# Patient Record
Sex: MALE | Race: WHITE | NOT HISPANIC OR LATINO | Employment: OTHER | URBAN - NONMETROPOLITAN AREA
[De-identification: names, ages, dates, MRNs, and addresses within clinical notes are randomized per-mention and may not be internally consistent; named-entity substitution may affect disease eponyms.]

---

## 2018-05-10 ENCOUNTER — HOSPITAL ENCOUNTER (OUTPATIENT)
Dept: GENERAL RADIOLOGY | Facility: HOSPITAL | Age: 63
Discharge: HOME | End: 2018-05-10
Admitting: FAMILY MEDICINE

## 2018-05-10 DIAGNOSIS — R13.12 OROPHARYNGEAL DYSPHAGIA: ICD-10-CM

## 2018-05-10 PROCEDURE — G8998 SWALLOW D/C STATUS: HCPCS | Performed by: SPEECH-LANGUAGE PATHOLOGIST

## 2018-05-10 PROCEDURE — A9270 NON-COVERED ITEM OR SERVICE: HCPCS | Performed by: RADIOLOGY

## 2018-05-10 PROCEDURE — G8996 SWALLOW CURRENT STATUS: HCPCS | Performed by: SPEECH-LANGUAGE PATHOLOGIST

## 2018-05-10 PROCEDURE — 92611 MOTION FLUOROSCOPY/SWALLOW: CPT | Performed by: SPEECH-LANGUAGE PATHOLOGIST

## 2018-05-10 PROCEDURE — G8997 SWALLOW GOAL STATUS: HCPCS | Performed by: SPEECH-LANGUAGE PATHOLOGIST

## 2018-05-10 PROCEDURE — 63710000001 BARIUM SULFATE 96 % RECONSTITUTED SUSPENSION: Performed by: RADIOLOGY

## 2018-05-10 PROCEDURE — 74230 X-RAY XM SWLNG FUNCJ C+: CPT

## 2018-05-10 RX ADMIN — BARIUM SULFATE 40 ML: 960 POWDER, FOR SUSPENSION ORAL at 09:00

## 2018-05-10 NOTE — THERAPY EVALUATION
Outpatient Speech Language Pathology   Adult Swallow Initial Evaluation  [facility]     Patient Name: Bautista Grubbs  : 1955  MRN: [id number]  Today's Date: 5/10/2018         Visit Date: 05/10/2018   There is no problem list on file for this patient.       No past medical history on file.     No past surgical history on file.  SPEECH PATHOLOGY MODIFIED BARIUM SWALLOW STUDY (VFSS)    Chief Complaint: coughing on liquids    Medical Diagnoses: dysphagia    Past Medical History: severe intellectual disorder, bi-temporal seizure disorder, scoliosis        Present diet textures: NTL, ground meat, chopped food    Views: Patient seated at 90 degrees in:    X__lateral view    __A/P    Ability to follow instructions:  __Excellent   __Good __Fair  _X_Poor      Oral Motor Status Exam:    Dentition: X__Natural  __Dentures   __Edentulous         __Partials         Condition/Fit: missing most teeth    Presence of Oral Motor Weakness:  Yes, general weakness, decreased bolus control.l      The following consistencies were given, with symptoms as noted:    Consistency Method Labial Seal Oral Prep AP Transit Premature Spillage Delayed Swallow Reflex Laryngeal Penetration Aspiration Pooling Valleculae Pooling pyriform sinuses   Thin  Cup     yes yes   yes yes   Thin   cup    yes yes       Puree spoon    yes    Yes, after the swallow Yes, after the swallow   Cookie         Yes, after the swallow Yes, after the swallow     Impairments:  _X_Premature loss of bolus  __Reduced velopharyngeal closure  _X_Decreased lingual propulsion  __Reduced epiglottic inversion  __Decreased hyolaryngeal elevation  __Reduced laryngeal closure  __Reduced esophageal sphincter opening      Penetration-Aspiration Scale Rating:     Category Score Descriptions   No penetration or aspiration 1 Contrast does not enter the airway   Penetration 2 Contrast enters the airway, remains above vocal folds; no residue    3 Contrast remains above vocal  folds; visible residue remains    4 Contrast contacts vocal folds; no residue    5 Contrast contacts vocal folds; visible residue remains   Aspiration 6 Contrast passes glottis; no subglottic residue visible    7 Contrast passes glottis; visible subglottic residue despite patient's response    8 Contrast passes glottis; visible subglottic residue; absent patient response       Aspiration:  __Prior __During __After the swallow    Cough: X__Delayed __Immediate   __Absent with penetration/aspiration    Cough response:  __Weak __Strong        Dysphagia Scale Rating:    Dysphagia Rating Scale Explanation   0   Normal swallowing mechanism     1 Minimal Dysphagia- video swallow shows slight deviance from a normal swallow. Patient may report change in sensation during swallow. No change in diet is required.     2 Mild Dysphagia- oropharyngeal dysphagia present, which can be managed by specific swallow suggestions. Slight modification in consistency of diet may be indicated.      3 Mild-Moderate Dysphagia- potential for aspiration exists but is diminished by specific swallow techniques and a modified diet. Time for eating is significantly increased; thus supplemental nutrition may be indicated.      4 Moderate Dysphagia- significant potential for aspiration exists. Trace aspiration of one or more consistencies may be seen under videofluoroscopy. Patient may eat certain consistencies by using specific techniques to minimize potential for aspiration and/or facilitate swallowing. Supervision at mealtimes required. May require supplemental nutrition orally or via feeding tube.      5 Moderately Severe Dysphagia- patient aspirates 5% to 10% on one or more consistencies, with potential for aspiration on all consistencies. Potential for aspiration minimized by specific swallow instructions. Cough reflex absent or nonprotective. Alterative mode of feeding required to maintain patient's nutritional needs. If pulmonary status is  "compromised, \"nothing by mouth\" may be indicated.      6 Severe Dysphagia- more than 10% aspiration for all consistencies. \"Nothing by mouth\" recommended.          Recommendation:  Based on performance of MBS , recommend advance to thin liquids and consider flow controlled cup if cough persists at bedside.  Recommend continue mech soft solids or chopped foods d/t pt having increased oral transit and decreased mastication skills.  He may also require monitoring for use of compensatory strats as he has decreaed cognition.      Compensatory Swallow Strategies suggested:   _x_sit 90 degrees for all PO and ___ minutes after meals/snacks/medication   x__small bites   x__small sips   _x_cyclic eating (2 bites/1 sip)   _x_eat/drink slowly   _x_chew food well   _x_empty mouth each time between bites   _x_swallow _1_ times after each 1__bite _1_sip _1_at the end of the meal   _x_check mouth for pocketing   _x_supervision at all meals for compliance with compensatory swallow strategies.               Visit Dx:     ICD-10-CM ICD-9-CM   1. Oropharyngeal dysphagia R13.12 787.22                 SLP Adult Swallow Evaluation - 05/10/18 0900        Rehab Evaluation    Document Type evaluation  -EC    Total Evaluation Minutes, SLP 30  -EC    Subjective Information no complaints  -EC    Patient Observations alert;cooperative;agree to therapy  -EC    Patient/Family Observations staff from [facility] present  -EC    Patient Effort good  -EC    Comment pt difficuylty following directions  -EC    Symptoms Noted During/After Treatment none  -EC       General Information    Patient Profile Reviewed yes  -EC    Pertinent History Of Current Problem recent change to NTL via cup rim   -EC    Current Method of Nutrition ground;soft textures;chopped  -EC    Barriers to Rehab language barrier;cognitive status;previous functional deficit  -EC    Patient's Goals for Discharge return to all previous roles/activities  -EC       Oral Motor and Function    " Dentition Assessment missing teeth;teeth are in poor condition  -EC    Secretion Management WNL/WFL  -EC    Mucosal Quality moist, healthy  -EC    Volitional Swallow delayed  -EC    Volitional Cough unable to elicit  -EC       General Eating/Swallowing Observations    Respiratory Support Currently in Use room air  -EC    Eating/Swallowing Skills self-fed;fed by SLP  -EC    Positioning During Eating upright 90 degree  -EC    Utensils Used spoon;cup  -EC    Consistencies Trialed soft textures;pureed;thin liquids  -EC       Clinical Swallow Eval    Oral Prep Phase impaired  -EC    Oral Transit impaired  -EC    Oral Residue WFL  -EC    Pharyngeal Phase suspected pharyngeal impairment  -EC    Esophageal Phase unremarkable  -EC       Oral Prep Concerns    Oral Prep Concerns prolonged mastication;incomplete or weak lip closure around spoon;incomplete bolus preparation  -EC    Prolonged Mastication pudding;mechanical soft  -EC    Incomplete or Weak Lip Closure Around Spoon pudding;mechanical soft  -EC    Incomplete Bolus Preparation mechanical soft  -EC    Oral Prep Concerns, Comment pt has decreased mastication of soft foods and will benfit from mech soft or chopped foods.    -EC       Oral Transit Concerns    Oral Transit Concerns delayed initiation of bolus transit;increased oral transit time  -EC    Delayed Intiation of Bolus Transit pudding;mechanical soft  -EC    Increased Oral Transit Time pudding;mechanical soft  -EC       Pharyngeal Phase Concerns    Pharyngeal Phase Concerns multiple swallows  -EC    Multiple Swallows thin;pudding;mechanical soft  -EC    Pharyngeal Phase Concerns, Comment pt clears residue with second swallow  -EC       MBS/VFSS    Utensils Used spoon;cup  -EC    Consistencies Trialed soft textures;pureed;thin liquids  -EC       MBS/VFSS Interpretation    Oral Prep Phase impaired oral phase of swallowing  -EC    Oral Transit Phase impaired  -EC    VFSS Summary no aspiration noted.  flash  penetration of thin liquids with delayed swallow initation noted  -EC    Oral Phase, Comment increased time for mastication of puree and mech soft solids   -EC       Oral Preparatory Phase    Oral Preparatory Phase prolonged manipulation  -EC    Prolonged Manipulation mechanical soft  -EC    Oral Preparatory Phase, Comment increased time noted with chewable.    -EC       Oral Transit Phase    Impaired Oral Transit Phase delayed initiation of bolus transit;increased A-P transit time  -EC    Delayed Initiation of bolus transit pudding/puree;mechanical soft  -EC    Increased A-P Transit Time mechanical soft;pudding/puree  -EC    Oral Transit Phase, Comment cleared residue with second swallow  -EC       Initiation of Pharyngeal Swallow    Initiation of Pharyngeal Swallow bolus in valleculae;bolus in pyriform sinuses  -EC    Pharyngeal Phase, Comment cleared residue with second swallow  -EC       Esophageal Phase    Esophageal Phase see radiology report for further details  -EC      User Key  (r) = Recorded By, (t) = Taken By, (c) = Cosigned By    Initials Name Provider Type    EC Светлана Coronado CCC-SLP Speech and Language Pathologist                              OP SLP Education     Row Name 05/10/18 1003       Education    Barriers to Learning Language barrier;Decreased comprehension  -EC    Education Provided Family/caregivers demonstrated recommended strategies  -EC    Assessed Learning needs  -EC    Learning Motivation Weak  -EC    Learning Method Explanation;Demonstration  -EC    Teaching Response Demonstrated understanding  -EC      User Key  (r) = Recorded By, (t) = Taken By, (c) = Cosigned By    Initials Name Effective Dates    EC Светлана Coronado CCC-SLP 03/07/18 -                             Time Calculation:   SLP Start Time: 0900  SLP Stop Time: 0930  SLP Time Calculation (min): 30 min  Total Timed Code Minutes- SLP: 30 minute(s)    Therapy Charges for Today     Code Description Service Date Service  Provider Modifiers Qty    02210104451 HC ST SWALLOWING CURRENT STATUS 5/10/2018 BRITTANY MetzSLP GN, CK 1    59748996212 HC ST SWALLOWING PROJECTED 5/10/2018 TORIBIO Metz GN, CJ 1    06221924305 HC ST SWALLOWING DISCHARGE 5/10/2018 TORIBIO Metz, CJ 1    97010712092 HC ST MOTION FLUORO EVAL SWALLOW 2 5/10/2018 TORIBIO Metz GN 1          SLP G-Codes  Functional Limitations: Swallowing  Swallow Current Status (): At least 40 percent but less than 60 percent impaired, limited or restricted  Swallow Goal Status (): At least 20 percent but less than 40 percent impaired, limited or restricted  Swallow Discharge Status (): At least 20 percent but less than 40 percent impaired, limited or restricted        TORIBIO Barone  5/10/2018

## 2018-12-06 ENCOUNTER — OUTSIDE FACILITY SERVICE (OUTPATIENT)
Dept: FAMILY MEDICINE CLINIC | Facility: CLINIC | Age: 63
End: 2018-12-06

## 2018-12-06 PROCEDURE — 99235 HOSP IP/OBS SAME DATE MOD 70: CPT | Performed by: FAMILY MEDICINE

## 2018-12-08 ENCOUNTER — APPOINTMENT (OUTPATIENT)
Dept: CT IMAGING | Facility: HOSPITAL | Age: 63
End: 2018-12-08

## 2018-12-08 ENCOUNTER — APPOINTMENT (OUTPATIENT)
Dept: GENERAL RADIOLOGY | Facility: HOSPITAL | Age: 63
End: 2018-12-08

## 2018-12-08 ENCOUNTER — HOSPITAL ENCOUNTER (OUTPATIENT)
Facility: HOSPITAL | Age: 63
Setting detail: OBSERVATION
LOS: 1 days | Discharge: HOME HEALTH | End: 2018-12-11
Attending: EMERGENCY MEDICINE | Admitting: EMERGENCY MEDICINE

## 2018-12-08 DIAGNOSIS — Z74.09 IMPAIRED MOBILITY AND ADLS: ICD-10-CM

## 2018-12-08 DIAGNOSIS — R13.10 DYSPHAGIA, UNSPECIFIED TYPE: ICD-10-CM

## 2018-12-08 DIAGNOSIS — R55 SYNCOPE, UNSPECIFIED SYNCOPE TYPE: Primary | ICD-10-CM

## 2018-12-08 DIAGNOSIS — Z78.9 IMPAIRED MOBILITY AND ADLS: ICD-10-CM

## 2018-12-08 DIAGNOSIS — J18.9 PNEUMONIA OF RIGHT LOWER LOBE DUE TO INFECTIOUS ORGANISM: ICD-10-CM

## 2018-12-08 DIAGNOSIS — Z74.09 IMPAIRED FUNCTIONAL MOBILITY, BALANCE, GAIT, AND ENDURANCE: ICD-10-CM

## 2018-12-08 LAB
ALBUMIN SERPL-MCNC: 4 G/DL (ref 3.4–4.8)
ALBUMIN/GLOB SERPL: 1.4 G/DL (ref 1.1–1.8)
ALP SERPL-CCNC: 92 U/L (ref 38–126)
ALT SERPL W P-5'-P-CCNC: 57 U/L (ref 21–72)
ANION GAP SERPL CALCULATED.3IONS-SCNC: 10 MMOL/L (ref 5–15)
AST SERPL-CCNC: 39 U/L (ref 17–59)
BASOPHILS # BLD AUTO: 0.01 10*3/MM3 (ref 0–0.2)
BASOPHILS NFR BLD AUTO: 0.1 % (ref 0–2)
BILIRUB SERPL-MCNC: 0.4 MG/DL (ref 0.2–1.3)
BILIRUB UR QL STRIP: NEGATIVE
BUN BLD-MCNC: 17 MG/DL (ref 7–21)
BUN/CREAT SERPL: 21 (ref 7–25)
CALCIUM SPEC-SCNC: 9.4 MG/DL (ref 8.4–10.2)
CHLORIDE SERPL-SCNC: 98 MMOL/L (ref 95–110)
CLARITY UR: ABNORMAL
CO2 SERPL-SCNC: 27 MMOL/L (ref 22–31)
COLOR UR: YELLOW
CREAT BLD-MCNC: 0.81 MG/DL (ref 0.7–1.3)
D-LACTATE SERPL-SCNC: 1 MMOL/L (ref 0.5–2)
DEPRECATED RDW RBC AUTO: 42.6 FL (ref 35.1–43.9)
EOSINOPHIL # BLD AUTO: 0.11 10*3/MM3 (ref 0–0.7)
EOSINOPHIL NFR BLD AUTO: 1.2 % (ref 0–7)
ERYTHROCYTE [DISTWIDTH] IN BLOOD BY AUTOMATED COUNT: 12.8 % (ref 11.5–14.5)
FLUAV AG NPH QL: NEGATIVE
FLUBV AG NPH QL IA: NEGATIVE
GFR SERPL CREATININE-BSD FRML MDRD: 97 ML/MIN/1.73 (ref 49–113)
GLOBULIN UR ELPH-MCNC: 2.8 GM/DL (ref 2.3–3.5)
GLUCOSE BLD-MCNC: 103 MG/DL (ref 60–100)
GLUCOSE UR STRIP-MCNC: NEGATIVE MG/DL
HCT VFR BLD AUTO: 35.2 % (ref 39–49)
HGB BLD-MCNC: 12.6 G/DL (ref 13.7–17.3)
HGB UR QL STRIP.AUTO: NEGATIVE
HOLD SPECIMEN: NORMAL
IMM GRANULOCYTES # BLD: 0.03 10*3/MM3 (ref 0–0.02)
IMM GRANULOCYTES NFR BLD: 0.3 % (ref 0–0.5)
KETONES UR QL STRIP: NEGATIVE
LEUKOCYTE ESTERASE UR QL STRIP.AUTO: NEGATIVE
LYMPHOCYTES # BLD AUTO: 0.48 10*3/MM3 (ref 0.6–4.2)
LYMPHOCYTES NFR BLD AUTO: 5.2 % (ref 10–50)
MAGNESIUM SERPL-MCNC: 2 MG/DL (ref 1.6–2.3)
MCH RBC QN AUTO: 32.6 PG (ref 26.5–34)
MCHC RBC AUTO-ENTMCNC: 35.8 G/DL (ref 31.5–36.3)
MCV RBC AUTO: 91.2 FL (ref 80–98)
MONOCYTES # BLD AUTO: 0.83 10*3/MM3 (ref 0–0.9)
MONOCYTES NFR BLD AUTO: 9.1 % (ref 0–12)
NEUTROPHILS # BLD AUTO: 7.7 10*3/MM3 (ref 2–8.6)
NEUTROPHILS NFR BLD AUTO: 84.1 % (ref 37–80)
NITRITE UR QL STRIP: NEGATIVE
NT-PROBNP SERPL-MCNC: 74.3 PG/ML (ref 0–900)
PH UR STRIP.AUTO: 7.5 [PH] (ref 5–9)
PLATELET # BLD AUTO: 266 10*3/MM3 (ref 150–450)
PMV BLD AUTO: 9.8 FL (ref 8–12)
POTASSIUM BLD-SCNC: 4 MMOL/L (ref 3.5–5.1)
PROT SERPL-MCNC: 6.8 G/DL (ref 6.3–8.6)
PROT UR QL STRIP: NEGATIVE
RBC # BLD AUTO: 3.86 10*6/MM3 (ref 4.37–5.74)
SODIUM BLD-SCNC: 135 MMOL/L (ref 137–145)
SP GR UR STRIP: 1.01 (ref 1–1.03)
TROPONIN I SERPL-MCNC: <0.012 NG/ML
UROBILINOGEN UR QL STRIP: ABNORMAL
WBC NRBC COR # BLD: 9.16 10*3/MM3 (ref 3.2–9.8)
WHOLE BLOOD HOLD SPECIMEN: NORMAL

## 2018-12-08 PROCEDURE — 96372 THER/PROPH/DIAG INJ SC/IM: CPT

## 2018-12-08 PROCEDURE — 74018 RADEX ABDOMEN 1 VIEW: CPT

## 2018-12-08 PROCEDURE — 99284 EMERGENCY DEPT VISIT MOD MDM: CPT

## 2018-12-08 PROCEDURE — G0378 HOSPITAL OBSERVATION PER HR: HCPCS

## 2018-12-08 PROCEDURE — 83735 ASSAY OF MAGNESIUM: CPT | Performed by: EMERGENCY MEDICINE

## 2018-12-08 PROCEDURE — 25010000002 ZIPRASIDONE MESYLATE PER 10 MG: Performed by: EMERGENCY MEDICINE

## 2018-12-08 PROCEDURE — 83605 ASSAY OF LACTIC ACID: CPT | Performed by: FAMILY MEDICINE

## 2018-12-08 PROCEDURE — 70450 CT HEAD/BRAIN W/O DYE: CPT

## 2018-12-08 PROCEDURE — 87040 BLOOD CULTURE FOR BACTERIA: CPT | Performed by: FAMILY MEDICINE

## 2018-12-08 PROCEDURE — 80053 COMPREHEN METABOLIC PANEL: CPT | Performed by: EMERGENCY MEDICINE

## 2018-12-08 PROCEDURE — 36415 COLL VENOUS BLD VENIPUNCTURE: CPT | Performed by: FAMILY MEDICINE

## 2018-12-08 PROCEDURE — 71045 X-RAY EXAM CHEST 1 VIEW: CPT

## 2018-12-08 PROCEDURE — 85025 COMPLETE CBC W/AUTO DIFF WBC: CPT | Performed by: EMERGENCY MEDICINE

## 2018-12-08 PROCEDURE — 87804 INFLUENZA ASSAY W/OPTIC: CPT | Performed by: EMERGENCY MEDICINE

## 2018-12-08 PROCEDURE — 93005 ELECTROCARDIOGRAM TRACING: CPT | Performed by: EMERGENCY MEDICINE

## 2018-12-08 PROCEDURE — 96375 TX/PRO/DX INJ NEW DRUG ADDON: CPT

## 2018-12-08 PROCEDURE — 25010000002 LORAZEPAM PER 2 MG: Performed by: EMERGENCY MEDICINE

## 2018-12-08 PROCEDURE — 81003 URINALYSIS AUTO W/O SCOPE: CPT | Performed by: EMERGENCY MEDICINE

## 2018-12-08 PROCEDURE — 96365 THER/PROPH/DIAG IV INF INIT: CPT

## 2018-12-08 PROCEDURE — 93010 ELECTROCARDIOGRAM REPORT: CPT | Performed by: INTERNAL MEDICINE

## 2018-12-08 PROCEDURE — 84484 ASSAY OF TROPONIN QUANT: CPT | Performed by: EMERGENCY MEDICINE

## 2018-12-08 PROCEDURE — 25010000002 CEFTRIAXONE PER 250 MG: Performed by: FAMILY MEDICINE

## 2018-12-08 PROCEDURE — 83880 ASSAY OF NATRIURETIC PEPTIDE: CPT | Performed by: EMERGENCY MEDICINE

## 2018-12-08 PROCEDURE — 94640 AIRWAY INHALATION TREATMENT: CPT

## 2018-12-08 PROCEDURE — 96361 HYDRATE IV INFUSION ADD-ON: CPT

## 2018-12-08 RX ORDER — ACETAMINOPHEN 325 MG/1
650 TABLET ORAL EVERY 4 HOURS PRN
Status: DISCONTINUED | OUTPATIENT
Start: 2018-12-08 | End: 2018-12-11 | Stop reason: HOSPADM

## 2018-12-08 RX ORDER — CHLORPHENIRAMINE MALEATE 4 MG/1
4 TABLET ORAL EVERY 6 HOURS PRN
Status: DISCONTINUED | OUTPATIENT
Start: 2018-12-08 | End: 2018-12-11 | Stop reason: HOSPADM

## 2018-12-08 RX ORDER — FERROUS SULFATE 325(65) MG
325 TABLET ORAL
COMMUNITY

## 2018-12-08 RX ORDER — BUDESONIDE AND FORMOTEROL FUMARATE DIHYDRATE 160; 4.5 UG/1; UG/1
2 AEROSOL RESPIRATORY (INHALATION)
Status: DISCONTINUED | OUTPATIENT
Start: 2018-12-08 | End: 2018-12-11 | Stop reason: HOSPADM

## 2018-12-08 RX ORDER — AZITHROMYCIN 250 MG/1
500 TABLET, FILM COATED ORAL ONCE
Status: COMPLETED | OUTPATIENT
Start: 2018-12-08 | End: 2018-12-08

## 2018-12-08 RX ORDER — LEVETIRACETAM 1000 MG/1
1000 TABLET ORAL 2 TIMES DAILY
COMMUNITY

## 2018-12-08 RX ORDER — SIMVASTATIN 20 MG
20 TABLET ORAL DAILY
COMMUNITY

## 2018-12-08 RX ORDER — ATORVASTATIN CALCIUM 20 MG/1
20 TABLET, FILM COATED ORAL DAILY
Status: DISCONTINUED | OUTPATIENT
Start: 2018-12-08 | End: 2018-12-11 | Stop reason: HOSPADM

## 2018-12-08 RX ORDER — ONDANSETRON 2 MG/ML
4 INJECTION INTRAMUSCULAR; INTRAVENOUS EVERY 6 HOURS PRN
Status: DISCONTINUED | OUTPATIENT
Start: 2018-12-08 | End: 2018-12-11 | Stop reason: HOSPADM

## 2018-12-08 RX ORDER — DOCUSATE SODIUM 100 MG/1
100 CAPSULE, LIQUID FILLED ORAL 2 TIMES DAILY
Status: DISCONTINUED | OUTPATIENT
Start: 2018-12-08 | End: 2018-12-11 | Stop reason: HOSPADM

## 2018-12-08 RX ORDER — ZIPRASIDONE MESYLATE 20 MG/ML
10 INJECTION, POWDER, LYOPHILIZED, FOR SOLUTION INTRAMUSCULAR ONCE
Status: COMPLETED | OUTPATIENT
Start: 2018-12-08 | End: 2018-12-08

## 2018-12-08 RX ORDER — FAMOTIDINE 40 MG/1
40 TABLET, FILM COATED ORAL DAILY
Status: DISCONTINUED | OUTPATIENT
Start: 2018-12-08 | End: 2018-12-11 | Stop reason: HOSPADM

## 2018-12-08 RX ORDER — SODIUM CHLORIDE 0.9 % (FLUSH) 0.9 %
3 SYRINGE (ML) INJECTION EVERY 12 HOURS SCHEDULED
Status: DISCONTINUED | OUTPATIENT
Start: 2018-12-08 | End: 2018-12-11 | Stop reason: HOSPADM

## 2018-12-08 RX ORDER — LORAZEPAM 2 MG/ML
0.5 INJECTION INTRAMUSCULAR ONCE
Status: COMPLETED | OUTPATIENT
Start: 2018-12-08 | End: 2018-12-08

## 2018-12-08 RX ORDER — LEVETIRACETAM 750 MG/1
750 TABLET ORAL 2 TIMES DAILY
COMMUNITY
End: 2022-09-26

## 2018-12-08 RX ORDER — CYPROHEPTADINE HYDROCHLORIDE 4 MG/1
4 TABLET ORAL 3 TIMES DAILY
COMMUNITY
End: 2020-03-12

## 2018-12-08 RX ORDER — LANOLIN ALCOHOL/MO/W.PET/CERES
50 CREAM (GRAM) TOPICAL
COMMUNITY
End: 2020-03-12

## 2018-12-08 RX ORDER — OMEPRAZOLE 20 MG/1
20 CAPSULE, DELAYED RELEASE ORAL DAILY
COMMUNITY
End: 2021-01-09 | Stop reason: HOSPADM

## 2018-12-08 RX ORDER — FLUOXETINE HYDROCHLORIDE 20 MG/1
20 CAPSULE ORAL DAILY
COMMUNITY
End: 2020-09-29

## 2018-12-08 RX ORDER — ZIPRASIDONE MESYLATE 20 MG/ML
10 INJECTION, POWDER, LYOPHILIZED, FOR SOLUTION INTRAMUSCULAR EVERY 12 HOURS PRN
Status: DISCONTINUED | OUTPATIENT
Start: 2018-12-08 | End: 2018-12-11 | Stop reason: HOSPADM

## 2018-12-08 RX ORDER — SODIUM CHLORIDE 0.9 % (FLUSH) 0.9 %
10 SYRINGE (ML) INJECTION AS NEEDED
Status: DISCONTINUED | OUTPATIENT
Start: 2018-12-08 | End: 2018-12-11 | Stop reason: HOSPADM

## 2018-12-08 RX ORDER — LORAZEPAM 2 MG/ML
0.5 INJECTION INTRAMUSCULAR EVERY 6 HOURS PRN
Status: DISCONTINUED | OUTPATIENT
Start: 2018-12-08 | End: 2018-12-11 | Stop reason: HOSPADM

## 2018-12-08 RX ORDER — LEVETIRACETAM 500 MG/1
500 TABLET ORAL 2 TIMES DAILY
COMMUNITY
End: 2021-01-21

## 2018-12-08 RX ORDER — LANOLIN ALCOHOL/MO/W.PET/CERES
3 CREAM (GRAM) TOPICAL NIGHTLY
COMMUNITY
End: 2020-03-12

## 2018-12-08 RX ORDER — SODIUM CHLORIDE 0.9 % (FLUSH) 0.9 %
3-10 SYRINGE (ML) INJECTION AS NEEDED
Status: DISCONTINUED | OUTPATIENT
Start: 2018-12-08 | End: 2018-12-11 | Stop reason: HOSPADM

## 2018-12-08 RX ORDER — FERROUS SULFATE TAB EC 324 MG (65 MG FE EQUIVALENT) 324 (65 FE) MG
324 TABLET DELAYED RESPONSE ORAL
Status: DISCONTINUED | OUTPATIENT
Start: 2018-12-09 | End: 2018-12-11 | Stop reason: HOSPADM

## 2018-12-08 RX ORDER — POLYETHYLENE GLYCOL 3350 17 G/17G
17 POWDER, FOR SOLUTION ORAL NIGHTLY
COMMUNITY
End: 2021-08-23

## 2018-12-08 RX ORDER — LAMOTRIGINE 200 MG/1
200 TABLET ORAL 2 TIMES DAILY
COMMUNITY

## 2018-12-08 RX ORDER — FAMOTIDINE 10 MG/ML
20 INJECTION, SOLUTION INTRAVENOUS EVERY 12 HOURS SCHEDULED
Status: CANCELLED | OUTPATIENT
Start: 2018-12-08

## 2018-12-08 RX ORDER — IPRATROPIUM BROMIDE AND ALBUTEROL SULFATE 2.5; .5 MG/3ML; MG/3ML
3 SOLUTION RESPIRATORY (INHALATION) EVERY 4 HOURS PRN
Status: DISCONTINUED | OUTPATIENT
Start: 2018-12-08 | End: 2018-12-11 | Stop reason: HOSPADM

## 2018-12-08 RX ORDER — IPRATROPIUM BROMIDE AND ALBUTEROL SULFATE 2.5; .5 MG/3ML; MG/3ML
3 SOLUTION RESPIRATORY (INHALATION)
Status: DISCONTINUED | OUTPATIENT
Start: 2018-12-08 | End: 2018-12-11 | Stop reason: HOSPADM

## 2018-12-08 RX ORDER — ACETAMINOPHEN 500 MG
500 TABLET ORAL 2 TIMES DAILY
COMMUNITY
End: 2020-03-12

## 2018-12-08 RX ORDER — SODIUM CHLORIDE 9 MG/ML
100 INJECTION, SOLUTION INTRAVENOUS CONTINUOUS
Status: DISCONTINUED | OUTPATIENT
Start: 2018-12-08 | End: 2018-12-11

## 2018-12-08 RX ADMIN — THIORIDAZINE HYDROCHLORIDE 200 MG: 50 TABLET, FILM COATED ORAL at 21:55

## 2018-12-08 RX ADMIN — LEVETIRACETAM 1750 MG: 500 TABLET ORAL at 21:55

## 2018-12-08 RX ADMIN — FAMOTIDINE 40 MG: 40 TABLET ORAL at 18:12

## 2018-12-08 RX ADMIN — LORAZEPAM 0.5 MG: 2 INJECTION INTRAMUSCULAR; INTRAVENOUS at 06:05

## 2018-12-08 RX ADMIN — AZITHROMYCIN 500 MG: 250 TABLET, FILM COATED ORAL at 09:17

## 2018-12-08 RX ADMIN — ATORVASTATIN CALCIUM 20 MG: 20 TABLET, FILM COATED ORAL at 21:55

## 2018-12-08 RX ADMIN — SODIUM CHLORIDE 100 ML/HR: 900 INJECTION, SOLUTION INTRAVENOUS at 16:24

## 2018-12-08 RX ADMIN — IPRATROPIUM BROMIDE AND ALBUTEROL SULFATE 3 ML: 2.5; .5 SOLUTION RESPIRATORY (INHALATION) at 16:11

## 2018-12-08 RX ADMIN — ZIPRASIDONE MESYLATE 10 MG: 20 INJECTION, POWDER, LYOPHILIZED, FOR SOLUTION INTRAMUSCULAR at 03:25

## 2018-12-08 RX ADMIN — SODIUM CHLORIDE, PRESERVATIVE FREE 3 ML: 5 INJECTION INTRAVENOUS at 21:58

## 2018-12-08 RX ADMIN — CEFTRIAXONE SODIUM 1 G: 1 INJECTION, POWDER, FOR SOLUTION INTRAMUSCULAR; INTRAVENOUS at 09:00

## 2018-12-08 RX ADMIN — DOCUSATE SODIUM 100 MG: 100 CAPSULE, LIQUID FILLED ORAL at 21:58

## 2018-12-08 NOTE — ED NOTES
Caregiver requested a staff member to sit so they could go on break.     Delvis Cruz RN  12/08/18 0984

## 2018-12-08 NOTE — H&P
[facility] Medicine Admission      Date of Admission: 12/8/2018      Primary Care Physician: Provider, No Known      Chief Complaint   Patient presents with   • Nausea   • Vomiting   • Syncope   • Hypotension       HPI:  63-year-old male presents to emergency department By caregivers.  He is intellectually disabled with history of major depressive disorder, seizures, and autism.  He is nonverbal.  Caregiver reports the patient has hes been sick lately , cough short of breath, vomting, diarrhea and BP has been fluctuating . Patient also had an episode of syncope.  patient  is unable to provide history due to being  Non verbal , and hx is obtained from caregivers and chart and medical staff.     Family history, surgical history, social history, current medications and allergies are reviewed with the caregiver and triage documentation and vitals are reviewed          Past Medical History:   Past Medical History:   Diagnosis Date   • Alopecia    • Arcus senilis    • Autism    • Cataract    • Contracture, right wrist    • COPD (chronic obstructive pulmonary disease) (CMS/HCC)    • GERD (gastroesophageal reflux disease)    • Hyperlipidemia    • Insomnia    • Intellectual disability    • Lennox-Gastaut syndrome (CMS/HCC)    • Major depressive disorder    • Orthostatic hypotension    • Osteoporosis    • Right bundle branch block    • Scoliosis    • Seizures (CMS/HCC)        Past Surgical History: History reviewed. No pertinent surgical history.    Family History: History reviewed. No pertinent family history.    Social History:   Social History     Socioeconomic History   • Marital status: Single     Spouse name: Not on file   • Number of children: Not on file   • Years of education: Not on file   • Highest education level: Not on file   Substance and Sexual Activity   • Alcohol use: No     Frequency: Never   • Drug use: No       Allergies:   Allergies   Allergen Reactions   •  Haldol [Haloperidol] Unknown (See Comments)     Unknown         Medications:   Prior to Admission medications    Not on File     No current facility-administered medications for this encounter.     Current Outpatient Medications:   •  acetaminophen (TYLENOL) 500 MG tablet, Take 500 mg by mouth 2 (Two) Times a Day., Disp: , Rfl:   •  calcium carbonate-vitamin d (CALCIUM 600+D) 600-400 MG-UNIT per tablet, Take 1 tablet by mouth Daily., Disp: , Rfl:   •  cyproheptadine (PERIACTIN) 4 MG tablet, Take 4 mg by mouth 3 (Three) Times a Day., Disp: , Rfl:   •  ferrous sulfate 325 (65 FE) MG tablet, Take 325 mg by mouth Daily With Breakfast., Disp: , Rfl:   •  Fludrocortisone Acetate (FLORINEF PO), Take 0.1 mg by mouth Daily., Disp: , Rfl:   •  FLUoxetine (PROzac) 20 MG capsule, Take 20 mg by mouth Daily., Disp: , Rfl:   •  lamoTRIgine (LaMICtal) 200 MG tablet, Take 200 mg by mouth 3 (Three) Times a Day., Disp: , Rfl:   •  levETIRAcetam (KEPPRA) 1000 MG tablet, Take 1,000 mg by mouth 2 (Two) Times a Day., Disp: , Rfl:   •  levETIRAcetam (KEPPRA) 250 MG tablet, Take 250 mg by mouth 2 (Two) Times a Day., Disp: , Rfl:   •  levETIRAcetam (KEPPRA) 500 MG tablet, Take 500 mg by mouth 2 (Two) Times a Day., Disp: , Rfl:   •  melatonin 3 MG tablet, Take 3 mg by mouth Every Night., Disp: , Rfl:   •  Multiple Vitamins-Minerals (CERTAVITE/ANTIOXIDANTS PO), Take 1 tablet by mouth 1 (One) Time., Disp: , Rfl:   •  omeprazole (priLOSEC) 20 MG capsule, Take 20 mg by mouth Daily., Disp: , Rfl:   •  polyethylene glycol (MIRALAX) packet, Take 17 g by mouth Every Night., Disp: , Rfl:   •  simvastatin (ZOCOR) 20 MG tablet, Take 20 mg by mouth Every Night., Disp: , Rfl:   •  thioridazine (MELLARIL) 100 MG tablet, Take 100 mg by mouth Daily., Disp: , Rfl:   •  thioridazine (MELLARIL) 100 MG tablet, Take 200 mg by mouth Every Night., Disp: , Rfl:   •  vitamin B-6 (PYRIDOXINE) 50 MG tablet, Take 50 mg by mouth Daily., Disp: , Rfl:   •  cefdinir  (OMNICEF) 300 MG capsule, Take 1 capsule by mouth 2 (Two) Times a Day., Disp: 6 capsule, Rfl: 0  Review of Systems:    -Otherwise complete ROS is negative except as mentioned above.    Physical Exam:    Physical Exam     Temp:  [98.5 °F (36.9 °C)-98.8 °F (37.1 °C)] 98.5 °F (36.9 °C)  Heart Rate:  [86-90] 87  Resp:  [18-20] 20  BP: (112-137)/(72-87) 112/72    -General:AAOx 0 non verbal . Covered in blanket up to his face. No signs of acute distress .  -HEENT: Head: Normocephalic and atraumatic. Right Ear: External ear normal. Left Ear: External ear normal. Nose: Nose normal. Mouth/Throat: Oropharynx is clear and moist.   Eyes: Conjunctivae and EOM are normal. Pupils are equal, round, and reactive to light. Right eye exhibits no discharge. Left eye exhibits no discharge.   Neck: Normal range of motion. Neck supple. No JVD present. No tracheal deviation present. No thyromegaly present.   -CVS: Normal rate, regular rhythm, normal heart sounds and intact distal pulses.  Exam reveals no gallop and no friction rub.    No murmur heard.  -Pulmonary: bilateral scattered wheezes . diminished bilaterally .  -Abdominal: Soft, Bowel sounds are normal. No distension and no mass. There is no tenderness. There is no rebound and no guarding. No hernia.   -Musc: No cyanosis, clubbing or edema.  -Lymph: No cervical adenopathy.       Nursing note and vitals reviewed.    Results Reviewed:  I have personally reviewed current lab, radiology, and data and agree with results.  Lab Results (last 24 hours)     Procedure Component Value Units Date/Time    Influenza Antigen, Rapid - Swab, Nasopharynx [[id number]]  (Normal) Collected:  12/08/18 0440    Specimen:  Swab from Nasopharynx Updated:  12/08/18 0706     Influenza A Ag, EIA Negative     Influenza B Ag, EIA Negative    Urinalysis With Culture If Indicated - Urine, Clean Catch [[id number]]  (Abnormal) Collected:  12/08/18 0552    Specimen:  Urine, Clean Catch Updated:  12/08/18 0603      Color, UA Yellow     Appearance, UA Cloudy     pH, UA 7.5     Specific Gravity, UA 1.011     Glucose, UA Negative     Ketones, UA Negative     Bilirubin, UA Negative     Blood, UA Negative     Protein, UA Negative     Leuk Esterase, UA Negative     Nitrite, UA Negative     Urobilinogen, UA 1.0 E.U./dL    Narrative:       Urine microscopic not indicated.    Extra Tubes [[id number]] Collected:  12/08/18 0452    Specimen:  Blood, Venous Line Updated:  12/08/18 0601    Narrative:       The following orders were created for panel order Extra Tubes.  Procedure                               Abnormality         Status                     ---------                               -----------         ------                     Light Blue Top[[id number]]                                   Final result               Gold Top - SST[[id number]]                                   Final result                 Please view results for these tests on the individual orders.    Light Blue Top [[id number]] Collected:  12/08/18 0452    Specimen:  Blood Updated:  12/08/18 0601     Extra Tube hold for add-on     Comment: Auto resulted       Gold Top - SST [[id number]] Collected:  12/08/18 0452    Specimen:  Blood Updated:  12/08/18 0601     Extra Tube Hold for add-ons.     Comment: Auto resulted.       Troponin [[id number]]  (Normal) Collected:  12/08/18 0440    Specimen:  Blood Updated:  12/08/18 0533     Troponin I <0.012 ng/mL     BNP [[id number]]  (Normal) Collected:  12/08/18 0440    Specimen:  Blood Updated:  12/08/18 0533     proBNP 74.3 pg/mL     Comprehensive Metabolic Panel [[id number]]  (Abnormal) Collected:  12/08/18 0440    Specimen:  Blood Updated:  12/08/18 0522     Glucose 103 mg/dL      BUN 17 mg/dL      Creatinine 0.81 mg/dL      Sodium 135 mmol/L      Potassium 4.0 mmol/L      Chloride 98 mmol/L      CO2 27.0 mmol/L      Calcium 9.4 mg/dL      Total Protein 6.8 g/dL      Albumin 4.00 g/dL      ALT (SGPT) 57 U/L      AST (SGOT) 39 U/L       Alkaline Phosphatase 92 U/L      Total Bilirubin 0.4 mg/dL      eGFR Non African Amer 97 mL/min/1.73      Globulin 2.8 gm/dL      A/G Ratio 1.4 g/dL      BUN/Creatinine Ratio 21.0     Anion Gap 10.0 mmol/L     Magnesium [[id number]]  (Normal) Collected:  12/08/18 0440    Specimen:  Blood Updated:  12/08/18 0522     Magnesium 2.0 mg/dL     CBC & Differential [[id number]] Collected:  12/08/18 0440    Specimen:  Blood Updated:  12/08/18 0456    Narrative:       The following orders were created for panel order CBC & Differential.  Procedure                               Abnormality         Status                     ---------                               -----------         ------                     CBC Auto Differential[[id number]]        Abnormal            Final result                 Please view results for these tests on the individual orders.    CBC Auto Differential [[id number]]  (Abnormal) Collected:  12/08/18 0440    Specimen:  Blood Updated:  12/08/18 0456     WBC 9.16 10*3/mm3      RBC 3.86 10*6/mm3      Hemoglobin 12.6 g/dL      Hematocrit 35.2 %      MCV 91.2 fL      MCH 32.6 pg      MCHC 35.8 g/dL      RDW 12.8 %      RDW-SD 42.6 fl      MPV 9.8 fL      Platelets 266 10*3/mm3      Neutrophil % 84.1 %      Lymphocyte % 5.2 %      Monocyte % 9.1 %      Eosinophil % 1.2 %      Basophil % 0.1 %      Immature Grans % 0.3 %      Neutrophils, Absolute 7.70 10*3/mm3      Lymphocytes, Absolute 0.48 10*3/mm3      Monocytes, Absolute 0.83 10*3/mm3      Eosinophils, Absolute 0.11 10*3/mm3      Basophils, Absolute 0.01 10*3/mm3      Immature Grans, Absolute 0.03 10*3/mm3         Imaging Results (last 24 hours)     Procedure Component Value Units Date/Time    CT Head Without Contrast [[id number]] Collected:  12/08/18 0600     Updated:  12/08/18 0651    Narrative:         CT head without contrast on 12/8/2018     CLINICAL INDICATION: Syncope, hypotension    TECHNIQUE: Multiple axial images are obtained throughout the  head  without the administration of contrast. This exam was performed  according to our departmental dose-optimization program, which  includes automated exposure control, adjustment of the mA and/or  kV according to patient size and/or use of iterative  reconstruction technique.   Total DLP is 2891.4 mGy*cm.    COMPARISON: None    FINDINGS: Motion somewhat limits the exam. There is generalized  cerebral atrophy. There is no hydrocephalus. No definite CT  evidence of acute infarct is noted. There is no hemorrhage. There  are no abnormal extra-axial fluid collections. There is no mass,  mass effect or midline shift. Mucosal thickening is noted in the  left maxillary sinus consistent with chronic sinusitis. No bony  abnormality is noted.      Impression:       Atrophy with no acute intracranial abnormality.    Electronically signed by:  Melvin Larsen  12/8/2018 6:50 AM  CST Workstation: [id number]    XR Chest 1 View [[id number]] Collected:  12/08/18 0259     Updated:  12/08/18 0315    Narrative:         Chest single view on  12/8/2018     CLINICAL INDICATION: Syncope    COMPARISON: None    FINDINGS: There is right basilar opacity consistent with  atelectasis and/or pneumonia. Lungs are otherwise clear. Heart is  within normal limits for size. Pulmonary vascularity is within  normal limits.      Impression:       Mild right basilar atelectasis and/or pneumonia.    Electronically signed by:  Melvin Larsen  12/8/2018 3:14 AM  CST Workstation: [id number]          Assessment/Plan       There are no active hospital problems to display for this patient.    Active Hospital Problems    Diagnosis   • Pneumonia   • Syncope     Assessment / Plan  --Acute dyspnea and cough  Likely secondary to COPD exacerbation/Pneumonia   Admit. IV antibiotics. IV Solu-Medrol. Duonebs. Supplemental O2. Cultures pending NPO, SLP .    --diarrhea , with N/V . Will obtain abd KUB and GI panel     --syncope . CT head negative. Trop  negative. Will obtain ECHO , IVF , treat as above     --hx of sezires : will need to confirm home meds . Seizure precations .    --intellectualy delay     --agitation : PRN geodan and ativan     --GERD: PPI      --GI/DVT prophylaxis    I discussed the patient's findings and my recommendations with the patient, and the patient verbalized understanding of the plan, risks and benefits of the plan.    This document has been electronically signed by Gulshan Thomas MD on December 8, 2018 9:06 AM

## 2018-12-08 NOTE — ED NOTES
Holding PO med until nurse can verify with caregiver that pt can tolerate PO medications.     Delvis Cruz RN  12/08/18 0901

## 2018-12-08 NOTE — ED NOTES
Pt refuses to keep O2 probe on finger, refuses to give temperature (had to hold down arm for axillary temp), and refuses to cooperate with commands. Pt repeatedly pulls cover over head.     Candelario Mayen RN  12/08/18 1233

## 2018-12-08 NOTE — ED PROVIDER NOTES
Subjective   63-year-old male presents to emergency department By caregivers.  He is intellectually disabled with history of major depressive disorder, seizures, and autism.  He is nonverbal.  Caregiver reports the patient has had multiple episodes of syncope over the last month or so and has had fluctuating blood pressure.  He does have history of orthostatic hypotension but they are more concerned about these episodes.  There is local physician has sent him out couple of times and he did receive some fluids recently at [facility] but with a low blood pressure this evening they wanted him sent out again for evaluation.  He does have history of hyperlipidemia but they deny any known history of cardiac disease.  He has reported COPD as well.    Family history, surgical history, social history, current medications and allergies are reviewed with the caregiver and triage documentation and vitals are reviewed.        History provided by:  Caregiver and medical records  History limited by:  Psychiatric disorder   used: No        Review of Systems   Unable to perform ROS: Other (intellectual disability, non-verbal)   Neurological: Positive for syncope.       Past Medical History:   Diagnosis Date   • Alopecia    • Arcus senilis    • Autism    • Cataract    • Contracture, right wrist    • COPD (chronic obstructive pulmonary disease) (CMS/HCC)    • GERD (gastroesophageal reflux disease)    • Hyperlipidemia    • Insomnia    • Intellectual disability    • Lennox-Gastaut syndrome (CMS/HCC)    • Major depressive disorder    • Orthostatic hypotension    • Osteoporosis    • Right bundle branch block    • Scoliosis    • Seizures (CMS/HCC)        Allergies   Allergen Reactions   • Haldol [Haloperidol] Unknown (See Comments)     Unknown         History reviewed. No pertinent surgical history.    History reviewed. No pertinent family history.    Social History     Socioeconomic History   • Marital status:  Single     Spouse name: Not on file   • Number of children: Not on file   • Years of education: Not on file   • Highest education level: Not on file   Substance and Sexual Activity   • Alcohol use: No     Frequency: Never   • Drug use: No           Objective   Physical Exam   Constitutional: He appears well-developed and well-nourished.   HENT:   Head: Normocephalic.   Eyes: Pupils are equal, round, and reactive to light.   Cardiovascular: Normal rate and regular rhythm.   Pulmonary/Chest: Effort normal and breath sounds normal.   Abdominal: Soft. Bowel sounds are normal.   Musculoskeletal: Normal range of motion. He exhibits no edema.   Neurological: He is alert.   Skin: Skin is warm and dry. Capillary refill takes less than 2 seconds.   Nursing note and vitals reviewed.      ECG 12 Lead    Date/Time: 12/8/2018 9:06 AM  Performed by: Wilian Moore MD  Authorized by: Raffaele Spicer DO   Interpreted by physician  Rhythm: sinus tachycardia  BPM: 106            none         ED Course      Labs Reviewed   COMPREHENSIVE METABOLIC PANEL - Abnormal; Notable for the following components:       Result Value    Glucose 103 (*)     Sodium 135 (*)     All other components within normal limits   URINALYSIS W/ CULTURE IF INDICATED - Abnormal; Notable for the following components:    Appearance, UA Cloudy (*)     All other components within normal limits    Narrative:     Urine microscopic not indicated.   CBC WITH AUTO DIFFERENTIAL - Abnormal; Notable for the following components:    RBC 3.86 (*)     Hemoglobin 12.6 (*)     Hematocrit 35.2 (*)     Neutrophil % 84.1 (*)     Lymphocyte % 5.2 (*)     Lymphocytes, Absolute 0.48 (*)     Immature Grans, Absolute 0.03 (*)     All other components within normal limits   INFLUENZA ANTIGEN, RAPID - Normal   TROPONIN (IN-HOUSE) - Normal   BNP (IN-HOUSE) - Normal   MAGNESIUM - Normal   BLOOD CULTURE   BLOOD CULTURE   CBC AND DIFFERENTIAL    Narrative:     The following orders  were created for panel order CBC & Differential.  Procedure                               Abnormality         Status                     ---------                               -----------         ------                     CBC Auto Differential[[id number]]        Abnormal            Final result                 Please view results for these tests on the individual orders.   EXTRA TUBES    Narrative:     The following orders were created for panel order Extra Tubes.  Procedure                               Abnormality         Status                     ---------                               -----------         ------                     Light Blue Top[[id number]]                                   Final result               Gold Top - SST[[id number]]                                   Final result                 Please view results for these tests on the individual orders.   LIGHT BLUE TOP   GOLD TOP - SST     Ct Head Without Contrast    Result Date: 12/8/2018  Narrative: CT head without contrast on 12/8/2018 CLINICAL INDICATION: Syncope, hypotension TECHNIQUE: Multiple axial images are obtained throughout the head without the administration of contrast. This exam was performed according to our departmental dose-optimization program, which includes automated exposure control, adjustment of the mA and/or kV according to patient size and/or use of iterative reconstruction technique. Total DLP is 2891.4 mGy*cm. COMPARISON: None FINDINGS: Motion somewhat limits the exam. There is generalized cerebral atrophy. There is no hydrocephalus. No definite CT evidence of acute infarct is noted. There is no hemorrhage. There are no abnormal extra-axial fluid collections. There is no mass, mass effect or midline shift. Mucosal thickening is noted in the left maxillary sinus consistent with chronic sinusitis. No bony abnormality is noted.     Impression: Atrophy with no acute intracranial abnormality. Electronically signed by:   Melvin Larsen  12/8/2018 6:50 AM CST Workstation: [id number]    Xr Chest 1 View    Result Date: 12/8/2018  Narrative: Chest single view on  12/8/2018 CLINICAL INDICATION: Syncope COMPARISON: None FINDINGS: There is right basilar opacity consistent with atelectasis and/or pneumonia. Lungs are otherwise clear. Heart is within normal limits for size. Pulmonary vascularity is within normal limits.     Impression: Mild right basilar atelectasis and/or pneumonia. Electronically signed by:  Melvin Larsen  12/8/2018 3:14 AM CST Workstation: [id number]    EKG December 8, 2018@0 723 reveals sinus tachycardia with peak cc at a rate of 106 bpm.  There is significant artifact however no obvious acute ischemia.  There is no previous EKG for comparison.    0700 patient evaluation complete and awaiting admission.  Patient signed out to Dr. Moore.  Please see his documentation for final disposition.        MDM      Final diagnoses:   Syncope, unspecified syncope type   Pneumonia of right lower lobe due to infectious organism (CMS/[facility])            Wilian Moore MD  12/08/18 0937

## 2018-12-09 ENCOUNTER — APPOINTMENT (OUTPATIENT)
Dept: CARDIOLOGY | Facility: HOSPITAL | Age: 63
End: 2018-12-09
Attending: FAMILY MEDICINE

## 2018-12-09 PROBLEM — J18.9 PNEUMONIA OF RIGHT LOWER LOBE DUE TO INFECTIOUS ORGANISM: Status: ACTIVE | Noted: 2018-12-09

## 2018-12-09 PROBLEM — E78.5 HYPERLIPIDEMIA: Status: ACTIVE | Noted: 2018-12-09

## 2018-12-09 PROBLEM — F84.0 AUTISM: Status: ACTIVE | Noted: 2018-12-09

## 2018-12-09 PROBLEM — K21.9 GERD (GASTROESOPHAGEAL REFLUX DISEASE): Status: ACTIVE | Noted: 2018-12-09

## 2018-12-09 PROBLEM — R56.9 SEIZURES: Status: ACTIVE | Noted: 2018-12-09

## 2018-12-09 PROBLEM — J44.9 COPD (CHRONIC OBSTRUCTIVE PULMONARY DISEASE): Status: ACTIVE | Noted: 2018-12-09

## 2018-12-09 LAB
ANION GAP SERPL CALCULATED.3IONS-SCNC: 9 MMOL/L (ref 5–15)
B PERT DNA SPEC QL NAA+PROBE: NOT DETECTED
BASOPHILS # BLD AUTO: 0.01 10*3/MM3 (ref 0–0.2)
BASOPHILS NFR BLD AUTO: 0.2 % (ref 0–2)
BUN BLD-MCNC: 13 MG/DL (ref 7–21)
BUN/CREAT SERPL: 13.8 (ref 7–25)
C PNEUM DNA NPH QL NAA+NON-PROBE: NOT DETECTED
CALCIUM SPEC-SCNC: 9 MG/DL (ref 8.4–10.2)
CHLORIDE SERPL-SCNC: 104 MMOL/L (ref 95–110)
CO2 SERPL-SCNC: 26 MMOL/L (ref 22–31)
CREAT BLD-MCNC: 0.94 MG/DL (ref 0.7–1.3)
DEPRECATED RDW RBC AUTO: 43.9 FL (ref 35.1–43.9)
EOSINOPHIL # BLD AUTO: 0.25 10*3/MM3 (ref 0–0.7)
EOSINOPHIL NFR BLD AUTO: 5.1 % (ref 0–7)
ERYTHROCYTE [DISTWIDTH] IN BLOOD BY AUTOMATED COUNT: 13.1 % (ref 11.5–14.5)
FLUAV H1 2009 PAND RNA NPH QL NAA+PROBE: NOT DETECTED
FLUAV H1 HA GENE NPH QL NAA+PROBE: NOT DETECTED
FLUAV H3 RNA NPH QL NAA+PROBE: NOT DETECTED
FLUAV SUBTYP SPEC NAA+PROBE: NOT DETECTED
FLUBV RNA ISLT QL NAA+PROBE: NOT DETECTED
GFR SERPL CREATININE-BSD FRML MDRD: 81 ML/MIN/1.73 (ref 49–113)
GLUCOSE BLD-MCNC: 82 MG/DL (ref 60–100)
HADV DNA SPEC NAA+PROBE: NOT DETECTED
HCOV 229E RNA SPEC QL NAA+PROBE: NOT DETECTED
HCOV HKU1 RNA SPEC QL NAA+PROBE: NOT DETECTED
HCOV NL63 RNA SPEC QL NAA+PROBE: NOT DETECTED
HCOV OC43 RNA SPEC QL NAA+PROBE: NOT DETECTED
HCT VFR BLD AUTO: 34.7 % (ref 39–49)
HGB BLD-MCNC: 12.2 G/DL (ref 13.7–17.3)
HMPV RNA NPH QL NAA+NON-PROBE: NOT DETECTED
HPIV1 RNA SPEC QL NAA+PROBE: NOT DETECTED
HPIV2 RNA SPEC QL NAA+PROBE: NOT DETECTED
HPIV3 RNA NPH QL NAA+PROBE: NOT DETECTED
HPIV4 P GENE NPH QL NAA+PROBE: NOT DETECTED
IMM GRANULOCYTES # BLD: 0.02 10*3/MM3 (ref 0–0.02)
IMM GRANULOCYTES NFR BLD: 0.4 % (ref 0–0.5)
LYMPHOCYTES # BLD AUTO: 1.39 10*3/MM3 (ref 0.6–4.2)
LYMPHOCYTES NFR BLD AUTO: 28.3 % (ref 10–50)
M PNEUMO IGG SER IA-ACNC: NOT DETECTED
MCH RBC QN AUTO: 32.3 PG (ref 26.5–34)
MCHC RBC AUTO-ENTMCNC: 35.2 G/DL (ref 31.5–36.3)
MCV RBC AUTO: 91.8 FL (ref 80–98)
MONOCYTES # BLD AUTO: 0.63 10*3/MM3 (ref 0–0.9)
MONOCYTES NFR BLD AUTO: 12.8 % (ref 0–12)
NEUTROPHILS # BLD AUTO: 2.61 10*3/MM3 (ref 2–8.6)
NEUTROPHILS NFR BLD AUTO: 53.2 % (ref 37–80)
PLATELET # BLD AUTO: 261 10*3/MM3 (ref 150–450)
PMV BLD AUTO: 10 FL (ref 8–12)
POTASSIUM BLD-SCNC: 3.7 MMOL/L (ref 3.5–5.1)
RBC # BLD AUTO: 3.78 10*6/MM3 (ref 4.37–5.74)
RHINOVIRUS RNA SPEC NAA+PROBE: NOT DETECTED
RSV RNA NPH QL NAA+NON-PROBE: NOT DETECTED
SODIUM BLD-SCNC: 139 MMOL/L (ref 137–145)
WBC NRBC COR # BLD: 4.91 10*3/MM3 (ref 3.2–9.8)

## 2018-12-09 PROCEDURE — 87633 RESP VIRUS 12-25 TARGETS: CPT | Performed by: HOSPITALIST

## 2018-12-09 PROCEDURE — 96367 TX/PROPH/DG ADDL SEQ IV INF: CPT

## 2018-12-09 PROCEDURE — 87486 CHLMYD PNEUM DNA AMP PROBE: CPT | Performed by: HOSPITALIST

## 2018-12-09 PROCEDURE — G0378 HOSPITAL OBSERVATION PER HR: HCPCS

## 2018-12-09 PROCEDURE — 94760 N-INVAS EAR/PLS OXIMETRY 1: CPT

## 2018-12-09 PROCEDURE — 94640 AIRWAY INHALATION TREATMENT: CPT

## 2018-12-09 PROCEDURE — 96366 THER/PROPH/DIAG IV INF ADDON: CPT

## 2018-12-09 PROCEDURE — 94799 UNLISTED PULMONARY SVC/PX: CPT

## 2018-12-09 PROCEDURE — G8996 SWALLOW CURRENT STATUS: HCPCS | Performed by: SPEECH-LANGUAGE PATHOLOGIST

## 2018-12-09 PROCEDURE — 80048 BASIC METABOLIC PNL TOTAL CA: CPT | Performed by: FAMILY MEDICINE

## 2018-12-09 PROCEDURE — 25010000002 CEFTRIAXONE: Performed by: FAMILY MEDICINE

## 2018-12-09 PROCEDURE — G8979 MOBILITY GOAL STATUS: HCPCS

## 2018-12-09 PROCEDURE — 97162 PT EVAL MOD COMPLEX 30 MIN: CPT

## 2018-12-09 PROCEDURE — 85025 COMPLETE CBC W/AUTO DIFF WBC: CPT | Performed by: FAMILY MEDICINE

## 2018-12-09 PROCEDURE — G8997 SWALLOW GOAL STATUS: HCPCS | Performed by: SPEECH-LANGUAGE PATHOLOGIST

## 2018-12-09 PROCEDURE — 87581 M.PNEUMON DNA AMP PROBE: CPT | Performed by: HOSPITALIST

## 2018-12-09 PROCEDURE — 25010000002 AZITHROMYCIN PER 500 MG: Performed by: FAMILY MEDICINE

## 2018-12-09 PROCEDURE — 96361 HYDRATE IV INFUSION ADD-ON: CPT

## 2018-12-09 PROCEDURE — 92610 EVALUATE SWALLOWING FUNCTION: CPT | Performed by: SPEECH-LANGUAGE PATHOLOGIST

## 2018-12-09 PROCEDURE — G8978 MOBILITY CURRENT STATUS: HCPCS

## 2018-12-09 PROCEDURE — 87798 DETECT AGENT NOS DNA AMP: CPT | Performed by: HOSPITALIST

## 2018-12-09 RX ADMIN — SODIUM CHLORIDE 100 ML/HR: 900 INJECTION, SOLUTION INTRAVENOUS at 02:23

## 2018-12-09 RX ADMIN — DOCUSATE SODIUM 100 MG: 100 CAPSULE, LIQUID FILLED ORAL at 08:46

## 2018-12-09 RX ADMIN — LEVETIRACETAM 1750 MG: 500 TABLET ORAL at 20:54

## 2018-12-09 RX ADMIN — FAMOTIDINE 40 MG: 40 TABLET ORAL at 08:46

## 2018-12-09 RX ADMIN — BUDESONIDE AND FORMOTEROL FUMARATE DIHYDRATE 2 PUFF: 160; 4.5 AEROSOL RESPIRATORY (INHALATION) at 21:51

## 2018-12-09 RX ADMIN — ATORVASTATIN CALCIUM 20 MG: 20 TABLET, FILM COATED ORAL at 08:46

## 2018-12-09 RX ADMIN — THIORIDAZINE HYDROCHLORIDE 100 MG: 50 TABLET, FILM COATED ORAL at 08:47

## 2018-12-09 RX ADMIN — CEFTRIAXONE 1 G: 1 INJECTION, POWDER, FOR SOLUTION INTRAMUSCULAR; INTRAVENOUS at 12:00

## 2018-12-09 RX ADMIN — IPRATROPIUM BROMIDE AND ALBUTEROL SULFATE 3 ML: 2.5; .5 SOLUTION RESPIRATORY (INHALATION) at 21:42

## 2018-12-09 RX ADMIN — LEVETIRACETAM 1750 MG: 500 TABLET ORAL at 08:46

## 2018-12-09 RX ADMIN — DOCUSATE SODIUM 100 MG: 100 CAPSULE, LIQUID FILLED ORAL at 20:55

## 2018-12-09 RX ADMIN — SODIUM CHLORIDE 100 ML/HR: 900 INJECTION, SOLUTION INTRAVENOUS at 16:47

## 2018-12-09 RX ADMIN — FERROUS SULFATE TAB EC 324 MG (65 MG FE EQUIVALENT) 324 MG: 324 (65 FE) TABLET DELAYED RESPONSE at 08:46

## 2018-12-09 RX ADMIN — AZITHROMYCIN MONOHYDRATE 500 MG: 500 INJECTION, POWDER, LYOPHILIZED, FOR SOLUTION INTRAVENOUS at 08:45

## 2018-12-09 RX ADMIN — THIORIDAZINE HYDROCHLORIDE 200 MG: 50 TABLET, FILM COATED ORAL at 20:55

## 2018-12-09 RX ADMIN — SODIUM CHLORIDE, PRESERVATIVE FREE 3 ML: 5 INJECTION INTRAVENOUS at 20:59

## 2018-12-09 NOTE — THERAPY EVALUATION
Acute Care - Speech Language Pathology   Swallow Initial Evaluation [facility]     Patient Name: Bautista Grubbs  : 1955  MRN: [id number]  Today's Date: 2018        Referring Physician: Dr EDUARD Thomas      Admit Date: 2018     Dysphagia evaluation completed this date. Pt's CG stated that pt was previously on a regular diet w/cut meats/thin liquids w/no difficulty. Pt is nonverbal and declined to self feed. Pt consumed puree/regular solids w/no difficulty. Pt coughed on thin liquids via cup x1 over 12+ oz of intake. SLP suspects that pt's coordination was off d/t being fed vs self-feeding. SLP will f/u w/pt for diet toleration. CG stated that someone will be present at all meals for tray setup and feeding assistance if needed. Pt's diet advanced to regular solids/thin liquids - no straws.    Goal:  Patient will safely tolerate least restricted diet w/no overt s/s of aspiration for adequate nutrition and hydration:      Visit Dx:     ICD-10-CM ICD-9-CM   1. Syncope, unspecified syncope type R55 780.2   2. Pneumonia of right lower lobe due to infectious organism (CMS/HCC) J18.1 486   3. Dysphagia, unspecified type R13.10 787.20     Patient Active Problem List   Diagnosis   • Pneumonia   • Syncope   • Pneumonia of right lower lobe due to infectious organism (CMS/HCC)   • Autism   • COPD (chronic obstructive pulmonary disease) (CMS/HCC)   • GERD (gastroesophageal reflux disease)   • Hyperlipidemia   • Seizures (CMS/HCC)     Past Medical History:   Diagnosis Date   • Alopecia    • Arcus senilis    • Autism    • Cataract    • Contracture, right wrist    • COPD (chronic obstructive pulmonary disease) (CMS/HCC)    • GERD (gastroesophageal reflux disease)    • Hyperlipidemia    • Insomnia    • Intellectual disability    • Lennox-Gastaut syndrome (CMS/HCC)    • Major depressive disorder    • Orthostatic hypotension    • Osteoporosis    • Right bundle branch block    • Scoliosis    • Seizures (CMS/HCC)       History reviewed. No pertinent surgical history.     SWALLOW EVALUATION (last 72 hours)      SLP Adult Swallow Evaluation     Row Name 12/09/18 0902                   Rehab Evaluation    Document Type  evaluation  -CK        Total Evaluation Minutes, SLP  41  -CK        Subjective Information  no complaints  -CK        Patient Observations  alert;cooperative;agree to therapy  -CK        Patient/Family Observations  Pt reclined in bed; CG at bedside  -CK        Patient Effort  good  -CK           General Information    Patient Profile Reviewed  yes  -CK        Pertinent History Of Current Problem  Regular solids w/cut meats; thin liquids  -CK        Current Method of Nutrition  NPO  -CK        Precautions/Limitations, Vision  WFL;for purposes of eval  -CK        Precautions/Limitations, Hearing  WFL;for purposes of eval  -CK        Prior Level of Function-Communication  cognitive-linguistic impairment  -CK        Prior Level of Function-Swallowing  regular textures;thin liquids;other (see comments) cut meats  -CK        Plans/Goals Discussed with  patient;other (see comments) caregiver  -CK        Barriers to Rehab  cognitive status  -CK        Patient's Goals for Discharge  patient could not state  -CK           Pain Assessment    Additional Documentation  Pain Scale: FACES Pre/Post-Treatment (Group)  -CK           Pain Scale: Numbers Pre/Post-Treatment    Pain Scale: Numbers, Pretreatment  --  -CK           Pain Scale: FACES Pre/Post-Treatment    Pain: FACES Scale, Pretreatment  0-->no hurt  -CK        Pain: FACES Scale, Post-Treatment  0-->no hurt  -CK           Oral Motor and Function    Dentition Assessment  natural, present and adequate  -CK        Secretion Management  WNL/WFL  -CK        Mucosal Quality  moist, healthy  -CK        Volitional Swallow  WFL  -CK        Volitional Cough  unable to elicit  -CK           General Eating/Swallowing Observations    Respiratory Support Currently in Use  room air   -CK        Eating/Swallowing Skills  fed by SLP  -CK        Positioning During Eating  upright in bed  -CK        Utensils Used  cup;straw;spoon  -CK        Consistencies Trialed  regular textures;pureed;thin liquids  -CK           Clinical Swallow Eval    Oral Prep Phase  WFL  -CK        Oral Transit  WFL  -CK        Oral Residue  WFL  -CK        Pharyngeal Phase  WFL  -CK        Esophageal Phase  unremarkable  -CK        Clinical Swallow Evaluation Summary  Pt appears to be safely tolerating regular/puree solids and thin liquids this date.  Pt's cough appeared to be d/t incoordination from being fed.  -CK           Clinical Impression    SLP Swallowing Diagnosis  functional oral phase;functional pharyngeal phase  -CK        Functional Impact  no impact on function  -CK        Rehab Potential/Prognosis, Swallowing  good, to achieve stated therapy goals  -CK        Swallow Criteria for Skilled Therapeutic Interventions Met  demonstrates skilled criteria  -CK           Recommendations    Therapy Frequency (Swallow)  other (see comments) 1-2 f/u sessions  -CK        Predicted Duration Therapy Intervention (Days)  until discharge  -CK        SLP Diet Recommendation  regular textures;thin liquids  -CK        Recommended Precautions and Strategies  no straw;upright posture during/after eating;small bites of food and sips of liquid  -CK        SLP Rec. for Method of Medication Administration  as tolerated  -CK        Monitor for Signs of Aspiration  yes;cough;gurgly voice;throat clearing  -CK        Anticipated Dischage Disposition  community-based residential facility ([facility])  -CK           Swallow Goals (SLP)    Oral Nutrition/Hydration Goal Selection (SLP)  oral nutrition/hydration, SLP goal 1  -CK           Oral Nutrition/Hydration Goal 1 (SLP)    Oral Nutrition/Hydration Goal 1, SLP  Pt will safely tolerate least restricted diet w/no overt s/s of aspiration for adequate nutrition and hydration.  -CK        Time Frame  (Oral Nutrition/Hydration Goal 1, SLP)  by discharge  -CK        Barriers (Oral Nutrition/Hydration Goal 1, SLP)  cognition  -CK        Progress/Outcomes (Oral Nutrition/Hydration Goal 1, SLP)  other (see comments) new goal  -CK          User Key  (r) = Recorded By, (t) = Taken By, (c) = Cosigned By    Initials Name Effective Dates    SAUL EnidNetta, MS CCC-SLP 04/03/18 -           EDUCATION  The patient has been educated in the following areas:   Dysphagia (Swallowing Impairment) Modified Diet Instruction.    SLP Recommendation and Plan  SLP Swallowing Diagnosis: functional oral phase, functional pharyngeal phase  SLP Diet Recommendation: regular textures, thin liquids  Recommended Precautions and Strategies: no straw, upright posture during/after eating, small bites of food and sips of liquid     Monitor for Signs of Aspiration: yes, cough, gurgly voice, throat clearing     Swallow Criteria for Skilled Therapeutic Interventions Met: demonstrates skilled criteria  Anticipated Dischage Disposition: community-based residential facility ([facility])  Rehab Potential/Prognosis, Swallowing: good, to achieve stated therapy goals  Therapy Frequency (Swallow): other (see comments)(1-2 f/u sessions)  Predicted Duration Therapy Intervention (Days): until discharge       Plan of Care Reviewed With: patient, caregiver  Plan of Care Review  Plan of Care Reviewed With: patient, caregiver  Outcome Summary: Dysphagia evaluation completed this date.  Pt's CG stated that pt was previously on a regular diet w/cut meats/thin liquids w/no difficulty.  Pt is nonverbal and declined to self feed.  Pt consumed puree/regular solids w/no difficulty.  Pt coughed on thin liquids via cup x1 over 12+ oz of intake.  SLP suspects that pt's coordination was off d/t being fed vs self-feeding.  SLP will f/u w/pt for diet toleration.  CG stated that someone will be present at all meals for tray setup and feeding assistance if needed.  Pt's diet  advanced to regular solids/thin liquids - no straws.    SLP GOALS     Row Name 12/09/18 0902             Oral Nutrition/Hydration Goal 1 (SLP)    Oral Nutrition/Hydration Goal 1, SLP  Pt will safely tolerate least restricted diet w/no overt s/s of aspiration for adequate nutrition and hydration.  -CK      Time Frame (Oral Nutrition/Hydration Goal 1, SLP)  by discharge  -CK      Barriers (Oral Nutrition/Hydration Goal 1, SLP)  cognition  -CK      Progress/Outcomes (Oral Nutrition/Hydration Goal 1, SLP)  other (see comments) new goal  -CK        User Key  (r) = Recorded By, (t) = Taken By, (c) = Cosigned By    Initials Name Provider Type    Netta Soto MS CCC-SLP Speech and Language Pathologist             Time Calculation:   Time Calculation- SLP     Row Name 12/09/18 1138             Time Calculation- SLP    SLP Start Time  0902  -CK      SLP Stop Time  0943  -CK      SLP Time Calculation (min)  41 min  -CK      Total Timed Code Minutes- SLP  41 minute(s)  -CK      SLP Received On  12/09/18  -CK      SLP Goal Re-Cert Due Date  12/23/18  -CK        User Key  (r) = Recorded By, (t) = Taken By, (c) = Cosigned By    Initials Name Provider Type    Netta Soto MS CCC-SLP Speech and Language Pathologist          Therapy Charges for Today     Code Description Service Date Service Provider Modifiers Qty    30152693115 HC ST SWALLOWING CURRENT STATUS 12/9/2018 Netta Rossi MS CCC-SLP GN, CI 1    72717160670 HC ST SWALLOWING PROJECTED 12/9/2018 Netta Rossi MS CCC-SLP GN, CI 1    53656228701 HC ST EVAL ORAL PHARYNG SWALLOW 3 12/9/2018 Netta Rossi MS CCC-SLP GN 1          SLP G-Codes  Functional Limitations: Swallowing  Swallow Current Status (): At least 1 percent but less than 20 percent impaired, limited or restricted  Swallow Goal Status (): At least 1 percent but less than 20 percent impaired, limited or restricted    Netta Rossi MS CCC-SLP  12/9/2018

## 2018-12-09 NOTE — PLAN OF CARE
Problem: Fall Risk (Adult)  Goal: Identify Related Risk Factors and Signs and Symptoms  Outcome: Outcome(s) achieved Date Met: 12/09/18    Goal: Absence of Fall  Outcome: Ongoing (interventions implemented as appropriate)      Problem: Patient Care Overview  Goal: Plan of Care Review  Outcome: Ongoing (interventions implemented as appropriate)   12/09/18 0040   Coping/Psychosocial   Plan of Care Reviewed With patient;caregiver   Plan of Care Review   Progress no change     Goal: Individualization and Mutuality  Outcome: Ongoing (interventions implemented as appropriate)    Goal: Discharge Needs Assessment  Outcome: Ongoing (interventions implemented as appropriate)    Goal: Interprofessional Rounds/Family Conf  Outcome: Ongoing (interventions implemented as appropriate)      Problem: Skin Injury Risk (Adult)  Goal: Identify Related Risk Factors and Signs and Symptoms  Outcome: Outcome(s) achieved Date Met: 12/09/18    Goal: Skin Health and Integrity  Outcome: Ongoing (interventions implemented as appropriate)      Problem: Pneumonia (Adult)  Goal: Signs and Symptoms of Listed Potential Problems Will be Absent, Minimized or Managed (Pneumonia)  Outcome: Ongoing (interventions implemented as appropriate)

## 2018-12-09 NOTE — PLAN OF CARE
Problem: Patient Care Overview  Goal: Plan of Care Review  Outcome: Ongoing (interventions implemented as appropriate)   12/09/18 7599   Coping/Psychosocial   Plan of Care Reviewed With patient;caregiver   OTHER   Outcome Summary PT hugo completed this date; he was accompanied by caregiver from facility which was very helpful.He is able to amb and toilet at home indep but currently dependent & non ambulatory. BP was 111/92 initially w/  sidelying, 119/96 and  sitting and he tried to return to supine multiple times so not sure if dizzy or not, then 111/82,  after being supine for about 10 min. Pt will be followed to encourage mobilty and gait w/ VS monitoring as there is concern for syncopal hx. Recommend return to prior facility w/ PT to follow for juliether gait/ex training as sparkle.

## 2018-12-09 NOTE — THERAPY EVALUATION
Acute Care - Physical Therapy Initial Evaluation  [facility]     Patient Name: Bautista Grubbs  : 1955  MRN: [id number]  Today's Date: 2018         Referring Physician: Dr EDUARD Thomas      Admit Date: 2018    Visit Dx:     ICD-10-CM ICD-9-CM   1. Syncope, unspecified syncope type R55 780.2   2. Pneumonia of right lower lobe due to infectious organism (CMS/HCC) J18.1 486   3. Dysphagia, unspecified type R13.10 787.20   4. Impaired functional mobility, balance, gait, and endurance Z74.09 V49.89     Patient Active Problem List   Diagnosis   • Pneumonia   • Syncope   • Pneumonia of right lower lobe due to infectious organism (CMS/HCC)   • Autism   • COPD (chronic obstructive pulmonary disease) (CMS/HCC)   • GERD (gastroesophageal reflux disease)   • Hyperlipidemia   • Seizures (CMS/HCC)     Past Medical History:   Diagnosis Date   • Alopecia    • Arcus senilis    • Autism    • Cataract    • Contracture, right wrist    • COPD (chronic obstructive pulmonary disease) (CMS/HCC)    • GERD (gastroesophageal reflux disease)    • Hyperlipidemia    • Insomnia    • Intellectual disability    • Lennox-Gastaut syndrome (CMS/HCC)    • Major depressive disorder    • Orthostatic hypotension    • Osteoporosis    • Right bundle branch block    • Scoliosis    • Seizures (CMS/HCC)      History reviewed. No pertinent surgical history.     PT ASSESSMENT (last 12 hours)      Physical Therapy Evaluation     Row Name 18 1003          PT Evaluation Time/Intention    Subjective Information  no complaints  -GB     Document Type  evaluation  -GB     Mode of Treatment  individual therapy;physical therapy  -GB     Comment  sitter present; states she doesn't know why PT indicated because he walks tho has not walked here; states he only had 1 episode of passing out which precipitated his admit;  -GB     Row Name 18 1003          General Information    Patient Profile Reviewed?  yes  -GB     Referring Physician  Dr CHAPA  Bleibel  -     Patient Observations  agree to therapy sitter agreed  -GB     Prior Level of Function  independent:;min assist:;mod assist:;max assist:;gait;ADL's  -GB     Equipment Currently Used at Home  wheelchair  -GB     Existing Precautions/Restrictions  cardiac;fall;seizures  (Significant)  watch b/p & syncope;pt sits on ground when objects to events  -GB     Risks Reviewed  patient:;other:;LOB;dizziness;change in vital signs  -GB     Benefits Reviewed  patient:;other:;improve function;increase independence;increase strength  -GB     Row Name 12/09/18 1003          Relationship/Environment    Primary Source of Support/Comfort  nonrelative caregiver  -GB     Name of Support/Comfort Primary Source  Skyler present from [place]-is his usual caregiver  -GB     Lives With  facility resident  -GB     Row Name 12/09/18 1003          Resource/Environmental Concerns    Current Living Arrangements  residential facility  -     Transportation Concerns  -- facility arranges transportation  -     Row Name 12/09/18 1003          Cognitive Assessment/Intervention- PT/OT    Orientation Status (Cognition)  oriented to;person  -GB     Follows Commands (Cognition)  follows one step commands;0-24% accuracy  -     Safety Deficit (Cognitive)  severe deficit;ability to follow commands;at risk behavior observed;impulsivity;insight into deficits/self awareness;judgment;problem solving;safety precautions awareness;safety precautions follow-through/compliance  -     Row Name 12/09/18 1003          Safety Issues, Functional Mobility    Safety Issues Affecting Function (Mobility)  ability to follow commands;insight into deficits/self awareness;judgment  -     Row Name 12/09/18 1003          Bed Mobility Assessment/Treatment    Bed Mobility Assessment/Treatment  bed mobility (all) activities  -     [place] Level (Bed Mobility)  maximum assist (25% patient effort)  -     Bed Mobility, Safety Issues  cognitive deficits  limit understanding;decreased use of arms for pushing/pulling;decreased use of legs for bridging/pushing;impaired trunk control for bed mobility  -     Assistive Device (Bed Mobility)  bed rails;draw sheet;head of bed elevated  -     Comment (Bed Mobility)  pt has ability to move but does not follow commands at eval so Aide from [facility] moved him more than he moved self.   -     Row Name 12/09/18 1003          Transfer Assessment/Treatment    Comment (Transfers)  Aide from [facility] states pt is amb and toiletting indep usually but not while here, as he realizes someone will clean him up;  -     Row Name 12/09/18 1003          Gait/Stairs Assessment/Training    [place] Level (Gait)  not tested;unable to assess  -     Distance in Feet (Gait)  not amb today due to multiple attmepts to lay down, stay in fetal position and resist requests. His systloci/diastolic levels notably close so he could be dizzy/light headed and we are unable to tell.   -     Row Name 12/09/18 1003          General ROM    GENERAL ROM COMMENTS  difficult to assess due to resisting activity from us. Keeps postioin in fetal position but can elongate UE's w/ assist to partiail extension; he lacks end range UE/LE extension but allowed elbow ext to ~40 deg from ext w/ PROM; feet in full PF kyrie and tight  -     Row Name 12/09/18 1003          MMT (Manual Muscle Testing)    General MMT Comments  difficult to evaluate w/ him not following directions or cues.bit appears at least 4/5 when he wants to use limbs due to amount of resistance noted w/ attempted ROM  -     Row Name 12/09/18 1003          Sensory Assessment/Intervention    Sensory General Assessment  no sensation deficits identified  -     Row Name 12/09/18 1003          Plan of Care Review    Plan of Care Reviewed With  patient;caregiver  -     Row Name 12/09/18 1003          Physical Therapy Clinical Impression    PT Diagnosis (PT Clinical Impression)   recent  fall/syncope, PNE with resultant impaired functional mobility, gait, balance and endurance  -GB     Prognosis (PT Clinical Impression)  good  -GB     Patient/Family Goals Statement (PT Clinical Impression)  return to PLOF at facility  -GB     Criteria for Skilled Interventions Met (PT Clinical Impression)  yes;treatment indicated  -GB     Pathology/Pathophysiology Noted (Describe Specifically for Each System)  musculoskeletal;cardiovascular  -GB     Impairments Found (describe specific impairments)  aerobic capacity/endurance;gait, locomotion, and balance  -GB     Rehab Potential (PT Clinical Summary)  good, to achieve stated therapy goals  -GB     Predicted Duration of Therapy (PT)  till goals met or d/c   -GB     Care Plan Review (PT)  patient/other agree to care plan  -GB     Care Plan Review, Other Participant (PT Clinical Impression)  caregiver  -GB     Row Name 12/09/18 1003          Vital Signs    Pre Systolic BP Rehab  111  -GB     Pre Treatment Diastolic BP  92  -GB     Intra Systolic BP Rehab  119  -GB     Intra Treatment Diastolic BP  96  -GB     Post Systolic BP Rehab  111  -GB     Post Treatment Diastolic BP  82  -GB     Pretreatment Heart Rate (beats/min)  105  -GB     Intratreatment Heart Rate (beats/min)  124  -GB     Posttreatment Heart Rate (beats/min)  104  -GB     Pre SpO2 (%)  98  -GB     O2 Delivery Pre Treatment  room air  -GB     Intra SpO2 (%)  91  -GB     O2 Delivery Intra Treatment  room air  -GB     Post SpO2 (%)  92  -GB     O2 Delivery Post Treatment  room air  -GB     Pre Patient Position  Supine  -GB     Intra Patient Position  Sitting  -GB     Post Patient Position  Supine  -GB     Row Name 12/09/18 1003          Physical Therapy Goals    Bed Mobility Goal Selection (PT)  bed mobility, PT goal 1  -GB     Gait Training Goal Selection (PT)  gait training, PT goal 1  -GB     Additional Documentation  Stairs Goal Selection (PT) (Row)  -GB     Row Name 12/09/18 1003          Bed Mobility  Goal 1 (PT)    Activity/Assistive Device (Bed Mobility Goal 1, PT)  bed mobility activities, all  -GB     [place] Level/Cues Needed (Bed Mobility Goal 1, PT)  independent;conditional independence  -GB     Time Frame (Bed Mobility Goal 1, PT)  by discharge  -GB     Progress/Outcomes (Bed Mobility Goal 1, PT)  goal ongoing  -GB     Row Name 12/09/18 1003          Gait Training Goal 1 (PT)    Activity/Assistive Device (Gait Training Goal 1, PT)  gait (walking locomotion);decrease fall risk  -GB     [place] Level (Gait Training Goal 1, PT)  conditional independence;contact guard assist  -GB     Distance (Gait Goal 1, PT)  100 ft or more  -GB     Time Frame (Gait Training Goal 1, PT)  by discharge  -GB     Progress/Outcome (Gait Training Goal 1, PT)  goal ongoing  -GB     Row Name 12/09/18 1003          Positioning and Restraints    Pre-Treatment Position  in bed  -GB     Post Treatment Position  bed  -GB     In Bed  notified nsg;side lying right;encouraged to call for assist;exit alarm on;with family/caregiver  -GB     Row Name 12/09/18 1003          Living Environment    Home Accessibility  wheelchair accessible  -GB       User Key  (r) = Recorded By, (t) = Taken By, (c) = Cosigned By    Initials Name Provider Type    Kati Shah, PT Physical Therapist          PT Recommendation and Plan  Anticipated Discharge Disposition (PT): home with home health, home with 24/7 care, anticipate therapy at next level of care  Planned Therapy Interventions (PT Eval): balance training, bed mobility training, gait training, stair training, transfer training  Therapy Frequency (PT Clinical Impression): other (see comments)(6x/wk)  Outcome Summary/Treatment Plan (PT)  Anticipated Discharge Disposition (PT): home with home health, home with 24/7 care, anticipate therapy at next level of care  Plan of Care Reviewed With: patient, caregiver  Outcome Summary: PT hugo completed this date; he was accompanied by  caregiver from facility which was very helpful.He is able to amb and toilet at home indep but currently dependent & non ambulatory. BP was 111/92 initially w/  sidelying, 119/96 and  sitting and he tried to return to supine multiple times so not sure if dizzy or not, then 111/82,  after being supine for about 10 min. Pt will be followed to encourage mobilty and gait w/ VS monitoring as there is concern for syncopal hx.  Recommend return to prior facility w/ PT to follow for furhter gait/ex training as sparkle.   Outcome Measures     Row Name 12/09/18 1200             How much help from another person do you currently need...    Turning from your back to your side while in flat bed without using bedrails?  2  -GB      Moving from lying on back to sitting on the side of a flat bed without bedrails?  2  -GB      Moving to and from a bed to a chair (including a wheelchair)?  1  -GB      Standing up from a chair using your arms (e.g., wheelchair, bedside chair)?  1  -GB      Climbing 3-5 steps with a railing?  1  -GB      To walk in hospital room?  1  -GB      AM-PAC 6 Clicks Score  8  -GB         Functional Assessment    Outcome Measure Options  AM-PAC 6 Clicks Basic Mobility (PT)  -GB        User Key  (r) = Recorded By, (t) = Taken By, (c) = Cosigned By    Initials Name Provider Type    Kati Shah PT Physical Therapist         Time Calculation:   PT Charges     Row Name 12/09/18 1245             Time Calculation    Start Time  1003  -GB      Stop Time  1040  -GB      Time Calculation (min)  37 min  -GB      PT Non-Billable Time (min)  14 min  -GB      PT Received On  12/09/18  -GB      PT Goal Re-Cert Due Date  12/19/18  -GB        User Key  (r) = Recorded By, (t) = Taken By, (c) = Cosigned By    Initials Name Provider Type    Kati Shah PT Physical Therapist        Therapy Suggested Charges     Code   Minutes Charges    None           Therapy Charges for Today      Code Description Service Date Service Provider Modifiers Qty    68887483133 HC PT MOBILITY CURRENT 12/9/2018 Kati Arellano, PT GP, CM 1    11954901246 HC PT MOBILITY PROJECTED 12/9/2018 Kati Arellano, PT GP, CI 1    64493138756 HC PT EVAL MOD COMPLEXITY 2 12/9/2018 Kati Arellano, PT GP 1    86749958753 HC PT THER SUPP EA 15 MIN 12/9/2018 Kati Arellano, PT GP 1          PT G-Codes  PT Professional Judgement Used?: Yes  Outcome Measure Options: AM-PAC 6 Clicks Basic Mobility (PT)  AM-PAC 6 Clicks Score: 8  Functional Limitation: Mobility: Walking and moving around  Mobility: Walking and Moving Around Current Status (): At least 80 percent but less than 100 percent impaired, limited or restricted  Mobility: Walking and Moving Around Goal Status (): At least 1 percent but less than 20 percent impaired, limited or restricted      Kati Arellano, PT  12/9/2018

## 2018-12-09 NOTE — PLAN OF CARE
Problem: Patient Care Overview  Goal: Plan of Care Review  Outcome: Ongoing (interventions implemented as appropriate)   12/09/18 1142   Coping/Psychosocial   Plan of Care Reviewed With patient;caregiver   OTHER   Outcome Summary Dysphagia evaluation completed this date. Pt's CG stated that pt was previously on a regular diet w/cut meats/thin liquids w/no difficulty. Pt is nonverbal and declined to self feed. Pt consumed puree/regular solids w/no difficulty. Pt coughed on thin liquids via cup x1 over 12+ oz of intake. SLP suspects that pt's coordination was off d/t being fed vs self-feeding. SLP will f/u w/pt for diet toleration. CG stated that someone will be present at all meals for tray setup and feeding assistance if needed. Pt's diet advanced to regular solids/thin liquids - no straws.

## 2018-12-09 NOTE — PROGRESS NOTES
"DAILY PROGRESS NOTE  [facility]    Patient Identification:  Name: Bautista Grubbs  Age: 63 y.o.  Sex: male  :  1955  MRN: [id number]         Primary Care Physician: Provider, No Known    Subjective:  Interval History:He is non verbal.    Objective:    Scheduled Meds:  atorvastatin 20 mg Oral Daily   azithromycin 500 mg Intravenous Q24H   budesonide-formoterol 2 puff Inhalation BID - RT   ceftriaxone 1 g Intravenous Q24H   docusate sodium 100 mg Oral BID   famotidine 40 mg Oral Daily   ferrous sulfate 324 mg Oral Daily With Breakfast   ipratropium-albuterol 3 mL Nebulization 4x Daily - RT   levETIRAcetam 1,750 mg Oral Q12H   sodium chloride 3 mL Intravenous Q12H   thioridazine 100 mg Oral Daily   thioridazine 200 mg Oral Nightly     Continuous Infusions:  sodium chloride 100 mL/hr Last Rate: 100 mL/hr (18 0732)       Vital signs in last 24 hours:  Temp:  [97.2 °F (36.2 °C)-98.6 °F (37 °C)] 97.2 °F (36.2 °C)  Heart Rate:  [] 94  Resp:  [18-20] 18  BP: (100-143)/(61-86) 100/69    Intake/Output:    Intake/Output Summary (Last 24 hours) at 2018 0941  Last data filed at 2018 0223  Gross per 24 hour   Intake 998.33 ml   Output --   Net 998.33 ml       Exam:  /69 (BP Location: Right arm, Patient Position: Lying)   Pulse 94   Temp 97.2 °F (36.2 °C) (Axillary)   Resp 18   Ht 180.3 cm (71\")   Wt 74.5 kg (164 lb 3.2 oz)   SpO2 92%   BMI 22.90 kg/m²     General Appearance:    Alert, nonverbal, no distress   Head:    Normocephalic, without obvious abnormality, atraumatic   Eyes:       Throat:   Lips, tongue, gums normal   Neck:   Supple, symmetrical, trachea midline, no JVD   Lungs:     Wheezes bilaterally, respirations unlabored   Chest Wall:    No tenderness or deformity    Heart:    Regular rate and rhythm, S1 and S2 normal, no murmur,no  Rub or gallop   Abdomen:     Soft, non-tender, bowel sounds active, no masses, no organomegaly    Extremities:   Extremities " normal, atraumatic, no cyanosis or edema   Pulses:      Skin:   Skin is warm and dry,  no rashes or palpable lesions   Neurologic:   Artistic with chronic changes and he is nonverbal       [unfilled]  Data Review:  Results from last 7 days   Lab Units  12/09/18   0718  12/08/18   0440   SODIUM mmol/L  139  135*   POTASSIUM mmol/L  3.7  4.0   CHLORIDE mmol/L  104  98   CO2 mmol/L  26.0  27.0   BUN mg/dL  13  17   CREATININE mg/dL  0.94  0.81   GLUCOSE mg/dL  82  103*   CALCIUM mg/dL  9.0  9.4     Results from last 7 days   Lab Units  12/09/18   0718  12/08/18   0440   WBC 10*3/mm3  4.91  9.16   HEMOGLOBIN g/dL  12.2*  12.6*   HEMATOCRIT %  34.7*  35.2*   PLATELETS 10*3/mm3  261  266             No results found for: TROPONINT      Results from last 7 days   Lab Units  12/08/18   0440   ALK PHOS U/L  92   BILIRUBIN mg/dL  0.4   ALT (SGPT) U/L  57   AST (SGOT) U/L  39             No results found for: POCGLU        Patient Active Problem List   Diagnosis Code   • Pneumonia J18.9   • Syncope R55   • Pneumonia of right lower lobe due to infectious organism (CMS/[facility]) J18.1   • Autism F84.0   • COPD (chronic obstructive pulmonary disease) (CMS/[facility]) J44.9   • GERD (gastroesophageal reflux disease) K21.9   • Hyperlipidemia E78.5   • Seizures (CMS/[facility]) R56.9       Assessment:  Active Hospital Problems    Diagnosis Date Noted   • **Pneumonia [J18.9] 12/08/2018   • Pneumonia of right lower lobe due to infectious organism (CMS/HCC) [J18.1] 12/09/2018   • Autism [F84.0] 12/09/2018   • COPD (chronic obstructive pulmonary disease) (CMS/HCC) [J44.9] 12/09/2018   • GERD (gastroesophageal reflux disease) [K21.9] 12/09/2018   • Hyperlipidemia [E78.5] 12/09/2018   • Seizures (CMS/[facility]) [R56.9] 12/09/2018   • Syncope [R55] 12/08/2018      Resolved Hospital Problems   No resolved problems to display.       Plan:  Continue with antibiotics, bronchodilators and oxygen.  Speech therapy is seeing for swallow eval.  We'll get follow-up  labs.    Luis Mohan MD  12/9/2018  9:41 AM

## 2018-12-10 ENCOUNTER — APPOINTMENT (OUTPATIENT)
Dept: CARDIOLOGY | Facility: HOSPITAL | Age: 63
End: 2018-12-10
Attending: FAMILY MEDICINE

## 2018-12-10 LAB
ADV 40+41 DNA STL QL NAA+NON-PROBE: NOT DETECTED
ANION GAP SERPL CALCULATED.3IONS-SCNC: 8 MMOL/L (ref 5–15)
ASTRO TYP 1-8 RNA STL QL NAA+NON-PROBE: NOT DETECTED
BASOPHILS # BLD AUTO: 0.03 10*3/MM3 (ref 0–0.2)
BASOPHILS NFR BLD AUTO: 0.6 % (ref 0–2)
BH CV ECHO MEAS - ACS: 2.4 CM
BH CV ECHO MEAS - AO ROOT AREA (BSA CORRECTED): 2.1
BH CV ECHO MEAS - AO ROOT AREA: 10.8 CM^2
BH CV ECHO MEAS - AO ROOT DIAM: 3.7 CM
BH CV ECHO MEAS - ASC AORTA: 3.3 CM
BH CV ECHO MEAS - BSA(HAYCOCK): 1.8 M^2
BH CV ECHO MEAS - BSA: 1.7 M^2
BH CV ECHO MEAS - BZI_BMI: 30.8 KILOGRAMS/M^2
BH CV ECHO MEAS - BZI_METRIC_HEIGHT: 154.9 CM
BH CV ECHO MEAS - BZI_METRIC_WEIGHT: 73.9 KG
BH CV ECHO MEAS - EDV(CUBED): 65.9 ML
BH CV ECHO MEAS - EDV(TEICH): 71.7 ML
BH CV ECHO MEAS - EF(CUBED): 73.3 %
BH CV ECHO MEAS - EF(TEICH): 65.7 %
BH CV ECHO MEAS - ESV(CUBED): 17.6 ML
BH CV ECHO MEAS - ESV(TEICH): 24.6 ML
BH CV ECHO MEAS - FS: 35.6 %
BH CV ECHO MEAS - IVS/LVPW: 0.97
BH CV ECHO MEAS - IVSD: 1.1 CM
BH CV ECHO MEAS - LA DIMENSION: 3.6 CM
BH CV ECHO MEAS - LA/AO: 0.97
BH CV ECHO MEAS - LV MASS(C)D: 145 GRAMS
BH CV ECHO MEAS - LV MASS(C)DI: 83.7 GRAMS/M^2
BH CV ECHO MEAS - LVIDD: 4 CM
BH CV ECHO MEAS - LVIDS: 2.6 CM
BH CV ECHO MEAS - LVPWD: 1.1 CM
BH CV ECHO MEAS - PA MAX PG: 2.4 MMHG
BH CV ECHO MEAS - PA V2 MAX: 77.1 CM/SEC
BH CV ECHO MEAS - RAP SYSTOLE: 5 MMHG
BH CV ECHO MEAS - RVDD: 2.5 CM
BH CV ECHO MEAS - RVSP: 16 MMHG
BH CV ECHO MEAS - SI(CUBED): 27.9 ML/M^2
BH CV ECHO MEAS - SI(TEICH): 27.2 ML/M^2
BH CV ECHO MEAS - SV(CUBED): 48.4 ML
BH CV ECHO MEAS - SV(TEICH): 47.1 ML
BH CV ECHO MEAS - TR MAX VEL: 174 CM/SEC
BUN BLD-MCNC: 11 MG/DL (ref 7–21)
BUN/CREAT SERPL: 12.9 (ref 7–25)
C CAYETANENSIS DNA STL QL NAA+NON-PROBE: NOT DETECTED
C DIFF TOX GENS STL QL NAA+PROBE: NOT DETECTED
CALCIUM SPEC-SCNC: 9.1 MG/DL (ref 8.4–10.2)
CAMPY SP DNA.DIARRHEA STL QL NAA+PROBE: NOT DETECTED
CHLORIDE SERPL-SCNC: 102 MMOL/L (ref 95–110)
CO2 SERPL-SCNC: 26 MMOL/L (ref 22–31)
CREAT BLD-MCNC: 0.85 MG/DL (ref 0.7–1.3)
CRYPTOSP STL CULT: NOT DETECTED
DEPRECATED RDW RBC AUTO: 44.2 FL (ref 35.1–43.9)
E COLI DNA SPEC QL NAA+PROBE: NOT DETECTED
E HISTOLYT AG STL-ACNC: NOT DETECTED
EAEC PAA PLAS AGGR+AATA ST NAA+NON-PRB: NOT DETECTED
EC STX1 + STX2 GENES STL NAA+PROBE: NOT DETECTED
EOSINOPHIL # BLD AUTO: 0.24 10*3/MM3 (ref 0–0.7)
EOSINOPHIL NFR BLD AUTO: 4.8 % (ref 0–7)
EPEC EAE GENE STL QL NAA+NON-PROBE: NOT DETECTED
ERYTHROCYTE [DISTWIDTH] IN BLOOD BY AUTOMATED COUNT: 13.1 % (ref 11.5–14.5)
ETEC LTA+ST1A+ST1B TOX ST NAA+NON-PROBE: NOT DETECTED
G LAMBLIA DNA SPEC QL NAA+PROBE: NOT DETECTED
GFR SERPL CREATININE-BSD FRML MDRD: 91 ML/MIN/1.73 (ref 49–113)
GLUCOSE BLD-MCNC: 91 MG/DL (ref 60–100)
HCT VFR BLD AUTO: 36 % (ref 39–49)
HGB BLD-MCNC: 12.5 G/DL (ref 13.7–17.3)
IMM GRANULOCYTES # BLD: 0.03 10*3/MM3 (ref 0–0.02)
IMM GRANULOCYTES NFR BLD: 0.6 % (ref 0–0.5)
LYMPHOCYTES # BLD AUTO: 1.39 10*3/MM3 (ref 0.6–4.2)
LYMPHOCYTES NFR BLD AUTO: 27.9 % (ref 10–50)
MAXIMAL PREDICTED HEART RATE: 158 BPM
MCH RBC QN AUTO: 32.2 PG (ref 26.5–34)
MCHC RBC AUTO-ENTMCNC: 34.7 G/DL (ref 31.5–36.3)
MCV RBC AUTO: 92.8 FL (ref 80–98)
MONOCYTES # BLD AUTO: 0.71 10*3/MM3 (ref 0–0.9)
MONOCYTES NFR BLD AUTO: 14.2 % (ref 0–12)
NEUTROPHILS # BLD AUTO: 2.59 10*3/MM3 (ref 2–8.6)
NEUTROPHILS NFR BLD AUTO: 51.9 % (ref 37–80)
NOROVIRUS GI+II RNA STL QL NAA+NON-PROBE: NOT DETECTED
NRBC BLD MANUAL-RTO: 0 /100 WBC (ref 0–0)
P SHIGELLOIDES DNA STL QL NAA+NON-PROBE: NOT DETECTED
PLATELET # BLD AUTO: 295 10*3/MM3 (ref 150–450)
PMV BLD AUTO: 9.5 FL (ref 8–12)
POTASSIUM BLD-SCNC: 3.6 MMOL/L (ref 3.5–5.1)
RBC # BLD AUTO: 3.88 10*6/MM3 (ref 4.37–5.74)
RV RNA STL NAA+PROBE: NOT DETECTED
SALMONELLA DNA SPEC QL NAA+PROBE: NOT DETECTED
SAPO I+II+IV+V RNA STL QL NAA+NON-PROBE: NOT DETECTED
SHIGELLA SP+EIEC IPAH STL QL NAA+PROBE: NOT DETECTED
SODIUM BLD-SCNC: 136 MMOL/L (ref 137–145)
STRESS TARGET HR: 134 BPM
V CHOLERAE DNA SPEC QL NAA+PROBE: NOT DETECTED
VIBRIO DNA SPEC NAA+PROBE: NOT DETECTED
WBC NRBC COR # BLD: 4.99 10*3/MM3 (ref 3.2–9.8)
YERSINIA STL CULT: NOT DETECTED

## 2018-12-10 PROCEDURE — G8988 SELF CARE GOAL STATUS: HCPCS

## 2018-12-10 PROCEDURE — G0378 HOSPITAL OBSERVATION PER HR: HCPCS

## 2018-12-10 PROCEDURE — 94799 UNLISTED PULMONARY SVC/PX: CPT

## 2018-12-10 PROCEDURE — G8998 SWALLOW D/C STATUS: HCPCS | Performed by: SPEECH-LANGUAGE PATHOLOGIST

## 2018-12-10 PROCEDURE — 80048 BASIC METABOLIC PNL TOTAL CA: CPT | Performed by: FAMILY MEDICINE

## 2018-12-10 PROCEDURE — 97166 OT EVAL MOD COMPLEX 45 MIN: CPT

## 2018-12-10 PROCEDURE — 85025 COMPLETE CBC W/AUTO DIFF WBC: CPT | Performed by: FAMILY MEDICINE

## 2018-12-10 PROCEDURE — 25010000002 CEFTRIAXONE PER 250 MG: Performed by: FAMILY MEDICINE

## 2018-12-10 PROCEDURE — 93321 DOPPLER ECHO F-UP/LMTD STD: CPT | Performed by: INTERNAL MEDICINE

## 2018-12-10 PROCEDURE — 96361 HYDRATE IV INFUSION ADD-ON: CPT

## 2018-12-10 PROCEDURE — 93321 DOPPLER ECHO F-UP/LMTD STD: CPT

## 2018-12-10 PROCEDURE — 97530 THERAPEUTIC ACTIVITIES: CPT

## 2018-12-10 PROCEDURE — 96366 THER/PROPH/DIAG IV INF ADDON: CPT

## 2018-12-10 PROCEDURE — 93325 DOPPLER ECHO COLOR FLOW MAPG: CPT | Performed by: INTERNAL MEDICINE

## 2018-12-10 PROCEDURE — 93308 TTE F-UP OR LMTD: CPT

## 2018-12-10 PROCEDURE — 92526 ORAL FUNCTION THERAPY: CPT | Performed by: SPEECH-LANGUAGE PATHOLOGIST

## 2018-12-10 PROCEDURE — 93308 TTE F-UP OR LMTD: CPT | Performed by: INTERNAL MEDICINE

## 2018-12-10 PROCEDURE — 94760 N-INVAS EAR/PLS OXIMETRY 1: CPT

## 2018-12-10 PROCEDURE — 25010000002 AZITHROMYCIN PER 500 MG: Performed by: FAMILY MEDICINE

## 2018-12-10 PROCEDURE — G8987 SELF CARE CURRENT STATUS: HCPCS

## 2018-12-10 PROCEDURE — 93325 DOPPLER ECHO COLOR FLOW MAPG: CPT

## 2018-12-10 PROCEDURE — 87999 UNLISTED MICROBIOLOGY PX: CPT | Performed by: FAMILY MEDICINE

## 2018-12-10 PROCEDURE — G8989 SELF CARE D/C STATUS: HCPCS

## 2018-12-10 RX ADMIN — SODIUM CHLORIDE 100 ML/HR: 900 INJECTION, SOLUTION INTRAVENOUS at 17:50

## 2018-12-10 RX ADMIN — LEVETIRACETAM 1750 MG: 500 TABLET ORAL at 09:19

## 2018-12-10 RX ADMIN — THIORIDAZINE HYDROCHLORIDE 200 MG: 50 TABLET, FILM COATED ORAL at 20:27

## 2018-12-10 RX ADMIN — THIORIDAZINE HYDROCHLORIDE 100 MG: 50 TABLET, FILM COATED ORAL at 09:20

## 2018-12-10 RX ADMIN — FAMOTIDINE 40 MG: 40 TABLET ORAL at 09:20

## 2018-12-10 RX ADMIN — AZITHROMYCIN MONOHYDRATE 500 MG: 500 INJECTION, POWDER, LYOPHILIZED, FOR SOLUTION INTRAVENOUS at 09:19

## 2018-12-10 RX ADMIN — IPRATROPIUM BROMIDE AND ALBUTEROL SULFATE 3 ML: 2.5; .5 SOLUTION RESPIRATORY (INHALATION) at 07:39

## 2018-12-10 RX ADMIN — ATORVASTATIN CALCIUM 20 MG: 20 TABLET, FILM COATED ORAL at 09:20

## 2018-12-10 RX ADMIN — SODIUM CHLORIDE, PRESERVATIVE FREE 3 ML: 5 INJECTION INTRAVENOUS at 20:28

## 2018-12-10 RX ADMIN — IPRATROPIUM BROMIDE AND ALBUTEROL SULFATE 3 ML: 2.5; .5 SOLUTION RESPIRATORY (INHALATION) at 16:40

## 2018-12-10 RX ADMIN — SODIUM CHLORIDE 100 ML/HR: 900 INJECTION, SOLUTION INTRAVENOUS at 03:15

## 2018-12-10 RX ADMIN — LEVETIRACETAM 1750 MG: 500 TABLET ORAL at 20:27

## 2018-12-10 RX ADMIN — CEFTRIAXONE 1 G: 1 INJECTION, POWDER, FOR SOLUTION INTRAMUSCULAR; INTRAVENOUS at 10:59

## 2018-12-10 RX ADMIN — IPRATROPIUM BROMIDE AND ALBUTEROL SULFATE 3 ML: 2.5; .5 SOLUTION RESPIRATORY (INHALATION) at 19:59

## 2018-12-10 RX ADMIN — FERROUS SULFATE TAB EC 324 MG (65 MG FE EQUIVALENT) 324 MG: 324 (65 FE) TABLET DELAYED RESPONSE at 09:19

## 2018-12-10 NOTE — PROGRESS NOTES
MEDICINE DAILY PROGRESS NOTE  NAME: Bautista Grubbs  : 1955  MRN: [id number]     LOS: 1 day     PROVIDER OF SERVICE: Cesar Murry MD    Chief Complaint: Pneumonia    Subjective:   HPI: 63-year-old  male with medical history significant for depression, seizure disorder, autism, nonverbal presented to the emergency room after.  Patient being unwell.  Reports per patient's caregivers is that he's had worsening cough, shortness of air, vomiting, diarrhea, and one episode of syncope.  Since patient has been in our facility no episodes of syncope have been witnessed.  Patient is still not his usual self per caregivers.  Patient has better by mouth intake with caregivers assistance.  Patient not feeding himself like he would normally.  Patient also not ambulating like he would normally.  Patient currently being treated for community acquired pneumonia.  Patient still with some diarrhea ;however, patient's stools are loose and not watery per nursing.    Interval History:  History taken from: chart RN Patient unable to give history due to patient nonverbal.  12/10. No acute events. Patient with loose stools after a period of constipation. Nonverbal. Sitter at bedside.    Review of Systems:   Review of Systems   Unable to perform ROS: Patient nonverbal       Objective:     Vital Signs  Vitals:    12/10/18 0739 12/10/18 0748 12/10/18 0859 12/10/18 1200   BP:    122/72   BP Location:    Right arm   Patient Position:    Lying   Pulse: 89 83 66 66   Resp: 18   18   Temp:    98.8 °F (37.1 °C)   TempSrc:    Oral   SpO2: 98%   98%   Weight:       Height:           Physical Exam  Physical Exam   Constitutional: He appears well-developed and well-nourished. No distress.   HENT:   Head: Normocephalic and atraumatic.   Right Ear: External ear normal.   Left Ear: External ear normal.   Nose: Nose normal.   Eyes: Conjunctivae and EOM are normal. Pupils are equal, round, and reactive to light.   Neck: Neck supple. No  thyromegaly present.   Cardiovascular: Normal rate, regular rhythm, normal heart sounds and intact distal pulses.   Pulmonary/Chest: Effort normal. He has no wheezes. He has no rales. He exhibits no tenderness.   Coarse bibasilar.   Abdominal: Soft. Bowel sounds are normal. He exhibits no distension and no mass. There is no tenderness. There is no rebound and no guarding.   Musculoskeletal: He exhibits no edema or tenderness.   Neurological: He is alert.   Nonverbal. Very alert.    Skin: Skin is warm and dry. No rash noted. He is not diaphoretic. No erythema. No pallor.   Psychiatric:   Unable to assess.  Nonverbal.   Nursing note and vitals reviewed.      Medication Review    Current Facility-Administered Medications:   •  acetaminophen (TYLENOL) tablet 650 mg, 650 mg, Oral, Q4H PRN, Gulshan Thomas MD  •  atorvastatin (LIPITOR) tablet 20 mg, 20 mg, Oral, Daily, Gulshan Thomas MD, 20 mg at 12/10/18 0920  •  AZITHROMYCIN 500 MG/250 ML 0.9% NS IVPB (vial-mate), 500 mg, Intravenous, Q24H, Gulshan Thomas MD, Stopped at 12/10/18 1229  •  budesonide-formoterol (SYMBICORT) 160-4.5 MCG/ACT inhaler 2 puff, 2 puff, Inhalation, BID - RT, Gulshan Thomas MD, 2 puff at 12/09/18 2151  •  cefTRIAXone (ROCEPHIN) 1 g/100 mL 0.9% NS (MBP), 1 g, Intravenous, Q24H, Gulshan Thomas MD, Stopped at 12/10/18 1415  •  chlorpheniramine (CHLOR-TRIMETON) tablet 4 mg, 4 mg, Oral, Q6H PRN, Gulshan Thomas MD  •  docusate sodium (COLACE) capsule 100 mg, 100 mg, Oral, BID, Gulshan Thomas MD, 100 mg at 12/09/18 2055  •  famotidine (PEPCID) tablet 40 mg, 40 mg, Oral, Daily, Gulshan Thomas MD, 40 mg at 12/10/18 0920  •  ferrous sulfate EC tablet 324 mg, 324 mg, Oral, Daily With Breakfast, Gulshan Thomas MD, 324 mg at 12/10/18 0919  •  ipratropium-albuterol (DUO-NEB) nebulizer solution 3 mL, 3 mL, Nebulization, Q4H PRN, Gulshan Thomas MD  •  ipratropium-albuterol (DUO-NEB) nebulizer solution 3 mL, 3 mL, Nebulization, 4x Daily - RT, Gulhsan Thomas MD, 3  mL at 12/10/18 0739  •  levETIRAcetam (KEPPRA) tablet 1,750 mg, 1,750 mg, Oral, Q12H, Gulshan Thomas MD, 1,750 mg at 12/10/18 0919  •  LORazepam (ATIVAN) injection 0.5 mg, 0.5 mg, Intravenous, Q6H PRN, Gulshan Thomas MD  •  ondansetron (ZOFRAN) injection 4 mg, 4 mg, Intravenous, Q6H PRN, Gulshan Thomas MD  •  [COMPLETED] Insert peripheral IV, , , Once **AND** sodium chloride 0.9 % flush 10 mL, 10 mL, Intravenous, PRN, Raffaele Spicer, DO  •  sodium chloride 0.9 % flush 3 mL, 3 mL, Intravenous, Q12H, Gulshan Thomas MD, 3 mL at 12/09/18 2059  •  sodium chloride 0.9 % flush 3-10 mL, 3-10 mL, Intravenous, PRN, Gulshan Thomas MD  •  sodium chloride 0.9 % infusion, 100 mL/hr, Intravenous, Continuous, Gulshan Thomas MD, Last Rate: 100 mL/hr at 12/10/18 1414, 100 mL/hr at 12/10/18 1414  •  thioridazine (MELLARIL) tablet 100 mg, 100 mg, Oral, Daily, Gulshan Thomas MD, 100 mg at 12/10/18 0920  •  thioridazine (MELLARIL) tablet 200 mg, 200 mg, Oral, Nightly, Gulshan Thomas MD, 200 mg at 12/09/18 2055  •  ziprasidone (GEODON) injection 10 mg, 10 mg, Intramuscular, Q12H PRN, Gulshan Thomas MD     Diagnostic Data    Lab Results (last 24 hours)     Procedure Component Value Units Date/Time    Blood Culture - Blood, Arm, Left [[id number]] Collected:  12/08/18 0956    Specimen:  Blood from Arm, Left Updated:  12/10/18 1000     Blood Culture No growth at 2 days    Blood Culture - Blood, Arm, Left [[id number]] Collected:  12/08/18 0916    Specimen:  Blood from Arm, Left Updated:  12/10/18 0946     Blood Culture No growth at 2 days    Basic Metabolic Panel [[id number]]  (Abnormal) Collected:  12/10/18 0619    Specimen:  Blood Updated:  12/10/18 0700     Glucose 91 mg/dL      BUN 11 mg/dL      Creatinine 0.85 mg/dL      Sodium 136 mmol/L      Potassium 3.6 mmol/L      Chloride 102 mmol/L      CO2 26.0 mmol/L      Calcium 9.1 mg/dL      eGFR Non African Amer 91 mL/min/1.73      BUN/Creatinine Ratio 12.9     Anion Gap 8.0 mmol/L     CBC &  Differential [[id number]] Collected:  12/10/18 0619    Specimen:  Blood Updated:  12/10/18 0651    Narrative:       The following orders were created for panel order CBC & Differential.  Procedure                               Abnormality         Status                     ---------                               -----------         ------                     CBC Auto Differential[[id number]]        Abnormal            Final result                 Please view results for these tests on the individual orders.    CBC Auto Differential [[id number]]  (Abnormal) Collected:  12/10/18 0619    Specimen:  Blood Updated:  12/10/18 0651     WBC 4.99 10*3/mm3      RBC 3.88 10*6/mm3      Hemoglobin 12.5 g/dL      Hematocrit 36.0 %      MCV 92.8 fL      MCH 32.2 pg      MCHC 34.7 g/dL      RDW 13.1 %      RDW-SD 44.2 fl      MPV 9.5 fL      Platelets 295 10*3/mm3      Neutrophil % 51.9 %      Lymphocyte % 27.9 %      Monocyte % 14.2 %      Eosinophil % 4.8 %      Basophil % 0.6 %      Immature Grans % 0.6 %      Neutrophils, Absolute 2.59 10*3/mm3      Lymphocytes, Absolute 1.39 10*3/mm3      Monocytes, Absolute 0.71 10*3/mm3      Eosinophils, Absolute 0.24 10*3/mm3      Basophils, Absolute 0.03 10*3/mm3      Immature Grans, Absolute 0.03 10*3/mm3      nRBC 0.0 /100 WBC     Respiratory Panel, PCR - Swab, Nasopharynx [[id number]]  (Normal) Collected:  12/09/18 1648    Specimen:  Swab from Nasopharynx Updated:  12/09/18 1854     ADENOVIRUS, PCR Not Detected     Coronavirus 229E Not Detected     Coronavirus HKU1 Not Detected     Coronavirus NL63 Not Detected     Coronavirus OC43 Not Detected     Human Metapneumovirus Not Detected     Human Rhinovirus/Enterovirus Not Detected     Influenza B PCR Not Detected     Parainfluenza Virus 1 Not Detected     Parainfluenza Virus 2 Not Detected     Parainfluenza Virus 3 Not Detected     Parainfluenza Virus 4 Not Detected     Bordetella pertussis pcr Not Detected     Influenza A H1 2009 PCR Not  Detected     Chlamydophila pneumoniae PCR Not Detected     Mycoplasma pneumo by PCR Not Detected     Influenza A PCR Not Detected     Influenza A H3 Not Detected     Influenza A H1 Not Detected     RSV, PCR Not Detected          I reviewed the patient's new clinical results.    Assessment/Plan:     Active Hospital Problems    Diagnosis Date Noted   • **Pneumonia [J18.9] 12/08/2018   • Pneumonia of right lower lobe due to infectious organism (CMS/[facility]) [J18.1] 12/09/2018   • Autism [F84.0] 12/09/2018   • COPD (chronic obstructive pulmonary disease) (CMS/[facility]) [J44.9] 12/09/2018   • GERD (gastroesophageal reflux disease) [K21.9] 12/09/2018   • Hyperlipidemia [E78.5] 12/09/2018   • Seizures (CMS/[facility]) [R56.9] 12/09/2018   • Syncope [R55] 12/08/2018       #.  Community acquired pneumonia.  Azithromycin/Rocephin.  #.  Near-syncope.  Echo reviewed.  Technically difficult study but appears to have intact ejection fraction.  PT ordered.  #.  COPD.  Nebs.  Patient on room air.  #.  Seizure disorder.  Keppra.  Seizure precautions.  #.  Diarrhea.  Loose but not watery per nursing.  GI PCR ordered.  Johnny results.  #. HLD. Statin.      Will monitor patient's hospital course and adjust treatment as hospital course dictates.    DVT prophylaxis: SCDs  Code status is   Code Status and Medical Interventions:   Ordered at: 12/08/18 0911     Code Status:    CPR     Medical Interventions (Level of Support Prior to Arrest):    Full       Plan for disposition:[facility] in 0-2 days      Time:           This document has been electronically signed by Cesar Murry MD on December 10, 2018 2:31 PM

## 2018-12-10 NOTE — THERAPY TREATMENT NOTE
Acute Care - Physical Therapy Treatment Note  [facility]     Patient Name: Bautista Grubbs  : 1955  MRN: [id number]  Today's Date: 12/10/2018        Referring Physician: Dr EDUARD Thomas    Admit Date: 2018    Visit Dx:    ICD-10-CM ICD-9-CM   1. Syncope, unspecified syncope type R55 780.2   2. Pneumonia of right lower lobe due to infectious organism (CMS/HCC) J18.1 486   3. Dysphagia, unspecified type R13.10 787.20   4. Impaired functional mobility, balance, gait, and endurance Z74.09 V49.89     Patient Active Problem List   Diagnosis   • Pneumonia   • Syncope   • Pneumonia of right lower lobe due to infectious organism (CMS/HCC)   • Autism   • COPD (chronic obstructive pulmonary disease) (CMS/[facility])   • GERD (gastroesophageal reflux disease)   • Hyperlipidemia   • Seizures (CMS/HCC)       Therapy Treatment    Rehabilitation Treatment Summary     Row Name 12/10/18 1150 12/10/18 1122          Treatment Time/Intention    Discipline  speech language pathologist  -CK  physical therapy assistant  -TA     Document Type  therapy note (daily note)  -CK  therapy note (daily note)  -TA     Subjective Information  no complaints  -CK  no complaints  -TA     Mode of Treatment  speech-language pathology;individual therapy  -CK  individual therapy;physical therapy  -TA     Patient/Family Observations  Pt reclined to 35 degrees; CG feeding pt in this position; stated that pt refused to sit up for her and PT  -CK  --     Care Plan Review  care plan/treatment goals reviewed;risks/benefits reviewed;current/potential barriers reviewed;patient/other agree to care plan  -CK  --     Care Plan Review, Other Participant(s)  caregiver  -CK  --     Therapy Frequency (PT Clinical Impression)  --  other (see comments) 6x/wk  -TA     Patient Effort  adequate  -CK  --     Existing Precautions/Restrictions  --  cardiac;fall;seizures  (Significant)  watch b/p & syncope;pt sits on ground when objects to events  -TA     Patient  Response to Treatment  --  sitter present, pt demonstrated unwillingness for vss to be taken, pt required much encouragement to partiicipate  -TA     Recorded by [CK] Netta Rossi MS CCC-SLP 12/10/18 1158 [TA] Liza Bhatt, PTA 12/10/18 1202     Row Name 12/10/18 1122             Vital Signs    Pre Systolic BP Rehab  106  -TA      Pre Treatment Diastolic BP  80  -TA      Pretreatment Heart Rate (beats/min)  89  -TA      Pre SpO2 (%)  98  -TA      O2 Delivery Pre Treatment  room air  -TA      Pre Patient Position  Supine with blanket over his head, sitter states it is his normal  -TA      Intra Patient Position  Sitting  -TA      Post Patient Position  Side Lying right  -TA      Recorded by [TA] Liza Bhatt, PTA 12/10/18 1202      Row Name 12/10/18 1122             Cognitive Assessment/Intervention- PT/OT    Orientation Status (Cognition)  oriented to;person  -TA      Follows Commands (Cognition)  follows one step commands;0-24% accuracy  -TA      Recorded by [TA] Liza Bhatt, PTA 12/10/18 1202      Row Name 12/10/18 1122             Bed Mobility Assessment/Treatment    Bed Mobility Assessment/Treatment  bed mobility (all) activities  -TA      Garza Level (Bed Mobility)  maximum assist (25% patient effort)  -TA      Rolling Right Garza (Bed Mobility)  supervision  -TA      Scooting/Bridging Garza (Bed Mobility)  supervision  -TA      Supine-Sit Garza (Bed Mobility)  maximum assist (25% patient effort);2 person assist assist of sitter from [place]  -TA      Sit-Supine Garza (Bed Mobility)  supervision  -TA      Bed Mobility, Safety Issues  cognitive deficits limit understanding;decreased use of arms for pushing/pulling;decreased use of legs for bridging/pushing;impaired trunk control for bed mobility  -TA      Assistive Device (Bed Mobility)  bed rails;draw sheet;head of bed elevated  -TA      Comment (Bed Mobility)   pt sat @ EOB briefly & pt immediiately rolled to  right & returned to fetal position  -TA      Recorded by [TA] Liza Bhatt, PTA 12/10/18 1202      Row Name 12/10/18 1122             Gait/Stairs Assessment/Training    [place] Level (Gait)  not tested;unable to assess  -TA      Recorded by [TA] Liza Bhatt, PTA 12/10/18 1202      Row Name 12/10/18 1150 12/10/18 1122          Positioning and Restraints    Pre-Treatment Position  in bed  -CK  in bed  -TA     Post Treatment Position  bed  -CK  bed  -TA     In Bed  --  with family/caregiver;with other staff  -TA     Recorded by [CK] Netta Rossi, MS CCC-SLP 12/10/18 1158 [TA] Liza Bhatt, PTA 12/10/18 1202     Row Name 12/10/18 1150 12/10/18 1122          Pain Scale: FACES Pre/Post-Treatment    Pain: FACES Scale, Pretreatment  0-->no hurt  -CK  0-->no hurt  -TA     Pain: FACES Scale, Post-Treatment  --  0-->no hurt  -TA     Recorded by [CK] Netta Rossi, MS CCC-SLP 12/10/18 1158 [TA] Liza Bhatt, PTA 12/10/18 1202     Row Name 12/10/18 1122             Sensory Assessment/Intervention    Sensory General Assessment  no sensation deficits identified  -TA      Recorded by [TA] Liza Bhatt, PTA 12/10/18 1202      Row Name 12/10/18 1122             Outcome Summary/Treatment Plan (PT)    Daily Summary of Progress (PT)  unable to show any progress toward functional goals  -TA      Plan for Continued Treatment (PT)  continue  -TA      Anticipated Discharge Disposition (PT)  home with home health;home with 24/7 care;anticipate therapy at next level of care  -TA      Recorded by [TA] Liza Bhatt, PTA 12/10/18 1202      Row Name 12/10/18 1150             Outcome Summary/Treatment Plan (SLP)    Daily Summary of Progress (SLP)  progress toward functional goals as expected  -CK      Barriers to Overall Progress (SLP)  cognition impacts cooperation at times  -CK      Plan for Continued Treatment (SLP)  Conintue POC  -CK      Anticipated Dischage Disposition  community-based residential facility  ([facility])  -CK      Recorded by [CK] Netta Rossi, MS CCC-SLP 12/10/18 1158        User Key  (r) = Recorded By, (t) = Taken By, (c) = Cosigned By    Initials Name Effective Dates Discipline    CK Netta Rossi, MS CCC-SLP 04/03/18 -  SLP    Liza Pan PTA 03/07/18 -  PT               PT Rehab Goals     Row Name 12/10/18 1122             Bed Mobility Goal 1 (PT)    Activity/Assistive Device (Bed Mobility Goal 1, PT)  bed mobility activities, all  -TA      [place]eler Level/Cues Needed (Bed Mobility Goal 1, PT)  independent;conditional independence  -TA      Time Frame (Bed Mobility Goal 1, PT)  by discharge  -TA      Progress/Outcomes (Bed Mobility Goal 1, PT)  goal ongoing  -TA         Gait Training Goal 1 (PT)    Activity/Assistive Device (Gait Training Goal 1, PT)  gait (walking locomotion);decrease fall risk  -TA      [place] Level (Gait Training Goal 1, PT)  conditional independence;contact guard assist  -TA      Distance (Gait Goal 1, PT)  100 ft or more  -TA      Time Frame (Gait Training Goal 1, PT)  by discharge  -TA      Progress/Outcome (Gait Training Goal 1, PT)  goal ongoing  -TA        User Key  (r) = Recorded By, (t) = Taken By, (c) = Cosigned By    Initials Name Provider Type Discipline    Liza Pan, LORAINE Physical Therapy Assistant PT              PT Recommendation and Plan  Anticipated Discharge Disposition (PT): home with home health, home with 24/7 care, anticipate therapy at next level of care  Therapy Frequency (PT Clinical Impression): other (see comments)(6x/wk)  Outcome Summary/Treatment Plan (PT)  Daily Summary of Progress (PT): unable to show any progress toward functional goals  Plan for Continued Treatment (PT): continue  Anticipated Discharge Disposition (PT): home with home health, home with 24/7 care, anticipate therapy at next level of care  Outcome Summary: pt sup>sit with max assist of 2, sit>sup with SBA, rolling to right with SBA. pt sat @ EOB  briefly, pt will require 24/7 care & to return to facility @ D/C  Outcome Measures     Row Name 12/10/18 1200 12/09/18 1200          How much help from another person do you currently need...    Turning from your back to your side while in flat bed without using bedrails?  2  -TA  2  -GB     Moving from lying on back to sitting on the side of a flat bed without bedrails?  2  -TA  2  -GB     Moving to and from a bed to a chair (including a wheelchair)?  1  -TA  1  -GB     Standing up from a chair using your arms (e.g., wheelchair, bedside chair)?  1  -TA  1  -GB     Climbing 3-5 steps with a railing?  1  -TA  1  -GB     To walk in hospital room?  1  -TA  1  -GB     AM-PAC 6 Clicks Score  8  -TA  8  -GB        Functional Assessment    Outcome Measure Options  AM-PAC 6 Clicks Basic Mobility (PT)  -TA  AM-PAC 6 Clicks Basic Mobility (PT)  -GB       User Key  (r) = Recorded By, (t) = Taken By, (c) = Cosigned By    Initials Name Provider Type    Kati Shah, PT Physical Therapist    Liza Pan PTA Physical Therapy Assistant         Time Calculation:   PT Charges     Row Name 12/10/18 1206             Time Calculation    Start Time  1122  -TA      Stop Time  1139  -TA      Time Calculation (min)  17 min  -TA         Time Calculation- PT    Total Timed Code Minutes- PT  17 minute(s)  -TA        User Key  (r) = Recorded By, (t) = Taken By, (c) = Cosigned By    Initials Name Provider Type    Liza Pan PTA Physical Therapy Assistant        Therapy Suggested Charges     Code   Minutes Charges    None           Therapy Charges for Today     Code Description Service Date Service Provider Modifiers Qty    84229692225 HC PT THERAPEUTIC ACT EA 15 MIN 12/10/2018 Liza Bhatt PTA GP 1          PT G-Codes  PT Professional Judgement Used?: Yes  Outcome Measure Options: AM-PAC 6 Clicks Basic Mobility (PT)  AM-PAC 6 Clicks Score: 8  Functional Limitation: Mobility: Walking and moving  around  Mobility: Walking and Moving Around Current Status (): At least 80 percent but less than 100 percent impaired, limited or restricted  Mobility: Walking and Moving Around Goal Status (): At least 1 percent but less than 20 percent impaired, limited or restricted    Liza Bhatt, PTA  12/10/2018

## 2018-12-10 NOTE — THERAPY DISCHARGE NOTE
Acute Care - Occupational Therapy Initial Eval/Discharge  [facility]     Patient Name: Bautista Grubbs  : 1955  MRN: [id number]  Today's Date: 12/10/2018  Onset of Illness/Injury or Date of Surgery: 18  Date of Referral to OT: 18  Referring Physician: Dr. EDUARD Clements       Admit Date: 2018       ICD-10-CM ICD-9-CM   1. Syncope, unspecified syncope type R55 780.2   2. Pneumonia of right lower lobe due to infectious organism (CMS/HCC) J18.1 486   3. Dysphagia, unspecified type R13.10 787.20   4. Impaired functional mobility, balance, gait, and endurance Z74.09 V49.89   5. Impaired mobility and ADLs Z74.09 799.89     Patient Active Problem List   Diagnosis   • Pneumonia   • Syncope   • Pneumonia of right lower lobe due to infectious organism (CMS/HCC)   • Autism   • COPD (chronic obstructive pulmonary disease) (CMS/HCC)   • GERD (gastroesophageal reflux disease)   • Hyperlipidemia   • Seizures (CMS/HCC)     Past Medical History:   Diagnosis Date   • Alopecia    • Arcus senilis    • Autism    • Cataract    • Contracture, right wrist    • COPD (chronic obstructive pulmonary disease) (CMS/HCC)    • GERD (gastroesophageal reflux disease)    • Hyperlipidemia    • Insomnia    • Intellectual disability    • Lennox-Gastaut syndrome (CMS/HCC)    • Major depressive disorder    • Orthostatic hypotension    • Osteoporosis    • Right bundle branch block    • Scoliosis    • Seizures (CMS/HCC)      History reviewed. No pertinent surgical history.       OT ASSESSMENT FLOWSHEET (last 72 hours)      Occupational Therapy Evaluation     Row Name 12/10/18 1244                   OT Evaluation Time/Intention    Subjective Information  no complaints  -MH        Document Type  evaluation  -MH        Mode of Treatment  occupational therapy  -MH        Total Evaluation Minutes, Occupational Therapy  13  -MH        Patient Effort  poor  -MH        Comment  Caregiver/sitter from SNF present and provided pt history    -           General Information    Patient Profile Reviewed?  yes  -        Onset of Illness/Injury or Date of Surgery  12/08/18  -        Referring Physician  Dr. EDUARD Clements   -        Patient Observations  alert;poorly cooperative  -        Patient/Family Observations  Pt supine in bed with caregiver/sitter present throughout eval.  -        Prior Level of Function  mod assist:;dressing;dependent:;bathing;grooming;independent:;feeding max A toileting  -        Equipment Currently Used at Home  wheelchair  -        Existing Precautions/Restrictions  cardiac;fall;seizures  -        Risks Reviewed  patient:;other:;LOB;nausea/vomiting;dizziness;increased discomfort;change in vital signs  -        Benefits Reviewed  patient:;other:;improve function;increase independence;increase strength  -           Relationship/Environment    Primary Source of Support/Comfort  nonrelative caregiver  -        Lives With  facility resident  -           Resource/Environmental Concerns    Current Living Arrangements  residential facility  -           Cognitive Assessment/Intervention- PT/OT    Orientation Status (Cognition)  -- Pt nonverbal  -        Follows Commands (Cognition)  does not follow one step commands;unable/difficult to assess  -        Safety Deficit (Cognitive)  unable/difficult to assess;severe deficit;ability to follow commands  -           Safety Issues, Functional Mobility    Safety Issues Affecting Function (Mobility)  ability to follow commands;insight into deficits/self awareness  -           Bed Mobility Assessment/Treatment    Bed Mobility Assessment/Treatment  supine-sit  -        Supine-Sit [place] (Bed Mobility)  unable to assess;dependent (less than 25% patient effort);2 person assist  -        Bed Mobility, Safety Issues  cognitive deficits limit understanding;decreased use of arms for pushing/pulling;decreased use of legs for bridging/pushing;impaired trunk control  for bed mobility  -        Assistive Device (Bed Mobility)  -- Attempted to sit pt EOB with max A x2 however pt resistive   -           Functional Mobility    Functional Mobility- Comment  Caregiver from SNF reports pt is typically independent with ambulation however does not want to walk/get out of bed for past 2 weeks   -           ADL Assessment/Intervention    Additional Documentation  -- Unable to assess due to pt resisting   -           BADL Safety/Performance    Impairments, BADL Safety/Performance  cognition  -           General ROM    GENERAL ROM COMMENTS  Difficult to evaluate due to pt resisting activity and unable to follow commands. Pt not allowing AROM or PROM.   -           MMT (Manual Muscle Testing)    General MMT Comments  difficult to evaluate due to pt not following commands. BUE MMT appears 4+/5 when pt resisting.   -           Sensory Assessment/Intervention    Sensory General Assessment  no sensation deficits identified  -           Positioning and Restraints    Pre-Treatment Position  in bed  -        Post Treatment Position  bed  -MH        In Bed  supine;call light within reach;encouraged to call for assist;exit alarm on;side rails up x2;with family/caregiver  -           Pain Scale: FACES Pre/Post-Treatment    Pain: FACES Scale, Pretreatment  0-->no hurt  -        Pain: FACES Scale, Post-Treatment  0-->no hurt  -           Plan of Care Review    Plan of Care Reviewed With  patient;caregiver  -           Clinical Impression (OT)    Date of Referral to OT  12/08/18  -        OT Diagnosis  impaired mobility and ADLs  -        Criteria for Skilled Therapeutic Interventions Met (OT Eval)  current level of function same as previous level of function  -        Care Plan Review (OT)  evaluation/treatment results reviewed;risks/benefits reviewed;current/potential barriers reviewed;patient/other agree to care plan  -        Care Plan Review, Other Participant (OT  Eval)  caregiver  -        Anticipated Discharge Disposition (OT)  skilled nursing facility  -           Vital Signs    Pre Systolic BP Rehab  126  -        Pre Treatment Diastolic BP  93  -        Pretreatment Heart Rate (beats/min)  94  -        Posttreatment Resp Rate (breaths/min)  -- Pt resisting pulse ox on finger   -           Living Environment    Home Accessibility  wheelchair accessible  -          User Key  (r) = Recorded By, (t) = Taken By, (c) = Cosigned By    Initials Name Effective Dates     Zeeshan Reynaga 10/12/18 -               OT Recommendation and Plan  Outcome Summary/Treatment Plan (OT)  Anticipated Discharge Disposition (OT): skilled nursing facility  Plan of Care Review  Plan of Care Reviewed With: patient  Plan of Care Reviewed With: patient  Outcome Summary: OT eval completed 12/10/18. Pt with caregiver at bedside and providing pt history. Pt resisting any activity and unable to follow any commands. Caregiver reports that pt requires assist with ADLs. Pt currently functioning at baseline and does not require skilled OT intervention at this time. Recommend return to SNF upon d/c.          Outcome Measures     Row Name 12/10/18 1244 12/10/18 1200 12/09/18 1200       How much help from another person do you currently need...    Turning from your back to your side while in flat bed without using bedrails?  --  2  -TA  2  -GB    Moving from lying on back to sitting on the side of a flat bed without bedrails?  --  2  -TA  2  -GB    Moving to and from a bed to a chair (including a wheelchair)?  --  1  -TA  1  -GB    Standing up from a chair using your arms (e.g., wheelchair, bedside chair)?  --  1  -TA  1  -GB    Climbing 3-5 steps with a railing?  --  1  -TA  1  -GB    To walk in hospital room?  --  1  -TA  1  -GB    AM-PAC 6 Clicks Score  --  8  -TA  8  -GB       How much help from another is currently needed...    Putting on and taking off regular lower body clothing?  2   -  --  --    Bathing (including washing, rinsing, and drying)  1  -  --  --    Toileting (which includes using toilet bed pan or urinal)  1  -  --  --    Putting on and taking off regular upper body clothing  2  -  --  --    Taking care of personal grooming (such as brushing teeth)  2  -  --  --    Eating meals  4  -  --  --    Score  12  -  --  --       Functional Assessment    Outcome Measure Options  AM-PAC 6 Clicks Daily Activity (OT)  -  AM-PAC 6 Clicks Basic Mobility (PT)  -  AM-PAC 6 Clicks Basic Mobility (PT)  -      User Key  (r) = Recorded By, (t) = Taken By, (c) = Cosigned By    Initials Name Provider Type    Kati Shah, PT Physical Therapist    Liza Pan, PTA Physical Therapy Assistant     Zeeshan Reyngaa Occupational Therapist          Time Calculation:   Time Calculation- OT     Row Name 12/10/18 1538             Time Calculation-     OT Start Time  1244  -      OT Stop Time  1257  -      OT Time Calculation (min)  13 min  -      OT Received On  12/10/18  -        User Key  (r) = Recorded By, (t) = Taken By, (c) = Cosigned By    Initials Name Provider Type     Zeeshan Reynaga Occupational Therapist        Therapy Suggested Charges     Code   Minutes Charges    None           Therapy Charges for Today     Code Description Service Date Service Provider Modifiers Qty    51502240413 HC OT SELFCARE CURRENT 12/10/2018 Zeeshan Reynaga, CL 1    23238417473 HC OT SELFCARE PROJECTED 12/10/2018 Zeeshan Reynaga  1    43434781714 HC OT SELFCARE DISCHARGE 12/10/2018 Zeeshan Reynaga, CL 1    [id number]  OT EVAL MOD COMPLEXITY 1 12/10/2018 Zeeshan Reynaga 1          OT G-codes  OT Professional Judgement Used?: Yes  OT Functional Scales Options: AM-PAC 6 Clicks Daily Activity (OT)  Score: 12  Functional Limitation: Self care  Self Care Current Status (): At least 60 percent but less than 80 percent impaired,  limited or restricted  Self Care Goal Status (): At least 60 percent but less than 80 percent impaired, limited or restricted  Self Care Discharge Status (): At least 60 percent but less than 80 percent impaired, limited or restricted    OT Discharge Summary  Anticipated Discharge Disposition (OT): skilled nursing facility    Margato Wilcoxricks  12/10/2018

## 2018-12-10 NOTE — PLAN OF CARE
Problem: Patient Care Overview  Goal: Plan of Care Review  Outcome: Ongoing (interventions implemented as appropriate)   12/10/18 0106   Coping/Psychosocial   Plan of Care Reviewed With patient   OTHER   Outcome Summary OT eval completed 12/10/18. Pt with caregiver at bedside and providing pt history. Pt resisting any activity and unable to follow any commands. Caregiver reports that pt requires assist with ADLs. Pt currently functioning at baseline and does not require skilled OT intervention at this time. Recommend return to SNF upon d/c.

## 2018-12-10 NOTE — PLAN OF CARE
Problem: Patient Care Overview  Goal: Plan of Care Review  Outcome: Ongoing (interventions implemented as appropriate)   12/10/18 8338   Coping/Psychosocial   Plan of Care Reviewed With patient;caregiver   Plan of Care Review   Progress no change   OTHER   Outcome Summary Pt seen for dysphagia therapy. Pt presented in a reclined position at 35 degrees and was being fed lunch by CG. CG stated that pt had refused to situp in the bed for her and PT. Pt consumed thin liquids via spoon and straw. Pt coughed x1 on straw and no other overt s/s of aspiration were noted. CG stated that pt does cough from time to time; however, that was baseline and he typically feeds himself. SLP educated CG on d/c on current diet at this time.

## 2018-12-10 NOTE — PLAN OF CARE
Problem: Fall Risk (Adult)  Goal: Absence of Fall  Outcome: Ongoing (interventions implemented as appropriate)      Problem: Patient Care Overview  Goal: Individualization and Mutuality  Outcome: Ongoing (interventions implemented as appropriate)    Goal: Discharge Needs Assessment  Outcome: Ongoing (interventions implemented as appropriate)    Goal: Interprofessional Rounds/Family Conf  Outcome: Ongoing (interventions implemented as appropriate)      Problem: Skin Injury Risk (Adult)  Goal: Skin Health and Integrity  Outcome: Ongoing (interventions implemented as appropriate)      Problem: Pneumonia (Adult)  Goal: Signs and Symptoms of Listed Potential Problems Will be Absent, Minimized or Managed (Pneumonia)  Outcome: Ongoing (interventions implemented as appropriate)

## 2018-12-10 NOTE — PLAN OF CARE
Problem: Patient Care Overview  Goal: Plan of Care Review  Outcome: Ongoing (interventions implemented as appropriate)   12/10/18 0130 12/10/18 0800 12/10/18 1122   Coping/Psychosocial   Plan of Care Reviewed With --  patient --    Plan of Care Review   Progress improving --  --    OTHER   Outcome Summary --  --  pt sup>sit with max assist of 2, sit>sup with SBA, rolling to right with SBA. pt sat @ EOB briefly, pt will require 24/7 care & to return to facility @ D/C

## 2018-12-10 NOTE — PLAN OF CARE
Problem: Fall Risk (Adult)  Goal: Absence of Fall  Outcome: Ongoing (interventions implemented as appropriate)      Problem: Patient Care Overview  Goal: Plan of Care Review  Outcome: Ongoing (interventions implemented as appropriate)   12/10/18 0130   Coping/Psychosocial   Plan of Care Reviewed With patient   Plan of Care Review   Progress improving     Goal: Individualization and Mutuality  Outcome: Ongoing (interventions implemented as appropriate)    Goal: Discharge Needs Assessment  Outcome: Ongoing (interventions implemented as appropriate)    Goal: Interprofessional Rounds/Family Conf  Outcome: Ongoing (interventions implemented as appropriate)      Problem: Skin Injury Risk (Adult)  Goal: Skin Health and Integrity  Outcome: Ongoing (interventions implemented as appropriate)      Problem: Pneumonia (Adult)  Goal: Signs and Symptoms of Listed Potential Problems Will be Absent, Minimized or Managed (Pneumonia)  Outcome: Ongoing (interventions implemented as appropriate)

## 2018-12-10 NOTE — THERAPY DISCHARGE NOTE
Acute Care - Speech Language Pathology   Swallow Treatment Note/Discharge   [facility]     Patient Name: Bautista Grubbs  : 1955  MRN: [id number]  Today's Date: 12/10/2018        Referring Physician: Dr EDUARD Thomas      Admit Date: 2018      Goal:  Patient will safely tolerate least restricted diet w/no overt s/s of aspiration for adequate nutrition and hydration:  Pt seen for dysphagia therapy. Pt presented in a reclined position at 35 degrees and was being fed lunch by CG. CG stated that pt had refused to situp in the bed for her and PT. Pt consumed thin liquids via spoon and straw. Pt coughed x1 on straw and no other overt s/s of aspiration were noted. CG stated that pt does cough from time to time; however, that was baseline and he typically feeds himself. SLP educated CG on d/c on current diet at this time.  Visit Dx:      ICD-10-CM ICD-9-CM   1. Syncope, unspecified syncope type R55 780.2   2. Pneumonia of right lower lobe due to infectious organism (CMS/HCC) J18.1 486   3. Dysphagia, unspecified type R13.10 787.20   4. Impaired functional mobility, balance, gait, and endurance Z74.09 V49.89     Patient Active Problem List   Diagnosis   • Pneumonia   • Syncope   • Pneumonia of right lower lobe due to infectious organism (CMS/HCC)   • Autism   • COPD (chronic obstructive pulmonary disease) (CMS/HCC)   • GERD (gastroesophageal reflux disease)   • Hyperlipidemia   • Seizures (CMS/HCC)       Therapy Treatment  Rehabilitation Treatment Summary     Row Name 12/10/18 1150 12/10/18 1122          Treatment Time/Intention    Discipline  speech language pathologist  -CK  physical therapy assistant  -TA     Document Type  therapy note (daily note)  -CK  therapy note (daily note)  -TA     Subjective Information  no complaints  -CK  no complaints  -TA     Mode of Treatment  speech-language pathology;individual therapy  -CK  individual therapy;physical therapy  -TA     Patient/Family Observations  Pt  reclined to 35 degrees; CG feeding pt in this position; stated that pt refused to sit up for her and PT  -CK  --     Care Plan Review  care plan/treatment goals reviewed;risks/benefits reviewed;current/potential barriers reviewed;patient/other agree to care plan  -CK  --     Care Plan Review, Other Participant(s)  caregiver  -CK  --     Therapy Frequency (PT Clinical Impression)  --  other (see comments) 6x/wk  -TA     Total Evaluation Minutes, SLP  15  -CK2  --     Patient Effort  adequate  -CK  --     Existing Precautions/Restrictions  --  cardiac;fall;seizures  (Significant)  watch b/p & syncope;pt sits on ground when objects to events  -TA     Patient Response to Treatment  --  sitter present, pt demonstrated unwillingness for vss to be taken, pt required much encouragement to partiicipate  -TA     Recorded by [CK] Netta Rossi, MS CCC-SLP 12/10/18 1158  [CK2] Netta Rossi, MS CCC-SLP 12/10/18 1304 [TA] Liza Bhatt, PTA 12/10/18 1202     Row Name 12/10/18 1122             Vital Signs    Pre Systolic BP Rehab  106  -TA      Pre Treatment Diastolic BP  80  -TA      Pretreatment Heart Rate (beats/min)  89  -TA      Pre SpO2 (%)  98  -TA      O2 Delivery Pre Treatment  room air  -TA      Pre Patient Position  Supine with blanket over his head, sitter states it is his normal  -TA      Intra Patient Position  Sitting  -TA      Post Patient Position  Side Lying right  -TA      Recorded by [TA] Liza Bhatt, PTA 12/10/18 1202      Row Name 12/10/18 1122             Cognitive Assessment/Intervention- PT/OT    Orientation Status (Cognition)  oriented to;person  -TA      Follows Commands (Cognition)  follows one step commands;0-24% accuracy  -TA      Recorded by [TA] Liza Bhatt, PTA 12/10/18 1202      Row Name 12/10/18 1122             Bed Mobility Assessment/Treatment    Bed Mobility Assessment/Treatment  bed mobility (all) activities  -TA      [place] Level (Bed Mobility)  maximum assist (25%  patient effort)  -TA      Rolling Right [place] (Bed Mobility)  supervision  -TA      Scooting/Bridging [place] (Bed Mobility)  supervision  -TA      Supine-Sit [place] (Bed Mobility)  maximum assist (25% patient effort);2 person assist assist of sitter from [place]  -TA      Sit-Supine [place] (Bed Mobility)  supervision  -TA      Bed Mobility, Safety Issues  cognitive deficits limit understanding;decreased use of arms for pushing/pulling;decreased use of legs for bridging/pushing;impaired trunk control for bed mobility  -TA      Assistive Device (Bed Mobility)  bed rails;draw sheet;head of bed elevated  -TA      Comment (Bed Mobility)   pt sat @ EOB briefly & pt immediiately rolled to right & returned to fetal position  -TA      Recorded by [TA] Liza Bhatt, PTA 12/10/18 1202      Row Name 12/10/18 1122             Gait/Stairs Assessment/Training    [place] Level (Gait)  not tested;unable to assess  -TA      Recorded by [TA] Liza Bhatt, PTA 12/10/18 1202      Row Name 12/10/18 1150 12/10/18 1122          Positioning and Restraints    Pre-Treatment Position  in bed  -CK  in bed  -TA     Post Treatment Position  bed  -CK  bed  -TA     In Bed  supine;side lying right;call light within reach;encouraged to call for assist;exit alarm on;side rails up x2;with family/caregiver  -CK2  with family/caregiver;with other staff  -TA     Recorded by [CK] Netta Rossi, MS CCC-SLP 12/10/18 1158  [CK2] Netta Rossi, MS CCC-SLP 12/10/18 1304 [TA] Liza Bhatt, PTA 12/10/18 1202     Row Name 12/10/18 1150 12/10/18 1122          Pain Scale: FACES Pre/Post-Treatment    Pain: FACES Scale, Pretreatment  0-->no hurt  -CK  0-->no hurt  -TA     Pain: FACES Scale, Post-Treatment  0-->no hurt  -CK2  0-->no hurt  -TA     Recorded by [CK] Netta Rossi, MS CCC-SLP 12/10/18 1158  [CK2] Netta Rossi, MS CCC-SLP 12/10/18 1304 [TA] Liza Bhatt, PTA 12/10/18 1202     Row Name 12/10/18  1122             Sensory Assessment/Intervention    Sensory General Assessment  no sensation deficits identified  -TA      Recorded by [TA] Liza Bhatt, PTA 12/10/18 1202      Row Name 12/10/18 1122             Outcome Summary/Treatment Plan (PT)    Daily Summary of Progress (PT)  unable to show any progress toward functional goals  -TA      Plan for Continued Treatment (PT)  continue  -TA      Anticipated Discharge Disposition (PT)  home with home health;home with 24/7 care;anticipate therapy at next level of care  -TA      Recorded by [TA] Liza Bhatt, PTA 12/10/18 1202      Row Name 12/10/18 1150             Outcome Summary/Treatment Plan (SLP)    Daily Summary of Progress (SLP)  progress toward functional goals as expected  -CK      Barriers to Overall Progress (SLP)  cognition impacts cooperation at times  -CK      Plan for Continued Treatment (SLP)  Conintue POC  -CK      Anticipated Dischage Disposition  [facility] facility ([facility])  -CK      Reason for Discharge  all goals and outcomes met, no further needs identified  -CK2      Recorded by [CK] Netta Rossi MS CCC-SLP 12/10/18 1158  [CK2] Netta Rossi, MS CCC-SLP 12/10/18 1304        User Key  (r) = Recorded By, (t) = Taken By, (c) = Cosigned By    Initials Name Effective Dates Discipline    CK Netta Rossi, MS CCC-SLP 04/03/18 -  SLP    TA Liza Bhatt, PTA 03/07/18 -  PT        Outcome Summary  Outcome Summary/Treatment Plan (SLP)  Daily Summary of Progress (SLP): progress toward functional goals as expected (12/10/18 1150 : Netta Rossi, MS CCC-SLP)  Barriers to Overall Progress (SLP): cognition impacts cooperation at times (12/10/18 1150 : Netta Rossi, MS CCC-SLP)  Plan for Continued Treatment (SLP): Conintue POC (12/10/18 1150 : Netta Rossi, MS CCC-SLP)  Anticipated Dischage Disposition: [facility] facility ([facility]) (12/10/18 1248 : Netta Rossi, MS CCC-SLP)  Reason  for Discharge: all goals and outcomes met, no further needs identified (12/10/18 1248 : Netta Rossi MS CCC-SLP)  SLP GOALS     Row Name 12/10/18 1150 12/09/18 0902          Oral Nutrition/Hydration Goal 1 (SLP)    Oral Nutrition/Hydration Goal 1, SLP  Pt will safely tolerate least restricted diet w/no overt s/s of aspiration for adequate nutrition and hydration.  -CK  Pt will safely tolerate least restricted diet w/no overt s/s of aspiration for adequate nutrition and hydration.  -CK     Time Frame (Oral Nutrition/Hydration Goal 1, SLP)  by discharge  -CK  by discharge  -CK     Barriers (Oral Nutrition/Hydration Goal 1, SLP)  cognition  -CK  cognition  -CK     Progress/Outcomes (Oral Nutrition/Hydration Goal 1, SLP)  goal met  (Significant)   -CK  other (see comments) new goal  -CK       User Key  (r) = Recorded By, (t) = Taken By, (c) = Cosigned By    Initials Name Provider Type    CK Netta Rossi MS CCC-SLP Speech and Language Pathologist          EDUCATION  The patient has been educated in the following areas:   Dysphagia (Swallowing Impairment) Modified Diet Instruction.    SLP Recommendation and Plan                    Anticipated Dischage Disposition: community-based residential facility ([facility])                Daily Summary of Progress (SLP): progress toward functional goals as expected  Plan for Continued Treatment (SLP): Jewel BECERRA  Plan of Care Reviewed With: patient, caregiver  Progress: no change  Plan of Care Reviewed With: patient, caregiver           Time Calculation:   Time Calculation- SLP     Row Name 12/10/18 1247             Time Calculation- SLP    SLP Start Time  1150  -CK      SLP Stop Time  1205  -CK      SLP Time Calculation (min)  15 min  -CK      Total Timed Code Minutes- SLP  15 minute(s)  -CK      SLP Received On  12/10/18  -CK      SLP Goal Re-Cert Due Date  12/23/18  -CK        User Key  (r) = Recorded By, (t) = Taken By, (c) = Cosigned By    Initials Name Provider Type     Netta Soto MS CCC-SLP Speech and Language Pathologist          Therapy Charges for Today     Code Description Service Date Service Provider Modifiers Qty    34649570136 HC ST SWALLOWING CURRENT STATUS 12/9/2018 Netta Rossi MS CCC-SLP GN, CI 1    91902803003 HC ST SWALLOWING PROJECTED 12/9/2018 Netta Rossi MS CCC-SLP GN, CI 1    37772671291 HC ST EVAL ORAL PHARYNG SWALLOW 3 12/9/2018 Netta Rossi MS CCC-SLP GN 1    46379615806 HC ST SWALLOWING DISCHARGE 12/10/2018 Netta Rossi MS CCC-SLP GN, CI 1    93124222644 HC ST TREATMENT SWALLOW 1 12/10/2018 Netta Rossi MS CCC-SLP GN 1          SLP G-Codes  Functional Limitations: Swallowing  Swallow Current Status (): At least 1 percent but less than 20 percent impaired, limited or restricted  Swallow Goal Status (): At least 1 percent but less than 20 percent impaired, limited or restricted  Swallow Discharge Status (): At least 1 percent but less than 20 percent impaired, limited or restricted    SLP Discharge Summary  Anticipated Dischage Disposition: community-based residential facility ([facility])  Reason for Discharge: all goals and outcomes met, no further needs identified  Progress Toward Achieving Short/long Term Goals: all goals met within established timelines    MS BRITTANY LopezSLP  12/10/2018

## 2018-12-11 VITALS
DIASTOLIC BLOOD PRESSURE: 72 MMHG | SYSTOLIC BLOOD PRESSURE: 122 MMHG | WEIGHT: 159.44 LBS | OXYGEN SATURATION: 99 % | BODY MASS INDEX: 22.32 KG/M2 | HEIGHT: 71 IN | TEMPERATURE: 98.8 F | RESPIRATION RATE: 18 BRPM | HEART RATE: 74 BPM

## 2018-12-11 LAB
ANION GAP SERPL CALCULATED.3IONS-SCNC: 9 MMOL/L (ref 5–15)
BASOPHILS # BLD AUTO: 0.01 10*3/MM3 (ref 0–0.2)
BASOPHILS NFR BLD AUTO: 0.2 % (ref 0–2)
BUN BLD-MCNC: 11 MG/DL (ref 7–21)
BUN/CREAT SERPL: 12.4 (ref 7–25)
CALCIUM SPEC-SCNC: 9.2 MG/DL (ref 8.4–10.2)
CHLORIDE SERPL-SCNC: 104 MMOL/L (ref 95–110)
CO2 SERPL-SCNC: 24 MMOL/L (ref 22–31)
CREAT BLD-MCNC: 0.89 MG/DL (ref 0.7–1.3)
DEPRECATED RDW RBC AUTO: 41.4 FL (ref 35.1–43.9)
EOSINOPHIL # BLD AUTO: 0.27 10*3/MM3 (ref 0–0.7)
EOSINOPHIL NFR BLD AUTO: 5.4 % (ref 0–7)
ERYTHROCYTE [DISTWIDTH] IN BLOOD BY AUTOMATED COUNT: 12.6 % (ref 11.5–14.5)
GFR SERPL CREATININE-BSD FRML MDRD: 87 ML/MIN/1.73 (ref 49–113)
GLUCOSE BLD-MCNC: 87 MG/DL (ref 60–100)
HCT VFR BLD AUTO: 35 % (ref 39–49)
HGB BLD-MCNC: 12.5 G/DL (ref 13.7–17.3)
IMM GRANULOCYTES # BLD: 0.02 10*3/MM3 (ref 0–0.02)
IMM GRANULOCYTES NFR BLD: 0.4 % (ref 0–0.5)
LYMPHOCYTES # BLD AUTO: 1.32 10*3/MM3 (ref 0.6–4.2)
LYMPHOCYTES NFR BLD AUTO: 26.4 % (ref 10–50)
MCH RBC QN AUTO: 32.4 PG (ref 26.5–34)
MCHC RBC AUTO-ENTMCNC: 35.7 G/DL (ref 31.5–36.3)
MCV RBC AUTO: 90.7 FL (ref 80–98)
MONOCYTES # BLD AUTO: 0.69 10*3/MM3 (ref 0–0.9)
MONOCYTES NFR BLD AUTO: 13.8 % (ref 0–12)
NEUTROPHILS # BLD AUTO: 2.69 10*3/MM3 (ref 2–8.6)
NEUTROPHILS NFR BLD AUTO: 53.8 % (ref 37–80)
PLATELET # BLD AUTO: 270 10*3/MM3 (ref 150–450)
PMV BLD AUTO: 9.7 FL (ref 8–12)
POTASSIUM BLD-SCNC: 3.4 MMOL/L (ref 3.5–5.1)
RBC # BLD AUTO: 3.86 10*6/MM3 (ref 4.37–5.74)
SODIUM BLD-SCNC: 137 MMOL/L (ref 137–145)
WBC NRBC COR # BLD: 5 10*3/MM3 (ref 3.2–9.8)

## 2018-12-11 PROCEDURE — 80048 BASIC METABOLIC PNL TOTAL CA: CPT | Performed by: FAMILY MEDICINE

## 2018-12-11 PROCEDURE — 25010000002 AZITHROMYCIN PER 500 MG: Performed by: FAMILY MEDICINE

## 2018-12-11 PROCEDURE — 96366 THER/PROPH/DIAG IV INF ADDON: CPT

## 2018-12-11 PROCEDURE — 85025 COMPLETE CBC W/AUTO DIFF WBC: CPT | Performed by: FAMILY MEDICINE

## 2018-12-11 PROCEDURE — 96361 HYDRATE IV INFUSION ADD-ON: CPT

## 2018-12-11 PROCEDURE — 25010000002 CEFTRIAXONE PER 250 MG: Performed by: FAMILY MEDICINE

## 2018-12-11 PROCEDURE — G0378 HOSPITAL OBSERVATION PER HR: HCPCS

## 2018-12-11 RX ORDER — CEFDINIR 300 MG/1
300 CAPSULE ORAL 2 TIMES DAILY
Qty: 6 CAPSULE | Refills: 0 | Status: SHIPPED | OUTPATIENT
Start: 2018-12-12 | End: 2020-03-12

## 2018-12-11 RX ADMIN — LEVETIRACETAM 1750 MG: 500 TABLET ORAL at 08:50

## 2018-12-11 RX ADMIN — THIORIDAZINE HYDROCHLORIDE 100 MG: 50 TABLET, FILM COATED ORAL at 08:50

## 2018-12-11 RX ADMIN — SODIUM CHLORIDE 100 ML/HR: 900 INJECTION, SOLUTION INTRAVENOUS at 05:28

## 2018-12-11 RX ADMIN — AZITHROMYCIN MONOHYDRATE 500 MG: 500 INJECTION, POWDER, LYOPHILIZED, FOR SOLUTION INTRAVENOUS at 10:59

## 2018-12-11 RX ADMIN — FAMOTIDINE 40 MG: 40 TABLET ORAL at 08:50

## 2018-12-11 RX ADMIN — CEFTRIAXONE 1 G: 1 INJECTION, POWDER, FOR SOLUTION INTRAMUSCULAR; INTRAVENOUS at 08:49

## 2018-12-11 RX ADMIN — FERROUS SULFATE TAB EC 324 MG (65 MG FE EQUIVALENT) 324 MG: 324 (65 FE) TABLET DELAYED RESPONSE at 08:50

## 2018-12-11 RX ADMIN — ATORVASTATIN CALCIUM 20 MG: 20 TABLET, FILM COATED ORAL at 08:50

## 2018-12-11 NOTE — PLAN OF CARE
Problem: Patient Care Overview  Goal: Plan of Care Review  Outcome: Ongoing (interventions implemented as appropriate)   12/11/18 0259   Coping/Psychosocial   Plan of Care Reviewed With patient   Plan of Care Review   Progress no change   OTHER   Outcome Summary pt appeared to rest well during the night. vital signs maintained stable. will continue to monitor.     Goal: Individualization and Mutuality  Outcome: Ongoing (interventions implemented as appropriate)    Goal: Discharge Needs Assessment  Outcome: Ongoing (interventions implemented as appropriate)      Problem: Skin Injury Risk (Adult)  Goal: Skin Health and Integrity  Outcome: Ongoing (interventions implemented as appropriate)      Problem: Pneumonia (Adult)  Goal: Signs and Symptoms of Listed Potential Problems Will be Absent, Minimized or Managed (Pneumonia)  Outcome: Ongoing (interventions implemented as appropriate)

## 2018-12-11 NOTE — DISCHARGE SUMMARY
DISCHARGE SUMMARY    NAME: Bautista Grubbs   PHYSICIAN: Cesar Murry MD  : [id number]  MRN: [id number]    ADMITTED: 2018   DISCHARGED: 18    ADMISSION DIAGNOSES:  Present on Admission:  • Pneumonia  • Syncope    DISCHARGE DIAGNOSES:     Pneumonia    Syncope    Pneumonia of right lower lobe due to infectious organism (CMS/[facility])    Autism    COPD (chronic obstructive pulmonary disease) (CMS/[facility])    GERD (gastroesophageal reflux disease)    Hyperlipidemia    Seizures (CMS/[facility])    SERVICE: Medicine. Attending Cesar Murry MD    CONSULTS:   Consult Orders (all) (From admission, onward)    None          PROCEDURES:   Imaging Results (last 7 days)     Procedure Component Value Units Date/Time    XR Abdomen KUB [[id number]] Collected:  18 1526     Updated:  18 1547    Narrative:       PROCEDURE: XR ABDOMEN KUB    Clinical History: n/v, R55 Syncope and collapse J18.1 Lobar  pneumonia, unspecified organism    Indication:     Same as above     Comparison:     None     Technique:    Single supine view of the abdomen and pelvis.    Findings:     There is no gross evidence of free air in the abdomen or the  pelvis on this single supine view of the abdomen and pelvis.    The small and large bowel gas pattern does not show any evidence  of obstruction, ileus or bowel wall thickening.    There is no visualization of radiopaque calculi in the outline of  the urinary tract.    There is moderate amount of constipation      Impression:       Impression:     There is moderate amount of constipation      Location of Interpretation:   [id number]    Electronically signed by:  Felton Trujillo MD  2018 3:45 PM CST  Workstation: [id number]    CT Head Without Contrast [[id number]] Collected:  18 06     Updated:  18 0651    Narrative:         CT head without contrast on 2018     CLINICAL INDICATION: Syncope, hypotension    TECHNIQUE: Multiple axial images are obtained throughout the head  without the  administration of contrast. This exam was performed  according to our departmental dose-optimization program, which  includes automated exposure control, adjustment of the mA and/or  kV according to patient size and/or use of iterative  reconstruction technique.   Total DLP is 2891.4 mGy*cm.    COMPARISON: None    FINDINGS: Motion somewhat limits the exam. There is generalized  cerebral atrophy. There is no hydrocephalus. No definite CT  evidence of acute infarct is noted. There is no hemorrhage. There  are no abnormal extra-axial fluid collections. There is no mass,  mass effect or midline shift. Mucosal thickening is noted in the  left maxillary sinus consistent with chronic sinusitis. No bony  abnormality is noted.      Impression:       Atrophy with no acute intracranial abnormality.    Electronically signed by:  Melvin Larsen  12/8/2018 6:50 AM  CST Workstation: [email or web address]    XR Chest 1 View [[id number]] Collected:  12/08/18 0259     Updated:  12/08/18 0315    Narrative:         Chest single view on  12/8/2018     CLINICAL INDICATION: Syncope    COMPARISON: None    FINDINGS: There is right basilar opacity consistent with  atelectasis and/or pneumonia. Lungs are otherwise clear. Heart is  within normal limits for size. Pulmonary vascularity is within  normal limits.      Impression:       Mild right basilar atelectasis and/or pneumonia.    Electronically signed by:  Melvin Larsen  12/8/2018 3:14 AM  CST Workstation: [id number]          HISTORY OF PRESENT ILLNESS: *Copied from Gulshan Thomas MD's H&P*  63-year-old male presents to emergency department By caregivers.  He is intellectually disabled with history of major depressive disorder, seizures, and autism.  He is nonverbal.  Caregiver reports the patient has hes been sick lately , cough short of breath, vomting, diarrhea and BP has been fluctuating . Patient also had an episode of syncope.  patient  is unable to provide history due to being  Non  verbal , and hx is obtained from caregivers and chart and medical staff.       DIAGNOSTIC DATA:   Lab Results (last 7 days)     Procedure Component Value Units Date/Time    Blood Culture - Blood, Arm, Left [[id number]] Collected:  12/08/18 0956    Specimen:  Blood from Arm, Left Updated:  12/11/18 1000     Blood Culture No growth at 3 days    Blood Culture - Blood, Arm, Left [[id number]] Collected:  12/08/18 0916    Specimen:  Blood from Arm, Left Updated:  12/11/18 0945     Blood Culture No growth at 3 days    Basic Metabolic Panel [[id number]]  (Abnormal) Collected:  12/11/18 0646    Specimen:  Blood Updated:  12/11/18 0736     Glucose 87 mg/dL      BUN 11 mg/dL      Creatinine 0.89 mg/dL      Sodium 137 mmol/L      Potassium 3.4 mmol/L      Chloride 104 mmol/L      CO2 24.0 mmol/L      Calcium 9.2 mg/dL      eGFR Non African Amer 87 mL/min/1.73      BUN/Creatinine Ratio 12.4     Anion Gap 9.0 mmol/L     CBC & Differential [[id number]] Collected:  12/11/18 0646    Specimen:  Blood Updated:  12/11/18 0721    Narrative:       The following orders were created for panel order CBC & Differential.  Procedure                               Abnormality         Status                     ---------                               -----------         ------                     CBC Auto Differential[[id number]]        Abnormal            Final result                 Please view results for these tests on the individual orders.    CBC Auto Differential [[id number]]  (Abnormal) Collected:  12/11/18 0646    Specimen:  Blood Updated:  12/11/18 0721     WBC 5.00 10*3/mm3      RBC 3.86 10*6/mm3      Hemoglobin 12.5 g/dL      Hematocrit 35.0 %      MCV 90.7 fL      MCH 32.4 pg      MCHC 35.7 g/dL      RDW 12.6 %      RDW-SD 41.4 fl      MPV 9.7 fL      Platelets 270 10*3/mm3      Neutrophil % 53.8 %      Lymphocyte % 26.4 %      Monocyte % 13.8 %      Eosinophil % 5.4 %      Basophil % 0.2 %      Immature Grans % 0.4 %      Neutrophils,  Absolute 2.69 10*3/mm3      Lymphocytes, Absolute 1.32 10*3/mm3      Monocytes, Absolute 0.69 10*3/mm3      Eosinophils, Absolute 0.27 10*3/mm3      Basophils, Absolute 0.01 10*3/mm3      Immature Grans, Absolute 0.02 10*3/mm3     Gastrointestinal Panel, PCR - Stool, Per Rectum [[id number]]  (Normal) Collected:  12/10/18 1909    Specimen:  Stool from Per Rectum Updated:  12/10/18 2222     Campylobacter Not Detected     Clostridium difficile (toxin A/B) Not Detected     Plesiomonas shigelloides Not Detected     Salmonella Not Detected     Vibrio Not Detected     Vibrio cholerae Not Detected     Yersinia enterocolitica Not Detected     Enteroaggregative E. coli (EAEC) Not Detected     Enteropathogenic E. coli (EPEC) Not Detected     Enterotoxigenic E. coli (ETEC) lt/st Not Detected     Shiga-like toxin-producing E. coli (STEC) stx1/stx2 Not Detected     E. coli O157 Not Detected     Shigella/Enteroinvasive E. coli (EIEC) Not Detected     Cryptosporidium Not Detected     Cyclospora cayetanensis Not Detected     Entamoeba histolytica Not Detected     Giardia lamblia Not Detected     Adenovirus F40/41 Not Detected     Astrovirus Not Detected     Norovirus GI/GII Not Detected     Rotavirus A Not Detected     Sapovirus (I, II, IV or V) Not Detected    Narrative:       Testing performed by multiplex PCR system.    Basic Metabolic Panel [[id number]]  (Abnormal) Collected:  12/10/18 0619    Specimen:  Blood Updated:  12/10/18 0700     Glucose 91 mg/dL      BUN 11 mg/dL      Creatinine 0.85 mg/dL      Sodium 136 mmol/L      Potassium 3.6 mmol/L      Chloride 102 mmol/L      CO2 26.0 mmol/L      Calcium 9.1 mg/dL      eGFR Non African Amer 91 mL/min/1.73      BUN/Creatinine Ratio 12.9     Anion Gap 8.0 mmol/L     CBC & Differential [[id number]] Collected:  12/10/18 0619    Specimen:  Blood Updated:  12/10/18 0651    Narrative:       The following orders were created for panel order CBC & Differential.  Procedure                                Abnormality         Status                     ---------                               -----------         ------                     CBC Auto Differential[[id number]]        Abnormal            Final result                 Please view results for these tests on the individual orders.    CBC Auto Differential [[id number]]  (Abnormal) Collected:  12/10/18 0619    Specimen:  Blood Updated:  12/10/18 0651     WBC 4.99 10*3/mm3      RBC 3.88 10*6/mm3      Hemoglobin 12.5 g/dL      Hematocrit 36.0 %      MCV 92.8 fL      MCH 32.2 pg      MCHC 34.7 g/dL      RDW 13.1 %      RDW-SD 44.2 fl      MPV 9.5 fL      Platelets 295 10*3/mm3      Neutrophil % 51.9 %      Lymphocyte % 27.9 %      Monocyte % 14.2 %      Eosinophil % 4.8 %      Basophil % 0.6 %      Immature Grans % 0.6 %      Neutrophils, Absolute 2.59 10*3/mm3      Lymphocytes, Absolute 1.39 10*3/mm3      Monocytes, Absolute 0.71 10*3/mm3      Eosinophils, Absolute 0.24 10*3/mm3      Basophils, Absolute 0.03 10*3/mm3      Immature Grans, Absolute 0.03 10*3/mm3      nRBC 0.0 /100 WBC     Respiratory Panel, PCR - Swab, Nasopharynx [[id number]]  (Normal) Collected:  12/09/18 1648    Specimen:  Swab from Nasopharynx Updated:  12/09/18 1854     ADENOVIRUS, PCR Not Detected     Coronavirus 229E Not Detected     Coronavirus HKU1 Not Detected     Coronavirus NL63 Not Detected     Coronavirus OC43 Not Detected     Human Metapneumovirus Not Detected     Human Rhinovirus/Enterovirus Not Detected     Influenza B PCR Not Detected     Parainfluenza Virus 1 Not Detected     Parainfluenza Virus 2 Not Detected     Parainfluenza Virus 3 Not Detected     Parainfluenza Virus 4 Not Detected     Bordetella pertussis pcr Not Detected     Influenza A H1 2009 PCR Not Detected     Chlamydophila pneumoniae PCR Not Detected     Mycoplasma pneumo by PCR Not Detected     Influenza A PCR Not Detected     Influenza A H3 Not Detected     Influenza A H1 Not Detected     RSV, PCR Not Detected     Basic Metabolic Panel [[id number]]  (Normal) Collected:  12/09/18 0718    Specimen:  Blood Updated:  12/09/18 0805     Glucose 82 mg/dL      BUN 13 mg/dL      Creatinine 0.94 mg/dL      Sodium 139 mmol/L      Potassium 3.7 mmol/L      Chloride 104 mmol/L      CO2 26.0 mmol/L      Calcium 9.0 mg/dL      eGFR Non African Amer 81 mL/min/1.73      BUN/Creatinine Ratio 13.8     Anion Gap 9.0 mmol/L     CBC & Differential [[id number]] Collected:  12/09/18 0718    Specimen:  Blood Updated:  12/09/18 0759    Narrative:       The following orders were created for panel order CBC & Differential.  Procedure                               Abnormality         Status                     ---------                               -----------         ------                     CBC Auto Differential[[id number]]        Abnormal            Final result                 Please view results for these tests on the individual orders.    CBC Auto Differential [[id number]]  (Abnormal) Collected:  12/09/18 0718    Specimen:  Blood Updated:  12/09/18 0759     WBC 4.91 10*3/mm3      RBC 3.78 10*6/mm3      Hemoglobin 12.2 g/dL      Hematocrit 34.7 %      MCV 91.8 fL      MCH 32.3 pg      MCHC 35.2 g/dL      RDW 13.1 %      RDW-SD 43.9 fl      MPV 10.0 fL      Platelets 261 10*3/mm3      Neutrophil % 53.2 %      Lymphocyte % 28.3 %      Monocyte % 12.8 %      Eosinophil % 5.1 %      Basophil % 0.2 %      Immature Grans % 0.4 %      Neutrophils, Absolute 2.61 10*3/mm3      Lymphocytes, Absolute 1.39 10*3/mm3      Monocytes, Absolute 0.63 10*3/mm3      Eosinophils, Absolute 0.25 10*3/mm3      Basophils, Absolute 0.01 10*3/mm3      Immature Grans, Absolute 0.02 10*3/mm3     Lactic Acid, Plasma [[id number]]  (Normal) Collected:  12/08/18 1514    Specimen:  Blood Updated:  12/08/18 1541     Lactate 1.0 mmol/L     Influenza Antigen, Rapid - Swab, Nasopharynx [[id number]]  (Normal) Collected:  12/08/18 0440    Specimen:  Swab from Nasopharynx Updated:   12/08/18 0706     Influenza A Ag, EIA Negative     Influenza B Ag, EIA Negative    Urinalysis With Culture If Indicated - Urine, Clean Catch [[id number]]  (Abnormal) Collected:  12/08/18 0552    Specimen:  Urine, Clean Catch Updated:  12/08/18 0603     Color, UA Yellow     Appearance, UA Cloudy     pH, UA 7.5     Specific Gravity, UA 1.011     Glucose, UA Negative     Ketones, UA Negative     Bilirubin, UA Negative     Blood, UA Negative     Protein, UA Negative     Leuk Esterase, UA Negative     Nitrite, UA Negative     Urobilinogen, UA 1.0 E.U./dL    Narrative:       Urine microscopic not indicated.    Extra Tubes [[id number]] Collected:  12/08/18 0452    Specimen:  Blood, Venous Line Updated:  12/08/18 0601    Narrative:       The following orders were created for panel order Extra Tubes.  Procedure                               Abnormality         Status                     ---------                               -----------         ------                     Light Blue Top[[id number]]                                   Final result               Gold Top - SST[[id number]]                                   Final result                 Please view results for these tests on the individual orders.    Light Blue Top [[id number]] Collected:  12/08/18 0452    Specimen:  Blood Updated:  12/08/18 0601     Extra Tube hold for add-on     Comment: Auto resulted       Gold Top - SST [[id number]] Collected:  12/08/18 0452    Specimen:  Blood Updated:  12/08/18 0601     Extra Tube Hold for add-ons.     Comment: Auto resulted.       Troponin [[id number]]  (Normal) Collected:  12/08/18 0440    Specimen:  Blood Updated:  12/08/18 0533     Troponin I <0.012 ng/mL     BNP [[id number]]  (Normal) Collected:  12/08/18 0440    Specimen:  Blood Updated:  12/08/18 0533     proBNP 74.3 pg/mL     Comprehensive Metabolic Panel [[id number]]  (Abnormal) Collected:  12/08/18 0440    Specimen:  Blood Updated:  12/08/18 0522     Glucose 103 mg/dL       BUN 17 mg/dL      Creatinine 0.81 mg/dL      Sodium 135 mmol/L      Potassium 4.0 mmol/L      Chloride 98 mmol/L      CO2 27.0 mmol/L      Calcium 9.4 mg/dL      Total Protein 6.8 g/dL      Albumin 4.00 g/dL      ALT (SGPT) 57 U/L      AST (SGOT) 39 U/L      Alkaline Phosphatase 92 U/L      Total Bilirubin 0.4 mg/dL      eGFR Non African Amer 97 mL/min/1.73      Globulin 2.8 gm/dL      A/G Ratio 1.4 g/dL      BUN/Creatinine Ratio 21.0     Anion Gap 10.0 mmol/L     Magnesium [[id number]]  (Normal) Collected:  12/08/18 0440    Specimen:  Blood Updated:  12/08/18 0522     Magnesium 2.0 mg/dL     CBC & Differential [[id number]] Collected:  12/08/18 0440    Specimen:  Blood Updated:  12/08/18 0456    Narrative:       The following orders were created for panel order CBC & Differential.  Procedure                               Abnormality         Status                     ---------                               -----------         ------                     CBC Auto Differential[[id number]]        Abnormal            Final result                 Please view results for these tests on the individual orders.    CBC Auto Differential [[id number]]  (Abnormal) Collected:  12/08/18 0440    Specimen:  Blood Updated:  12/08/18 0456     WBC 9.16 10*3/mm3      RBC 3.86 10*6/mm3      Hemoglobin 12.6 g/dL      Hematocrit 35.2 %      MCV 91.2 fL      MCH 32.6 pg      MCHC 35.8 g/dL      RDW 12.8 %      RDW-SD 42.6 fl      MPV 9.8 fL      Platelets 266 10*3/mm3      Neutrophil % 84.1 %      Lymphocyte % 5.2 %      Monocyte % 9.1 %      Eosinophil % 1.2 %      Basophil % 0.1 %      Immature Grans % 0.3 %      Neutrophils, Absolute 7.70 10*3/mm3      Lymphocytes, Absolute 0.48 10*3/mm3      Monocytes, Absolute 0.83 10*3/mm3      Eosinophils, Absolute 0.11 10*3/mm3      Basophils, Absolute 0.01 10*3/mm3      Immature Grans, Absolute 0.03 10*3/mm3           HOSPITAL COURSE:  Active Hospital Problems    Diagnosis Date Noted   • **Pneumonia  [J18.9] 12/08/2018   • Pneumonia of right lower lobe due to infectious organism (CMS/[facility]) [J18.1] 12/09/2018   • Autism [F84.0] 12/09/2018   • COPD (chronic obstructive pulmonary disease) (CMS/[facility]) [J44.9] 12/09/2018   • GERD (gastroesophageal reflux disease) [K21.9] 12/09/2018   • Hyperlipidemia [E78.5] 12/09/2018   • Seizures (CMS/[facility]) [R56.9] 12/09/2018   • Syncope [R55] 12/08/2018     Patient admitted for CAP from [facility].  Patient non verbal. He improved towards his baseline mentation. He did have some diarrhea that was tested via GI PCR. Reviewed and negative. Patient with no diarrhea overnight on day of discharge. He is to complete a course of azithromycin/rocephin(cefdinir) on discharge. Patient much more alert at time of discharge. Close follow up on discharge. Patient with near syncopal episode reported also on admission. ECHO reviewed and grossly negative. No reoccurrence of symptoms while in the hospital.      #.  Community acquired pneumonia.    12/11. Azithromycin completed. Rocephin transitioned to cefdinir for completion of 7 days of abx.  12/10. Azithromycin/Rocephin.  #.  Near-syncope.  Echo reviewed.  Technically difficult study but appears to have intact ejection fraction.  PT ordered.  #.  COPD.  Nebs.  Patient on room air.  #.  Seizure disorder.  Keppra.  Seizure precautions.  #.  Diarrhea.    12/11. GI PCR reviewed. Negative. Had resolved prior to discharge. No diarrhea noted overnight. Monitor. Follow up as needed.  12/10. Loose but not watery per nursing.  GI PCR ordered.  Johnny results.  #. HLD. Statin.      DISCHARGE CONDITION:   Stable. At [facility]    DISPOSITION:  Home or Self Care    DISCHARGE MEDICATIONS     Discharge Medications      New Medications      Instructions Start Date   cefdinir 300 MG capsule  Commonly known as:  OMNICEF   300 mg, Oral, 2 Times Daily         Continue These Medications      Instructions Start Date   acetaminophen 500 MG tablet  Commonly known as:  TYLENOL    500 mg, Oral, 2 Times Daily      CALCIUM 600+D 600-400 MG-UNIT per tablet  Generic drug:  calcium carbonate-vitamin d   1 tablet, Oral, Daily      CERTAVITE/ANTIOXIDANTS PO   1 tablet, Oral, Once      cyproheptadine 4 MG tablet  Commonly known as:  PERIACTIN   4 mg, Oral, 3 Times Daily      ferrous sulfate 325 (65 FE) MG tablet   325 mg, Oral, Daily With Breakfast      FLORINEF PO   0.1 mg, Oral, Daily      FLUoxetine 20 MG capsule  Commonly known as:  PROzac   20 mg, Oral, Daily      lamoTRIgine 200 MG tablet  Commonly known as:  LaMICtal   200 mg, Oral, 3 Times Daily      levETIRAcetam 1000 MG tablet  Commonly known as:  KEPPRA   1,000 mg, Oral, 2 Times Daily      levETIRAcetam 500 MG tablet  Commonly known as:  KEPPRA   500 mg, Oral, 2 Times Daily      levETIRAcetam 250 MG tablet  Commonly known as:  KEPPRA   250 mg, Oral, 2 Times Daily      melatonin 3 MG tablet   3 mg, Oral, Nightly      omeprazole 20 MG capsule  Commonly known as:  priLOSEC   20 mg, Oral, Daily      polyethylene glycol packet  Commonly known as:  MIRALAX   17 g, Oral, Nightly      simvastatin 20 MG tablet  Commonly known as:  ZOCOR   20 mg, Oral, Nightly      thioridazine 100 MG tablet  Commonly known as:  MELLARIL   100 mg, Oral, Daily      thioridazine 100 MG tablet  Commonly known as:  MELLARIL   200 mg, Oral, Nightly      vitamin B-6 50 MG tablet  Commonly known as:  PYRIDOXINE   50 mg, Oral, Daily             INSTRUCTIONS:  Activity:   Activity Instructions     Activity as Tolerated      PT/OT/SLP          Diet:   Diet Instructions     Advance Diet As Tolerated      Regular. Thin liquid.          Special instructions: Patient instructed to call MD or return to ED with worsening shortness of breath, chest pain, fever greater than 100.4 degrees F or any other medical concerns..    FOLLOW UP:   Follow-up Information     Provider, No Known. Schedule an appointment as soon as possible for a visit in 1 week(s).    Why:  Needs to see PCP or  Provider at [facility] in 1 week for recheck/hospital follow up.  Contact information:  [facility] [id number]                   PENDING TEST RESULTS AT DISCHARGE   Order Current Status    Blood Culture - Blood, Arm, Left Preliminary result    Blood Culture - Blood, Arm, Left Preliminary result          Time: Discharge 30 min          This document has been electronically signed by Cesar Murry MD on December 11, 2018 2:21 PM

## 2018-12-11 NOTE — PROGRESS NOTES
MEDICINE DAILY PROGRESS NOTE  NAME: Bautista Grubbs  : 1955  MRN: [id number]     LOS: 1 day     PROVIDER OF SERVICE: Cesar Murry MD    Chief Complaint: Pneumonia    Subjective:   HPI: 63-year-old  male with medical history significant for depression, seizure disorder, autism, nonverbal presented to the emergency room after.  Patient being unwell.  Reports per patient's caregivers is that he's had worsening cough, shortness of air, vomiting, diarrhea, and one episode of syncope.  Since patient has been in our facility no episodes of syncope have been witnessed.  Patient is still not his usual self per caregivers.  Patient has better by mouth intake with caregivers assistance.  Patient not feeding himself like he would normally.  Patient also not ambulating like he would normally.  Patient currently being treated for community acquired pneumonia.  Patient still with some diarrhea ;however, patient's stools are loose and not watery per nursing.    Interval History:  History taken from: chart RN Patient unable to give history due to patient nonverbal.  . Patient with no diarrhea overnight. Rested well. Vital signs reviewed. Tolerating PO with assistance.   12/10. No acute events. Patient with loose stools after a period of constipation. Nonverbal. Sitter at bedside.    Review of Systems:   Review of Systems   Unable to perform ROS: Patient nonverbal       Objective:     Vital Signs  Vitals:    18 0124 18 0352 18 1040 18 1200   BP:  111/76  122/72   BP Location:  Left arm  Left arm   Patient Position:  Lying  Lying   Pulse: 74 85 76 74   Resp:  16  18   Temp:  97.7 °F (36.5 °C)  98.8 °F (37.1 °C)   TempSrc:  Axillary  Oral   SpO2:  98%  99%   Weight:  72.3 kg (159 lb 7 oz)     Height:           Physical Exam  Physical Exam   Constitutional: He appears well-developed and well-nourished. No distress.   HENT:   Head: Normocephalic and atraumatic.   Right Ear: External ear  normal.   Left Ear: External ear normal.   Nose: Nose normal.   Eyes: Conjunctivae and EOM are normal. Pupils are equal, round, and reactive to light.   Neck: Neck supple. No thyromegaly present.   Cardiovascular: Normal rate, regular rhythm, normal heart sounds and intact distal pulses.   Pulmonary/Chest: Effort normal. He has no wheezes. He has no rales. He exhibits no tenderness.   Coarse bibasilar.   Abdominal: Soft. Bowel sounds are normal. He exhibits no distension and no mass. There is no tenderness. There is no rebound and no guarding.   Musculoskeletal: He exhibits no edema or tenderness.   Neurological: He is alert.   Nonverbal. Very alert.    Skin: Skin is warm and dry. No rash noted. He is not diaphoretic. No erythema. No pallor.   Psychiatric:   Unable to assess.  Nonverbal.   Nursing note and vitals reviewed.      Medication Review    Current Facility-Administered Medications:   •  acetaminophen (TYLENOL) tablet 650 mg, 650 mg, Oral, Q4H PRN, Gulshan Thomas MD  •  atorvastatin (LIPITOR) tablet 20 mg, 20 mg, Oral, Daily, Gulshan Thomas MD, 20 mg at 12/11/18 0850  •  budesonide-formoterol (SYMBICORT) 160-4.5 MCG/ACT inhaler 2 puff, 2 puff, Inhalation, BID - RT, Gulshan Thomas MD, 2 puff at 12/09/18 2151  •  cefTRIAXone (ROCEPHIN) 1 g/100 mL 0.9% NS (MBP), 1 g, Intravenous, Q24H, Gulshan Thomas MD, Stopped at 12/11/18 1055  •  chlorpheniramine (CHLOR-TRIMETON) tablet 4 mg, 4 mg, Oral, Q6H PRN, Gulshan Thomas MD  •  docusate sodium (COLACE) capsule 100 mg, 100 mg, Oral, BID, Gulshan Thomas MD, 100 mg at 12/09/18 2055  •  famotidine (PEPCID) tablet 40 mg, 40 mg, Oral, Daily, Gulshan Thomsa MD, 40 mg at 12/11/18 0850  •  ferrous sulfate EC tablet 324 mg, 324 mg, Oral, Daily With Breakfast, Gulshan Thomas MD, 324 mg at 12/11/18 0850  •  ipratropium-albuterol (DUO-NEB) nebulizer solution 3 mL, 3 mL, Nebulization, Q4H PRN, Gulshan Thomas MD  •  ipratropium-albuterol (DUO-NEB) nebulizer solution 3 mL, 3 mL,  Nebulization, 4x Daily - RT, Gulshan Thomas MD, 3 mL at 12/10/18 1959  •  levETIRAcetam (KEPPRA) tablet 1,750 mg, 1,750 mg, Oral, Q12H, Gulshan Thomas MD, 1,750 mg at 12/11/18 0850  •  LORazepam (ATIVAN) injection 0.5 mg, 0.5 mg, Intravenous, Q6H PRN, Gulshan Thomas MD  •  ondansetron (ZOFRAN) injection 4 mg, 4 mg, Intravenous, Q6H PRN, Gulshan Thomas MD  •  [COMPLETED] Insert peripheral IV, , , Once **AND** sodium chloride 0.9 % flush 10 mL, 10 mL, Intravenous, PRN, Raffaele Spicer,   •  sodium chloride 0.9 % flush 3 mL, 3 mL, Intravenous, Q12H, Gulshan Thomas MD, 3 mL at 12/10/18 2028  •  sodium chloride 0.9 % flush 3-10 mL, 3-10 mL, Intravenous, PRN, Gulshan Thomas MD  •  thioridazine (MELLARIL) tablet 100 mg, 100 mg, Oral, Daily, Gulshan Thomas MD, 100 mg at 12/11/18 0850  •  thioridazine (MELLARIL) tablet 200 mg, 200 mg, Oral, Nightly, Gulshan Thomas MD, 200 mg at 12/10/18 2027  •  ziprasidone (GEODON) injection 10 mg, 10 mg, Intramuscular, Q12H PRN, Gulshan Thomas MD     Diagnostic Data    Lab Results (last 24 hours)     Procedure Component Value Units Date/Time    Blood Culture - Blood, Arm, Left [[id number]] Collected:  12/08/18 0956    Specimen:  Blood from Arm, Left Updated:  12/11/18 1000     Blood Culture No growth at 3 days    Blood Culture - Blood, Arm, Left [[id number]] Collected:  12/08/18 0916    Specimen:  Blood from Arm, Left Updated:  12/11/18 0945     Blood Culture No growth at 3 days    Basic Metabolic Panel [[id number]]  (Abnormal) Collected:  12/11/18 0646    Specimen:  Blood Updated:  12/11/18 0736     Glucose 87 mg/dL      BUN 11 mg/dL      Creatinine 0.89 mg/dL      Sodium 137 mmol/L      Potassium 3.4 mmol/L      Chloride 104 mmol/L      CO2 24.0 mmol/L      Calcium 9.2 mg/dL      eGFR Non African Amer 87 mL/min/1.73      BUN/Creatinine Ratio 12.4     Anion Gap 9.0 mmol/L     CBC & Differential [[id number]] Collected:  12/11/18 0646    Specimen:  Blood Updated:  12/11/18 0721     Narrative:       The following orders were created for panel order CBC & Differential.  Procedure                               Abnormality         Status                     ---------                               -----------         ------                     CBC Auto Differential[[id number]]        Abnormal            Final result                 Please view results for these tests on the individual orders.    CBC Auto Differential [[id number]]  (Abnormal) Collected:  12/11/18 0646    Specimen:  Blood Updated:  12/11/18 0721     WBC 5.00 10*3/mm3      RBC 3.86 10*6/mm3      Hemoglobin 12.5 g/dL      Hematocrit 35.0 %      MCV 90.7 fL      MCH 32.4 pg      MCHC 35.7 g/dL      RDW 12.6 %      RDW-SD 41.4 fl      MPV 9.7 fL      Platelets 270 10*3/mm3      Neutrophil % 53.8 %      Lymphocyte % 26.4 %      Monocyte % 13.8 %      Eosinophil % 5.4 %      Basophil % 0.2 %      Immature Grans % 0.4 %      Neutrophils, Absolute 2.69 10*3/mm3      Lymphocytes, Absolute 1.32 10*3/mm3      Monocytes, Absolute 0.69 10*3/mm3      Eosinophils, Absolute 0.27 10*3/mm3      Basophils, Absolute 0.01 10*3/mm3      Immature Grans, Absolute 0.02 10*3/mm3     Gastrointestinal Panel, PCR - Stool, Per Rectum [[id number]]  (Normal) Collected:  12/10/18 1909    Specimen:  Stool from Per Rectum Updated:  12/10/18 2222     Campylobacter Not Detected     Clostridium difficile (toxin A/B) Not Detected     Plesiomonas shigelloides Not Detected     Salmonella Not Detected     Vibrio Not Detected     Vibrio cholerae Not Detected     Yersinia enterocolitica Not Detected     Enteroaggregative E. coli (EAEC) Not Detected     Enteropathogenic E. coli (EPEC) Not Detected     Enterotoxigenic E. coli (ETEC) lt/st Not Detected     Shiga-like toxin-producing E. coli (STEC) stx1/stx2 Not Detected     E. coli O157 Not Detected     Shigella/Enteroinvasive E. coli (EIEC) Not Detected     Cryptosporidium Not Detected     Cyclospora cayetanensis Not Detected      Entamoeba histolytica Not Detected     Giardia lamblia Not Detected     Adenovirus F40/41 Not Detected     Astrovirus Not Detected     Norovirus GI/GII Not Detected     Rotavirus A Not Detected     Sapovirus (I, II, IV or V) Not Detected    Narrative:       Testing performed by multiplex PCR system.          I reviewed the patient's new clinical results.    Assessment/Plan:     Active Hospital Problems    Diagnosis Date Noted   • **Pneumonia [J18.9] 12/08/2018   • Pneumonia of right lower lobe due to infectious organism (CMS/[facility]) [J18.1] 12/09/2018   • Autism [F84.0] 12/09/2018   • COPD (chronic obstructive pulmonary disease) (CMS/[facility]) [J44.9] 12/09/2018   • GERD (gastroesophageal reflux disease) [K21.9] 12/09/2018   • Hyperlipidemia [E78.5] 12/09/2018   • Seizures (CMS/[facility]) [R56.9] 12/09/2018   • Syncope [R55] 12/08/2018       #.  Community acquired pneumonia.    12/11. Azithromycin completed. Rocephin transitioned to cefdinir for completion of 7 days of abx.  12/10. Azithromycin/Rocephin.  #.  Near-syncope.  Echo reviewed.  Technically difficult study but appears to have intact ejection fraction.  PT ordered.  #.  COPD.  Nebs.  Patient on room air.  #.  Seizure disorder.  Keppra.  Seizure precautions.  #.  Diarrhea.    12/11. GI PCR reviewed.  12/10. Loose but not watery per nursing.  GI PCR ordered.  Johnny results.  #. HLD. Statin.      Will monitor patient's hospital course and adjust treatment as hospital course dictates.    DVT prophylaxis: SCDs  Code status is   Code Status and Medical Interventions:   Ordered at: 12/08/18 0911     Code Status:    CPR     Medical Interventions (Level of Support Prior to Arrest):    Full       Plan for disposition:[facility] in today.      Time:           This document has been electronically signed by Cesar Murry MD on December 11, 2018 2:18 PM

## 2018-12-13 LAB
BACTERIA SPEC AEROBE CULT: NORMAL
BACTERIA SPEC AEROBE CULT: NORMAL

## 2019-01-18 ENCOUNTER — OFFICE VISIT (OUTPATIENT)
Dept: CARDIOLOGY | Facility: CLINIC | Age: 64
End: 2019-01-18

## 2019-01-18 VITALS
DIASTOLIC BLOOD PRESSURE: 54 MMHG | BODY MASS INDEX: 22.67 KG/M2 | WEIGHT: 161.9 LBS | OXYGEN SATURATION: 98 % | HEIGHT: 71 IN | SYSTOLIC BLOOD PRESSURE: 96 MMHG | HEART RATE: 95 BPM

## 2019-01-18 DIAGNOSIS — R55 VASOVAGAL SYNCOPE: Primary | ICD-10-CM

## 2019-01-18 DIAGNOSIS — I95.1 ORTHOSTATIC HYPOTENSION: ICD-10-CM

## 2019-01-18 PROCEDURE — 99203 OFFICE O/P NEW LOW 30 MIN: CPT | Performed by: INTERNAL MEDICINE

## 2019-01-18 RX ORDER — MEGESTROL ACETATE 40 MG/ML
SUSPENSION ORAL DAILY
COMMUNITY
End: 2020-03-12

## 2019-01-18 NOTE — PATIENT INSTRUCTIONS
Orthostatic Hypotension  Orthostatic hypotension is a sudden drop in blood pressure that happens when you quickly change positions, such as when you get up from a seated or lying position. Blood pressure is a measurement of how strongly, or weakly, your blood is pressing against the walls of your arteries. Arteries are blood vessels that carry blood from your heart throughout your body. When blood pressure is too low, you may not get enough blood to your brain or to the rest of your organs. This can cause weakness, light-headedness, rapid heartbeat, and fainting. This can last for just a few seconds or for up to a few minutes.  Orthostatic hypotension is usually not a serious problem. However, if it happens frequently or gets worse, it may be a sign of something more serious.  What are the causes?  This condition may be caused by:  · Sudden changes in posture, such as standing up quickly after you have been sitting or lying down.  · Blood loss.  · Loss of body fluids (dehydration).  · Heart problems.  · Hormone (endocrine) problems.  · Pregnancy.  · Severe infection.  · Lack of certain nutrients.  · Severe allergic reactions (anaphylaxis).  · Certain medicines, such as blood pressure medicine or medicines that make the body lose excess fluids (diuretics). Sometimes, this condition can be caused by not taking medicine as directed, such as taking too much of a certain medicine.    What increases the risk?  Certain factors can make you more likely to develop orthostatic hypotension, including:  · Age. Risk increases as you get older.  · Conditions that affect the heart or the central nervous system.  · Taking certain medicines, such as blood pressure medicine or diuretics.  · Being pregnant.    What are the signs or symptoms?  Symptoms of this condition may include:  · Weakness.  · Light-headedness.  · Dizziness.  · Blurred vision.  · Fatigue.  · Rapid heartbeat.  · Fainting, in severe cases.    How is this  "diagnosed?  This condition is diagnosed based on:  · Your medical history.  · Your symptoms.  · Your blood pressure measurement. Your health care provider will check your blood pressure when you are:  ? Lying down.  ? Sitting.  ? Standing.    A blood pressure reading is recorded as two numbers, such as \"120 over 80\" (or 120/80). The first (\"top\") number is called the systolic pressure. It is a measure of the pressure in your arteries as your heart beats. The second (\"bottom\") number is called the diastolic pressure. It is a measure of the pressure in your arteries when your heart relaxes between beats. Blood pressure is measured in a unit called mm Hg. Healthy blood pressure for adults is 120/80. If your blood pressure is below 90/60, you may be diagnosed with hypotension.  Other information or tests that may be used to diagnose orthostatic hypotension include:  · Your other vital signs, such as your heart rate and temperature.  · Blood tests.  · Tilt table test. For this test, you will be safely secured to a table that moves you from a lying position to an upright position. Your heart rhythm and blood pressure will be monitored during the test.    How is this treated?  Treatment for this condition may include:  · Changing your diet. This may involve eating more salt (sodium) or drinking more water.  · Taking medicines to raise your blood pressure.  · Changing the dosage of certain medicines you are taking that might be lowering your blood pressure.  · Wearing compression stockings. These stockings help to prevent blood clots and reduce swelling in your legs.    In some cases, you may need to go to the hospital for:  · Fluid replacement. This means you will receive fluids through an IV tube.  · Blood replacement. This means you will receive donated blood through an IV tube (transfusion).  · Treating an infection or heart problems, if this applies.  · Monitoring. You may need to be monitored while medicines that you " are taking wear off.    Follow these instructions at home:  Eating and drinking    · Drink enough fluid to keep your urine clear or pale yellow.  · Eat a healthy diet and follow instructions from your health care provider about eating or drinking restrictions. A healthy diet includes:  ? Fresh fruits and vegetables.  ? Whole grains.  ? Lean meats.  ? Low-fat dairy products.  · Eat extra salt only as directed. Do not add extra salt to your diet unless your health care provider told you to do that.  · Eat frequent, small meals.  · Avoid standing up suddenly after eating.  Medicines  · Take over-the-counter and prescription medicines only as told by your health care provider.  ? Follow instructions from your health care provider about changing the dosage of your current medicines, if this applies.  ? Do not stop or adjust any of your medicines on your own.  General instructions  · Wear compression stockings as told by your health care provider.  · Get up slowly from lying down or sitting positions. This gives your blood pressure a chance to adjust.  · Avoid hot showers and excessive heat as directed by your health care provider.  · Return to your normal activities as told by your health care provider. Ask your health care provider what activities are safe for you.  · Do not use any products that contain nicotine or tobacco, such as cigarettes and e-cigarettes. If you need help quitting, ask your health care provider.  · Keep all follow-up visits as told by your health care provider. This is important.  Contact a health care provider if:  · You vomit.  · You have diarrhea.  · You have a fever for more than 2-3 days.  · You feel more thirsty than usual.  · You feel weak and tired.  Get help right away if:  · You have chest pain.  · You have a fast or irregular heartbeat.  · You develop numbness in any part of your body.  · You cannot move your arms or your legs.  · You have trouble speaking.  · You become sweaty or feel  lightheaded.  · You faint.  · You feel short of breath.  · You have trouble staying awake.  · You feel confused.  This information is not intended to replace advice given to you by your health care provider. Make sure you discuss any questions you have with your health care provider.  Document Released: 12/08/2003 Document Revised: 09/05/2017 Document Reviewed: 06/09/2017  [employer] Interactive Patient Education © 2018 [employer]

## 2019-01-18 NOTE — PROGRESS NOTES
Cardiovascular Medicine      Bin Can M.D., Ph.D., [facility]         Dr. Murry  Thank you for asking me to see Bautista Grubbs for Vasovagal syncope.    History of Present Illness  This is a 63 y.o. male with:    1.  Vasovagal syncope    Bautista Grubbs is a 63 y.o. male who presents for consultation today.  He was arranged hospital discharge follow-up for vasovagal syncope.  The patient is intellectually disabled and resides at a facility.  He also has been diagnosed with major depressive disorder, seizure disorder and autism.  He is nonverbal.  The patient presented to the emergency department after an episode of syncope.  My report is from the EMR.  In the emergency department there was a caregiver present who reported that the patient had been sick lately.  The patient had obvious shortness of breath and cough.  He also had some vomiting and diarrhea.  There was a report of fluctuating blood pressure.  I also reviewed the emergency department notes.  They report a history of documented orthostatic hypotension.  The patient has had documented postural changes in his blood pressure.  There was also a physician who had sent the patient out several times for IV fluids because of low blood pressures.  The patient was diagnosed with pneumonia and admitted to the hospitalist service.  Chest x-ray showed right basilar opacification.  He actually had a resting low blood pressure, but this was his normal.  He had no further episodes of syncope.  A 2-D TTE was obtained that was technically difficult because he was uncooperative.  All valvular structures were not visualized.  However, his left ventricular ejection fraction appeared to be normal.  His last EKG was when he was inpatient and showed sinus tach with normal intervals.  For reasons that are really unclear he was arranged a cardiology follow-up for his episode of vasovagal syncope.  Fortunately, he's accompanied by caregiver daily.  They tell me he's  had no further episodes.  He is actually now also been placed on fludrocortisone.  I'm told that his laboratory evaluations are managed by physician at the facility in which she lives. His caregiver does report that when he stands he appears to be very weak and his lips color change.     Review of Systems - Review of Systems   Unable to perform ROS: patient nonverbal        All other systems were reviewed and were negative.    family history is not on file.     reports that he does not drink alcohol or use drugs.    Allergies   Allergen Reactions   • Haldol [Haloperidol] Unknown (See Comments)     Unknown           Current Outpatient Medications:   •  acetaminophen (TYLENOL) 500 MG tablet, Take 500 mg by mouth 2 (Two) Times a Day., Disp: , Rfl:   •  calcium carbonate-vitamin d (CALCIUM 600+D) 600-400 MG-UNIT per tablet, Take 1 tablet by mouth Daily., Disp: , Rfl:   •  cyproheptadine (PERIACTIN) 4 MG tablet, Take 4 mg by mouth 3 (Three) Times a Day., Disp: , Rfl:   •  ferrous sulfate 325 (65 FE) MG tablet, Take 325 mg by mouth Daily With Breakfast., Disp: , Rfl:   •  FLUoxetine (PROzac) 20 MG capsule, Take 20 mg by mouth Daily., Disp: , Rfl:   •  lamoTRIgine (LaMICtal) 200 MG tablet, Take 200 mg by mouth 3 (Three) Times a Day., Disp: , Rfl:   •  levETIRAcetam (KEPPRA) 1000 MG tablet, Take 1,000 mg by mouth 2 (Two) Times a Day., Disp: , Rfl:   •  levETIRAcetam (KEPPRA) 500 MG tablet, Take 500 mg by mouth 2 (Two) Times a Day., Disp: , Rfl:   •  megestrol (MEGACE) 40 MG/ML suspension, Take  by mouth Daily., Disp: , Rfl:   •  Multiple Vitamins-Minerals (CERTAVITE/ANTIOXIDANTS PO), Take 1 tablet by mouth 1 (One) Time., Disp: , Rfl:   •  omeprazole (priLOSEC) 20 MG capsule, Take 20 mg by mouth Daily., Disp: , Rfl:   •  polyethylene glycol (MIRALAX) packet, Take 17 g by mouth Every Night., Disp: , Rfl:   •  simvastatin (ZOCOR) 20 MG tablet, Take 20 mg by mouth Every Night., Disp: , Rfl:   •  thioridazine (MELLARIL) 100 MG  tablet, Take 100 mg by mouth Every Morning., Disp: , Rfl:   •  vitamin B-6 (PYRIDOXINE) 50 MG tablet, Take 50 mg by mouth. 4 tablets at bedtime, Disp: , Rfl:   •  cefdinir (OMNICEF) 300 MG capsule, Take 1 capsule by mouth 2 (Two) Times a Day., Disp: 6 capsule, Rfl: 0  •  Fludrocortisone Acetate (FLORINEF PO), Take 0.1 mg by mouth Daily., Disp: , Rfl:   •  levETIRAcetam (KEPPRA) 250 MG tablet, Take 250 mg by mouth 2 (Two) Times a Day., Disp: , Rfl:   •  melatonin 3 MG tablet, Take 3 mg by mouth Every Night., Disp: , Rfl:   •  thioridazine (MELLARIL) 100 MG tablet, Take 200 mg by mouth Every Night., Disp: , Rfl:     Physical Exam:  Physical Exam:  Vitals:    01/18/19 1008   BP: 96/54   Pulse: 95   SpO2: 98%     Body mass index is 22.58 kg/m².   Pulse Ox: Normal  on room air  General: alert, appears stated age and cooperative     Body Habitus: well-nourished    HEENT: Head: Normocephalic, no lesions, without obvious abnormality. No arcus senilis, xanthelasma or xanthomas.    JVP: Volume/Pulsation: Normal  Carotid Exam: no bruit normal pulsation bilaterally   Carotid Volume: normal    Respirations: no increased work of breathing   Chest:  Not examined secondary to lack of patient cooperation    Pulmonary:Not examined secondary to lack of patient cooperation     Heart rate: normal    Heart Rhythm: regular     Heart Sounds: S1: normal intensity  S2: normal intensity  S3: absent   S4: absent  Opening Snap: absent  A2-OS:  absent.   Pericardial rub: absent    Ejection click: None      Murmurs:  absent   Extremity: moves all extremities equally.         DATA REVIEWED:         TTE/JUICE:  Results for orders placed during the hospital encounter of 12/08/18   Adult Transthoracic Echo Limited W/ Cont if Necessary Per Protocol    Narrative · The quality of the study is limited due to poor acoustic windows related   to limited views obtained and an uncooperative patient  · Grossly preserved systolic biventricular function. Estimated  LVEF 60%.  · No significant valvular abnormalities given the limitations of the   study.          --------------------------------------------------------------------------------------------------  LABS:   Lab Results   Component Value Date    GLUCOSE 87 12/11/2018    BUN 11 12/11/2018    CREATININE 0.89 12/11/2018    EGFRIFNONA 87 12/11/2018    BCR 12.4 12/11/2018    K 3.4 (L) 12/11/2018    CO2 24.0 12/11/2018    CALCIUM 9.2 12/11/2018    ALBUMIN 4.00 12/08/2018    AST 39 12/08/2018    ALT 57 12/08/2018     Lab Results   Component Value Date    WBC 5.00 12/11/2018    HGB 12.5 (L) 12/11/2018    HCT 35.0 (L) 12/11/2018    MCV 90.7 12/11/2018     12/11/2018     No results found for: CHOL, CHLPL, TRIG, HDL, LDL, LDLDIRECT  No results found for: TSH, Z6DKVZT, L1SMWGM, THYROIDAB  Lab Results   Component Value Date    TROPONINI <0.012 12/08/2018     No results found for: HGBA1C  No results found for: DDIMER  Lab Results   Component Value Date    ALT 57 12/08/2018     No results found for: HGBA1C  Lab Results   Component Value Date    CREATININE 0.89 12/11/2018     No results found for: IRON, TIBC, FERRITIN  No results found for: INR, PROTIME    Assessment/Plan      Diagnosis Plan   1. Vasovagal syncope/orthostatic hypotension.  I did inform the caregiver today that I do not see patients on a regular basis for  OH.  I think it would be reasonable for him to establish care with EP. He apparently was prescribed Florinef on his discharge, but it doesn't appear to be administered at the facility.   A hand out was provided to the caregiver.   · EP referral for consideration of OH medications  · Handout provided        No follow-up was provided in my clinic since I do not see patients with OH.  A referral was placed to Dr. Reid.

## 2019-01-28 ENCOUNTER — APPOINTMENT (OUTPATIENT)
Dept: GENERAL RADIOLOGY | Facility: HOSPITAL | Age: 64
End: 2019-01-28

## 2019-01-28 ENCOUNTER — HOSPITAL ENCOUNTER (EMERGENCY)
Facility: HOSPITAL | Age: 64
Discharge: HOME | End: 2019-01-28
Attending: EMERGENCY MEDICINE | Admitting: EMERGENCY MEDICINE

## 2019-01-28 ENCOUNTER — APPOINTMENT (OUTPATIENT)
Dept: CT IMAGING | Facility: HOSPITAL | Age: 64
End: 2019-01-28

## 2019-01-28 VITALS
SYSTOLIC BLOOD PRESSURE: 138 MMHG | WEIGHT: 133 LBS | TEMPERATURE: 97.3 F | HEART RATE: 73 BPM | HEIGHT: 71 IN | DIASTOLIC BLOOD PRESSURE: 71 MMHG | RESPIRATION RATE: 17 BRPM | BODY MASS INDEX: 18.62 KG/M2 | OXYGEN SATURATION: 93 %

## 2019-01-28 DIAGNOSIS — S02.0XXD CLOSED FRACTURE OF PARIETAL BONE OF SKULL WITH ROUTINE HEALING, SUBSEQUENT ENCOUNTER: ICD-10-CM

## 2019-01-28 DIAGNOSIS — S06.4XAA EPIDURAL HEMATOMA (HCC): Primary | ICD-10-CM

## 2019-01-28 LAB
ANION GAP SERPL CALCULATED.3IONS-SCNC: 9 MMOL/L (ref 4–13)
APTT PPP: 29.4 SECONDS (ref 24.1–34.8)
BASOPHILS # BLD AUTO: 0.03 10*3/MM3 (ref 0–0.2)
BASOPHILS NFR BLD AUTO: 0.6 % (ref 0–2)
BILIRUB UR QL STRIP: NEGATIVE
BUN BLD-MCNC: 15 MG/DL (ref 5–21)
BUN/CREAT SERPL: 18.5 (ref 7–25)
CALCIUM SPEC-SCNC: 9.6 MG/DL (ref 8.4–10.4)
CHLORIDE SERPL-SCNC: 95 MMOL/L (ref 98–110)
CLARITY UR: ABNORMAL
CO2 SERPL-SCNC: 33 MMOL/L (ref 24–31)
COLOR UR: YELLOW
CREAT BLD-MCNC: 0.81 MG/DL (ref 0.5–1.4)
DEPRECATED RDW RBC AUTO: 42.5 FL (ref 40–54)
EOSINOPHIL # BLD AUTO: 0.15 10*3/MM3 (ref 0–0.7)
EOSINOPHIL NFR BLD AUTO: 2.8 % (ref 0–4)
ERYTHROCYTE [DISTWIDTH] IN BLOOD BY AUTOMATED COUNT: 12.9 % (ref 12–15)
GFR SERPL CREATININE-BSD FRML MDRD: 97 ML/MIN/1.73
GLUCOSE BLD-MCNC: 99 MG/DL (ref 70–100)
GLUCOSE UR STRIP-MCNC: NEGATIVE MG/DL
HCT VFR BLD AUTO: 37.1 % (ref 40–52)
HGB BLD-MCNC: 12.6 G/DL (ref 14–18)
HGB UR QL STRIP.AUTO: NEGATIVE
IMM GRANULOCYTES # BLD AUTO: 0.02 10*3/MM3 (ref 0–0.03)
IMM GRANULOCYTES NFR BLD AUTO: 0.4 % (ref 0–5)
INR PPP: 0.93 (ref 0.91–1.09)
KETONES UR QL STRIP: NEGATIVE
LEUKOCYTE ESTERASE UR QL STRIP.AUTO: NEGATIVE
LYMPHOCYTES # BLD AUTO: 1 10*3/MM3 (ref 0.72–4.86)
LYMPHOCYTES NFR BLD AUTO: 18.5 % (ref 15–45)
MCH RBC QN AUTO: 31.1 PG (ref 28–32)
MCHC RBC AUTO-ENTMCNC: 34 G/DL (ref 33–36)
MCV RBC AUTO: 91.6 FL (ref 82–95)
MONOCYTES # BLD AUTO: 0.71 10*3/MM3 (ref 0.19–1.3)
MONOCYTES NFR BLD AUTO: 13.1 % (ref 4–12)
NEUTROPHILS # BLD AUTO: 3.51 10*3/MM3 (ref 1.87–8.4)
NEUTROPHILS NFR BLD AUTO: 64.6 % (ref 39–78)
NITRITE UR QL STRIP: NEGATIVE
NRBC BLD AUTO-RTO: 0 /100 WBC (ref 0–0)
PH UR STRIP.AUTO: 8 [PH] (ref 5–8)
PLATELET # BLD AUTO: 244 10*3/MM3 (ref 130–400)
PMV BLD AUTO: 10.3 FL (ref 6–12)
POTASSIUM BLD-SCNC: 4.1 MMOL/L (ref 3.5–5.3)
PROT UR QL STRIP: NEGATIVE
PROTHROMBIN TIME: 12.7 SECONDS (ref 11.9–14.6)
RBC # BLD AUTO: 4.05 10*6/MM3 (ref 4.8–5.9)
SODIUM BLD-SCNC: 137 MMOL/L (ref 135–145)
SP GR UR STRIP: 1.02 (ref 1–1.03)
UROBILINOGEN UR QL STRIP: ABNORMAL
WBC NRBC COR # BLD: 5.42 10*3/MM3 (ref 4.8–10.8)

## 2019-01-28 PROCEDURE — 72125 CT NECK SPINE W/O DYE: CPT

## 2019-01-28 PROCEDURE — 81003 URINALYSIS AUTO W/O SCOPE: CPT | Performed by: EMERGENCY MEDICINE

## 2019-01-28 PROCEDURE — 80048 BASIC METABOLIC PNL TOTAL CA: CPT | Performed by: EMERGENCY MEDICINE

## 2019-01-28 PROCEDURE — 93005 ELECTROCARDIOGRAM TRACING: CPT | Performed by: EMERGENCY MEDICINE

## 2019-01-28 PROCEDURE — 85610 PROTHROMBIN TIME: CPT | Performed by: EMERGENCY MEDICINE

## 2019-01-28 PROCEDURE — 85730 THROMBOPLASTIN TIME PARTIAL: CPT | Performed by: EMERGENCY MEDICINE

## 2019-01-28 PROCEDURE — 70450 CT HEAD/BRAIN W/O DYE: CPT

## 2019-01-28 PROCEDURE — 99284 EMERGENCY DEPT VISIT MOD MDM: CPT | Performed by: NEUROLOGICAL SURGERY

## 2019-01-28 PROCEDURE — 85025 COMPLETE CBC W/AUTO DIFF WBC: CPT | Performed by: EMERGENCY MEDICINE

## 2019-01-28 PROCEDURE — 99285 EMERGENCY DEPT VISIT HI MDM: CPT

## 2019-01-28 PROCEDURE — 93010 ELECTROCARDIOGRAM REPORT: CPT | Performed by: INTERNAL MEDICINE

## 2019-01-28 PROCEDURE — 71045 X-RAY EXAM CHEST 1 VIEW: CPT

## 2019-01-28 RX ORDER — SODIUM CHLORIDE 0.9 % (FLUSH) 0.9 %
10 SYRINGE (ML) INJECTION AS NEEDED
Status: DISCONTINUED | OUTPATIENT
Start: 2019-01-28 | End: 2019-01-28 | Stop reason: HOSPADM

## 2019-01-31 ENCOUNTER — TRANSCRIBE ORDERS (OUTPATIENT)
Dept: ADMINISTRATIVE | Facility: HOSPITAL | Age: 64
End: 2019-01-31

## 2019-01-31 DIAGNOSIS — S02.0XXD CLOSED FRACTURE OF VAULT OF SKULL WITH ROUTINE HEALING, SUBSEQUENT ENCOUNTER: ICD-10-CM

## 2019-01-31 DIAGNOSIS — S06.4XAS: Primary | ICD-10-CM

## 2019-02-04 ENCOUNTER — OFFICE VISIT (OUTPATIENT)
Dept: CARDIOLOGY | Facility: CLINIC | Age: 64
End: 2019-02-04

## 2019-02-04 VITALS
HEIGHT: 71 IN | HEART RATE: 89 BPM | OXYGEN SATURATION: 99 % | BODY MASS INDEX: 22.68 KG/M2 | SYSTOLIC BLOOD PRESSURE: 114 MMHG | WEIGHT: 162 LBS | DIASTOLIC BLOOD PRESSURE: 78 MMHG

## 2019-02-04 DIAGNOSIS — R55 VASOVAGAL SYNCOPE: Primary | ICD-10-CM

## 2019-02-04 DIAGNOSIS — K21.9 GASTROESOPHAGEAL REFLUX DISEASE WITHOUT ESOPHAGITIS: ICD-10-CM

## 2019-02-04 DIAGNOSIS — I95.1 ORTHOSTATIC HYPOTENSION: ICD-10-CM

## 2019-02-04 DIAGNOSIS — E78.5 HYPERLIPIDEMIA, UNSPECIFIED HYPERLIPIDEMIA TYPE: ICD-10-CM

## 2019-02-04 DIAGNOSIS — R56.9 SEIZURES (HCC): ICD-10-CM

## 2019-02-04 PROCEDURE — 99204 OFFICE O/P NEW MOD 45 MIN: CPT | Performed by: INTERNAL MEDICINE

## 2019-02-04 RX ORDER — MIDODRINE HYDROCHLORIDE 5 MG/1
5 TABLET ORAL 3 TIMES DAILY
Qty: 90 TABLET | Refills: 3 | Status: SHIPPED | OUTPATIENT
Start: 2019-02-04

## 2019-02-04 NOTE — PROGRESS NOTES
Bautista Grubbs  63 y.o. male    02/04/2019  1. Vasovagal syncope    2. Orthostatic hypotension    3. Seizures (CMS/HCC)    4. Hyperlipidemia, unspecified hyperlipidemia type    5. Gastroesophageal reflux disease without esophagitis        History of Present Illness:  Patient's Body mass index is 22.59 kg/m². BMI is within normal parameters. No follow-up required.   .  63 y.o. male who presents for consultation today with history of recurrent postural syncope and documented history of orthostasis.  Previously evaluated by Dr. Can and subsequently had evaluation performed by neurosurgeon for management of abnormal CT scan finding no intervention performed.  Patient have recurrent postural syncope with a documented history of orthostasis  The patient is intellectually disabled and resides at a facility.  He also has been diagnosed with major depressive disorder, seizure disorder and autism.  He is nonverbal.  T  There was a report of fluctuating blood pressure.    during previous ER evaluation for syncope reported orthostasis and similar finding also reported by caregiver.  The patient has had documented postural changes in his blood pressure.   patient unable to provide information has a history of intellectually disability .  A 2-D TTE was obtained that was technically difficult because he was uncooperative.  All valvular structures were not visualized.  However, his left ventricular ejection fraction appeared to be normal.  His last EKG was when he was inpatient and showed sinus tach with normal intervals.  For reasons that are really unclear he was arranged a cardiology follow-up for his episode of vasovagal syncope.  Fortunately, he's accompanied by caregiver daily.    ECHO 12/2018  · The quality of the study is limited due to poor acoustic windows related to limited views obtained and an uncooperative patient  · Grossly preserved systolic biventricular function. Estimated LVEF 60%.  · No significant valvular  abnormalities given the limitations of the study.       CT 1/2019  FINDINGS:   There is acute extra-axial blood in the posterior RIGHT parietal region  that measures 7 mm in thickness. The midline structures are  nondisplaced. There is moderate, generalized volume loss.     A small amount of intracranial hemorrhage is noted in the inferior RIGHT  frontal region on coronal image 10.     A nondepressed skull fracture extends through the LEFT parietal and  temporal bones and into the skull base. This further extends into the  greater wing of the LEFT sphenoid on axial image 8.     IMPRESSION:  1. Small extra-axial hemorrhage in the RIGHT parietal region is likely  due to a contrecoup injury. There is no significant mass effect.  2. Nondepressed skull fracture extending from the through the LEFT  parietal and temporal bones and into the greater wing of the LEFT  sphenoid.  3. Small amount of intraparenchymal hemorrhage in the RIGHT frontal  lobe, inferiorly.     Findings were discussed with Dr. Silverio Stewart at 7:42 pm on 1/28/2019.    SUBJECTIVE:    Allergies   Allergen Reactions   • Haldol [Haloperidol] Unknown (See Comments)     Unknown           Past Medical History:   Diagnosis Date   • Alopecia    • Arcus senilis    • Autism    • Cataract    • Contracture, right wrist    • COPD (chronic obstructive pulmonary disease) (CMS/HCC)    • GERD (gastroesophageal reflux disease)    • Hyperlipidemia    • Insomnia    • Intellectual disability    • Lennox-Gastaut syndrome (CMS/HCC)    • Major depressive disorder    • Orthostatic hypotension    • Osteoporosis    • Right bundle branch block    • Scoliosis    • Seizures (CMS/HCC)          Past Surgical History:   Procedure Laterality Date   • OTHER SURGICAL HISTORY      unknown if patient has had any surgies         Family History   Family history unknown: Yes         Social History     Socioeconomic History   • Marital status: Single     Spouse name: Not on file   • Number of  children: Not on file   • Years of education: Not on file   • Highest education level: Not on file   Social Needs   • Financial resource strain: Not on file   • Food insecurity - worry: Not on file   • Food insecurity - inability: Not on file   • Transportation needs - medical: Not on file   • Transportation needs - non-medical: Not on file   Occupational History   • Not on file   Tobacco Use   • Smoking status: Unknown If Ever Smoked   Substance and Sexual Activity   • Alcohol use: No     Frequency: Never   • Drug use: No   • Sexual activity: Defer   Other Topics Concern   • Not on file   Social History Narrative   • Not on file         Current Outpatient Medications   Medication Sig Dispense Refill   • acetaminophen (TYLENOL) 500 MG tablet Take 500 mg by mouth 2 (Two) Times a Day.     • calcium carbonate-vitamin d (CALCIUM 600+D) 600-400 MG-UNIT per tablet Take 1 tablet by mouth Daily.     • cefdinir (OMNICEF) 300 MG capsule Take 1 capsule by mouth 2 (Two) Times a Day. 6 capsule 0   • cyproheptadine (PERIACTIN) 4 MG tablet Take 4 mg by mouth 3 (Three) Times a Day.     • ferrous sulfate 325 (65 FE) MG tablet Take 325 mg by mouth Daily With Breakfast.     • Fludrocortisone Acetate (FLORINEF PO) Take 0.1 mg by mouth Daily.     • FLUoxetine (PROzac) 20 MG capsule Take 20 mg by mouth Daily.     • lamoTRIgine (LaMICtal) 200 MG tablet Take 200 mg by mouth 3 (Three) Times a Day.     • levETIRAcetam (KEPPRA) 1000 MG tablet Take 1,000 mg by mouth 2 (Two) Times a Day.     • levETIRAcetam (KEPPRA) 250 MG tablet Take 250 mg by mouth 2 (Two) Times a Day.     • levETIRAcetam (KEPPRA) 500 MG tablet Take 500 mg by mouth 2 (Two) Times a Day.     • megestrol (MEGACE) 40 MG/ML suspension Take  by mouth Daily.     • melatonin 3 MG tablet Take 3 mg by mouth Every Night.     • midodrine (PROAMATINE) 5 MG tablet Take 1 tablet by mouth 3 (Three) Times a Day. 90 tablet 3   • Multiple Vitamins-Minerals (CERTAVITE/ANTIOXIDANTS PO) Take 1  "tablet by mouth 1 (One) Time.     • omeprazole (priLOSEC) 20 MG capsule Take 20 mg by mouth Daily.     • polyethylene glycol (MIRALAX) packet Take 17 g by mouth Every Night.     • simvastatin (ZOCOR) 20 MG tablet Take 20 mg by mouth Every Night.     • thioridazine (MELLARIL) 100 MG tablet Take 100 mg by mouth Every Morning.     • thioridazine (MELLARIL) 100 MG tablet Take 200 mg by mouth Every Night.     • vitamin B-6 (PYRIDOXINE) 50 MG tablet Take 50 mg by mouth. 4 tablets at bedtime       No current facility-administered medications for this visit.            Review of Systems:      Unable to perform ROS: patient nonverbal             OBJECTIVE:    /78   Pulse 89   Ht 180.3 cm (71\")   Wt 73.5 kg (162 lb)   SpO2 99%   BMI 22.59 kg/m²       Physical Exam:     General: alert, appears stated age and cooperative     Body Habitus: well-nourished    HEENT: Head: Normocephalic, no lesions, without obvious abnormality..    JVP: Volume/Pulsation:       Normal  Carotid Exam: no bruit normal pulsation bilaterally    Carotid Volume: normal                      Respirations: no increased work of breathing            Chest:  Not examined secondary to lack of patient cooperation         Pulmonary:Not examined secondary to lack of patient cooperation                                      Heart rate: normal    Heart Rhythm: regular                                     Heart Sounds: S1: normal intensity  S2: normal intensity  S3: absent                               S4: absent  Opening Snap: absent          A2-OS:  absent.   Pericardial rub: absent                       Ejection click: None                                        Murmurs:  absent   Extremity: moves all extremities equally.             Procedures      Lab Results   Component Value Date    WBC 5.42 01/28/2019    HGB 12.6 (L) 01/28/2019    HCT 37.1 (L) 01/28/2019    MCV 91.6 01/28/2019     01/28/2019     Lab Results   Component Value Date    GLUCOSE 99 " 01/28/2019    BUN 15 01/28/2019    CREATININE 0.81 01/28/2019    EGFRIFNONA 97 01/28/2019    BCR 18.5 01/28/2019    CO2 33.0 (H) 01/28/2019    CALCIUM 9.6 01/28/2019    ALBUMIN 4.00 12/08/2018    AST 39 12/08/2018    ALT 57 12/08/2018     No results found for: CHOL  No results found for: TRIG  No results found for: HDL  No components found for: LDLCALC  No results found for: LDL  No results found for: HDLLDLRATIO  No components found for: CHOLHDL  No results found for: HGBA1C  No results found for: TSH, G2TFUQX, F4VDHYH, THYROIDAB        ASSESSMENT AND PLAN:  #1 recurrent syncope #2 postural orthostasis #3 intellectual disability with history of seizure disorder    63 years old patient with a history of recurrent postural syncope with a documented history of orthostasis evaluated by Dr. Can and subsequently referred for further evaluations.  Patient accompanied by caregiver.  Patient unable to provide information.  In discussed with the caregiver regarding evaluation is management of recurrent postural syncope with a documented history of orthostasis.  Advise to increase  water intake at least 50-60 pounds in 24 hour period.  Not sure how much the patient will follow their instructions regarding postural change holding hent together pulling apart and flexion and extension and ankle level before changing posture.  I will start midodrine starting the dose of 5 mg by mouth 3 times a day.  Dose can be adjusted depends on patient's clinical condition and hemodynamic response.  The patient remained symptomatic Droxidopa  can be contemplated.  Patient is on multiple antiepileptic medications will see him back in 6 month R depends on patient clinical conditions    Bautista was seen today for new patient.    Diagnoses and all orders for this visit:    Vasovagal syncope    Orthostatic hypotension    Seizures (CMS/HCC)    Hyperlipidemia, unspecified hyperlipidemia type    Gastroesophageal reflux disease without  esophagitis    Other orders  -     midodrine (PROAMATINE) 5 MG tablet; Take 1 tablet by mouth 3 (Three) Times a Day.        Earnestine Reid MD  2/4/2019  9:58 AM

## 2019-02-14 ENCOUNTER — HOSPITAL ENCOUNTER (OUTPATIENT)
Dept: CT IMAGING | Facility: HOSPITAL | Age: 64
Discharge: HOME | End: 2019-02-14
Attending: FAMILY MEDICINE | Admitting: FAMILY MEDICINE

## 2019-02-14 DIAGNOSIS — S02.0XXD CLOSED FRACTURE OF VAULT OF SKULL WITH ROUTINE HEALING, SUBSEQUENT ENCOUNTER: ICD-10-CM

## 2019-02-14 DIAGNOSIS — S06.4XAS: ICD-10-CM

## 2019-02-14 PROCEDURE — 70450 CT HEAD/BRAIN W/O DYE: CPT

## 2019-04-22 ENCOUNTER — TELEPHONE (OUTPATIENT)
Dept: FAMILY MEDICINE CLINIC | Facility: CLINIC | Age: 64
End: 2019-04-22

## 2019-04-22 NOTE — TELEPHONE ENCOUNTER
----- Message from GARRET Cason sent at 4/18/2019  6:11 PM CDT -----  Please fax to [email or web address]:  [phone]

## 2019-04-22 NOTE — TELEPHONE ENCOUNTER
----- Message from GARRET Cason sent at 4/18/2019  6:10 PM CDT -----  Please fax to [email or web address]:  [phone]  Thanks!

## 2019-06-05 ENCOUNTER — OFFICE VISIT (OUTPATIENT)
Dept: CARDIOLOGY | Facility: CLINIC | Age: 64
End: 2019-06-05

## 2019-06-05 VITALS
WEIGHT: 160 LBS | SYSTOLIC BLOOD PRESSURE: 122 MMHG | BODY MASS INDEX: 22.4 KG/M2 | HEART RATE: 76 BPM | HEIGHT: 71 IN | DIASTOLIC BLOOD PRESSURE: 70 MMHG

## 2019-06-05 DIAGNOSIS — I95.1 ORTHOSTATIC HYPOTENSION: ICD-10-CM

## 2019-06-05 DIAGNOSIS — R56.9 SEIZURES (HCC): ICD-10-CM

## 2019-06-05 DIAGNOSIS — R55 VASOVAGAL SYNCOPE: Primary | ICD-10-CM

## 2019-06-05 DIAGNOSIS — E78.5 HYPERLIPIDEMIA, UNSPECIFIED HYPERLIPIDEMIA TYPE: ICD-10-CM

## 2019-06-05 PROCEDURE — 99213 OFFICE O/P EST LOW 20 MIN: CPT | Performed by: INTERNAL MEDICINE

## 2019-06-05 NOTE — PROGRESS NOTES
Bautista Grubbs  63 y.o. male    06/05/2019  1. Vasovagal syncope    2. Hyperlipidemia, unspecified hyperlipidemia type    3. Seizures (CMS/HCC)    4. Orthostatic hypotension        History of Present Illness:    Patient's Body mass index is 22.32 kg/m². BMI is within normal parameters. No follow-up required..    63 y.o. male who presents for consultation today with history of recurrent postural syncope and documented history of orthostasis.  Previously evaluated by Dr. Can and subsequently had evaluation performed by neurosurgeon for management of abnormal CT scan finding no intervention performed.  Patient have recurrent postural syncope with a documented history of orthostasis  The patient is intellectually disabled and resides at a facility.  He also has been diagnosed with major depressive disorder, seizure disorder and autism.  He is nonverbal.  T There was a report of fluctuating blood pressure.    during previous ER evaluation for syncope reported orthostasis and similar finding also reported by caregiver.  The patient has had documented postural changes in his blood pressure.   patient unable to provide information has a history of intellectually disability .  A 2-D TTE was obtained that was technically difficult because he was uncooperative.  All valvular structures were not visualized.  However, his left ventricular ejection fraction appeared to be normal.  His last EKG was when he was inpatient and showed sinus tach with normal intervals.  For reasons that are really unclear he was arranged a cardiology follow-up for his episode of vasovagal syncope.  Fortunately, he's accompanied by caregiver daily.     ECHO 12/2018  · The quality of the study is limited due to poor acoustic windows related to limited views obtained and an uncooperative patient  · Grossly preserved systolic biventricular function. Estimated LVEF 60%.  · No significant valvular abnormalities given the limitations of the  study.        CT 1/2019  FINDINGS:   There is acute extra-axial blood in the posterior RIGHT parietal region  that measures 7 mm in thickness. The midline structures are  nondisplaced. There is moderate, generalized volume loss.     A small amount of intracranial hemorrhage is noted in the inferior RIGHT  frontal region on coronal image 10.     A nondepressed skull fracture extends through the LEFT parietal and  temporal bones and into the skull base. This further extends into the  greater wing of the LEFT sphenoid on axial image 8.     IMPRESSION:  1. Small extra-axial hemorrhage in the RIGHT parietal region is likely  due to a contrecoup injury. There is no significant mass effect.  2. Nondepressed skull fracture extending from the through the LEFT  parietal and temporal bones and into the greater wing of the LEFT  sphenoid.  3. Small amount of intraparenchymal hemorrhage in the RIGHT frontal  lobe, inferiorly.          SUBJECTIVE:    Allergies   Allergen Reactions   • Haldol [Haloperidol] Unknown (See Comments)     Unknown           Past Medical History:   Diagnosis Date   • Alopecia    • Arcus senilis    • Autism    • Cataract    • Contracture, right wrist    • COPD (chronic obstructive pulmonary disease) (CMS/HCC)    • GERD (gastroesophageal reflux disease)    • Hyperlipidemia    • Insomnia    • Intellectual disability    • Lennox-Gastaut syndrome (CMS/HCC)    • Major depressive disorder    • Orthostatic hypotension    • Osteoporosis    • Right bundle branch block    • Scoliosis    • Seizures (CMS/HCC)          Past Surgical History:   Procedure Laterality Date   • OTHER SURGICAL HISTORY      unknown if patient has had any surgies         Family History   Family history unknown: Yes         Social History     Socioeconomic History   • Marital status: Single     Spouse name: Not on file   • Number of children: Not on file   • Years of education: Not on file   • Highest education level: Not on file   Tobacco Use    • Smoking status: Unknown If Ever Smoked   Substance and Sexual Activity   • Alcohol use: No     Frequency: Never   • Drug use: No   • Sexual activity: Defer         Current Outpatient Medications   Medication Sig Dispense Refill   • acetaminophen (TYLENOL) 500 MG tablet Take 500 mg by mouth 2 (Two) Times a Day.     • calcium carbonate-vitamin d (CALCIUM 600+D) 600-400 MG-UNIT per tablet Take 1 tablet by mouth Daily.     • cefdinir (OMNICEF) 300 MG capsule Take 1 capsule by mouth 2 (Two) Times a Day. 6 capsule 0   • cyproheptadine (PERIACTIN) 4 MG tablet Take 4 mg by mouth 3 (Three) Times a Day.     • ferrous sulfate 325 (65 FE) MG tablet Take 325 mg by mouth Daily With Breakfast.     • Fludrocortisone Acetate (FLORINEF PO) Take 0.1 mg by mouth Daily.     • FLUoxetine (PROzac) 20 MG capsule Take 20 mg by mouth Daily.     • lamoTRIgine (LaMICtal) 200 MG tablet Take 200 mg by mouth 3 (Three) Times a Day.     • levETIRAcetam (KEPPRA) 1000 MG tablet Take 1,000 mg by mouth 2 (Two) Times a Day.     • levETIRAcetam (KEPPRA) 250 MG tablet Take 250 mg by mouth 2 (Two) Times a Day.     • levETIRAcetam (KEPPRA) 500 MG tablet Take 500 mg by mouth 2 (Two) Times a Day.     • megestrol (MEGACE) 40 MG/ML suspension Take  by mouth Daily.     • melatonin 3 MG tablet Take 3 mg by mouth Every Night.     • midodrine (PROAMATINE) 5 MG tablet Take 1 tablet by mouth 3 (Three) Times a Day. 90 tablet 3   • Multiple Vitamins-Minerals (CERTAVITE/ANTIOXIDANTS PO) Take 1 tablet by mouth 1 (One) Time.     • omeprazole (priLOSEC) 20 MG capsule Take 20 mg by mouth Daily.     • polyethylene glycol (MIRALAX) packet Take 17 g by mouth Every Night.     • simvastatin (ZOCOR) 20 MG tablet Take 20 mg by mouth Every Night.     • thioridazine (MELLARIL) 100 MG tablet Take 100 mg by mouth Every Morning.     • thioridazine (MELLARIL) 100 MG tablet Take 200 mg by mouth Every Night.     • vitamin B-6 (PYRIDOXINE) 50 MG tablet Take 50 mg by mouth. 4 tablets at  "bedtime       No current facility-administered medications for this visit.            Review of Systems:     Difficult to obtain a review of system given the mental status however no significant change reported and no further syncope reported.  The patient had a history of intracranial bleeding medically managed no recurrent seizure reported       OBJECTIVE:    /70   Pulse 76   Ht 180.3 cm (71\")   Wt 72.6 kg (160 lb)   BMI 22.32 kg/m²       Physical Exam:     Constitutional: Cooperative, alert and oriented, well-developed, well-nourished, in no acute distress.     HENT:   Head: Normocephalic, normal hair patterns, no masses or tenderness.  Ears, Nose, and Throat: No gross abnormalities. No pallor or cyanosis. Dentition good.   Eyes: EOMS intact, PERRL, conjunctivae and lids unremarkable. Fundoscopic exam and visual fields not performed.   Neck: No palpable masses or adenopathy, no thyromegaly, no JVD, carotid pulses are full and equal bilaterally and without  Bruits.     Cardiovascular: Regular rhythm, S1 and S2 normal, no S3 or S4. Apical impulse not displaced. No murmurs, gallops, or rubs detected.     Pulmonary/Chest: Chest: normal symmetry, no tenderness to palpation, normal respiratory excursion, no intercostal retraction, no use of accessory muscles. Pulmonary: Normal breath sounds. No rales or rhonchi.    Abdominal: Abdomen soft, bowel sounds normoactive, no masses, no hepatosplenomegaly, non-tender, no bruits.     Musculoskeletal: No deformities, clubbing, cyanosis, erythema, or edema observed. There are no spinal abnormalities noted. Normal muscle strength and tone. Pulses full and equal in all extremities, no bruits auscultated.     Neurological: No gross motor or sensory deficits noted, affect appropriate, oriented to time, person, place.     Skin: Warm and dry to the touch, no apparent skin lesions or masses noted.     Psychiatric: He has a normal mood and affect. His behavior is normal. " Judgment and thought content normal.         Procedures      Lab Results   Component Value Date    WBC 5.42 01/28/2019    HGB 12.6 (L) 01/28/2019    HCT 37.1 (L) 01/28/2019    MCV 91.6 01/28/2019     01/28/2019     Lab Results   Component Value Date    GLUCOSE 99 01/28/2019    BUN 15 01/28/2019    CREATININE 0.81 01/28/2019    EGFRIFNONA 97 01/28/2019    BCR 18.5 01/28/2019    CO2 33.0 (H) 01/28/2019    CALCIUM 9.6 01/28/2019    ALBUMIN 4.00 12/08/2018    AST 39 12/08/2018    ALT 57 12/08/2018     No results found for: CHOL  No results found for: TRIG  No results found for: HDL  No components found for: LDLCALC  No results found for: LDL  No results found for: HDLLDLRATIO  No components found for: CHOLHDL  No results found for: HGBA1C  No results found for: TSH, U6TLAWR, O7BCDFF, THYROIDAB        ASSESSMENT AND PLAN:  #1 recurrent syncope #2 postural orthostasis #3 intellectual disability with history of seizure disorder     63 years old patient with a history of recurrent postural syncope with a documented history of orthostasis evaluated by Dr. Can and subsequently referred for further evaluations.  Patient accompanied by caregiver.  Patient unable to provide information.  In discussed with the caregiver regarding evaluation is management of recurrent postural syncope with a documented history of orthostasis.  Advise to increase  water intake at least 50-60 pounds in 24 hour period.  Not sure how much the patient will follow their instructions regarding postural change , flexion and extension and ankle level before changing posture.  started midodrine starting the dose of 5 mg by mouth 3 times a day.  No further recurrence of syncope Dose can be adjusted depends on patient's clinical condition and hemodynamic response.   Patient is on multiple antiepileptic medications will see him back in 6 month R depends on patient clinical conditions        Bautista was seen today for follow-up.    Diagnoses and all  orders for this visit:    Vasovagal syncope    Hyperlipidemia, unspecified hyperlipidemia type    Seizures (CMS/HCC)    Orthostatic hypotension        Earnestine Reid MD  6/5/2019  12:11 PM

## 2019-07-14 ENCOUNTER — APPOINTMENT (OUTPATIENT)
Dept: GENERAL RADIOLOGY | Facility: HOSPITAL | Age: 64
End: 2019-07-14

## 2019-07-14 ENCOUNTER — HOSPITAL ENCOUNTER (EMERGENCY)
Facility: HOSPITAL | Age: 64
Discharge: HOME | End: 2019-07-14
Attending: FAMILY MEDICINE | Admitting: FAMILY MEDICINE

## 2019-07-14 VITALS
RESPIRATION RATE: 20 BRPM | SYSTOLIC BLOOD PRESSURE: 164 MMHG | OXYGEN SATURATION: 97 % | TEMPERATURE: 98.2 F | DIASTOLIC BLOOD PRESSURE: 85 MMHG | BODY MASS INDEX: 20.58 KG/M2 | HEIGHT: 71 IN | HEART RATE: 78 BPM | WEIGHT: 147 LBS

## 2019-07-14 DIAGNOSIS — S01.511A LIP LACERATION, INITIAL ENCOUNTER: ICD-10-CM
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
## 2019-07-14 PROCEDURE — 96360 HYDRATION IV INFUSION INIT: CPT

## 2019-07-14 PROCEDURE — 70140 X-RAY EXAM OF FACIAL BONES: CPT

## 2019-07-14 PROCEDURE — 99284 EMERGENCY DEPT VISIT MOD MDM: CPT

## 2019-07-14 RX ORDER — KETAMINE HYDROCHLORIDE 100 MG/ML
25 INJECTION INTRAMUSCULAR; INTRAVENOUS ONCE
Status: COMPLETED | OUTPATIENT
Start: 2019-07-14 | End: 2019-07-14

## 2019-07-14 RX ORDER — SODIUM CHLORIDE 0.9 % (FLUSH) 0.9 %
10 SYRINGE (ML) INJECTION AS NEEDED
Status: DISCONTINUED | OUTPATIENT
Start: 2019-07-14 | End: 2019-07-14 | Stop reason: HOSPADM

## 2019-07-14 RX ORDER — SODIUM CHLORIDE 9 MG/ML
125 INJECTION, SOLUTION INTRAVENOUS CONTINUOUS
Status: DISCONTINUED | OUTPATIENT
Start: 2019-07-14 | End: 2019-07-14 | Stop reason: HOSPADM

## 2019-07-14 RX ADMIN — SODIUM CHLORIDE 125 ML/HR: 900 INJECTION, SOLUTION INTRAVENOUS at 13:32

## 2019-07-14 RX ADMIN — KETAMINE HYDROCHLORIDE 25 MG: 100 INJECTION INTRAMUSCULAR; INTRAVENOUS at 14:08

## 2019-07-14 RX ADMIN — KETAMINE HYDROCHLORIDE 25 MG: 100 INJECTION INTRAMUSCULAR; INTRAVENOUS at 14:14

## 2019-07-14 RX ADMIN — Medication 10 ML: at 13:32

## 2019-07-14 NOTE — ED PROVIDER NOTES
Subjective   63-year-old autistic nonverbal LGS patient who lives in a [facility] assisted living home reports to ED via staff complaining of an upper lip laceration.  Staff members at bedside reports that the patient was ambulating on his tiptoes.  And fell forward striking the floor with his face.  The patient is pleasantly combative, disoriented but at his baseline, nonverbal.  Patient is wearing a protective helmet year.        History provided by:  Nursing home and medical records  History limited by:  Dementia and psychiatric disorder   used: No        Review of Systems   Unable to perform ROS: Patient nonverbal       Past Medical History:   Diagnosis Date   • Alopecia    • Arcus senilis    • Autism    • Cataract    • Contracture, right wrist    • COPD (chronic obstructive pulmonary disease) (CMS/HCC)    • GERD (gastroesophageal reflux disease)    • Hyperlipidemia    • Insomnia    • Intellectual disability    • Lennox-Gastaut syndrome (CMS/HCC)    • Major depressive disorder    • Orthostatic hypotension    • Osteoporosis    • Right bundle branch block    • Scoliosis    • Seizures (CMS/HCC)        Allergies   Allergen Reactions   • Haldol [Haloperidol] Unknown (See Comments)     Unknown         Past Surgical History:   Procedure Laterality Date   • OTHER SURGICAL HISTORY      unknown if patient has had any surgies       Family History   Family history unknown: Yes       Social History     Socioeconomic History   • Marital status: Single     Spouse name: Not on file   • Number of children: Not on file   • Years of education: Not on file   • Highest education level: Not on file   Tobacco Use   • Smoking status: Unknown If Ever Smoked   Substance and Sexual Activity   • Alcohol use: No     Frequency: Never   • Drug use: No   • Sexual activity: Defer           Objective   Physical Exam   Constitutional: He appears well-nourished.   HENT:   Mouth/Throat: Abnormal dentition (missing teeth ).  Lacerations and dental caries present.       Eyes: Pupils are equal, round, and reactive to light.   Pulmonary/Chest: Effort normal and breath sounds normal.   Neurological: He displays atrophy. He exhibits abnormal muscle tone. Coordination and gait abnormal. GCS eye subscore is 4. GCS verbal subscore is 2. GCS motor subscore is 4.   Nursing note and vitals reviewed.      Laceration Repair  Date/Time: 7/14/2019 1:12 PM  Performed by: Aime Jackson APRN  Authorized by: Daljit Anand MD     Consent:     Consent obtained:  Written    Consent given by:  Guardian and healthcare agent    Risks discussed:  Infection, poor cosmetic result and pain    Alternatives discussed:  No treatment  Anesthesia (see MAR for exact dosages):     Anesthesia method:  Nerve block    Block location:  Infraorbital Nerve Block    Block needle gauge:  27 G    Block anesthetic:  Lidocaine 1% WITH epi    Block injection procedure:  Anatomic landmarks identified, introduced needle and negative aspiration for blood    Block outcome:  Anesthesia achieved  Laceration details:     Location:  Lip    Lip location:  Upper lip, full thickness    Vermilion border involved: yes      Height of lip laceration:  Up to half vertical height    Length (cm):  1.5  Repair type:     Repair type:  Simple  Pre-procedure details:     Preparation:  Patient was prepped and draped in usual sterile fashion and imaging obtained to evaluate for foreign bodies  Exploration:     Wound extent: fascia violated      Contaminated: no    Treatment:     Area cleansed with:  Shur-Clens and saline    Amount of cleaning:  Standard    Irrigation solution:  Sterile saline    Irrigation volume:  100    Irrigation method:  Syringe    Visualized foreign bodies/material removed: no    Skin repair:     Repair method:  Sutures    Suture size:  5-0    Suture material:  Fast-absorbing gut    Suture technique:  Simple interrupted    Number of sutures:  4  Approximation:      Approximation:  Close    Vermilion border: well-aligned    Post-procedure details:     Patient tolerance of procedure:  Tolerated well, no immediate complications  Comments:      Conscious sedation achieved with ketamine               ED Course         XR Facial Bones < 3 View   Final Result   CONCLUSION:   No facial bone fracture identified on this single view.      [id number]      Electronically signed by:  Dar Whitten MD  7/14/2019 3:15 PM CDT   Workstation: [id number]                    MDM  Number of Diagnoses or Management Options  Fall, initial encounter: new and requires workup  Lip laceration, initial encounter: new and requires workup     Amount and/or Complexity of Data Reviewed  Tests in the radiology section of CPT®: ordered    Risk of Complications, Morbidity, and/or Mortality  Presenting problems: moderate  Diagnostic procedures: moderate  Management options: moderate          Final diagnoses:   Fall, initial encounter   Lip laceration, initial encounter            Aime Jackson, APRN  07/14/19 1528

## 2019-07-15 ENCOUNTER — TELEPHONE (OUTPATIENT)
Dept: FAMILY MEDICINE CLINIC | Facility: CLINIC | Age: 64
End: 2019-07-15

## 2019-07-15 NOTE — TELEPHONE ENCOUNTER
Raegan from [facility] called and stated that Bautista Grubbs was unable to wear the sleeping device equipment and Rito  Is suppose to be returning it today to the hospital.  Raegan suggested and in house sleep study with possible sedation.

## 2019-07-25 RX ORDER — THIORIDAZINE HYDROCHLORIDE 25 MG/1
100 TABLET, FILM COATED ORAL 3 TIMES DAILY
Qty: 100 TABLET | Refills: 0 | Status: SHIPPED | OUTPATIENT
Start: 2019-07-25 | End: 2020-03-12

## 2019-12-18 ENCOUNTER — OFFICE VISIT (OUTPATIENT)
Dept: CARDIOLOGY | Facility: CLINIC | Age: 64
End: 2019-12-18

## 2019-12-18 VITALS
BODY MASS INDEX: 20.58 KG/M2 | DIASTOLIC BLOOD PRESSURE: 80 MMHG | HEIGHT: 71 IN | WEIGHT: 147 LBS | OXYGEN SATURATION: 98 % | HEART RATE: 93 BPM | SYSTOLIC BLOOD PRESSURE: 120 MMHG

## 2019-12-18 DIAGNOSIS — E78.5 HYPERLIPIDEMIA, UNSPECIFIED HYPERLIPIDEMIA TYPE: ICD-10-CM

## 2019-12-18 DIAGNOSIS — I95.1 ORTHOSTATIC HYPOTENSION: ICD-10-CM

## 2019-12-18 DIAGNOSIS — R55 VASOVAGAL SYNCOPE: Primary | ICD-10-CM

## 2019-12-18 DIAGNOSIS — R56.9 SEIZURES (HCC): ICD-10-CM

## 2019-12-18 PROCEDURE — 99213 OFFICE O/P EST LOW 20 MIN: CPT | Performed by: INTERNAL MEDICINE

## 2019-12-18 NOTE — PROGRESS NOTES
Bautista Grubbs  64 y.o. male    12/18/2019      1. Vasovagal syncope    2. Hyperlipidemia, unspecified hyperlipidemia type    3. Orthostatic hypotension    4. Seizures (CMS/HCC)        History of Present Illness:    Patient's Body mass index is 20.51 kg/m². BMI is within normal parameters. No follow-up required..    64 y.o. male who was evaluated with history of recurrent postural syncope and documented history of orthostasis.  Previously evaluated by Dr. Can and subsequently had evaluation performed by neurosurgeon for management of abnormal CT scan finding no intervention performed.  Patient have recurrent postural syncope with a documented history of orthostasis  The patient is intellectually disabled and resides at a facility.  He also has been diagnosed with major depressive disorder, seizure disorder and autism.  He is nonverbal.  T There was a report of fluctuating blood pressure.    during previous ER evaluation for syncope reported orthostasis and similar finding also reported by caregiver.  The patient has had documented postural changes in his blood pressure.   patient unable to provide information has a history of intellectually disability .  A 2-D TTE was obtained that was technically difficult because he was uncooperative.  All valvular structures were not visualized.  However, his left ventricular ejection fraction appeared to be normal.  His last EKG was when he was inpatient and showed sinus tach with normal intervals.  For reasons that are really unclear he was arranged a cardiology follow-up for his episode of vasovagal syncope.  Fortunately, he's accompanied by caregiver daily.     ECHO 12/2018  · The quality of the study is limited due to poor acoustic windows related to limited views obtained and an uncooperative patient  · Grossly preserved systolic biventricular function. Estimated LVEF 60%.  · No significant valvular abnormalities given the limitations of the study.        CT  1/2019  FINDINGS:   There is acute extra-axial blood in the posterior RIGHT parietal region  that measures 7 mm in thickness. The midline structures are  nondisplaced. There is moderate, generalized volume loss.     A small amount of intracranial hemorrhage is noted in the inferior RIGHT  frontal region on coronal image 10.     A nondepressed skull fracture extends through the LEFT parietal and  temporal bones and into the skull base. This further extends into the  greater wing of the LEFT sphenoid on axial image 8.     IMPRESSION:  1. Small extra-axial hemorrhage in the RIGHT parietal region is likely  due to a contrecoup injury. There is no significant mass effect.  2. Nondepressed skull fracture extending from the through the LEFT  parietal and temporal bones and into the greater wing of the LEFT  sphenoid.  3. Small amount of intraparenchymal hemorrhage in the RIGHT frontal  lobe, inferiorly.          SUBJECTIVE:    Allergies   Allergen Reactions   • Haldol [Haloperidol] Unknown (See Comments)     Unknown           Past Medical History:   Diagnosis Date   • Alopecia    • Arcus senilis    • Autism    • Cataract    • Contracture, right wrist    • COPD (chronic obstructive pulmonary disease) (CMS/HCC)    • GERD (gastroesophageal reflux disease)    • Hyperlipidemia    • Insomnia    • Intellectual disability    • Lennox-Gastaut syndrome (CMS/HCC)    • Major depressive disorder    • Orthostatic hypotension    • Osteoporosis    • Right bundle branch block    • Scoliosis    • Seizures (CMS/HCC)          Past Surgical History:   Procedure Laterality Date   • OTHER SURGICAL HISTORY      unknown if patient has had any surgies         Family History   Family history unknown: Yes         Social History     Socioeconomic History   • Marital status: Single     Spouse name: Not on file   • Number of children: Not on file   • Years of education: Not on file   • Highest education level: Not on file   Tobacco Use   • Smoking  status: Unknown If Ever Smoked   Substance and Sexual Activity   • Alcohol use: No     Frequency: Never   • Drug use: No   • Sexual activity: Defer         Current Outpatient Medications   Medication Sig Dispense Refill   • acetaminophen (TYLENOL) 500 MG tablet Take 500 mg by mouth 2 (Two) Times a Day.     • calcium carbonate-vitamin d (CALCIUM 600+D) 600-400 MG-UNIT per tablet Take 1 tablet by mouth Daily.     • cefdinir (OMNICEF) 300 MG capsule Take 1 capsule by mouth 2 (Two) Times a Day. 6 capsule 0   • cyproheptadine (PERIACTIN) 4 MG tablet Take 4 mg by mouth 3 (Three) Times a Day.     • ferrous sulfate 325 (65 FE) MG tablet Take 325 mg by mouth Daily With Breakfast.     • Fludrocortisone Acetate (FLORINEF PO) Take 0.1 mg by mouth Daily.     • FLUoxetine (PROzac) 20 MG capsule Take 20 mg by mouth Daily.     • lamoTRIgine (LaMICtal) 200 MG tablet Take 200 mg by mouth 3 (Three) Times a Day.     • levETIRAcetam (KEPPRA) 1000 MG tablet Take 1,000 mg by mouth 2 (Two) Times a Day.     • levETIRAcetam (KEPPRA) 250 MG tablet Take 250 mg by mouth 2 (Two) Times a Day.     • levETIRAcetam (KEPPRA) 500 MG tablet Take 500 mg by mouth 2 (Two) Times a Day.     • megestrol (MEGACE) 40 MG/ML suspension Take  by mouth Daily.     • melatonin 3 MG tablet Take 3 mg by mouth Every Night.     • midodrine (PROAMATINE) 5 MG tablet Take 1 tablet by mouth 3 (Three) Times a Day. 90 tablet 3   • Multiple Vitamins-Minerals (CERTAVITE/ANTIOXIDANTS PO) Take 1 tablet by mouth 1 (One) Time.     • omeprazole (priLOSEC) 20 MG capsule Take 20 mg by mouth Daily.     • polyethylene glycol (MIRALAX) packet Take 17 g by mouth Every Night.     • simvastatin (ZOCOR) 20 MG tablet Take 20 mg by mouth Every Night.     • thioridazine (MELLARIL) 100 MG tablet Take 100 mg by mouth Every Morning.     • thioridazine (MELLARIL) 25 MG tablet Take 4 tablets by mouth 3 (Three) Times a Day. 100 tablet 0   • vitamin B-6 (PYRIDOXINE) 50 MG tablet Take 50 mg by mouth. 4  "tablets at bedtime       No current facility-administered medications for this visit.            Review of Systems:     Difficult to obtain a review of system given the mental status however no significant change reported and no further syncope reported.  The patient had a history of intracranial bleeding medically managed no recurrent seizure reported       OBJECTIVE:    /80   Pulse 93   Ht 180.3 cm (70.98\")   Wt 66.7 kg (147 lb)   SpO2 98%   BMI 20.51 kg/m²       Physical Exam:     Constitutional: Cooperative, alert and oriented, well-developed, well-nourished, in no acute distress.     HENT:   Head: Normocephalic, normal hair patterns, no masses or tenderness.  Ears, Nose, and Throat: No gross abnormalities. No pallor or cyanosis. Dentition good.   Eyes: EOMS intact, PERRL, conjunctivae and lids unremarkable. Fundoscopic exam and visual fields not performed.   Neck: No palpable masses or adenopathy, no thyromegaly, no JVD, carotid pulses are full and equal bilaterally and without  Bruits.     Cardiovascular: Regular rhythm, S1 and S2 normal, no S3 or S4. Apical impulse not displaced. No murmurs, gallops, or rubs detected.     Pulmonary/Chest: Chest: normal symmetry, no tenderness to palpation, normal respiratory excursion, no intercostal retraction, no use of accessory muscles. Pulmonary: Normal breath sounds. No rales or rhonchi.    Abdominal: Abdomen soft, bowel sounds normoactive, no masses, no hepatosplenomegaly, non-tender, no bruits.     Musculoskeletal: No deformities, clubbing, cyanosis, erythema, or edema observed. There are no spinal abnormalities noted. Normal muscle strength and tone. Pulses full and equal in all extremities, no bruits auscultated.     Neurological: History of intellectual disability.     Skin: Warm and dry to the touch, no apparent skin lesions or masses noted.     Psychiatric: He has a normal mood and affect. His behavior is normal. Judgment and thought content normal. "         Procedures      Lab Results   Component Value Date    WBC 5.42 01/28/2019    HGB 12.6 (L) 01/28/2019    HCT 37.1 (L) 01/28/2019    MCV 91.6 01/28/2019     01/28/2019     Lab Results   Component Value Date    GLUCOSE 99 01/28/2019    BUN 15 01/28/2019    CREATININE 0.81 01/28/2019    EGFRIFNONA 97 01/28/2019    BCR 18.5 01/28/2019    CO2 33.0 (H) 01/28/2019    CALCIUM 9.6 01/28/2019    ALBUMIN 4.00 12/08/2018    AST 39 12/08/2018    ALT 57 12/08/2018     No results found for: CHOL  No results found for: TRIG  No results found for: HDL  No components found for: LDLCALC  No results found for: LDL  No results found for: HDLLDLRATIO  No components found for: CHOLHDL  No results found for: HGBA1C  No results found for: TSH, G7YAIZC, T4PJLDQ, THYROIDAB        ASSESSMENT AND PLAN:  #1 recurrent syncope  #2 postural orthostasis #  3 intellectual disability with history of seizure disorder     64 years old patient with a history of recurrent postural syncope with a documented history of orthostasis evaluated by Dr. Can and subsequently referred for further evaluations.  Patient accompanied by caregiver.  Patient unable to provide information.  In discussed with the caregiver regarding evaluation is management of recurrent postural syncope with a documented history of orthostasis.  Advise to increase  water intake at least 50-60 pounds in 24 hour period.  Not sure how much the patient will follow  instructions regarding postural change , flexion and extension and ankle level before changing posture.    Patient was started on midodrine starting the dose of 5 mg by mouth 3 times a day.  No further recurrence of syncope .   Patient is on multiple antiepileptic medications will see him back in 6 month R depends on patient clinical conditions        Bautista was seen today for follow-up.    Diagnoses and all orders for this visit:    Vasovagal syncope    Hyperlipidemia, unspecified hyperlipidemia  type    Orthostatic hypotension    Seizures (CMS/HCC)        Earnestine Reid MD  12/18/2019  11:43 AM

## 2020-01-19 ENCOUNTER — APPOINTMENT (OUTPATIENT)
Dept: GENERAL RADIOLOGY | Facility: HOSPITAL | Age: 65
End: 2020-01-19

## 2020-01-19 ENCOUNTER — APPOINTMENT (OUTPATIENT)
Dept: CT IMAGING | Facility: HOSPITAL | Age: 65
End: 2020-01-19

## 2020-01-19 ENCOUNTER — HOSPITAL ENCOUNTER (EMERGENCY)
Facility: HOSPITAL | Age: 65
Discharge: ANOTHER HOSPITAL | End: 2020-01-20
Attending: EMERGENCY MEDICINE | Admitting: EMERGENCY MEDICINE

## 2020-01-19 DIAGNOSIS — A41.9 SEPSIS WITH ACUTE ORGAN DYSFUNCTION AND SEPTIC SHOCK, DUE TO UNSPECIFIED ORGANISM, UNSPECIFIED TYPE (HCC): Primary | ICD-10-CM

## 2020-01-19 DIAGNOSIS — S02.92XA CLOSED EXTENSIVE FACIAL FRACTURES, INITIAL ENCOUNTER (HCC): ICD-10-CM

## 2020-01-19 DIAGNOSIS — R65.21 SEPSIS WITH ACUTE ORGAN DYSFUNCTION AND SEPTIC SHOCK, DUE TO UNSPECIFIED ORGANISM, UNSPECIFIED TYPE (HCC): Primary | ICD-10-CM

## 2020-01-19 DIAGNOSIS — J69.0 ASPIRATION PNEUMONIA, UNSPECIFIED ASPIRATION PNEUMONIA TYPE, UNSPECIFIED LATERALITY, UNSPECIFIED PART OF LUNG (HCC): ICD-10-CM

## 2020-01-19 LAB
ALBUMIN SERPL-MCNC: 3 G/DL (ref 3.5–5.2)
ALBUMIN/GLOB SERPL: 1.2 G/DL
ALP SERPL-CCNC: 75 U/L (ref 39–117)
ALT SERPL W P-5'-P-CCNC: 28 U/L (ref 1–41)
ANION GAP SERPL CALCULATED.3IONS-SCNC: 13 MMOL/L (ref 5–15)
ARTERIAL PATENCY WRIST A: ABNORMAL
AST SERPL-CCNC: 25 U/L (ref 1–40)
ATMOSPHERIC PRESS: 760 MMHG
BASE EXCESS BLDA CALC-SCNC: -5.6 MMOL/L (ref 0–2)
BDY SITE: ABNORMAL
BILIRUB SERPL-MCNC: 0.5 MG/DL (ref 0.2–1.2)
BUN BLD-MCNC: 25 MG/DL (ref 8–23)
BUN/CREAT SERPL: 17 (ref 7–25)
CALCIUM SPEC-SCNC: 8 MG/DL (ref 8.6–10.5)
CHLORIDE SERPL-SCNC: 101 MMOL/L (ref 98–107)
CO2 SERPL-SCNC: 20 MMOL/L (ref 22–29)
CREAT BLD-MCNC: 1.47 MG/DL (ref 0.76–1.27)
D-LACTATE SERPL-SCNC: 3.5 MMOL/L (ref 0.5–2)
DEPRECATED RDW RBC AUTO: 46.3 FL (ref 37–54)
ERYTHROCYTE [DISTWIDTH] IN BLOOD BY AUTOMATED COUNT: 13 % (ref 12.3–15.4)
FLUAV AG NPH QL: NEGATIVE
FLUBV AG NPH QL IA: NEGATIVE
GAS FLOW AIRWAY: 15 LPM
GFR SERPL CREATININE-BSD FRML MDRD: 48 ML/MIN/1.73
GLOBULIN UR ELPH-MCNC: 2.5 GM/DL
GLUCOSE BLD-MCNC: 96 MG/DL (ref 65–99)
HCO3 BLDA-SCNC: 19.1 MMOL/L (ref 20–26)
HCT VFR BLD AUTO: 33.2 % (ref 37.5–51)
HGB BLD-MCNC: 11.3 G/DL (ref 13–17.7)
Lab: ABNORMAL
MAGNESIUM SERPL-MCNC: 1.6 MG/DL (ref 1.6–2.4)
MCH RBC QN AUTO: 32.9 PG (ref 26.6–33)
MCHC RBC AUTO-ENTMCNC: 34 G/DL (ref 31.5–35.7)
MCV RBC AUTO: 96.8 FL (ref 79–97)
MODALITY: ABNORMAL
NT-PROBNP SERPL-MCNC: 495.5 PG/ML (ref 5–900)
PCO2 BLDA: 33.7 MM HG (ref 35–45)
PH BLDA: 7.36 PH UNITS (ref 7.35–7.45)
PLATELET # BLD AUTO: 166 10*3/MM3 (ref 140–450)
PMV BLD AUTO: 9.3 FL (ref 6–12)
PO2 BLDA: 133 MM HG (ref 83–108)
POTASSIUM BLD-SCNC: 4.1 MMOL/L (ref 3.5–5.2)
PROT SERPL-MCNC: 5.5 G/DL (ref 6–8.5)
RBC # BLD AUTO: 3.43 10*6/MM3 (ref 4.14–5.8)
SAO2 % BLDCOA: 99.1 % (ref 94–99)
SODIUM BLD-SCNC: 134 MMOL/L (ref 136–145)
TROPONIN T SERPL-MCNC: <0.01 NG/ML (ref 0–0.03)
VENTILATOR MODE: ABNORMAL
WBC NRBC COR # BLD: 3.15 10*3/MM3 (ref 3.4–10.8)

## 2020-01-19 PROCEDURE — 93005 ELECTROCARDIOGRAM TRACING: CPT | Performed by: EMERGENCY MEDICINE

## 2020-01-19 PROCEDURE — 82803 BLOOD GASES ANY COMBINATION: CPT

## 2020-01-19 PROCEDURE — 83880 ASSAY OF NATRIURETIC PEPTIDE: CPT | Performed by: EMERGENCY MEDICINE

## 2020-01-19 PROCEDURE — 36600 WITHDRAWAL OF ARTERIAL BLOOD: CPT

## 2020-01-19 PROCEDURE — 87804 INFLUENZA ASSAY W/OPTIC: CPT | Performed by: EMERGENCY MEDICINE

## 2020-01-19 PROCEDURE — 85025 COMPLETE CBC W/AUTO DIFF WBC: CPT | Performed by: EMERGENCY MEDICINE

## 2020-01-19 PROCEDURE — 71045 X-RAY EXAM CHEST 1 VIEW: CPT

## 2020-01-19 PROCEDURE — 80053 COMPREHEN METABOLIC PANEL: CPT | Performed by: EMERGENCY MEDICINE

## 2020-01-19 PROCEDURE — C1751 CATH, INF, PER/CENT/MIDLINE: HCPCS

## 2020-01-19 PROCEDURE — 99285 EMERGENCY DEPT VISIT HI MDM: CPT

## 2020-01-19 PROCEDURE — 85007 BL SMEAR W/DIFF WBC COUNT: CPT | Performed by: EMERGENCY MEDICINE

## 2020-01-19 PROCEDURE — 70486 CT MAXILLOFACIAL W/O DYE: CPT

## 2020-01-19 PROCEDURE — 70450 CT HEAD/BRAIN W/O DYE: CPT

## 2020-01-19 PROCEDURE — 84484 ASSAY OF TROPONIN QUANT: CPT | Performed by: EMERGENCY MEDICINE

## 2020-01-19 PROCEDURE — 83605 ASSAY OF LACTIC ACID: CPT | Performed by: EMERGENCY MEDICINE

## 2020-01-19 PROCEDURE — 83735 ASSAY OF MAGNESIUM: CPT | Performed by: EMERGENCY MEDICINE

## 2020-01-19 RX ORDER — SODIUM CHLORIDE 0.9 % (FLUSH) 0.9 %
10 SYRINGE (ML) INJECTION AS NEEDED
Status: DISCONTINUED | OUTPATIENT
Start: 2020-01-19 | End: 2020-01-20 | Stop reason: HOSPADM

## 2020-01-19 RX ORDER — NOREPINEPHRINE BIT/0.9 % NACL 8 MG/250ML
.02-.3 INFUSION BOTTLE (ML) INTRAVENOUS
Status: DISCONTINUED | OUTPATIENT
Start: 2020-01-19 | End: 2020-01-20 | Stop reason: HOSPADM

## 2020-01-20 ENCOUNTER — APPOINTMENT (OUTPATIENT)
Dept: CT IMAGING | Facility: HOSPITAL | Age: 65
End: 2020-01-20

## 2020-01-20 VITALS
HEIGHT: 70 IN | OXYGEN SATURATION: 99 % | SYSTOLIC BLOOD PRESSURE: 85 MMHG | TEMPERATURE: 98 F | DIASTOLIC BLOOD PRESSURE: 55 MMHG | RESPIRATION RATE: 22 BRPM | WEIGHT: 163.6 LBS | HEART RATE: 98 BPM | BODY MASS INDEX: 23.42 KG/M2

## 2020-01-20 LAB
ANISOCYTOSIS BLD QL: ABNORMAL
BACTERIA UR QL AUTO: NORMAL /HPF
BILIRUB UR QL STRIP: ABNORMAL
CLARITY UR: ABNORMAL
COLOR UR: ABNORMAL
GLUCOSE UR STRIP-MCNC: NEGATIVE MG/DL
HGB UR QL STRIP.AUTO: NEGATIVE
HOLD SPECIMEN: NORMAL
HOLD SPECIMEN: NORMAL
HYALINE CASTS UR QL AUTO: NORMAL /LPF
KETONES UR QL STRIP: NEGATIVE
LEUKOCYTE ESTERASE UR QL STRIP.AUTO: NEGATIVE
LYMPHOCYTES # BLD MANUAL: 0.35 10*3/MM3 (ref 0.7–3.1)
LYMPHOCYTES NFR BLD MANUAL: 10 % (ref 5–12)
LYMPHOCYTES NFR BLD MANUAL: 11 % (ref 19.6–45.3)
METAMYELOCYTES NFR BLD MANUAL: 2 % (ref 0–0)
MONOCYTES # BLD AUTO: 0.32 10*3/MM3 (ref 0.1–0.9)
NEUTROPHILS # BLD AUTO: 2.27 10*3/MM3 (ref 1.7–7)
NEUTROPHILS NFR BLD MANUAL: 68 % (ref 42.7–76)
NEUTS BAND NFR BLD MANUAL: 4 % (ref 0–5)
NEUTS VAC BLD QL SMEAR: ABNORMAL
NITRITE UR QL STRIP: NEGATIVE
PH UR STRIP.AUTO: 7.5 [PH] (ref 5–9)
PROT UR QL STRIP: ABNORMAL
RBC # UR: NORMAL /HPF
REF LAB TEST METHOD: NORMAL
SMALL PLATELETS BLD QL SMEAR: ADEQUATE
SMUDGE CELLS BLD QL SMEAR: ABNORMAL
SMUDGE CELLS IN BLOOD BY LIGHT MICROSCOPY: 3 /100 WBC
SP GR UR STRIP: 1.02 (ref 1–1.03)
SQUAMOUS #/AREA URNS HPF: NORMAL /HPF
UROBILINOGEN UR QL STRIP: ABNORMAL
VARIANT LYMPHS NFR BLD MANUAL: 5 % (ref 0–5)
WBC UR QL AUTO: NORMAL /HPF
WHOLE BLOOD HOLD SPECIMEN: NORMAL

## 2020-01-20 PROCEDURE — 25010000002 VANCOMYCIN: Performed by: EMERGENCY MEDICINE

## 2020-01-20 PROCEDURE — 96366 THER/PROPH/DIAG IV INF ADDON: CPT

## 2020-01-20 PROCEDURE — 72125 CT NECK SPINE W/O DYE: CPT

## 2020-01-20 PROCEDURE — 87040 BLOOD CULTURE FOR BACTERIA: CPT | Performed by: EMERGENCY MEDICINE

## 2020-01-20 PROCEDURE — 96365 THER/PROPH/DIAG IV INF INIT: CPT

## 2020-01-20 PROCEDURE — 96368 THER/DIAG CONCURRENT INF: CPT

## 2020-01-20 PROCEDURE — P9612 CATHETERIZE FOR URINE SPEC: HCPCS

## 2020-01-20 PROCEDURE — 81001 URINALYSIS AUTO W/SCOPE: CPT | Performed by: EMERGENCY MEDICINE

## 2020-01-20 PROCEDURE — 36415 COLL VENOUS BLD VENIPUNCTURE: CPT | Performed by: EMERGENCY MEDICINE

## 2020-01-20 PROCEDURE — 93010 ELECTROCARDIOGRAM REPORT: CPT | Performed by: INTERNAL MEDICINE

## 2020-01-20 PROCEDURE — C1751 CATH, INF, PER/CENT/MIDLINE: HCPCS

## 2020-01-20 PROCEDURE — 25010000002 PIPERACILLIN-TAZOBACTAM: Performed by: EMERGENCY MEDICINE

## 2020-01-20 RX ADMIN — PIPERACILLIN SODIUM,TAZOBACTAM SODIUM 3.38 G: 3; .375 INJECTION, POWDER, FOR SOLUTION INTRAVENOUS at 01:23

## 2020-01-20 RX ADMIN — VANCOMYCIN HYDROCHLORIDE 1500 MG: 10 INJECTION, POWDER, LYOPHILIZED, FOR SOLUTION INTRAVENOUS at 02:02

## 2020-01-20 RX ADMIN — NOREPINEPHRINE BITARTRATE 0.02 MCG/KG/MIN: 1 INJECTION, SOLUTION, CONCENTRATE INTRAVENOUS at 00:02

## 2020-01-20 NOTE — ED PROVIDER NOTES
Subjective   64-year-old male is brought via EMS from his personal care facility with concern for possible sepsis and respiratory issues.  He reportedly has been coughing and with low oxygen saturation and low blood pressure.  Patient is nonverbal due to history of MR, autism, and seizures.  He has some facial bruising and they report that he hits himself and throws himself in the floor.  They believe the current trauma may have happened about 24 to 36 hours prior to arrival.  He also has issues with sometimes getting choked on food if they are watching carefully during eating.  Patient unable to provide any history or review of systems.          History provided by:  EMS personnel, medical records and caregiver  History limited by: Nonverbal.   used: No        Review of Systems   Unable to perform ROS: Patient nonverbal   Respiratory: Positive for cough and shortness of breath.    Skin: Positive for wound.       Past Medical History:   Diagnosis Date   • Alopecia    • Arcus senilis    • Autism    • Cataract    • Contracture, right wrist    • COPD (chronic obstructive pulmonary disease) (CMS/HCC)    • GERD (gastroesophageal reflux disease)    • Hyperlipidemia    • Insomnia    • Intellectual disability    • Lennox-Gastaut syndrome (CMS/HCC)    • Major depressive disorder    • Orthostatic hypotension    • Osteoporosis    • Right bundle branch block    • Scoliosis    • Seizures (CMS/HCC)        Allergies   Allergen Reactions   • Haldol [Haloperidol] Unknown (See Comments)     Unknown         Past Surgical History:   Procedure Laterality Date   • OTHER SURGICAL HISTORY      unknown if patient has had any surgies       Family History   Family history unknown: Yes       Social History     Socioeconomic History   • Marital status: Single     Spouse name: Not on file   • Number of children: Not on file   • Years of education: Not on file   • Highest education level: Not on file   Tobacco Use   • Smoking  status: Unknown If Ever Smoked   Substance and Sexual Activity   • Alcohol use: No     Frequency: Never   • Drug use: No   • Sexual activity: Defer           Objective   Physical Exam   Constitutional: He appears well-developed. He appears ill. He appears distressed. Face mask in place.   HENT:   Head: Normocephalic. Head is with contusion. Head is without raccoon's eyes, without Junior's sign and without laceration.       Right Ear: Tympanic membrane normal.   Left Ear: Tympanic membrane normal.   Nose: No septal deviation or nasal septal hematoma. Right sinus exhibits maxillary sinus tenderness. Left sinus exhibits no maxillary sinus tenderness.   Mouth/Throat: Oropharynx is clear and moist and mucous membranes are normal.   Eyes: Conjunctivae and EOM are normal.   Neck: No JVD present.   Cardiovascular: Regular rhythm, intact distal pulses and normal pulses.  No extrasystoles are present. Tachycardia present.   No murmur heard.  Pulmonary/Chest: No accessory muscle usage. Tachypnea noted. He is in respiratory distress. He has decreased breath sounds in the right lower field and the left lower field. He has no wheezes. He has rhonchi in the right upper field and the left upper field. He has no rales.   Abdominal: Soft. Bowel sounds are normal.   Neurological: He is alert.   Skin: Skin is warm and dry. Capillary refill takes less than 2 seconds. There is pallor.   Nursing note and vitals reviewed.      Critical Care  Performed by: Raffaele Spicer DO  Authorized by: Raffaele Spicer DO     Critical care provider statement:     Critical care time (minutes):  25    Critical care time was exclusive of:  Separately billable procedures and treating other patients and teaching time    Critical care was necessary to treat or prevent imminent or life-threatening deterioration of the following conditions:  Circulatory failure, respiratory failure, sepsis and shock    Critical care was time spent personally by me on  the following activities:  Ordering and performing treatments and interventions, ordering and review of laboratory studies, ordering and review of radiographic studies, pulse oximetry, re-evaluation of patient's condition, vascular access procedures, obtaining history from patient or surrogate, interpretation of cardiac output measurements, examination of patient, evaluation of patient's response to treatment, discussions with consultants and development of treatment plan with patient or surrogate  Central Line At Bedside  Date/Time: 1/20/2020 1:43 AM  Performed by: Raffaele Spicer DO  Authorized by: Raffaele Spicer DO     Consent:     Consent obtained:  Emergent situation  Pre-procedure details:     Hand hygiene: Hand hygiene performed prior to insertion      Sterile barrier technique: All elements of maximal sterile technique followed      Skin preparation:  2% chlorhexidine  Anesthesia (see MAR for exact dosages):     Anesthesia method:  Local infiltration    Local anesthetic:  Lidocaine 1% w/o epi  Procedure details:     Location:  R femoral    Site selection rationale:  Upper extremity contractures and neck position, collapsable IJs    Patient position:  Flat    Procedural supplies:  Triple lumen    Catheter size:  7 Fr    Landmarks identified: yes      Ultrasound guidance: yes      Sterile ultrasound techniques: Sterile gel and sterile probe covers were used      Number of attempts:  1    Successful placement: yes    Post-procedure details:     Post-procedure:  Dressing applied and line sutured    Assessment:  Blood return through all ports and free fluid flow    Patient tolerance of procedure:  Tolerated well, no immediate complications      none         ED Course      Labs Reviewed   COMPREHENSIVE METABOLIC PANEL - Abnormal; Notable for the following components:       Result Value    BUN 25 (*)     Creatinine 1.47 (*)     Sodium 134 (*)     CO2 20.0 (*)     Calcium 8.0 (*)     Total Protein 5.5 (*)       Albumin 3.00 (*)     eGFR Non  Amer 48 (*)     All other components within normal limits    Narrative:     GFR Normal >60  Chronic Kidney Disease <60  Kidney Failure <15     URINALYSIS W/ CULTURE IF INDICATED - Abnormal; Notable for the following components:    Appearance, UA Cloudy (*)     Bilirubin, UA Small (1+) (*)     Protein, UA 30 mg/dL (1+) (*)     All other components within normal limits   LACTIC ACID, PLASMA - Abnormal; Notable for the following components:    Lactate 3.5 (*)     All other components within normal limits   CBC WITH AUTO DIFFERENTIAL - Abnormal; Notable for the following components:    WBC 3.15 (*)     RBC 3.43 (*)     Hemoglobin 11.3 (*)     Hematocrit 33.2 (*)     All other components within normal limits   BLOOD GAS, ARTERIAL - Abnormal; Notable for the following components:    pCO2, Arterial 33.7 (*)     pO2, Arterial 133.0 (*)     HCO3, Arterial 19.1 (*)     Base Excess, Arterial -5.6 (*)     O2 Saturation, Arterial 99.1 (*)     All other components within normal limits   MANUAL DIFFERENTIAL - Abnormal; Notable for the following components:    Lymphocyte % 11.0 (*)     Metamyelocyte % 2.0 (*)     Lymphocytes Absolute 0.35 (*)     All other components within normal limits   INFLUENZA ANTIGEN, RAPID - Normal   TROPONIN (IN-HOUSE) - Normal    Narrative:     Troponin T Reference Range:  <= 0.03 ng/mL-   Negative for AMI  >0.03 ng/mL-     Abnormal for myocardial necrosis.  Clinicians would have to utilize clinical acumen, EKG, Troponin and serial changes to determine if it is an Acute Myocardial Infarction or myocardial injury due to an underlying chronic condition.       Results may be falsely decreased if patient taking Biotin.     BNP (IN-HOUSE) - Normal    Narrative:     Among patients with dyspnea, NT-proBNP is highly sensitive for the detection of acute congestive heart failure. In addition NT-proBNP of <300 pg/ml effectively rules out acute congestive heart failure with  99% negative predictive value.    Results may be falsely decreased if patient taking Biotin.     MAGNESIUM - Normal   BLOOD CULTURE   BLOOD CULTURE   BLOOD GAS, ARTERIAL   URINALYSIS, MICROSCOPIC ONLY   LACTIC ACID REFLEX TIMER   CBC AND DIFFERENTIAL    Narrative:     The following orders were created for panel order CBC & Differential.  Procedure                               Abnormality         Status                     ---------                               -----------         ------                     CBC Auto Differential[[id number]]        Abnormal            Final result                 Please view results for these tests on the individual orders.   EXTRA TUBES    Narrative:     The following orders were created for panel order Extra Tubes.  Procedure                               Abnormality         Status                     ---------                               -----------         ------                     Light Blue Top[[id number]]                                   Final result               Gold Top - SST[[id number]]                                   Final result                 Please view results for these tests on the individual orders.   LIGHT BLUE TOP   GOLD TOP - SST   EXTRA TUBES    Narrative:     The following orders were created for panel order Extra Tubes.  Procedure                               Abnormality         Status                     ---------                               -----------         ------                     Lavender Top[[id number]]                                                                Green Top (Gel)[[id number]]                                                               Please view results for these tests on the individual orders.   LAVENDER TOP   GREEN TOP     Ct Head Without Contrast    Result Date: 1/19/2020  Narrative: EXAM: CT HEAD WO CONTRAST CLINICAL INDICATION: Altered mental status, trauma COMPARISON: There is no previous study for comparison.  TECHNIQUE: CT scan was done using contiguous axial 5 mm sections through the brain. This exam was performed according to our departmental dose-optimization program, which includes automated exposure control, adjustment of the mA and/or kV according to patient size and/or use of iterative reconstruction technique. FINDINGS: There is no midline shift, mass effect, or extraaxial fluid collection. There is no evidence of acute intracranial hemorrhage, mass lesion, or cerebral edema. The ventricles and cortical sulci are normal for the patient's age. Bone window images reveal no evidence of a skull fracture.     Impression: No evidence of an acute intracranial process. Electronically signed by:  Aime Mon MD  1/19/2020 11:45 PM CST Workstation: [id number]    Ct Cervical Spine Without Contrast    Result Date: 1/20/2020  Narrative: EXAM:   CT Cervical Spine Without Intravenous Contrast CLINICAL HISTORY:   The patient is 64 years old and is Male; facial pain, ?fall TECHNIQUE:   Axial computed tomography images of the cervical spine without intravenous contrast.  Sagittal and coronal reformatted images were created and reviewed.  This CT exam was performed using one or more of the following dose reduction techniques:  automated exposure control, adjustment of the mA and/or kV according to patient size, and/or use of iterative reconstruction technique. COMPARISON:   No relevant prior studies available. FINDINGS:   VERTEBRAE:  The vertebral body heights and alignment are maintained.  No acute fracture.   DISCS/SPINAL CANAL/NEURAL FORAMINA:  There is multi-level intervertebral disc height loss. There are disc-osteophyte complexes at several levels, with associated mild spinal canal narrowing. There is also facet hypertrophy and uncovertebral joint osteophytosis, with associated multilevel neural foraminal narrowing.   SOFT TISSUES:  The soft tissues are normal.   LUNG APICES:  The lung apices are clear.     Impression:    Spondylosis of the cervical spine without acute findings. Electronically signed by:  Lisset Lund MD  1/20/2020 1:52 AM CST Workstation: [id number]    Xr Chest 1 View    Result Date: 1/19/2020  Narrative: EXAM: XR CHEST 1 VW CLINICAL INDICATION: Respiratory distress COMPARISON: 12/8/2018 FINDINGS: A single view of the chest was obtained. The heart size is normal. The pulmonary vascularity is unremarkable. The lungs are clear except for mild atelectasis in the right lung base. There is no pneumothorax or pleural effusion. Small hiatal hernia is noted.     Impression: Mild atelectasis in the right lung base. Otherwise, no acute process in the chest. Electronically signed by:  Aime Mon MD  1/19/2020 11:20 PM CST Workstation: [id number]    Ct Facial Bones Without Contrast    Result Date: 1/19/2020  Narrative: EXAM:   CT Maxillofacial Without Intravenous Contrast CLINICAL HISTORY:   The patient is 64 years old and is Male; facial trauma pain TECHNIQUE:   Axial computed tomography images of the face without intravenous contrast.  Sagittal and coronal reformatted images were created and reviewed.  This CT exam was performed using one or more of the following dose reduction techniques:  automated exposure control, adjustment of the mA and/or kV according to patient size, and/or use of iterative reconstruction technique. COMPARISON:   No relevant prior studies available. FINDINGS:   BONES/JOINTS:  Fracture of the infratemporal surface and zygomatic process of the right maxilla is present. Fracture of the frontal process of the maxilla bilaterally is present. Fracture of the orbital floor on the right is present.   SOFT TISSUES:  Unremarkable.   ORBITS:  Unremarkable.   SINUSES:  Mucoperiosteal thickening of the right maxillary sinus and ethmoid air cells is present.  No air-fluid levels.     Impression:   Multiple facial fractures to include: Right maxilla (infratemporal surface, zygomatic process, and frontal process),  frontal process of the left maxilla, and right orbital floor. Electronically signed by:  Lisset Lund MD  1/19/2020 11:58 PM CST Workstation: [id number]    EKG January 20, 2020 at 0029 reveals sinus rhythm with baseline artifact at a rate of 99 bpm.  Some T wave flattening without inversion.  No Leland depression.  No evidence of acute ischemia.        MDM  Number of Diagnoses or Management Options     Amount and/or Complexity of Data Reviewed  Clinical lab tests: reviewed  Tests in the radiology section of CPT®: reviewed  Discuss the patient with other providers: yes    Critical Care  Total time providing critical care: < 30 minutes    Patient Progress  Patient progress: stable    SEPTIC SHOCK FOCUSED EXAM    *Must be completed by a licensed independent Practitioner within 6 hours of diagnosis for the following conditions -- Septic Shock or Severe Sepsis with Lactate >/= 4.    Fluid bolus must be completed prior to assessment.      Diagnosis: Septic Shock     Vital Signs (Attestation) Reviewed Temp, HR, RR, BP, SpO2   Shock Index (HR/SBP) (Attestation) Reviewed    Urine Output (Attestation) Reviewed   General resting comfortably, no distress   Respiratory rhonchi   Cardiac/Chest regular rate, rhythm   Skin (documentation of skin color is required) pale   Capillary Refill <3 seconds   Peripheral Pulses Checked                          radial  palpable     † Septic shock is defined by CMS as severe sepsis plus one of the following: persistent hypotension after fluid bolus OR tissue hypoperfusion (Lactic Acid ?4)  †† ONE OF THE FOLLOWING can be done in lieu of the Focused Exam: Measure CVP; Measure ScVO2; Echocardiogram (cardiac echo or cardiac ultrasound); Dynamic assessment of fluid responsiveness with passive leg raise or fluid challenge.    Raffaele Spicer DO  01/20/20  2:00 AM    Patient with multiple right-sided facial fractures.  Septic shock likely due to aspiration.  No other source identified.  Started  on vancomycin and Zosyn.  Central line placed for pressor initiation.  Spoke with [facility] for transfer given patient's traumatic injuries.  Accepted to Dr. Rivera to the ICU.    Final diagnoses:   Sepsis with acute organ dysfunction and septic shock, due to unspecified organism, unspecified type (CMS/[facility])   Closed extensive facial fractures, initial encounter (CMS/[facility])   Aspiration pneumonia, unspecified aspiration pneumonia type, unspecified laterality, unspecified part of lung (CMS/[facility])            Raffaele Spicer,   01/20/20 0208

## 2020-01-25 LAB
BACTERIA SPEC AEROBE CULT: NORMAL
BACTERIA SPEC AEROBE CULT: NORMAL

## 2020-02-06 DIAGNOSIS — R56.9 SEIZURES (HCC): Primary | ICD-10-CM

## 2020-02-06 RX ORDER — DIAZEPAM 10 MG/2ML
10 GEL RECTAL ONCE
Qty: 2 EACH | Refills: 5 | Status: SHIPPED | OUTPATIENT
Start: 2020-02-06 | End: 2020-02-06

## 2020-03-12 ENCOUNTER — APPOINTMENT (OUTPATIENT)
Dept: GENERAL RADIOLOGY | Facility: HOSPITAL | Age: 65
End: 2020-03-12

## 2020-03-12 ENCOUNTER — HOSPITAL ENCOUNTER (INPATIENT)
Facility: HOSPITAL | Age: 65
LOS: 4 days | Discharge: HOME | End: 2020-03-16
Attending: EMERGENCY MEDICINE | Admitting: INTERNAL MEDICINE

## 2020-03-12 DIAGNOSIS — J18.9 PNEUMONIA OF LEFT UPPER LOBE DUE TO INFECTIOUS ORGANISM: Primary | ICD-10-CM

## 2020-03-12 DIAGNOSIS — A41.9 SEPSIS, DUE TO UNSPECIFIED ORGANISM, UNSPECIFIED WHETHER ACUTE ORGAN DYSFUNCTION PRESENT (HCC): ICD-10-CM

## 2020-03-12 DIAGNOSIS — I95.9 HYPOTENSION, UNSPECIFIED HYPOTENSION TYPE: ICD-10-CM

## 2020-03-12 LAB
ALBUMIN SERPL-MCNC: 3.3 G/DL (ref 3.5–5.2)
ALBUMIN/GLOB SERPL: 1.2 G/DL
ALP SERPL-CCNC: 61 U/L (ref 39–117)
ALT SERPL W P-5'-P-CCNC: 17 U/L (ref 1–41)
ANION GAP SERPL CALCULATED.3IONS-SCNC: 11 MMOL/L (ref 5–15)
AST SERPL-CCNC: 18 U/L (ref 1–40)
BILIRUB SERPL-MCNC: 0.2 MG/DL (ref 0.2–1.2)
BILIRUB UR QL STRIP: NEGATIVE
BUN BLD-MCNC: 21 MG/DL (ref 8–23)
BUN/CREAT SERPL: 24.1 (ref 7–25)
CALCIUM SPEC-SCNC: 9.2 MG/DL (ref 8.6–10.5)
CHLORIDE SERPL-SCNC: 101 MMOL/L (ref 98–107)
CLARITY UR: CLEAR
CO2 SERPL-SCNC: 26 MMOL/L (ref 22–29)
COLOR UR: YELLOW
CRE SCREEN PCR: NOT DETECTED
CREAT BLD-MCNC: 0.87 MG/DL (ref 0.76–1.27)
D-LACTATE SERPL-SCNC: 2 MMOL/L (ref 0.5–2)
D-LACTATE SERPL-SCNC: 2 MMOL/L (ref 0.5–2)
DEPRECATED RDW RBC AUTO: 45.9 FL (ref 37–54)
ERYTHROCYTE [DISTWIDTH] IN BLOOD BY AUTOMATED COUNT: 13.6 % (ref 12.3–15.4)
GFR SERPL CREATININE-BSD FRML MDRD: 89 ML/MIN/1.73
GLOBULIN UR ELPH-MCNC: 2.7 GM/DL
GLUCOSE BLD-MCNC: 93 MG/DL (ref 65–99)
GLUCOSE UR STRIP-MCNC: NEGATIVE MG/DL
HCT VFR BLD AUTO: 32.1 % (ref 37.5–51)
HGB BLD-MCNC: 11 G/DL (ref 13–17.7)
HGB UR QL STRIP.AUTO: NEGATIVE
HOLD SPECIMEN: NORMAL
HOLD SPECIMEN: NORMAL
IMP STRAIN: NOT DETECTED
KETONES UR QL STRIP: NEGATIVE
KPC STRAIN: NOT DETECTED
LEUKOCYTE ESTERASE UR QL STRIP.AUTO: NEGATIVE
LYMPHOCYTES # BLD MANUAL: 0.53 10*3/MM3 (ref 0.7–3.1)
LYMPHOCYTES NFR BLD MANUAL: 11 % (ref 5–12)
LYMPHOCYTES NFR BLD MANUAL: 12 % (ref 19.6–45.3)
MCH RBC QN AUTO: 31.4 PG (ref 26.6–33)
MCHC RBC AUTO-ENTMCNC: 34.3 G/DL (ref 31.5–35.7)
MCV RBC AUTO: 91.7 FL (ref 79–97)
METAMYELOCYTES NFR BLD MANUAL: 1 % (ref 0–0)
MONOCYTES # BLD AUTO: 0.49 10*3/MM3 (ref 0.1–0.9)
NDM STRAIN: NOT DETECTED
NEUTROPHILS # BLD AUTO: 3.35 10*3/MM3 (ref 1.7–7)
NEUTROPHILS NFR BLD MANUAL: 48 % (ref 42.7–76)
NEUTS BAND NFR BLD MANUAL: 28 % (ref 0–5)
NITRITE UR QL STRIP: NEGATIVE
NT-PROBNP SERPL-MCNC: 482.5 PG/ML (ref 5–900)
OXA 48 STRAIN: NOT DETECTED
PH UR STRIP.AUTO: 7.5 [PH] (ref 5–9)
PLATELET # BLD AUTO: 205 10*3/MM3 (ref 140–450)
PMV BLD AUTO: 10.3 FL (ref 6–12)
POTASSIUM BLD-SCNC: 3.8 MMOL/L (ref 3.5–5.2)
PROT SERPL-MCNC: 6 G/DL (ref 6–8.5)
PROT UR QL STRIP: NEGATIVE
RBC # BLD AUTO: 3.5 10*6/MM3 (ref 4.14–5.8)
RBC MORPH BLD: NORMAL
SMALL PLATELETS BLD QL SMEAR: ADEQUATE
SODIUM BLD-SCNC: 138 MMOL/L (ref 136–145)
SP GR UR STRIP: 1.01 (ref 1–1.03)
TROPONIN T SERPL-MCNC: <0.01 NG/ML (ref 0–0.03)
UROBILINOGEN UR QL STRIP: NORMAL
VIM STRAIN: NOT DETECTED
WBC MORPH BLD: NORMAL
WBC NRBC COR # BLD: 4.41 10*3/MM3 (ref 3.4–10.8)
WHOLE BLOOD HOLD SPECIMEN: NORMAL
WHOLE BLOOD HOLD SPECIMEN: NORMAL

## 2020-03-12 PROCEDURE — 93005 ELECTROCARDIOGRAM TRACING: CPT | Performed by: EMERGENCY MEDICINE

## 2020-03-12 PROCEDURE — 25010000002 PIPERACILLIN-TAZOBACTAM: Performed by: EMERGENCY MEDICINE

## 2020-03-12 PROCEDURE — 87081 CULTURE SCREEN ONLY: CPT | Performed by: INTERNAL MEDICINE

## 2020-03-12 PROCEDURE — 25010000002 VANCOMYCIN: Performed by: EMERGENCY MEDICINE

## 2020-03-12 PROCEDURE — 87150 DNA/RNA AMPLIFIED PROBE: CPT | Performed by: EMERGENCY MEDICINE

## 2020-03-12 PROCEDURE — 80053 COMPREHEN METABOLIC PANEL: CPT | Performed by: EMERGENCY MEDICINE

## 2020-03-12 PROCEDURE — 93010 ELECTROCARDIOGRAM REPORT: CPT | Performed by: INTERNAL MEDICINE

## 2020-03-12 PROCEDURE — 71045 X-RAY EXAM CHEST 1 VIEW: CPT

## 2020-03-12 PROCEDURE — 36415 COLL VENOUS BLD VENIPUNCTURE: CPT | Performed by: EMERGENCY MEDICINE

## 2020-03-12 PROCEDURE — P9612 CATHETERIZE FOR URINE SPEC: HCPCS

## 2020-03-12 PROCEDURE — 85025 COMPLETE CBC W/AUTO DIFF WBC: CPT | Performed by: EMERGENCY MEDICINE

## 2020-03-12 PROCEDURE — 94640 AIRWAY INHALATION TREATMENT: CPT

## 2020-03-12 PROCEDURE — 87040 BLOOD CULTURE FOR BACTERIA: CPT | Performed by: EMERGENCY MEDICINE

## 2020-03-12 PROCEDURE — 25010000002 PIPERACILLIN SOD-TAZOBACTAM PER 1 G: Performed by: INTERNAL MEDICINE

## 2020-03-12 PROCEDURE — 83605 ASSAY OF LACTIC ACID: CPT | Performed by: INTERNAL MEDICINE

## 2020-03-12 PROCEDURE — 85007 BL SMEAR W/DIFF WBC COUNT: CPT | Performed by: EMERGENCY MEDICINE

## 2020-03-12 PROCEDURE — 84484 ASSAY OF TROPONIN QUANT: CPT | Performed by: EMERGENCY MEDICINE

## 2020-03-12 PROCEDURE — 87147 CULTURE TYPE IMMUNOLOGIC: CPT | Performed by: EMERGENCY MEDICINE

## 2020-03-12 PROCEDURE — 81003 URINALYSIS AUTO W/O SCOPE: CPT | Performed by: EMERGENCY MEDICINE

## 2020-03-12 PROCEDURE — 99284 EMERGENCY DEPT VISIT MOD MDM: CPT

## 2020-03-12 PROCEDURE — 94799 UNLISTED PULMONARY SVC/PX: CPT

## 2020-03-12 PROCEDURE — 83880 ASSAY OF NATRIURETIC PEPTIDE: CPT | Performed by: EMERGENCY MEDICINE

## 2020-03-12 PROCEDURE — 83605 ASSAY OF LACTIC ACID: CPT | Performed by: EMERGENCY MEDICINE

## 2020-03-12 RX ORDER — SODIUM CHLORIDE 0.9 % (FLUSH) 0.9 %
10 SYRINGE (ML) INJECTION AS NEEDED
Status: DISCONTINUED | OUTPATIENT
Start: 2020-03-12 | End: 2020-03-16 | Stop reason: HOSPADM

## 2020-03-12 RX ORDER — LAMOTRIGINE 100 MG/1
200 TABLET ORAL 2 TIMES DAILY
Status: DISCONTINUED | OUTPATIENT
Start: 2020-03-12 | End: 2020-03-16 | Stop reason: HOSPADM

## 2020-03-12 RX ORDER — SODIUM CHLORIDE 9 MG/ML
150 INJECTION, SOLUTION INTRAVENOUS CONTINUOUS
Status: DISCONTINUED | OUTPATIENT
Start: 2020-03-12 | End: 2020-03-13

## 2020-03-12 RX ORDER — LEVETIRACETAM 500 MG/1
500 TABLET ORAL 2 TIMES DAILY
Status: DISCONTINUED | OUTPATIENT
Start: 2020-03-12 | End: 2020-03-16 | Stop reason: HOSPADM

## 2020-03-12 RX ORDER — LEVETIRACETAM 250 MG/1
250 TABLET ORAL 2 TIMES DAILY
Status: DISCONTINUED | OUTPATIENT
Start: 2020-03-12 | End: 2020-03-12

## 2020-03-12 RX ORDER — IPRATROPIUM BROMIDE AND ALBUTEROL SULFATE 2.5; .5 MG/3ML; MG/3ML
3 SOLUTION RESPIRATORY (INHALATION)
Status: DISCONTINUED | OUTPATIENT
Start: 2020-03-12 | End: 2020-03-16 | Stop reason: HOSPADM

## 2020-03-12 RX ORDER — LEVETIRACETAM 250 MG/1
250 TABLET ORAL 2 TIMES DAILY
Status: DISCONTINUED | OUTPATIENT
Start: 2020-03-12 | End: 2020-03-16 | Stop reason: HOSPADM

## 2020-03-12 RX ORDER — SODIUM CHLORIDE 9 MG/ML
125 INJECTION, SOLUTION INTRAVENOUS CONTINUOUS
Status: DISCONTINUED | OUTPATIENT
Start: 2020-03-12 | End: 2020-03-12

## 2020-03-12 RX ORDER — SODIUM CHLORIDE 0.9 % (FLUSH) 0.9 %
10 SYRINGE (ML) INJECTION EVERY 12 HOURS SCHEDULED
Status: DISCONTINUED | OUTPATIENT
Start: 2020-03-12 | End: 2020-03-16 | Stop reason: HOSPADM

## 2020-03-12 RX ORDER — LEVETIRACETAM 500 MG/1
500 TABLET ORAL DAILY
Status: DISCONTINUED | OUTPATIENT
Start: 2020-03-12 | End: 2020-03-12

## 2020-03-12 RX ORDER — LEVETIRACETAM 500 MG/1
1000 TABLET ORAL 2 TIMES DAILY
Status: DISCONTINUED | OUTPATIENT
Start: 2020-03-12 | End: 2020-03-12

## 2020-03-12 RX ORDER — LEVETIRACETAM 500 MG/1
1000 TABLET ORAL 2 TIMES DAILY
Status: DISCONTINUED | OUTPATIENT
Start: 2020-03-12 | End: 2020-03-16 | Stop reason: HOSPADM

## 2020-03-12 RX ADMIN — LAMOTRIGINE 200 MG: 100 TABLET ORAL at 22:05

## 2020-03-12 RX ADMIN — LEVETIRACETAM 250 MG: 250 TABLET ORAL at 22:03

## 2020-03-12 RX ADMIN — IPRATROPIUM BROMIDE AND ALBUTEROL SULFATE 3 ML: 2.5; .5 SOLUTION RESPIRATORY (INHALATION) at 13:16

## 2020-03-12 RX ADMIN — SODIUM CHLORIDE 150 ML/HR: 9 INJECTION, SOLUTION INTRAVENOUS at 22:04

## 2020-03-12 RX ADMIN — SODIUM CHLORIDE 125 ML/HR: 900 INJECTION, SOLUTION INTRAVENOUS at 16:19

## 2020-03-12 RX ADMIN — LEVETIRACETAM 500 MG: 500 TABLET ORAL at 22:02

## 2020-03-12 RX ADMIN — PIPERACILLIN SODIUM,TAZOBACTAM SODIUM 3.38 G: 3; .375 INJECTION, POWDER, FOR SOLUTION INTRAVENOUS at 11:47

## 2020-03-12 RX ADMIN — SODIUM CHLORIDE, PRESERVATIVE FREE 10 ML: 5 INJECTION INTRAVENOUS at 22:03

## 2020-03-12 RX ADMIN — SODIUM CHLORIDE 125 ML/HR: 900 INJECTION, SOLUTION INTRAVENOUS at 09:57

## 2020-03-12 RX ADMIN — PIPERACILLIN AND TAZOBACTAM 3.38 G: 3; .375 INJECTION, POWDER, FOR SOLUTION INTRAVENOUS at 18:45

## 2020-03-12 RX ADMIN — VANCOMYCIN HYDROCHLORIDE 1500 MG: 10 INJECTION, POWDER, LYOPHILIZED, FOR SOLUTION INTRAVENOUS at 13:04

## 2020-03-12 RX ADMIN — LEVETIRACETAM 1000 MG: 500 TABLET ORAL at 22:02

## 2020-03-12 NOTE — PLAN OF CARE
Problem: Fall Risk (Adult)  Goal: Identify Related Risk Factors and Signs and Symptoms  Outcome: Ongoing (interventions implemented as appropriate)  Note:   Pt admitted from ED, pneumonia. Pt is nonverbal at baseline, pt is accompanied by staff member from [facility]. Pt on troom air, O2 sat at 92-96% on room air. Pt in soft wrist restraints to preserve lines and monitoring, as patient is unable to comprehend need, keeps trying to remove them.

## 2020-03-12 NOTE — ED PROVIDER NOTES
Subjective   Patient presents from [facility] assisted care home with complaints of shortness of breath and low oxygen saturations.  Patient has a history of intellectual disability and is not able to give a history, nonverbal.  Patient reportedly with fever to over 102 at the facility.  Patient has had recent diagnosis of pneumonia approximately 1 month ago requiring hospitalization.  Patient does have behavioral issues and can be somewhat combative at times though patient's long-term contractures on the left side and general weakness limits his ability to be combative.  There is been no nausea vomiting, no diarrhea per history by EMS or the care home personnel.  Patient does have history of COPD.          Review of Systems   Unable to perform ROS: Patient nonverbal   Constitutional: Positive for fatigue and fever.   Respiratory: Positive for cough, choking and shortness of breath.    Gastrointestinal: Positive for vomiting.   Psychiatric/Behavioral: Positive for behavioral problems and confusion.       Past Medical History:   Diagnosis Date   • Alopecia    • Arcus senilis    • Autism    • Cataract    • Contracture, right wrist    • COPD (chronic obstructive pulmonary disease) (CMS/HCC)    • GERD (gastroesophageal reflux disease)    • Hyperlipidemia    • Insomnia    • Intellectual disability    • Lennox-Gastaut syndrome (CMS/HCC)    • Major depressive disorder    • Orthostatic hypotension    • Osteoporosis    • Right bundle branch block    • Scoliosis    • Seizures (CMS/HCC)        Allergies   Allergen Reactions   • Haldol [Haloperidol] Unknown (See Comments)     Unknown         Past Surgical History:   Procedure Laterality Date   • OTHER SURGICAL HISTORY      unknown if patient has had any surgies       Family History   Family history unknown: Yes       Social History     Socioeconomic History   • Marital status: Single     Spouse name: Not on file   • Number of children: Not on file   • Years of education: Not on  file   • Highest education level: Not on file   Tobacco Use   • Smoking status: Unknown If Ever Smoked   Substance and Sexual Activity   • Alcohol use: No     Frequency: Never   • Drug use: No   • Sexual activity: Defer           Objective   Physical Exam   Constitutional: He appears well-developed and well-nourished. He appears distressed.   HENT:   Head: Normocephalic and atraumatic.   Dry mucous membranes     Eyes: Pupils are equal, round, and reactive to light. Conjunctivae and EOM are normal.   Neck: Normal range of motion. Neck supple. No JVD present.   Cardiovascular: Normal rate, regular rhythm, normal heart sounds and intact distal pulses. Exam reveals no gallop and no friction rub.   No murmur heard.  Pulmonary/Chest: No accessory muscle usage. Tachypnea noted. He is in respiratory distress. He has no wheezes. He has rhonchi. He has no rales. He exhibits no tenderness.   Abdominal: Soft. Bowel sounds are normal. He exhibits no distension and no mass. There is no tenderness. There is no rebound and no guarding.   Musculoskeletal: Normal range of motion.   Areas of contracture LUE, RUE.  Tone in bilateral legs       Neurological: He is alert.   Noncooperative with exam, no new focal neurologic deficits.     Skin: Skin is warm and dry. Capillary refill takes less than 2 seconds. He is not diaphoretic.   Psychiatric: He has a normal mood and affect. His behavior is normal. Judgment and thought content normal.   Nursing note and vitals reviewed.      Procedures           ED Course                                 Labs Reviewed   COMPREHENSIVE METABOLIC PANEL - Abnormal; Notable for the following components:       Result Value    Albumin 3.30 (*)     All other components within normal limits    Narrative:     GFR Normal >60  Chronic Kidney Disease <60  Kidney Failure <15     CBC WITH AUTO DIFFERENTIAL - Abnormal; Notable for the following components:    RBC 3.50 (*)     Hemoglobin 11.0 (*)     Hematocrit 32.1 (*)      All other components within normal limits   MANUAL DIFFERENTIAL - Abnormal; Notable for the following components:    Lymphocyte % 12.0 (*)     Bands %  28.0 (*)     Metamyelocyte % 1.0 (*)     Lymphocytes Absolute 0.53 (*)     All other components within normal limits   LACTIC ACID, PLASMA - Normal   BNP (IN-HOUSE) - Normal    Narrative:     Among patients with dyspnea, NT-proBNP is highly sensitive for the detection of acute congestive heart failure. In addition NT-proBNP of <300 pg/ml effectively rules out acute congestive heart failure with 99% negative predictive value.    Results may be falsely decreased if patient taking Biotin.     TROPONIN (IN-HOUSE) - Normal    Narrative:     Troponin T Reference Range:  <= 0.03 ng/mL-   Negative for AMI  >0.03 ng/mL-     Abnormal for myocardial necrosis.  Clinicians would have to utilize clinical acumen, EKG, Troponin and serial changes to determine if it is an Acute Myocardial Infarction or myocardial injury due to an underlying chronic condition.       Results may be falsely decreased if patient taking Biotin.     BLOOD CULTURE   BLOOD CULTURE   RAINBOW DRAW    Narrative:     The following orders were created for panel order [place] Draw.  Procedure                               Abnormality         Status                     ---------                               -----------         ------                     Light Blue Top[[id number]]                                   Final result               Green Top (Gel)[[id number]]                                  Final result               Lavender Top[[id number]]                                     Final result               Gold Top - SST[[id number]]                                   Final result                 Please view results for these tests on the individual orders.   URINALYSIS W/ MICROSCOPIC IF INDICATED (NO CULTURE)   CBC AND DIFFERENTIAL    Narrative:     The following orders were created for panel order CBC &  Differential.  Procedure                               Abnormality         Status                     ---------                               -----------         ------                     CBC Auto Differential[[id number]]        Abnormal            Final result                 Please view results for these tests on the individual orders.   LIGHT BLUE TOP   GREEN TOP   LAVENDER TOP   GOLD TOP - SST       XR Chest 1 View   Final Result   Heterogeneous consolidation in the left upper lobe   and perihilar area may represent localized area of pneumonia.      Moderate asymmetric increased pulmonary vascular and interstitial   markings throughout the right lung differential includes   asymmetric edema or infectious or inflammatory process in the   right lung.      [id number]      Electronically signed by:  Nils Pantoja MD  3/12/2020 10:49   AM CDT Workstation: [id number]        Patient with pneumonia, findings c/w sepsis.  Abx initiated, lactate 2.0.  Stepdown support.           MDM    Final diagnoses:   Pneumonia of left upper lobe due to infectious organism (CMS/HCC)   Sepsis, due to unspecified organism, unspecified whether acute organ dysfunction present (CMS/HCC)   Hypotension, unspecified hypotension type            Sandeep Fernandez MD  03/12/20 1552

## 2020-03-12 NOTE — H&P
History and Physical  Kalpesh Schwarz MD  Hospitalist    Date of admission: 3/12/2020    Patient Care Team:  Bautista James MD as PCP - General (Family Medicine)    Chief complaint   Chief Complaint   Patient presents with   • Shortness of Breath     Subjective     Patient is a 64 y.o. male admitted for worsening shortness of breath, cough, hypoxia. He could have experienced a fever up to 102 for the last day of so. He has had previous episodes of pneumonia in the recent past.    He is unable to give any history given his intellectual disability.The information is obtained from the nursing and ER physician notes.    History  Past Medical History:   Diagnosis Date   • Alopecia    • Autism    • COPD (chronic obstructive pulmonary disease) (CMS/HCC)    • GERD (gastroesophageal reflux disease)    • Hyperlipidemia    • Insomnia    • Intellectual disability    • Lennox-Gastaut syndrome (CMS/HCC)    • Major depressive disorder    • Orthostatic hypotension    • Osteoporosis    • Right bundle branch block    • Scoliosis    • Seizures (CMS/HCC)      History reviewed. No pertinent surgical history.  Family History   Problem Relation Age of Onset   • Hypertension Father      Social History     Tobacco Use   • Smoking status: Never Smoker   • Smokeless tobacco: Never Used   Substance Use Topics   • Alcohol use: Not Currently   • Drug use: Not Currently     Medications Prior to Admission   Medication Sig Dispense Refill Last Dose   • calcium carbonate-vitamin d (CALCIUM 600+D) 600-400 MG-UNIT per tablet Take 1 tablet by mouth 2 (Two) Times a Day.   Patient Taking Differently at Unknown time   • ferrous sulfate 325 (65 FE) MG tablet Take 325 mg by mouth Daily With Breakfast.   3/11/2020 at Unknown time   • FLUoxetine (PROzac) 20 MG capsule Take 20 mg by mouth Daily.   3/12/2020 at Unknown time   • lamoTRIgine (LaMICtal) 200 MG tablet Take 200 mg by mouth 2 (Two) Times a Day.   Patient Taking Differently at Unknown time   •  levETIRAcetam (KEPPRA) 1000 MG tablet Take 1,000 mg by mouth 2 (Two) Times a Day.   3/12/2020 at 0500   • levETIRAcetam (KEPPRA) 250 MG tablet Take 250 mg by mouth 2 (Two) Times a Day.   3/12/2020 at 0500   • levETIRAcetam (KEPPRA) 500 MG tablet Take 500 mg by mouth 2 (Two) Times a Day.   3/12/2020 at 0500   • midodrine (PROAMATINE) 5 MG tablet Take 1 tablet by mouth 3 (Three) Times a Day. (Patient taking differently: Take 10 mg by mouth 3 (Three) Times a Day.) 90 tablet 3 3/12/2020 at 0500   • Multiple Vitamins-Minerals (CERTAVITE/ANTIOXIDANTS PO) Take 1 tablet by mouth 1 (One) Time.   3/12/2020 at Unknown time   • omeprazole (priLOSEC) 20 MG capsule Take 20 mg by mouth Daily.   3/12/2020 at Unknown time   • polyethylene glycol (MIRALAX) packet Take 17 g by mouth Every Night.   3/11/2020 at Unknown time   • simvastatin (ZOCOR) 20 MG tablet Take 20 mg by mouth Every Night.   3/11/2020 at Unknown time   • thioridazine (MELLARIL) 100 MG tablet Take 100 mg by mouth 3 (Three) Times a Day.   Patient Taking Differently at Unknown time     Allergies:  Haldol [haloperidol]    Review of Systems  Review of Systems   Constitutional: Positive for fatigue and fever.   Respiratory: Positive for cough, shortness of breath and wheezing. Negative for choking.    Cardiovascular: Negative for chest pain and palpitations.   Gastrointestinal: Negative for abdominal distention, abdominal pain, blood in stool, diarrhea, nausea and vomiting.   Genitourinary: Negative for dysuria, enuresis, frequency, genital sores, penile pain and urgency.   Musculoskeletal: Positive for arthralgias. Negative for back pain and joint swelling.   Skin: Positive for pallor. Negative for color change and rash.   Neurological: Positive for weakness. Negative for seizures, syncope, facial asymmetry, light-headedness and headaches.   Psychiatric/Behavioral: Positive for agitation and confusion. Negative for behavioral problems.   All other systems reviewed and  are negative.      Objective     Vital Signs  Temp:  [97.4 °F (36.3 °C)-99.1 °F (37.3 °C)] 97.4 °F (36.3 °C)  Heart Rate:  [] 78  Resp:  [20-22] 20  BP: ()/(52-87) 125/83    Physical Exam:  Physical Exam   Constitutional: He appears ill. He appears distressed.   HENT:   Head: Normocephalic and atraumatic.   Eyes: Pupils are equal, round, and reactive to light. EOM are normal. No scleral icterus.   Neck: Normal range of motion. Neck supple.   Cardiovascular: Normal rate and regular rhythm.   Pulmonary/Chest: He is in respiratory distress (minimal). He has no wheezes. He has rales.   Abdominal: Soft. Bowel sounds are normal. He exhibits no distension. There is no tenderness. There is no guarding.   Musculoskeletal: Normal range of motion. He exhibits no edema or tenderness.   Neurological: No cranial nerve deficit. He exhibits abnormal muscle tone.   Non verbal / withdraws in pain. Spastic weakness present   Skin: Skin is warm and dry. No rash noted. No erythema. There is pallor.   Vitals reviewed.      Results Review:   Lab Results (last 24 hours)     Procedure Component Value Units Date/Time    CRE Screen by PCR - Swab, Large Intestine, Rectum [[id number]] Collected:  03/12/20 1439    Specimen:  Swab from Large Intestine, Rectum Updated:  03/12/20 1640     CRE SCREEN Not Detected     Comment: Test performed by real-time polymerase chain reaction (qPCR).        OXA 48 Strain Not Detected     IMP STRAIN Not Detected     VIM STRAIN Not Detected     NDM Strain Not Detected     KPC Strain Not Detected    Blood Culture - Blood, Hand, Right [[id number]] Collected:  03/12/20 1357    Specimen:  Blood from Hand, Right Updated:  03/12/20 1355    Urinalysis With Microscopic If Indicated (No Culture) - Urine, Catheter [[id number]]  (Normal) Collected:  03/12/20 1302    Specimen:  Urine, Catheter Updated:  03/12/20 1314     Color, UA Yellow     Appearance, UA Clear     pH, UA 7.5     Specific [place], UA 1.006      Glucose, UA Negative     Ketones, UA Negative     Bilirubin, UA Negative     Blood, UA Negative     Protein, UA Negative     Leuk Esterase, UA Negative     Nitrite, UA Negative     Urobilinogen, UA 0.2 E.U./dL    Narrative:       Urine microscopic not indicated.    [place] Draw [[id number]] Collected:  03/12/20 1029    Specimen:  Blood Updated:  03/12/20 1145    Narrative:       The following orders were created for panel order [place] Draw.  Procedure                               Abnormality         Status                     ---------                               -----------         ------                     Light Blue Top[[id number]]                                   Final result               Green Top (Gel)[[id number]]                                  Final result               Lavender Top[[id number]]                                     Final result               Gold Top - SST[[id number]]                                   Final result                 Please view results for these tests on the individual orders.    Lavender Top [[id number]] Collected:  03/12/20 1030    Specimen:  Blood Updated:  03/12/20 1145     Extra Tube hold for add-on     Comment: Auto resulted       Manual Differential [[id number]]  (Abnormal) Collected:  03/12/20 1030    Specimen:  Blood Updated:  03/12/20 1131     Neutrophil % 48.0 %      Lymphocyte % 12.0 %      Monocyte % 11.0 %      Bands %  28.0 %      Metamyelocyte % 1.0 %      Neutrophils Absolute 3.35 10*3/mm3      Lymphocytes Absolute 0.53 10*3/mm3      Monocytes Absolute 0.49 10*3/mm3      RBC Morphology Normal     WBC Morphology Normal     Platelet Estimate Adequate    Light Blue Top [[id number]] Collected:  03/12/20 1029    Specimen:  Blood Updated:  03/12/20 1130     Extra Tube hold for add-on     Comment: Auto resulted       Green Top (Gel) [[id number]] Collected:  03/12/20 1030    Specimen:  Blood Updated:  03/12/20 1130     Extra Tube Hold for add-ons.     Comment: Auto  resulted.       Gold Top - [facility] [[id number]] Collected:  03/12/20 1030    Specimen:  Blood Updated:  03/12/20 1130     Extra Tube Hold for add-ons.     Comment: Auto resulted.       CBC & Differential [[id number]] Collected:  03/12/20 1030    Specimen:  Blood Updated:  03/12/20 1113    Narrative:       The following orders were created for panel order CBC & Differential.  Procedure                               Abnormality         Status                     ---------                               -----------         ------                     CBC Auto Differential[[id number]]        Abnormal            Final result                 Please view results for these tests on the individual orders.    CBC Auto Differential [[id number]]  (Abnormal) Collected:  03/12/20 1030    Specimen:  Blood Updated:  03/12/20 1113     WBC 4.41 10*3/mm3      RBC 3.50 10*6/mm3      Hemoglobin 11.0 g/dL      Hematocrit 32.1 %      MCV 91.7 fL      MCH 31.4 pg      MCHC 34.3 g/dL      RDW 13.6 %      RDW-SD 45.9 fl      MPV 10.3 fL      Platelets 205 10*3/mm3     Comprehensive Metabolic Panel [[id number]]  (Abnormal) Collected:  03/12/20 1030    Specimen:  Blood Updated:  03/12/20 1109     Glucose 93 mg/dL      BUN 21 mg/dL      Creatinine 0.87 mg/dL      Sodium 138 mmol/L      Potassium 3.8 mmol/L      Chloride 101 mmol/L      CO2 26.0 mmol/L      Calcium 9.2 mg/dL      Total Protein 6.0 g/dL      Albumin 3.30 g/dL      ALT (SGPT) 17 U/L      AST (SGOT) 18 U/L      Alkaline Phosphatase 61 U/L      Total Bilirubin 0.2 mg/dL      eGFR Non African Amer 89 mL/min/1.73      Globulin 2.7 gm/dL      A/G Ratio 1.2 g/dL      BUN/Creatinine Ratio 24.1     Anion Gap 11.0 mmol/L     Narrative:       GFR Normal >60  Chronic Kidney Disease <60  Kidney Failure <15      Troponin [[id number]]  (Normal) Collected:  03/12/20 1030    Specimen:  Blood Updated:  03/12/20 1108     Troponin T <0.010 ng/mL     Narrative:       Troponin T Reference Range:  <= 0.03 ng/mL-    Negative for AMI  >0.03 ng/mL-     Abnormal for myocardial necrosis.  Clinicians would have to utilize clinical acumen, EKG, Troponin and serial changes to determine if it is an Acute Myocardial Infarction or myocardial injury due to an underlying chronic condition.       Results may be falsely decreased if patient taking Biotin.      BNP [[id number]]  (Normal) Collected:  03/12/20 1030    Specimen:  Blood Updated:  03/12/20 1107     proBNP 482.5 pg/mL     Narrative:       Among patients with dyspnea, NT-proBNP is highly sensitive for the detection of acute congestive heart failure. In addition NT-proBNP of <300 pg/ml effectively rules out acute congestive heart failure with 99% negative predictive value.    Results may be falsely decreased if patient taking Biotin.      Lactic Acid, Plasma [[id number]]  (Normal) Collected:  03/12/20 1030    Specimen:  Blood Updated:  03/12/20 1105     Lactate 2.0 mmol/L     Blood Culture - Blood, Arm, Left [[id number]] Collected:  03/12/20 1029    Specimen:  Blood from Arm, Left Updated:  03/12/20 1037          Imaging Results (Last 24 Hours)     Procedure Component Value Units Date/Time    XR Chest 1 View [[id number]] Collected:  03/12/20 1033     Updated:  03/12/20 1050    Narrative:       PROCEDURE: Chest, AP semiupright at 9:59 AM.    INDICATION: sob, sepsis    COMPARISON: 1/19/2020 chest x-ray.    Mild cardiomegaly with mild increased vascularity.    There is heterogeneous consolidation in the left upper lobe and  left perihilar area.  Asymmetric increased pulmonary vascular and interstitial markings  throughout the right lung.    No pleural effusion.      Impression:       Heterogeneous consolidation in the left upper lobe  and perihilar area may represent localized area of pneumonia.    Moderate asymmetric increased pulmonary vascular and interstitial  markings throughout the right lung differential includes  asymmetric edema or infectious or inflammatory process in the  right  lung.    [id number]    Electronically signed by:  Nils Pantoja MD  3/12/2020 10:49  AM CDT Workstation: [id number]          Assessment/Plan       Left upper lobe pneumonia (CMS/HCC)    Sepsis (CMS/HCC)    Autism    Seizures (CMS/HCC)    Continue with supportive care, aggressive IV hydration due to the hypotension present on admission (78/52), supplemental oxygen, nebulized treatments, IV antibiotics.     The admission chest x-ray shows a right upper lung infiltrate, but the white count is within normal limit (4.41) as it is his lactic acid (2.0).    Kalpesh Schwarz MD  03/12/20  18:48

## 2020-03-13 LAB
ALBUMIN SERPL-MCNC: 2.9 G/DL (ref 3.5–5.2)
ALBUMIN/GLOB SERPL: 0.9 G/DL
ALP SERPL-CCNC: 62 U/L (ref 39–117)
ALT SERPL W P-5'-P-CCNC: 19 U/L (ref 1–41)
ANION GAP SERPL CALCULATED.3IONS-SCNC: 12 MMOL/L (ref 5–15)
ANISOCYTOSIS BLD QL: NORMAL
AST SERPL-CCNC: 23 U/L (ref 1–40)
BACTERIA BLD CULT: ABNORMAL
BASOPHILS # BLD AUTO: 0.04 10*3/MM3 (ref 0–0.2)
BASOPHILS NFR BLD AUTO: 0.4 % (ref 0–1.5)
BILIRUB SERPL-MCNC: 0.2 MG/DL (ref 0.2–1.2)
BUN BLD-MCNC: 23 MG/DL (ref 8–23)
BUN/CREAT SERPL: 31.1 (ref 7–25)
CALCIUM SPEC-SCNC: 9.1 MG/DL (ref 8.6–10.5)
CHLORIDE SERPL-SCNC: 103 MMOL/L (ref 98–107)
CLUMPED PLATELETS: PRESENT
CO2 SERPL-SCNC: 21 MMOL/L (ref 22–29)
CREAT BLD-MCNC: 0.74 MG/DL (ref 0.76–1.27)
DEPRECATED RDW RBC AUTO: 45 FL (ref 37–54)
EOSINOPHIL # BLD AUTO: 0 10*3/MM3 (ref 0–0.4)
EOSINOPHIL NFR BLD AUTO: 0 % (ref 0.3–6.2)
ERYTHROCYTE [DISTWIDTH] IN BLOOD BY AUTOMATED COUNT: 13.4 % (ref 12.3–15.4)
GFR SERPL CREATININE-BSD FRML MDRD: 107 ML/MIN/1.73
GLOBULIN UR ELPH-MCNC: 3.2 GM/DL
GLUCOSE BLD-MCNC: 119 MG/DL (ref 65–99)
HCT VFR BLD AUTO: 31.6 % (ref 37.5–51)
HGB BLD-MCNC: 10.9 G/DL (ref 13–17.7)
IMM GRANULOCYTES # BLD AUTO: 0.1 10*3/MM3 (ref 0–0.05)
IMM GRANULOCYTES NFR BLD AUTO: 1 % (ref 0–0.5)
LYMPHOCYTES # BLD AUTO: 0.43 10*3/MM3 (ref 0.7–3.1)
LYMPHOCYTES NFR BLD AUTO: 4.3 % (ref 19.6–45.3)
MCH RBC QN AUTO: 31.4 PG (ref 26.6–33)
MCHC RBC AUTO-ENTMCNC: 34.5 G/DL (ref 31.5–35.7)
MCV RBC AUTO: 91.1 FL (ref 79–97)
MONOCYTES # BLD AUTO: 0.68 10*3/MM3 (ref 0.1–0.9)
MONOCYTES NFR BLD AUTO: 6.8 % (ref 5–12)
MRSA DNA SPEC QL NAA+PROBE: NEGATIVE
NEUTROPHILS # BLD AUTO: 8.77 10*3/MM3 (ref 1.7–7)
NEUTROPHILS NFR BLD AUTO: 87.5 % (ref 42.7–76)
NRBC BLD AUTO-RTO: 0 /100 WBC (ref 0–0.2)
PLATELET # BLD AUTO: 205 10*3/MM3 (ref 140–450)
PMV BLD AUTO: 10.4 FL (ref 6–12)
POIKILOCYTOSIS BLD QL SMEAR: NORMAL
POTASSIUM BLD-SCNC: 3.7 MMOL/L (ref 3.5–5.2)
PROT SERPL-MCNC: 6.1 G/DL (ref 6–8.5)
RBC # BLD AUTO: 3.47 10*6/MM3 (ref 4.14–5.8)
SMALL PLATELETS BLD QL SMEAR: ADEQUATE
SODIUM BLD-SCNC: 136 MMOL/L (ref 136–145)
WBC MORPH BLD: NORMAL
WBC NRBC COR # BLD: 10.02 10*3/MM3 (ref 3.4–10.8)

## 2020-03-13 PROCEDURE — 85025 COMPLETE CBC W/AUTO DIFF WBC: CPT | Performed by: INTERNAL MEDICINE

## 2020-03-13 PROCEDURE — 80175 DRUG SCREEN QUAN LAMOTRIGINE: CPT | Performed by: INTERNAL MEDICINE

## 2020-03-13 PROCEDURE — 25010000002 PIPERACILLIN SOD-TAZOBACTAM PER 1 G: Performed by: INTERNAL MEDICINE

## 2020-03-13 PROCEDURE — 87641 MR-STAPH DNA AMP PROBE: CPT | Performed by: INTERNAL MEDICINE

## 2020-03-13 PROCEDURE — 25010000002 VANCOMYCIN 1 G RECONSTITUTED SOLUTION: Performed by: INTERNAL MEDICINE

## 2020-03-13 PROCEDURE — 80053 COMPREHEN METABOLIC PANEL: CPT | Performed by: INTERNAL MEDICINE

## 2020-03-13 PROCEDURE — 94799 UNLISTED PULMONARY SVC/PX: CPT

## 2020-03-13 PROCEDURE — 85007 BL SMEAR W/DIFF WBC COUNT: CPT | Performed by: INTERNAL MEDICINE

## 2020-03-13 RX ORDER — SODIUM CHLORIDE 9 MG/ML
100 INJECTION, SOLUTION INTRAVENOUS CONTINUOUS
Status: DISCONTINUED | OUTPATIENT
Start: 2020-03-13 | End: 2020-03-16 | Stop reason: HOSPADM

## 2020-03-13 RX ADMIN — LEVETIRACETAM 250 MG: 250 TABLET ORAL at 21:52

## 2020-03-13 RX ADMIN — PIPERACILLIN AND TAZOBACTAM 3.38 G: 3; .375 INJECTION, POWDER, FOR SOLUTION INTRAVENOUS at 10:41

## 2020-03-13 RX ADMIN — SODIUM CHLORIDE 150 ML/HR: 9 INJECTION, SOLUTION INTRAVENOUS at 10:40

## 2020-03-13 RX ADMIN — LAMOTRIGINE 200 MG: 100 TABLET ORAL at 08:48

## 2020-03-13 RX ADMIN — SODIUM CHLORIDE, PRESERVATIVE FREE 10 ML: 5 INJECTION INTRAVENOUS at 08:48

## 2020-03-13 RX ADMIN — LEVETIRACETAM 1000 MG: 500 TABLET ORAL at 08:48

## 2020-03-13 RX ADMIN — IPRATROPIUM BROMIDE AND ALBUTEROL SULFATE 3 ML: 2.5; .5 SOLUTION RESPIRATORY (INHALATION) at 07:09

## 2020-03-13 RX ADMIN — PIPERACILLIN AND TAZOBACTAM 3.38 G: 3; .375 INJECTION, POWDER, FOR SOLUTION INTRAVENOUS at 18:04

## 2020-03-13 RX ADMIN — IPRATROPIUM BROMIDE AND ALBUTEROL SULFATE 3 ML: 2.5; .5 SOLUTION RESPIRATORY (INHALATION) at 11:18

## 2020-03-13 RX ADMIN — LAMOTRIGINE 200 MG: 100 TABLET ORAL at 21:52

## 2020-03-13 RX ADMIN — LEVETIRACETAM 500 MG: 500 TABLET ORAL at 21:52

## 2020-03-13 RX ADMIN — VANCOMYCIN HYDROCHLORIDE 1000 MG: 1 INJECTION, POWDER, LYOPHILIZED, FOR SOLUTION INTRAVENOUS at 01:32

## 2020-03-13 RX ADMIN — LEVETIRACETAM 250 MG: 250 TABLET ORAL at 08:48

## 2020-03-13 RX ADMIN — PIPERACILLIN AND TAZOBACTAM 3.38 G: 3; .375 INJECTION, POWDER, FOR SOLUTION INTRAVENOUS at 02:48

## 2020-03-13 RX ADMIN — LEVETIRACETAM 1000 MG: 500 TABLET ORAL at 21:52

## 2020-03-13 RX ADMIN — LEVETIRACETAM 500 MG: 500 TABLET ORAL at 08:47

## 2020-03-13 RX ADMIN — SODIUM CHLORIDE, PRESERVATIVE FREE 10 ML: 5 INJECTION INTRAVENOUS at 21:53

## 2020-03-13 NOTE — PROGRESS NOTES
Progress Note  Kalpesh Schwarz MD  Hospitalist    Date of visit: 3/13/2020     LOS: 1 day   Patient Care Team:  Bautista James MD as PCP - General (Family Medicine)    Chief Complaint: lethargy    Subjective     Interval History:     Patient Complaints: less lethargic today, more agitated    History taken from: nursing     Medication Review:   Current Facility-Administered Medications   Medication Dose Route Frequency Provider Last Rate Last Dose   • ipratropium-albuterol (DUO-NEB) nebulizer solution 3 mL  3 mL Nebulization 4x Daily - RT Kalpesh Schwarz MD   3 mL at 03/13/20 1118   • lamoTRIgine (LaMICtal) tablet 200 mg  200 mg Oral BID Kalpesh Schwarz MD   200 mg at 03/13/20 0848   • levETIRAcetam (KEPPRA) tablet 1,000 mg  1,000 mg Oral BID Kalpesh Schwarz MD   1,000 mg at 03/13/20 0848    And   • levETIRAcetam (KEPPRA) tablet 500 mg  500 mg Oral BID Kalpesh Schwarz MD   500 mg at 03/13/20 0847    And   • levETIRAcetam (KEPPRA) tablet 250 mg  250 mg Oral BID Kalpesh Schwarz MD   250 mg at 03/13/20 0848   • Pharmacy to Dose Zosyn   Does not apply Continuous PRN Kalpesh Schwarz MD       • piperacillin-tazobactam (ZOSYN) 3.375 g/100 mL 0.9% NS IVPB (mbp)  3.375 g Intravenous Q8H Kalpesh Schwarz MD   3.375 g at 03/13/20 1041   • sodium chloride 0.9 % flush 10 mL  10 mL Intravenous PRN Kalpesh Schwarz MD       • sodium chloride 0.9 % flush 10 mL  10 mL Intravenous Q12H Kalpesh Schwarz MD   10 mL at 03/13/20 0848   • sodium chloride 0.9 % flush 10 mL  10 mL Intravenous PRN Kalpesh Schwarz MD       • sodium chloride 0.9 % infusion  100 mL/hr Intravenous Continuous Kalpesh Schwarz  mL/hr at 03/13/20 1159 100 mL/hr at 03/13/20 1159       Review of Systems:   Review of Systems   Unable to perform ROS: Patient nonverbal       Objective     Vital Signs  Temp:  [97 °F (36.1 °C)-97.7 °F (36.5 °C)] 97.5 °F (36.4 °C)  Heart Rate:  [67-86] 67  Resp:  [18-20] 18  BP: ()/(58-87) 112/76    Physical Exam:  Physical  Exam   Constitutional: He appears ill. No distress.   HENT:   Head: Normocephalic and atraumatic.   Eyes: Pupils are equal, round, and reactive to light. EOM are normal. No scleral icterus.   Neck: Normal range of motion. Neck supple.   Cardiovascular: Normal rate and regular rhythm.   Pulmonary/Chest: No respiratory distress. He has no wheezes. He has no rales.   Abdominal: Soft. Bowel sounds are normal. He exhibits no distension. There is no tenderness. There is no guarding.   Musculoskeletal: He exhibits no tenderness.   Neurological: He exhibits abnormal muscle tone.   More arousable / more agitated / spastic weakness    Skin: Skin is warm and dry. There is pallor.        Results Review:    Lab Results (last 24 hours)     Procedure Component Value Units Date/Time    Blood Culture - Blood, Hand, Right [[id number]] Collected:  03/12/20 1354    Specimen:  Blood from Hand, Right Updated:  03/13/20 1400     Blood Culture No growth at 24 hours    MRSA Screen, PCR - Swab, Nares [[id number]]  (Normal) Collected:  03/13/20 0918    Specimen:  Swab from Nares Updated:  03/13/20 1101     MRSA, PCR Negative    Narrative:       Performed by real-time polymerase chain reaction (qPCR).    Blood Culture - Blood, Arm, Left [[id number]] Collected:  03/12/20 1029    Specimen:  Blood from Arm, Left Updated:  03/13/20 1045     Blood Culture No growth at 24 hours    Scan Slide [[id number]] Collected:  03/13/20 0334    Specimen:  Blood Updated:  03/13/20 0552     Anisocytosis Slight/1+     Poikilocytes Slight/1+     WBC Morphology Normal     Platelet Estimate Adequate     Clumped Platelets Present    Comprehensive Metabolic Panel [[id number]]  (Abnormal) Collected:  03/13/20 0334    Specimen:  Blood Updated:  03/13/20 0441     Glucose 119 mg/dL      BUN 23 mg/dL      Creatinine 0.74 mg/dL      Sodium 136 mmol/L      Potassium 3.7 mmol/L      Chloride 103 mmol/L      CO2 21.0 mmol/L      Calcium 9.1 mg/dL      Total Protein 6.1 g/dL       Albumin 2.90 g/dL      ALT (SGPT) 19 U/L      AST (SGOT) 23 U/L      Alkaline Phosphatase 62 U/L      Total Bilirubin 0.2 mg/dL      eGFR Non African Amer 107 mL/min/1.73      Globulin 3.2 gm/dL      A/G Ratio 0.9 g/dL      BUN/Creatinine Ratio 31.1     Anion Gap 12.0 mmol/L     Narrative:       GFR Normal >60  Chronic Kidney Disease <60  Kidney Failure <15      CBC & Differential [[id number]] Collected:  03/13/20 0334    Specimen:  Blood Updated:  03/13/20 0414    Narrative:       The following orders were created for panel order CBC & Differential.  Procedure                               Abnormality         Status                     ---------                               -----------         ------                     CBC Auto Differential[[id number]]        Abnormal            Final result                 Please view results for these tests on the individual orders.    CBC Auto Differential [[id number]]  (Abnormal) Collected:  03/13/20 0334    Specimen:  Blood Updated:  03/13/20 0414     WBC 10.02 10*3/mm3      RBC 3.47 10*6/mm3      Hemoglobin 10.9 g/dL      Hematocrit 31.6 %      MCV 91.1 fL      MCH 31.4 pg      MCHC 34.5 g/dL      RDW 13.4 %      RDW-SD 45.0 fl      MPV 10.4 fL      Platelets 205 10*3/mm3      Neutrophil % 87.5 %      Lymphocyte % 4.3 %      Monocyte % 6.8 %      Eosinophil % 0.0 %      Basophil % 0.4 %      Immature Grans % 1.0 %      Neutrophils, Absolute 8.77 10*3/mm3      Lymphocytes, Absolute 0.43 10*3/mm3      Monocytes, Absolute 0.68 10*3/mm3      Eosinophils, Absolute 0.00 10*3/mm3      Basophils, Absolute 0.04 10*3/mm3      Immature Grans, Absolute 0.10 10*3/mm3      nRBC 0.0 /100 WBC     Lamotrigine Level [[id number]] Collected:  03/13/20 0334    Specimen:  Blood Updated:  03/13/20 0407    Lactic Acid, Plasma [[id number]]  (Normal) Collected:  03/12/20 2013    Specimen:  Blood Updated:  03/12/20 2043     Lactate 2.0 mmol/L     CRE Screen by PCR - Swab, Large Intestine, Rectum  [[id number]] Collected:  03/12/20 1435    Specimen:  Swab from Large Intestine, Rectum Updated:  03/12/20 1640     CRE SCREEN Not Detected     Comment: Test performed by real-time polymerase chain reaction (qPCR).        OXA 48 Strain Not Detected     IMP STRAIN Not Detected     VIM STRAIN Not Detected     NDM Strain Not Detected     KPC Strain Not Detected          Imaging Results (Last 24 Hours)     ** No results found for the last 24 hours. **          Assessment/Plan       Left upper lobe pneumonia (CMS/HCC)    Sepsis (CMS/HCC)    Autism    Seizures (CMS/HCC)    Continue with the supportive care, nebulized treatments, IV antibiotics. Can be transferred to a regular floor.    Kalpesh Schwarz MD  03/13/20  16:04

## 2020-03-13 NOTE — PROGRESS NOTES
Discharge Planning Assessment  [facility]     Patient Name: Bautista Grubbs  MRN: [id number]  Today's Date: 3/13/2020    Admit Date: 3/12/2020    Discharge Needs Assessment     Row Name 03/13/20 0915       Living Environment    Lives With  facility resident [facility]    Primary Care Provided by  other (see comments) [facility]    Provides Primary Care For  no one    Family Caregiver if Needed  none    Quality of Family Relationships  unable to assess    Able to Return to Prior Arrangements  yes    Living Arrangement Comments  Pt is a resident of [facility].        Resource/Environmental Concerns    Resource/Environmental Concerns  none       Transition Planning    Patient/Family Anticipates Transition to  long term care facility Goal is for pt to return to [facility] once cleared medically.     Transportation Anticipated  other (see comments) [facility] has van and transports their pt's to and from facility.        Discharge Needs Assessment    Concerns to be Addressed  adjustment to diagnosis/illness    Equipment Currently Used at Home  wheelchair;shower chair    Anticipated Changes Related to Illness  none    Equipment Needed After Discharge  -- Awaiting MD and therapy recomendations.     Current Discharge Risk  chronically ill;cognitively impaired;physical impairment    Discharge Coordination/Progress  Goal is for pt to return to [facility] once cleared medically.         Discharge Plan     Row Name 03/13/20 0920       Plan    Plan Comments  LACE Complete. Pt is a resident of [facility]. All information was gathered from Rut at [facility]. Rut informed LSW that goal is for pt to return to facility once cleared medically. She reports pt uses wheelchair to assist with mobility. LSW/case mgt will follow up as consulted and complete arrangements as ordered. Volodymyr Lynn LSW        Destination      Coordination has not been started for this encounter.      Durable Medical Equipment      Coordination has not been started for this  encounter.      Dialysis/Infusion      Coordination has not been started for this encounter.      Home Medical Care      Coordination has not been started for this encounter.      Therapy      Coordination has not been started for this encounter.      Community Resources      Coordination has not been started for this encounter.        Expected Discharge Date and Time     Expected Discharge Date Expected Discharge Time    Mar 15, 2020         Demographic Summary     Row Name 03/13/20 0912       General Information    Admission Type  inpatient    Referral Source  high risk screening    Reason for Consult  discharge planning    Preferred Language  English     Used During This Interaction  yes       Contact Information    Contact Information Obtained for          Functional Status     Row Name 03/13/20 0913       Functional Status    Usual Activity Tolerance  poor    Current Activity Tolerance  poor    Functional Status Comments  Uses wheelchair to assist with mobility. Pt also wears helmet, knee and elbow pads.        Functional Status, IADL    Medications  completely dependent    Meal Preparation  completely dependent    Housekeeping  completely dependent    Laundry  completely dependent    Shopping  completely dependent    IADL Comments  Pt is a resident of [facility].       Mental Status Summary    Recent Changes in Mental Status/Cognitive Functioning  no changes        Psychosocial    No documentation.       Abuse/Neglect    No documentation.       Legal    No documentation.       Substance Abuse    No documentation.       Patient Forms    No documentation.           MADHAV Manley

## 2020-03-13 NOTE — PLAN OF CARE
Problem: Patient Care Overview  Goal: Plan of Care Review  Outcome: Ongoing (interventions implemented as appropriate)  Flowsheets (Taken 3/13/2020 7670)  Progress: improving  Outcome Summary: VSS, Pt out of restraints, Respiratory stable, Fall precautions in place, Pt turns self     Problem: Pneumonia (Adult)  Goal: Signs and Symptoms of Listed Potential Problems Will be Absent, Minimized or Managed (Pneumonia)  Outcome: Ongoing (interventions implemented as appropriate)     Problem: Patient Care Overview  Goal: Individualization and Mutuality  Outcome: Ongoing (interventions implemented as appropriate)     Problem: Patient Care Overview  Goal: Discharge Needs Assessment  Outcome: Ongoing (interventions implemented as appropriate)     Problem: Fall Risk (Adult)  Goal: Identify Related Risk Factors and Signs and Symptoms  Outcome: Ongoing (interventions implemented as appropriate)     Problem: Fluid Volume Deficit (Adult)  Goal: Identify Related Risk Factors and Signs and Symptoms  Outcome: Ongoing (interventions implemented as appropriate)     Problem: Skin Injury Risk (Adult)  Goal: Identify Related Risk Factors and Signs and Symptoms  Outcome: Ongoing (interventions implemented as appropriate)

## 2020-03-14 LAB
ANION GAP SERPL CALCULATED.3IONS-SCNC: 10 MMOL/L (ref 5–15)
BASOPHILS # BLD AUTO: 0.03 10*3/MM3 (ref 0–0.2)
BASOPHILS NFR BLD AUTO: 0.3 % (ref 0–1.5)
BUN BLD-MCNC: 17 MG/DL (ref 8–23)
BUN/CREAT SERPL: 24.6 (ref 7–25)
CALCIUM SPEC-SCNC: 8.7 MG/DL (ref 8.6–10.5)
CHLORIDE SERPL-SCNC: 105 MMOL/L (ref 98–107)
CO2 SERPL-SCNC: 23 MMOL/L (ref 22–29)
CREAT BLD-MCNC: 0.69 MG/DL (ref 0.76–1.27)
DEPRECATED RDW RBC AUTO: 43.1 FL (ref 37–54)
EOSINOPHIL # BLD AUTO: 0.08 10*3/MM3 (ref 0–0.4)
EOSINOPHIL NFR BLD AUTO: 0.7 % (ref 0.3–6.2)
ERYTHROCYTE [DISTWIDTH] IN BLOOD BY AUTOMATED COUNT: 13.1 % (ref 12.3–15.4)
GFR SERPL CREATININE-BSD FRML MDRD: 116 ML/MIN/1.73
GLUCOSE BLD-MCNC: 81 MG/DL (ref 65–99)
HCT VFR BLD AUTO: 30 % (ref 37.5–51)
HGB BLD-MCNC: 10.5 G/DL (ref 13–17.7)
IMM GRANULOCYTES # BLD AUTO: 0.12 10*3/MM3 (ref 0–0.05)
IMM GRANULOCYTES NFR BLD AUTO: 1 % (ref 0–0.5)
LYMPHOCYTES # BLD AUTO: 0.95 10*3/MM3 (ref 0.7–3.1)
LYMPHOCYTES NFR BLD AUTO: 8.2 % (ref 19.6–45.3)
MCH RBC QN AUTO: 31.3 PG (ref 26.6–33)
MCHC RBC AUTO-ENTMCNC: 35 G/DL (ref 31.5–35.7)
MCV RBC AUTO: 89.6 FL (ref 79–97)
MONOCYTES # BLD AUTO: 0.7 10*3/MM3 (ref 0.1–0.9)
MONOCYTES NFR BLD AUTO: 6 % (ref 5–12)
NEUTROPHILS # BLD AUTO: 9.71 10*3/MM3 (ref 1.7–7)
NEUTROPHILS NFR BLD AUTO: 83.8 % (ref 42.7–76)
NRBC BLD AUTO-RTO: 0 /100 WBC (ref 0–0.2)
PLATELET # BLD AUTO: 227 10*3/MM3 (ref 140–450)
PMV BLD AUTO: 10.1 FL (ref 6–12)
POTASSIUM BLD-SCNC: 3.8 MMOL/L (ref 3.5–5.2)
RBC # BLD AUTO: 3.35 10*6/MM3 (ref 4.14–5.8)
SODIUM BLD-SCNC: 138 MMOL/L (ref 136–145)
WBC NRBC COR # BLD: 11.59 10*3/MM3 (ref 3.4–10.8)

## 2020-03-14 PROCEDURE — 85025 COMPLETE CBC W/AUTO DIFF WBC: CPT | Performed by: INTERNAL MEDICINE

## 2020-03-14 PROCEDURE — 80048 BASIC METABOLIC PNL TOTAL CA: CPT | Performed by: INTERNAL MEDICINE

## 2020-03-14 PROCEDURE — 25010000002 PIPERACILLIN SOD-TAZOBACTAM PER 1 G: Performed by: INTERNAL MEDICINE

## 2020-03-14 PROCEDURE — 94799 UNLISTED PULMONARY SVC/PX: CPT

## 2020-03-14 RX ADMIN — LEVETIRACETAM 500 MG: 500 TABLET ORAL at 08:23

## 2020-03-14 RX ADMIN — LAMOTRIGINE 200 MG: 100 TABLET ORAL at 08:22

## 2020-03-14 RX ADMIN — LEVETIRACETAM 1000 MG: 500 TABLET ORAL at 08:23

## 2020-03-14 RX ADMIN — LEVETIRACETAM 500 MG: 500 TABLET ORAL at 20:01

## 2020-03-14 RX ADMIN — IPRATROPIUM BROMIDE AND ALBUTEROL SULFATE 3 ML: 2.5; .5 SOLUTION RESPIRATORY (INHALATION) at 11:52

## 2020-03-14 RX ADMIN — LEVETIRACETAM 250 MG: 250 TABLET ORAL at 20:02

## 2020-03-14 RX ADMIN — IPRATROPIUM BROMIDE AND ALBUTEROL SULFATE 3 ML: 2.5; .5 SOLUTION RESPIRATORY (INHALATION) at 08:05

## 2020-03-14 RX ADMIN — LEVETIRACETAM 1000 MG: 500 TABLET ORAL at 20:01

## 2020-03-14 RX ADMIN — LEVETIRACETAM 250 MG: 250 TABLET ORAL at 08:22

## 2020-03-14 RX ADMIN — SODIUM CHLORIDE, PRESERVATIVE FREE 10 ML: 5 INJECTION INTRAVENOUS at 08:23

## 2020-03-14 RX ADMIN — PIPERACILLIN AND TAZOBACTAM 3.38 G: 3; .375 INJECTION, POWDER, FOR SOLUTION INTRAVENOUS at 10:14

## 2020-03-14 RX ADMIN — LAMOTRIGINE 200 MG: 100 TABLET ORAL at 20:01

## 2020-03-14 RX ADMIN — PIPERACILLIN AND TAZOBACTAM 3.38 G: 3; .375 INJECTION, POWDER, FOR SOLUTION INTRAVENOUS at 01:54

## 2020-03-14 RX ADMIN — IPRATROPIUM BROMIDE AND ALBUTEROL SULFATE 3 ML: 2.5; .5 SOLUTION RESPIRATORY (INHALATION) at 20:25

## 2020-03-14 RX ADMIN — SODIUM CHLORIDE, PRESERVATIVE FREE 10 ML: 5 INJECTION INTRAVENOUS at 20:01

## 2020-03-14 RX ADMIN — PIPERACILLIN AND TAZOBACTAM 3.38 G: 3; .375 INJECTION, POWDER, FOR SOLUTION INTRAVENOUS at 17:05

## 2020-03-14 RX ADMIN — IPRATROPIUM BROMIDE AND ALBUTEROL SULFATE 3 ML: 2.5; .5 SOLUTION RESPIRATORY (INHALATION) at 15:38

## 2020-03-14 RX ADMIN — SODIUM CHLORIDE 100 ML/HR: 9 INJECTION, SOLUTION INTRAVENOUS at 08:25

## 2020-03-14 NOTE — PLAN OF CARE
Problem: Patient Care Overview  Goal: Plan of Care Review  Outcome: Ongoing (interventions implemented as appropriate)  Flowsheets  Taken 3/14/2020 0049 by Akosua Warren, RN  Progress: improving  Outcome Summary: pt awake most of night. vital signs stable. no s/s of distress.  Taken 3/13/2020 1200 by Laquita Larios RN  Plan of Care Reviewed With: caregiver;patient

## 2020-03-14 NOTE — PLAN OF CARE
Problem: Patient Care Overview  Goal: Plan of Care Review  Flowsheets  Taken 3/14/2020 1715  Progress: improving  Taken 3/14/2020 0935  Plan of Care Reviewed With: caregiver;patient     Problem: Fall Risk (Adult)  Goal: Absence of Fall  Flowsheets (Taken 3/14/2020 1715)  Absence of Fall: achieves outcome     Problem: Pneumonia (Adult)  Goal: Signs and Symptoms of Listed Potential Problems Will be Absent, Minimized or Managed (Pneumonia)  Flowsheets (Taken 3/14/2020 1715)  Problems Assessed (Pneumonia): all  Problems Present (Pneumonia): respiratory compromise     Problem: Communication Impaired, Verbal (Adult,Obstetrics,Pediatric)  Goal: Improved/Effective Communication  Flowsheets (Taken 3/14/2020 1715)  Improved/Effective Communication: making progress toward outcome     Problem: Fluid Volume Deficit (Adult)  Goal: Optimal Fluid Balance  Flowsheets (Taken 3/14/2020 1715)  Optimal Fluid Balance: making progress toward outcome     Problem: Skin Injury Risk (Adult)  Goal: Skin Health and Integrity  Flowsheets (Taken 3/14/2020 1715)  Skin Health and Integrity: making progress toward outcome

## 2020-03-14 NOTE — PROGRESS NOTES
[facility] Medicine Services  INPATIENT PROGRESS NOTE    Length of Stay: 2  Date of Admission: 3/12/2020  Primary Care Physician: Bautista James MD    Subjective   Chief Complaint: Lethargy  HPI: Patient appears comfortable.  He is mostly nonverbal.    Review of Systems   Unable to perform ROS: Psychiatric disorder        Objective    Temp:  [96.9 °F (36.1 °C)-98.9 °F (37.2 °C)] 97.9 °F (36.6 °C)  Heart Rate:  [64-85] 84  Resp:  [16-18] 16  BP: (106-130)/(67-83) 106/67    Physical Exam   Constitutional: He appears well-developed and well-nourished.   HENT:   Head: Normocephalic and atraumatic.   Eyes: Pupils are equal, round, and reactive to light. EOM are normal.   Neck: Normal range of motion. Neck supple.   Cardiovascular: Normal rate, regular rhythm, normal heart sounds and intact distal pulses. Exam reveals no gallop and no friction rub.   No murmur heard.  Pulmonary/Chest: Effort normal and breath sounds normal. No respiratory distress. He has no wheezes. He has no rales. He exhibits no tenderness.   Abdominal: Soft. Bowel sounds are normal. He exhibits no distension. There is no tenderness.   Psychiatric: He has a normal mood and affect. His behavior is normal.   Vitals reviewed.          Results Review:  I have reviewed the labs, radiology results, and diagnostic studies.    Laboratory Data:   Results from last 7 days   Lab Units 03/14/20  0535 03/13/20  0334 03/12/20  1030   SODIUM mmol/L 138 136 138   POTASSIUM mmol/L 3.8 3.7 3.8   CHLORIDE mmol/L 105 103 101   CO2 mmol/L 23.0 21.0* 26.0   BUN mg/dL 17 23 21   CREATININE mg/dL 0.69* 0.74* 0.87   GLUCOSE mg/dL 81 119* 93   CALCIUM mg/dL 8.7 9.1 9.2   BILIRUBIN mg/dL  --  0.2 0.2   ALK PHOS U/L  --  62 61   ALT (SGPT) U/L  --  19 17   AST (SGOT) U/L  --  23 18   ANION GAP mmol/L 10.0 12.0 11.0     Estimated Creatinine Clearance: 103.1 mL/min (A) (by C-G formula based on SCr of 0.69 mg/dL (L)).          Results  from last 7 days   Lab Units 03/14/20  0535 03/13/20  0334 03/12/20  1030   WBC 10*3/mm3 11.59* 10.02 4.41   HEMOGLOBIN g/dL 10.5* 10.9* 11.0*   HEMATOCRIT % 30.0* 31.6* 32.1*   PLATELETS 10*3/mm3 227 205 205           Culture Data:   Blood Culture   Date Value Ref Range Status   03/12/2020 No growth at 24 hours  Preliminary   03/12/2020 No growth at 2 days  Preliminary     No results found for: URINECX  No results found for: RESPCX  No results found for: WOUNDCX  No results found for: STOOLCX  No components found for: BODYFLD    Radiology Data:   Imaging Results (Last 24 Hours)     ** No results found for the last 24 hours. **          I have reviewed the patient's current medications.     Assessment/Plan     Active Hospital Problems    Diagnosis   • **Left upper lobe pneumonia (CMS/HCC)   • Sepsis (CMS/HCC)   • Autism   • Seizures (CMS/[facility])       Plan:    1.  Left upper lobe pneumonia: Continue antibiotics.  Cultures pending.  2.  Sepsis: Continue supportive care.  Patient had 1 out of 4 blood cultures positive.  Likely contaminant.  3.  Autism  4.  Seizure disorder: Continue Keppra.      Discharge Planning: I expect patient to be discharged to facility in 2-3 days.        This document has been electronically signed by Vicente Spicer MD on March 14, 2020 13:07

## 2020-03-15 LAB
ANION GAP SERPL CALCULATED.3IONS-SCNC: 11 MMOL/L (ref 5–15)
BACTERIA SPEC AEROBE CULT: ABNORMAL
BASOPHILS # BLD AUTO: 0.03 10*3/MM3 (ref 0–0.2)
BASOPHILS NFR BLD AUTO: 0.4 % (ref 0–1.5)
BUN BLD-MCNC: 13 MG/DL (ref 8–23)
BUN/CREAT SERPL: 18.1 (ref 7–25)
CALCIUM SPEC-SCNC: 8.8 MG/DL (ref 8.6–10.5)
CHLORIDE SERPL-SCNC: 101 MMOL/L (ref 98–107)
CO2 SERPL-SCNC: 25 MMOL/L (ref 22–29)
CREAT BLD-MCNC: 0.72 MG/DL (ref 0.76–1.27)
DEPRECATED RDW RBC AUTO: 44.1 FL (ref 37–54)
EOSINOPHIL # BLD AUTO: 0.14 10*3/MM3 (ref 0–0.4)
EOSINOPHIL NFR BLD AUTO: 1.9 % (ref 0.3–6.2)
ERYTHROCYTE [DISTWIDTH] IN BLOOD BY AUTOMATED COUNT: 13.2 % (ref 12.3–15.4)
GFR SERPL CREATININE-BSD FRML MDRD: 110 ML/MIN/1.73
GLUCOSE BLD-MCNC: 104 MG/DL (ref 65–99)
GRAM STN SPEC: ABNORMAL
HCT VFR BLD AUTO: 32.3 % (ref 37.5–51)
HGB BLD-MCNC: 11.2 G/DL (ref 13–17.7)
IMM GRANULOCYTES # BLD AUTO: 0.1 10*3/MM3 (ref 0–0.05)
IMM GRANULOCYTES NFR BLD AUTO: 1.4 % (ref 0–0.5)
ISOLATED FROM: ABNORMAL
LYMPHOCYTES # BLD AUTO: 0.72 10*3/MM3 (ref 0.7–3.1)
LYMPHOCYTES NFR BLD AUTO: 9.9 % (ref 19.6–45.3)
MCH RBC QN AUTO: 31.4 PG (ref 26.6–33)
MCHC RBC AUTO-ENTMCNC: 34.7 G/DL (ref 31.5–35.7)
MCV RBC AUTO: 90.5 FL (ref 79–97)
MONOCYTES # BLD AUTO: 0.7 10*3/MM3 (ref 0.1–0.9)
MONOCYTES NFR BLD AUTO: 9.6 % (ref 5–12)
NEUTROPHILS # BLD AUTO: 5.57 10*3/MM3 (ref 1.7–7)
NEUTROPHILS NFR BLD AUTO: 76.8 % (ref 42.7–76)
NRBC BLD AUTO-RTO: 0 /100 WBC (ref 0–0.2)
PLATELET # BLD AUTO: 233 10*3/MM3 (ref 140–450)
PMV BLD AUTO: 9.8 FL (ref 6–12)
POTASSIUM BLD-SCNC: 3.8 MMOL/L (ref 3.5–5.2)
RBC # BLD AUTO: 3.57 10*6/MM3 (ref 4.14–5.8)
SODIUM BLD-SCNC: 137 MMOL/L (ref 136–145)
WBC NRBC COR # BLD: 7.26 10*3/MM3 (ref 3.4–10.8)

## 2020-03-15 PROCEDURE — 85025 COMPLETE CBC W/AUTO DIFF WBC: CPT | Performed by: HOSPITALIST

## 2020-03-15 PROCEDURE — 80048 BASIC METABOLIC PNL TOTAL CA: CPT | Performed by: HOSPITALIST

## 2020-03-15 PROCEDURE — 25010000002 PIPERACILLIN SOD-TAZOBACTAM PER 1 G: Performed by: INTERNAL MEDICINE

## 2020-03-15 PROCEDURE — 94799 UNLISTED PULMONARY SVC/PX: CPT

## 2020-03-15 RX ADMIN — LAMOTRIGINE 200 MG: 100 TABLET ORAL at 20:03

## 2020-03-15 RX ADMIN — SODIUM CHLORIDE 100 ML/HR: 9 INJECTION, SOLUTION INTRAVENOUS at 19:49

## 2020-03-15 RX ADMIN — SODIUM CHLORIDE 100 ML/HR: 9 INJECTION, SOLUTION INTRAVENOUS at 01:53

## 2020-03-15 RX ADMIN — IPRATROPIUM BROMIDE AND ALBUTEROL SULFATE 3 ML: 2.5; .5 SOLUTION RESPIRATORY (INHALATION) at 08:13

## 2020-03-15 RX ADMIN — LEVETIRACETAM 1000 MG: 500 TABLET ORAL at 08:30

## 2020-03-15 RX ADMIN — LAMOTRIGINE 200 MG: 100 TABLET ORAL at 08:31

## 2020-03-15 RX ADMIN — LEVETIRACETAM 500 MG: 500 TABLET ORAL at 08:31

## 2020-03-15 RX ADMIN — PIPERACILLIN AND TAZOBACTAM 3.38 G: 3; .375 INJECTION, POWDER, FOR SOLUTION INTRAVENOUS at 01:53

## 2020-03-15 RX ADMIN — LEVETIRACETAM 250 MG: 250 TABLET ORAL at 20:04

## 2020-03-15 RX ADMIN — IPRATROPIUM BROMIDE AND ALBUTEROL SULFATE 3 ML: 2.5; .5 SOLUTION RESPIRATORY (INHALATION) at 15:50

## 2020-03-15 RX ADMIN — SODIUM CHLORIDE, PRESERVATIVE FREE 10 ML: 5 INJECTION INTRAVENOUS at 08:31

## 2020-03-15 RX ADMIN — PIPERACILLIN AND TAZOBACTAM 3.38 G: 3; .375 INJECTION, POWDER, FOR SOLUTION INTRAVENOUS at 09:00

## 2020-03-15 RX ADMIN — LEVETIRACETAM 250 MG: 250 TABLET ORAL at 08:30

## 2020-03-15 RX ADMIN — IPRATROPIUM BROMIDE AND ALBUTEROL SULFATE 3 ML: 2.5; .5 SOLUTION RESPIRATORY (INHALATION) at 11:57

## 2020-03-15 RX ADMIN — LEVETIRACETAM 1000 MG: 500 TABLET ORAL at 20:03

## 2020-03-15 RX ADMIN — SODIUM CHLORIDE, PRESERVATIVE FREE 10 ML: 5 INJECTION INTRAVENOUS at 20:03

## 2020-03-15 RX ADMIN — LEVETIRACETAM 500 MG: 500 TABLET ORAL at 20:04

## 2020-03-15 RX ADMIN — IPRATROPIUM BROMIDE AND ALBUTEROL SULFATE 3 ML: 2.5; .5 SOLUTION RESPIRATORY (INHALATION) at 21:27

## 2020-03-15 NOTE — PLAN OF CARE
Problem: Fall Risk (Adult)  Goal: Absence of Fall  Flowsheets (Taken 3/15/2020 1723)  Absence of Fall: achieves outcome     Problem: Seizure Disorder/Epilepsy (Adult)  Goal: Signs and Symptoms of Listed Potential Problems Will be Absent, Minimized or Managed (Seizure Disorder/Epilepsy)  Flowsheets (Taken 3/15/2020 1723)  Problems Assessed (Seizure Disorder/Epilepsy): all  Problems Present (Seizure/Epilepsy): none     Problem: Communication Impaired, Verbal (Adult,Obstetrics,Pediatric)  Goal: Improved/Effective Communication  Flowsheets (Taken 3/15/2020 1723)  Improved/Effective Communication: making progress toward outcome     Problem: Fluid Volume Deficit (Adult)  Goal: Optimal Fluid Balance  Flowsheets (Taken 3/15/2020 1723)  Optimal Fluid Balance: achieves outcome     Problem: Skin Injury Risk (Adult)  Goal: Skin Health and Integrity  Flowsheets (Taken 3/15/2020 1723)  Skin Health and Integrity: achieves outcome

## 2020-03-15 NOTE — PLAN OF CARE
Problem: Patient Care Overview  Goal: Plan of Care Review  Outcome: Ongoing (interventions implemented as appropriate)  Flowsheets  Taken 3/15/2020 0147  Progress: improving  Outcome Summary: pt appeared to rest better tonight. vital signs stable. no s/s of distress.  Taken 3/14/2020 2000  Plan of Care Reviewed With: caregiver;patient

## 2020-03-15 NOTE — PROGRESS NOTES
[facility] Medicine Services  INPATIENT PROGRESS NOTE    Length of Stay: 3  Date of Admission: 3/12/2020  Primary Care Physician: Bautista James MD    Subjective   Chief Complaint: Lethargy  HPI:  Patient asleep, but wakes up easily.  Appears comfortable.    Review of Systems   Unable to perform ROS: Psychiatric disorder        Objective    Temp:  [97.7 °F (36.5 °C)-98.9 °F (37.2 °C)] 98.1 °F (36.7 °C)  Heart Rate:  [60-95] 80  Resp:  [16-18] 16  BP: (108-138)/(66-92) 108/66    Physical Exam   Constitutional: He appears well-developed and well-nourished.   HENT:   Head: Normocephalic and atraumatic.   Eyes: Pupils are equal, round, and reactive to light. EOM are normal.   Neck: Normal range of motion. Neck supple.   Cardiovascular: Normal rate, regular rhythm, normal heart sounds and intact distal pulses. Exam reveals no gallop and no friction rub.   No murmur heard.  Pulmonary/Chest: Effort normal and breath sounds normal. No respiratory distress. He has no wheezes. He has no rales. He exhibits no tenderness.   Abdominal: Soft. Bowel sounds are normal. He exhibits no distension. There is no tenderness.   Psychiatric: He has a normal mood and affect. His behavior is normal.   Vitals reviewed.        Results Review:  I have reviewed the labs, radiology results, and diagnostic studies.    Laboratory Data:   Results from last 7 days   Lab Units 03/14/20  0535 03/13/20  0334 03/12/20  1030   SODIUM mmol/L 138 136 138   POTASSIUM mmol/L 3.8 3.7 3.8   CHLORIDE mmol/L 105 103 101   CO2 mmol/L 23.0 21.0* 26.0   BUN mg/dL 17 23 21   CREATININE mg/dL 0.69* 0.74* 0.87   GLUCOSE mg/dL 81 119* 93   CALCIUM mg/dL 8.7 9.1 9.2   BILIRUBIN mg/dL  --  0.2 0.2   ALK PHOS U/L  --  62 61   ALT (SGPT) U/L  --  19 17   AST (SGOT) U/L  --  23 18   ANION GAP mmol/L 10.0 12.0 11.0     Estimated Creatinine Clearance: 100.4 mL/min (A) (by C-G formula based on SCr of 0.69 mg/dL (L)).             Results from last 7 days   Lab Units 03/14/20  0535 03/13/20  0334 03/12/20  1030   WBC 10*3/mm3 11.59* 10.02 4.41   HEMOGLOBIN g/dL 10.5* 10.9* 11.0*   HEMATOCRIT % 30.0* 31.6* 32.1*   PLATELETS 10*3/mm3 227 205 205           Culture Data:   Blood Culture   Date Value Ref Range Status   03/12/2020 Staphylococcus, coagulase negative (C)  Final     Comment:     Probable contaminant requires clinical correlation, susceptibility not performed unless requested by physician.       No results found for: URINECX  No results found for: RESPCX  No results found for: WOUNDCX  No results found for: STOOLCX  No components found for: BODYFLD    Radiology Data:   Imaging Results (Last 24 Hours)     ** No results found for the last 24 hours. **          I have reviewed the patient's current medications.     Assessment/Plan     Active Hospital Problems    Diagnosis   • **Left upper lobe pneumonia (CMS/HCC)   • Sepsis (CMS/HCC)   • Autism   • Seizures (CMS/[facility])       Plan:    1.  Left upper lobe pneumonia: Continue antibiotics.  Cultures pending.  Recheck white blood count.  2.  Sepsis: Continue supportive care.  Patient had 1 out of 4 blood cultures positive.  Likely contaminant.  3.  Autism  4.  Seizure disorder: Continue Keppra.      Discharge Planning: I expect patient to be discharged to facility in 2-3 days.        This document has been electronically signed by Vicente Spicer MD on March 15, 2020 11:28

## 2020-03-16 VITALS
TEMPERATURE: 97 F | BODY MASS INDEX: 19.93 KG/M2 | OXYGEN SATURATION: 90 % | HEART RATE: 88 BPM | HEIGHT: 70 IN | SYSTOLIC BLOOD PRESSURE: 105 MMHG | RESPIRATION RATE: 18 BRPM | DIASTOLIC BLOOD PRESSURE: 57 MMHG | WEIGHT: 139.2 LBS

## 2020-03-16 LAB
ANION GAP SERPL CALCULATED.3IONS-SCNC: 12 MMOL/L (ref 5–15)
BASOPHILS # BLD AUTO: 0.04 10*3/MM3 (ref 0–0.2)
BASOPHILS NFR BLD AUTO: 0.6 % (ref 0–1.5)
BUN BLD-MCNC: 13 MG/DL (ref 8–23)
BUN/CREAT SERPL: 19.4 (ref 7–25)
CALCIUM SPEC-SCNC: 8.7 MG/DL (ref 8.6–10.5)
CHLORIDE SERPL-SCNC: 98 MMOL/L (ref 98–107)
CO2 SERPL-SCNC: 25 MMOL/L (ref 22–29)
CREAT BLD-MCNC: 0.67 MG/DL (ref 0.76–1.27)
DEPRECATED RDW RBC AUTO: 43.6 FL (ref 37–54)
EOSINOPHIL # BLD AUTO: 0.19 10*3/MM3 (ref 0–0.4)
EOSINOPHIL NFR BLD AUTO: 2.6 % (ref 0.3–6.2)
ERYTHROCYTE [DISTWIDTH] IN BLOOD BY AUTOMATED COUNT: 13.2 % (ref 12.3–15.4)
GFR SERPL CREATININE-BSD FRML MDRD: 120 ML/MIN/1.73
GLUCOSE BLD-MCNC: 87 MG/DL (ref 65–99)
HCT VFR BLD AUTO: 31.7 % (ref 37.5–51)
HGB BLD-MCNC: 11.1 G/DL (ref 13–17.7)
IMM GRANULOCYTES # BLD AUTO: 0.17 10*3/MM3 (ref 0–0.05)
IMM GRANULOCYTES NFR BLD AUTO: 2.4 % (ref 0–0.5)
LAMOTRIGINE SERPL-MCNC: 5.1 UG/ML (ref 2–20)
LYMPHOCYTES # BLD AUTO: 1.08 10*3/MM3 (ref 0.7–3.1)
LYMPHOCYTES NFR BLD AUTO: 15 % (ref 19.6–45.3)
MCH RBC QN AUTO: 31.3 PG (ref 26.6–33)
MCHC RBC AUTO-ENTMCNC: 35 G/DL (ref 31.5–35.7)
MCV RBC AUTO: 89.3 FL (ref 79–97)
MONOCYTES # BLD AUTO: 0.83 10*3/MM3 (ref 0.1–0.9)
MONOCYTES NFR BLD AUTO: 11.5 % (ref 5–12)
NEUTROPHILS # BLD AUTO: 4.88 10*3/MM3 (ref 1.7–7)
NEUTROPHILS NFR BLD AUTO: 67.9 % (ref 42.7–76)
NRBC BLD AUTO-RTO: 0 /100 WBC (ref 0–0.2)
PLATELET # BLD AUTO: 224 10*3/MM3 (ref 140–450)
PMV BLD AUTO: 10.1 FL (ref 6–12)
POTASSIUM BLD-SCNC: 4 MMOL/L (ref 3.5–5.2)
RBC # BLD AUTO: 3.55 10*6/MM3 (ref 4.14–5.8)
SODIUM BLD-SCNC: 135 MMOL/L (ref 136–145)
WBC NRBC COR # BLD: 7.19 10*3/MM3 (ref 3.4–10.8)

## 2020-03-16 PROCEDURE — 80048 BASIC METABOLIC PNL TOTAL CA: CPT | Performed by: HOSPITALIST

## 2020-03-16 PROCEDURE — 94799 UNLISTED PULMONARY SVC/PX: CPT

## 2020-03-16 PROCEDURE — 85025 COMPLETE CBC W/AUTO DIFF WBC: CPT | Performed by: HOSPITALIST

## 2020-03-16 RX ORDER — AMOXICILLIN AND CLAVULANATE POTASSIUM 875; 125 MG/1; MG/1
1 TABLET, FILM COATED ORAL 2 TIMES DAILY
Qty: 10 TABLET | Refills: 0 | Status: SHIPPED | OUTPATIENT
Start: 2020-03-16 | End: 2020-03-21

## 2020-03-16 RX ADMIN — LEVETIRACETAM 250 MG: 250 TABLET ORAL at 09:08

## 2020-03-16 RX ADMIN — SODIUM CHLORIDE, PRESERVATIVE FREE 10 ML: 5 INJECTION INTRAVENOUS at 09:07

## 2020-03-16 RX ADMIN — LEVETIRACETAM 500 MG: 500 TABLET ORAL at 09:08

## 2020-03-16 RX ADMIN — SODIUM CHLORIDE 100 ML/HR: 9 INJECTION, SOLUTION INTRAVENOUS at 07:50

## 2020-03-16 RX ADMIN — IPRATROPIUM BROMIDE AND ALBUTEROL SULFATE 3 ML: 2.5; .5 SOLUTION RESPIRATORY (INHALATION) at 06:28

## 2020-03-16 RX ADMIN — LEVETIRACETAM 1000 MG: 500 TABLET ORAL at 09:08

## 2020-03-16 RX ADMIN — IPRATROPIUM BROMIDE AND ALBUTEROL SULFATE 3 ML: 2.5; .5 SOLUTION RESPIRATORY (INHALATION) at 10:55

## 2020-03-16 RX ADMIN — LAMOTRIGINE 200 MG: 100 TABLET ORAL at 09:09

## 2020-03-16 NOTE — PLAN OF CARE
Problem: Patient Care Overview  Goal: Plan of Care Review  Outcome: Ongoing (interventions implemented as appropriate)  Flowsheets (Taken 3/16/2020 1014)  Progress: no change  Plan of Care Reviewed With: patient  Note:   Pt acting more like his baseline, according to [place] sitter. Lung sounds are clear. Vital signs stable. Pt shows good appetite. Pt scheduled to be discharged today.

## 2020-03-16 NOTE — DISCHARGE SUMMARY
PHYSICIAN DISCHARGE SUMMARY                                                                       [facility]    Patient Identification:  Name: Bautista Grubbs  Age: 64 y.o.  Sex: male  :  1955  MRN: [id number]  Primary Care Physician: Bautista James MD    Admit date: 3/12/2020  Discharge date and time:3/16/2020  Discharged Condition: good    Discharge Diagnoses:  Active Hospital Problems    Diagnosis  POA   • **Left upper lobe pneumonia (CMS/HCC) [J18.1]  Yes   • Sepsis (CMS/HCC) [A41.9]  Yes   • Autism [F84.0]  Yes   • Seizures (CMS/HCC) [R56.9]  Yes   • GERD (gastroesophageal reflux disease) [K21.9]  Yes   • Hyperlipidemia [E78.5]  Yes   • COPD (chronic obstructive pulmonary disease) (CMS/HCC) [J44.9]  Yes      Resolved Hospital Problems   No resolved problems to display.      Patient Active Problem List   Diagnosis Code   • Pneumonia J18.9   • Syncope R55   • Pneumonia of right lower lobe due to infectious organism (CMS/HCC) J18.1   • Autism F84.0   • COPD (chronic obstructive pulmonary disease) (CMS/HCC) J44.9   • GERD (gastroesophageal reflux disease) K21.9   • Hyperlipidemia E78.5   • Seizures (CMS/HCC) R56.9   • Orthostatic hypotension I95.1   • Left upper lobe pneumonia (CMS/HCC) J18.1   • Sepsis (CMS/HCC) A41.9       PMHX:   Past Medical History:   Diagnosis Date   • Alopecia    • Autism    • COPD (chronic obstructive pulmonary disease) (CMS/HCC)    • GERD (gastroesophageal reflux disease)    • Hyperlipidemia    • Insomnia    • Intellectual disability    • Lennox-Gastaut syndrome (CMS/HCC)    • Major depressive disorder    • Orthostatic hypotension    • Osteoporosis    • Right bundle branch block    • Scoliosis    • Seizures (CMS/HCC)      PSHX: History reviewed. No pertinent surgical history.    Hospital Course: Bautista Grubbs  is a 64 y.o. male admitted for worsening shortness of breath, cough, hypoxia.  He could have experienced a fever up to 102 for the last day of so. He has had previous episodes of pneumonia in the recent past.     He is unable to give any history given his intellectual disability.The information is obtained from the nursing and ER physician notes.       The patient was admitted to the hospital and treated with IV antibiotics for pneumonia.  He was doing better after being in the hospital for a few days and looked well enough to go back to his facility.  He will finish a few more days of oral antibiotics and follow-up with his primary care in 1 week for continuing care.  Suggest have follow-up chest x-ray in about a month to ensure his pneumonia is clearing.    Consults:     Consults     No orders found from 2/12/2020 to 3/13/2020.        Results from last 7 days   Lab Units 03/16/20  0644   WBC 10*3/mm3 7.19   HEMOGLOBIN g/dL 11.1*   HEMATOCRIT % 31.7*   PLATELETS 10*3/mm3 224     Results from last 7 days   Lab Units 03/16/20  0644   SODIUM mmol/L 135*   POTASSIUM mmol/L 4.0   CHLORIDE mmol/L 98   CO2 mmol/L 25.0   BUN mg/dL 13   CREATININE mg/dL 0.67*   GLUCOSE mg/dL 87   CALCIUM mg/dL 8.7     Significant Diagnostic Studies:   WBC   Date Value Ref Range Status   03/16/2020 7.19 3.40 - 10.80 10*3/mm3 Final     Hemoglobin   Date Value Ref Range Status   03/16/2020 11.1 (L) 13.0 - 17.7 g/dL Final     Hematocrit   Date Value Ref Range Status   03/16/2020 31.7 (L) 37.5 - 51.0 % Final     Platelets   Date Value Ref Range Status   03/16/2020 224 140 - 450 10*3/mm3 Final     Sodium   Date Value Ref Range Status   03/16/2020 135 (L) 136 - 145 mmol/L Final     Potassium   Date Value Ref Range Status   03/16/2020 4.0 3.5 - 5.2 mmol/L Final     Chloride   Date Value Ref Range Status   03/16/2020 98 98 - 107 mmol/L Final     CO2   Date Value Ref Range Status   03/16/2020 25.0 22.0 - 29.0 mmol/L Final     BUN   Date Value Ref Range Status   03/16/2020 13 8 - 23 mg/dL Final     Creatinine   Date Value Ref  Range Status   03/16/2020 0.67 (L) 0.76 - 1.27 mg/dL Final     Glucose   Date Value Ref Range Status   03/16/2020 87 65 - 99 mg/dL Final     Calcium   Date Value Ref Range Status   03/16/2020 8.7 8.6 - 10.5 mg/dL Final     No results found for: AST, ALT, ALKPHOS  No results found for: APTT, INR  No results found for: COLORU, CLARITYU, SPECGRAV, PHUR, PROTEINUR, GLUCOSEU, KETONESU, BLOODU, NITRITE, LEUKOCYTESUR, BILIRUBINUR, UROBILINOGEN, RBCUA, WBCUA, BACTERIA, UACOMMENT  No results found for: TROPONINT, TROPONINI, BNP  No components found for: HGBA1C;2  No components found for: TSH;2  Imaging Results (All)     Procedure Component Value Units Date/Time    XR Chest 1 View [[id number]] Collected:  03/12/20 1033     Updated:  03/12/20 1050    Narrative:       PROCEDURE: Chest, AP semiupright at 9:59 AM.    INDICATION: sob, sepsis    COMPARISON: 1/19/2020 chest x-ray.    Mild cardiomegaly with mild increased vascularity.    There is heterogeneous consolidation in the left upper lobe and  left perihilar area.  Asymmetric increased pulmonary vascular and interstitial markings  throughout the right lung.    No pleural effusion.      Impression:       Heterogeneous consolidation in the left upper lobe  and perihilar area may represent localized area of pneumonia.    Moderate asymmetric increased pulmonary vascular and interstitial  markings throughout the right lung differential includes  asymmetric edema or infectious or inflammatory process in the  right lung.    [id number]    Electronically signed by:  Nils Pantoja MD  3/12/2020 10:49  AM CDT Workstation: [id number]        Lab Results (last 7 days)     Procedure Component Value Units Date/Time    Blood Culture - Blood, Arm, Left [[id number]] Collected:  03/12/20 1029    Specimen:  Blood from Arm, Left Updated:  03/16/20 1045     Blood Culture No growth at 4 days    Lamotrigine Level [[id number]] Collected:  03/13/20 0334    Specimen:  Blood Updated:  03/16/20 1008      Lamotrigine 5.1 ug/mL      Comment: This test was developed and its performance characteristics  determined by [facility]. It has not been cleared or approved  by the Food and Drug Administration.                                  Detection Limit = 1.0       Narrative:       Performed at:  [address]  [id number]  : Mamadou Rivera MD, Phone:  [phone]    Basic Metabolic Panel [[id number]]  (Abnormal) Collected:  03/16/20 0644    Specimen:  Blood Updated:  03/16/20 0728     Glucose 87 mg/dL      BUN 13 mg/dL      Creatinine 0.67 mg/dL      Sodium 135 mmol/L      Potassium 4.0 mmol/L      Chloride 98 mmol/L      CO2 25.0 mmol/L      Calcium 8.7 mg/dL      eGFR Non African Amer 120 mL/min/1.73      BUN/Creatinine Ratio 19.4     Anion Gap 12.0 mmol/L     Narrative:       GFR Normal >60  Chronic Kidney Disease <60  Kidney Failure <15      CBC & Differential [[id number]] Collected:  03/16/20 0644    Specimen:  Blood Updated:  03/16/20 0703    Narrative:       The following orders were created for panel order CBC & Differential.  Procedure                               Abnormality         Status                     ---------                               -----------         ------                     CBC Auto Differential[[id number]]        Abnormal            Final result                 Please view results for these tests on the individual orders.    CBC Auto Differential [[id number]]  (Abnormal) Collected:  03/16/20 0644    Specimen:  Blood Updated:  03/16/20 0703     WBC 7.19 10*3/mm3      RBC 3.55 10*6/mm3      Hemoglobin 11.1 g/dL      Hematocrit 31.7 %      MCV 89.3 fL      MCH 31.3 pg      MCHC 35.0 g/dL      RDW 13.2 %      RDW-SD 43.6 fl      MPV 10.1 fL      Platelets 224 10*3/mm3      Neutrophil % 67.9 %      Lymphocyte % 15.0 %      Monocyte % 11.5 %      Eosinophil % 2.6 %      Basophil % 0.6 %      Immature Grans % 2.4 %      Neutrophils, Absolute 4.88  10*3/mm3      Lymphocytes, Absolute 1.08 10*3/mm3      Monocytes, Absolute 0.83 10*3/mm3      Eosinophils, Absolute 0.19 10*3/mm3      Basophils, Absolute 0.04 10*3/mm3      Immature Grans, Absolute 0.17 10*3/mm3      nRBC 0.0 /100 WBC     Basic Metabolic Panel [[id number]]  (Abnormal) Collected:  03/15/20 1145    Specimen:  Blood Updated:  03/15/20 1213     Glucose 104 mg/dL      BUN 13 mg/dL      Creatinine 0.72 mg/dL      Sodium 137 mmol/L      Potassium 3.8 mmol/L      Chloride 101 mmol/L      CO2 25.0 mmol/L      Calcium 8.8 mg/dL      eGFR Non African Amer 110 mL/min/1.73      BUN/Creatinine Ratio 18.1     Anion Gap 11.0 mmol/L     Narrative:       GFR Normal >60  Chronic Kidney Disease <60  Kidney Failure <15      CBC & Differential [[id number]] Collected:  03/15/20 1145    Specimen:  Blood Updated:  03/15/20 1155    Narrative:       The following orders were created for panel order CBC & Differential.  Procedure                               Abnormality         Status                     ---------                               -----------         ------                     CBC Auto Differential[[id number]]        Abnormal            Final result                 Please view results for these tests on the individual orders.    CBC Auto Differential [[id number]]  (Abnormal) Collected:  03/15/20 1145    Specimen:  Blood Updated:  03/15/20 1155     WBC 7.26 10*3/mm3      RBC 3.57 10*6/mm3      Hemoglobin 11.2 g/dL      Hematocrit 32.3 %      MCV 90.5 fL      MCH 31.4 pg      MCHC 34.7 g/dL      RDW 13.2 %      RDW-SD 44.1 fl      MPV 9.8 fL      Platelets 233 10*3/mm3      Neutrophil % 76.8 %      Lymphocyte % 9.9 %      Monocyte % 9.6 %      Eosinophil % 1.9 %      Basophil % 0.4 %      Immature Grans % 1.4 %      Neutrophils, Absolute 5.57 10*3/mm3      Lymphocytes, Absolute 0.72 10*3/mm3      Monocytes, Absolute 0.70 10*3/mm3      Eosinophils, Absolute 0.14 10*3/mm3      Basophils, Absolute 0.03 10*3/mm3       Immature Grans, Absolute 0.10 10*3/mm3      nRBC 0.0 /100 WBC     Blood Culture - Blood, Hand, Right [[id number]]  (Abnormal) Collected:  03/12/20 1354    Specimen:  Blood from Hand, Right Updated:  03/15/20 0626     Blood Culture Staphylococcus, coagulase negative     Comment: Probable contaminant requires clinical correlation, susceptibility not performed unless requested by physician.          Isolated from Anaerobic Bottle     Gram Stain Anaerobic Bottle Gram positive cocci in clusters     Comment: 1 bottle positive of 4 drawn       Basic Metabolic Panel [[id number]]  (Abnormal) Collected:  03/14/20 0535    Specimen:  Blood Updated:  03/14/20 0634     Glucose 81 mg/dL      BUN 17 mg/dL      Creatinine 0.69 mg/dL      Sodium 138 mmol/L      Potassium 3.8 mmol/L      Chloride 105 mmol/L      CO2 23.0 mmol/L      Calcium 8.7 mg/dL      eGFR Non African Amer 116 mL/min/1.73      BUN/Creatinine Ratio 24.6     Anion Gap 10.0 mmol/L     Narrative:       GFR Normal >60  Chronic Kidney Disease <60  Kidney Failure <15      CBC & Differential [[id number]] Collected:  03/14/20 0535    Specimen:  Blood Updated:  03/14/20 0611    Narrative:       The following orders were created for panel order CBC & Differential.  Procedure                               Abnormality         Status                     ---------                               -----------         ------                     CBC Auto Differential[[id number]]        Abnormal            Final result                 Please view results for these tests on the individual orders.    CBC Auto Differential [[id number]]  (Abnormal) Collected:  03/14/20 0535    Specimen:  Blood Updated:  03/14/20 0611     WBC 11.59 10*3/mm3      RBC 3.35 10*6/mm3      Hemoglobin 10.5 g/dL      Hematocrit 30.0 %      MCV 89.6 fL      MCH 31.3 pg      MCHC 35.0 g/dL      RDW 13.1 %      RDW-SD 43.1 fl      MPV 10.1 fL      Platelets 227 10*3/mm3      Neutrophil % 83.8 %      Lymphocyte % 8.2 %       Monocyte % 6.0 %      Eosinophil % 0.7 %      Basophil % 0.3 %      Immature Grans % 1.0 %      Neutrophils, Absolute 9.71 10*3/mm3      Lymphocytes, Absolute 0.95 10*3/mm3      Monocytes, Absolute 0.70 10*3/mm3      Eosinophils, Absolute 0.08 10*3/mm3      Basophils, Absolute 0.03 10*3/mm3      Immature Grans, Absolute 0.12 10*3/mm3      nRBC 0.0 /100 WBC     Blood Culture ID, PCR - Blood, Hand, Right [[id number]]  (Abnormal) Collected:  03/12/20 1354    Specimen:  Blood from Hand, Right Updated:  03/13/20 1951     BCID, PCR Staphylococcus species, not aureus. mecA (methicillin resistance gene) detected. Identification by BCID PCR.    MRSA Screen, PCR - Swab, Nares [[id number]]  (Normal) Collected:  03/13/20 0918    Specimen:  Swab from Nares Updated:  03/13/20 1101     MRSA, PCR Negative    Narrative:       Performed by real-time polymerase chain reaction (qPCR).    Scan Slide [[id number]] Collected:  03/13/20 0334    Specimen:  Blood Updated:  03/13/20 0552     Anisocytosis Slight/1+     Poikilocytes Slight/1+     WBC Morphology Normal     Platelet Estimate Adequate     Clumped Platelets Present    Comprehensive Metabolic Panel [[id number]]  (Abnormal) Collected:  03/13/20 0334    Specimen:  Blood Updated:  03/13/20 0441     Glucose 119 mg/dL      BUN 23 mg/dL      Creatinine 0.74 mg/dL      Sodium 136 mmol/L      Potassium 3.7 mmol/L      Chloride 103 mmol/L      CO2 21.0 mmol/L      Calcium 9.1 mg/dL      Total Protein 6.1 g/dL      Albumin 2.90 g/dL      ALT (SGPT) 19 U/L      AST (SGOT) 23 U/L      Alkaline Phosphatase 62 U/L      Total Bilirubin 0.2 mg/dL      eGFR Non African Amer 107 mL/min/1.73      Globulin 3.2 gm/dL      A/G Ratio 0.9 g/dL      BUN/Creatinine Ratio 31.1     Anion Gap 12.0 mmol/L     Narrative:       GFR Normal >60  Chronic Kidney Disease <60  Kidney Failure <15      CBC & Differential [[id number]] Collected:  03/13/20 0334    Specimen:  Blood Updated:  03/13/20 0414    Narrative:       The  following orders were created for panel order CBC & Differential.  Procedure                               Abnormality         Status                     ---------                               -----------         ------                     CBC Auto Differential[[id number]]        Abnormal            Final result                 Please view results for these tests on the individual orders.    CBC Auto Differential [[id number]]  (Abnormal) Collected:  03/13/20 0334    Specimen:  Blood Updated:  03/13/20 0414     WBC 10.02 10*3/mm3      RBC 3.47 10*6/mm3      Hemoglobin 10.9 g/dL      Hematocrit 31.6 %      MCV 91.1 fL      MCH 31.4 pg      MCHC 34.5 g/dL      RDW 13.4 %      RDW-SD 45.0 fl      MPV 10.4 fL      Platelets 205 10*3/mm3      Neutrophil % 87.5 %      Lymphocyte % 4.3 %      Monocyte % 6.8 %      Eosinophil % 0.0 %      Basophil % 0.4 %      Immature Grans % 1.0 %      Neutrophils, Absolute 8.77 10*3/mm3      Lymphocytes, Absolute 0.43 10*3/mm3      Monocytes, Absolute 0.68 10*3/mm3      Eosinophils, Absolute 0.00 10*3/mm3      Basophils, Absolute 0.04 10*3/mm3      Immature Grans, Absolute 0.10 10*3/mm3      nRBC 0.0 /100 WBC     Lactic Acid, Plasma [[id number]]  (Normal) Collected:  03/12/20 2013    Specimen:  Blood Updated:  03/12/20 2043     Lactate 2.0 mmol/L     CRE Screen by PCR - Swab, Large Intestine, Rectum [[id number]] Collected:  03/12/20 1435    Specimen:  Swab from Large Intestine, Rectum Updated:  03/12/20 1640     CRE SCREEN Not Detected     Comment: Test performed by real-time polymerase chain reaction (qPCR).        OXA 48 Strain Not Detected     IMP STRAIN Not Detected     VIM STRAIN Not Detected     NDM Strain Not Detected     KPC Strain Not Detected    Urinalysis With Microscopic If Indicated (No Culture) - Urine, Catheter [[id number]]  (Normal) Collected:  03/12/20 1302    Specimen:  Urine, Catheter Updated:  03/12/20 1314     Color, UA Yellow     Appearance, UA Clear     pH, UA 7.5      Specific Gravity, UA 1.006     Glucose, UA Negative     Ketones, UA Negative     Bilirubin, UA Negative     Blood, UA Negative     Protein, UA Negative     Leuk Esterase, UA Negative     Nitrite, UA Negative     Urobilinogen, UA 0.2 E.U./dL    Narrative:       Urine microscopic not indicated.    [place] Draw [[id number]] Collected:  03/12/20 1029    Specimen:  Blood Updated:  03/12/20 1145    Narrative:       The following orders were created for panel order [place] Draw.  Procedure                               Abnormality         Status                     ---------                               -----------         ------                     Light Blue Top[[id number]]                                   Final result               Green Top (Gel)[[id number]]                                  Final result               Lavender Top[[id number]]                                     Final result               Gold Top - SST[[id number]]                                   Final result                 Please view results for these tests on the individual orders.    [place] Top [[id number]] Collected:  03/12/20 1030    Specimen:  Blood Updated:  03/12/20 1145     Extra Tube hold for add-on     Comment: Auto resulted       Manual Differential [[id number]]  (Abnormal) Collected:  03/12/20 1030    Specimen:  Blood Updated:  03/12/20 1131     Neutrophil % 48.0 %      Lymphocyte % 12.0 %      Monocyte % 11.0 %      Bands %  28.0 %      Metamyelocyte % 1.0 %      Neutrophils Absolute 3.35 10*3/mm3      Lymphocytes Absolute 0.53 10*3/mm3      Monocytes Absolute 0.49 10*3/mm3      RBC Morphology Normal     WBC Morphology Normal     Platelet Estimate Adequate    Light Blue Top [[id number]] Collected:  03/12/20 1029    Specimen:  Blood Updated:  03/12/20 1130     Extra Tube hold for add-on     Comment: Auto resulted       Green Top (Gel) [[id number]] Collected:  03/12/20 1030    Specimen:  Blood Updated:  03/12/20 1130     Extra Tube Hold for  add-ons.     Comment: Auto resulted.       Gold Top - SST [[id number]] Collected:  03/12/20 1030    Specimen:  Blood Updated:  03/12/20 1130     Extra Tube Hold for add-ons.     Comment: Auto resulted.       CBC & Differential [[id number]] Collected:  03/12/20 1030    Specimen:  Blood Updated:  03/12/20 1113    Narrative:       The following orders were created for panel order CBC & Differential.  Procedure                               Abnormality         Status                     ---------                               -----------         ------                     CBC Auto Differential[[id number]]        Abnormal            Final result                 Please view results for these tests on the individual orders.    CBC Auto Differential [[id number]]  (Abnormal) Collected:  03/12/20 1030    Specimen:  Blood Updated:  03/12/20 1113     WBC 4.41 10*3/mm3      RBC 3.50 10*6/mm3      Hemoglobin 11.0 g/dL      Hematocrit 32.1 %      MCV 91.7 fL      MCH 31.4 pg      MCHC 34.3 g/dL      RDW 13.6 %      RDW-SD 45.9 fl      MPV 10.3 fL      Platelets 205 10*3/mm3     Comprehensive Metabolic Panel [[id number]]  (Abnormal) Collected:  03/12/20 1030    Specimen:  Blood Updated:  03/12/20 1109     Glucose 93 mg/dL      BUN 21 mg/dL      Creatinine 0.87 mg/dL      Sodium 138 mmol/L      Potassium 3.8 mmol/L      Chloride 101 mmol/L      CO2 26.0 mmol/L      Calcium 9.2 mg/dL      Total Protein 6.0 g/dL      Albumin 3.30 g/dL      ALT (SGPT) 17 U/L      AST (SGOT) 18 U/L      Alkaline Phosphatase 61 U/L      Total Bilirubin 0.2 mg/dL      eGFR Non African Amer 89 mL/min/1.73      Globulin 2.7 gm/dL      A/G Ratio 1.2 g/dL      BUN/Creatinine Ratio 24.1     Anion Gap 11.0 mmol/L     Narrative:       GFR Normal >60  Chronic Kidney Disease <60  Kidney Failure <15      Troponin [[id number]]  (Normal) Collected:  03/12/20 1030    Specimen:  Blood Updated:  03/12/20 1108     Troponin T <0.010 ng/mL     Narrative:       Troponin T  "Reference Range:  <= 0.03 ng/mL-   Negative for AMI  >0.03 ng/mL-     Abnormal for myocardial necrosis.  Clinicians would have to utilize clinical acumen, EKG, Troponin and serial changes to determine if it is an Acute Myocardial Infarction or myocardial injury due to an underlying chronic condition.       Results may be falsely decreased if patient taking Biotin.      BNP [[id number]]  (Normal) Collected:  03/12/20 1030    Specimen:  Blood Updated:  03/12/20 1107     proBNP 482.5 pg/mL     Narrative:       Among patients with dyspnea, NT-proBNP is highly sensitive for the detection of acute congestive heart failure. In addition NT-proBNP of <300 pg/ml effectively rules out acute congestive heart failure with 99% negative predictive value.    Results may be falsely decreased if patient taking Biotin.      Lactic Acid, Plasma [[id number]]  (Normal) Collected:  03/12/20 1030    Specimen:  Blood Updated:  03/12/20 1105     Lactate 2.0 mmol/L         /69 (BP Location: Left arm, Patient Position: Lying)   Pulse 91   Temp 98.4 °F (36.9 °C) (Temporal)   Resp 18   Ht 177.8 cm (70\")   Wt 63.1 kg (139 lb 3.2 oz)   SpO2 93%   BMI 19.97 kg/m²     Discharge Exam:  General Appearance:    Alert, cooperative, no distress                          Head:    Normocephalic, without obvious abnormality, atraumatic                          Eyes:                            Throat:   Lips, tongue, gums normal                          Neck:   Supple, symmetrical, trachea midline, no JVD                        Lungs:     Clear to auscultation bilaterally, respirations unlabored                Chest Wall:    No tenderness or deformity                        Heart:    Regular rate and rhythm, S1 and S2 normal, no murmur,no  Rub or gallop                  Abdomen:     Soft, non-tender, bowel sounds active, no masses, no organomegaly                  Extremities:   Extremities normal, atraumatic, no cyanosis or edema                    "          Skin:   Skin is warm and dry,  no rashes or palpable lesions                  Neurologic: He is autistic and nonverbal     Disposition:  Home    Patient Instructions:     Diet and activity as tolerated         Discharge Medications      New Medications      Instructions Start Date   amoxicillin-clavulanate 875-125 MG per tablet  Commonly known as:  AUGMENTIN   1 tablet, Oral, 2 Times Daily         Changes to Medications      Instructions Start Date   midodrine 5 MG tablet  Commonly known as:  PROAMATINE  What changed:  how much to take   5 mg, Oral, 3 Times Daily         Continue These Medications      Instructions Start Date   CALCIUM 600+D 600-400 MG-UNIT per tablet  Generic drug:  calcium carbonate-vitamin d   1 tablet, Oral, 2 Times Daily      CERTAVITE/ANTIOXIDANTS PO   1 tablet, Oral, Once      ferrous sulfate 325 (65 FE) MG tablet   325 mg, Oral, Daily With Breakfast      FLUoxetine 20 MG capsule  Commonly known as:  PROzac   20 mg, Oral, Daily      lamoTRIgine 200 MG tablet  Commonly known as:  LaMICtal   200 mg, Oral, 2 Times Daily      levETIRAcetam 1000 MG tablet  Commonly known as:  KEPPRA   1,000 mg, Oral, 2 Times Daily      levETIRAcetam 500 MG tablet  Commonly known as:  KEPPRA   500 mg, Oral, 2 Times Daily      levETIRAcetam 250 MG tablet  Commonly known as:  KEPPRA   250 mg, Oral, 2 Times Daily      omeprazole 20 MG capsule  Commonly known as:  priLOSEC   20 mg, Oral, Daily      polyethylene glycol packet  Commonly known as:  MIRALAX   17 g, Oral, Nightly      simvastatin 20 MG tablet  Commonly known as:  ZOCOR   20 mg, Oral, Nightly      thioridazine 100 MG tablet  Commonly known as:  MELLARIL   100 mg, Oral, 3 Times Daily           Future Appointments   Date Time Provider Department Center   6/23/2020 10:00 AM Earnestine Reid MD [facility] None     Follow-up Information     Bautista James MD Follow up in 1 week(s).    Specialty:  Family Medicine  Why:  Needs follow-up chest x-ray  in about a month to ensure his pneumonia is clearing  Contact information:  [address] [facility] [address]  [phone]                 Discharge Order (From admission, onward)     Start     Ordered    03/16/20 1045  Discharge patient  Once     Expected Discharge Date:  03/16/20    Discharge Disposition:  Home or Self Care    Physician of Record for Attribution - Please select from Treatment Team:  LUIS MOHAN [[id number]]    Review needed by CMO to determine Physician of Record:  No       Question Answer Comment   Physician of Record for Attribution - Please select from Treatment Team LUIS MOHAN    Review needed by CMO to determine Physician of Record No        03/16/20 1047                Total time spent discharging patient including evaluation,post hospitalization follow up,  medication and post hospitalization instructions and education total time exceeds 30 minutes.    Signed:  Luis Mohan MD  3/16/2020  10:47

## 2020-03-16 NOTE — PLAN OF CARE
Problem: Patient Care Overview  Goal: Plan of Care Review  Outcome: Ongoing (interventions implemented as appropriate)  Flowsheets  Taken 3/16/2020 0142  Progress: improving  Outcome Summary: pt rested well tonight. vital signs stable. no s/s of distress.  Taken 3/15/2020 2000  Plan of Care Reviewed With: caregiver;patient

## 2020-03-17 ENCOUNTER — READMISSION MANAGEMENT (OUTPATIENT)
Dept: CALL CENTER | Facility: HOSPITAL | Age: 65
End: 2020-03-17

## 2020-03-17 LAB — BACTERIA SPEC AEROBE CULT: NORMAL

## 2020-03-17 NOTE — OUTREACH NOTE
Prep Survey      Responses   [facility] facility patient discharged from?  [place]   Is LACE score < 7 ?  No   Eligibility  Not Eligible   What are the reasons patient is not eligible?  [facility]   Does the patient have one of the following disease processes/diagnoses(primary or secondary)?  Sepsis   Prep survey completed?  Yes          Lina Seaman RN

## 2020-03-24 DIAGNOSIS — J18.9 PNEUMONIA OF LEFT UPPER LOBE DUE TO INFECTIOUS ORGANISM: Primary | ICD-10-CM

## 2020-03-24 DIAGNOSIS — Z87.01 HISTORY OF PNEUMONIA: ICD-10-CM

## 2020-06-05 DIAGNOSIS — F41.9 ANXIETY: ICD-10-CM

## 2020-06-05 DIAGNOSIS — F84.0 AUTISM: Primary | ICD-10-CM

## 2020-06-05 RX ORDER — LORAZEPAM 0.5 MG/1
0.5 TABLET ORAL 2 TIMES DAILY
Qty: 62 TABLET | Refills: 5 | Status: SHIPPED | OUTPATIENT
Start: 2020-06-05 | End: 2020-10-30

## 2020-06-23 ENCOUNTER — OFFICE VISIT (OUTPATIENT)
Dept: CARDIOLOGY | Facility: CLINIC | Age: 65
End: 2020-06-23

## 2020-06-23 ENCOUNTER — DOCUMENTATION (OUTPATIENT)
Dept: CARDIOLOGY | Facility: CLINIC | Age: 65
End: 2020-06-23

## 2020-06-23 VITALS
HEIGHT: 70 IN | BODY MASS INDEX: 18.61 KG/M2 | DIASTOLIC BLOOD PRESSURE: 80 MMHG | WEIGHT: 130 LBS | SYSTOLIC BLOOD PRESSURE: 140 MMHG

## 2020-06-23 DIAGNOSIS — I95.1 ORTHOSTATIC HYPOTENSION: ICD-10-CM

## 2020-06-23 DIAGNOSIS — R56.9 SEIZURES (HCC): ICD-10-CM

## 2020-06-23 DIAGNOSIS — R55 VASOVAGAL SYNCOPE: Primary | ICD-10-CM

## 2020-06-23 DIAGNOSIS — E78.5 HYPERLIPIDEMIA, UNSPECIFIED HYPERLIPIDEMIA TYPE: ICD-10-CM

## 2020-06-23 PROCEDURE — 99213 OFFICE O/P EST LOW 20 MIN: CPT | Performed by: INTERNAL MEDICINE

## 2020-06-23 NOTE — PROGRESS NOTES
Contacted chris about the care that was given to the patient today,   When talking with Dr. Reid, he stated that he asked the two care givers if they had any questions and why the patient is here.     Informed my practice manager about everything that is going on.

## 2020-06-23 NOTE — PROGRESS NOTES
Bautista Grubbs  64 y.o. male    6/23/2020      1. Vasovagal syncope    2. Hyperlipidemia, unspecified hyperlipidemia type    3. Orthostatic hypotension    4. Seizures (CMS/HCC)        History of Present Illness:    Patient's Body mass index is 18.65 kg/m². BMI is within normal parameters. No follow-up required..    64 y.o. male who presented for routine follow-up and recently evaluated at [facility] for lung infection treated subsequent discharge and is back to baseline. walked into the room 2 attendants or caregiver was present and asked if there is any question the need to addressed told he is doing fine and we do not know why even we  here may be routine appointment and the patient had history of tremor start left, another moment he covered his face sitting upright in chair the was evaluated with history of recurrent postural syncope and documented history of orthostasis.  Previously evaluated by Dr. Can and subsequently had evaluation performed by neurosurgeon for management of abnormal CT scan finding no intervention performed.  Patient have recurrent postural syncope with a documented history of orthostasis  The patient is intellectually disabled and resides at a facility.  He also has been diagnosed with major depressive disorder, seizure disorder and autism.  He is nonverbal.  There was a report of fluctuating blood pressure.    during previous ER evaluation for syncope reported orthostasis and similar finding also reported by caregiver.  The patient has had documented postural changes in his blood pressure.   patient unable to provide information has a history of intellectually disability .  A 2-D TTE was obtained that was technically difficult because he was uncooperative.  All valvular structures were not visualized.  However, his left ventricular ejection fraction appeared to be normal.  .      ECHO 12/2018  · The quality of the study is limited due to poor acoustic windows related  to limited views obtained and an uncooperative patient  · Grossly preserved systolic biventricular function. Estimated LVEF 60%.  · No significant valvular abnormalities given the limitations of the study.        CT 1/2019  FINDINGS:   There is acute extra-axial blood in the posterior RIGHT parietal region  that measures 7 mm in thickness. The midline structures are  nondisplaced. There is moderate, generalized volume loss.     A small amount of intracranial hemorrhage is noted in the inferior RIGHT  frontal region on coronal image 10.     A nondepressed skull fracture extends through the LEFT parietal and  temporal bones and into the skull base. This further extends into the  greater wing of the LEFT sphenoid on axial image 8.     IMPRESSION:  1. Small extra-axial hemorrhage in the RIGHT parietal region is likely  due to a contrecoup injury. There is no significant mass effect.  2. Nondepressed skull fracture extending from the through the LEFT  parietal and temporal bones and into the greater wing of the LEFT  sphenoid.  3. Small amount of intraparenchymal hemorrhage in the RIGHT frontal  lobe, inferiorly.          SUBJECTIVE:    Allergies   Allergen Reactions   • Haldol [Haloperidol] Unknown (See Comments)     Unknown           Past Medical History:   Diagnosis Date   • Alopecia    • Autism    • COPD (chronic obstructive pulmonary disease) (CMS/HCC)    • GERD (gastroesophageal reflux disease)    • Hyperlipidemia    • Insomnia    • Intellectual disability    • Lennox-Gastaut syndrome (CMS/HCC)    • Major depressive disorder    • Orthostatic hypotension    • Osteoporosis    • Right bundle branch block    • Scoliosis    • Seizures (CMS/HCC)          History reviewed. No pertinent surgical history.      Family History   Problem Relation Age of Onset   • Hypertension Father          Social History     Socioeconomic History   • Marital status: Single     Spouse name: Not on file   • Number of children: Not on file   •  Years of education: Not on file   • Highest education level: Not on file   Tobacco Use   • Smoking status: Never Smoker   • Smokeless tobacco: Never Used   Substance and Sexual Activity   • Alcohol use: Not Currently   • Drug use: Not Currently   • Sexual activity: Not Currently         Current Outpatient Medications   Medication Sig Dispense Refill   • calcium carbonate-vitamin d (CALCIUM 600+D) 600-400 MG-UNIT per tablet Take 1 tablet by mouth 2 (Two) Times a Day.     • ferrous sulfate 325 (65 FE) MG tablet Take 325 mg by mouth Daily With Breakfast.     • FLUoxetine (PROzac) 20 MG capsule Take 20 mg by mouth Daily.     • lamoTRIgine (LaMICtal) 200 MG tablet Take 200 mg by mouth 2 (Two) Times a Day.     • levETIRAcetam (KEPPRA) 1000 MG tablet Take 1,000 mg by mouth 2 (Two) Times a Day.     • levETIRAcetam (KEPPRA) 250 MG tablet Take 250 mg by mouth 2 (Two) Times a Day.     • levETIRAcetam (KEPPRA) 500 MG tablet Take 500 mg by mouth 2 (Two) Times a Day.     • LORazepam (Ativan) 0.5 MG tablet Take 1 tablet by mouth 2 (two) times a day. 62 tablet 5   • midodrine (PROAMATINE) 5 MG tablet Take 1 tablet by mouth 3 (Three) Times a Day. (Patient taking differently: Take 10 mg by mouth 3 (Three) Times a Day.) 90 tablet 3   • Multiple Vitamins-Minerals (CERTAVITE/ANTIOXIDANTS PO) Take 1 tablet by mouth 1 (One) Time.     • omeprazole (priLOSEC) 20 MG capsule Take 20 mg by mouth Daily.     • polyethylene glycol (MIRALAX) packet Take 17 g by mouth Every Night.     • simvastatin (ZOCOR) 20 MG tablet Take 20 mg by mouth Every Night.     • thioridazine (MELLARIL) 100 MG tablet Take 100 mg by mouth 3 (Three) Times a Day.       No current facility-administered medications for this visit.            Review of Systems:     Difficult to obtain a review of system given the mental status however no significant change reported and no further syncope reported.  The patient had a history of intracranial bleeding medically managed no  "recurrent seizure reported       OBJECTIVE:    /80   Ht 177.8 cm (70\")   Wt 59 kg (130 lb)   BMI 18.65 kg/m²       Physical Exam:     Patient was sitting upright in chair and no complaints reported by the caregiver and his blood pressure today 140/80 Is nonverbal and sometimes start laughing and at the time he covered his face and head tremor and extremity        Procedures      Lab Results   Component Value Date    WBC 7.19 03/16/2020    HGB 11.1 (L) 03/16/2020    HCT 31.7 (L) 03/16/2020    MCV 89.3 03/16/2020     03/16/2020     Lab Results   Component Value Date    GLUCOSE 87 03/16/2020    BUN 13 03/16/2020    CREATININE 0.67 (L) 03/16/2020    EGFRIFNONA 120 03/16/2020    BCR 19.4 03/16/2020    CO2 25.0 03/16/2020    CALCIUM 8.7 03/16/2020    ALBUMIN 2.90 (L) 03/13/2020    AST 23 03/13/2020    ALT 19 03/13/2020     No results found for: CHOL  No results found for: TRIG  No results found for: HDL  No components found for: LDLCALC  No results found for: LDL  No results found for: HDLLDLRATIO  No components found for: CHOLHDL  No results found for: HGBA1C  No results found for: TSH, H9NJBYI, H7KMASP, THYROIDAB        ASSESSMENT AND PLAN:  #1 recurrent syncope  #2 postural orthostasis #  3 intellectual disability with history of seizure disorder     64 years old patient with a history of recurrent postural syncope with a documented history of orthostasis evaluated by Dr. Can and subsequently referred for further evaluations.  Patient accompanied by caregiver.  Patient unable to provide information.    As previously recommended will continue with the fluid intake and midodrine 5 mg p.o. 3 times daily and dose can be adjusted depends upon patient's hemodynamic   Not sure how much the patient will follow .      Patient is on multiple antiepileptic medications will see him back in 12month R depends on patient clinical conditions        Bautista was seen today for follow-up.    Diagnoses and all orders " for this visit:    Vasovagal syncope    Hyperlipidemia, unspecified hyperlipidemia type    Orthostatic hypotension    Seizures (CMS/HCC)        Earnestine Reid MD  6/23/2020  10:15

## 2020-09-29 ENCOUNTER — APPOINTMENT (OUTPATIENT)
Dept: CT IMAGING | Facility: HOSPITAL | Age: 65
End: 2020-09-29

## 2020-09-29 ENCOUNTER — HOSPITAL ENCOUNTER (EMERGENCY)
Facility: HOSPITAL | Age: 65
Discharge: HOME | End: 2020-09-29
Attending: EMERGENCY MEDICINE | Admitting: EMERGENCY MEDICINE

## 2020-09-29 ENCOUNTER — APPOINTMENT (OUTPATIENT)
Dept: GENERAL RADIOLOGY | Facility: HOSPITAL | Age: 65
End: 2020-09-29

## 2020-09-29 VITALS
OXYGEN SATURATION: 99 % | WEIGHT: 137 LBS | HEART RATE: 97 BPM | BODY MASS INDEX: 20.76 KG/M2 | TEMPERATURE: 99.3 F | RESPIRATION RATE: 18 BRPM | DIASTOLIC BLOOD PRESSURE: 72 MMHG | HEIGHT: 68 IN | SYSTOLIC BLOOD PRESSURE: 118 MMHG

## 2020-09-29 DIAGNOSIS — J18.9 PNEUMONIA OF LEFT LOWER LOBE DUE TO INFECTIOUS ORGANISM: Primary | ICD-10-CM

## 2020-09-29 LAB
ALBUMIN SERPL-MCNC: 4.1 G/DL (ref 3.5–5.2)
ALBUMIN/GLOB SERPL: 1.6 G/DL
ALP SERPL-CCNC: 73 U/L (ref 39–117)
ALT SERPL W P-5'-P-CCNC: 29 U/L (ref 1–41)
AMYLASE SERPL-CCNC: 76 U/L (ref 28–100)
ANION GAP SERPL CALCULATED.3IONS-SCNC: 7 MMOL/L (ref 5–15)
AST SERPL-CCNC: 23 U/L (ref 1–40)
BASOPHILS # BLD AUTO: 0.02 10*3/MM3 (ref 0–0.2)
BASOPHILS NFR BLD AUTO: 0.2 % (ref 0–1.5)
BILIRUB SERPL-MCNC: 0.3 MG/DL (ref 0–1.2)
BILIRUB UR QL STRIP: NEGATIVE
BUN SERPL-MCNC: 25 MG/DL (ref 8–23)
BUN/CREAT SERPL: 24.3 (ref 7–25)
CALCIUM SPEC-SCNC: 9.7 MG/DL (ref 8.6–10.5)
CHLORIDE SERPL-SCNC: 97 MMOL/L (ref 98–107)
CLARITY UR: CLEAR
CO2 SERPL-SCNC: 26 MMOL/L (ref 22–29)
COLOR UR: YELLOW
CREAT SERPL-MCNC: 1.03 MG/DL (ref 0.76–1.27)
DEPRECATED RDW RBC AUTO: 41.4 FL (ref 37–54)
EOSINOPHIL # BLD AUTO: 0.01 10*3/MM3 (ref 0–0.4)
EOSINOPHIL NFR BLD AUTO: 0.1 % (ref 0.3–6.2)
ERYTHROCYTE [DISTWIDTH] IN BLOOD BY AUTOMATED COUNT: 12.5 % (ref 12.3–15.4)
GFR SERPL CREATININE-BSD FRML MDRD: 73 ML/MIN/1.73
GLOBULIN UR ELPH-MCNC: 2.6 GM/DL
GLUCOSE BLDC GLUCOMTR-MCNC: 108 MG/DL (ref 70–130)
GLUCOSE SERPL-MCNC: 101 MG/DL (ref 65–99)
GLUCOSE UR STRIP-MCNC: NEGATIVE MG/DL
HCT VFR BLD AUTO: 38.9 % (ref 37.5–51)
HGB BLD-MCNC: 13.5 G/DL (ref 13–17.7)
HGB UR QL STRIP.AUTO: NEGATIVE
HOLD SPECIMEN: NORMAL
HOLD SPECIMEN: NORMAL
IMM GRANULOCYTES # BLD AUTO: 0.04 10*3/MM3 (ref 0–0.05)
IMM GRANULOCYTES NFR BLD AUTO: 0.5 % (ref 0–0.5)
INR PPP: 0.95 (ref 0.8–1.2)
KETONES UR QL STRIP: NEGATIVE
LEUKOCYTE ESTERASE UR QL STRIP.AUTO: NEGATIVE
LYMPHOCYTES # BLD AUTO: 0.3 10*3/MM3 (ref 0.7–3.1)
LYMPHOCYTES NFR BLD AUTO: 3.6 % (ref 19.6–45.3)
MAGNESIUM SERPL-MCNC: 1.9 MG/DL (ref 1.6–2.4)
MCH RBC QN AUTO: 31.7 PG (ref 26.6–33)
MCHC RBC AUTO-ENTMCNC: 34.7 G/DL (ref 31.5–35.7)
MCV RBC AUTO: 91.3 FL (ref 79–97)
MONOCYTES # BLD AUTO: 0.53 10*3/MM3 (ref 0.1–0.9)
MONOCYTES NFR BLD AUTO: 6.3 % (ref 5–12)
NEUTROPHILS NFR BLD AUTO: 7.46 10*3/MM3 (ref 1.7–7)
NEUTROPHILS NFR BLD AUTO: 89.3 % (ref 42.7–76)
NITRITE UR QL STRIP: NEGATIVE
NRBC BLD AUTO-RTO: 0 /100 WBC (ref 0–0.2)
PH UR STRIP.AUTO: 8 [PH] (ref 5–9)
PLATELET # BLD AUTO: 218 10*3/MM3 (ref 140–450)
PMV BLD AUTO: 10.1 FL (ref 6–12)
POTASSIUM SERPL-SCNC: 4.5 MMOL/L (ref 3.5–5.2)
PROT SERPL-MCNC: 6.7 G/DL (ref 6–8.5)
PROT UR QL STRIP: NEGATIVE
PROTHROMBIN TIME: 13 SECONDS (ref 11.1–15.3)
RBC # BLD AUTO: 4.26 10*6/MM3 (ref 4.14–5.8)
SARS-COV-2 N GENE RESP QL NAA+PROBE: NOT DETECTED
SODIUM SERPL-SCNC: 130 MMOL/L (ref 136–145)
SP GR UR STRIP: 1.01 (ref 1–1.03)
TROPONIN T SERPL-MCNC: <0.01 NG/ML (ref 0–0.03)
UROBILINOGEN UR QL STRIP: NORMAL
WBC # BLD AUTO: 8.36 10*3/MM3 (ref 3.4–10.8)
WHOLE BLOOD HOLD SPECIMEN: NORMAL
WHOLE BLOOD HOLD SPECIMEN: NORMAL

## 2020-09-29 PROCEDURE — 85610 PROTHROMBIN TIME: CPT | Performed by: NURSE PRACTITIONER

## 2020-09-29 PROCEDURE — 93010 ELECTROCARDIOGRAM REPORT: CPT | Performed by: INTERNAL MEDICINE

## 2020-09-29 PROCEDURE — 82150 ASSAY OF AMYLASE: CPT | Performed by: NURSE PRACTITIONER

## 2020-09-29 PROCEDURE — C9803 HOPD COVID-19 SPEC COLLECT: HCPCS | Performed by: NURSE PRACTITIONER

## 2020-09-29 PROCEDURE — 82962 GLUCOSE BLOOD TEST: CPT

## 2020-09-29 PROCEDURE — 99284 EMERGENCY DEPT VISIT MOD MDM: CPT

## 2020-09-29 PROCEDURE — 25010000002 LEVOFLOXACIN PER 250 MG: Performed by: NURSE PRACTITIONER

## 2020-09-29 PROCEDURE — 87635 SARS-COV-2 COVID-19 AMP PRB: CPT | Performed by: NURSE PRACTITIONER

## 2020-09-29 PROCEDURE — 85025 COMPLETE CBC W/AUTO DIFF WBC: CPT | Performed by: NURSE PRACTITIONER

## 2020-09-29 PROCEDURE — 93005 ELECTROCARDIOGRAM TRACING: CPT | Performed by: NURSE PRACTITIONER

## 2020-09-29 PROCEDURE — 80053 COMPREHEN METABOLIC PANEL: CPT | Performed by: NURSE PRACTITIONER

## 2020-09-29 PROCEDURE — 84484 ASSAY OF TROPONIN QUANT: CPT | Performed by: NURSE PRACTITIONER

## 2020-09-29 PROCEDURE — 83735 ASSAY OF MAGNESIUM: CPT | Performed by: NURSE PRACTITIONER

## 2020-09-29 PROCEDURE — 81003 URINALYSIS AUTO W/O SCOPE: CPT | Performed by: NURSE PRACTITIONER

## 2020-09-29 PROCEDURE — 96365 THER/PROPH/DIAG IV INF INIT: CPT

## 2020-09-29 PROCEDURE — 71045 X-RAY EXAM CHEST 1 VIEW: CPT

## 2020-09-29 PROCEDURE — P9612 CATHETERIZE FOR URINE SPEC: HCPCS

## 2020-09-29 RX ORDER — LEVOFLOXACIN 5 MG/ML
750 INJECTION, SOLUTION INTRAVENOUS ONCE
Status: COMPLETED | OUTPATIENT
Start: 2020-09-29 | End: 2020-09-29

## 2020-09-29 RX ORDER — LEVOFLOXACIN 5 MG/ML
500 INJECTION, SOLUTION INTRAVENOUS ONCE
Status: DISCONTINUED | OUTPATIENT
Start: 2020-09-29 | End: 2020-09-29

## 2020-09-29 RX ORDER — DENOSUMAB 60 MG/ML
60 INJECTION SUBCUTANEOUS
COMMUNITY

## 2020-09-29 RX ORDER — SODIUM CHLORIDE 0.9 % (FLUSH) 0.9 %
10 SYRINGE (ML) INJECTION AS NEEDED
Status: DISCONTINUED | OUTPATIENT
Start: 2020-09-29 | End: 2020-09-29 | Stop reason: HOSPADM

## 2020-09-29 RX ORDER — LEVOFLOXACIN 750 MG/1
750 TABLET ORAL DAILY
Qty: 5 TABLET | Refills: 0 | Status: SHIPPED | OUTPATIENT
Start: 2020-09-29 | End: 2020-10-04

## 2020-09-29 RX ADMIN — LEVOFLOXACIN 750 MG: 5 INJECTION, SOLUTION INTRAVENOUS at 12:36

## 2020-09-29 RX ADMIN — SODIUM CHLORIDE 1000 ML: 900 INJECTION, SOLUTION INTRAVENOUS at 11:00

## 2020-09-30 ENCOUNTER — TELEPHONE (OUTPATIENT)
Dept: OTHER | Facility: HOSPITAL | Age: 65
End: 2020-09-30

## 2020-09-30 NOTE — TELEPHONE ENCOUNTER
Spoke with Kelly at [facility] regarding patient's negative COVID results after being given permission by guardian, Andres, to do so.

## 2020-09-30 NOTE — TELEPHONE ENCOUNTER
Spoke with patient's guardian, [place], and gave negative COVID results. Given permission by guardian to contact [facility] with negative results as well.

## 2020-10-30 DIAGNOSIS — F84.0 AUTISM: ICD-10-CM

## 2020-10-30 DIAGNOSIS — F41.9 ANXIETY: ICD-10-CM

## 2020-10-30 RX ORDER — LORAZEPAM 0.5 MG/1
TABLET ORAL
Qty: 60 TABLET | Refills: 5 | Status: SHIPPED | OUTPATIENT
Start: 2020-10-30 | End: 2021-04-20 | Stop reason: SDUPTHER

## 2021-01-05 ENCOUNTER — APPOINTMENT (OUTPATIENT)
Dept: CT IMAGING | Facility: HOSPITAL | Age: 66
End: 2021-01-05

## 2021-01-05 ENCOUNTER — HOSPITAL ENCOUNTER (INPATIENT)
Facility: HOSPITAL | Age: 66
LOS: 1 days | Discharge: HOME | End: 2021-01-09
Attending: EMERGENCY MEDICINE | Admitting: INTERNAL MEDICINE

## 2021-01-05 DIAGNOSIS — D64.9 ANEMIA, UNSPECIFIED TYPE: Primary | ICD-10-CM

## 2021-01-05 DIAGNOSIS — K92.2 GASTROINTESTINAL HEMORRHAGE, UNSPECIFIED GASTROINTESTINAL HEMORRHAGE TYPE: ICD-10-CM

## 2021-01-05 DIAGNOSIS — D50.0 IRON DEFICIENCY ANEMIA DUE TO CHRONIC BLOOD LOSS: ICD-10-CM

## 2021-01-05 LAB
ALBUMIN SERPL-MCNC: 3.1 G/DL (ref 3.5–5.2)
ALBUMIN/GLOB SERPL: 1.5 G/DL
ALP SERPL-CCNC: 67 U/L (ref 39–117)
ALT SERPL W P-5'-P-CCNC: 19 U/L (ref 1–41)
ANION GAP SERPL CALCULATED.3IONS-SCNC: 6 MMOL/L (ref 5–15)
AST SERPL-CCNC: 17 U/L (ref 1–40)
BASOPHILS # BLD AUTO: 0.03 10*3/MM3 (ref 0–0.2)
BASOPHILS NFR BLD AUTO: 0.7 % (ref 0–1.5)
BILIRUB SERPL-MCNC: <0.2 MG/DL (ref 0–1.2)
BUN SERPL-MCNC: 16 MG/DL (ref 8–23)
BUN/CREAT SERPL: 18.2 (ref 7–25)
CALCIUM SPEC-SCNC: 8.3 MG/DL (ref 8.6–10.5)
CHLORIDE SERPL-SCNC: 108 MMOL/L (ref 98–107)
CO2 SERPL-SCNC: 27 MMOL/L (ref 22–29)
CREAT SERPL-MCNC: 0.88 MG/DL (ref 0.76–1.27)
DEPRECATED RDW RBC AUTO: 46.9 FL (ref 37–54)
EOSINOPHIL # BLD AUTO: 0.15 10*3/MM3 (ref 0–0.4)
EOSINOPHIL NFR BLD AUTO: 3.5 % (ref 0.3–6.2)
ERYTHROCYTE [DISTWIDTH] IN BLOOD BY AUTOMATED COUNT: 14.1 % (ref 12.3–15.4)
GFR SERPL CREATININE-BSD FRML MDRD: 87 ML/MIN/1.73
GLOBULIN UR ELPH-MCNC: 2.1 GM/DL
GLUCOSE SERPL-MCNC: 95 MG/DL (ref 65–99)
HCT VFR BLD AUTO: 24.6 % (ref 37.5–51)
HGB BLD-MCNC: 8.2 G/DL (ref 13–17.7)
HOLD SPECIMEN: NORMAL
IMM GRANULOCYTES # BLD AUTO: 0.03 10*3/MM3 (ref 0–0.05)
IMM GRANULOCYTES NFR BLD AUTO: 0.7 % (ref 0–0.5)
INR PPP: 0.99 (ref 0.8–1.2)
LYMPHOCYTES # BLD AUTO: 0.6 10*3/MM3 (ref 0.7–3.1)
LYMPHOCYTES NFR BLD AUTO: 14.1 % (ref 19.6–45.3)
MCH RBC QN AUTO: 31.1 PG (ref 26.6–33)
MCHC RBC AUTO-ENTMCNC: 33.3 G/DL (ref 31.5–35.7)
MCV RBC AUTO: 93.2 FL (ref 79–97)
MONOCYTES # BLD AUTO: 0.37 10*3/MM3 (ref 0.1–0.9)
MONOCYTES NFR BLD AUTO: 8.7 % (ref 5–12)
NEUTROPHILS NFR BLD AUTO: 3.07 10*3/MM3 (ref 1.7–7)
NEUTROPHILS NFR BLD AUTO: 72.3 % (ref 42.7–76)
NRBC BLD AUTO-RTO: 0 /100 WBC (ref 0–0.2)
PLATELET # BLD AUTO: 250 10*3/MM3 (ref 140–450)
PMV BLD AUTO: 10.2 FL (ref 6–12)
POTASSIUM SERPL-SCNC: 3.7 MMOL/L (ref 3.5–5.2)
PROT SERPL-MCNC: 5.2 G/DL (ref 6–8.5)
PROTHROMBIN TIME: 13.5 SECONDS (ref 11.1–15.3)
RBC # BLD AUTO: 2.64 10*6/MM3 (ref 4.14–5.8)
SODIUM SERPL-SCNC: 141 MMOL/L (ref 136–145)
WBC # BLD AUTO: 4.25 10*3/MM3 (ref 3.4–10.8)

## 2021-01-05 PROCEDURE — G0378 HOSPITAL OBSERVATION PER HR: HCPCS

## 2021-01-05 PROCEDURE — 85610 PROTHROMBIN TIME: CPT | Performed by: EMERGENCY MEDICINE

## 2021-01-05 PROCEDURE — 74177 CT ABD & PELVIS W/CONTRAST: CPT

## 2021-01-05 PROCEDURE — 99285 EMERGENCY DEPT VISIT HI MDM: CPT

## 2021-01-05 PROCEDURE — 85025 COMPLETE CBC W/AUTO DIFF WBC: CPT | Performed by: EMERGENCY MEDICINE

## 2021-01-05 PROCEDURE — 80053 COMPREHEN METABOLIC PANEL: CPT | Performed by: EMERGENCY MEDICINE

## 2021-01-05 PROCEDURE — 25010000002 IOPAMIDOL 61 % SOLUTION: Performed by: EMERGENCY MEDICINE

## 2021-01-05 RX ORDER — SODIUM CHLORIDE 0.9 % (FLUSH) 0.9 %
10 SYRINGE (ML) INJECTION EVERY 12 HOURS SCHEDULED
Status: DISCONTINUED | OUTPATIENT
Start: 2021-01-05 | End: 2021-01-09 | Stop reason: HOSPADM

## 2021-01-05 RX ORDER — LEVETIRACETAM 500 MG/1
1000 TABLET ORAL 2 TIMES DAILY
Status: DISCONTINUED | OUTPATIENT
Start: 2021-01-05 | End: 2021-01-09 | Stop reason: HOSPADM

## 2021-01-05 RX ORDER — PANTOPRAZOLE SODIUM 40 MG/10ML
40 INJECTION, POWDER, LYOPHILIZED, FOR SOLUTION INTRAVENOUS
Status: DISCONTINUED | OUTPATIENT
Start: 2021-01-06 | End: 2021-01-06

## 2021-01-05 RX ORDER — MIDODRINE HYDROCHLORIDE 5 MG/1
5 TABLET ORAL
Status: DISCONTINUED | OUTPATIENT
Start: 2021-01-05 | End: 2021-01-06

## 2021-01-05 RX ORDER — MULTIPLE VITAMINS W/ MINERALS TAB 9MG-400MCG
1 TAB ORAL ONCE
Status: COMPLETED | OUTPATIENT
Start: 2021-01-06 | End: 2021-01-06

## 2021-01-05 RX ORDER — SODIUM CHLORIDE 0.9 % (FLUSH) 0.9 %
10 SYRINGE (ML) INJECTION AS NEEDED
Status: DISCONTINUED | OUTPATIENT
Start: 2021-01-05 | End: 2021-01-09 | Stop reason: HOSPADM

## 2021-01-05 RX ORDER — LEVETIRACETAM 500 MG/1
500 TABLET ORAL EVERY 12 HOURS SCHEDULED
Status: DISCONTINUED | OUTPATIENT
Start: 2021-01-05 | End: 2021-01-09 | Stop reason: HOSPADM

## 2021-01-05 RX ORDER — FERROUS SULFATE TAB EC 324 MG (65 MG FE EQUIVALENT) 324 (65 FE) MG
324 TABLET DELAYED RESPONSE ORAL
Status: DISCONTINUED | OUTPATIENT
Start: 2021-01-06 | End: 2021-01-09 | Stop reason: HOSPADM

## 2021-01-05 RX ORDER — DEXTROSE AND SODIUM CHLORIDE 5; .45 G/100ML; G/100ML
30 INJECTION, SOLUTION INTRAVENOUS CONTINUOUS PRN
Status: CANCELLED | OUTPATIENT
Start: 2021-01-07

## 2021-01-05 RX ORDER — LORAZEPAM 0.5 MG/1
0.5 TABLET ORAL EVERY 12 HOURS SCHEDULED
Status: DISCONTINUED | OUTPATIENT
Start: 2021-01-05 | End: 2021-01-09 | Stop reason: HOSPADM

## 2021-01-05 RX ORDER — LAMOTRIGINE 100 MG/1
200 TABLET ORAL 2 TIMES DAILY
Status: DISCONTINUED | OUTPATIENT
Start: 2021-01-05 | End: 2021-01-09 | Stop reason: HOSPADM

## 2021-01-05 RX ORDER — ATORVASTATIN CALCIUM 10 MG/1
10 TABLET, FILM COATED ORAL DAILY
Status: DISCONTINUED | OUTPATIENT
Start: 2021-01-06 | End: 2021-01-09 | Stop reason: HOSPADM

## 2021-01-05 RX ORDER — LEVETIRACETAM 250 MG/1
250 TABLET ORAL 2 TIMES DAILY
Status: DISCONTINUED | OUTPATIENT
Start: 2021-01-05 | End: 2021-01-09 | Stop reason: HOSPADM

## 2021-01-05 RX ADMIN — Medication 1 TABLET: at 21:18

## 2021-01-05 RX ADMIN — SODIUM CHLORIDE 500 ML: 9 INJECTION, SOLUTION INTRAVENOUS at 10:59

## 2021-01-05 RX ADMIN — SODIUM CHLORIDE, PRESERVATIVE FREE 10 ML: 5 INJECTION INTRAVENOUS at 21:20

## 2021-01-05 RX ADMIN — LAMOTRIGINE 200 MG: 100 TABLET ORAL at 21:19

## 2021-01-05 RX ADMIN — IOPAMIDOL 80 ML: 612 INJECTION, SOLUTION INTRAVENOUS at 13:00

## 2021-01-05 RX ADMIN — MIDODRINE HYDROCHLORIDE 5 MG: 5 TABLET ORAL at 19:44

## 2021-01-05 RX ADMIN — THIORIDAZINE HYDROCHLORIDE 100 MG: 50 TABLET, FILM COATED ORAL at 21:20

## 2021-01-05 RX ADMIN — LORAZEPAM 0.5 MG: 0.5 TABLET ORAL at 21:19

## 2021-01-05 RX ADMIN — LEVETIRACETAM 250 MG: 250 TABLET ORAL at 21:20

## 2021-01-05 RX ADMIN — LEVETIRACETAM 1000 MG: 500 TABLET ORAL at 21:19

## 2021-01-05 RX ADMIN — LEVETIRACETAM 500 MG: 500 TABLET ORAL at 21:20

## 2021-01-05 NOTE — ED NOTES
Per [facility] nurse, patient had coffee ground emesis and dark tarry stools. Pt was seen at [facility] and did not receive a scope. Pt sent here for further evaluation.      Sobia Moscoso, RN  01/05/21 2375

## 2021-01-05 NOTE — ED PROVIDER NOTES
Subjective   65-year-old male from long-term care facility due to [place] Gestalt syndrome as well as autism, COPD, and seizures is brought via caregivers for concern for hematemesis and dark stools.  Reportedly he was seen yesterday at [facility] for the hematemesis and was discharged home.  They cannot give much information on what was done.  They brought him in because of dark stools today.  Denies any known abnormal vital signs.  Patient cannot provide history or review of systems.    Family history, surgical history, social history, current medications and allergies are reviewed with the patient's medical record and triage documentation and vitals are reviewed.      History provided by:  Medical records and caregiver  History limited by:  Patient nonverbal   used: No        Review of Systems   Unable to perform ROS: Patient nonverbal   Constitutional: Negative for fever.   Gastrointestinal: Positive for blood in stool and vomiting (hematemesis). Negative for abdominal distention and anal bleeding.       Past Medical History:   Diagnosis Date   • Alopecia    • Autism    • COPD (chronic obstructive pulmonary disease) (CMS/HCC)    • GERD (gastroesophageal reflux disease)    • Hyperlipidemia    • Insomnia    • Intellectual disability    • Lennox-Gastaut syndrome (CMS/HCC)    • Major depressive disorder    • Orthostatic hypotension    • Osteoporosis    • Right bundle branch block    • Scoliosis    • Seizures (CMS/HCC)        Allergies   Allergen Reactions   • Haldol [Haloperidol] Unknown (See Comments)     Unknown         History reviewed. No pertinent surgical history.    Family History   Problem Relation Age of Onset   • Hypertension Father        Social History     Socioeconomic History   • Marital status: Single     Spouse name: Not on file   • Number of children: Not on file   • Years of education: Not on file   • Highest education level: Not on file   Tobacco Use   • Smoking status: Never  Smoker   • Smokeless tobacco: Never Used   Substance and Sexual Activity   • Alcohol use: Not Currently   • Drug use: Not Currently   • Sexual activity: Not Currently           Objective   Physical Exam  Vitals signs and nursing note reviewed.   Constitutional:       General: He is not in acute distress.     Appearance: Normal appearance.   HENT:      Head:      Comments: Wearing protective helmet  Eyes:      General: No scleral icterus.     Conjunctiva/sclera: Conjunctivae normal.      Pupils: Pupils are equal, round, and reactive to light.   Cardiovascular:      Rate and Rhythm: Normal rate and regular rhythm.      Pulses: Normal pulses.      Heart sounds: No murmur.   Pulmonary:      Effort: Pulmonary effort is normal.      Breath sounds: Normal breath sounds.   Abdominal:      General: Bowel sounds are normal.      Palpations: Abdomen is soft.      Tenderness: There is no abdominal tenderness. There is no guarding or rebound.   Musculoskeletal: Normal range of motion.   Skin:     General: Skin is warm and dry.      Capillary Refill: Capillary refill takes less than 2 seconds.      Coloration: Skin is not jaundiced or pale.   Neurological:      Mental Status: He is alert.         Procedures  none         ED Course      Labs Reviewed   COMPREHENSIVE METABOLIC PANEL - Abnormal; Notable for the following components:       Result Value    Chloride 108 (*)     Calcium 8.3 (*)     Total Protein 5.2 (*)     Albumin 3.10 (*)     All other components within normal limits    Narrative:     GFR Normal >60  Chronic Kidney Disease <60  Kidney Failure <15     CBC WITH AUTO DIFFERENTIAL - Abnormal; Notable for the following components:    RBC 2.64 (*)     Hemoglobin 8.2 (*)     Hematocrit 24.6 (*)     Lymphocyte % 14.1 (*)     Immature Grans % 0.7 (*)     Lymphocytes, Absolute 0.60 (*)     All other components within normal limits   PROTIME-INR - Normal    Narrative:     Therapeutic range for most indications is 2.0-3.0  INR,  or 2.5-3.5 for mechanical heart valves.   CBC AND DIFFERENTIAL    Narrative:     The following orders were created for panel order CBC & Differential.  Procedure                               Abnormality         Status                     ---------                               -----------         ------                     CBC Auto Differential[[id number]]        Abnormal            Final result                 Please view results for these tests on the individual orders.   EXTRA TUBES    Narrative:     The following orders were created for panel order Extra Tubes.  Procedure                               Abnormality         Status                     ---------                               -----------         ------                     Gold Top - SST[[id number]]                                   Final result                 Please view results for these tests on the individual orders.   GOLD TOP - SST     Ct Abdomen Pelvis With Contrast    Result Date: 1/5/2021  Narrative: PROCEDURE: CT abdomen and pelvis with intravenous contrast HISTORY: hematemesis, black stool This exam was performed using radiation doses that are as low as reasonably achievable (ALARA). This exam was performed according to our departmental dose optimization program, which includes automated exposure control, adjustment of the mA and/or KV according to patient size and/or use of iterative reconstruction technique. COMPARISON: No comparison CONTRAST: Oral and 80 cc intravenous Isovue 300 TECHNIQUE: Multiple contiguous contrast enhanced axial images are obtained of the abdomen and pelvis. FINDINGS: LOWER CHEST: Unremarkable. HEPATOBILIARY: Unremarkable. SPLEEN: Unremarkable. PANCREAS: Unremarkable ADRENAL GLANDS: Unremarkable. KIDNEYS/URETERS: No evidence of hydronephrosis or suspicious mass. GASTROINTESTINAL: Small hiatal hernia. Moderate amount of stool noted throughout the colon. REPRODUCTIVE ORGANS: Unremarkable. URINARY BLADDER:  Unremarkable VASCULAR: Unremarkable LYMPH NODES: No pathologically enlarged nodes by size criteria. PERITONEUM/RETROPERITONEUM: Unremarkable. OSSEOUS STRUCTURES: Multiple old healed bilateral rib fractures. Inferior endplate T9 compression sclerotic deformity. No acute osseous abnormality is seen.     Impression: CONCLUSION: No acute CT abnormality. Moderate stool noted throughout the colon. Small hiatal hernia. Electronically signed by:  Jorge Davis MD  1/5/2021 1:16 PM CST Workstation: [id number]            MDM  Number of Diagnoses or Management Options     Amount and/or Complexity of Data Reviewed  Clinical lab tests: reviewed    Patient Progress  Patient progress: stable    Patient was found to have a 5 g hemoglobin drop in 3 months.  Hemoccult positive stool and no active bleeding while in the emergency department with stable vital signs.  Remainder of labs overall unremarkable.  Discussed admission with the hospitalist she is agreeable.    Final diagnoses:   Anemia, unspecified type   Gastrointestinal hemorrhage, unspecified gastrointestinal hemorrhage type            Raffaele Spicer DO  01/05/21 1443

## 2021-01-05 NOTE — H&P
[facility] Medicine Admission      Date of Admission: 1/5/2021      Primary Care Physician: Bautista James MD      Chief Complaint: Melena and hematemesis    HPI: Patient is a 65-year-old male with history of Lennox Gestalt syndrome and autism who is unable to provide significant history. Patient was seen at [facility] Thursday for hematemesis that did not warrant hospitalization. This morning he was noted to have dark stools and presented to our facility for evaluation.  History was obtained from the emergency department record and the patient's caregiver.    Concurrent Medical History:  has a past medical history of Alopecia, Autism, COPD (chronic obstructive pulmonary disease) (CMS/[facility]), GERD (gastroesophageal reflux disease), Hyperlipidemia, Insomnia, Intellectual disability, Lennox-Gastaut syndrome (CMS/HCC), Major depressive disorder, Orthostatic hypotension, Osteoporosis, Right bundle branch block, Scoliosis, and Seizures (CMS/[facility]).    Past Surgical History:  has no past surgical history on file.    Family History: family history includes Hypertension in his father.     Social History:  reports that he has never smoked. He has never used smokeless tobacco. He reports previous alcohol use. He reports previous drug use.    Allergies:   Allergies   Allergen Reactions   • Haldol [Haloperidol] Unknown (See Comments)     Unknown         Medications:   Prior to Admission medications    Medication Sig Start Date End Date Taking? Authorizing Provider   calcium carbonate-vitamin d (CALCIUM 600+D) 600-400 MG-UNIT per tablet Take 1 tablet by mouth 2 (Two) Times a Day.    Patricia Driscoll MD   denosumab (Prolia) 60 MG/ML solution prefilled syringe syringe Inject  under the skin into the appropriate area as directed 1 (One) Time.    Patricia Driscoll MD   ferrous sulfate 325 (65 FE) MG tablet Take 325 mg by mouth Daily With Breakfast.    Patricia Driscoll MD    lamoTRIgine (LaMICtal) 200 MG tablet Take 200 mg by mouth 2 (Two) Times a Day.    Patricia Driscoll MD   levETIRAcetam (KEPPRA) 1000 MG tablet Take 1,000 mg by mouth 2 (Two) Times a Day.    Patricia Driscoll MD   levETIRAcetam (KEPPRA) 250 MG tablet Take 250 mg by mouth 2 (Two) Times a Day.    Patricia Driscoll MD   levETIRAcetam (KEPPRA) 500 MG tablet Take 500 mg by mouth 2 (Two) Times a Day.    Patricia Driscoll MD   LORazepam (ATIVAN) 0.5 MG tablet GIVE ONE TABLET BY MOUTH TWICE DAILY FOR ANXIETY AND AGITATION 10/30/20   Taylor Jones MD   midodrine (PROAMATINE) 5 MG tablet Take 1 tablet by mouth 3 (Three) Times a Day.  Patient taking differently: Take 10 mg by mouth 3 (Three) Times a Day. 2/4/19   Earnestine Reid MD   Multiple Vitamins-Minerals (CERTAVITE/ANTIOXIDANTS PO) Take 1 tablet by mouth 1 (One) Time.    Patricia Driscoll MD   omeprazole (priLOSEC) 20 MG capsule Take 20 mg by mouth Daily.    Patricia Driscoll MD   polyethylene glycol (MIRALAX) packet Take 17 g by mouth Every Night.    Patricia Driscoll MD   simvastatin (ZOCOR) 20 MG tablet Take 20 mg by mouth Every Night.    Patricia Driscoll MD   thioridazine (MELLARIL) 100 MG tablet Take 100 mg by mouth 3 (Three) Times a Day.    Patricia Driscoll MD       Review of Systems:  Review of Systems   Unable to perform ROS: Psychiatric disorder        Physical Exam:   Temp:  [98 °F (36.7 °C)] 98 °F (36.7 °C)  Heart Rate:  [67-68] 68  Resp:  [17-18] 18  BP: ()/(54-62) 100/54     Physical Exam  Constitutional:       Appearance: He is well-developed.   HENT:      Head: Normocephalic and atraumatic.   Neck:      Musculoskeletal: Normal range of motion and neck supple. Normal range of motion. No edema, neck rigidity or spinous process tenderness.      Vascular: No JVD.      Trachea: No tracheal tenderness or tracheal deviation.   Cardiovascular:      Rate and Rhythm: Normal rate and regular rhythm.      Pulses:  Normal pulses.      Heart sounds: Normal heart sounds, S1 normal and S2 normal. No murmur. No friction rub. No gallop.    Pulmonary:      Effort: Pulmonary effort is normal. No accessory muscle usage or respiratory distress.      Breath sounds: Normal breath sounds. No decreased breath sounds, wheezing or rales.   Chest:      Chest wall: No tenderness.   Abdominal:      General: Bowel sounds are normal. There is no distension.      Palpations: Abdomen is soft. There is no mass.      Tenderness: There is no abdominal tenderness. There is no guarding or rebound.   Musculoskeletal:         General: No tenderness.   Skin:     General: Skin is warm.      Coloration: Skin is not pale.      Findings: No rash.   Neurological:      Mental Status: He is alert and oriented to person, place, and time. Mental status is at baseline.      Cranial Nerves: No cranial nerve deficit.      Sensory: No sensory deficit.      Motor: No atrophy, abnormal muscle tone or seizure activity.      Coordination: Coordination normal.      Deep Tendon Reflexes: Reflexes are normal and symmetric. Reflexes normal.      Comments: Patient is nonverbal and does not follow commands.   Psychiatric:         Behavior: Behavior normal.         Thought Content: Thought content normal.         Judgment: Judgment normal.      Comments: Patient is nonverbal.       During my exam, patient remained on his side curled up precluding a full and thorough physical exam from being done.    Results Reviewed:  I have personally reviewed current lab, radiology, and data and agree with results.  Lab Results (last 24 hours)     Procedure Component Value Units Date/Time    Extra Tubes [[id number]] Collected: 01/05/21 1052    Specimen: Blood, Venous Line Updated: 01/05/21 1200    Narrative:      The following orders were created for panel order Extra Tubes.  Procedure                               Abnormality         Status                     ---------                                -----------         ------                     Gold Top - SST[[id number]]                                   Final result                 Please view results for these tests on the individual orders.    Gold Top - SST [[id number]] Collected: 01/05/21 1052    Specimen: Blood Updated: 01/05/21 1200     Extra Tube Hold for add-ons.     Comment: Auto resulted.       Protime-INR [[id number]]  (Normal) Collected: 01/05/21 1052    Specimen: Blood Updated: 01/05/21 1125     Protime 13.5 Seconds      INR 0.99    Narrative:      Therapeutic range for most indications is 2.0-3.0 INR,  or 2.5-3.5 for mechanical heart valves.    Comprehensive Metabolic Panel [[id number]]  (Abnormal) Collected: 01/05/21 1052    Specimen: Blood Updated: 01/05/21 1116     Glucose 95 mg/dL      BUN 16 mg/dL      Creatinine 0.88 mg/dL      Sodium 141 mmol/L      Potassium 3.7 mmol/L      Chloride 108 mmol/L      CO2 27.0 mmol/L      Calcium 8.3 mg/dL      Total Protein 5.2 g/dL      Albumin 3.10 g/dL      ALT (SGPT) 19 U/L      AST (SGOT) 17 U/L      Alkaline Phosphatase 67 U/L      Total Bilirubin <0.2 mg/dL      eGFR Non African Amer 87 mL/min/1.73      Globulin 2.1 gm/dL      A/G Ratio 1.5 g/dL      BUN/Creatinine Ratio 18.2     Anion Gap 6.0 mmol/L     Narrative:      GFR Normal >60  Chronic Kidney Disease <60  Kidney Failure <15      CBC & Differential [[id number]]  (Abnormal) Collected: 01/05/21 1052    Specimen: Blood Updated: 01/05/21 1102    Narrative:      The following orders were created for panel order CBC & Differential.  Procedure                               Abnormality         Status                     ---------                               -----------         ------                     CBC Auto Differential[[id number]]        Abnormal            Final result                 Please view results for these tests on the individual orders.    CBC Auto Differential [[id number]]  (Abnormal) Collected: 01/05/21 1052    Specimen: Blood  Updated: 01/05/21 1102     WBC 4.25 10*3/mm3      RBC 2.64 10*6/mm3      Hemoglobin 8.2 g/dL      Hematocrit 24.6 %      MCV 93.2 fL      MCH 31.1 pg      MCHC 33.3 g/dL      RDW 14.1 %      RDW-SD 46.9 fl      MPV 10.2 fL      Platelets 250 10*3/mm3      Neutrophil % 72.3 %      Lymphocyte % 14.1 %      Monocyte % 8.7 %      Eosinophil % 3.5 %      Basophil % 0.7 %      Immature Grans % 0.7 %      Neutrophils, Absolute 3.07 10*3/mm3      Lymphocytes, Absolute 0.60 10*3/mm3      Monocytes, Absolute 0.37 10*3/mm3      Eosinophils, Absolute 0.15 10*3/mm3      Basophils, Absolute 0.03 10*3/mm3      Immature Grans, Absolute 0.03 10*3/mm3      nRBC 0.0 /100 WBC         Imaging Results (Last 24 Hours)     Procedure Component Value Units Date/Time    CT Abdomen Pelvis With Contrast [[id number]] Collected: 01/05/21 1243     Updated: 01/05/21 1317    Narrative:      PROCEDURE: CT abdomen and pelvis with intravenous contrast    HISTORY: hematemesis, black stool    This exam was performed using radiation doses that are as low as  reasonably achievable (ALARA).  This exam was performed according to our departmental dose  optimization program, which includes automated exposure control,  adjustment of the mA and/or KV according to patient size and/or  use of iterative reconstruction technique.    COMPARISON: No comparison    CONTRAST:   Oral and 80 cc intravenous Isovue 300     TECHNIQUE: Multiple contiguous contrast enhanced axial images are  obtained of the abdomen and pelvis.     FINDINGS:     LOWER CHEST: Unremarkable.    HEPATOBILIARY: Unremarkable.  SPLEEN: Unremarkable.  PANCREAS: Unremarkable  ADRENAL GLANDS: Unremarkable.  KIDNEYS/URETERS: No evidence of hydronephrosis or suspicious  mass.    GASTROINTESTINAL: Small hiatal hernia. Moderate amount of stool  noted throughout the colon.  REPRODUCTIVE ORGANS: Unremarkable.  URINARY BLADDER: Unremarkable    VASCULAR: Unremarkable  LYMPH NODES: No pathologically enlarged nodes  by size criteria.  PERITONEUM/RETROPERITONEUM: Unremarkable.     OSSEOUS STRUCTURES: Multiple old healed bilateral rib fractures.  Inferior endplate T9 compression sclerotic deformity. No acute  osseous abnormality is seen.        Impression:      CONCLUSION:   No acute CT abnormality. Moderate stool noted throughout the  colon. Small hiatal hernia.    Electronically signed by:  Jorge Davis MD  1/5/2021 1:16 PM CST  Workstation: [id number]            Assessment:    Active Hospital Problems    Diagnosis   • Anemia             Plan:  1.  Anemia:  Likely secondary to acute blood loss.  Hemoglobin has dropped about 5 g in the last 3 months.  Discussed with Dr. Lang.  She will see him bleeding.  We will keep n.p.o. tonight.  Start PPI.  Will type and screen.  Monitor closely for need for transfusion.  2.  COPD: No acute exacerbation:  3.  Seizure disorder: Continue home medication.  4.  Hyperlipidemia: Continue medication.  5.  Lennox-Gastaut syndrome  6.  Autism  7.  DVT prophylaxis: SCDs.    I discussed the patient's findings and my recommendations with: Nursing and caregiver        This document has been electronically signed by Vicente Spicer MD on January 5, 2021 15:28 CST

## 2021-01-05 NOTE — PLAN OF CARE
Goal Outcome Evaluation:  Plan of Care Reviewed With: patient  Progress: no change  Outcome Summary: New admission

## 2021-01-06 ENCOUNTER — ANESTHESIA EVENT (OUTPATIENT)
Dept: GASTROENTEROLOGY | Facility: HOSPITAL | Age: 66
End: 2021-01-06

## 2021-01-06 ENCOUNTER — ANESTHESIA (OUTPATIENT)
Dept: GASTROENTEROLOGY | Facility: HOSPITAL | Age: 66
End: 2021-01-06

## 2021-01-06 PROBLEM — K92.2 GASTROINTESTINAL HEMORRHAGE: Status: ACTIVE | Noted: 2021-01-05

## 2021-01-06 LAB
ANION GAP SERPL CALCULATED.3IONS-SCNC: 5 MMOL/L (ref 5–15)
BASOPHILS # BLD AUTO: 0.04 10*3/MM3 (ref 0–0.2)
BASOPHILS NFR BLD AUTO: 0.7 % (ref 0–1.5)
BUN SERPL-MCNC: 14 MG/DL (ref 8–23)
BUN/CREAT SERPL: 14.1 (ref 7–25)
CALCIUM SPEC-SCNC: 9.6 MG/DL (ref 8.6–10.5)
CHLORIDE SERPL-SCNC: 101 MMOL/L (ref 98–107)
CO2 SERPL-SCNC: 30 MMOL/L (ref 22–29)
CREAT SERPL-MCNC: 0.99 MG/DL (ref 0.76–1.27)
DEPRECATED RDW RBC AUTO: 46.3 FL (ref 37–54)
EOSINOPHIL # BLD AUTO: 0.24 10*3/MM3 (ref 0–0.4)
EOSINOPHIL NFR BLD AUTO: 4.4 % (ref 0.3–6.2)
ERYTHROCYTE [DISTWIDTH] IN BLOOD BY AUTOMATED COUNT: 14.2 % (ref 12.3–15.4)
GFR SERPL CREATININE-BSD FRML MDRD: 76 ML/MIN/1.73
GLUCOSE SERPL-MCNC: 77 MG/DL (ref 65–99)
HCT VFR BLD AUTO: 27.9 % (ref 37.5–51)
HGB BLD-MCNC: 9.4 G/DL (ref 13–17.7)
IMM GRANULOCYTES # BLD AUTO: 0.04 10*3/MM3 (ref 0–0.05)
IMM GRANULOCYTES NFR BLD AUTO: 0.7 % (ref 0–0.5)
LYMPHOCYTES # BLD AUTO: 1.1 10*3/MM3 (ref 0.7–3.1)
LYMPHOCYTES NFR BLD AUTO: 20 % (ref 19.6–45.3)
MCH RBC QN AUTO: 30.9 PG (ref 26.6–33)
MCHC RBC AUTO-ENTMCNC: 33.7 G/DL (ref 31.5–35.7)
MCV RBC AUTO: 91.8 FL (ref 79–97)
MONOCYTES # BLD AUTO: 0.53 10*3/MM3 (ref 0.1–0.9)
MONOCYTES NFR BLD AUTO: 9.6 % (ref 5–12)
NEUTROPHILS NFR BLD AUTO: 3.55 10*3/MM3 (ref 1.7–7)
NEUTROPHILS NFR BLD AUTO: 64.6 % (ref 42.7–76)
NRBC BLD AUTO-RTO: 0 /100 WBC (ref 0–0.2)
PLATELET # BLD AUTO: 290 10*3/MM3 (ref 140–450)
PMV BLD AUTO: 10.5 FL (ref 6–12)
POTASSIUM SERPL-SCNC: 3.7 MMOL/L (ref 3.5–5.2)
RBC # BLD AUTO: 3.04 10*6/MM3 (ref 4.14–5.8)
SODIUM SERPL-SCNC: 136 MMOL/L (ref 136–145)
WBC # BLD AUTO: 5.5 10*3/MM3 (ref 3.4–10.8)

## 2021-01-06 PROCEDURE — 80048 BASIC METABOLIC PNL TOTAL CA: CPT | Performed by: HOSPITALIST

## 2021-01-06 PROCEDURE — 87635 SARS-COV-2 COVID-19 AMP PRB: CPT | Performed by: INTERNAL MEDICINE

## 2021-01-06 PROCEDURE — 85025 COMPLETE CBC W/AUTO DIFF WBC: CPT | Performed by: HOSPITALIST

## 2021-01-06 PROCEDURE — G0378 HOSPITAL OBSERVATION PER HR: HCPCS

## 2021-01-06 PROCEDURE — 99204 OFFICE O/P NEW MOD 45 MIN: CPT | Performed by: INTERNAL MEDICINE

## 2021-01-06 RX ORDER — SODIUM CHLORIDE 9 MG/ML
50 INJECTION, SOLUTION INTRAVENOUS CONTINUOUS
Status: DISCONTINUED | OUTPATIENT
Start: 2021-01-06 | End: 2021-01-09 | Stop reason: HOSPADM

## 2021-01-06 RX ORDER — MIDODRINE HYDROCHLORIDE 5 MG/1
10 TABLET ORAL
Status: DISCONTINUED | OUTPATIENT
Start: 2021-01-06 | End: 2021-01-09 | Stop reason: HOSPADM

## 2021-01-06 RX ORDER — PANTOPRAZOLE SODIUM 40 MG/10ML
40 INJECTION, POWDER, LYOPHILIZED, FOR SOLUTION INTRAVENOUS
Status: DISCONTINUED | OUTPATIENT
Start: 2021-01-06 | End: 2021-01-08

## 2021-01-06 RX ADMIN — PANTOPRAZOLE SODIUM 40 MG: 40 INJECTION, POWDER, FOR SOLUTION INTRAVENOUS at 06:12

## 2021-01-06 RX ADMIN — LEVETIRACETAM 500 MG: 500 TABLET ORAL at 09:16

## 2021-01-06 RX ADMIN — MIDODRINE HYDROCHLORIDE 5 MG: 5 TABLET ORAL at 09:14

## 2021-01-06 RX ADMIN — SODIUM CHLORIDE 125 ML/HR: 9 INJECTION, SOLUTION INTRAVENOUS at 11:58

## 2021-01-06 RX ADMIN — Medication 1 TABLET: at 20:42

## 2021-01-06 RX ADMIN — Medication 1 TABLET: at 09:15

## 2021-01-06 RX ADMIN — LAMOTRIGINE 200 MG: 100 TABLET ORAL at 20:39

## 2021-01-06 RX ADMIN — THIORIDAZINE HYDROCHLORIDE 100 MG: 50 TABLET, FILM COATED ORAL at 09:14

## 2021-01-06 RX ADMIN — FERROUS SULFATE TAB EC 324 MG (65 MG FE EQUIVALENT) 324 MG: 324 (65 FE) TABLET DELAYED RESPONSE at 09:14

## 2021-01-06 RX ADMIN — LEVETIRACETAM 1000 MG: 500 TABLET ORAL at 20:40

## 2021-01-06 RX ADMIN — THIORIDAZINE HYDROCHLORIDE 100 MG: 50 TABLET, FILM COATED ORAL at 20:39

## 2021-01-06 RX ADMIN — THIORIDAZINE HYDROCHLORIDE 100 MG: 50 TABLET, FILM COATED ORAL at 17:22

## 2021-01-06 RX ADMIN — LEVETIRACETAM 500 MG: 500 TABLET ORAL at 20:40

## 2021-01-06 RX ADMIN — MIDODRINE HYDROCHLORIDE 10 MG: 5 TABLET ORAL at 13:08

## 2021-01-06 RX ADMIN — LORAZEPAM 0.5 MG: 0.5 TABLET ORAL at 09:14

## 2021-01-06 RX ADMIN — LEVETIRACETAM 250 MG: 250 TABLET ORAL at 20:40

## 2021-01-06 RX ADMIN — LEVETIRACETAM 1000 MG: 500 TABLET ORAL at 10:29

## 2021-01-06 RX ADMIN — ATORVASTATIN CALCIUM 10 MG: 10 TABLET, FILM COATED ORAL at 09:14

## 2021-01-06 RX ADMIN — SODIUM CHLORIDE, PRESERVATIVE FREE 10 ML: 5 INJECTION INTRAVENOUS at 09:15

## 2021-01-06 RX ADMIN — LORAZEPAM 0.5 MG: 0.5 TABLET ORAL at 20:40

## 2021-01-06 RX ADMIN — SODIUM CHLORIDE 125 ML/HR: 9 INJECTION, SOLUTION INTRAVENOUS at 20:39

## 2021-01-06 RX ADMIN — LEVETIRACETAM 250 MG: 250 TABLET ORAL at 09:18

## 2021-01-06 RX ADMIN — SODIUM CHLORIDE, PRESERVATIVE FREE 10 ML: 5 INJECTION INTRAVENOUS at 20:57

## 2021-01-06 RX ADMIN — LAMOTRIGINE 200 MG: 100 TABLET ORAL at 09:15

## 2021-01-06 NOTE — PROGRESS NOTES
[facility] Medicine Services  INPATIENT PROGRESS NOTE    Length of Stay: 0  Date of Admission: 1/5/2021  Primary Care Physician: Bautista James MD    Subjective   Chief Complaint: No complaints    HPI:      1/6/21:  Hemoglobin 9.4 today. No reports of hematemesis today.       H&P by Dr. Spicer:  Patient is a 65-year-old male with history of Lennox Gestalt syndrome and autism who is unable to provide significant history. Patient was seen at [facility] Thursday for hematemesis that did not warrant hospitalization. This morning he was noted to have dark stools and presented to our facility for evaluation.  History was obtained from the emergency department record and the patient's caregiver.       Objective    Temp:  [97.2 °F (36.2 °C)-98 °F (36.7 °C)] 97.5 °F (36.4 °C)  Heart Rate:  [64-83] 64  Resp:  [18] 18  BP: ()/(52-69) 101/53    Physical Exam  Constitutional:       Appearance: He is well-developed.   HENT:      Head: Normocephalic and atraumatic.   Eyes:      Pupils: Pupils are equal, round, and reactive to light.   Neck:      Musculoskeletal: Normal range of motion and neck supple.   Cardiovascular:      Rate and Rhythm: Normal rate and regular rhythm.   Pulmonary:      Effort: Pulmonary effort is normal.      Breath sounds: Normal breath sounds.   Abdominal:      General: Bowel sounds are normal.      Palpations: Abdomen is soft.   Musculoskeletal: Normal range of motion.   Skin:     General: Skin is warm and dry.   Neurological:      Mental Status: He is alert and oriented to person, place, and time.   Psychiatric:         Behavior: Behavior normal.       Results Review:  I have reviewed the labs, radiology results, and diagnostic studies.    Laboratory Data:   Results from last 7 days   Lab Units 01/06/21  0519 01/05/21  1052   SODIUM mmol/L 136 141   POTASSIUM mmol/L 3.7 3.7   CHLORIDE mmol/L 101 108*   CO2 mmol/L 30.0* 27.0   BUN mg/dL 14 16    CREATININE mg/dL 0.99 0.88   GLUCOSE mg/dL 77 95   CALCIUM mg/dL 9.6 8.3*   BILIRUBIN mg/dL  --  <0.2   ALK PHOS U/L  --  67   ALT (SGPT) U/L  --  19   AST (SGOT) U/L  --  17   ANION GAP mmol/L 5.0 6.0     Estimated Creatinine Clearance: 67 mL/min (by C-G formula based on SCr of 0.99 mg/dL).          Results from last 7 days   Lab Units 01/06/21  0518 01/05/21  1052   WBC 10*3/mm3 5.50 4.25   HEMOGLOBIN g/dL 9.4* 8.2*   HEMATOCRIT % 27.9* 24.6*   PLATELETS 10*3/mm3 290 250     Results from last 7 days   Lab Units 01/05/21  1052   INR  0.99       Culture Data:   No results found for: BLOODCX  No results found for: URINECX  No results found for: RESPCX  No results found for: WOUNDCX  No results found for: STOOLCX  No components found for: BODYFLD    Radiology Data:   Imaging Results (Last 24 Hours)     ** No results found for the last 24 hours. **          I have reviewed the patient's current medications.     Assessment/Plan     Active Hospital Problems    Diagnosis POA   • **Gastrointestinal hemorrhage [K92.2] Unknown   • Anemia [D64.9] Yes       Plan:    1.  Anemia:  Likely secondary to acute blood loss.  Hemoglobin has dropped about 5 g in the last 3 months. Dr. Lang consult appreciated. Continue PPI.  Will type and screen.  Monitor closely for need for transfusion.  2.  COPD: No acute exacerbation: Continue to monitor.   3.  Seizure disorder: Continue home medication.  4.  Hyperlipidemia: Continue home medication.  5.  Lennox-Gastaut syndrome:    6.  Autism:  7.  DVT prophylaxis: SCDs.  8.  Hypotension:  Continue Midodrine 10 mg tid.     Discharge Planning: I expect patient to be discharged to home in 1-2 days.      This document has been electronically signed by GARRET Munroe on January 6, 2021 15:47 CST

## 2021-01-06 NOTE — PLAN OF CARE
Problem: Adult Inpatient Plan of Care  Goal: Plan of Care Review  Outcome: Ongoing, Progressing  Flowsheets  Taken 1/6/2021 0528 by Genna Nugent, RN  Plan of Care Reviewed With:   patient   caregiver  Outcome Summary: VSS no bowel movement this shift, able to take meds. Will continue to monitor.  Taken 1/5/2021 1623 by Page Hanley RN  Progress: no change   Goal Outcome Evaluation:  Plan of Care Reviewed With: patient, caregiver  Progress: no change  Outcome Summary: VSS no bowel movement this shift, able to take meds. Will continue to monitor.

## 2021-01-06 NOTE — H&P (VIEW-ONLY)
SUBJECTIVE:   1/6/2021    Name: Bautista Grubbs  DOD: 1955    REASON FOR CONSULT: GI bleed    Chief Complaint:     Chief Complaint   Patient presents with   • Nausea   • Vomiting       Subjective     Patient is 65 y.o. male with PMH of autism, COPD, GERD, hyperlipidemia, depression, seizures, scoliosis presents with several days H/O nausea, vomiting, hematemesis and melena. Noted to have drop in hgb to 8.2 yesterday which has improved to 9.4 today. Pt nonverbal and unable to give history.       ROS/HISTORY/ CURRENT MEDICATIONS/OBJECTIVE/VS/PE:   Review of Systems:   Review of Systems   Unable to perform ROS: Patient nonverbal       History:     Past Medical History:   Diagnosis Date   • Alopecia    • Autism    • COPD (chronic obstructive pulmonary disease) (CMS/HCC)    • GERD (gastroesophageal reflux disease)    • Hyperlipidemia    • Insomnia    • Intellectual disability    • Lennox-Gastaut syndrome (CMS/HCC)    • Major depressive disorder    • Orthostatic hypotension    • Osteoporosis    • Right bundle branch block    • Scoliosis    • Seizures (CMS/HCC)      History reviewed. No pertinent surgical history.  Family History   Problem Relation Age of Onset   • Hypertension Father      Social History     Tobacco Use   • Smoking status: Never Smoker   • Smokeless tobacco: Never Used   Substance Use Topics   • Alcohol use: Not Currently   • Drug use: Not Currently     Medications Prior to Admission   Medication Sig Dispense Refill Last Dose   • calcium carbonate-vitamin d (CALCIUM 600+D) 600-400 MG-UNIT per tablet Take 1 tablet by mouth 2 (Two) Times a Day.   1/5/2021 at Unknown time   • denosumab (Prolia) 60 MG/ML solution prefilled syringe syringe Inject  under the skin into the appropriate area as directed 1 (One) Time.   1/5/2021 at Unknown time   • ferrous sulfate 325 (65 FE) MG tablet Take 325 mg by mouth Daily With Breakfast.   1/5/2021 at Unknown time   • lamoTRIgine (LaMICtal) 200 MG tablet Take 200 mg  by mouth 2 (Two) Times a Day.   1/5/2021 at Unknown time   • levETIRAcetam (KEPPRA) 1000 MG tablet Take 1,000 mg by mouth 2 (Two) Times a Day.   1/5/2021 at Unknown time   • levETIRAcetam (KEPPRA) 250 MG tablet Take 250 mg by mouth 2 (Two) Times a Day.   1/5/2021 at Unknown time   • levETIRAcetam (KEPPRA) 500 MG tablet Take 500 mg by mouth 2 (Two) Times a Day.   1/5/2021 at Unknown time   • LORazepam (ATIVAN) 0.5 MG tablet GIVE ONE TABLET BY MOUTH TWICE DAILY FOR ANXIETY AND AGITATION 60 tablet 5 1/4/2021 at Unknown time   • midodrine (PROAMATINE) 5 MG tablet Take 1 tablet by mouth 3 (Three) Times a Day. (Patient taking differently: Take 10 mg by mouth 3 (Three) Times a Day.) 90 tablet 3 1/5/2021 at Unknown time   • Multiple Vitamins-Minerals (CERTAVITE/ANTIOXIDANTS PO) Take 1 tablet by mouth 1 (One) Time.   1/5/2021 at Unknown time   • omeprazole (priLOSEC) 20 MG capsule Take 20 mg by mouth Daily.   1/5/2021 at Unknown time   • polyethylene glycol (MIRALAX) packet Take 17 g by mouth Every Night.   1/4/2021 at Unknown time   • simvastatin (ZOCOR) 20 MG tablet Take 20 mg by mouth Every Night.   1/5/2021 at Unknown time   • thioridazine (MELLARIL) 100 MG tablet Take 100 mg by mouth 3 (Three) Times a Day.   1/5/2021 at Unknown time     Allergies:  Haldol [haloperidol]    I have reviewed the patient's medical history, surgical history and family history in the available medical record system.     Current Medications:     Current Facility-Administered Medications   Medication Dose Route Frequency Provider Last Rate Last Admin   • atorvastatin (LIPITOR) tablet 10 mg  10 mg Oral Daily Vicente Spicer MD   10 mg at 01/06/21 0914   • calcium carbonate-vitamin d 600-400 MG-UNIT per tablet 1 tablet  1 tablet Oral BID Vicente Spicer MD   1 tablet at 01/06/21 0915   • ferrous sulfate EC tablet 324 mg  324 mg Oral Daily With Breakfast Vicente Spicer MD   324 mg at 01/06/21 0914   • lamoTRIgine (LaMICtal) tablet 200 mg   200 mg Oral BID Vicente Spicer MD   200 mg at 01/06/21 0915   • levETIRAcetam (KEPPRA) tablet 1,000 mg  1,000 mg Oral BID Vicente Spicer MD   1,000 mg at 01/06/21 1029   • levETIRAcetam (KEPPRA) tablet 250 mg  250 mg Oral BID Vicente Spicer MD   250 mg at 01/06/21 0918   • levETIRAcetam (KEPPRA) tablet 500 mg  500 mg Oral Q12H Vicente Spicer MD   500 mg at 01/06/21 0916   • LORazepam (ATIVAN) tablet 0.5 mg  0.5 mg Oral Q12H Vicente Spicer MD   0.5 mg at 01/06/21 0914   • midodrine (PROAMATINE) tablet 10 mg  10 mg Oral TID AC Levill, Mercedes G, APRN       • pantoprazole (PROTONIX) injection 40 mg  40 mg Intravenous BID AC Levill, Mercedes G, APRN       • sodium chloride 0.9 % flush 10 mL  10 mL Intravenous PRN Vicente Spicer MD       • sodium chloride 0.9 % flush 10 mL  10 mL Intravenous Q12H Vicente Spicer MD   10 mL at 01/06/21 0915   • sodium chloride 0.9 % flush 10 mL  10 mL Intravenous PRN Vicente Spicer MD       • sodium chloride 0.9 % infusion  125 mL/hr Intravenous Continuous Levill, Mercedes G, APRN 125 mL/hr at 01/06/21 1158 125 mL/hr at 01/06/21 1158   • thioridazine (MELLARIL) tablet 100 mg  100 mg Oral TID Vicente Spicer MD   100 mg at 01/06/21 0914       Objective     Physical Exam:   Temp:  [97.2 °F (36.2 °C)-98 °F (36.7 °C)] 97.7 °F (36.5 °C)  Heart Rate:  [64-83] 77  Resp:  [18] 18  BP: ()/(52-69) 94/59    Physical Exam:  General Appearance:    Alert, cooperative, in no acute distress   Head:    Normocephalic, without obvious abnormality, atraumatic   Eyes:            Lids and lashes normal, conjunctivae and sclerae normal, no   icterus, no pallor, corneas clear, PERRLA   Ears:    Ears appear intact with no abnormalities noted   Throat:   No oral lesions, no thrush, oral mucosa moist   Neck:   No adenopathy, supple, trachea midline, no thyromegaly, no     carotid bruit, no JVD   Back:     No kyphosis present, no scoliosis present, no skin lesions,       erythema or  scars, no tenderness to percussion or                   palpation,   range of motion normal   Lungs:     Clear to auscultation,respirations regular, even and                   unlabored    Heart:    Regular rhythm and normal rate, normal S1 and S2, no            murmur, no gallop, no rub, no click   Breast Exam:    Deferred   Abdomen:     Normal bowel sounds, no masses, no organomegaly, soft        nontender, nondistended, no guarding, no rebound                 tenderness   Genitalia:    Deferred   Extremities:   Moves all extremities well, no edema, no cyanosis, no              redness   Pulses:   Pulses palpable and equal bilaterally   Skin:   No bleeding, bruising or rash   Lymph nodes:   No palpable adenopathy   Neurologic:   Cranial nerves 2 - 12 grossly intact, sensation intact, DTR        present and equal bilaterally      Results Review:     Lab Results   Component Value Date    WBC 5.50 01/06/2021    WBC 4.25 01/05/2021    WBC 8.36 09/29/2020    HGB 9.4 (L) 01/06/2021    HGB 8.2 (L) 01/05/2021    HGB 13.5 09/29/2020    HCT 27.9 (L) 01/06/2021    HCT 24.6 (L) 01/05/2021    HCT 38.9 09/29/2020     01/06/2021     01/05/2021     09/29/2020     Results from last 7 days   Lab Units 01/05/21  1052   ALK PHOS U/L 67   ALT (SGPT) U/L 19   AST (SGOT) U/L 17     Results from last 7 days   Lab Units 01/05/21  1052   BILIRUBIN mg/dL <0.2   ALK PHOS U/L 67     No results found for: LIPASE  Lab Results   Component Value Date    INR 0.99 01/05/2021    INR 0.95 09/29/2020    INR 0.93 01/28/2019         Radiology Review:  Imaging Results (Last 72 Hours)     Procedure Component Value Units Date/Time    CT Abdomen Pelvis With Contrast [[id number]] Collected: 01/05/21 1243     Updated: 01/05/21 1317    Narrative:      PROCEDURE: CT abdomen and pelvis with intravenous contrast    HISTORY: hematemesis, black stool    This exam was performed using radiation doses that are as low as  reasonably achievable  (MIKE).  This exam was performed according to our departmental dose  optimization program, which includes automated exposure control,  adjustment of the mA and/or KV according to patient size and/or  use of iterative reconstruction technique.    COMPARISON: No comparison    CONTRAST:   Oral and 80 cc intravenous Isovue 300     TECHNIQUE: Multiple contiguous contrast enhanced axial images are  obtained of the abdomen and pelvis.     FINDINGS:     LOWER CHEST: Unremarkable.    HEPATOBILIARY: Unremarkable.  SPLEEN: Unremarkable.  PANCREAS: Unremarkable  ADRENAL GLANDS: Unremarkable.  KIDNEYS/URETERS: No evidence of hydronephrosis or suspicious  mass.    GASTROINTESTINAL: Small hiatal hernia. Moderate amount of stool  noted throughout the colon.  REPRODUCTIVE ORGANS: Unremarkable.  URINARY BLADDER: Unremarkable    VASCULAR: Unremarkable  LYMPH NODES: No pathologically enlarged nodes by size criteria.  PERITONEUM/RETROPERITONEUM: Unremarkable.     OSSEOUS STRUCTURES: Multiple old healed bilateral rib fractures.  Inferior endplate T9 compression sclerotic deformity. No acute  osseous abnormality is seen.        Impression:      CONCLUSION:   No acute CT abnormality. Moderate stool noted throughout the  colon. Small hiatal hernia.    Electronically signed by:  Jorge Davis MD  1/5/2021 1:16 PM CST  Workstation: [id number]          I reviewed the patient's new clinical results.    I reviewed the patient's new imaging results and agree with the interpretation.     ASSESSMENT/PLAN:   ASSESSMENT: GI bleed likely UGIB since pt had emesis and hematemesis, could also be LGIB given the H/O melena.   Acute post hemorrhagic anemia    PLAN: 1. Cont PPI  2. Follow H/H closely and transfuse as needed  3. Schedule EGD for further evaluation  4. Colonoscopy if EGD is unremarkable  The risks, benefits, and alternatives of this procedure have been discussed with the patient or the responsible party. The patient understands and agrees to  proceed.         Wanda Lang MD  01/06/21  12:46 CST

## 2021-01-06 NOTE — PLAN OF CARE
Goal Outcome Evaluation:  Plan of Care Reviewed With: other (see comments)(RN)  Progress: no change  Outcome Summary: Initial assessment.  Resume PO intake as appropriate.

## 2021-01-06 NOTE — CONSULTS
SUBJECTIVE:   1/6/2021    Name: Bautista Grubbs  DOD: 1955    REASON FOR CONSULT: GI bleed    Chief Complaint:     Chief Complaint   Patient presents with   • Nausea   • Vomiting       Subjective     Patient is 65 y.o. male with PMH of autism, COPD, GERD, hyperlipidemia, depression, seizures, scoliosis presents with several days H/O nausea, vomiting, hematemesis and melena. Noted to have drop in hgb to 8.2 yesterday which has improved to 9.4 today. Pt nonverbal and unable to give history.       ROS/HISTORY/ CURRENT MEDICATIONS/OBJECTIVE/VS/PE:   Review of Systems:   Review of Systems   Unable to perform ROS: Patient nonverbal       History:     Past Medical History:   Diagnosis Date   • Alopecia    • Autism    • COPD (chronic obstructive pulmonary disease) (CMS/HCC)    • GERD (gastroesophageal reflux disease)    • Hyperlipidemia    • Insomnia    • Intellectual disability    • Lennox-Gastaut syndrome (CMS/HCC)    • Major depressive disorder    • Orthostatic hypotension    • Osteoporosis    • Right bundle branch block    • Scoliosis    • Seizures (CMS/HCC)      History reviewed. No pertinent surgical history.  Family History   Problem Relation Age of Onset   • Hypertension Father      Social History     Tobacco Use   • Smoking status: Never Smoker   • Smokeless tobacco: Never Used   Substance Use Topics   • Alcohol use: Not Currently   • Drug use: Not Currently     Medications Prior to Admission   Medication Sig Dispense Refill Last Dose   • calcium carbonate-vitamin d (CALCIUM 600+D) 600-400 MG-UNIT per tablet Take 1 tablet by mouth 2 (Two) Times a Day.   1/5/2021 at Unknown time   • denosumab (Prolia) 60 MG/ML solution prefilled syringe syringe Inject  under the skin into the appropriate area as directed 1 (One) Time.   1/5/2021 at Unknown time   • ferrous sulfate 325 (65 FE) MG tablet Take 325 mg by mouth Daily With Breakfast.   1/5/2021 at Unknown time   • lamoTRIgine (LaMICtal) 200 MG tablet Take 200 mg  by mouth 2 (Two) Times a Day.   1/5/2021 at Unknown time   • levETIRAcetam (KEPPRA) 1000 MG tablet Take 1,000 mg by mouth 2 (Two) Times a Day.   1/5/2021 at Unknown time   • levETIRAcetam (KEPPRA) 250 MG tablet Take 250 mg by mouth 2 (Two) Times a Day.   1/5/2021 at Unknown time   • levETIRAcetam (KEPPRA) 500 MG tablet Take 500 mg by mouth 2 (Two) Times a Day.   1/5/2021 at Unknown time   • LORazepam (ATIVAN) 0.5 MG tablet GIVE ONE TABLET BY MOUTH TWICE DAILY FOR ANXIETY AND AGITATION 60 tablet 5 1/4/2021 at Unknown time   • midodrine (PROAMATINE) 5 MG tablet Take 1 tablet by mouth 3 (Three) Times a Day. (Patient taking differently: Take 10 mg by mouth 3 (Three) Times a Day.) 90 tablet 3 1/5/2021 at Unknown time   • Multiple Vitamins-Minerals (CERTAVITE/ANTIOXIDANTS PO) Take 1 tablet by mouth 1 (One) Time.   1/5/2021 at Unknown time   • omeprazole (priLOSEC) 20 MG capsule Take 20 mg by mouth Daily.   1/5/2021 at Unknown time   • polyethylene glycol (MIRALAX) packet Take 17 g by mouth Every Night.   1/4/2021 at Unknown time   • simvastatin (ZOCOR) 20 MG tablet Take 20 mg by mouth Every Night.   1/5/2021 at Unknown time   • thioridazine (MELLARIL) 100 MG tablet Take 100 mg by mouth 3 (Three) Times a Day.   1/5/2021 at Unknown time     Allergies:  Haldol [haloperidol]    I have reviewed the patient's medical history, surgical history and family history in the available medical record system.     Current Medications:     Current Facility-Administered Medications   Medication Dose Route Frequency Provider Last Rate Last Admin   • atorvastatin (LIPITOR) tablet 10 mg  10 mg Oral Daily Vicente Spicer MD   10 mg at 01/06/21 0914   • calcium carbonate-vitamin d 600-400 MG-UNIT per tablet 1 tablet  1 tablet Oral BID Vicente Spicer MD   1 tablet at 01/06/21 0915   • ferrous sulfate EC tablet 324 mg  324 mg Oral Daily With Breakfast Vicente Spicer MD   324 mg at 01/06/21 0914   • lamoTRIgine (LaMICtal) tablet 200 mg   200 mg Oral BID Vicente Spicer MD   200 mg at 01/06/21 0915   • levETIRAcetam (KEPPRA) tablet 1,000 mg  1,000 mg Oral BID Vicente Spicer MD   1,000 mg at 01/06/21 1029   • levETIRAcetam (KEPPRA) tablet 250 mg  250 mg Oral BID Vicente Spicer MD   250 mg at 01/06/21 0918   • levETIRAcetam (KEPPRA) tablet 500 mg  500 mg Oral Q12H Vicente Spicer MD   500 mg at 01/06/21 0916   • LORazepam (ATIVAN) tablet 0.5 mg  0.5 mg Oral Q12H Vicente Spicer MD   0.5 mg at 01/06/21 0914   • midodrine (PROAMATINE) tablet 10 mg  10 mg Oral TID AC Levill, Mercedes G, APRN       • pantoprazole (PROTONIX) injection 40 mg  40 mg Intravenous BID AC Levill, Mercedes G, APRN       • sodium chloride 0.9 % flush 10 mL  10 mL Intravenous PRN Vicente Spicer MD       • sodium chloride 0.9 % flush 10 mL  10 mL Intravenous Q12H Vicente Spicer MD   10 mL at 01/06/21 0915   • sodium chloride 0.9 % flush 10 mL  10 mL Intravenous PRN Vicente Spicer MD       • sodium chloride 0.9 % infusion  125 mL/hr Intravenous Continuous Levill, Mercedes G, APRN 125 mL/hr at 01/06/21 1158 125 mL/hr at 01/06/21 1158   • thioridazine (MELLARIL) tablet 100 mg  100 mg Oral TID Vicente Spicer MD   100 mg at 01/06/21 0914       Objective     Physical Exam:   Temp:  [97.2 °F (36.2 °C)-98 °F (36.7 °C)] 97.7 °F (36.5 °C)  Heart Rate:  [64-83] 77  Resp:  [18] 18  BP: ()/(52-69) 94/59    Physical Exam:  General Appearance:    Alert, cooperative, in no acute distress   Head:    Normocephalic, without obvious abnormality, atraumatic   Eyes:            Lids and lashes normal, conjunctivae and sclerae normal, no   icterus, no pallor, corneas clear, PERRLA   Ears:    Ears appear intact with no abnormalities noted   Throat:   No oral lesions, no thrush, oral mucosa moist   Neck:   No adenopathy, supple, trachea midline, no thyromegaly, no     carotid bruit, no JVD   Back:     No kyphosis present, no scoliosis present, no skin lesions,       erythema or  scars, no tenderness to percussion or                   palpation,   range of motion normal   Lungs:     Clear to auscultation,respirations regular, even and                   unlabored    Heart:    Regular rhythm and normal rate, normal S1 and S2, no            murmur, no gallop, no rub, no click   Breast Exam:    Deferred   Abdomen:     Normal bowel sounds, no masses, no organomegaly, soft        nontender, nondistended, no guarding, no rebound                 tenderness   Genitalia:    Deferred   Extremities:   Moves all extremities well, no edema, no cyanosis, no              redness   Pulses:   Pulses palpable and equal bilaterally   Skin:   No bleeding, bruising or rash   Lymph nodes:   No palpable adenopathy   Neurologic:   Cranial nerves 2 - 12 grossly intact, sensation intact, DTR        present and equal bilaterally      Results Review:     Lab Results   Component Value Date    WBC 5.50 01/06/2021    WBC 4.25 01/05/2021    WBC 8.36 09/29/2020    HGB 9.4 (L) 01/06/2021    HGB 8.2 (L) 01/05/2021    HGB 13.5 09/29/2020    HCT 27.9 (L) 01/06/2021    HCT 24.6 (L) 01/05/2021    HCT 38.9 09/29/2020     01/06/2021     01/05/2021     09/29/2020     Results from last 7 days   Lab Units 01/05/21  1052   ALK PHOS U/L 67   ALT (SGPT) U/L 19   AST (SGOT) U/L 17     Results from last 7 days   Lab Units 01/05/21  1052   BILIRUBIN mg/dL <0.2   ALK PHOS U/L 67     No results found for: LIPASE  Lab Results   Component Value Date    INR 0.99 01/05/2021    INR 0.95 09/29/2020    INR 0.93 01/28/2019         Radiology Review:  Imaging Results (Last 72 Hours)     Procedure Component Value Units Date/Time    CT Abdomen Pelvis With Contrast [[id number]] Collected: 01/05/21 1243     Updated: 01/05/21 1317    Narrative:      PROCEDURE: CT abdomen and pelvis with intravenous contrast    HISTORY: hematemesis, black stool    This exam was performed using radiation doses that are as low as  reasonably achievable  (MIKE).  This exam was performed according to our departmental dose  optimization program, which includes automated exposure control,  adjustment of the mA and/or KV according to patient size and/or  use of iterative reconstruction technique.    COMPARISON: No comparison    CONTRAST:   Oral and 80 cc intravenous Isovue 300     TECHNIQUE: Multiple contiguous contrast enhanced axial images are  obtained of the abdomen and pelvis.     FINDINGS:     LOWER CHEST: Unremarkable.    HEPATOBILIARY: Unremarkable.  SPLEEN: Unremarkable.  PANCREAS: Unremarkable  ADRENAL GLANDS: Unremarkable.  KIDNEYS/URETERS: No evidence of hydronephrosis or suspicious  mass.    GASTROINTESTINAL: Small hiatal hernia. Moderate amount of stool  noted throughout the colon.  REPRODUCTIVE ORGANS: Unremarkable.  URINARY BLADDER: Unremarkable    VASCULAR: Unremarkable  LYMPH NODES: No pathologically enlarged nodes by size criteria.  PERITONEUM/RETROPERITONEUM: Unremarkable.     OSSEOUS STRUCTURES: Multiple old healed bilateral rib fractures.  Inferior endplate T9 compression sclerotic deformity. No acute  osseous abnormality is seen.        Impression:      CONCLUSION:   No acute CT abnormality. Moderate stool noted throughout the  colon. Small hiatal hernia.    Electronically signed by:  Jorge Davis MD  1/5/2021 1:16 PM CST  Workstation: [id number]          I reviewed the patient's new clinical results.    I reviewed the patient's new imaging results and agree with the interpretation.     ASSESSMENT/PLAN:   ASSESSMENT: GI bleed likely UGIB since pt had emesis and hematemesis, could also be LGIB given the H/O melena.   Acute post hemorrhagic anemia    PLAN: 1. Cont PPI  2. Follow H/H closely and transfuse as needed  3. Schedule EGD for further evaluation  4. Colonoscopy if EGD is unremarkable  The risks, benefits, and alternatives of this procedure have been discussed with the patient or the responsible party. The patient understands and agrees to  proceed.         Wanda Lang MD  01/06/21  12:46 CST

## 2021-01-07 LAB
ANION GAP SERPL CALCULATED.3IONS-SCNC: 11 MMOL/L (ref 5–15)
BASOPHILS # BLD AUTO: 0.02 10*3/MM3 (ref 0–0.2)
BASOPHILS NFR BLD AUTO: 0.4 % (ref 0–1.5)
BUN SERPL-MCNC: 16 MG/DL (ref 8–23)
BUN/CREAT SERPL: 17.2 (ref 7–25)
CALCIUM SPEC-SCNC: 8.7 MG/DL (ref 8.6–10.5)
CHLORIDE SERPL-SCNC: 104 MMOL/L (ref 98–107)
CO2 SERPL-SCNC: 24 MMOL/L (ref 22–29)
CREAT SERPL-MCNC: 0.93 MG/DL (ref 0.76–1.27)
DEPRECATED RDW RBC AUTO: 46.6 FL (ref 37–54)
EOSINOPHIL # BLD AUTO: 0.08 10*3/MM3 (ref 0–0.4)
EOSINOPHIL NFR BLD AUTO: 1.8 % (ref 0.3–6.2)
ERYTHROCYTE [DISTWIDTH] IN BLOOD BY AUTOMATED COUNT: 14 % (ref 12.3–15.4)
GFR SERPL CREATININE-BSD FRML MDRD: 82 ML/MIN/1.73
GLUCOSE BLDC GLUCOMTR-MCNC: 144 MG/DL (ref 70–130)
GLUCOSE BLDC GLUCOMTR-MCNC: 173 MG/DL (ref 70–130)
GLUCOSE BLDC GLUCOMTR-MCNC: 45 MG/DL (ref 70–130)
GLUCOSE BLDC GLUCOMTR-MCNC: 84 MG/DL (ref 70–130)
GLUCOSE SERPL-MCNC: 44 MG/DL (ref 65–99)
HCT VFR BLD AUTO: 27.3 % (ref 37.5–51)
HGB BLD-MCNC: 9.2 G/DL (ref 13–17.7)
IMM GRANULOCYTES # BLD AUTO: 0.03 10*3/MM3 (ref 0–0.05)
IMM GRANULOCYTES NFR BLD AUTO: 0.7 % (ref 0–0.5)
LYMPHOCYTES # BLD AUTO: 0.94 10*3/MM3 (ref 0.7–3.1)
LYMPHOCYTES NFR BLD AUTO: 21 % (ref 19.6–45.3)
MCH RBC QN AUTO: 31.5 PG (ref 26.6–33)
MCHC RBC AUTO-ENTMCNC: 33.7 G/DL (ref 31.5–35.7)
MCV RBC AUTO: 93.5 FL (ref 79–97)
MONOCYTES # BLD AUTO: 0.46 10*3/MM3 (ref 0.1–0.9)
MONOCYTES NFR BLD AUTO: 10.3 % (ref 5–12)
NEUTROPHILS NFR BLD AUTO: 2.94 10*3/MM3 (ref 1.7–7)
NEUTROPHILS NFR BLD AUTO: 65.8 % (ref 42.7–76)
NRBC BLD AUTO-RTO: 0 /100 WBC (ref 0–0.2)
PLATELET # BLD AUTO: 256 10*3/MM3 (ref 140–450)
PMV BLD AUTO: 9.8 FL (ref 6–12)
POTASSIUM SERPL-SCNC: 3.7 MMOL/L (ref 3.5–5.2)
RBC # BLD AUTO: 2.92 10*6/MM3 (ref 4.14–5.8)
SARS-COV-2 N GENE RESP QL NAA+PROBE: NOT DETECTED
SODIUM SERPL-SCNC: 139 MMOL/L (ref 136–145)
WBC # BLD AUTO: 4.47 10*3/MM3 (ref 3.4–10.8)

## 2021-01-07 PROCEDURE — 82962 GLUCOSE BLOOD TEST: CPT

## 2021-01-07 PROCEDURE — G0378 HOSPITAL OBSERVATION PER HR: HCPCS

## 2021-01-07 PROCEDURE — 0DB98ZX EXCISION OF DUODENUM, VIA NATURAL OR ARTIFICIAL OPENING ENDOSCOPIC, DIAGNOSTIC: ICD-10-PCS | Performed by: INTERNAL MEDICINE

## 2021-01-07 PROCEDURE — 0DB48ZX EXCISION OF ESOPHAGOGASTRIC JUNCTION, VIA NATURAL OR ARTIFICIAL OPENING ENDOSCOPIC, DIAGNOSTIC: ICD-10-PCS | Performed by: INTERNAL MEDICINE

## 2021-01-07 PROCEDURE — 43239 EGD BIOPSY SINGLE/MULTIPLE: CPT | Performed by: INTERNAL MEDICINE

## 2021-01-07 PROCEDURE — 25010000002 PROPOFOL 10 MG/ML EMULSION: Performed by: NURSE ANESTHETIST, CERTIFIED REGISTERED

## 2021-01-07 PROCEDURE — 85025 COMPLETE CBC W/AUTO DIFF WBC: CPT | Performed by: HOSPITALIST

## 2021-01-07 PROCEDURE — 80048 BASIC METABOLIC PNL TOTAL CA: CPT | Performed by: HOSPITALIST

## 2021-01-07 PROCEDURE — 88305 TISSUE EXAM BY PATHOLOGIST: CPT

## 2021-01-07 PROCEDURE — 0DB68ZX EXCISION OF STOMACH, VIA NATURAL OR ARTIFICIAL OPENING ENDOSCOPIC, DIAGNOSTIC: ICD-10-PCS | Performed by: INTERNAL MEDICINE

## 2021-01-07 RX ORDER — LORAZEPAM 0.5 MG/1
1 TABLET ORAL ONCE
Status: COMPLETED | OUTPATIENT
Start: 2021-01-07 | End: 2021-01-07

## 2021-01-07 RX ORDER — LIDOCAINE HYDROCHLORIDE 20 MG/ML
INJECTION, SOLUTION INTRAVENOUS AS NEEDED
Status: DISCONTINUED | OUTPATIENT
Start: 2021-01-07 | End: 2021-01-07 | Stop reason: SURG

## 2021-01-07 RX ORDER — PROPOFOL 10 MG/ML
VIAL (ML) INTRAVENOUS AS NEEDED
Status: DISCONTINUED | OUTPATIENT
Start: 2021-01-07 | End: 2021-01-07 | Stop reason: SURG

## 2021-01-07 RX ORDER — POLYETHYLENE GLYCOL 3350 17 G/17G
1 POWDER, FOR SOLUTION ORAL ONCE
Status: COMPLETED | OUTPATIENT
Start: 2021-01-07 | End: 2021-01-07

## 2021-01-07 RX ORDER — LORAZEPAM 2 MG/ML
1 INJECTION INTRAMUSCULAR EVERY 4 HOURS PRN
Status: DISCONTINUED | OUTPATIENT
Start: 2021-01-07 | End: 2021-01-09 | Stop reason: HOSPADM

## 2021-01-07 RX ORDER — DEXTROSE MONOHYDRATE 25 G/50ML
50 INJECTION, SOLUTION INTRAVENOUS
Status: DISCONTINUED | OUTPATIENT
Start: 2021-01-07 | End: 2021-01-09 | Stop reason: HOSPADM

## 2021-01-07 RX ORDER — DEXTROSE AND SODIUM CHLORIDE 5; .45 G/100ML; G/100ML
30 INJECTION, SOLUTION INTRAVENOUS CONTINUOUS PRN
Status: DISCONTINUED | OUTPATIENT
Start: 2021-01-07 | End: 2021-01-09 | Stop reason: HOSPADM

## 2021-01-07 RX ORDER — POLYETHYLENE GLYCOL 3350 17 G/17G
238 POWDER, FOR SOLUTION ORAL ONCE
Status: DISCONTINUED | OUTPATIENT
Start: 2021-01-07 | End: 2021-01-07

## 2021-01-07 RX ADMIN — LIDOCAINE HYDROCHLORIDE 100 MG: 20 INJECTION, SOLUTION INTRAVENOUS at 15:33

## 2021-01-07 RX ADMIN — LEVETIRACETAM 1000 MG: 500 TABLET ORAL at 20:57

## 2021-01-07 RX ADMIN — LEVETIRACETAM 500 MG: 500 TABLET ORAL at 20:58

## 2021-01-07 RX ADMIN — PROPOFOL 30 MG: 10 INJECTION, EMULSION INTRAVENOUS at 15:33

## 2021-01-07 RX ADMIN — DEXTROSE MONOHYDRATE 50 ML: 500 INJECTION PARENTERAL at 07:25

## 2021-01-07 RX ADMIN — PROPOFOL 70 MG: 10 INJECTION, EMULSION INTRAVENOUS at 15:35

## 2021-01-07 RX ADMIN — POLYETHYLENE GLYCOL 3350 1 BOTTLE: 17 POWDER, FOR SOLUTION ORAL at 17:56

## 2021-01-07 RX ADMIN — DEXTROSE MONOHYDRATE 50 ML: 500 INJECTION PARENTERAL at 16:33

## 2021-01-07 RX ADMIN — MIDODRINE HYDROCHLORIDE 10 MG: 5 TABLET ORAL at 16:34

## 2021-01-07 RX ADMIN — LORAZEPAM 0.5 MG: 0.5 TABLET ORAL at 20:56

## 2021-01-07 RX ADMIN — SODIUM CHLORIDE 125 ML/HR: 9 INJECTION, SOLUTION INTRAVENOUS at 05:20

## 2021-01-07 RX ADMIN — LAMOTRIGINE 200 MG: 100 TABLET ORAL at 20:56

## 2021-01-07 RX ADMIN — THIORIDAZINE HYDROCHLORIDE 100 MG: 50 TABLET, FILM COATED ORAL at 20:56

## 2021-01-07 RX ADMIN — PANTOPRAZOLE SODIUM 40 MG: 40 INJECTION, POWDER, FOR SOLUTION INTRAVENOUS at 08:51

## 2021-01-07 RX ADMIN — PANTOPRAZOLE SODIUM 40 MG: 40 INJECTION, POWDER, FOR SOLUTION INTRAVENOUS at 16:33

## 2021-01-07 RX ADMIN — SODIUM CHLORIDE 125 ML/HR: 9 INJECTION, SOLUTION INTRAVENOUS at 18:05

## 2021-01-07 RX ADMIN — SODIUM CHLORIDE 125 ML/HR: 9 INJECTION, SOLUTION INTRAVENOUS at 13:51

## 2021-01-07 RX ADMIN — Medication 1 TABLET: at 20:58

## 2021-01-07 RX ADMIN — LEVETIRACETAM 250 MG: 250 TABLET ORAL at 20:56

## 2021-01-07 RX ADMIN — SODIUM CHLORIDE, PRESERVATIVE FREE 10 ML: 5 INJECTION INTRAVENOUS at 20:58

## 2021-01-07 RX ADMIN — SODIUM CHLORIDE 500 ML: 9 INJECTION, SOLUTION INTRAVENOUS at 08:42

## 2021-01-07 RX ADMIN — PROPOFOL 20 MG: 10 INJECTION, EMULSION INTRAVENOUS at 15:38

## 2021-01-07 RX ADMIN — LORAZEPAM 1 MG: 0.5 TABLET ORAL at 20:57

## 2021-01-07 RX ADMIN — THIORIDAZINE HYDROCHLORIDE 100 MG: 50 TABLET, FILM COATED ORAL at 16:36

## 2021-01-07 NOTE — ANESTHESIA POSTPROCEDURE EVALUATION
Patient: Bautista Grubbs    Procedure Summary     Date: 01/07/21 Room / Location: [facility] ENDOSCOPY 1 / [facility] ENDOSCOPY    Anesthesia Start: 1531 Anesthesia Stop: 1538    Procedure: ESOPHAGOGASTRODUODENOSCOPY (N/A ) Diagnosis:       Gastrointestinal hemorrhage, unspecified gastrointestinal hemorrhage type      (Gastrointestinal hemorrhage, unspecified gastrointestinal hemorrhage type [K92.2])    Surgeon: Wanda Lang MD Provider: Leodan Navas CRNA    Anesthesia Type: MAC ASA Status: 3          Anesthesia Type: MAC    Vitals  No vitals data found for the desired time range.          Post Anesthesia Care and Evaluation    Patient location during evaluation: bedside  Patient participation: waiting for patient participation  Level of consciousness: responsive to verbal stimuli  Pain management: adequate  Airway patency: patent  Anesthetic complications: No anesthetic complications  PONV Status: none  Cardiovascular status: acceptable  Respiratory status: acceptable  Hydration status: acceptable    Comments: ---------------------------               01/07/21                      1539         ---------------------------   BP:          134/76         Pulse:       71        Resp:          16           Temp:   98.1 °F (36.7 °C)   SpO2:          98%         ---------------------------

## 2021-01-07 NOTE — NURSING NOTE
pts SBP up to 82  Pt still seems asymptomatic   Attempted to give pts AM dose of Midodrine but pt refused  Dheeraj COMBS made aware

## 2021-01-07 NOTE — NURSING NOTE
Spoke with Dr Foster regarding pts low blood sugar orders received for a one time dose of D50 50 ML will continue to monitor

## 2021-01-07 NOTE — PROGRESS NOTES
[facility] Medicine Services  INPATIENT PROGRESS NOTE    Length of Stay: 0  Date of Admission: 1/5/2021  Primary Care Physician: Bautista James MD    Subjective   Chief Complaint: hematemesis   HPI:  65 yea rold male with a history of Lennox Gestalt syndrome and autism who is admitted for hematemesis and melena.  He is to [facility] [place] EGD today. H&H is stable. Refused medications today    Review of Systems   Unable to perform ROS: Psychiatric disorder        All pertinent negatives and positives are as above. All other systems have been reviewed and are negative unless otherwise stated.     Objective    Temp:  [97.3 °F (36.3 °C)-98 °F (36.7 °C)] 97.8 °F (36.6 °C)  Heart Rate:  [57-80] 57  Resp:  [16-18] 18  BP: ()/(53-68) 125/68    Physical Exam  Vitals signs reviewed.   Constitutional:       General: He is not in acute distress.     Appearance: Normal appearance.   Cardiovascular:      Rate and Rhythm: Normal rate and regular rhythm.   Pulmonary:      Effort: Pulmonary effort is normal.      Breath sounds: Normal breath sounds.   Abdominal:      General: There is no distension.      Palpations: Abdomen is soft.      Tenderness: There is no abdominal tenderness.   Musculoskeletal:         General: No deformity.   Skin:     General: Skin is warm and dry.   Neurological:      General: No focal deficit present.      Mental Status: He is alert. Mental status is at baseline.             Results Review:  I have reviewed the labs, radiology results, and diagnostic studies.    Laboratory Data:   Results from last 7 days   Lab Units 01/07/21  0603 01/06/21  0519 01/05/21  1052   SODIUM mmol/L 139 136 141   POTASSIUM mmol/L 3.7 3.7 3.7   CHLORIDE mmol/L 104 101 108*   CO2 mmol/L 24.0 30.0* 27.0   BUN mg/dL 16 14 16   CREATININE mg/dL 0.93 0.99 0.88   GLUCOSE mg/dL 44* 77 95   CALCIUM mg/dL 8.7 9.6 8.3*   BILIRUBIN mg/dL  --   --  <0.2   ALK PHOS U/L  --   --  67   ALT (SGPT) U/L   --   --  19   AST (SGOT) U/L  --   --  17   ANION GAP mmol/L 11.0 5.0 6.0     Estimated Creatinine Clearance: 73.2 mL/min (by C-G formula based on SCr of 0.93 mg/dL).          Results from last 7 days   Lab Units 01/07/21  0603 01/06/21  0518 01/05/21  1052   WBC 10*3/mm3 4.47 5.50 4.25   HEMOGLOBIN g/dL 9.2* 9.4* 8.2*   HEMATOCRIT % 27.3* 27.9* 24.6*   PLATELETS 10*3/mm3 256 290 250     Results from last 7 days   Lab Units 01/05/21  1052   INR  0.99       Culture Data:   No results found for: BLOODCX  No results found for: URINECX  No results found for: RESPCX  No results found for: WOUNDCX  No results found for: STOOLCX  No components found for: BODYFLD    Radiology Data:   Imaging Results (Last 24 Hours)     ** No results found for the last 24 hours. **          I have reviewed the patient's current medications.     Assessment/Plan     Active Hospital Problems    Diagnosis   • **Gastrointestinal hemorrhage   • Anemia       Plan:   EGD today  PPI  GI consultation appreciated  H&H stable  VTE PPx: SCD          The patient was evaluated during the global COVID-19 pandemic, and the diagnosis was suspected/considered upon their initial presentation.  Evaluation, treatment, and testing were consistent with current guidelines for patients who present with complaints or symptoms that may be related to COVID-19.        This document has been electronically signed by GARRET Harry on January 7, 2021 13:21 CST

## 2021-01-07 NOTE — PLAN OF CARE
Problem: Adult Inpatient Plan of Care  Goal: Plan of Care Review  1/7/2021 0510 by Genna Nugent RN  Outcome: Ongoing, Not Progressing  Flowsheets  Taken 1/7/2021 0510 by Genna Nugent RN  Outcome Summary: VSS EGD today no Bm this shift will continue to monitor  Taken 1/6/2021 1447 by Blanca Menjivar RD  Progress: no change  1/7/2021 0510 by Genna Nugent RN  Outcome: Ongoing, Progressing  Flowsheets  Taken 1/7/2021 0510 by Genna Nugent RN  Plan of Care Reviewed With:   caregiver   patient  Outcome Summary: VSS EGD today no Bm this shift will continue to monitor  Taken 1/6/2021 1447 by Blanca Menjivar RD  Progress: no change   Goal Outcome Evaluation:  Plan of Care Reviewed With: caregiver, patient  Progress: no change  Outcome Summary: VSS EGD today no Bm this shift will continue to monitor

## 2021-01-07 NOTE — NURSING NOTE
made aware that pts BP 78/58  Pt is non-verbal but appears asymptomatic at this time  Orders received

## 2021-01-07 NOTE — PLAN OF CARE
Goal Outcome Evaluation:  Plan of Care Reviewed With: other (see comments)  Progress: no change  Outcome Summary: information received from chon.

## 2021-01-07 NOTE — ANESTHESIA PREPROCEDURE EVALUATION
Anesthesia Evaluation     NPO Solid Status: > 8 hours  NPO Liquid Status: > 2 hours           Airway   Mallampati: III  TM distance: >3 FB  Neck ROM: full  Dental      Pulmonary    (+) COPD,   Cardiovascular     (+) dysrhythmias, hyperlipidemia,       Neuro/Psych  (+) seizures, syncope, psychiatric history Depression,     GI/Hepatic/Renal/Endo    (+)  GERD well controlled, GI bleeding ,     Musculoskeletal     Abdominal    Substance History      OB/GYN          Other                        Anesthesia Plan    ASA 3     MAC     intravenous induction     Anesthetic plan, all risks, benefits, and alternatives have been provided, discussed and informed consent has been obtained with: healthcare power of  and sibling.

## 2021-01-08 ENCOUNTER — ANESTHESIA EVENT (OUTPATIENT)
Dept: GASTROENTEROLOGY | Facility: HOSPITAL | Age: 66
End: 2021-01-08

## 2021-01-08 ENCOUNTER — ANESTHESIA (OUTPATIENT)
Dept: GASTROENTEROLOGY | Facility: HOSPITAL | Age: 66
End: 2021-01-08

## 2021-01-08 PROBLEM — D50.0 IRON DEFICIENCY ANEMIA DUE TO CHRONIC BLOOD LOSS: Status: ACTIVE | Noted: 2021-01-05

## 2021-01-08 LAB
ANION GAP SERPL CALCULATED.3IONS-SCNC: 5 MMOL/L (ref 5–15)
BASOPHILS # BLD AUTO: 0.03 10*3/MM3 (ref 0–0.2)
BASOPHILS NFR BLD AUTO: 0.4 % (ref 0–1.5)
BUN SERPL-MCNC: 10 MG/DL (ref 8–23)
BUN/CREAT SERPL: 12.3 (ref 7–25)
CALCIUM SPEC-SCNC: 8.8 MG/DL (ref 8.6–10.5)
CHLORIDE SERPL-SCNC: 107 MMOL/L (ref 98–107)
CO2 SERPL-SCNC: 27 MMOL/L (ref 22–29)
CREAT SERPL-MCNC: 0.81 MG/DL (ref 0.76–1.27)
DEPRECATED RDW RBC AUTO: 44.7 FL (ref 37–54)
EOSINOPHIL # BLD AUTO: 0.23 10*3/MM3 (ref 0–0.4)
EOSINOPHIL NFR BLD AUTO: 3.4 % (ref 0.3–6.2)
ERYTHROCYTE [DISTWIDTH] IN BLOOD BY AUTOMATED COUNT: 13.6 % (ref 12.3–15.4)
GFR SERPL CREATININE-BSD FRML MDRD: 96 ML/MIN/1.73
GLUCOSE BLDC GLUCOMTR-MCNC: 216 MG/DL (ref 70–130)
GLUCOSE BLDC GLUCOMTR-MCNC: 270 MG/DL (ref 70–130)
GLUCOSE BLDC GLUCOMTR-MCNC: 56 MG/DL (ref 70–130)
GLUCOSE BLDC GLUCOMTR-MCNC: 84 MG/DL (ref 70–130)
GLUCOSE BLDC GLUCOMTR-MCNC: 85 MG/DL (ref 70–130)
GLUCOSE SERPL-MCNC: 88 MG/DL (ref 65–99)
HCT VFR BLD AUTO: 28 % (ref 37.5–51)
HGB BLD-MCNC: 9.4 G/DL (ref 13–17.7)
IMM GRANULOCYTES # BLD AUTO: 0.03 10*3/MM3 (ref 0–0.05)
IMM GRANULOCYTES NFR BLD AUTO: 0.4 % (ref 0–0.5)
LYMPHOCYTES # BLD AUTO: 0.74 10*3/MM3 (ref 0.7–3.1)
LYMPHOCYTES NFR BLD AUTO: 10.9 % (ref 19.6–45.3)
MCH RBC QN AUTO: 31.2 PG (ref 26.6–33)
MCHC RBC AUTO-ENTMCNC: 33.6 G/DL (ref 31.5–35.7)
MCV RBC AUTO: 93 FL (ref 79–97)
MONOCYTES # BLD AUTO: 0.62 10*3/MM3 (ref 0.1–0.9)
MONOCYTES NFR BLD AUTO: 9.1 % (ref 5–12)
NEUTROPHILS NFR BLD AUTO: 5.13 10*3/MM3 (ref 1.7–7)
NEUTROPHILS NFR BLD AUTO: 75.8 % (ref 42.7–76)
NRBC BLD AUTO-RTO: 0 /100 WBC (ref 0–0.2)
PLATELET # BLD AUTO: 285 10*3/MM3 (ref 140–450)
PMV BLD AUTO: 9.9 FL (ref 6–12)
POTASSIUM SERPL-SCNC: 3.9 MMOL/L (ref 3.5–5.2)
RBC # BLD AUTO: 3.01 10*6/MM3 (ref 4.14–5.8)
SODIUM SERPL-SCNC: 139 MMOL/L (ref 136–145)
WBC # BLD AUTO: 6.78 10*3/MM3 (ref 3.4–10.8)

## 2021-01-08 PROCEDURE — 0DBP8ZX EXCISION OF RECTUM, VIA NATURAL OR ARTIFICIAL OPENING ENDOSCOPIC, DIAGNOSTIC: ICD-10-PCS | Performed by: INTERNAL MEDICINE

## 2021-01-08 PROCEDURE — 88305 TISSUE EXAM BY PATHOLOGIST: CPT

## 2021-01-08 PROCEDURE — 25010000003 LEVETIRACETAM IN NACL 0.75% 1000 MG/100ML SOLUTION: Performed by: NURSE PRACTITIONER

## 2021-01-08 PROCEDURE — 85025 COMPLETE CBC W/AUTO DIFF WBC: CPT | Performed by: INTERNAL MEDICINE

## 2021-01-08 PROCEDURE — 45385 COLONOSCOPY W/LESION REMOVAL: CPT | Performed by: INTERNAL MEDICINE

## 2021-01-08 PROCEDURE — 0DBK8ZX EXCISION OF ASCENDING COLON, VIA NATURAL OR ARTIFICIAL OPENING ENDOSCOPIC, DIAGNOSTIC: ICD-10-PCS | Performed by: INTERNAL MEDICINE

## 2021-01-08 PROCEDURE — 80048 BASIC METABOLIC PNL TOTAL CA: CPT | Performed by: INTERNAL MEDICINE

## 2021-01-08 PROCEDURE — 0DBN8ZX EXCISION OF SIGMOID COLON, VIA NATURAL OR ARTIFICIAL OPENING ENDOSCOPIC, DIAGNOSTIC: ICD-10-PCS | Performed by: INTERNAL MEDICINE

## 2021-01-08 PROCEDURE — 82962 GLUCOSE BLOOD TEST: CPT

## 2021-01-08 PROCEDURE — 25010000002 PROPOFOL 10 MG/ML EMULSION: Performed by: NURSE ANESTHETIST, CERTIFIED REGISTERED

## 2021-01-08 PROCEDURE — 45380 COLONOSCOPY AND BIOPSY: CPT | Performed by: INTERNAL MEDICINE

## 2021-01-08 PROCEDURE — 0DBL8ZX EXCISION OF TRANSVERSE COLON, VIA NATURAL OR ARTIFICIAL OPENING ENDOSCOPIC, DIAGNOSTIC: ICD-10-PCS | Performed by: INTERNAL MEDICINE

## 2021-01-08 RX ORDER — PANTOPRAZOLE SODIUM 40 MG/1
40 TABLET, DELAYED RELEASE ORAL
Status: DISCONTINUED | OUTPATIENT
Start: 2021-01-09 | End: 2021-01-09 | Stop reason: HOSPADM

## 2021-01-08 RX ORDER — LEVETIRACETAM 10 MG/ML
1000 INJECTION INTRAVASCULAR ONCE
Status: COMPLETED | OUTPATIENT
Start: 2021-01-08 | End: 2021-01-08

## 2021-01-08 RX ORDER — PROPOFOL 10 MG/ML
VIAL (ML) INTRAVENOUS AS NEEDED
Status: DISCONTINUED | OUTPATIENT
Start: 2021-01-08 | End: 2021-01-08 | Stop reason: SURG

## 2021-01-08 RX ORDER — ONDANSETRON 2 MG/ML
4 INJECTION INTRAMUSCULAR; INTRAVENOUS ONCE AS NEEDED
Status: CANCELLED | OUTPATIENT
Start: 2021-01-08

## 2021-01-08 RX ADMIN — PROPOFOL 20 MG: 10 INJECTION, EMULSION INTRAVENOUS at 16:21

## 2021-01-08 RX ADMIN — PROPOFOL 10 MG: 10 INJECTION, EMULSION INTRAVENOUS at 16:11

## 2021-01-08 RX ADMIN — THIORIDAZINE HYDROCHLORIDE 100 MG: 50 TABLET, FILM COATED ORAL at 22:25

## 2021-01-08 RX ADMIN — PANTOPRAZOLE SODIUM 40 MG: 40 INJECTION, POWDER, FOR SOLUTION INTRAVENOUS at 08:58

## 2021-01-08 RX ADMIN — PROPOFOL 10 MG: 10 INJECTION, EMULSION INTRAVENOUS at 16:09

## 2021-01-08 RX ADMIN — LEVETIRACETAM 1000 MG: 500 TABLET ORAL at 22:26

## 2021-01-08 RX ADMIN — MIDODRINE HYDROCHLORIDE 10 MG: 5 TABLET ORAL at 18:01

## 2021-01-08 RX ADMIN — PROPOFOL 10 MG: 10 INJECTION, EMULSION INTRAVENOUS at 16:00

## 2021-01-08 RX ADMIN — PROPOFOL 20 MG: 10 INJECTION, EMULSION INTRAVENOUS at 15:57

## 2021-01-08 RX ADMIN — SODIUM CHLORIDE 125 ML/HR: 9 INJECTION, SOLUTION INTRAVENOUS at 02:15

## 2021-01-08 RX ADMIN — Medication 1 TABLET: at 22:25

## 2021-01-08 RX ADMIN — LEVETIRACETAM 250 MG: 250 TABLET ORAL at 22:27

## 2021-01-08 RX ADMIN — ATORVASTATIN CALCIUM 10 MG: 10 TABLET, FILM COATED ORAL at 18:01

## 2021-01-08 RX ADMIN — PROPOFOL 10 MG: 10 INJECTION, EMULSION INTRAVENOUS at 16:27

## 2021-01-08 RX ADMIN — PROPOFOL 60 MG: 10 INJECTION, EMULSION INTRAVENOUS at 15:51

## 2021-01-08 RX ADMIN — SODIUM CHLORIDE, PRESERVATIVE FREE 10 ML: 5 INJECTION INTRAVENOUS at 22:27

## 2021-01-08 RX ADMIN — LORAZEPAM 0.5 MG: 0.5 TABLET ORAL at 22:26

## 2021-01-08 RX ADMIN — PROPOFOL 40 MG: 10 INJECTION, EMULSION INTRAVENOUS at 16:18

## 2021-01-08 RX ADMIN — FERROUS SULFATE TAB EC 324 MG (65 MG FE EQUIVALENT) 324 MG: 324 (65 FE) TABLET DELAYED RESPONSE at 18:01

## 2021-01-08 RX ADMIN — PROPOFOL 10 MG: 10 INJECTION, EMULSION INTRAVENOUS at 16:23

## 2021-01-08 RX ADMIN — LEVETIRACETAM 500 MG: 500 TABLET ORAL at 22:25

## 2021-01-08 RX ADMIN — PROPOFOL 20 MG: 10 INJECTION, EMULSION INTRAVENOUS at 15:54

## 2021-01-08 RX ADMIN — PROPOFOL 20 MG: 10 INJECTION, EMULSION INTRAVENOUS at 15:58

## 2021-01-08 RX ADMIN — THIORIDAZINE HYDROCHLORIDE 100 MG: 50 TABLET, FILM COATED ORAL at 18:01

## 2021-01-08 RX ADMIN — LEVETIRACETAM 1000 MG: 10 INJECTION INTRAVENOUS at 10:13

## 2021-01-08 RX ADMIN — LAMOTRIGINE 200 MG: 100 TABLET ORAL at 22:25

## 2021-01-08 RX ADMIN — PROPOFOL 20 MG: 10 INJECTION, EMULSION INTRAVENOUS at 16:24

## 2021-01-08 RX ADMIN — PROPOFOL 10 MG: 10 INJECTION, EMULSION INTRAVENOUS at 16:15

## 2021-01-08 NOTE — NURSING NOTE
2014 Staff assist called RN ran into room to find patient having a seizure, Seizure lasted 2 minutes before post clonic stage achieved vitals taken and were WNL. Dr Foster contacted and orders received for a one time dose of oral ativan and PRN ativan IV. Blood sugar of 216 taken. Patient behavior at baseline at this time. [place] contacted with an update Will continue to monitor

## 2021-01-08 NOTE — PLAN OF CARE
Problem: Adult Inpatient Plan of Care  Goal: Plan of Care Review  Outcome: Ongoing, Progressing  Flowsheets  Taken 1/8/2021 0520 by Genna Nugent, RN  Progress: improving  Outcome Summary: pt experienced seizure today at 2014, baseline achieved after two minutes Md notified and orders recieved for PRN ativian and a one time extra dose of PO ativian. patient has had full dose of bowel prep and 2 very large bowel movements.Colonoscopy scheduled today NPO since midnight will continue to monitor  Taken 1/7/2021 1914 by Rubina Banerjee RN  Plan of Care Reviewed With: patient   Goal Outcome Evaluation:  Plan of Care Reviewed With: patient  Progress: improving  Outcome Summary: pt experienced seizure today at 2014, baseline achieved after two minutes Md notified and orders recieved for PRN ativian and a one time extra dose of PO ativian. patient has had full dose of bowel prep and 2 very large bowel movements.Colonoscopy scheduled today NPO since midnight will continue to monitor

## 2021-01-08 NOTE — PLAN OF CARE
Goal Outcome Evaluation:  Plan of Care Reviewed With: patient  Progress: no change  Outcome Summary: pt had a EGD today and has colonoscopy in AM

## 2021-01-08 NOTE — ANESTHESIA POSTPROCEDURE EVALUATION
Patient: Bautista Grubbs    Procedure Summary     Date: 01/08/21 Room / Location: [facility] ENDOSCOPY 1 / [facility] ENDOSCOPY    Anesthesia Start: 1542 Anesthesia Stop: 1656    Procedure: COLONOSCOPY (N/A ) Diagnosis:       Iron deficiency anemia due to chronic blood loss      (Iron deficiency anemia due to chronic blood loss [D50.0])    Surgeon: Wanda Lang MD Provider: Becca Jack CRNA    Anesthesia Type: MAC ASA Status: 3          Anesthesia Type: MAC    Vitals  Vitals Value Taken Time   BP 96/58 01/08/21 1634   Temp 97.6 °F (36.4 °C) 01/08/21 1634   Pulse 65 01/08/21 1634   Resp 16 01/08/21 1634   SpO2 96 % 01/08/21 1634           Post Anesthesia Care and Evaluation    Patient location during evaluation: bedside  Patient participation: complete - patient participated  Level of consciousness: awake and alert  Pain score: 0  Pain management: adequate  Airway patency: patent  Anesthetic complications: No anesthetic complications  PONV Status: none  Cardiovascular status: hemodynamically stable  Respiratory status: spontaneous ventilation and room air  Hydration status: acceptable

## 2021-01-08 NOTE — ANESTHESIA PREPROCEDURE EVALUATION
Anesthesia Evaluation     NPO Solid Status: > 8 hours  NPO Liquid Status: > 2 hours           Airway   Mallampati: III  TM distance: >3 FB  Neck ROM: full  Dental          Pulmonary    (+) COPD, decreased breath sounds,   Cardiovascular - normal exam    (+) dysrhythmias, hyperlipidemia,       Neuro/Psych  (+) seizures, syncope, psychiatric history Depression,       ROS Comment: Does not communicate  GI/Hepatic/Renal/Endo    (+)  GERD well controlled, GI bleeding ,     Musculoskeletal     Abdominal    Substance History      OB/GYN          Other   blood dyscrasia anemia,                       Anesthesia Plan    ASA 3     MAC     intravenous induction     Anesthetic plan, all risks, benefits, and alternatives have been provided, discussed and informed consent has been obtained with: healthcare power of  and sibling.

## 2021-01-08 NOTE — PROGRESS NOTES
[facility] Medicine Services  INPATIENT PROGRESS NOTE    Length of Stay: 0  Date of Admission: 1/5/2021  Primary Care Physician: Bautista James MD    Subjective   Chief Complaint: hematemesis   HPI:  65 yea rold male with a history of Lennox Gestalt syndrome, seizures, and autism who is admitted for hematemesis and melena.  EGD revealed gastritis and esophagitis. H&H is stable.  Colonoscopy is planned today.  He had a seizure last night and had refused medications yesterday.    Review of Systems   Unable to perform ROS: Psychiatric disorder      All pertinent negatives and positives are as above. All other systems have been reviewed and are negative unless otherwise stated.     Objective    Temp:  [96.6 °F (35.9 °C)-98.1 °F (36.7 °C)] 96.6 °F (35.9 °C)  Heart Rate:  [48-71] 65  Resp:  [16-17] 16  BP: ()/(52-81) 102/64    Physical Exam  Vitals signs reviewed.   Constitutional:       General: He is not in acute distress.     Appearance: Normal appearance.   Cardiovascular:      Rate and Rhythm: Normal rate and regular rhythm.   Pulmonary:      Effort: Pulmonary effort is normal.      Breath sounds: Normal breath sounds.   Abdominal:      General: There is no distension.      Palpations: Abdomen is soft.      Tenderness: There is no abdominal tenderness.   Musculoskeletal:         General: No deformity.   Skin:     General: Skin is warm and dry.   Neurological:      General: No focal deficit present.      Mental Status: He is alert. Mental status is at baseline.         Results Review:  I have reviewed the labs, radiology results, and diagnostic studies.    Laboratory Data:   Results from last 7 days   Lab Units 01/08/21  0541 01/07/21  0603 01/06/21  0519 01/05/21  1052   SODIUM mmol/L 139 139 136 141   POTASSIUM mmol/L 3.9 3.7 3.7 3.7   CHLORIDE mmol/L 107 104 101 108*   CO2 mmol/L 27.0 24.0 30.0* 27.0   BUN mg/dL 10 16 14 16   CREATININE mg/dL 0.81 0.93 0.99 0.88    GLUCOSE mg/dL 88 44* 77 95   CALCIUM mg/dL 8.8 8.7 9.6 8.3*   BILIRUBIN mg/dL  --   --   --  <0.2   ALK PHOS U/L  --   --   --  67   ALT (SGPT) U/L  --   --   --  19   AST (SGOT) U/L  --   --   --  17   ANION GAP mmol/L 5.0 11.0 5.0 6.0     Estimated Creatinine Clearance: 79.8 mL/min (by C-G formula based on SCr of 0.81 mg/dL).          Results from last 7 days   Lab Units 01/08/21  0541 01/07/21  0603 01/06/21  0518 01/05/21  1052   WBC 10*3/mm3 6.78 4.47 5.50 4.25   HEMOGLOBIN g/dL 9.4* 9.2* 9.4* 8.2*   HEMATOCRIT % 28.0* 27.3* 27.9* 24.6*   PLATELETS 10*3/mm3 285 256 290 250     Results from last 7 days   Lab Units 01/05/21  1052   INR  0.99       Culture Data:   No results found for: BLOODCX  No results found for: URINECX  No results found for: RESPCX  No results found for: WOUNDCX  No results found for: STOOLCX  No components found for: BODYFLD    Radiology Data:   Imaging Results (Last 24 Hours)     ** No results found for the last 24 hours. **          I have reviewed the patient's current medications.     Assessment/Plan     Active Hospital Problems    Diagnosis   • **Gastrointestinal hemorrhage   • Anemia   • Iron deficiency anemia due to chronic blood loss     Added automatically from request for surgery [id number]         Plan:   S/P EGD  Colonoscopy today  PPI  GI consultation appreciated  H&H stable  IV Keppra 1000 mg once, then resume PO keppra after colonoscopy  VTE PPx: SCD          The patient was evaluated during the global COVID-19 pandemic, and the diagnosis was suspected/considered upon their initial presentation.  Evaluation, treatment, and testing were consistent with current guidelines for patients who present with complaints or symptoms that may be related to COVID-19.        This document has been electronically signed by GARRET Harry on January 8, 2021 12:18 CST

## 2021-01-09 ENCOUNTER — READMISSION MANAGEMENT (OUTPATIENT)
Dept: CALL CENTER | Facility: HOSPITAL | Age: 66
End: 2021-01-09

## 2021-01-09 VITALS
BODY MASS INDEX: 18.83 KG/M2 | DIASTOLIC BLOOD PRESSURE: 61 MMHG | WEIGHT: 134.5 LBS | SYSTOLIC BLOOD PRESSURE: 99 MMHG | TEMPERATURE: 96.2 F | RESPIRATION RATE: 18 BRPM | OXYGEN SATURATION: 98 % | HEART RATE: 67 BPM | HEIGHT: 71 IN

## 2021-01-09 LAB
ANION GAP SERPL CALCULATED.3IONS-SCNC: 6 MMOL/L (ref 5–15)
BASOPHILS # BLD AUTO: 0.04 10*3/MM3 (ref 0–0.2)
BASOPHILS NFR BLD AUTO: 0.6 % (ref 0–1.5)
BUN SERPL-MCNC: 8 MG/DL (ref 8–23)
BUN/CREAT SERPL: 9 (ref 7–25)
CALCIUM SPEC-SCNC: 8.4 MG/DL (ref 8.6–10.5)
CHLORIDE SERPL-SCNC: 106 MMOL/L (ref 98–107)
CO2 SERPL-SCNC: 28 MMOL/L (ref 22–29)
CREAT SERPL-MCNC: 0.89 MG/DL (ref 0.76–1.27)
DEPRECATED RDW RBC AUTO: 44.4 FL (ref 37–54)
EOSINOPHIL # BLD AUTO: 0.21 10*3/MM3 (ref 0–0.4)
EOSINOPHIL NFR BLD AUTO: 3.3 % (ref 0.3–6.2)
ERYTHROCYTE [DISTWIDTH] IN BLOOD BY AUTOMATED COUNT: 13.6 % (ref 12.3–15.4)
GFR SERPL CREATININE-BSD FRML MDRD: 86 ML/MIN/1.73
GLUCOSE BLDC GLUCOMTR-MCNC: 131 MG/DL (ref 70–130)
GLUCOSE BLDC GLUCOMTR-MCNC: 90 MG/DL (ref 70–130)
GLUCOSE SERPL-MCNC: 114 MG/DL (ref 65–99)
HCT VFR BLD AUTO: 27.8 % (ref 37.5–51)
HGB BLD-MCNC: 9.6 G/DL (ref 13–17.7)
IMM GRANULOCYTES # BLD AUTO: 0.03 10*3/MM3 (ref 0–0.05)
IMM GRANULOCYTES NFR BLD AUTO: 0.5 % (ref 0–0.5)
LYMPHOCYTES # BLD AUTO: 0.8 10*3/MM3 (ref 0.7–3.1)
LYMPHOCYTES NFR BLD AUTO: 12.6 % (ref 19.6–45.3)
MCH RBC QN AUTO: 31.5 PG (ref 26.6–33)
MCHC RBC AUTO-ENTMCNC: 34.5 G/DL (ref 31.5–35.7)
MCV RBC AUTO: 91.1 FL (ref 79–97)
MONOCYTES # BLD AUTO: 0.68 10*3/MM3 (ref 0.1–0.9)
MONOCYTES NFR BLD AUTO: 10.7 % (ref 5–12)
NEUTROPHILS NFR BLD AUTO: 4.6 10*3/MM3 (ref 1.7–7)
NEUTROPHILS NFR BLD AUTO: 72.3 % (ref 42.7–76)
NRBC BLD AUTO-RTO: 0 /100 WBC (ref 0–0.2)
PLATELET # BLD AUTO: 272 10*3/MM3 (ref 140–450)
PMV BLD AUTO: 9.7 FL (ref 6–12)
POTASSIUM SERPL-SCNC: 3.5 MMOL/L (ref 3.5–5.2)
RBC # BLD AUTO: 3.05 10*6/MM3 (ref 4.14–5.8)
SODIUM SERPL-SCNC: 140 MMOL/L (ref 136–145)
WBC # BLD AUTO: 6.36 10*3/MM3 (ref 3.4–10.8)

## 2021-01-09 PROCEDURE — 80048 BASIC METABOLIC PNL TOTAL CA: CPT | Performed by: INTERNAL MEDICINE

## 2021-01-09 PROCEDURE — 85025 COMPLETE CBC W/AUTO DIFF WBC: CPT | Performed by: INTERNAL MEDICINE

## 2021-01-09 PROCEDURE — 25010000002 LORAZEPAM PER 2 MG: Performed by: INTERNAL MEDICINE

## 2021-01-09 PROCEDURE — 82962 GLUCOSE BLOOD TEST: CPT

## 2021-01-09 RX ORDER — PANTOPRAZOLE SODIUM 40 MG/1
40 TABLET, DELAYED RELEASE ORAL DAILY
Qty: 30 TABLET | Refills: 0 | Status: SHIPPED | OUTPATIENT
Start: 2021-01-09 | End: 2021-01-28 | Stop reason: HOSPADM

## 2021-01-09 RX ADMIN — SODIUM CHLORIDE, PRESERVATIVE FREE 10 ML: 5 INJECTION INTRAVENOUS at 08:35

## 2021-01-09 RX ADMIN — Medication 1 TABLET: at 08:34

## 2021-01-09 RX ADMIN — FERROUS SULFATE TAB EC 324 MG (65 MG FE EQUIVALENT) 324 MG: 324 (65 FE) TABLET DELAYED RESPONSE at 08:34

## 2021-01-09 RX ADMIN — LEVETIRACETAM 1000 MG: 500 TABLET ORAL at 08:37

## 2021-01-09 RX ADMIN — LORAZEPAM 1 MG: 2 INJECTION, SOLUTION INTRAMUSCULAR; INTRAVENOUS at 02:14

## 2021-01-09 RX ADMIN — THIORIDAZINE HYDROCHLORIDE 100 MG: 50 TABLET, FILM COATED ORAL at 08:33

## 2021-01-09 RX ADMIN — PANTOPRAZOLE SODIUM 40 MG: 40 TABLET, DELAYED RELEASE ORAL at 06:12

## 2021-01-09 RX ADMIN — ATORVASTATIN CALCIUM 10 MG: 10 TABLET, FILM COATED ORAL at 08:34

## 2021-01-09 RX ADMIN — MIDODRINE HYDROCHLORIDE 10 MG: 5 TABLET ORAL at 06:12

## 2021-01-09 RX ADMIN — LEVETIRACETAM 250 MG: 250 TABLET ORAL at 08:34

## 2021-01-09 RX ADMIN — LEVETIRACETAM 500 MG: 500 TABLET ORAL at 08:38

## 2021-01-09 RX ADMIN — LAMOTRIGINE 200 MG: 100 TABLET ORAL at 08:33

## 2021-01-09 RX ADMIN — SODIUM CHLORIDE 50 ML/HR: 9 INJECTION, SOLUTION INTRAVENOUS at 06:12

## 2021-01-09 RX ADMIN — LORAZEPAM 0.5 MG: 0.5 TABLET ORAL at 08:34

## 2021-01-09 NOTE — NURSING NOTE
Nurse was called to pts room per sitter, pt was having a seizure  When nurse entered room pt still appeared to be seizing   Sitter states that the seizure lasted approximately a minute   pts PRN dose of IV Ativan was given for seizure activity

## 2021-01-09 NOTE — DISCHARGE SUMMARY
[facility] Medicine Services  DISCHARGE SUMMARY       Date of Admission: 1/5/2021  Date of Discharge:  1/9/2021  Primary Care Physician: Bautista James MD    Presenting Problem/History of Present Illness:  Anemia, unspecified type [D64.9]  Gastrointestinal hemorrhage, unspecified gastrointestinal hemorrhage type [K92.2]  Anemia, unspecified type [D64.9]     Final Discharge Diagnoses:    Gastrointestinal hemorrhage    Seizures (CMS/HCC)    Anemia    Iron deficiency anemia due to chronic blood loss      Consults:   Consults     Date and Time Order Name Status Description    1/5/2021 1756 Inpatient Gastroenterology Consult Completed     1/5/2021 1441 Hospitalist (on-call MD unless specified)            Procedures Performed: Procedure(s):  COLONOSCOPY           01/08 1536 COLONOSCOPY  01/07 1529 UPPER GI ENDOSCOPY    Pertinent Test Results:   Ct Abdomen Pelvis With Contrast    Result Date: 1/5/2021  PROCEDURE: CT abdomen and pelvis with intravenous contrast HISTORY: hematemesis, black stool This exam was performed using radiation doses that are as low as reasonably achievable (ALARA). This exam was performed according to our departmental dose optimization program, which includes automated exposure control, adjustment of the mA and/or KV according to patient size and/or use of iterative reconstruction technique. COMPARISON: No comparison CONTRAST: Oral and 80 cc intravenous Isovue 300 TECHNIQUE: Multiple contiguous contrast enhanced axial images are obtained of the abdomen and pelvis. FINDINGS: LOWER CHEST: Unremarkable. HEPATOBILIARY: Unremarkable. SPLEEN: Unremarkable. PANCREAS: Unremarkable ADRENAL GLANDS: Unremarkable. KIDNEYS/URETERS: No evidence of hydronephrosis or suspicious mass. GASTROINTESTINAL: Small hiatal hernia. Moderate amount of stool noted throughout the colon. REPRODUCTIVE ORGANS: Unremarkable. URINARY BLADDER: Unremarkable VASCULAR: Unremarkable LYMPH  "NODES: No pathologically enlarged nodes by size criteria. PERITONEUM/RETROPERITONEUM: Unremarkable. OSSEOUS STRUCTURES: Multiple old healed bilateral rib fractures. Inferior endplate T9 compression sclerotic deformity. No acute osseous abnormality is seen.     CONCLUSION: No acute CT abnormality. Moderate stool noted throughout the colon. Small hiatal hernia. Electronically signed by:  Jorge Davis MD  1/5/2021 1:16 PM CST Workstation: [id number]      HPI/Hospital Course:  The patient is a 65 y.o. male who presented to [facility] with with a history of Lennox Gestalt syndrome and autism who presented with hematemesis and melena.  GI consulted and he underwent EGD and colonoscopy.  This revealed gastritis, esophagitis, and multiple polyps.  H&H is stable with no evidence of active bleeding.  He is stable and discharged to [facility].        Condition on Discharge:  Stable    Physical Exam on Discharge:  BP 99/61 (BP Location: Right arm, Patient Position: Lying)   Pulse 67   Temp 96.2 °F (35.7 °C) (Axillary)   Resp 18   Ht 180.3 cm (71\")   Wt 61 kg (134 lb 8 oz)   SpO2 98%   BMI 18.76 kg/m²   Physical Exam  Vitals signs reviewed.   Constitutional:       General: He is not in acute distress.     Appearance: Normal appearance.   Cardiovascular:      Rate and Rhythm: Normal rate and regular rhythm.   Pulmonary:      Effort: Pulmonary effort is normal.      Breath sounds: Normal breath sounds.   Abdominal:      General: There is no distension.      Palpations: Abdomen is soft.      Tenderness: There is no abdominal tenderness.   Musculoskeletal:         General: No deformity.   Skin:     General: Skin is warm and dry.   Neurological:      General: No focal deficit present.      Mental Status: He is alert. Mental status is at baseline.     Discharge Disposition:  Home or Self Care    Discharge Medications:     Discharge Medications      New Medications      Instructions Start Date   pantoprazole 40 MG EC " tablet  Commonly known as: Protonix   40 mg, Oral, Daily         Changes to Medications      Instructions Start Date   midodrine 5 MG tablet  Commonly known as: PROAMATINE  What changed: how much to take   5 mg, Oral, 3 Times Daily         Continue These Medications      Instructions Start Date   Calcium 600+D 600-400 MG-UNIT per tablet  Generic drug: calcium carbonate-vitamin d   1 tablet, Oral, 2 Times Daily      ferrous sulfate 325 (65 FE) MG tablet   325 mg, Oral, Daily With Breakfast      lamoTRIgine 200 MG tablet  Commonly known as: LaMICtal   200 mg, Oral, 2 Times Daily      levETIRAcetam 1000 MG tablet  Commonly known as: KEPPRA   1,000 mg, Oral, 2 Times Daily      levETIRAcetam 500 MG tablet  Commonly known as: KEPPRA   500 mg, Oral, 2 Times Daily      levETIRAcetam 250 MG tablet  Commonly known as: KEPPRA   250 mg, Oral, 2 Times Daily      LORazepam 0.5 MG tablet  Commonly known as: ATIVAN   GIVE ONE TABLET BY MOUTH TWICE DAILY FOR ANXIETY AND AGITATION      multivitamin with minerals tablet tablet   1 tablet, Oral, Once      polyethylene glycol packet  Commonly known as: MIRALAX   17 g, Oral, Nightly      Prolia 60 MG/ML solution prefilled syringe syringe  Generic drug: denosumab   Subcutaneous, Once      simvastatin 20 MG tablet  Commonly known as: ZOCOR   20 mg, Oral, Nightly      thioridazine 100 MG tablet  Commonly known as: MELLARIL   100 mg, Oral, 3 Times Daily         Stop These Medications    omeprazole 20 MG capsule  Commonly known as: priLOSEC            Discharge Diet:   Diet Instructions     Diet: Regular; Thin      Discharge Diet: Regular    Fluid Consistency: Thin          Activity at Discharge:   Activity Instructions     Activity as Tolerated          Follow-up Appointments:   1. PCP 1 week  2. Dr. Lang 2 weeks  Future Appointments   Date Time Provider Department Center   6/23/2021 10:00 AM Earnestine Reid MD [facility] None       Test Results Pending at Discharge:   Pending Labs      Order Current Status    Tissue Pathology Exam Collected (01/08/21 1472)    Tissue Pathology Exam In process          Dheeraj Barron Lory, APRN  01/09/21  11:34 CST    Time: Discharge planning and teaching took greater than 30 minutes.

## 2021-01-09 NOTE — PLAN OF CARE
Goal Outcome Evaluation:  Plan of Care Reviewed With: patient, other (see comments)  Progress: no change  Outcome Summary: pt ate well after Colonoscopy, pt did have one seizure and dose of Ativan given

## 2021-01-09 NOTE — OUTREACH NOTE
Prep Survey      Responses   [facility] patient discharged from?  [place]   Is LACE score < 7 ?  No   Emergency Room discharge w/ pulse ox?  No   Eligibility  [facility]   Date of Admission  01/05/21   Date of Discharge  01/09/21   Discharge Disposition  Home or Self Care   Discharge diagnosis  Gastrointestinal hemorrhage   Does the patient have one of the following disease processes/diagnoses(primary or secondary)?  Other   Does the patient have Home health ordered?  No   Is there a DME ordered?  No   Prep survey completed?  Yes          Patricia Rendon RN

## 2021-01-11 ENCOUNTER — TRANSITIONAL CARE MANAGEMENT TELEPHONE ENCOUNTER (OUTPATIENT)
Dept: CALL CENTER | Facility: HOSPITAL | Age: 66
End: 2021-01-11

## 2021-01-11 NOTE — OUTREACH NOTE
Call Center TCM Note      Responses   [facility] patient discharged from?  [place]   Does the patient have one of the following disease processes/diagnoses(primary or secondary)?  Other   TCM attempt successful?  No   Unsuccessful attempts  Attempt 1   Revoked Reason  Other [Pt lives in Long Term Facility]          Rut Castano LPN    1/11/2021, 14:28 EST

## 2021-01-12 LAB
LAB AP CASE REPORT: NORMAL
PATH REPORT.FINAL DX SPEC: NORMAL

## 2021-01-13 LAB
LAB AP CASE REPORT: NORMAL
PATH REPORT.FINAL DX SPEC: NORMAL

## 2021-01-18 ENCOUNTER — READMISSION MANAGEMENT (OUTPATIENT)
Dept: CALL CENTER | Facility: HOSPITAL | Age: 66
End: 2021-01-18

## 2021-01-18 NOTE — OUTREACH NOTE
Medical Week 2 Survey      Responses   [facility] patient discharged from?  [place]   Does the patient have one of the following disease processes/diagnoses(primary or secondary)?  Other   Week 2 attempt successful?  Yes   Call start time  1555   Discharge diagnosis  Gastrointestinal hemorrhage   Rescheduled  Revoked [The number listed for patient is his living facility. They report he doesn't have a personal number. ]   Call end time  1555          Mana Mancia RN

## 2021-01-21 ENCOUNTER — ANESTHESIA (OUTPATIENT)
Dept: GASTROENTEROLOGY | Facility: HOSPITAL | Age: 66
End: 2021-01-21

## 2021-01-21 ENCOUNTER — APPOINTMENT (OUTPATIENT)
Dept: GENERAL RADIOLOGY | Facility: HOSPITAL | Age: 66
End: 2021-01-21

## 2021-01-21 ENCOUNTER — ANESTHESIA EVENT (OUTPATIENT)
Dept: GASTROENTEROLOGY | Facility: HOSPITAL | Age: 66
End: 2021-01-21

## 2021-01-21 ENCOUNTER — APPOINTMENT (OUTPATIENT)
Dept: INTERVENTIONAL RADIOLOGY/VASCULAR | Facility: HOSPITAL | Age: 66
End: 2021-01-21

## 2021-01-21 ENCOUNTER — APPOINTMENT (OUTPATIENT)
Dept: ULTRASOUND IMAGING | Facility: HOSPITAL | Age: 66
End: 2021-01-21

## 2021-01-21 ENCOUNTER — HOSPITAL ENCOUNTER (INPATIENT)
Facility: HOSPITAL | Age: 66
LOS: 6 days | Discharge: NURSING, REHAB OR LONG-TERM CARE | End: 2021-01-28
Attending: EMERGENCY MEDICINE | Admitting: FAMILY MEDICINE

## 2021-01-21 DIAGNOSIS — R13.12 OROPHARYNGEAL DYSPHAGIA: ICD-10-CM

## 2021-01-21 DIAGNOSIS — E83.52 HYPERCALCEMIA: ICD-10-CM

## 2021-01-21 DIAGNOSIS — Z78.9 IMPAIRED MOBILITY AND ACTIVITIES OF DAILY LIVING: ICD-10-CM

## 2021-01-21 DIAGNOSIS — K92.2 UGIB (UPPER GASTROINTESTINAL BLEED): Primary | ICD-10-CM

## 2021-01-21 DIAGNOSIS — J18.9 PNEUMONIA OF RIGHT UPPER LOBE DUE TO INFECTIOUS ORGANISM: ICD-10-CM

## 2021-01-21 DIAGNOSIS — Z74.09 IMPAIRED MOBILITY AND ACTIVITIES OF DAILY LIVING: ICD-10-CM

## 2021-01-21 DIAGNOSIS — Z74.09 IMPAIRED FUNCTIONAL MOBILITY, BALANCE, GAIT, AND ENDURANCE: ICD-10-CM

## 2021-01-21 LAB
ABO GROUP BLD: NORMAL
ALBUMIN SERPL-MCNC: 3.6 G/DL (ref 3.5–5.2)
ALBUMIN/GLOB SERPL: 1.3 G/DL
ALP SERPL-CCNC: 81 U/L (ref 39–117)
ALT SERPL W P-5'-P-CCNC: 25 U/L (ref 1–41)
AMYLASE SERPL-CCNC: 63 U/L (ref 28–100)
ANION GAP SERPL CALCULATED.3IONS-SCNC: 8 MMOL/L (ref 5–15)
APTT PPP: 26.4 SECONDS (ref 20–40.3)
AST SERPL-CCNC: 20 U/L (ref 1–40)
BASOPHILS # BLD AUTO: 0.03 10*3/MM3 (ref 0–0.2)
BASOPHILS NFR BLD AUTO: 0.3 % (ref 0–1.5)
BILIRUB SERPL-MCNC: 0.2 MG/DL (ref 0–1.2)
BILIRUB UR QL STRIP: NEGATIVE
BLD GP AB SCN SERPL QL: NEGATIVE
BUN SERPL-MCNC: 42 MG/DL (ref 8–23)
BUN/CREAT SERPL: 41.6 (ref 7–25)
CALCIUM SPEC-SCNC: 11.5 MG/DL (ref 8.6–10.5)
CHLORIDE SERPL-SCNC: 104 MMOL/L (ref 98–107)
CLARITY UR: CLEAR
CO2 SERPL-SCNC: 29 MMOL/L (ref 22–29)
COLOR UR: YELLOW
CREAT SERPL-MCNC: 1.01 MG/DL (ref 0.76–1.27)
D-LACTATE SERPL-SCNC: 1.8 MMOL/L (ref 0.5–2)
DEPRECATED RDW RBC AUTO: 47.9 FL (ref 37–54)
EOSINOPHIL # BLD AUTO: 0.01 10*3/MM3 (ref 0–0.4)
EOSINOPHIL NFR BLD AUTO: 0.1 % (ref 0.3–6.2)
ERYTHROCYTE [DISTWIDTH] IN BLOOD BY AUTOMATED COUNT: 14.3 % (ref 12.3–15.4)
FLUAV RNA RESP QL NAA+PROBE: NOT DETECTED
FLUBV RNA RESP QL NAA+PROBE: NOT DETECTED
GASTROCULT GAST QL: NEGATIVE
GFR SERPL CREATININE-BSD FRML MDRD: 74 ML/MIN/1.73
GLOBULIN UR ELPH-MCNC: 2.8 GM/DL
GLUCOSE SERPL-MCNC: 123 MG/DL (ref 65–99)
GLUCOSE UR STRIP-MCNC: NEGATIVE MG/DL
HCT VFR BLD AUTO: 35.9 % (ref 37.5–51)
HGB BLD-MCNC: 12 G/DL (ref 13–17.7)
HGB UR QL STRIP.AUTO: NEGATIVE
HOLD SPECIMEN: NORMAL
HOLD SPECIMEN: NORMAL
IMM GRANULOCYTES # BLD AUTO: 0.02 10*3/MM3 (ref 0–0.05)
IMM GRANULOCYTES NFR BLD AUTO: 0.2 % (ref 0–0.5)
INR PPP: 0.95 (ref 0.8–1.2)
KETONES UR QL STRIP: NEGATIVE
LEUKOCYTE ESTERASE UR QL STRIP.AUTO: NEGATIVE
LIPASE SERPL-CCNC: 11 U/L (ref 13–60)
LYMPHOCYTES # BLD AUTO: 0.18 10*3/MM3 (ref 0.7–3.1)
LYMPHOCYTES NFR BLD AUTO: 1.9 % (ref 19.6–45.3)
Lab: NORMAL
MCH RBC QN AUTO: 30.9 PG (ref 26.6–33)
MCHC RBC AUTO-ENTMCNC: 33.4 G/DL (ref 31.5–35.7)
MCV RBC AUTO: 92.5 FL (ref 79–97)
MONOCYTES # BLD AUTO: 0.51 10*3/MM3 (ref 0.1–0.9)
MONOCYTES NFR BLD AUTO: 5.3 % (ref 5–12)
NEUTROPHILS NFR BLD AUTO: 8.95 10*3/MM3 (ref 1.7–7)
NEUTROPHILS NFR BLD AUTO: 92.2 % (ref 42.7–76)
NITRITE UR QL STRIP: NEGATIVE
NRBC BLD AUTO-RTO: 0 /100 WBC (ref 0–0.2)
PH UR STRIP.AUTO: 7 [PH] (ref 5–9)
PLATELET # BLD AUTO: 267 10*3/MM3 (ref 140–450)
PMV BLD AUTO: 10.1 FL (ref 6–12)
POTASSIUM SERPL-SCNC: 4.6 MMOL/L (ref 3.5–5.2)
PROT SERPL-MCNC: 6.4 G/DL (ref 6–8.5)
PROT UR QL STRIP: NEGATIVE
PROTHROMBIN TIME: 13 SECONDS (ref 11.1–15.3)
RBC # BLD AUTO: 3.88 10*6/MM3 (ref 4.14–5.8)
RH BLD: NEGATIVE
SARS-COV-2 RNA RESP QL NAA+PROBE: NOT DETECTED
SODIUM SERPL-SCNC: 141 MMOL/L (ref 136–145)
SP GR UR STRIP: 1.02 (ref 1–1.03)
T&S EXPIRATION DATE: NORMAL
UROBILINOGEN UR QL STRIP: NORMAL
WBC # BLD AUTO: 9.7 10*3/MM3 (ref 3.4–10.8)
WHOLE BLOOD HOLD SPECIMEN: NORMAL
WHOLE BLOOD HOLD SPECIMEN: NORMAL

## 2021-01-21 PROCEDURE — 74022 RADEX COMPL AQT ABD SERIES: CPT

## 2021-01-21 PROCEDURE — 99285 EMERGENCY DEPT VISIT HI MDM: CPT

## 2021-01-21 PROCEDURE — 85730 THROMBOPLASTIN TIME PARTIAL: CPT | Performed by: EMERGENCY MEDICINE

## 2021-01-21 PROCEDURE — P9047 ALBUMIN (HUMAN), 25%, 50ML: HCPCS | Performed by: INTERNAL MEDICINE

## 2021-01-21 PROCEDURE — 87636 SARSCOV2 & INF A&B AMP PRB: CPT | Performed by: EMERGENCY MEDICINE

## 2021-01-21 PROCEDURE — 82150 ASSAY OF AMYLASE: CPT | Performed by: EMERGENCY MEDICINE

## 2021-01-21 PROCEDURE — 85610 PROTHROMBIN TIME: CPT | Performed by: EMERGENCY MEDICINE

## 2021-01-21 PROCEDURE — 25010000002 ALBUMIN HUMAN 25% PER 50 ML: Performed by: INTERNAL MEDICINE

## 2021-01-21 PROCEDURE — C1751 CATH, INF, PER/CENT/MIDLINE: HCPCS

## 2021-01-21 PROCEDURE — 87150 DNA/RNA AMPLIFIED PROBE: CPT | Performed by: EMERGENCY MEDICINE

## 2021-01-21 PROCEDURE — 86901 BLOOD TYPING SEROLOGIC RH(D): CPT

## 2021-01-21 PROCEDURE — 25010000002 AZITHROMYCIN 500 MG/250 ML: Performed by: EMERGENCY MEDICINE

## 2021-01-21 PROCEDURE — 86901 BLOOD TYPING SEROLOGIC RH(D): CPT | Performed by: EMERGENCY MEDICINE

## 2021-01-21 PROCEDURE — 85025 COMPLETE CBC W/AUTO DIFF WBC: CPT

## 2021-01-21 PROCEDURE — 87147 CULTURE TYPE IMMUNOLOGIC: CPT | Performed by: EMERGENCY MEDICINE

## 2021-01-21 PROCEDURE — 87186 SC STD MICRODIL/AGAR DIL: CPT | Performed by: EMERGENCY MEDICINE

## 2021-01-21 PROCEDURE — 83690 ASSAY OF LIPASE: CPT

## 2021-01-21 PROCEDURE — 02HV33Z INSERTION OF INFUSION DEVICE INTO SUPERIOR VENA CAVA, PERCUTANEOUS APPROACH: ICD-10-PCS | Performed by: INTERNAL MEDICINE

## 2021-01-21 PROCEDURE — 81003 URINALYSIS AUTO W/O SCOPE: CPT | Performed by: EMERGENCY MEDICINE

## 2021-01-21 PROCEDURE — 99214 OFFICE O/P EST MOD 30 MIN: CPT | Performed by: INTERNAL MEDICINE

## 2021-01-21 PROCEDURE — G0378 HOSPITAL OBSERVATION PER HR: HCPCS

## 2021-01-21 PROCEDURE — G0463 HOSPITAL OUTPT CLINIC VISIT: HCPCS | Performed by: INTERNAL MEDICINE

## 2021-01-21 PROCEDURE — 87040 BLOOD CULTURE FOR BACTERIA: CPT | Performed by: EMERGENCY MEDICINE

## 2021-01-21 PROCEDURE — 82271 OCCULT BLOOD OTHER SOURCES: CPT | Performed by: EMERGENCY MEDICINE

## 2021-01-21 PROCEDURE — 86900 BLOOD TYPING SEROLOGIC ABO: CPT | Performed by: EMERGENCY MEDICINE

## 2021-01-21 PROCEDURE — 83605 ASSAY OF LACTIC ACID: CPT | Performed by: EMERGENCY MEDICINE

## 2021-01-21 PROCEDURE — 80053 COMPREHEN METABOLIC PANEL: CPT

## 2021-01-21 PROCEDURE — 25010000002 PHENYLEPHRINE 10 MG/ML SOLUTION: Performed by: NURSE ANESTHETIST, CERTIFIED REGISTERED

## 2021-01-21 PROCEDURE — 86900 BLOOD TYPING SEROLOGIC ABO: CPT

## 2021-01-21 PROCEDURE — 86850 RBC ANTIBODY SCREEN: CPT | Performed by: EMERGENCY MEDICINE

## 2021-01-21 PROCEDURE — 25010000002 CEFTRIAXONE: Performed by: INTERNAL MEDICINE

## 2021-01-21 RX ORDER — ACETAMINOPHEN 650 MG/1
650 SUPPOSITORY RECTAL EVERY 4 HOURS PRN
Status: DISCONTINUED | OUTPATIENT
Start: 2021-01-21 | End: 2021-01-28 | Stop reason: HOSPADM

## 2021-01-21 RX ORDER — CLINDAMYCIN PHOSPHATE 600 MG/50ML
600 INJECTION INTRAVENOUS ONCE
Status: COMPLETED | OUTPATIENT
Start: 2021-01-21 | End: 2021-01-21

## 2021-01-21 RX ORDER — NOREPINEPHRINE BIT/0.9 % NACL 8 MG/250ML
.02-.3 INFUSION BOTTLE (ML) INTRAVENOUS
Status: DISCONTINUED | OUTPATIENT
Start: 2021-01-21 | End: 2021-01-26

## 2021-01-21 RX ORDER — SODIUM CHLORIDE 0.9 % (FLUSH) 0.9 %
10 SYRINGE (ML) INJECTION AS NEEDED
Status: DISCONTINUED | OUTPATIENT
Start: 2021-01-21 | End: 2021-01-28 | Stop reason: HOSPADM

## 2021-01-21 RX ORDER — ONDANSETRON 2 MG/ML
4 INJECTION INTRAMUSCULAR; INTRAVENOUS EVERY 6 HOURS PRN
Status: DISCONTINUED | OUTPATIENT
Start: 2021-01-21 | End: 2021-01-28 | Stop reason: HOSPADM

## 2021-01-21 RX ORDER — PANTOPRAZOLE SODIUM 40 MG/10ML
40 INJECTION, POWDER, LYOPHILIZED, FOR SOLUTION INTRAVENOUS ONCE
Status: COMPLETED | OUTPATIENT
Start: 2021-01-21 | End: 2021-01-21

## 2021-01-21 RX ORDER — LEVETIRACETAM 100 MG/ML
1750 SOLUTION ORAL EVERY 12 HOURS SCHEDULED
Status: DISCONTINUED | OUTPATIENT
Start: 2021-01-21 | End: 2021-01-26

## 2021-01-21 RX ORDER — SODIUM CHLORIDE 9 MG/ML
75 INJECTION, SOLUTION INTRAVENOUS CONTINUOUS
Status: DISCONTINUED | OUTPATIENT
Start: 2021-01-21 | End: 2021-01-28 | Stop reason: HOSPADM

## 2021-01-21 RX ORDER — ACETAMINOPHEN 160 MG/5ML
650 SOLUTION ORAL EVERY 4 HOURS PRN
Status: DISCONTINUED | OUTPATIENT
Start: 2021-01-21 | End: 2021-01-28 | Stop reason: HOSPADM

## 2021-01-21 RX ORDER — ONDANSETRON 4 MG/1
4 TABLET, FILM COATED ORAL EVERY 6 HOURS PRN
Status: DISCONTINUED | OUTPATIENT
Start: 2021-01-21 | End: 2021-01-28 | Stop reason: HOSPADM

## 2021-01-21 RX ORDER — ALBUMIN (HUMAN) 12.5 G/50ML
25 SOLUTION INTRAVENOUS 2 TIMES DAILY
Status: COMPLETED | OUTPATIENT
Start: 2021-01-21 | End: 2021-01-23

## 2021-01-21 RX ORDER — SODIUM CHLORIDE 0.9 % (FLUSH) 0.9 %
10 SYRINGE (ML) INJECTION EVERY 12 HOURS SCHEDULED
Status: DISCONTINUED | OUTPATIENT
Start: 2021-01-21 | End: 2021-01-28 | Stop reason: HOSPADM

## 2021-01-21 RX ORDER — SODIUM CHLORIDE 9 MG/ML
125 INJECTION, SOLUTION INTRAVENOUS CONTINUOUS
Status: DISCONTINUED | OUTPATIENT
Start: 2021-01-21 | End: 2021-01-21

## 2021-01-21 RX ORDER — ACETAMINOPHEN 325 MG/1
650 TABLET ORAL EVERY 4 HOURS PRN
Status: DISCONTINUED | OUTPATIENT
Start: 2021-01-21 | End: 2021-01-28 | Stop reason: HOSPADM

## 2021-01-21 RX ORDER — LEVOFLOXACIN 5 MG/ML
750 INJECTION, SOLUTION INTRAVENOUS ONCE
Status: DISCONTINUED | OUTPATIENT
Start: 2021-01-21 | End: 2021-01-21

## 2021-01-21 RX ORDER — LEVETIRACETAM 500 MG/1
1000 TABLET ORAL 2 TIMES DAILY
Status: DISCONTINUED | OUTPATIENT
Start: 2021-01-21 | End: 2021-01-21 | Stop reason: DRUGHIGH

## 2021-01-21 RX ORDER — PANTOPRAZOLE SODIUM 40 MG/10ML
40 INJECTION, POWDER, LYOPHILIZED, FOR SOLUTION INTRAVENOUS
Status: DISCONTINUED | OUTPATIENT
Start: 2021-01-22 | End: 2021-01-28 | Stop reason: HOSPADM

## 2021-01-21 RX ORDER — LAMOTRIGINE 100 MG/1
200 TABLET ORAL 2 TIMES DAILY
Status: DISCONTINUED | OUTPATIENT
Start: 2021-01-21 | End: 2021-01-28 | Stop reason: HOSPADM

## 2021-01-21 RX ORDER — PHENYLEPHRINE HCL IN 0.9% NACL 0.5 MG/5ML
.5-3 SYRINGE (ML) INTRAVENOUS
Status: DISCONTINUED | OUTPATIENT
Start: 2021-01-21 | End: 2021-01-21 | Stop reason: HOSPADM

## 2021-01-21 RX ORDER — DEXTROSE AND SODIUM CHLORIDE 5; .45 G/100ML; G/100ML
30 INJECTION, SOLUTION INTRAVENOUS CONTINUOUS PRN
Status: DISCONTINUED | OUTPATIENT
Start: 2021-01-21 | End: 2021-01-22 | Stop reason: SDUPTHER

## 2021-01-21 RX ORDER — MIDODRINE HYDROCHLORIDE 5 MG/1
10 TABLET ORAL
Status: DISCONTINUED | OUTPATIENT
Start: 2021-01-21 | End: 2021-01-28 | Stop reason: HOSPADM

## 2021-01-21 RX ADMIN — SODIUM CHLORIDE 1000 ML: 9 INJECTION, SOLUTION INTRAVENOUS at 07:56

## 2021-01-21 RX ADMIN — LEVETIRACETAM 1750 MG: 100 SOLUTION ORAL at 22:30

## 2021-01-21 RX ADMIN — CLINDAMYCIN IN 5 PERCENT DEXTROSE 600 MG: 12 INJECTION, SOLUTION INTRAVENOUS at 09:44

## 2021-01-21 RX ADMIN — MIDODRINE HYDROCHLORIDE 10 MG: 5 TABLET ORAL at 18:52

## 2021-01-21 RX ADMIN — CEFTRIAXONE 1 G: 1 INJECTION, POWDER, FOR SOLUTION INTRAMUSCULAR; INTRAVENOUS at 19:03

## 2021-01-21 RX ADMIN — Medication 1 MCG/KG/MIN: at 15:30

## 2021-01-21 RX ADMIN — SODIUM CHLORIDE, PRESERVATIVE FREE 10 ML: 5 INJECTION INTRAVENOUS at 20:59

## 2021-01-21 RX ADMIN — LAMOTRIGINE 200 MG: 100 TABLET ORAL at 21:01

## 2021-01-21 RX ADMIN — SODIUM CHLORIDE 125 ML/HR: 9 INJECTION, SOLUTION INTRAVENOUS at 14:03

## 2021-01-21 RX ADMIN — PANTOPRAZOLE SODIUM 40 MG: 40 INJECTION, POWDER, FOR SOLUTION INTRAVENOUS at 07:56

## 2021-01-21 RX ADMIN — SODIUM CHLORIDE 250 ML: 9 INJECTION, SOLUTION INTRAVENOUS at 16:57

## 2021-01-21 RX ADMIN — SODIUM CHLORIDE 125 ML/HR: 9 INJECTION, SOLUTION INTRAVENOUS at 09:06

## 2021-01-21 RX ADMIN — ALBUMIN (HUMAN) 25 G: 0.25 INJECTION, SOLUTION INTRAVENOUS at 17:55

## 2021-01-21 RX ADMIN — SODIUM CHLORIDE 75 ML/HR: 900 INJECTION, SOLUTION INTRAVENOUS at 16:57

## 2021-01-21 RX ADMIN — AZITHROMYCIN 500 MG: 500 INJECTION, POWDER, LYOPHILIZED, FOR SOLUTION INTRAVENOUS at 10:40

## 2021-01-22 ENCOUNTER — ANESTHESIA (OUTPATIENT)
Dept: GASTROENTEROLOGY | Facility: HOSPITAL | Age: 66
End: 2021-01-22

## 2021-01-22 ENCOUNTER — ANESTHESIA EVENT (OUTPATIENT)
Dept: GASTROENTEROLOGY | Facility: HOSPITAL | Age: 66
End: 2021-01-22

## 2021-01-22 LAB
ANION GAP SERPL CALCULATED.3IONS-SCNC: 5 MMOL/L (ref 5–15)
BACTERIA BLD CULT: ABNORMAL
BASOPHILS # BLD AUTO: 0.05 10*3/MM3 (ref 0–0.2)
BASOPHILS NFR BLD AUTO: 0.4 % (ref 0–1.5)
BUN SERPL-MCNC: 19 MG/DL (ref 8–23)
BUN/CREAT SERPL: 27.9 (ref 7–25)
CALCIUM SPEC-SCNC: 8.3 MG/DL (ref 8.6–10.5)
CHLORIDE SERPL-SCNC: 111 MMOL/L (ref 98–107)
CO2 SERPL-SCNC: 24 MMOL/L (ref 22–29)
CREAT SERPL-MCNC: 0.68 MG/DL (ref 0.76–1.27)
DEPRECATED RDW RBC AUTO: 49.5 FL (ref 37–54)
EOSINOPHIL # BLD AUTO: 0.32 10*3/MM3 (ref 0–0.4)
EOSINOPHIL NFR BLD AUTO: 2.5 % (ref 0.3–6.2)
ERYTHROCYTE [DISTWIDTH] IN BLOOD BY AUTOMATED COUNT: 14.6 % (ref 12.3–15.4)
GFR SERPL CREATININE-BSD FRML MDRD: 117 ML/MIN/1.73
GLUCOSE SERPL-MCNC: 87 MG/DL (ref 65–99)
HCT VFR BLD AUTO: 25.9 % (ref 37.5–51)
HGB BLD-MCNC: 8.7 G/DL (ref 13–17.7)
IMM GRANULOCYTES # BLD AUTO: 0.07 10*3/MM3 (ref 0–0.05)
IMM GRANULOCYTES NFR BLD AUTO: 0.5 % (ref 0–0.5)
LYMPHOCYTES # BLD AUTO: 0.48 10*3/MM3 (ref 0.7–3.1)
LYMPHOCYTES NFR BLD AUTO: 3.7 % (ref 19.6–45.3)
MCH RBC QN AUTO: 31.4 PG (ref 26.6–33)
MCHC RBC AUTO-ENTMCNC: 33.6 G/DL (ref 31.5–35.7)
MCV RBC AUTO: 93.5 FL (ref 79–97)
MONOCYTES # BLD AUTO: 0.72 10*3/MM3 (ref 0.1–0.9)
MONOCYTES NFR BLD AUTO: 5.5 % (ref 5–12)
NEUTROPHILS NFR BLD AUTO: 11.35 10*3/MM3 (ref 1.7–7)
NEUTROPHILS NFR BLD AUTO: 87.4 % (ref 42.7–76)
NRBC BLD AUTO-RTO: 0 /100 WBC (ref 0–0.2)
PLATELET # BLD AUTO: 199 10*3/MM3 (ref 140–450)
PMV BLD AUTO: 10.5 FL (ref 6–12)
POTASSIUM SERPL-SCNC: 4.1 MMOL/L (ref 3.5–5.2)
RBC # BLD AUTO: 2.77 10*6/MM3 (ref 4.14–5.8)
SODIUM SERPL-SCNC: 140 MMOL/L (ref 136–145)
WBC # BLD AUTO: 12.99 10*3/MM3 (ref 3.4–10.8)

## 2021-01-22 PROCEDURE — G0378 HOSPITAL OBSERVATION PER HR: HCPCS

## 2021-01-22 PROCEDURE — 0DB58ZX EXCISION OF ESOPHAGUS, VIA NATURAL OR ARTIFICIAL OPENING ENDOSCOPIC, DIAGNOSTIC: ICD-10-PCS | Performed by: INTERNAL MEDICINE

## 2021-01-22 PROCEDURE — 85025 COMPLETE CBC W/AUTO DIFF WBC: CPT | Performed by: INTERNAL MEDICINE

## 2021-01-22 PROCEDURE — 25010000002 VANCOMYCIN: Performed by: INTERNAL MEDICINE

## 2021-01-22 PROCEDURE — 25010000002 AZITHROMYCIN 500 MG/250 ML: Performed by: INTERNAL MEDICINE

## 2021-01-22 PROCEDURE — 43239 EGD BIOPSY SINGLE/MULTIPLE: CPT | Performed by: INTERNAL MEDICINE

## 2021-01-22 PROCEDURE — 88312 SPECIAL STAINS GROUP 1: CPT

## 2021-01-22 PROCEDURE — 25010000002 VANCOMYCIN 5 G RECONSTITUTED SOLUTION: Performed by: INTERNAL MEDICINE

## 2021-01-22 PROCEDURE — 88305 TISSUE EXAM BY PATHOLOGIST: CPT

## 2021-01-22 PROCEDURE — P9047 ALBUMIN (HUMAN), 25%, 50ML: HCPCS | Performed by: INTERNAL MEDICINE

## 2021-01-22 PROCEDURE — 88342 IMHCHEM/IMCYTCHM 1ST ANTB: CPT

## 2021-01-22 PROCEDURE — 25010000002 PROPOFOL 10 MG/ML EMULSION: Performed by: NURSE ANESTHETIST, CERTIFIED REGISTERED

## 2021-01-22 PROCEDURE — 88341 IMHCHEM/IMCYTCHM EA ADD ANTB: CPT

## 2021-01-22 PROCEDURE — 25010000002 ALBUMIN HUMAN 25% PER 50 ML: Performed by: INTERNAL MEDICINE

## 2021-01-22 PROCEDURE — 80048 BASIC METABOLIC PNL TOTAL CA: CPT | Performed by: INTERNAL MEDICINE

## 2021-01-22 RX ORDER — ONDANSETRON 2 MG/ML
4 INJECTION INTRAMUSCULAR; INTRAVENOUS ONCE AS NEEDED
Status: DISCONTINUED | OUTPATIENT
Start: 2021-01-22 | End: 2021-01-22 | Stop reason: HOSPADM

## 2021-01-22 RX ORDER — PROPOFOL 10 MG/ML
VIAL (ML) INTRAVENOUS AS NEEDED
Status: DISCONTINUED | OUTPATIENT
Start: 2021-01-22 | End: 2021-01-22 | Stop reason: SURG

## 2021-01-22 RX ORDER — LIDOCAINE HYDROCHLORIDE 20 MG/ML
INJECTION, SOLUTION INTRAVENOUS AS NEEDED
Status: DISCONTINUED | OUTPATIENT
Start: 2021-01-22 | End: 2021-01-22 | Stop reason: SURG

## 2021-01-22 RX ORDER — DEXTROSE AND SODIUM CHLORIDE 5; .45 G/100ML; G/100ML
30 INJECTION, SOLUTION INTRAVENOUS CONTINUOUS PRN
Status: DISCONTINUED | OUTPATIENT
Start: 2021-01-22 | End: 2021-01-28 | Stop reason: HOSPADM

## 2021-01-22 RX ADMIN — MIDODRINE HYDROCHLORIDE 10 MG: 5 TABLET ORAL at 10:00

## 2021-01-22 RX ADMIN — LEVETIRACETAM 1750 MG: 100 SOLUTION ORAL at 21:33

## 2021-01-22 RX ADMIN — PANTOPRAZOLE SODIUM 40 MG: 40 INJECTION, POWDER, FOR SOLUTION INTRAVENOUS at 10:00

## 2021-01-22 RX ADMIN — ALBUMIN (HUMAN) 25 G: 0.25 INJECTION, SOLUTION INTRAVENOUS at 17:37

## 2021-01-22 RX ADMIN — VANCOMYCIN HYDROCHLORIDE 1500 MG: 10 INJECTION, POWDER, LYOPHILIZED, FOR SOLUTION INTRAVENOUS at 10:00

## 2021-01-22 RX ADMIN — AZITHROMYCIN MONOHYDRATE 500 MG: 500 INJECTION, POWDER, LYOPHILIZED, FOR SOLUTION INTRAVENOUS at 16:44

## 2021-01-22 RX ADMIN — ALBUMIN (HUMAN) 25 G: 0.25 INJECTION, SOLUTION INTRAVENOUS at 10:00

## 2021-01-22 RX ADMIN — LAMOTRIGINE 200 MG: 100 TABLET ORAL at 21:33

## 2021-01-22 RX ADMIN — PROPOFOL 30 MG: 10 INJECTION, EMULSION INTRAVENOUS at 12:58

## 2021-01-22 RX ADMIN — LAMOTRIGINE 200 MG: 100 TABLET ORAL at 10:00

## 2021-01-22 RX ADMIN — PROPOFOL 30 MG: 10 INJECTION, EMULSION INTRAVENOUS at 12:59

## 2021-01-22 RX ADMIN — LIDOCAINE HYDROCHLORIDE 60 MG: 20 INJECTION, SOLUTION INTRAVENOUS at 12:58

## 2021-01-22 RX ADMIN — LEVETIRACETAM 1750 MG: 100 SOLUTION ORAL at 10:00

## 2021-01-22 RX ADMIN — SODIUM CHLORIDE, PRESERVATIVE FREE 10 ML: 5 INJECTION INTRAVENOUS at 08:30

## 2021-01-22 RX ADMIN — SODIUM CHLORIDE 75 ML/HR: 900 INJECTION, SOLUTION INTRAVENOUS at 04:33

## 2021-01-22 RX ADMIN — MIDODRINE HYDROCHLORIDE 10 MG: 5 TABLET ORAL at 17:37

## 2021-01-22 RX ADMIN — VANCOMYCIN HYDROCHLORIDE 1250 MG: 5 INJECTION, POWDER, LYOPHILIZED, FOR SOLUTION INTRAVENOUS at 22:14

## 2021-01-22 NOTE — ANESTHESIA PREPROCEDURE EVALUATION
Anesthesia Evaluation     Patient summary reviewed and Nursing notes reviewed   NPO Solid Status: > 8 hours  NPO Liquid Status: > 8 hours           Airway   Mallampati: II  TM distance: >3 FB  Neck ROM: full  No difficulty expected  Dental    (+) poor dentition    Pulmonary    (+) pneumonia , COPD moderate, decreased breath sounds,   Cardiovascular - normal exam    Rhythm: regular  Rate: normal    (+) dysrhythmias, hyperlipidemia,     ROS comment: Right bundle branch block    Neuro/Psych  (+) seizures poorly controlled, syncope, psychiatric history Depression,       ROS Comment: Mentally handicapped  GI/Hepatic/Renal/Endo    (+)  GERD poorly controlled, GI bleeding upper active bleeding,     Musculoskeletal     Abdominal  - normal exam   Substance History      OB/GYN          Other                      Anesthesia Plan    ASA 3     MAC   (Consent from brother 1/21/2021)  intravenous induction     Anesthetic plan, all risks, benefits, and alternatives have been provided, discussed and informed consent has been obtained with: patient.

## 2021-01-22 NOTE — ANESTHESIA POSTPROCEDURE EVALUATION
Patient: Bautista Grubbs    Procedure Summary     Date: 01/22/21 Room / Location: [facility] ENDOSCOPY 2 / [facility] ENDOSCOPY    Anesthesia Start: 1257 Anesthesia Stop: 1306    Procedure: ESOPHAGOGASTRODUODENOSCOPY (N/A ) Diagnosis:       UGIB (upper gastrointestinal bleed)      (UGIB (upper gastrointestinal bleed) [K92.2])    Surgeon: Wanda Lang MD Provider: Becca Jack CRNA    Anesthesia Type: MAC ASA Status: 3          Anesthesia Type: MAC    Vitals  No vitals data found for the desired time range.          Post Anesthesia Care and Evaluation    Patient location during evaluation: bedside  Patient participation: waiting for patient participation  Post-procedure mental status: returned to baseline.  PONV Status: none  Cardiovascular status: hemodynamically stable  Respiratory status: room air and spontaneous ventilation  Hydration status: acceptable

## 2021-01-23 PROBLEM — A41.9 SEPSIS: Status: RESOLVED | Noted: 2020-03-12 | Resolved: 2021-01-23

## 2021-01-23 PROBLEM — J18.9 LEFT UPPER LOBE PNEUMONIA: Status: RESOLVED | Noted: 2020-03-12 | Resolved: 2021-01-23

## 2021-01-23 PROBLEM — R78.81 MRSA BACTEREMIA: Status: ACTIVE | Noted: 2021-01-23

## 2021-01-23 PROBLEM — J18.9 PNEUMONIA OF RIGHT LOWER LOBE DUE TO INFECTIOUS ORGANISM: Status: RESOLVED | Noted: 2018-12-09 | Resolved: 2021-01-23

## 2021-01-23 PROBLEM — B95.62 MRSA BACTEREMIA: Status: ACTIVE | Noted: 2021-01-23

## 2021-01-23 PROBLEM — J18.9 RIGHT UPPER LOBE PNEUMONIA: Status: ACTIVE | Noted: 2021-01-23

## 2021-01-23 PROBLEM — J18.9 PNEUMONIA: Status: RESOLVED | Noted: 2018-12-08 | Resolved: 2021-01-23

## 2021-01-23 PROBLEM — R78.81 BACTEREMIA: Status: ACTIVE | Noted: 2021-01-23

## 2021-01-23 LAB
ANION GAP SERPL CALCULATED.3IONS-SCNC: 10 MMOL/L (ref 5–15)
BACTERIA SPEC AEROBE CULT: ABNORMAL
BACTERIA SPEC AEROBE CULT: ABNORMAL
BASOPHILS # BLD AUTO: 0.02 10*3/MM3 (ref 0–0.2)
BASOPHILS NFR BLD AUTO: 0.2 % (ref 0–1.5)
BUN SERPL-MCNC: 9 MG/DL (ref 8–23)
BUN/CREAT SERPL: 11.3 (ref 7–25)
CALCIUM SPEC-SCNC: 7.9 MG/DL (ref 8.6–10.5)
CHLORIDE SERPL-SCNC: 106 MMOL/L (ref 98–107)
CO2 SERPL-SCNC: 23 MMOL/L (ref 22–29)
CREAT SERPL-MCNC: 0.8 MG/DL (ref 0.76–1.27)
DEPRECATED RDW RBC AUTO: 47.9 FL (ref 37–54)
EOSINOPHIL # BLD AUTO: 0.22 10*3/MM3 (ref 0–0.4)
EOSINOPHIL NFR BLD AUTO: 2.1 % (ref 0.3–6.2)
ERYTHROCYTE [DISTWIDTH] IN BLOOD BY AUTOMATED COUNT: 14.2 % (ref 12.3–15.4)
GFR SERPL CREATININE-BSD FRML MDRD: 97 ML/MIN/1.73
GLUCOSE SERPL-MCNC: 81 MG/DL (ref 65–99)
GRAM STN SPEC: ABNORMAL
GRAM STN SPEC: ABNORMAL
HCT VFR BLD AUTO: 22 % (ref 37.5–51)
HCT VFR BLD AUTO: 22.3 % (ref 37.5–51)
HGB BLD-MCNC: 7.5 G/DL (ref 13–17.7)
HGB BLD-MCNC: 7.6 G/DL (ref 13–17.7)
IMM GRANULOCYTES # BLD AUTO: 0.04 10*3/MM3 (ref 0–0.05)
IMM GRANULOCYTES NFR BLD AUTO: 0.4 % (ref 0–0.5)
ISOLATED FROM: ABNORMAL
ISOLATED FROM: ABNORMAL
LYMPHOCYTES # BLD AUTO: 0.65 10*3/MM3 (ref 0.7–3.1)
LYMPHOCYTES NFR BLD AUTO: 6.2 % (ref 19.6–45.3)
MCH RBC QN AUTO: 31.7 PG (ref 26.6–33)
MCHC RBC AUTO-ENTMCNC: 34.1 G/DL (ref 31.5–35.7)
MCV RBC AUTO: 92.9 FL (ref 79–97)
MONOCYTES # BLD AUTO: 0.77 10*3/MM3 (ref 0.1–0.9)
MONOCYTES NFR BLD AUTO: 7.4 % (ref 5–12)
NEUTROPHILS NFR BLD AUTO: 8.75 10*3/MM3 (ref 1.7–7)
NEUTROPHILS NFR BLD AUTO: 83.7 % (ref 42.7–76)
NRBC BLD AUTO-RTO: 0 /100 WBC (ref 0–0.2)
PLATELET # BLD AUTO: 178 10*3/MM3 (ref 140–450)
PMV BLD AUTO: 10.2 FL (ref 6–12)
POTASSIUM SERPL-SCNC: 2.8 MMOL/L (ref 3.5–5.2)
POTASSIUM SERPL-SCNC: 3.3 MMOL/L (ref 3.5–5.2)
RBC # BLD AUTO: 2.4 10*6/MM3 (ref 4.14–5.8)
SODIUM SERPL-SCNC: 139 MMOL/L (ref 136–145)
VANCOMYCIN PEAK SERPL-MCNC: 24.1 MCG/ML (ref 20–40)
VANCOMYCIN TROUGH SERPL-MCNC: 10.1 MCG/ML (ref 5–20)
WBC # BLD AUTO: 10.45 10*3/MM3 (ref 3.4–10.8)

## 2021-01-23 PROCEDURE — 25010000002 ALBUMIN HUMAN 25% PER 50 ML: Performed by: INTERNAL MEDICINE

## 2021-01-23 PROCEDURE — 80202 ASSAY OF VANCOMYCIN: CPT | Performed by: INTERNAL MEDICINE

## 2021-01-23 PROCEDURE — 85014 HEMATOCRIT: CPT | Performed by: INTERNAL MEDICINE

## 2021-01-23 PROCEDURE — 25010000002 AZITHROMYCIN PER 500 MG: Performed by: INTERNAL MEDICINE

## 2021-01-23 PROCEDURE — P9047 ALBUMIN (HUMAN), 25%, 50ML: HCPCS | Performed by: INTERNAL MEDICINE

## 2021-01-23 PROCEDURE — 84132 ASSAY OF SERUM POTASSIUM: CPT | Performed by: FAMILY MEDICINE

## 2021-01-23 PROCEDURE — 85018 HEMOGLOBIN: CPT | Performed by: INTERNAL MEDICINE

## 2021-01-23 PROCEDURE — 85025 COMPLETE CBC W/AUTO DIFF WBC: CPT | Performed by: INTERNAL MEDICINE

## 2021-01-23 PROCEDURE — 25010000002 VANCOMYCIN 5 G RECONSTITUTED SOLUTION: Performed by: INTERNAL MEDICINE

## 2021-01-23 PROCEDURE — 80048 BASIC METABOLIC PNL TOTAL CA: CPT | Performed by: INTERNAL MEDICINE

## 2021-01-23 PROCEDURE — 36415 COLL VENOUS BLD VENIPUNCTURE: CPT | Performed by: INTERNAL MEDICINE

## 2021-01-23 RX ORDER — POTASSIUM CHLORIDE 1.5 G/1.77G
40 POWDER, FOR SOLUTION ORAL AS NEEDED
Status: DISCONTINUED | OUTPATIENT
Start: 2021-01-23 | End: 2021-01-28 | Stop reason: HOSPADM

## 2021-01-23 RX ORDER — POTASSIUM CHLORIDE 750 MG/1
40 CAPSULE, EXTENDED RELEASE ORAL AS NEEDED
Status: DISCONTINUED | OUTPATIENT
Start: 2021-01-23 | End: 2021-01-28 | Stop reason: HOSPADM

## 2021-01-23 RX ORDER — POTASSIUM CHLORIDE 7.45 MG/ML
10 INJECTION INTRAVENOUS
Status: DISCONTINUED | OUTPATIENT
Start: 2021-01-23 | End: 2021-01-28 | Stop reason: HOSPADM

## 2021-01-23 RX ORDER — SUCRALFATE 1 G/1
1 TABLET ORAL
Status: DISCONTINUED | OUTPATIENT
Start: 2021-01-23 | End: 2021-01-28 | Stop reason: HOSPADM

## 2021-01-23 RX ADMIN — SODIUM CHLORIDE, PRESERVATIVE FREE 10 ML: 5 INJECTION INTRAVENOUS at 08:33

## 2021-01-23 RX ADMIN — LAMOTRIGINE 200 MG: 100 TABLET ORAL at 21:04

## 2021-01-23 RX ADMIN — POTASSIUM CHLORIDE 40 MEQ: 10 CAPSULE, COATED, EXTENDED RELEASE ORAL at 21:05

## 2021-01-23 RX ADMIN — MIDODRINE HYDROCHLORIDE 10 MG: 5 TABLET ORAL at 11:09

## 2021-01-23 RX ADMIN — SUCRALFATE 1 G: 1 TABLET ORAL at 21:05

## 2021-01-23 RX ADMIN — MIDODRINE HYDROCHLORIDE 10 MG: 5 TABLET ORAL at 16:43

## 2021-01-23 RX ADMIN — ALBUMIN (HUMAN) 25 G: 0.25 INJECTION, SOLUTION INTRAVENOUS at 06:11

## 2021-01-23 RX ADMIN — VANCOMYCIN HYDROCHLORIDE 1250 MG: 5 INJECTION, POWDER, LYOPHILIZED, FOR SOLUTION INTRAVENOUS at 11:09

## 2021-01-23 RX ADMIN — POTASSIUM CHLORIDE 40 MEQ: 10 CAPSULE, COATED, EXTENDED RELEASE ORAL at 17:42

## 2021-01-23 RX ADMIN — MIDODRINE HYDROCHLORIDE 10 MG: 5 TABLET ORAL at 08:32

## 2021-01-23 RX ADMIN — AZITHROMYCIN MONOHYDRATE 500 MG: 500 INJECTION, POWDER, LYOPHILIZED, FOR SOLUTION INTRAVENOUS at 15:27

## 2021-01-23 RX ADMIN — SODIUM CHLORIDE 75 ML/HR: 900 INJECTION, SOLUTION INTRAVENOUS at 11:10

## 2021-01-23 RX ADMIN — LEVETIRACETAM 1750 MG: 100 SOLUTION ORAL at 21:05

## 2021-01-23 RX ADMIN — VANCOMYCIN HYDROCHLORIDE 1250 MG: 5 INJECTION, POWDER, LYOPHILIZED, FOR SOLUTION INTRAVENOUS at 22:03

## 2021-01-23 RX ADMIN — LEVETIRACETAM 1750 MG: 100 SOLUTION ORAL at 08:33

## 2021-01-23 RX ADMIN — SODIUM CHLORIDE, PRESERVATIVE FREE 10 ML: 5 INJECTION INTRAVENOUS at 21:09

## 2021-01-23 RX ADMIN — LAMOTRIGINE 200 MG: 100 TABLET ORAL at 08:32

## 2021-01-23 RX ADMIN — SUCRALFATE 1 G: 1 TABLET ORAL at 13:27

## 2021-01-23 RX ADMIN — PANTOPRAZOLE SODIUM 40 MG: 40 INJECTION, POWDER, FOR SOLUTION INTRAVENOUS at 06:11

## 2021-01-23 RX ADMIN — SUCRALFATE 1 G: 1 TABLET ORAL at 16:43

## 2021-01-24 LAB
ANION GAP SERPL CALCULATED.3IONS-SCNC: 7 MMOL/L (ref 5–15)
BASOPHILS # BLD AUTO: 0.02 10*3/MM3 (ref 0–0.2)
BASOPHILS NFR BLD AUTO: 0.2 % (ref 0–1.5)
BUN SERPL-MCNC: 10 MG/DL (ref 8–23)
BUN/CREAT SERPL: 16.7 (ref 7–25)
CALCIUM SPEC-SCNC: 8.4 MG/DL (ref 8.6–10.5)
CHLORIDE SERPL-SCNC: 112 MMOL/L (ref 98–107)
CO2 SERPL-SCNC: 23 MMOL/L (ref 22–29)
CREAT SERPL-MCNC: 0.6 MG/DL (ref 0.76–1.27)
DEPRECATED RDW RBC AUTO: 49.1 FL (ref 37–54)
EOSINOPHIL # BLD AUTO: 0.22 10*3/MM3 (ref 0–0.4)
EOSINOPHIL NFR BLD AUTO: 2.4 % (ref 0.3–6.2)
ERYTHROCYTE [DISTWIDTH] IN BLOOD BY AUTOMATED COUNT: 14.3 % (ref 12.3–15.4)
GFR SERPL CREATININE-BSD FRML MDRD: 136 ML/MIN/1.73
GLUCOSE SERPL-MCNC: 97 MG/DL (ref 65–99)
HCT VFR BLD AUTO: 22.2 % (ref 37.5–51)
HGB BLD-MCNC: 7.5 G/DL (ref 13–17.7)
HOLD SPECIMEN: NORMAL
IMM GRANULOCYTES # BLD AUTO: 0.04 10*3/MM3 (ref 0–0.05)
IMM GRANULOCYTES NFR BLD AUTO: 0.4 % (ref 0–0.5)
LYMPHOCYTES # BLD AUTO: 0.71 10*3/MM3 (ref 0.7–3.1)
LYMPHOCYTES NFR BLD AUTO: 7.7 % (ref 19.6–45.3)
MCH RBC QN AUTO: 31.4 PG (ref 26.6–33)
MCHC RBC AUTO-ENTMCNC: 33.8 G/DL (ref 31.5–35.7)
MCV RBC AUTO: 92.9 FL (ref 79–97)
MONOCYTES # BLD AUTO: 0.79 10*3/MM3 (ref 0.1–0.9)
MONOCYTES NFR BLD AUTO: 8.5 % (ref 5–12)
NEUTROPHILS NFR BLD AUTO: 7.49 10*3/MM3 (ref 1.7–7)
NEUTROPHILS NFR BLD AUTO: 80.8 % (ref 42.7–76)
NRBC BLD AUTO-RTO: 0 /100 WBC (ref 0–0.2)
PLATELET # BLD AUTO: 214 10*3/MM3 (ref 140–450)
PMV BLD AUTO: 10.5 FL (ref 6–12)
POTASSIUM SERPL-SCNC: 3.5 MMOL/L (ref 3.5–5.2)
POTASSIUM SERPL-SCNC: 4.1 MMOL/L (ref 3.5–5.2)
RBC # BLD AUTO: 2.39 10*6/MM3 (ref 4.14–5.8)
SODIUM SERPL-SCNC: 142 MMOL/L (ref 136–145)
WBC # BLD AUTO: 9.27 10*3/MM3 (ref 3.4–10.8)

## 2021-01-24 PROCEDURE — 92610 EVALUATE SWALLOWING FUNCTION: CPT | Performed by: SPEECH-LANGUAGE PATHOLOGIST

## 2021-01-24 PROCEDURE — 84132 ASSAY OF SERUM POTASSIUM: CPT | Performed by: FAMILY MEDICINE

## 2021-01-24 PROCEDURE — 87040 BLOOD CULTURE FOR BACTERIA: CPT | Performed by: FAMILY MEDICINE

## 2021-01-24 PROCEDURE — 25010000002 AZITHROMYCIN PER 500 MG: Performed by: INTERNAL MEDICINE

## 2021-01-24 PROCEDURE — 25010000002 VANCOMYCIN 5 G RECONSTITUTED SOLUTION: Performed by: FAMILY MEDICINE

## 2021-01-24 PROCEDURE — 85025 COMPLETE CBC W/AUTO DIFF WBC: CPT | Performed by: INTERNAL MEDICINE

## 2021-01-24 PROCEDURE — 36415 COLL VENOUS BLD VENIPUNCTURE: CPT | Performed by: INTERNAL MEDICINE

## 2021-01-24 PROCEDURE — 25010000002 VANCOMYCIN 5 G RECONSTITUTED SOLUTION: Performed by: INTERNAL MEDICINE

## 2021-01-24 PROCEDURE — 80048 BASIC METABOLIC PNL TOTAL CA: CPT | Performed by: INTERNAL MEDICINE

## 2021-01-24 RX ADMIN — SODIUM CHLORIDE, PRESERVATIVE FREE 10 ML: 5 INJECTION INTRAVENOUS at 09:33

## 2021-01-24 RX ADMIN — SODIUM CHLORIDE 75 ML/HR: 900 INJECTION, SOLUTION INTRAVENOUS at 04:00

## 2021-01-24 RX ADMIN — LAMOTRIGINE 200 MG: 100 TABLET ORAL at 21:03

## 2021-01-24 RX ADMIN — LEVETIRACETAM 1750 MG: 100 SOLUTION ORAL at 09:32

## 2021-01-24 RX ADMIN — SUCRALFATE 1 G: 1 TABLET ORAL at 16:37

## 2021-01-24 RX ADMIN — VANCOMYCIN HYDROCHLORIDE 1500 MG: 5 INJECTION, POWDER, LYOPHILIZED, FOR SOLUTION INTRAVENOUS at 21:03

## 2021-01-24 RX ADMIN — POTASSIUM CHLORIDE 40 MEQ: 10 CAPSULE, COATED, EXTENDED RELEASE ORAL at 13:36

## 2021-01-24 RX ADMIN — AZITHROMYCIN MONOHYDRATE 500 MG: 500 INJECTION, POWDER, LYOPHILIZED, FOR SOLUTION INTRAVENOUS at 15:14

## 2021-01-24 RX ADMIN — MIDODRINE HYDROCHLORIDE 10 MG: 5 TABLET ORAL at 16:37

## 2021-01-24 RX ADMIN — VANCOMYCIN HYDROCHLORIDE 1250 MG: 5 INJECTION, POWDER, LYOPHILIZED, FOR SOLUTION INTRAVENOUS at 09:32

## 2021-01-24 RX ADMIN — LEVETIRACETAM 1750 MG: 100 SOLUTION ORAL at 21:03

## 2021-01-24 RX ADMIN — POTASSIUM CHLORIDE 40 MEQ: 10 CAPSULE, COATED, EXTENDED RELEASE ORAL at 02:04

## 2021-01-24 RX ADMIN — MIDODRINE HYDROCHLORIDE 10 MG: 5 TABLET ORAL at 09:33

## 2021-01-24 RX ADMIN — PANTOPRAZOLE SODIUM 40 MG: 40 INJECTION, POWDER, FOR SOLUTION INTRAVENOUS at 05:24

## 2021-01-24 RX ADMIN — SODIUM CHLORIDE 75 ML/HR: 900 INJECTION, SOLUTION INTRAVENOUS at 22:11

## 2021-01-24 RX ADMIN — SUCRALFATE 1 G: 1 TABLET ORAL at 09:33

## 2021-01-24 RX ADMIN — SUCRALFATE 1 G: 1 TABLET ORAL at 21:02

## 2021-01-24 RX ADMIN — MIDODRINE HYDROCHLORIDE 10 MG: 5 TABLET ORAL at 12:26

## 2021-01-24 RX ADMIN — SUCRALFATE 1 G: 1 TABLET ORAL at 12:26

## 2021-01-24 RX ADMIN — LAMOTRIGINE 200 MG: 100 TABLET ORAL at 09:33

## 2021-01-24 RX ADMIN — POTASSIUM CHLORIDE 40 MEQ: 10 CAPSULE, COATED, EXTENDED RELEASE ORAL at 09:33

## 2021-01-25 ENCOUNTER — APPOINTMENT (OUTPATIENT)
Dept: NUCLEAR MEDICINE | Facility: HOSPITAL | Age: 66
End: 2021-01-25

## 2021-01-25 LAB
L PNEUMO1 AG UR QL IA: NEGATIVE
POTASSIUM SERPL-SCNC: 3.6 MMOL/L (ref 3.5–5.2)
S PNEUM AG SPEC QL LA: NEGATIVE

## 2021-01-25 PROCEDURE — 97162 PT EVAL MOD COMPLEX 30 MIN: CPT

## 2021-01-25 PROCEDURE — A9541 TC99M SULFUR COLLOID: HCPCS | Performed by: INTERNAL MEDICINE

## 2021-01-25 PROCEDURE — 25010000002 VANCOMYCIN 5 G RECONSTITUTED SOLUTION: Performed by: FAMILY MEDICINE

## 2021-01-25 PROCEDURE — 99232 SBSQ HOSP IP/OBS MODERATE 35: CPT | Performed by: INTERNAL MEDICINE

## 2021-01-25 PROCEDURE — 87899 AGENT NOS ASSAY W/OPTIC: CPT | Performed by: INTERNAL MEDICINE

## 2021-01-25 PROCEDURE — 78264 GASTRIC EMPTYING IMG STUDY: CPT

## 2021-01-25 PROCEDURE — 84132 ASSAY OF SERUM POTASSIUM: CPT | Performed by: FAMILY MEDICINE

## 2021-01-25 PROCEDURE — 97166 OT EVAL MOD COMPLEX 45 MIN: CPT

## 2021-01-25 PROCEDURE — 0 TECHNETIUM SULFUR COLLOID: Performed by: INTERNAL MEDICINE

## 2021-01-25 RX ADMIN — SUCRALFATE 1 G: 1 TABLET ORAL at 17:04

## 2021-01-25 RX ADMIN — SUCRALFATE 1 G: 1 TABLET ORAL at 20:04

## 2021-01-25 RX ADMIN — LEVETIRACETAM 1750 MG: 100 SOLUTION ORAL at 20:05

## 2021-01-25 RX ADMIN — POTASSIUM CHLORIDE 40 MEQ: 10 CAPSULE, COATED, EXTENDED RELEASE ORAL at 14:27

## 2021-01-25 RX ADMIN — TECHNETIUM TC 99M SULFUR COLLOID 1 DOSE: KIT at 11:12

## 2021-01-25 RX ADMIN — VANCOMYCIN HYDROCHLORIDE 1500 MG: 5 INJECTION, POWDER, LYOPHILIZED, FOR SOLUTION INTRAVENOUS at 09:51

## 2021-01-25 RX ADMIN — LAMOTRIGINE 200 MG: 100 TABLET ORAL at 20:05

## 2021-01-25 RX ADMIN — VANCOMYCIN HYDROCHLORIDE 1500 MG: 5 INJECTION, POWDER, LYOPHILIZED, FOR SOLUTION INTRAVENOUS at 21:56

## 2021-01-25 RX ADMIN — MIDODRINE HYDROCHLORIDE 10 MG: 5 TABLET ORAL at 17:04

## 2021-01-25 RX ADMIN — SODIUM CHLORIDE, PRESERVATIVE FREE 10 ML: 5 INJECTION INTRAVENOUS at 20:05

## 2021-01-25 RX ADMIN — PANTOPRAZOLE SODIUM 40 MG: 40 INJECTION, POWDER, FOR SOLUTION INTRAVENOUS at 05:58

## 2021-01-25 RX ADMIN — SODIUM CHLORIDE 75 ML/HR: 900 INJECTION, SOLUTION INTRAVENOUS at 14:00

## 2021-01-26 ENCOUNTER — APPOINTMENT (OUTPATIENT)
Dept: CT IMAGING | Facility: HOSPITAL | Age: 66
End: 2021-01-26

## 2021-01-26 ENCOUNTER — APPOINTMENT (OUTPATIENT)
Dept: CARDIOLOGY | Facility: HOSPITAL | Age: 66
End: 2021-01-26

## 2021-01-26 LAB
BH CV ECHO MEAS - BSA(HAYCOCK): 1.8 M^2
BH CV ECHO MEAS - BSA: 1.8 M^2
BH CV ECHO MEAS - BZI_BMI: 19.4 KILOGRAMS/M^2
BH CV ECHO MEAS - BZI_METRIC_HEIGHT: 180.3 CM
BH CV ECHO MEAS - BZI_METRIC_WEIGHT: 63.1 KG
BH CV ECHO MEAS - EDV(MOD-SP4): 67.8 ML
BH CV ECHO MEAS - EF(MOD-SP4): 61.1 %
BH CV ECHO MEAS - ESV(MOD-SP4): 26.4 ML
BH CV ECHO MEAS - LV DIASTOLIC VOL/BSA (35-75): 37.5 ML/M^2
BH CV ECHO MEAS - LV SYSTOLIC VOL/BSA (12-30): 14.6 ML/M^2
BH CV ECHO MEAS - LVLD AP4: 7.9 CM
BH CV ECHO MEAS - LVLS AP4: 6.1 CM
BH CV ECHO MEAS - SI(MOD-SP4): 22.9 ML/M^2
BH CV ECHO MEAS - SV(MOD-SP4): 41.4 ML
MAXIMAL PREDICTED HEART RATE: 156 BPM
POTASSIUM SERPL-SCNC: 3.6 MMOL/L (ref 3.5–5.2)
POTASSIUM SERPL-SCNC: 4 MMOL/L (ref 3.5–5.2)
STRESS TARGET HR: 133 BPM
VANCOMYCIN PEAK SERPL-MCNC: 32.3 MCG/ML (ref 20–40)
VANCOMYCIN TROUGH SERPL-MCNC: 15 MCG/ML (ref 5–20)

## 2021-01-26 PROCEDURE — 84132 ASSAY OF SERUM POTASSIUM: CPT | Performed by: STUDENT IN AN ORGANIZED HEALTH CARE EDUCATION/TRAINING PROGRAM

## 2021-01-26 PROCEDURE — 93308 TTE F-UP OR LMTD: CPT

## 2021-01-26 PROCEDURE — 93308 TTE F-UP OR LMTD: CPT | Performed by: INTERNAL MEDICINE

## 2021-01-26 PROCEDURE — 80202 ASSAY OF VANCOMYCIN: CPT | Performed by: FAMILY MEDICINE

## 2021-01-26 PROCEDURE — 93325 DOPPLER ECHO COLOR FLOW MAPG: CPT

## 2021-01-26 PROCEDURE — 97530 THERAPEUTIC ACTIVITIES: CPT

## 2021-01-26 PROCEDURE — 93325 DOPPLER ECHO COLOR FLOW MAPG: CPT | Performed by: INTERNAL MEDICINE

## 2021-01-26 PROCEDURE — 71250 CT THORAX DX C-: CPT

## 2021-01-26 PROCEDURE — 97535 SELF CARE MNGMENT TRAINING: CPT

## 2021-01-26 PROCEDURE — 93321 DOPPLER ECHO F-UP/LMTD STD: CPT

## 2021-01-26 PROCEDURE — 93321 DOPPLER ECHO F-UP/LMTD STD: CPT | Performed by: INTERNAL MEDICINE

## 2021-01-26 PROCEDURE — 99231 SBSQ HOSP IP/OBS SF/LOW 25: CPT | Performed by: INTERNAL MEDICINE

## 2021-01-26 PROCEDURE — 84132 ASSAY OF SERUM POTASSIUM: CPT | Performed by: FAMILY MEDICINE

## 2021-01-26 PROCEDURE — 25010000002 VANCOMYCIN 5 G RECONSTITUTED SOLUTION: Performed by: STUDENT IN AN ORGANIZED HEALTH CARE EDUCATION/TRAINING PROGRAM

## 2021-01-26 RX ORDER — SODIUM CHLORIDE 0.9 % (FLUSH) 0.9 %
10 SYRINGE (ML) INJECTION EVERY 12 HOURS SCHEDULED
Status: DISCONTINUED | OUTPATIENT
Start: 2021-01-26 | End: 2021-01-28 | Stop reason: HOSPADM

## 2021-01-26 RX ORDER — SODIUM CHLORIDE 0.9 % (FLUSH) 0.9 %
20 SYRINGE (ML) INJECTION AS NEEDED
Status: DISCONTINUED | OUTPATIENT
Start: 2021-01-26 | End: 2021-01-28 | Stop reason: HOSPADM

## 2021-01-26 RX ORDER — SODIUM CHLORIDE 0.9 % (FLUSH) 0.9 %
10 SYRINGE (ML) INJECTION AS NEEDED
Status: DISCONTINUED | OUTPATIENT
Start: 2021-01-26 | End: 2021-01-28 | Stop reason: HOSPADM

## 2021-01-26 RX ORDER — LORAZEPAM 2 MG/ML
1 INJECTION INTRAMUSCULAR EVERY 4 HOURS PRN
Status: DISCONTINUED | OUTPATIENT
Start: 2021-01-26 | End: 2021-01-28 | Stop reason: HOSPADM

## 2021-01-26 RX ADMIN — Medication 1 APPLICATION: at 09:38

## 2021-01-26 RX ADMIN — LEVETIRACETAM 1750 MG: 500 TABLET ORAL at 20:23

## 2021-01-26 RX ADMIN — SODIUM CHLORIDE, PRESERVATIVE FREE 10 ML: 5 INJECTION INTRAVENOUS at 09:40

## 2021-01-26 RX ADMIN — VANCOMYCIN HYDROCHLORIDE 1250 MG: 5 INJECTION, POWDER, LYOPHILIZED, FOR SOLUTION INTRAVENOUS at 20:23

## 2021-01-26 RX ADMIN — MIDODRINE HYDROCHLORIDE 10 MG: 5 TABLET ORAL at 18:07

## 2021-01-26 RX ADMIN — SUCRALFATE 1 G: 1 TABLET ORAL at 20:22

## 2021-01-26 RX ADMIN — SODIUM CHLORIDE, PRESERVATIVE FREE 10 ML: 5 INJECTION INTRAVENOUS at 20:23

## 2021-01-26 RX ADMIN — MIDODRINE HYDROCHLORIDE 10 MG: 5 TABLET ORAL at 14:17

## 2021-01-26 RX ADMIN — SUCRALFATE 1 G: 1 TABLET ORAL at 14:17

## 2021-01-26 RX ADMIN — LAMOTRIGINE 200 MG: 100 TABLET ORAL at 20:22

## 2021-01-26 RX ADMIN — POTASSIUM CHLORIDE 40 MEQ: 10 CAPSULE, COATED, EXTENDED RELEASE ORAL at 18:07

## 2021-01-26 RX ADMIN — SUCRALFATE 1 G: 1 TABLET ORAL at 09:39

## 2021-01-26 RX ADMIN — PANTOPRAZOLE SODIUM 40 MG: 40 INJECTION, POWDER, FOR SOLUTION INTRAVENOUS at 05:03

## 2021-01-26 RX ADMIN — SODIUM CHLORIDE, PRESERVATIVE FREE 10 ML: 5 INJECTION INTRAVENOUS at 20:25

## 2021-01-26 RX ADMIN — POTASSIUM CHLORIDE 40 MEQ: 10 CAPSULE, COATED, EXTENDED RELEASE ORAL at 14:17

## 2021-01-26 RX ADMIN — VANCOMYCIN HYDROCHLORIDE 1250 MG: 5 INJECTION, POWDER, LYOPHILIZED, FOR SOLUTION INTRAVENOUS at 11:37

## 2021-01-26 RX ADMIN — Medication 1 APPLICATION: at 20:23

## 2021-01-26 RX ADMIN — LEVETIRACETAM 1750 MG: 500 TABLET ORAL at 09:39

## 2021-01-26 RX ADMIN — MIDODRINE HYDROCHLORIDE 10 MG: 5 TABLET ORAL at 09:38

## 2021-01-26 RX ADMIN — SODIUM CHLORIDE 75 ML/HR: 900 INJECTION, SOLUTION INTRAVENOUS at 05:03

## 2021-01-26 RX ADMIN — SUCRALFATE 1 G: 1 TABLET ORAL at 18:07

## 2021-01-26 RX ADMIN — LAMOTRIGINE 200 MG: 100 TABLET ORAL at 09:39

## 2021-01-27 ENCOUNTER — APPOINTMENT (OUTPATIENT)
Dept: GENERAL RADIOLOGY | Facility: HOSPITAL | Age: 66
End: 2021-01-27

## 2021-01-27 LAB
BASOPHILS # BLD AUTO: 0.05 10*3/MM3 (ref 0–0.2)
BASOPHILS NFR BLD AUTO: 0.7 % (ref 0–1.5)
CREAT SERPL-MCNC: 0.7 MG/DL (ref 0.76–1.27)
DEPRECATED RDW RBC AUTO: 49.3 FL (ref 37–54)
EOSINOPHIL # BLD AUTO: 0.35 10*3/MM3 (ref 0–0.4)
EOSINOPHIL NFR BLD AUTO: 4.6 % (ref 0.3–6.2)
ERYTHROCYTE [DISTWIDTH] IN BLOOD BY AUTOMATED COUNT: 14.5 % (ref 12.3–15.4)
GFR SERPL CREATININE-BSD FRML MDRD: 114 ML/MIN/1.73
HCT VFR BLD AUTO: 25.8 % (ref 37.5–51)
HGB BLD-MCNC: 8.5 G/DL (ref 13–17.7)
IMM GRANULOCYTES # BLD AUTO: 0.04 10*3/MM3 (ref 0–0.05)
IMM GRANULOCYTES NFR BLD AUTO: 0.5 % (ref 0–0.5)
LYMPHOCYTES # BLD AUTO: 1.12 10*3/MM3 (ref 0.7–3.1)
LYMPHOCYTES NFR BLD AUTO: 14.7 % (ref 19.6–45.3)
MCH RBC QN AUTO: 30.9 PG (ref 26.6–33)
MCHC RBC AUTO-ENTMCNC: 32.9 G/DL (ref 31.5–35.7)
MCV RBC AUTO: 93.8 FL (ref 79–97)
MONOCYTES # BLD AUTO: 0.89 10*3/MM3 (ref 0.1–0.9)
MONOCYTES NFR BLD AUTO: 11.6 % (ref 5–12)
NEUTROPHILS NFR BLD AUTO: 5.19 10*3/MM3 (ref 1.7–7)
NEUTROPHILS NFR BLD AUTO: 67.9 % (ref 42.7–76)
NRBC BLD AUTO-RTO: 0 /100 WBC (ref 0–0.2)
PLATELET # BLD AUTO: 275 10*3/MM3 (ref 140–450)
PMV BLD AUTO: 10.9 FL (ref 6–12)
POTASSIUM SERPL-SCNC: 4.1 MMOL/L (ref 3.5–5.2)
RBC # BLD AUTO: 2.75 10*6/MM3 (ref 4.14–5.8)
SARS-COV-2 N GENE RESP QL NAA+PROBE: NOT DETECTED
VANCOMYCIN PEAK SERPL-MCNC: 33.9 MCG/ML (ref 20–40)
VANCOMYCIN TROUGH SERPL-MCNC: 21.3 MCG/ML (ref 5–20)
WBC # BLD AUTO: 7.64 10*3/MM3 (ref 3.4–10.8)
WHOLE BLOOD HOLD SPECIMEN: NORMAL

## 2021-01-27 PROCEDURE — 25010000002 VANCOMYCIN 5 G RECONSTITUTED SOLUTION: Performed by: STUDENT IN AN ORGANIZED HEALTH CARE EDUCATION/TRAINING PROGRAM

## 2021-01-27 PROCEDURE — 87635 SARS-COV-2 COVID-19 AMP PRB: CPT | Performed by: STUDENT IN AN ORGANIZED HEALTH CARE EDUCATION/TRAINING PROGRAM

## 2021-01-27 PROCEDURE — 94760 N-INVAS EAR/PLS OXIMETRY 1: CPT

## 2021-01-27 PROCEDURE — 99231 SBSQ HOSP IP/OBS SF/LOW 25: CPT | Performed by: INTERNAL MEDICINE

## 2021-01-27 PROCEDURE — 97530 THERAPEUTIC ACTIVITIES: CPT

## 2021-01-27 PROCEDURE — 82565 ASSAY OF CREATININE: CPT | Performed by: STUDENT IN AN ORGANIZED HEALTH CARE EDUCATION/TRAINING PROGRAM

## 2021-01-27 PROCEDURE — 94799 UNLISTED PULMONARY SVC/PX: CPT

## 2021-01-27 PROCEDURE — 74250 X-RAY XM SM INT 1CNTRST STD: CPT

## 2021-01-27 PROCEDURE — 97535 SELF CARE MNGMENT TRAINING: CPT

## 2021-01-27 PROCEDURE — 97110 THERAPEUTIC EXERCISES: CPT

## 2021-01-27 PROCEDURE — 80202 ASSAY OF VANCOMYCIN: CPT | Performed by: FAMILY MEDICINE

## 2021-01-27 PROCEDURE — 85025 COMPLETE CBC W/AUTO DIFF WBC: CPT | Performed by: STUDENT IN AN ORGANIZED HEALTH CARE EDUCATION/TRAINING PROGRAM

## 2021-01-27 PROCEDURE — 84132 ASSAY OF SERUM POTASSIUM: CPT | Performed by: FAMILY MEDICINE

## 2021-01-27 PROCEDURE — 97116 GAIT TRAINING THERAPY: CPT

## 2021-01-27 RX ADMIN — Medication 1 APPLICATION: at 11:36

## 2021-01-27 RX ADMIN — SODIUM CHLORIDE, PRESERVATIVE FREE 10 ML: 5 INJECTION INTRAVENOUS at 11:36

## 2021-01-27 RX ADMIN — SODIUM CHLORIDE, PRESERVATIVE FREE 10 ML: 5 INJECTION INTRAVENOUS at 22:02

## 2021-01-27 RX ADMIN — PANTOPRAZOLE SODIUM 40 MG: 40 INJECTION, POWDER, FOR SOLUTION INTRAVENOUS at 06:28

## 2021-01-27 RX ADMIN — SODIUM CHLORIDE, PRESERVATIVE FREE 10 ML: 5 INJECTION INTRAVENOUS at 11:37

## 2021-01-27 RX ADMIN — SUCRALFATE 1 G: 1 TABLET ORAL at 16:59

## 2021-01-27 RX ADMIN — LEVETIRACETAM 1750 MG: 500 TABLET ORAL at 22:00

## 2021-01-27 RX ADMIN — MIDODRINE HYDROCHLORIDE 10 MG: 5 TABLET ORAL at 16:59

## 2021-01-27 RX ADMIN — VANCOMYCIN HYDROCHLORIDE 1250 MG: 5 INJECTION, POWDER, LYOPHILIZED, FOR SOLUTION INTRAVENOUS at 04:18

## 2021-01-27 RX ADMIN — LAMOTRIGINE 200 MG: 100 TABLET ORAL at 22:00

## 2021-01-27 RX ADMIN — Medication 1 APPLICATION: at 22:02

## 2021-01-27 RX ADMIN — SODIUM CHLORIDE, PRESERVATIVE FREE 10 ML: 5 INJECTION INTRAVENOUS at 22:01

## 2021-01-27 RX ADMIN — SUCRALFATE 1 G: 1 TABLET ORAL at 22:00

## 2021-01-27 RX ADMIN — VANCOMYCIN HYDROCHLORIDE 1250 MG: 5 INJECTION, POWDER, LYOPHILIZED, FOR SOLUTION INTRAVENOUS at 14:23

## 2021-01-27 RX ADMIN — SODIUM CHLORIDE, PRESERVATIVE FREE 10 ML: 5 INJECTION INTRAVENOUS at 22:00

## 2021-01-28 ENCOUNTER — HOSPITAL ENCOUNTER (OUTPATIENT)
Facility: HOSPITAL | Age: 66
Discharge: NURSING, REHAB OR LONG-TERM CARE | End: 2021-02-15
Attending: INTERNAL MEDICINE | Admitting: INTERNAL MEDICINE

## 2021-01-28 VITALS
HEIGHT: 71 IN | HEART RATE: 65 BPM | BODY MASS INDEX: 19.59 KG/M2 | RESPIRATION RATE: 18 BRPM | OXYGEN SATURATION: 94 % | SYSTOLIC BLOOD PRESSURE: 106 MMHG | DIASTOLIC BLOOD PRESSURE: 58 MMHG | WEIGHT: 139.9 LBS | TEMPERATURE: 97.6 F

## 2021-01-28 DIAGNOSIS — Z74.09 IMPAIRED FUNCTIONAL MOBILITY, BALANCE, GAIT, AND ENDURANCE: ICD-10-CM

## 2021-01-28 LAB
ANION GAP SERPL CALCULATED.3IONS-SCNC: 7 MMOL/L (ref 5–15)
BASOPHILS # BLD AUTO: 0.04 10*3/MM3 (ref 0–0.2)
BASOPHILS NFR BLD AUTO: 0.5 % (ref 0–1.5)
BUN SERPL-MCNC: 8 MG/DL (ref 8–23)
BUN/CREAT SERPL: 11.9 (ref 7–25)
CALCIUM SPEC-SCNC: 8.9 MG/DL (ref 8.6–10.5)
CHLORIDE SERPL-SCNC: 100 MMOL/L (ref 98–107)
CO2 SERPL-SCNC: 25 MMOL/L (ref 22–29)
CREAT SERPL-MCNC: 0.67 MG/DL (ref 0.76–1.27)
DEPRECATED RDW RBC AUTO: 43.8 FL (ref 37–54)
EOSINOPHIL # BLD AUTO: 0.43 10*3/MM3 (ref 0–0.4)
EOSINOPHIL NFR BLD AUTO: 5.3 % (ref 0.3–6.2)
ERYTHROCYTE [DISTWIDTH] IN BLOOD BY AUTOMATED COUNT: 13.3 % (ref 12.3–15.4)
GFR SERPL CREATININE-BSD FRML MDRD: 119 ML/MIN/1.73
GLUCOSE SERPL-MCNC: 99 MG/DL (ref 65–99)
HCT VFR BLD AUTO: 26.4 % (ref 37.5–51)
HGB BLD-MCNC: 9.1 G/DL (ref 13–17.7)
IMM GRANULOCYTES # BLD AUTO: 0.06 10*3/MM3 (ref 0–0.05)
IMM GRANULOCYTES NFR BLD AUTO: 0.7 % (ref 0–0.5)
LAB AP CASE REPORT: NORMAL
LYMPHOCYTES # BLD AUTO: 0.75 10*3/MM3 (ref 0.7–3.1)
LYMPHOCYTES NFR BLD AUTO: 9.2 % (ref 19.6–45.3)
MAGNESIUM SERPL-MCNC: 1.9 MG/DL (ref 1.6–2.4)
MCH RBC QN AUTO: 30.8 PG (ref 26.6–33)
MCHC RBC AUTO-ENTMCNC: 34.5 G/DL (ref 31.5–35.7)
MCV RBC AUTO: 89.5 FL (ref 79–97)
MONOCYTES # BLD AUTO: 0.84 10*3/MM3 (ref 0.1–0.9)
MONOCYTES NFR BLD AUTO: 10.3 % (ref 5–12)
NEUTROPHILS NFR BLD AUTO: 6.06 10*3/MM3 (ref 1.7–7)
NEUTROPHILS NFR BLD AUTO: 74 % (ref 42.7–76)
NRBC BLD AUTO-RTO: 0 /100 WBC (ref 0–0.2)
PATH REPORT.FINAL DX SPEC: NORMAL
PLATELET # BLD AUTO: 294 10*3/MM3 (ref 140–450)
PMV BLD AUTO: 10.2 FL (ref 6–12)
POTASSIUM SERPL-SCNC: 3.8 MMOL/L (ref 3.5–5.2)
RBC # BLD AUTO: 2.95 10*6/MM3 (ref 4.14–5.8)
SODIUM SERPL-SCNC: 132 MMOL/L (ref 136–145)
WBC # BLD AUTO: 8.18 10*3/MM3 (ref 3.4–10.8)

## 2021-01-28 PROCEDURE — 94760 N-INVAS EAR/PLS OXIMETRY 1: CPT

## 2021-01-28 PROCEDURE — 80048 BASIC METABOLIC PNL TOTAL CA: CPT | Performed by: STUDENT IN AN ORGANIZED HEALTH CARE EDUCATION/TRAINING PROGRAM

## 2021-01-28 PROCEDURE — 25010000002 VANCOMYCIN: Performed by: INTERNAL MEDICINE

## 2021-01-28 PROCEDURE — 99232 SBSQ HOSP IP/OBS MODERATE 35: CPT | Performed by: INTERNAL MEDICINE

## 2021-01-28 PROCEDURE — 25010000002 VANCOMYCIN 5 G RECONSTITUTED SOLUTION: Performed by: FAMILY MEDICINE

## 2021-01-28 PROCEDURE — 94799 UNLISTED PULMONARY SVC/PX: CPT

## 2021-01-28 PROCEDURE — 85025 COMPLETE CBC W/AUTO DIFF WBC: CPT | Performed by: STUDENT IN AN ORGANIZED HEALTH CARE EDUCATION/TRAINING PROGRAM

## 2021-01-28 PROCEDURE — 83735 ASSAY OF MAGNESIUM: CPT | Performed by: STUDENT IN AN ORGANIZED HEALTH CARE EDUCATION/TRAINING PROGRAM

## 2021-01-28 RX ORDER — LORAZEPAM 2 MG/ML
1 INJECTION INTRAMUSCULAR EVERY 4 HOURS PRN
Status: ACTIVE | OUTPATIENT
Start: 2021-01-28 | End: 2021-02-04

## 2021-01-28 RX ORDER — ACETAMINOPHEN 650 MG/1
650 SUPPOSITORY RECTAL EVERY 4 HOURS PRN
Status: DISCONTINUED | OUTPATIENT
Start: 2021-01-28 | End: 2021-01-28

## 2021-01-28 RX ORDER — METOCLOPRAMIDE HYDROCHLORIDE 5 MG/5ML
5 SOLUTION ORAL
Start: 2021-01-28 | End: 2021-08-23

## 2021-01-28 RX ORDER — SODIUM CHLORIDE 0.9 % (FLUSH) 0.9 %
10 SYRINGE (ML) INJECTION EVERY 12 HOURS SCHEDULED
Status: DISCONTINUED | OUTPATIENT
Start: 2021-01-28 | End: 2021-02-15 | Stop reason: HOSPADM

## 2021-01-28 RX ORDER — HEPARIN SODIUM,PORCINE 10 UNIT/ML
30 VIAL (ML) INTRAVENOUS EVERY 8 HOURS SCHEDULED
Status: DISCONTINUED | OUTPATIENT
Start: 2021-01-28 | End: 2021-02-15 | Stop reason: HOSPADM

## 2021-01-28 RX ORDER — ACETAMINOPHEN 325 MG/1
650 TABLET ORAL EVERY 4 HOURS PRN
Status: DISCONTINUED | OUTPATIENT
Start: 2021-01-28 | End: 2021-02-15 | Stop reason: HOSPADM

## 2021-01-28 RX ORDER — PANTOPRAZOLE SODIUM 40 MG/10ML
40 INJECTION, POWDER, LYOPHILIZED, FOR SOLUTION INTRAVENOUS
Start: 2021-01-28 | End: 2021-08-23

## 2021-01-28 RX ORDER — SODIUM CHLORIDE 0.9 % (FLUSH) 0.9 %
10 SYRINGE (ML) INJECTION EVERY 8 HOURS SCHEDULED
Status: DISCONTINUED | OUTPATIENT
Start: 2021-01-28 | End: 2021-02-15 | Stop reason: HOSPADM

## 2021-01-28 RX ORDER — SUCRALFATE 1 G/1
1 TABLET ORAL
Status: DISCONTINUED | OUTPATIENT
Start: 2021-01-28 | End: 2021-02-15 | Stop reason: HOSPADM

## 2021-01-28 RX ORDER — SUCRALFATE 1 G/1
1 TABLET ORAL
Start: 2021-01-28 | End: 2021-05-19 | Stop reason: SDUPTHER

## 2021-01-28 RX ORDER — PANTOPRAZOLE SODIUM 40 MG/10ML
40 INJECTION, POWDER, LYOPHILIZED, FOR SOLUTION INTRAVENOUS
Status: DISCONTINUED | OUTPATIENT
Start: 2021-01-29 | End: 2021-02-15 | Stop reason: HOSPADM

## 2021-01-28 RX ORDER — SODIUM CHLORIDE 9 MG/ML
75 INJECTION, SOLUTION INTRAVENOUS CONTINUOUS
Status: DISCONTINUED | OUTPATIENT
Start: 2021-01-28 | End: 2021-02-15 | Stop reason: HOSPADM

## 2021-01-28 RX ORDER — POTASSIUM CHLORIDE 1.5 G/1.77G
40 POWDER, FOR SOLUTION ORAL AS NEEDED
Status: DISCONTINUED | OUTPATIENT
Start: 2021-01-28 | End: 2021-01-28

## 2021-01-28 RX ORDER — LAMOTRIGINE 100 MG/1
200 TABLET ORAL 2 TIMES DAILY
Status: DISCONTINUED | OUTPATIENT
Start: 2021-01-28 | End: 2021-02-15 | Stop reason: HOSPADM

## 2021-01-28 RX ORDER — SODIUM CHLORIDE 0.9 % (FLUSH) 0.9 %
10 SYRINGE (ML) INJECTION AS NEEDED
Status: DISCONTINUED | OUTPATIENT
Start: 2021-01-28 | End: 2021-02-15 | Stop reason: HOSPADM

## 2021-01-28 RX ORDER — MIDODRINE HYDROCHLORIDE 5 MG/1
10 TABLET ORAL
Status: DISCONTINUED | OUTPATIENT
Start: 2021-01-28 | End: 2021-02-15 | Stop reason: HOSPADM

## 2021-01-28 RX ORDER — L. ACIDOPHILUS/L.BULGARICUS 1MM CELL
1 TABLET ORAL 2 TIMES DAILY
Status: DISCONTINUED | OUTPATIENT
Start: 2021-01-28 | End: 2021-02-15 | Stop reason: HOSPADM

## 2021-01-28 RX ORDER — ONDANSETRON 4 MG/1
4 TABLET, FILM COATED ORAL EVERY 6 HOURS PRN
Status: DISCONTINUED | OUTPATIENT
Start: 2021-01-28 | End: 2021-02-15 | Stop reason: HOSPADM

## 2021-01-28 RX ORDER — ACETAMINOPHEN 160 MG/5ML
650 SOLUTION ORAL EVERY 4 HOURS PRN
Status: DISCONTINUED | OUTPATIENT
Start: 2021-01-28 | End: 2021-02-15 | Stop reason: HOSPADM

## 2021-01-28 RX ORDER — SODIUM CHLORIDE 9 MG/ML
75 INJECTION, SOLUTION INTRAVENOUS CONTINUOUS
Start: 2021-01-28 | End: 2021-08-23

## 2021-01-28 RX ADMIN — Medication 1 APPLICATION: at 09:25

## 2021-01-28 RX ADMIN — SUCRALFATE 1 G: 1 TABLET ORAL at 11:12

## 2021-01-28 RX ADMIN — MIDODRINE HYDROCHLORIDE 10 MG: 5 TABLET ORAL at 11:12

## 2021-01-28 RX ADMIN — MIDODRINE HYDROCHLORIDE 10 MG: 5 TABLET ORAL at 09:23

## 2021-01-28 RX ADMIN — SUCRALFATE 1 G: 1 TABLET ORAL at 09:24

## 2021-01-28 RX ADMIN — PANTOPRAZOLE SODIUM 40 MG: 40 INJECTION, POWDER, FOR SOLUTION INTRAVENOUS at 05:18

## 2021-01-28 RX ADMIN — LEVETIRACETAM 1750 MG: 500 TABLET ORAL at 09:23

## 2021-01-28 RX ADMIN — SODIUM CHLORIDE, PRESERVATIVE FREE 10 ML: 5 INJECTION INTRAVENOUS at 09:24

## 2021-01-28 RX ADMIN — SODIUM CHLORIDE, PRESERVATIVE FREE 10 ML: 5 INJECTION INTRAVENOUS at 09:23

## 2021-01-28 RX ADMIN — SODIUM CHLORIDE 75 ML/HR: 900 INJECTION, SOLUTION INTRAVENOUS at 11:12

## 2021-01-28 RX ADMIN — LAMOTRIGINE 200 MG: 100 TABLET ORAL at 09:23

## 2021-01-28 RX ADMIN — VANCOMYCIN HYDROCHLORIDE 1500 MG: 5 INJECTION, POWDER, LYOPHILIZED, FOR SOLUTION INTRAVENOUS at 04:13

## 2021-01-29 ENCOUNTER — APPOINTMENT (OUTPATIENT)
Dept: GENERAL RADIOLOGY | Facility: HOSPITAL | Age: 66
End: 2021-01-29

## 2021-01-29 LAB
ALBUMIN SERPL-MCNC: 3.3 G/DL (ref 3.5–5.2)
ALBUMIN/GLOB SERPL: 1.2 G/DL
ALP SERPL-CCNC: 94 U/L (ref 39–117)
ALT SERPL W P-5'-P-CCNC: 26 U/L (ref 1–41)
ANION GAP SERPL CALCULATED.3IONS-SCNC: 8 MMOL/L (ref 5–15)
AST SERPL-CCNC: 22 U/L (ref 1–40)
BACTERIA SPEC AEROBE CULT: NORMAL
BACTERIA SPEC AEROBE CULT: NORMAL
BASOPHILS # BLD AUTO: 0.04 10*3/MM3 (ref 0–0.2)
BASOPHILS NFR BLD AUTO: 0.6 % (ref 0–1.5)
BILIRUB SERPL-MCNC: 0.2 MG/DL (ref 0–1.2)
BUN SERPL-MCNC: 10 MG/DL (ref 8–23)
BUN/CREAT SERPL: 13.5 (ref 7–25)
CALCIUM SPEC-SCNC: 8.3 MG/DL (ref 8.6–10.5)
CHLORIDE SERPL-SCNC: 100 MMOL/L (ref 98–107)
CO2 SERPL-SCNC: 25 MMOL/L (ref 22–29)
CREAT SERPL-MCNC: 0.74 MG/DL (ref 0.76–1.27)
DEPRECATED RDW RBC AUTO: 43.8 FL (ref 37–54)
EOSINOPHIL # BLD AUTO: 0.38 10*3/MM3 (ref 0–0.4)
EOSINOPHIL NFR BLD AUTO: 6.1 % (ref 0.3–6.2)
ERYTHROCYTE [DISTWIDTH] IN BLOOD BY AUTOMATED COUNT: 13.3 % (ref 12.3–15.4)
GFR SERPL CREATININE-BSD FRML MDRD: 106 ML/MIN/1.73
GLOBULIN UR ELPH-MCNC: 2.7 GM/DL
GLUCOSE SERPL-MCNC: 93 MG/DL (ref 65–99)
HCT VFR BLD AUTO: 24.7 % (ref 37.5–51)
HGB BLD-MCNC: 8.7 G/DL (ref 13–17.7)
IMM GRANULOCYTES # BLD AUTO: 0.03 10*3/MM3 (ref 0–0.05)
IMM GRANULOCYTES NFR BLD AUTO: 0.5 % (ref 0–0.5)
LYMPHOCYTES # BLD AUTO: 0.84 10*3/MM3 (ref 0.7–3.1)
LYMPHOCYTES NFR BLD AUTO: 13.4 % (ref 19.6–45.3)
MCH RBC QN AUTO: 31.5 PG (ref 26.6–33)
MCHC RBC AUTO-ENTMCNC: 35.2 G/DL (ref 31.5–35.7)
MCV RBC AUTO: 89.5 FL (ref 79–97)
MONOCYTES # BLD AUTO: 0.74 10*3/MM3 (ref 0.1–0.9)
MONOCYTES NFR BLD AUTO: 11.8 % (ref 5–12)
NEUTROPHILS NFR BLD AUTO: 4.23 10*3/MM3 (ref 1.7–7)
NEUTROPHILS NFR BLD AUTO: 67.6 % (ref 42.7–76)
NRBC BLD AUTO-RTO: 0 /100 WBC (ref 0–0.2)
PLATELET # BLD AUTO: 308 10*3/MM3 (ref 140–450)
PMV BLD AUTO: 9.9 FL (ref 6–12)
POTASSIUM SERPL-SCNC: 3.9 MMOL/L (ref 3.5–5.2)
PROT SERPL-MCNC: 6 G/DL (ref 6–8.5)
RBC # BLD AUTO: 2.76 10*6/MM3 (ref 4.14–5.8)
SODIUM SERPL-SCNC: 133 MMOL/L (ref 136–145)
WBC # BLD AUTO: 6.26 10*3/MM3 (ref 3.4–10.8)

## 2021-01-29 PROCEDURE — 71045 X-RAY EXAM CHEST 1 VIEW: CPT

## 2021-01-29 PROCEDURE — 25010000002 VANCOMYCIN: Performed by: INTERNAL MEDICINE

## 2021-01-29 PROCEDURE — 85025 COMPLETE CBC W/AUTO DIFF WBC: CPT | Performed by: INTERNAL MEDICINE

## 2021-01-29 PROCEDURE — 80053 COMPREHEN METABOLIC PANEL: CPT | Performed by: INTERNAL MEDICINE

## 2021-01-29 PROCEDURE — 63710000001 ONDANSETRON PER 8 MG: Performed by: INTERNAL MEDICINE

## 2021-01-30 PROCEDURE — 63710000001 ONDANSETRON PER 8 MG: Performed by: INTERNAL MEDICINE

## 2021-01-30 PROCEDURE — 25010000002 VANCOMYCIN: Performed by: INTERNAL MEDICINE

## 2021-01-31 PROCEDURE — 25010000002 VANCOMYCIN: Performed by: INTERNAL MEDICINE

## 2021-01-31 PROCEDURE — 63710000001 ONDANSETRON PER 8 MG: Performed by: INTERNAL MEDICINE

## 2021-02-01 LAB
ALBUMIN SERPL-MCNC: 3.6 G/DL (ref 3.5–5.2)
ALBUMIN/GLOB SERPL: 1.2 G/DL
ALP SERPL-CCNC: 108 U/L (ref 39–117)
ALT SERPL W P-5'-P-CCNC: 22 U/L (ref 1–41)
ANION GAP SERPL CALCULATED.3IONS-SCNC: 8 MMOL/L (ref 5–15)
AST SERPL-CCNC: 22 U/L (ref 1–40)
BILIRUB SERPL-MCNC: <0.2 MG/DL (ref 0–1.2)
BUN SERPL-MCNC: 8 MG/DL (ref 8–23)
BUN/CREAT SERPL: 9.6 (ref 7–25)
CALCIUM SPEC-SCNC: 9 MG/DL (ref 8.6–10.5)
CHLORIDE SERPL-SCNC: 98 MMOL/L (ref 98–107)
CO2 SERPL-SCNC: 27 MMOL/L (ref 22–29)
CREAT SERPL-MCNC: 0.83 MG/DL (ref 0.76–1.27)
DEPRECATED RDW RBC AUTO: 42.3 FL (ref 37–54)
ERYTHROCYTE [DISTWIDTH] IN BLOOD BY AUTOMATED COUNT: 12.9 % (ref 12.3–15.4)
GFR SERPL CREATININE-BSD FRML MDRD: 93 ML/MIN/1.73
GLOBULIN UR ELPH-MCNC: 3.1 GM/DL
GLUCOSE SERPL-MCNC: 86 MG/DL (ref 65–99)
HCT VFR BLD AUTO: 28.2 % (ref 37.5–51)
HGB BLD-MCNC: 9.9 G/DL (ref 13–17.7)
MCH RBC QN AUTO: 31 PG (ref 26.6–33)
MCHC RBC AUTO-ENTMCNC: 35.1 G/DL (ref 31.5–35.7)
MCV RBC AUTO: 88.4 FL (ref 79–97)
PLATELET # BLD AUTO: 399 10*3/MM3 (ref 140–450)
PMV BLD AUTO: 9.8 FL (ref 6–12)
POTASSIUM SERPL-SCNC: 4 MMOL/L (ref 3.5–5.2)
PROT SERPL-MCNC: 6.7 G/DL (ref 6–8.5)
RBC # BLD AUTO: 3.19 10*6/MM3 (ref 4.14–5.8)
SODIUM SERPL-SCNC: 133 MMOL/L (ref 136–145)
WBC # BLD AUTO: 11.32 10*3/MM3 (ref 3.4–10.8)

## 2021-02-01 PROCEDURE — 25010000002 VANCOMYCIN: Performed by: INTERNAL MEDICINE

## 2021-02-01 PROCEDURE — 80053 COMPREHEN METABOLIC PANEL: CPT | Performed by: INTERNAL MEDICINE

## 2021-02-01 PROCEDURE — 85027 COMPLETE CBC AUTOMATED: CPT | Performed by: INTERNAL MEDICINE

## 2021-02-01 NOTE — PROGRESS NOTES
Pharmacokinetics by Pharmacy - Vancomycin    Bautista Grubbs is a 65 y.o. male  [Ht:  ; Wt:  ]    Estimated Creatinine Clearance: 80.8 mL/min (by C-G formula based on SCr of 0.83 mg/dL).   Creatinine   Date Value Ref Range Status   02/01/2021 0.83 0.76 - 1.27 mg/dL Final   01/29/2021 0.74 (L) 0.76 - 1.27 mg/dL Final   01/28/2021 0.67 (L) 0.76 - 1.27 mg/dL Final      Lab Results   Component Value Date    WBC 11.32 (H) 02/01/2021    WBC 6.26 01/29/2021    WBC 8.18 01/28/2021     Lactate   Date Value Ref Range Status   01/21/2021 1.8 0.5 - 2.0 mmol/L Final   03/12/2020 2.0 0.5 - 2.0 mmol/L Final   03/12/2020 2.0 0.5 - 2.0 mmol/L Final       Temp Readings from Last 1 Encounters:   01/28/21 97.6 °F (36.4 °C) (Temporal)      Lab Results   Component Value Date    VANCOPEAK 33.90 01/27/2021    VANCOTROUGH 21.30 (H) 01/27/2021         Culture Results:  Microbiology Results (last 10 days)       Procedure Component Value - Date/Time    COVID PRE-OP / PRE-PROCEDURE SCREENING ORDER (NO ISOLATION) - Swab, Nasopharynx [[id number]]  (Normal) Collected: 01/27/21 1506    Lab Status: Final result Specimen: Swab from Nasopharynx Updated: 01/27/21 2009    Narrative:      The following orders were created for panel order COVID PRE-OP / PRE-PROCEDURE SCREENING ORDER (NO ISOLATION) - Swab, Nasopharynx.  Procedure                               Abnormality         Status                     ---------                               -----------         ------                     COVID-[facility] IN-HOUS...[[id number]]  Normal              Final result                 Please view results for these tests on the individual orders.    COVID-[facility] IN-HOUSE, NP SWAB IN TRANSPORT MEDIA 8-10 HR TAT - Swab, Nasopharynx [[id number]]  (Normal) Collected: 01/27/21 1506    Lab Status: Final result Specimen: Swab from Nasopharynx Updated: 01/27/21 2009     COVID19 Not Detected    Narrative:      Testing performed by Real Time RT-PCR  This test has not  been approved by the [employer] but is authorized under the Emergency Use Act (EUA)    Fact sheet for providers: https://www.fda.gov/media/840178/download    Fact sheet for patients: https://www.fda.gov/media/351729/download        Legionella Antigen, Urine - Urine, Urine, Clean Catch [[id number]]  (Normal) Collected: 01/25/21 1317    Lab Status: Final result Specimen: Urine, Clean Catch Updated: 01/25/21 1428     LEGIONELLA ANTIGEN, URINE Negative    S. Pneumo Ag Urine or CSF - Urine, Urine, Clean Catch [[id number]]  (Normal) Collected: 01/25/21 1317    Lab Status: Final result Specimen: Urine, Clean Catch Updated: 01/25/21 1429     Strep Pneumo Ag Negative    Blood Culture - Blood, Arm, Right [[id number]] Collected: 01/24/21 0703    Lab Status: Final result Specimen: Blood from Arm, Right Updated: 01/29/21 0746     Blood Culture No growth at 5 days    Blood Culture - Blood, Arm, Left [[id number]] Collected: 01/24/21 0703    Lab Status: Final result Specimen: Blood from Arm, Left Updated: 01/29/21 0746     Blood Culture No growth at 5 days          No results found for: RESPCX    Indication for use: bacteremia, MRSA      Current Vancomycin Dose:  1500 mg IVPB every 12 hours, day 11 of therapy.       Assessment/Plan:  Reviewed above labs and cultures. All labs final.   Vancomycin trough level is ordered for 2/2 at 0930. WBC is 11.32, increase today. Cr is 0.83, improved. Will continue current dose. Therapy continues through 2/7 at 1000.  Pharmacy will continue to monitor renal function and adjust dose accordingly.    Sabrina Valencia, PharmD   02/01/21 09:36 CST

## 2021-02-02 LAB
VANCOMYCIN TROUGH SERPL-MCNC: 21.7 MCG/ML (ref 5–20)
VANCOMYCIN TROUGH SERPL-MCNC: 54.1 MCG/ML (ref 5–20)

## 2021-02-02 PROCEDURE — 80202 ASSAY OF VANCOMYCIN: CPT | Performed by: INTERNAL MEDICINE

## 2021-02-02 PROCEDURE — 25010000002 VANCOMYCIN: Performed by: INTERNAL MEDICINE

## 2021-02-02 PROCEDURE — 63710000001 ONDANSETRON PER 8 MG: Performed by: INTERNAL MEDICINE

## 2021-02-02 NOTE — PROGRESS NOTES
Pharmacokinetics by Pharmacy - Vancomycin    Bautista Grubbs is a 65 y.o. male  [Ht:  ; Wt:  ]    Estimated Creatinine Clearance: 80.8 mL/min (by C-G formula based on SCr of 0.83 mg/dL).   Creatinine   Date Value Ref Range Status   02/01/2021 0.83 0.76 - 1.27 mg/dL Final   01/29/2021 0.74 (L) 0.76 - 1.27 mg/dL Final   01/28/2021 0.67 (L) 0.76 - 1.27 mg/dL Final      Lab Results   Component Value Date    WBC 11.32 (H) 02/01/2021    WBC 6.26 01/29/2021    WBC 8.18 01/28/2021     Lactate   Date Value Ref Range Status   01/21/2021 1.8 0.5 - 2.0 mmol/L Final   03/12/2020 2.0 0.5 - 2.0 mmol/L Final   03/12/2020 2.0 0.5 - 2.0 mmol/L Final       Temp Readings from Last 1 Encounters:   01/28/21 97.6 °F (36.4 °C) (Temporal)      Lab Results   Component Value Date    VANCOPEAK 33.90 01/27/2021    VANCOTROUGH 54.10 (C) 02/02/2021         Culture Results:  Microbiology Results (last 10 days)       Procedure Component Value - Date/Time    COVID PRE-OP / PRE-PROCEDURE SCREENING ORDER (NO ISOLATION) - Swab, Nasopharynx [[id number]]  (Normal) Collected: 01/27/21 1506    Lab Status: Final result Specimen: Swab from Nasopharynx Updated: 01/27/21 2009    Narrative:      The following orders were created for panel order COVID PRE-OP / PRE-PROCEDURE SCREENING ORDER (NO ISOLATION) - Swab, Nasopharynx.  Procedure                               Abnormality         Status                     ---------                               -----------         ------                     COVID-[facility] IN-HOUS...[[id number]]  Normal              Final result                 Please view results for these tests on the individual orders.    COVID-[facility] IN-HOUSE, NP SWAB IN TRANSPORT MEDIA 8-10 HR TAT - Swab, Nasopharynx [[id number]]  (Normal) Collected: 01/27/21 1506    Lab Status: Final result Specimen: Swab from Nasopharynx Updated: 01/27/21 2009     COVID19 Not Detected    Narrative:      Testing performed by Real Time RT-PCR  This test has not  been approved by the [employer] but is authorized under the Emergency Use Act (EUA)    Fact sheet for providers: https://www.fda.gov/media/962678/download    Fact sheet for patients: https://www.fda.gov/media/395359/download        Legionella Antigen, Urine - Urine, Urine, Clean Catch [[id number]]  (Normal) Collected: 01/25/21 1317    Lab Status: Final result Specimen: Urine, Clean Catch Updated: 01/25/21 1428     LEGIONELLA ANTIGEN, URINE Negative    S. Pneumo Ag Urine or CSF - Urine, Urine, Clean Catch [[id number]]  (Normal) Collected: 01/25/21 1317    Lab Status: Final result Specimen: Urine, Clean Catch Updated: 01/25/21 1429     Strep Pneumo Ag Negative    Blood Culture - Blood, Arm, Right [[id number]] Collected: 01/24/21 0703    Lab Status: Final result Specimen: Blood from Arm, Right Updated: 01/29/21 0746     Blood Culture No growth at 5 days    Blood Culture - Blood, Arm, Left [[id number]] Collected: 01/24/21 0703    Lab Status: Final result Specimen: Blood from Arm, Left Updated: 01/29/21 0746     Blood Culture No growth at 5 days          No results found for: RESPCX    Indication for use: bacteremia, MRSA      Current Vancomycin Dose:  1500 mg IVPB every 12 hours, day 12 of therapy.     Assessment/Plan:  Reviewed above labs and cultures. No new changes.  Vancomycin trough level were drawn but dose was being infused giving a false reading. RN infused half the dose.  2/1 WBC is 11.32. 2/1 Cr is 0.83. Will continue current dose and order a trough level for tonight at 2130.  Pharmacy will continue to monitor renal function and adjust dose accordingly.    Sabrina Valencia, PharmD   02/02/21 14:04 CST

## 2021-02-03 PROCEDURE — 25010000002 VANCOMYCIN: Performed by: INTERNAL MEDICINE

## 2021-02-03 NOTE — PROGRESS NOTES
Pharmacokinetics by Pharmacy - Vancomycin    Bautista Grubbs is a 65 y.o. male receiving vancomycin 1500mg IV Q12H for bacteremia      Objective:  [Ht: 180.3 cm  ; Wt: 63.5 kg  ]     WBC   Date Value Ref Range Status   02/01/2021 11.32 (H) 3.40 - 10.80 10*3/mm3 Final   01/29/2021 6.26 3.40 - 10.80 10*3/mm3 Final   01/28/2021 8.18 3.40 - 10.80 10*3/mm3 Final      Lactate   Date Value Ref Range Status   01/21/2021 1.8 0.5 - 2.0 mmol/L Final   03/12/2020 2.0 0.5 - 2.0 mmol/L Final   03/12/2020 2.0 0.5 - 2.0 mmol/L Final      Temp Readings from Last 1 Encounters:   01/28/21 97.6 °F (36.4 °C) (Temporal)     Estimated Creatinine Clearance: 80.8 mL/min (by C-G formula based on SCr of 0.83 mg/dL).   Creatinine   Date Value Ref Range Status   02/01/2021 0.83 0.76 - 1.27 mg/dL Final   01/29/2021 0.74 (L) 0.76 - 1.27 mg/dL Final   01/28/2021 0.67 (L) 0.76 - 1.27 mg/dL Final       Vancomycin Peak   Date Value Ref Range Status   01/27/2021 33.90 20.00 - 40.00 mcg/mL Final   01/26/2021 32.30 20.00 - 40.00 mcg/mL Final     Vancomycin Trough   Date Value Ref Range Status   02/02/2021 21.70 (H) 5.00 - 20.00 mcg/mL Final   02/02/2021 54.10 (C) 5.00 - 20.00 mcg/mL Final       Culture Results:  Microbiology Results (last 10 days)       Procedure Component Value - Date/Time    COVID PRE-OP / PRE-PROCEDURE SCREENING ORDER (NO ISOLATION) - Swab, Nasopharynx [[id number]]  (Normal) Collected: 01/27/21 1506    Lab Status: Final result Specimen: Swab from Nasopharynx Updated: 01/27/21 2009    Narrative:      The following orders were created for panel order COVID PRE-OP / PRE-PROCEDURE SCREENING ORDER (NO ISOLATION) - Swab, Nasopharynx.  Procedure                               Abnormality         Status                     ---------                               -----------         ------                     COVID-19,  [facility] IN-HOUS...[[id number]]  Normal              Final result                 Please view results for these tests on the  individual orders.    COVID-19, [facility] IN-HOUSE, NP SWAB IN TRANSPORT MEDIA 8-10 HR TAT - Swab, Nasopharynx [[id number]]  (Normal) Collected: 01/27/21 1506    Lab Status: Final result Specimen: Swab from Nasopharynx Updated: 01/27/21 2009     COVID19 Not Detected    Narrative:      Testing performed by Real Time RT-PCR  This test has not been approved by the [facility] but is authorized under the Emergency Use Act (EUA)    Fact sheet for providers: https://www.fda.gov/media/313307/download    Fact sheet for patients: https://www.fda.gov/media/244027/download        Legionella Antigen, Urine - Urine, Urine, Clean Catch [[id number]]  (Normal) Collected: 01/25/21 1317    Lab Status: Final result Specimen: Urine, Clean Catch Updated: 01/25/21 1428     LEGIONELLA ANTIGEN, URINE Negative    S. Pneumo Ag Urine or CSF - Urine, Urine, Clean Catch [[id number]]  (Normal) Collected: 01/25/21 1317    Lab Status: Final result Specimen: Urine, Clean Catch Updated: 01/25/21 1429     Strep Pneumo Ag Negative          No results found for: RESPCX      Assessment:  No labs at time of note   CMP MoTh  No significant findings per nurse    Labs finalized  First negative blood cx was on 1/24.  Last dose will be 2/7 1000    Dose was held appropriately last night as trough returned 21.7.  Adjusting vancomycin to 1250 mg IV q12h.  Ordering new peak and trough.  K = 0.07  T1/2 = 9 hr        Plan:  1. Change to vancomycin 1250mg IV Q12H  2. Peak 2/4 1400, Trough 2/4 2300  3. Pharmacy will monitor renal function and adjust dose accordingly.      Melvin Velasquez, PharmD   02/03/21 10:16 CST

## 2021-02-04 LAB
ALBUMIN SERPL-MCNC: 3.5 G/DL (ref 3.5–5.2)
ALBUMIN/GLOB SERPL: 1.2 G/DL
ALP SERPL-CCNC: 104 U/L (ref 39–117)
ALT SERPL W P-5'-P-CCNC: 26 U/L (ref 1–41)
ANION GAP SERPL CALCULATED.3IONS-SCNC: 8 MMOL/L (ref 5–15)
AST SERPL-CCNC: 23 U/L (ref 1–40)
BILIRUB SERPL-MCNC: <0.2 MG/DL (ref 0–1.2)
BUN SERPL-MCNC: 11 MG/DL (ref 8–23)
BUN/CREAT SERPL: 13.8 (ref 7–25)
CALCIUM SPEC-SCNC: 9.3 MG/DL (ref 8.6–10.5)
CHLORIDE SERPL-SCNC: 98 MMOL/L (ref 98–107)
CO2 SERPL-SCNC: 27 MMOL/L (ref 22–29)
CREAT SERPL-MCNC: 0.8 MG/DL (ref 0.76–1.27)
DEPRECATED RDW RBC AUTO: 41.6 FL (ref 37–54)
ERYTHROCYTE [DISTWIDTH] IN BLOOD BY AUTOMATED COUNT: 13.1 % (ref 12.3–15.4)
GFR SERPL CREATININE-BSD FRML MDRD: 97 ML/MIN/1.73
GLOBULIN UR ELPH-MCNC: 2.9 GM/DL
GLUCOSE SERPL-MCNC: 91 MG/DL (ref 65–99)
HCT VFR BLD AUTO: 27.7 % (ref 37.5–51)
HGB BLD-MCNC: 9.5 G/DL (ref 13–17.7)
MCH RBC QN AUTO: 30.1 PG (ref 26.6–33)
MCHC RBC AUTO-ENTMCNC: 34.3 G/DL (ref 31.5–35.7)
MCV RBC AUTO: 87.7 FL (ref 79–97)
PLATELET # BLD AUTO: 450 10*3/MM3 (ref 140–450)
PMV BLD AUTO: 9.4 FL (ref 6–12)
POTASSIUM SERPL-SCNC: 3.9 MMOL/L (ref 3.5–5.2)
PROT SERPL-MCNC: 6.4 G/DL (ref 6–8.5)
RBC # BLD AUTO: 3.16 10*6/MM3 (ref 4.14–5.8)
SODIUM SERPL-SCNC: 133 MMOL/L (ref 136–145)
VANCOMYCIN PEAK SERPL-MCNC: 41 MCG/ML (ref 20–40)
VANCOMYCIN TROUGH SERPL-MCNC: 19.5 MCG/ML (ref 5–20)
WBC # BLD AUTO: 7.59 10*3/MM3 (ref 3.4–10.8)

## 2021-02-04 PROCEDURE — 80202 ASSAY OF VANCOMYCIN: CPT | Performed by: INTERNAL MEDICINE

## 2021-02-04 PROCEDURE — 80053 COMPREHEN METABOLIC PANEL: CPT | Performed by: INTERNAL MEDICINE

## 2021-02-04 PROCEDURE — 25010000002 VANCOMYCIN: Performed by: INTERNAL MEDICINE

## 2021-02-04 PROCEDURE — 85027 COMPLETE CBC AUTOMATED: CPT | Performed by: INTERNAL MEDICINE

## 2021-02-04 NOTE — PROGRESS NOTES
Pharmacokinetics by Pharmacy - Vancomycin    Bautista Grubbs is a 65 y.o. male receiving vancomycin for bacteremia      Objective:  [Ht: 180.3 cm  ; Wt: 63.5 kg  ]     WBC   Date Value Ref Range Status   02/04/2021 7.59 3.40 - 10.80 10*3/mm3 Final   02/01/2021 11.32 (H) 3.40 - 10.80 10*3/mm3 Final   01/29/2021 6.26 3.40 - 10.80 10*3/mm3 Final      Lactate   Date Value Ref Range Status   01/21/2021 1.8 0.5 - 2.0 mmol/L Final   03/12/2020 2.0 0.5 - 2.0 mmol/L Final   03/12/2020 2.0 0.5 - 2.0 mmol/L Final      Temp Readings from Last 1 Encounters:   01/28/21 97.6 °F (36.4 °C) (Temporal)     Estimated Creatinine Clearance: 80.8 mL/min (by C-G formula based on SCr of 0.83 mg/dL).   Creatinine   Date Value Ref Range Status   02/01/2021 0.83 0.76 - 1.27 mg/dL Final   01/29/2021 0.74 (L) 0.76 - 1.27 mg/dL Final   01/28/2021 0.67 (L) 0.76 - 1.27 mg/dL Final       Vancomycin Peak   Date Value Ref Range Status   01/27/2021 33.90 20.00 - 40.00 mcg/mL Final   01/26/2021 32.30 20.00 - 40.00 mcg/mL Final     Vancomycin Trough   Date Value Ref Range Status   02/02/2021 21.70 (H) 5.00 - 20.00 mcg/mL Final   02/02/2021 54.10 (C) 5.00 - 20.00 mcg/mL Final       Culture Results:  Microbiology Results (last 10 days)     Procedure Component Value - Date/Time    COVID PRE-OP / PRE-PROCEDURE SCREENING ORDER (NO ISOLATION) - Swab, Nasopharynx [[id number]]  (Normal) Collected: 01/27/21 1506    Lab Status: Final result Specimen: Swab from Nasopharynx Updated: 01/27/21 2009    Narrative:      The following orders were created for panel order COVID PRE-OP / PRE-PROCEDURE SCREENING ORDER (NO ISOLATION) - Swab, Nasopharynx.  Procedure                               Abnormality         Status                     ---------                               -----------         ------                     COVID-19,  [facility] IN-HOUS...[[id number]]  Normal              Final result                 Please view results for these tests on the individual orders.     COVID-19, [facility] IN-HOUSE, NP SWAB IN TRANSPORT MEDIA 8-10 HR TAT - Swab, Nasopharynx [[id number]]  (Normal) Collected: 01/27/21 1506    Lab Status: Final result Specimen: Swab from Nasopharynx Updated: 01/27/21 2009     COVID19 Not Detected    Narrative:      Testing performed by Real Time RT-PCR  This test has not been approved by the [facility] but is authorized under the Emergency Use Act (EUA)    Fact sheet for providers: https://www.fda.gov/media/208703/download    Fact sheet for patients: https://www.fda.gov/media/910155/download        Legionella Antigen, Urine - Urine, Urine, Clean Catch [[id number]]  (Normal) Collected: 01/25/21 1317    Lab Status: Final result Specimen: Urine, Clean Catch Updated: 01/25/21 1428     LEGIONELLA ANTIGEN, URINE Negative    S. Pneumo Ag Urine or CSF - Urine, Urine, Clean Catch [[id number]]  (Normal) Collected: 01/25/21 1317    Lab Status: Final result Specimen: Urine, Clean Catch Updated: 01/25/21 1429     Strep Pneumo Ag Negative        No results found for: RESPCX      Assessment:  Scr stable  WBC WNL  First negative blood cx was on 1/24   Last day 2/7     Vancomycin adjust recently based on a high trough  Peak and trough are planned for later on today  Continue current dose for now  Pharmacy will adjust dose tomorrow based on new levels    Plan:  1. Continue vancomycin 1250 mg IV Q12H  2. Peak 2/4 1400, Trough 2/4 2300  3. Pharmacy will monitor renal function and adjust dose accordingly.      Kem Gates, PharmD   02/04/21 08:41 CST

## 2021-02-05 ENCOUNTER — TELEPHONE (OUTPATIENT)
Dept: GASTROENTEROLOGY | Facility: CLINIC | Age: 66
End: 2021-02-05

## 2021-02-05 PROCEDURE — 25010000002 VANCOMYCIN: Performed by: INTERNAL MEDICINE

## 2021-02-05 RX ORDER — MEGESTROL ACETATE 40 MG/ML
400 SUSPENSION ORAL DAILY
Status: DISCONTINUED | OUTPATIENT
Start: 2021-02-05 | End: 2021-02-15 | Stop reason: HOSPADM

## 2021-02-05 NOTE — PROGRESS NOTES
Pharmacokinetics by Pharmacy - Vancomycin    Bautista Grubbs is a 65 y.o. male  [Ht:  ; Wt:  ]    Estimated Creatinine Clearance: 83.8 mL/min (by C-G formula based on SCr of 0.8 mg/dL).   Creatinine   Date Value Ref Range Status   02/04/2021 0.80 0.76 - 1.27 mg/dL Final   02/01/2021 0.83 0.76 - 1.27 mg/dL Final   01/29/2021 0.74 (L) 0.76 - 1.27 mg/dL Final      Lab Results   Component Value Date    WBC 7.59 02/04/2021    WBC 11.32 (H) 02/01/2021    WBC 6.26 01/29/2021      Temp Readings from Last 1 Encounters:   01/28/21 97.6 °F (36.4 °C) (Temporal)      Lab Results   Component Value Date    VANCOPEAK 41.00 (C) 02/04/2021    VANCOTROUGH 19.50 02/04/2021        Lactate   Date Value Ref Range Status   01/21/2021 1.8 0.5 - 2.0 mmol/L Final   03/12/2020 2.0 0.5 - 2.0 mmol/L Final   03/12/2020 2.0 0.5 - 2.0 mmol/L Final       Culture Results:  Microbiology Results (last 10 days)       Procedure Component Value - Date/Time    COVID PRE-OP / PRE-PROCEDURE SCREENING ORDER (NO ISOLATION) - Swab, Nasopharynx [[id number]]  (Normal) Collected: 01/27/21 1506    Lab Status: Final result Specimen: Swab from Nasopharynx Updated: 01/27/21 2009    Narrative:      The following orders were created for panel order COVID PRE-OP / PRE-PROCEDURE SCREENING ORDER (NO ISOLATION) - Swab, Nasopharynx.  Procedure                               Abnormality         Status                     ---------                               -----------         ------                     COVID-[facility] IN-HOUS...[[id number]]  Normal              Final result                 Please view results for these tests on the individual orders.    COVID-[facility] IN-HOUSE, NP SWAB IN TRANSPORT MEDIA 8-10 HR TAT - Swab, Nasopharynx [[id number]]  (Normal) Collected: 01/27/21 1506    Lab Status: Final result Specimen: Swab from Nasopharynx Updated: 01/27/21 2009     COVID19 Not Detected    Narrative:      Testing performed by Real Time RT-PCR  This test has not  been approved by the [employer] but is authorized under the Emergency Use Act (EUA)    Fact sheet for providers: https://www.fda.gov/media/236606/download    Fact sheet for patients: https://www.fda.gov/media/270703/download              No results found for: RESPCX    Indication for use: bacteremia, MRSA    Current Vancomycin Dose:  1250 mg IVPB every 12 hours, day 15 of therapy.    First negative blood cx was on 1/24.    Assessment/Plan:  Reviewed above labs and cultures.   WBC remains in goal range. Cr is stable. No new levels today.  Vancomycin peak level from 2/4 at 1429 is 41 (goal 30-40). Vancomycin trough level from 2/4 at 2250 is 19.5 (goal 10-20). Lab levels were drawn at the correct time. This will give an AUC > 700. Will decrease current dose to 750 mg IV every 12 hours starting at 1130 for the remainder of therapy (ends 2/7/21). This will give an AUC of 447.82, peak of 29.58 and trough of 11.81. All still in goal range.  Pharmacy will continue to monitor renal function and adjust dose accordingly.    Sabrina Vaelncia, PharmD   02/05/21 10:18 CST    Addemdum  Pt pulled out PICC line.Will be replaced later this afternoon.  Adjusted times to start at 1700 today.  Therapy ends 2/7/21 at 2359    Sabrina Valencia, PharmD  02/05/21  11:02 CST

## 2021-02-05 NOTE — TELEPHONE ENCOUNTER
02/05/2021, 0852 - Patient noted to reside at [facility].  Facility telephoned [phone].  Spoke with Director Of Nursing, Dennise.  LILIANA made aware patient in need of scheduling a clinical appointment regarding EGD performed 01/22/2021.  LILIANA stated patient remains on LTAC Unit at [facility], [place], [place].  DON made aware this staff member will notify Dr. Lang.

## 2021-02-06 PROCEDURE — 25010000002 VANCOMYCIN: Performed by: INTERNAL MEDICINE

## 2021-02-06 NOTE — PROGRESS NOTES
Pharmacokinetics by Pharmacy - Vancomycin    Bautista Grubbs is a 65 y.o. male  [Ht:  ; Wt:  ]    Estimated Creatinine Clearance: 83.8 mL/min (by C-G formula based on SCr of 0.8 mg/dL).   Creatinine   Date Value Ref Range Status   02/04/2021 0.80 0.76 - 1.27 mg/dL Final   02/01/2021 0.83 0.76 - 1.27 mg/dL Final   01/29/2021 0.74 (L) 0.76 - 1.27 mg/dL Final      Lab Results   Component Value Date    WBC 7.59 02/04/2021    WBC 11.32 (H) 02/01/2021    WBC 6.26 01/29/2021     Lactate   Date Value Ref Range Status   01/21/2021 1.8 0.5 - 2.0 mmol/L Final   03/12/2020 2.0 0.5 - 2.0 mmol/L Final   03/12/2020 2.0 0.5 - 2.0 mmol/L Final       Temp Readings from Last 1 Encounters:   01/28/21 97.6 °F (36.4 °C) (Temporal)      Lab Results   Component Value Date    VANCOPEAK 41.00 (C) 02/04/2021    VANCOTROUGH 19.50 02/04/2021         Culture Results:  Microbiology Results (last 10 days)       Procedure Component Value - Date/Time    COVID PRE-OP / PRE-PROCEDURE SCREENING ORDER (NO ISOLATION) - Swab, Nasopharynx [[id number]]  (Normal) Collected: 01/27/21 1506    Lab Status: Final result Specimen: Swab from Nasopharynx Updated: 01/27/21 2009    Narrative:      The following orders were created for panel order COVID PRE-OP / PRE-PROCEDURE SCREENING ORDER (NO ISOLATION) - Swab, Nasopharynx.  Procedure                               Abnormality         Status                     ---------                               -----------         ------                     COVID-[facility] IN-HOUS...[[id number]]  Normal              Final result                 Please view results for these tests on the individual orders.    COVID-[facility] IN-HOUSE, NP SWAB IN TRANSPORT MEDIA 8-10 HR TAT - Swab, Nasopharynx [[id number]]  (Normal) Collected: 01/27/21 1506    Lab Status: Final result Specimen: Swab from Nasopharynx Updated: 01/27/21 2009     COVID19 Not Detected    Narrative:      Testing performed by Real Time RT-PCR  This test has not been  approved by the [employer] but is authorized under the Emergency Use Act (EUA)    Fact sheet for providers: https://www.fda.gov/media/559927/download    Fact sheet for patients: https://www.fda.gov/media/935653/download              No results found for: RESPCX    Indication for use: bacteremia, MRSA      Current Vancomycin Dose:  750 mg IVPB every hours, day 16 of therapy.    First negative blood cx was on 1/24.  Therapy ends 2/7/21    Assessment/Plan:  No new labs today for WBC or Cr levels. Will continue current dose.  Pharmacy will continue to monitor renal function and adjust dose accordingly.    Sabrina Valencia, PharmD   02/06/21 09:10 CST

## 2021-02-07 PROCEDURE — 25010000002 VANCOMYCIN: Performed by: INTERNAL MEDICINE

## 2021-02-07 NOTE — PROGRESS NOTES
Pharmacokinetics by Pharmacy - Vancomycin    Bautista Grubbs is a 65 y.o. male  [Ht:  ; Wt:  ]    Estimated Creatinine Clearance: 83.8 mL/min (by C-G formula based on SCr of 0.8 mg/dL).   Creatinine   Date Value Ref Range Status   02/04/2021 0.80 0.76 - 1.27 mg/dL Final   02/01/2021 0.83 0.76 - 1.27 mg/dL Final   01/29/2021 0.74 (L) 0.76 - 1.27 mg/dL Final      Lab Results   Component Value Date    WBC 7.59 02/04/2021    WBC 11.32 (H) 02/01/2021    WBC 6.26 01/29/2021     Lactate   Date Value Ref Range Status   01/21/2021 1.8 0.5 - 2.0 mmol/L Final   03/12/2020 2.0 0.5 - 2.0 mmol/L Final   03/12/2020 2.0 0.5 - 2.0 mmol/L Final       Temp Readings from Last 1 Encounters:   01/28/21 97.6 °F (36.4 °C) (Temporal)      Lab Results   Component Value Date    VANCOPEAK 41.00 (C) 02/04/2021    VANCOTROUGH 19.50 02/04/2021         Culture Results:  Microbiology Results (last 10 days)       ** No results found for the last 240 hours. **          No results found for: RESPCX    Indication for use: bacteremia, MRSA      Current Vancomycin Dose:  750 mg IVPB every 12 hours, day 17 of therapy.     First negative blood cx was on 1/24.  Therapy ends 2/7/21    Assessment/Plan:  Reviewed above labs and cultures.   No new labs today. Consult and therapy ends today. Pharmacy signing off consult. Thank you for allowing us to participate in patients care.    Sabrina Valencia, PharmD   02/07/21 08:45 CST

## 2021-02-08 LAB
ALBUMIN SERPL-MCNC: 3.8 G/DL (ref 3.5–5.2)
ALBUMIN/GLOB SERPL: 1.1 G/DL
ALP SERPL-CCNC: 108 U/L (ref 39–117)
ALT SERPL W P-5'-P-CCNC: 40 U/L (ref 1–41)
ANION GAP SERPL CALCULATED.3IONS-SCNC: 8 MMOL/L (ref 5–15)
AST SERPL-CCNC: 38 U/L (ref 1–40)
BILIRUB SERPL-MCNC: 0.2 MG/DL (ref 0–1.2)
BUN SERPL-MCNC: 8 MG/DL (ref 8–23)
BUN/CREAT SERPL: 8.5 (ref 7–25)
CALCIUM SPEC-SCNC: 9.3 MG/DL (ref 8.6–10.5)
CHLORIDE SERPL-SCNC: 103 MMOL/L (ref 98–107)
CO2 SERPL-SCNC: 26 MMOL/L (ref 22–29)
CREAT SERPL-MCNC: 0.94 MG/DL (ref 0.76–1.27)
DEPRECATED RDW RBC AUTO: 42.9 FL (ref 37–54)
ERYTHROCYTE [DISTWIDTH] IN BLOOD BY AUTOMATED COUNT: 13.2 % (ref 12.3–15.4)
GFR SERPL CREATININE-BSD FRML MDRD: 81 ML/MIN/1.73
GLOBULIN UR ELPH-MCNC: 3.5 GM/DL
GLUCOSE SERPL-MCNC: 87 MG/DL (ref 65–99)
HCT VFR BLD AUTO: 29.6 % (ref 37.5–51)
HGB BLD-MCNC: 10.1 G/DL (ref 13–17.7)
MCH RBC QN AUTO: 29.9 PG (ref 26.6–33)
MCHC RBC AUTO-ENTMCNC: 34.1 G/DL (ref 31.5–35.7)
MCV RBC AUTO: 87.6 FL (ref 79–97)
PLATELET # BLD AUTO: 404 10*3/MM3 (ref 140–450)
PMV BLD AUTO: 9.4 FL (ref 6–12)
POTASSIUM SERPL-SCNC: 3.9 MMOL/L (ref 3.5–5.2)
PROT SERPL-MCNC: 7.3 G/DL (ref 6–8.5)
QT INTERVAL: 406 MS
QTC INTERVAL: 395 MS
RBC # BLD AUTO: 3.38 10*6/MM3 (ref 4.14–5.8)
SODIUM SERPL-SCNC: 137 MMOL/L (ref 136–145)
TROPONIN T SERPL-MCNC: <0.01 NG/ML (ref 0–0.03)
WBC # BLD AUTO: 6.29 10*3/MM3 (ref 3.4–10.8)

## 2021-02-08 PROCEDURE — 93005 ELECTROCARDIOGRAM TRACING: CPT | Performed by: INTERNAL MEDICINE

## 2021-02-08 PROCEDURE — 93010 ELECTROCARDIOGRAM REPORT: CPT | Performed by: INTERNAL MEDICINE

## 2021-02-08 PROCEDURE — 80053 COMPREHEN METABOLIC PANEL: CPT | Performed by: INTERNAL MEDICINE

## 2021-02-08 PROCEDURE — 85027 COMPLETE CBC AUTOMATED: CPT | Performed by: INTERNAL MEDICINE

## 2021-02-08 PROCEDURE — 97162 PT EVAL MOD COMPLEX 30 MIN: CPT

## 2021-02-08 PROCEDURE — 84484 ASSAY OF TROPONIN QUANT: CPT | Performed by: INTERNAL MEDICINE

## 2021-02-09 PROCEDURE — 97530 THERAPEUTIC ACTIVITIES: CPT

## 2021-02-10 PROCEDURE — 97116 GAIT TRAINING THERAPY: CPT

## 2021-02-10 PROCEDURE — 97530 THERAPEUTIC ACTIVITIES: CPT

## 2021-02-11 LAB
ALBUMIN SERPL-MCNC: 3.8 G/DL (ref 3.5–5.2)
ALBUMIN/GLOB SERPL: 1.3 G/DL
ALP SERPL-CCNC: 97 U/L (ref 39–117)
ALT SERPL W P-5'-P-CCNC: 58 U/L (ref 1–41)
ANION GAP SERPL CALCULATED.3IONS-SCNC: 7 MMOL/L (ref 5–15)
AST SERPL-CCNC: 37 U/L (ref 1–40)
BILIRUB SERPL-MCNC: 0.2 MG/DL (ref 0–1.2)
BUN SERPL-MCNC: 12 MG/DL (ref 8–23)
BUN/CREAT SERPL: 13.3 (ref 7–25)
CALCIUM SPEC-SCNC: 9.7 MG/DL (ref 8.6–10.5)
CHLORIDE SERPL-SCNC: 103 MMOL/L (ref 98–107)
CO2 SERPL-SCNC: 26 MMOL/L (ref 22–29)
CREAT SERPL-MCNC: 0.9 MG/DL (ref 0.76–1.27)
DEPRECATED RDW RBC AUTO: 41.9 FL (ref 37–54)
ERYTHROCYTE [DISTWIDTH] IN BLOOD BY AUTOMATED COUNT: 13.4 % (ref 12.3–15.4)
GFR SERPL CREATININE-BSD FRML MDRD: 85 ML/MIN/1.73
GLOBULIN UR ELPH-MCNC: 3 GM/DL
GLUCOSE SERPL-MCNC: 90 MG/DL (ref 65–99)
HCT VFR BLD AUTO: 29.8 % (ref 37.5–51)
HGB BLD-MCNC: 10.3 G/DL (ref 13–17.7)
MCH RBC QN AUTO: 29.6 PG (ref 26.6–33)
MCHC RBC AUTO-ENTMCNC: 34.6 G/DL (ref 31.5–35.7)
MCV RBC AUTO: 85.6 FL (ref 79–97)
PLATELET # BLD AUTO: 349 10*3/MM3 (ref 140–450)
PMV BLD AUTO: 9.5 FL (ref 6–12)
POTASSIUM SERPL-SCNC: 3.9 MMOL/L (ref 3.5–5.2)
PROT SERPL-MCNC: 6.8 G/DL (ref 6–8.5)
RBC # BLD AUTO: 3.48 10*6/MM3 (ref 4.14–5.8)
SODIUM SERPL-SCNC: 136 MMOL/L (ref 136–145)
WBC # BLD AUTO: 5.88 10*3/MM3 (ref 3.4–10.8)

## 2021-02-11 PROCEDURE — 97530 THERAPEUTIC ACTIVITIES: CPT

## 2021-02-11 PROCEDURE — 85027 COMPLETE CBC AUTOMATED: CPT | Performed by: INTERNAL MEDICINE

## 2021-02-11 PROCEDURE — 80053 COMPREHEN METABOLIC PANEL: CPT | Performed by: INTERNAL MEDICINE

## 2021-02-12 PROCEDURE — 63710000001 DIPHENHYDRAMINE PER 50 MG: Performed by: NURSE PRACTITIONER

## 2021-02-12 RX ORDER — DIPHENHYDRAMINE HCL 25 MG
25 CAPSULE ORAL EVERY 4 HOURS PRN
Status: DISCONTINUED | OUTPATIENT
Start: 2021-02-12 | End: 2021-02-15 | Stop reason: HOSPADM

## 2021-02-13 PROCEDURE — 63710000001 DIPHENHYDRAMINE PER 50 MG: Performed by: NURSE PRACTITIONER

## 2021-02-14 LAB
B PARAPERT DNA SPEC QL NAA+PROBE: NOT DETECTED
B PERT DNA SPEC QL NAA+PROBE: NOT DETECTED
C PNEUM DNA NPH QL NAA+NON-PROBE: NOT DETECTED
FLUAV SUBTYP SPEC NAA+PROBE: NOT DETECTED
FLUBV RNA ISLT QL NAA+PROBE: NOT DETECTED
HADV DNA SPEC NAA+PROBE: NOT DETECTED
HCOV 229E RNA SPEC QL NAA+PROBE: NOT DETECTED
HCOV HKU1 RNA SPEC QL NAA+PROBE: NOT DETECTED
HCOV NL63 RNA SPEC QL NAA+PROBE: NOT DETECTED
HCOV OC43 RNA SPEC QL NAA+PROBE: NOT DETECTED
HMPV RNA NPH QL NAA+NON-PROBE: NOT DETECTED
HPIV1 RNA SPEC QL NAA+PROBE: NOT DETECTED
HPIV2 RNA SPEC QL NAA+PROBE: NOT DETECTED
HPIV3 RNA NPH QL NAA+PROBE: NOT DETECTED
HPIV4 P GENE NPH QL NAA+PROBE: NOT DETECTED
M PNEUMO IGG SER IA-ACNC: NOT DETECTED
RHINOVIRUS RNA SPEC NAA+PROBE: NOT DETECTED
RSV RNA NPH QL NAA+NON-PROBE: NOT DETECTED
SARS-COV-2 RNA NPH QL NAA+NON-PROBE: NOT DETECTED

## 2021-02-14 PROCEDURE — 0202U NFCT DS 22 TRGT SARS-COV-2: CPT | Performed by: NURSE PRACTITIONER

## 2021-02-14 PROCEDURE — 97116 GAIT TRAINING THERAPY: CPT

## 2021-02-15 VITALS — HEART RATE: 78 BPM

## 2021-02-15 LAB
ALBUMIN SERPL-MCNC: 3.9 G/DL (ref 3.5–5.2)
ALBUMIN/GLOB SERPL: 1.2 G/DL
ALP SERPL-CCNC: 95 U/L (ref 39–117)
ALT SERPL W P-5'-P-CCNC: 82 U/L (ref 1–41)
ANION GAP SERPL CALCULATED.3IONS-SCNC: 8 MMOL/L (ref 5–15)
AST SERPL-CCNC: 49 U/L (ref 1–40)
BILIRUB SERPL-MCNC: 0.2 MG/DL (ref 0–1.2)
BUN SERPL-MCNC: 14 MG/DL (ref 8–23)
BUN/CREAT SERPL: 14.6 (ref 7–25)
CALCIUM SPEC-SCNC: 9.9 MG/DL (ref 8.6–10.5)
CHLORIDE SERPL-SCNC: 103 MMOL/L (ref 98–107)
CO2 SERPL-SCNC: 24 MMOL/L (ref 22–29)
CREAT SERPL-MCNC: 0.96 MG/DL (ref 0.76–1.27)
DEPRECATED RDW RBC AUTO: 41.7 FL (ref 37–54)
ERYTHROCYTE [DISTWIDTH] IN BLOOD BY AUTOMATED COUNT: 13.6 % (ref 12.3–15.4)
GFR SERPL CREATININE-BSD FRML MDRD: 79 ML/MIN/1.73
GLOBULIN UR ELPH-MCNC: 3.2 GM/DL
GLUCOSE SERPL-MCNC: 95 MG/DL (ref 65–99)
HCT VFR BLD AUTO: 30.4 % (ref 37.5–51)
HGB BLD-MCNC: 10.6 G/DL (ref 13–17.7)
MCH RBC QN AUTO: 29.5 PG (ref 26.6–33)
MCHC RBC AUTO-ENTMCNC: 34.9 G/DL (ref 31.5–35.7)
MCV RBC AUTO: 84.7 FL (ref 79–97)
PLATELET # BLD AUTO: 284 10*3/MM3 (ref 140–450)
PMV BLD AUTO: 10.2 FL (ref 6–12)
POTASSIUM SERPL-SCNC: 4.1 MMOL/L (ref 3.5–5.2)
PROT SERPL-MCNC: 7.1 G/DL (ref 6–8.5)
RBC # BLD AUTO: 3.59 10*6/MM3 (ref 4.14–5.8)
SODIUM SERPL-SCNC: 135 MMOL/L (ref 136–145)
WBC # BLD AUTO: 6.19 10*3/MM3 (ref 3.4–10.8)

## 2021-02-15 PROCEDURE — 85027 COMPLETE CBC AUTOMATED: CPT | Performed by: INTERNAL MEDICINE

## 2021-02-15 PROCEDURE — 63710000001 DIPHENHYDRAMINE PER 50 MG: Performed by: NURSE PRACTITIONER

## 2021-02-15 PROCEDURE — 80053 COMPREHEN METABOLIC PANEL: CPT | Performed by: INTERNAL MEDICINE

## 2021-04-20 DIAGNOSIS — F84.0 AUTISM: ICD-10-CM

## 2021-04-20 DIAGNOSIS — F41.9 ANXIETY: ICD-10-CM

## 2021-04-20 RX ORDER — LORAZEPAM 1 MG/1
1 TABLET ORAL 2 TIMES DAILY
Qty: 60 TABLET | Refills: 5 | Status: SHIPPED | OUTPATIENT
Start: 2021-04-20 | End: 2021-09-01

## 2021-05-19 ENCOUNTER — HOSPITAL ENCOUNTER (EMERGENCY)
Facility: HOSPITAL | Age: 66
Discharge: NURSING, REHAB OR LONG-TERM CARE | End: 2021-05-19
Attending: EMERGENCY MEDICINE | Admitting: EMERGENCY MEDICINE

## 2021-05-19 VITALS
DIASTOLIC BLOOD PRESSURE: 55 MMHG | HEIGHT: 71 IN | TEMPERATURE: 97 F | OXYGEN SATURATION: 94 % | HEART RATE: 79 BPM | SYSTOLIC BLOOD PRESSURE: 97 MMHG | RESPIRATION RATE: 18 BRPM | BODY MASS INDEX: 19.51 KG/M2

## 2021-05-19 DIAGNOSIS — K92.0 COFFEE GROUND EMESIS: ICD-10-CM

## 2021-05-19 DIAGNOSIS — R11.2 NON-INTRACTABLE VOMITING WITH NAUSEA, UNSPECIFIED VOMITING TYPE: Primary | ICD-10-CM

## 2021-05-19 LAB
ALBUMIN SERPL-MCNC: 3.6 G/DL (ref 3.5–5.2)
ALBUMIN/GLOB SERPL: 1.1 G/DL
ALP SERPL-CCNC: 88 U/L (ref 39–117)
ALT SERPL W P-5'-P-CCNC: 18 U/L (ref 1–41)
ANION GAP SERPL CALCULATED.3IONS-SCNC: 9 MMOL/L (ref 5–15)
AST SERPL-CCNC: 17 U/L (ref 1–40)
BASOPHILS # BLD AUTO: 0.04 10*3/MM3 (ref 0–0.2)
BASOPHILS NFR BLD AUTO: 0.4 % (ref 0–1.5)
BILIRUB SERPL-MCNC: <0.2 MG/DL (ref 0–1.2)
BUN SERPL-MCNC: 28 MG/DL (ref 8–23)
BUN/CREAT SERPL: 31.8 (ref 7–25)
CALCIUM SPEC-SCNC: 9.6 MG/DL (ref 8.6–10.5)
CHLORIDE SERPL-SCNC: 105 MMOL/L (ref 98–107)
CO2 SERPL-SCNC: 27 MMOL/L (ref 22–29)
CREAT SERPL-MCNC: 0.88 MG/DL (ref 0.76–1.27)
DEPRECATED RDW RBC AUTO: 60.8 FL (ref 37–54)
EOSINOPHIL # BLD AUTO: 0.07 10*3/MM3 (ref 0–0.4)
EOSINOPHIL NFR BLD AUTO: 0.7 % (ref 0.3–6.2)
ERYTHROCYTE [DISTWIDTH] IN BLOOD BY AUTOMATED COUNT: 19 % (ref 12.3–15.4)
GFR SERPL CREATININE-BSD FRML MDRD: 87 ML/MIN/1.73
GLOBULIN UR ELPH-MCNC: 3.2 GM/DL
GLUCOSE SERPL-MCNC: 93 MG/DL (ref 65–99)
HCT VFR BLD AUTO: 33.6 % (ref 37.5–51)
HGB BLD-MCNC: 11.2 G/DL (ref 13–17.7)
HOLD SPECIMEN: NORMAL
HOLD SPECIMEN: NORMAL
IMM GRANULOCYTES # BLD AUTO: 0.03 10*3/MM3 (ref 0–0.05)
IMM GRANULOCYTES NFR BLD AUTO: 0.3 % (ref 0–0.5)
LIPASE SERPL-CCNC: 17 U/L (ref 13–60)
LYMPHOCYTES # BLD AUTO: 0.6 10*3/MM3 (ref 0.7–3.1)
LYMPHOCYTES NFR BLD AUTO: 5.9 % (ref 19.6–45.3)
MCH RBC QN AUTO: 29.2 PG (ref 26.6–33)
MCHC RBC AUTO-ENTMCNC: 33.3 G/DL (ref 31.5–35.7)
MCV RBC AUTO: 87.5 FL (ref 79–97)
MONOCYTES # BLD AUTO: 0.48 10*3/MM3 (ref 0.1–0.9)
MONOCYTES NFR BLD AUTO: 4.7 % (ref 5–12)
NEUTROPHILS NFR BLD AUTO: 8.93 10*3/MM3 (ref 1.7–7)
NEUTROPHILS NFR BLD AUTO: 88 % (ref 42.7–76)
NRBC BLD AUTO-RTO: 0 /100 WBC (ref 0–0.2)
PLATELET # BLD AUTO: 247 10*3/MM3 (ref 140–450)
PMV BLD AUTO: 10.2 FL (ref 6–12)
POTASSIUM SERPL-SCNC: 3.7 MMOL/L (ref 3.5–5.2)
PROT SERPL-MCNC: 6.8 G/DL (ref 6–8.5)
RBC # BLD AUTO: 3.84 10*6/MM3 (ref 4.14–5.8)
SODIUM SERPL-SCNC: 141 MMOL/L (ref 136–145)
WBC # BLD AUTO: 10.15 10*3/MM3 (ref 3.4–10.8)
WHOLE BLOOD HOLD SPECIMEN: NORMAL

## 2021-05-19 PROCEDURE — 85025 COMPLETE CBC W/AUTO DIFF WBC: CPT | Performed by: EMERGENCY MEDICINE

## 2021-05-19 PROCEDURE — 83690 ASSAY OF LIPASE: CPT | Performed by: EMERGENCY MEDICINE

## 2021-05-19 PROCEDURE — 96361 HYDRATE IV INFUSION ADD-ON: CPT

## 2021-05-19 PROCEDURE — 80053 COMPREHEN METABOLIC PANEL: CPT | Performed by: EMERGENCY MEDICINE

## 2021-05-19 PROCEDURE — 99283 EMERGENCY DEPT VISIT LOW MDM: CPT

## 2021-05-19 PROCEDURE — 96374 THER/PROPH/DIAG INJ IV PUSH: CPT

## 2021-05-19 RX ORDER — SODIUM CHLORIDE 9 MG/ML
125 INJECTION, SOLUTION INTRAVENOUS CONTINUOUS
Status: DISCONTINUED | OUTPATIENT
Start: 2021-05-19 | End: 2021-05-19 | Stop reason: HOSPADM

## 2021-05-19 RX ORDER — SUCRALFATE 1 G/1
1 TABLET ORAL
Qty: 120 TABLET | Refills: 0 | Status: SHIPPED | OUTPATIENT
Start: 2021-05-19

## 2021-05-19 RX ORDER — ONDANSETRON 4 MG/1
4 TABLET, ORALLY DISINTEGRATING ORAL EVERY 6 HOURS PRN
Qty: 15 TABLET | Refills: 0 | Status: SHIPPED | OUTPATIENT
Start: 2021-05-19 | End: 2021-08-23

## 2021-05-19 RX ORDER — PANTOPRAZOLE SODIUM 40 MG/10ML
40 INJECTION, POWDER, LYOPHILIZED, FOR SOLUTION INTRAVENOUS ONCE
Status: COMPLETED | OUTPATIENT
Start: 2021-05-19 | End: 2021-05-19

## 2021-05-19 RX ORDER — SODIUM CHLORIDE 0.9 % (FLUSH) 0.9 %
10 SYRINGE (ML) INJECTION AS NEEDED
Status: DISCONTINUED | OUTPATIENT
Start: 2021-05-19 | End: 2021-05-19 | Stop reason: HOSPADM

## 2021-05-19 RX ADMIN — SODIUM CHLORIDE 125 ML/HR: 900 INJECTION, SOLUTION INTRAVENOUS at 08:21

## 2021-05-19 RX ADMIN — PANTOPRAZOLE SODIUM 40 MG: 40 INJECTION, POWDER, FOR SOLUTION INTRAVENOUS at 08:22

## 2021-05-19 RX ADMIN — SODIUM CHLORIDE, PRESERVATIVE FREE 10 ML: 5 INJECTION INTRAVENOUS at 08:24

## 2021-05-21 ENCOUNTER — OFFICE VISIT (OUTPATIENT)
Dept: GASTROENTEROLOGY | Facility: CLINIC | Age: 66
End: 2021-05-21

## 2021-05-21 ENCOUNTER — LAB (OUTPATIENT)
Dept: LAB | Facility: HOSPITAL | Age: 66
End: 2021-05-21

## 2021-05-21 VITALS — WEIGHT: 139 LBS | BODY MASS INDEX: 19.46 KG/M2 | HEIGHT: 71 IN

## 2021-05-21 DIAGNOSIS — R11.0 NAUSEA: Primary | ICD-10-CM

## 2021-05-21 LAB
DEPRECATED RDW RBC AUTO: 58.2 FL (ref 37–54)
ERYTHROCYTE [DISTWIDTH] IN BLOOD BY AUTOMATED COUNT: 18.2 % (ref 12.3–15.4)
HCT VFR BLD AUTO: 31.1 % (ref 37.5–51)
HGB BLD-MCNC: 10.6 G/DL (ref 13–17.7)
MCH RBC QN AUTO: 30 PG (ref 26.6–33)
MCHC RBC AUTO-ENTMCNC: 34.1 G/DL (ref 31.5–35.7)
MCV RBC AUTO: 88.1 FL (ref 79–97)
PLATELET # BLD AUTO: 229 10*3/MM3 (ref 140–450)
PMV BLD AUTO: 11.6 FL (ref 6–12)
RBC # BLD AUTO: 3.53 10*6/MM3 (ref 4.14–5.8)
WBC # BLD AUTO: 8.48 10*3/MM3 (ref 3.4–10.8)

## 2021-05-21 PROCEDURE — 85027 COMPLETE CBC AUTOMATED: CPT | Performed by: INTERNAL MEDICINE

## 2021-05-21 PROCEDURE — 99213 OFFICE O/P EST LOW 20 MIN: CPT | Performed by: INTERNAL MEDICINE

## 2021-05-21 PROCEDURE — 36415 COLL VENOUS BLD VENIPUNCTURE: CPT | Performed by: INTERNAL MEDICINE

## 2021-05-21 NOTE — PROGRESS NOTES
Chief Complaint   Patient presents with   • ER Follow up   • GI Bleeding possible       Subjective    Bautista Grubbs is a 65 y.o. male.    History of Present Illness  Patient presented to GI clinic for follow-up visit today.  Had a ER visit 2 days ago secondary to nausea, vomiting and coffee-ground emesis.  Symptoms have resolved subsequently.  Hemoglobin was 11.2 at the time.  Had mild elevation of BUN.  Creatinine was normal.  He was discharged to home on antiemetics and PPI along with Carafate.  He feels better currently.  No further nausea or emesis.  Also denied GERD symptoms.  Tolerating diet well.     The following portions of the patient's history were reviewed and updated as appropriate:   Past Medical History:   Diagnosis Date   • Alopecia    • Autism    • COPD (chronic obstructive pulmonary disease) (CMS/HCC)    • GERD (gastroesophageal reflux disease)    • Hyperlipidemia    • Insomnia    • Intellectual disability    • Lennox-Gastaut syndrome (CMS/HCC)    • Major depressive disorder    • Orthostatic hypotension    • Osteoporosis    • Right bundle branch block    • Scoliosis    • Seizures (CMS/HCC)      Past Surgical History:   Procedure Laterality Date   • COLONOSCOPY N/A 1/8/2021    Procedure: COLONOSCOPY;  Surgeon: Wanda Lang MD;  Location: [facility] ENDOSCOPY;  Service: Gastroenterology;  Laterality: N/A;   • ENDOSCOPY N/A 1/7/2021    Procedure: ESOPHAGOGASTRODUODENOSCOPY;  Surgeon: Wanda Lang MD;  Location: [facility] ENDOSCOPY;  Service: Gastroenterology;  Laterality: N/A;   • ENDOSCOPY N/A 1/22/2021    Procedure: ESOPHAGOGASTRODUODENOSCOPY;  Surgeon: Wanda Lang MD;  Location: [facility] ENDOSCOPY;  Service: Gastroenterology;  Laterality: N/A;   • UPPER GASTROINTESTINAL ENDOSCOPY  01/22/2021     Family History   Problem Relation Age of Onset   • Hypertension Father        Prior to Admission medications    Medication Sig Start Date End Date Taking? Authorizing Provider   calcium  carbonate-vitamin d (CALCIUM 600+D) 600-400 MG-UNIT per tablet Take 1 tablet by mouth 2 (Two) Times a Day.   Yes Patricia Driscoll MD   denosumab (Prolia) 60 MG/ML solution prefilled syringe syringe Inject  under the skin into the appropriate area as directed 1 (One) Time.   Yes Patricia Driscoll MD   DIAZEPAM RE Insert  into the rectum.   Yes Patricia Driscoll MD   Emollient (Bag Balm) ointment ointment Apply 1 application topically to the appropriate area as directed 2 (Two) Times a Day. 1/28/21  Yes Светлана Ladd MD   ferrous sulfate 325 (65 FE) MG tablet Take 325 mg by mouth Daily With Breakfast.   Yes Patricia Driscoll MD   lamoTRIgine (LaMICtal) 200 MG tablet Take 200 mg by mouth 2 (Two) Times a Day.   Yes Patricia Driscoll MD   levETIRAcetam (KEPPRA) 1000 MG tablet Take 1,000 mg by mouth 2 (Two) Times a Day. 1/21/21: Patient takes 1750 mg total (1000 mg + 750 mg tablet) twice daily per nursing home   Yes Patricia Driscoll MD   levETIRAcetam (KEPPRA) 750 MG tablet Take 750 mg by mouth 2 (Two) Times a Day. 1/21/21: Patient takes 1750 mg total (1000 mg + 750 mg tablet) twice daily per nursing home   Yes Patricia Driscoll MD   LORazepam (ATIVAN) 1 MG tablet Take 1 tablet by mouth 2 (two) times a day. 4/20/21  Yes Bautista James MD   metoclopramide (REGLAN) 5 MG/5ML solution Take 5 mL by mouth 4 (Four) Times a Day Before Meals & at Bedtime. 1/28/21  Yes Светлана Ladd MD   midodrine (PROAMATINE) 5 MG tablet Take 1 tablet by mouth 3 (Three) Times a Day.  Patient taking differently: Take 10 mg by mouth 3 (Three) Times a Day. 2/4/19  Yes Earnestine Reid MD   Multiple Vitamins-Minerals (CERTAVITE/ANTIOXIDANTS PO) Take 1 tablet by mouth 1 (One) Time.   Yes Patricia Driscoll MD   NON FORMULARY Insert 10 mg into the rectum As Needed (for seizure activity. do not exceed 2 doses in 24hr). Diazepam gel (diastat)   Yes Provider, Historical, MD   ondansetron ODT  (ZOFRAN-ODT) 4 MG disintegrating tablet Place 1 tablet on the tongue Every 6 (Six) Hours As Needed for Nausea or Vomiting. 5/19/21  Yes Sandeep Fernandez MD   pantoprazole (PROTONIX) 40 MG injection Infuse 10 mL into a venous catheter Every Morning Before Breakfast. Indications: Gastrointestinal (GI) Bleed 1/28/21  Yes Светлана Ladd MD   polyethylene glycol (MIRALAX) packet Take 17 g by mouth Every Night.   Yes Patricia Driscoll MD   simvastatin (ZOCOR) 20 MG tablet Take 20 mg by mouth Every Night.   Yes Patricia Driscoll MD   sodium chloride 0.9 % solution Infuse 75 mL/hr into a venous catheter Continuous. 1/28/21  Yes Светлана Ladd MD   sucralfate (CARAFATE) 1 g tablet Take 1 tablet by mouth 4 (Four) Times a Day Before Meals & at Bedtime. 5/19/21  Yes Sandeep Fernandez MD   thioridazine (MELLARIL) 100 MG tablet Take 200 mg by mouth 2 (Two) Times a Day.   Yes Patricia Driscoll MD     Allergies   Allergen Reactions   • Haldol [Haloperidol] Unknown (See Comments)     Unknown     • Levaquin [Levofloxacin] Other (See Comments)     Lowers seizure threshold     Social History     Socioeconomic History   • Marital status: Single     Spouse name: Not on file   • Number of children: Not on file   • Years of education: Not on file   • Highest education level: Not on file   Tobacco Use   • Smoking status: Never Smoker   • Smokeless tobacco: Never Used   Vaping Use   • Vaping Use: Never used   Substance and Sexual Activity   • Alcohol use: Not Currently   • Drug use: Not Currently   • Sexual activity: Not Currently       Review of Systems  Review of Systems   Constitutional: Negative for chills, fatigue, fever and unexpected weight change.   HENT: Negative for congestion, ear discharge, hearing loss, nosebleeds and sore throat.    Eyes: Negative for pain, discharge and redness.   Respiratory: Negative for cough, chest tightness, shortness of breath and wheezing.    Cardiovascular: Negative for chest  "pain and palpitations.   Gastrointestinal: Positive for nausea and vomiting. Negative for abdominal distention, abdominal pain, blood in stool, constipation and diarrhea.   Endocrine: Negative for cold intolerance, polydipsia, polyphagia and polyuria.   Genitourinary: Negative for dysuria, flank pain, frequency, hematuria and urgency.   Musculoskeletal: Negative for arthralgias, back pain, joint swelling and myalgias.   Skin: Negative for color change, pallor and rash.   Neurological: Negative for tremors, seizures, syncope, weakness and headaches.   Hematological: Negative for adenopathy. Does not bruise/bleed easily.   Psychiatric/Behavioral: Negative for behavioral problems, confusion, dysphoric mood, hallucinations and suicidal ideas. The patient is not nervous/anxious.         Ht 180.3 cm (71\")   Wt 63 kg (139 lb)   BMI 19.39 kg/m²     Objective    Physical Exam  Constitutional:       Appearance: He is well-developed.   HENT:      Head: Normocephalic and atraumatic.   Eyes:      Conjunctiva/sclera: Conjunctivae normal.      Pupils: Pupils are equal, round, and reactive to light.   Neck:      Thyroid: No thyromegaly.   Cardiovascular:      Rate and Rhythm: Normal rate and regular rhythm.      Heart sounds: Normal heart sounds. No murmur heard.     Pulmonary:      Effort: Pulmonary effort is normal.      Breath sounds: Normal breath sounds. No wheezing.   Abdominal:      General: Bowel sounds are normal. There is no distension.      Palpations: Abdomen is soft. There is no mass.      Tenderness: There is no abdominal tenderness.      Hernia: No hernia is present.   Genitourinary:     Comments: No lesions noted  Musculoskeletal:         General: No tenderness. Normal range of motion.      Cervical back: Normal range of motion and neck supple.   Lymphadenopathy:      Cervical: No cervical adenopathy.   Skin:     General: Skin is warm and dry.      Findings: No rash.   Neurological:      Mental Status: He is " alert. He is disoriented.      Cranial Nerves: No cranial nerve deficit.       Admission on 05/19/2021, Discharged on 05/19/2021   Component Date Value Ref Range Status   • Glucose 05/19/2021 93  65 - 99 mg/dL Final   • BUN 05/19/2021 28* 8 - 23 mg/dL Final   • Creatinine 05/19/2021 0.88  0.76 - 1.27 mg/dL Final   • Sodium 05/19/2021 141  136 - 145 mmol/L Final   • Potassium 05/19/2021 3.7  3.5 - 5.2 mmol/L Final   • Chloride 05/19/2021 105  98 - 107 mmol/L Final   • CO2 05/19/2021 27.0  22.0 - 29.0 mmol/L Final   • Calcium 05/19/2021 9.6  8.6 - 10.5 mg/dL Final   • Total Protein 05/19/2021 6.8  6.0 - 8.5 g/dL Final   • Albumin 05/19/2021 3.60  3.50 - 5.20 g/dL Final   • ALT (SGPT) 05/19/2021 18  1 - 41 U/L Final   • AST (SGOT) 05/19/2021 17  1 - 40 U/L Final   • Alkaline Phosphatase 05/19/2021 88  39 - 117 U/L Final   • Total Bilirubin 05/19/2021 <0.2  0.0 - 1.2 mg/dL Final   • eGFR Non  Amer 05/19/2021 87  >60 mL/min/1.73 Final   • Globulin 05/19/2021 3.2  gm/dL Final   • A/G Ratio 05/19/2021 1.1  g/dL Final   • BUN/Creatinine Ratio 05/19/2021 31.8* 7.0 - 25.0 Final   • Anion Gap 05/19/2021 9.0  5.0 - 15.0 mmol/L Final   • Lipase 05/19/2021 17  13 - 60 U/L Final   • Extra Tube 05/19/2021 hold for add-on   Final    Auto resulted   • Extra Tube 05/19/2021 Hold for add-ons.   Final    Auto resulted.   • Extra Tube 05/19/2021 hold for add-on   Final    Auto resulted   • Extra Tube 05/19/2021 Hold for add-ons.   Final    Auto resulted.   • WBC 05/19/2021 10.15  3.40 - 10.80 10*3/mm3 Final   • RBC 05/19/2021 3.84* 4.14 - 5.80 10*6/mm3 Final   • Hemoglobin 05/19/2021 11.2* 13.0 - 17.7 g/dL Final   • Hematocrit 05/19/2021 33.6* 37.5 - 51.0 % Final   • MCV 05/19/2021 87.5  79.0 - 97.0 fL Final   • MCH 05/19/2021 29.2  26.6 - 33.0 pg Final   • MCHC 05/19/2021 33.3  31.5 - 35.7 g/dL Final   • RDW 05/19/2021 19.0* 12.3 - 15.4 % Final   • RDW-SD 05/19/2021 60.8* 37.0 - 54.0 fl Final   • MPV 05/19/2021 10.2  6.0 - 12.0  fL Final   • Platelets 05/19/2021 247  140 - 450 10*3/mm3 Final   • Neutrophil % 05/19/2021 88.0* 42.7 - 76.0 % Final   • Lymphocyte % 05/19/2021 5.9* 19.6 - 45.3 % Final   • Monocyte % 05/19/2021 4.7* 5.0 - 12.0 % Final   • Eosinophil % 05/19/2021 0.7  0.3 - 6.2 % Final   • Basophil % 05/19/2021 0.4  0.0 - 1.5 % Final   • Immature Grans % 05/19/2021 0.3  0.0 - 0.5 % Final   • Neutrophils, Absolute 05/19/2021 8.93* 1.70 - 7.00 10*3/mm3 Final   • Lymphocytes, Absolute 05/19/2021 0.60* 0.70 - 3.10 10*3/mm3 Final   • Monocytes, Absolute 05/19/2021 0.48  0.10 - 0.90 10*3/mm3 Final   • Eosinophils, Absolute 05/19/2021 0.07  0.00 - 0.40 10*3/mm3 Final   • Basophils, Absolute 05/19/2021 0.04  0.00 - 0.20 10*3/mm3 Final   • Immature Grans, Absolute 05/19/2021 0.03  0.00 - 0.05 10*3/mm3 Final   • nRBC 05/19/2021 0.0  0.0 - 0.2 /100 WBC Final   • Extra Tube 05/19/2021 hold for add-on   Final    Auto resulted     Assessment/Plan      1. Nausea    1.  Nausea, vomiting and coffee-ground emesis, resolved.  Likely due to esophagitis.  Continue PPI and Carafate.  Continue Zofran as needed.  2.  Acute posthemorrhagic anemia, repeat CBC today.  Continue PPI.      Orders placed during this encounter include:  Orders Placed This Encounter   Procedures   • CBC (No Diff)     Order Specific Question:   Release to patient     Answer:   Immediate       * Surgery not found *    Review and/or summary of lab tests, radiology, procedures, medications. Review and summary of old records and obtaining of history. The risks and benefits of my recommendations, as well as other treatment options were discussed with the patient and nursing attendant today. Questions were answered.    No orders of the defined types were placed in this encounter.      Follow-up: No follow-ups on file.               Results for orders placed or performed during the hospital encounter of 05/19/21   Gold Top - [facility]   Result Value Ref Range    Extra Tube Hold for add-ons.     Green Top (Gel)   Result Value Ref Range    Extra Tube Hold for add-ons.    CBC Auto Differential    Specimen: Blood   Result Value Ref Range    WBC 10.15 3.40 - 10.80 10*3/mm3    RBC 3.84 (L) 4.14 - 5.80 10*6/mm3    Hemoglobin 11.2 (L) 13.0 - 17.7 g/dL    Hematocrit 33.6 (L) 37.5 - 51.0 %    MCV 87.5 79.0 - 97.0 fL    MCH 29.2 26.6 - 33.0 pg    MCHC 33.3 31.5 - 35.7 g/dL    RDW 19.0 (H) 12.3 - 15.4 %    RDW-SD 60.8 (H) 37.0 - 54.0 fl    MPV 10.2 6.0 - 12.0 fL    Platelets 247 140 - 450 10*3/mm3    Neutrophil % 88.0 (H) 42.7 - 76.0 %    Lymphocyte % 5.9 (L) 19.6 - 45.3 %    Monocyte % 4.7 (L) 5.0 - 12.0 %    Eosinophil % 0.7 0.3 - 6.2 %    Basophil % 0.4 0.0 - 1.5 %    Immature Grans % 0.3 0.0 - 0.5 %    Neutrophils, Absolute 8.93 (H) 1.70 - 7.00 10*3/mm3    Lymphocytes, Absolute 0.60 (L) 0.70 - 3.10 10*3/mm3    Monocytes, Absolute 0.48 0.10 - 0.90 10*3/mm3    Eosinophils, Absolute 0.07 0.00 - 0.40 10*3/mm3    Basophils, Absolute 0.04 0.00 - 0.20 10*3/mm3    Immature Grans, Absolute 0.03 0.00 - 0.05 10*3/mm3    nRBC 0.0 0.0 - 0.2 /100 WBC   Lavender Top   Result Value Ref Range    Extra Tube hold for add-on    Lavender Top   Result Value Ref Range    Extra Tube hold for add-on    Light Blue Top   Result Value Ref Range    Extra Tube hold for add-on    Lipase    Specimen: Blood   Result Value Ref Range    Lipase 17 13 - 60 U/L   Comprehensive Metabolic Panel    Specimen: Blood   Result Value Ref Range    Glucose 93 65 - 99 mg/dL    BUN 28 (H) 8 - 23 mg/dL    Creatinine 0.88 0.76 - 1.27 mg/dL    Sodium 141 136 - 145 mmol/L    Potassium 3.7 3.5 - 5.2 mmol/L    Chloride 105 98 - 107 mmol/L    CO2 27.0 22.0 - 29.0 mmol/L    Calcium 9.6 8.6 - 10.5 mg/dL    Total Protein 6.8 6.0 - 8.5 g/dL    Albumin 3.60 3.50 - 5.20 g/dL    ALT (SGPT) 18 1 - 41 U/L    AST (SGOT) 17 1 - 40 U/L    Alkaline Phosphatase 88 39 - 117 U/L    Total Bilirubin <0.2 0.0 - 1.2 mg/dL    eGFR Non African Amer 87 >60 mL/min/1.73    Globulin 3.2 gm/dL     A/G Ratio 1.1 g/dL    BUN/Creatinine Ratio 31.8 (H) 7.0 - 25.0    Anion Gap 9.0 5.0 - 15.0 mmol/L   Results for orders placed or performed during the hospital encounter of 01/28/21   Respiratory Panel PCR w/COVID-19(SARS-CoV-2) CAPO/GIA/JULIETH/PAD/COR/MAD/JENIFER In-House, NP Swab in UTM/VTM, 3-4 HR TAT - Swab, Nasopharynx    Specimen: Nasopharynx; Swab   Result Value Ref Range    ADENOVIRUS, PCR Not Detected Not Detected    Coronavirus 229E Not Detected Not Detected    Coronavirus HKU1 Not Detected Not Detected    Coronavirus NL63 Not Detected Not Detected    Coronavirus OC43 Not Detected Not Detected    COVID19 Not Detected Not Detected - Ref. Range    Human Metapneumovirus Not Detected Not Detected    Human Rhinovirus/Enterovirus Not Detected Not Detected    Influenza A PCR Not Detected Not Detected    Influenza B PCR Not Detected Not Detected    Parainfluenza Virus 1 Not Detected Not Detected    Parainfluenza Virus 2 Not Detected Not Detected    Parainfluenza Virus 3 Not Detected Not Detected    Parainfluenza Virus 4 Not Detected Not Detected    RSV, PCR Not Detected Not Detected    Bordetella pertussis pcr Not Detected Not Detected    Bordetella parapertussis PCR Not Detected Not Detected    Chlamydophila pneumoniae PCR Not Detected Not Detected    Mycoplasma pneumo by PCR Not Detected Not Detected   CBC Auto Differential    Specimen: Blood   Result Value Ref Range    WBC 6.26 3.40 - 10.80 10*3/mm3    RBC 2.76 (L) 4.14 - 5.80 10*6/mm3    Hemoglobin 8.7 (L) 13.0 - 17.7 g/dL    Hematocrit 24.7 (L) 37.5 - 51.0 %    MCV 89.5 79.0 - 97.0 fL    MCH 31.5 26.6 - 33.0 pg    MCHC 35.2 31.5 - 35.7 g/dL    RDW 13.3 12.3 - 15.4 %    RDW-SD 43.8 37.0 - 54.0 fl    MPV 9.9 6.0 - 12.0 fL    Platelets 308 140 - 450 10*3/mm3    Neutrophil % 67.6 42.7 - 76.0 %    Lymphocyte % 13.4 (L) 19.6 - 45.3 %    Monocyte % 11.8 5.0 - 12.0 %    Eosinophil % 6.1 0.3 - 6.2 %    Basophil % 0.6 0.0 - 1.5 %    Immature Grans % 0.5 0.0 - 0.5 %     Neutrophils, Absolute 4.23 1.70 - 7.00 10*3/mm3    Lymphocytes, Absolute 0.84 0.70 - 3.10 10*3/mm3    Monocytes, Absolute 0.74 0.10 - 0.90 10*3/mm3    Eosinophils, Absolute 0.38 0.00 - 0.40 10*3/mm3    Basophils, Absolute 0.04 0.00 - 0.20 10*3/mm3    Immature Grans, Absolute 0.03 0.00 - 0.05 10*3/mm3    nRBC 0.0 0.0 - 0.2 /100 WBC   Troponin    Specimen: Blood   Result Value Ref Range    Troponin T <0.010 0.000 - 0.030 ng/mL   CBC (No Diff)    Specimen: Blood   Result Value Ref Range    WBC 6.19 3.40 - 10.80 10*3/mm3    RBC 3.59 (L) 4.14 - 5.80 10*6/mm3    Hemoglobin 10.6 (L) 13.0 - 17.7 g/dL    Hematocrit 30.4 (L) 37.5 - 51.0 %    MCV 84.7 79.0 - 97.0 fL    MCH 29.5 26.6 - 33.0 pg    MCHC 34.9 31.5 - 35.7 g/dL    RDW 13.6 12.3 - 15.4 %    RDW-SD 41.7 37.0 - 54.0 fl    MPV 10.2 6.0 - 12.0 fL    Platelets 284 140 - 450 10*3/mm3     *Note: Due to a large number of results and/or encounters for the requested time period, some results have not been displayed. A complete set of results can be found in Results Review.         This document has been electronically signed by Wanda Lang MD on May 21, 2021 12:20 CDT

## 2021-05-21 NOTE — PATIENT INSTRUCTIONS
"BMI for Adults  What is BMI?  Body mass index (BMI) is a number that is calculated from a person's weight and height. BMI can help estimate how much of a person's weight is composed of fat. BMI does not measure body fat directly. Rather, it is an alternative to procedures that directly measure body fat, which can be difficult and expensive.  BMI can help identify people who may be at higher risk for certain medical problems.  What are BMI measurements used for?  BMI is used as a screening tool to identify possible weight problems. It helps determine whether a person is obese, overweight, a healthy weight, or underweight.  BMI is useful for:  · Identifying a weight problem that may be related to a medical condition or may increase the risk for medical problems.  · Promoting changes, such as changes in diet and exercise, to help reach a healthy weight. BMI screening can be repeated to see if these changes are working.  How is BMI calculated?  BMI involves measuring your weight in relation to your height. Both height and weight are measured, and the BMI is calculated from those numbers. This can be done either in English ([place] or metric measurements. Note that charts and online BMI calculators are available to help you find your BMI quickly and easily without having to do these calculations yourself.  To calculate your BMI in English ([place] measurements:    1. Measure your weight in pounds (lb).  2. Multiply the number of pounds by 703.  ? For example, for a person who weighs 180 lb, multiply that number by 703, which equals [phone],540.  3. Measure your height in inches. Then multiply that number by itself to get a measurement called \"inches squared.\"  ? For example, for a person who is 70 inches tall, the \"inches squared\" measurement is 70 inches x 70 inches, which equals 4,900 inches squared.  4. Divide the total from step 2 (number of lb x 703) by the total from step 3 (inches squared): 126,540 ÷ 4,900 = 25.8. This is " "your BMI.  To calculate your BMI in metric measurements:  1. Measure your weight in kilograms (kg).  2. Measure your height in meters (m). Then multiply that number by itself to get a measurement called \"meters squared.\"  ? For example, for a person who is 1.75 m tall, the \"meters squared\" measurement is 1.75 m x 1.75 m, which is equal to 3.1 meters squared.  3. Divide the number of kilograms (your weight) by the meters squared number. In this example: 70 ÷ 3.1 = 22.6. This is your BMI.  What do the results mean?  BMI charts are used to identify whether you are underweight, normal weight, overweight, or obese. The following guidelines will be used:  · Underweight: BMI less than 18.5.  · Normal weight: BMI between 18.5 and 24.9.  · Overweight: BMI between 25 and 29.9.  · Obese: BMI of 30 or above.  Keep these notes in mind:  · Weight includes both fat and muscle, so someone with a muscular build, such as an athlete, may have a BMI that is higher than 24.9. In cases like these, BMI is not an accurate measure of body fat.  · To determine if excess body fat is the cause of a BMI of 25 or higher, further assessments may need to be done by a health care provider.  · BMI is usually interpreted in the same way for men and women.  Where to find more information  For more information about BMI, including tools to quickly calculate your BMI, go to these websites:  · Centers for Disease Control and Prevention: www.cdc.gov  · American Heart Association: www.heart.org  · National Heart, Lung, and Blood [place]: www.nhlbi.nih.gov  Summary  · Body mass index (BMI) is a number that is calculated from a person's weight and height.  · BMI may help estimate how much of a person's weight is composed of fat. BMI can help identify those who may be at higher risk for certain medical problems.  · BMI can be measured using English measurements or metric measurements.  · BMI charts are used to identify whether you are underweight, normal " weight, overweight, or obese.  This information is not intended to replace advice given to you by your health care provider. Make sure you discuss any questions you have with your health care provider.  Document Revised: 09/09/2020 Document Reviewed: 07/17/2020  Elsevier Patient Education © 2021 [employer]

## 2021-06-04 ENCOUNTER — APPOINTMENT (OUTPATIENT)
Dept: CT IMAGING | Facility: HOSPITAL | Age: 66
End: 2021-06-04

## 2021-06-04 ENCOUNTER — HOSPITAL ENCOUNTER (EMERGENCY)
Facility: HOSPITAL | Age: 66
Discharge: NURSING, REHAB OR LONG-TERM CARE | End: 2021-06-04
Attending: EMERGENCY MEDICINE | Admitting: EMERGENCY MEDICINE

## 2021-06-04 VITALS
WEIGHT: 138.6 LBS | HEIGHT: 71 IN | DIASTOLIC BLOOD PRESSURE: 72 MMHG | RESPIRATION RATE: 18 BRPM | TEMPERATURE: 98.7 F | HEART RATE: 66 BPM | BODY MASS INDEX: 19.4 KG/M2 | OXYGEN SATURATION: 100 % | SYSTOLIC BLOOD PRESSURE: 114 MMHG

## 2021-06-04 DIAGNOSIS — J18.9 PNEUMONIA OF RIGHT UPPER LOBE DUE TO INFECTIOUS ORGANISM: ICD-10-CM

## 2021-06-04 DIAGNOSIS — S06.0X9A CONCUSSION WITH LOSS OF CONSCIOUSNESS, INITIAL ENCOUNTER: Primary | ICD-10-CM

## 2021-06-04 DIAGNOSIS — S01.81XA FACIAL LACERATION, INITIAL ENCOUNTER: ICD-10-CM

## 2021-06-04 PROCEDURE — 25010000002 TDAP 5-2.5-18.5 LF-MCG/0.5 SUSPENSION: Performed by: EMERGENCY MEDICINE

## 2021-06-04 PROCEDURE — 90715 TDAP VACCINE 7 YRS/> IM: CPT | Performed by: EMERGENCY MEDICINE

## 2021-06-04 PROCEDURE — 99284 EMERGENCY DEPT VISIT MOD MDM: CPT

## 2021-06-04 PROCEDURE — 70450 CT HEAD/BRAIN W/O DYE: CPT

## 2021-06-04 PROCEDURE — 72125 CT NECK SPINE W/O DYE: CPT

## 2021-06-04 PROCEDURE — 90471 IMMUNIZATION ADMIN: CPT | Performed by: EMERGENCY MEDICINE

## 2021-06-04 RX ORDER — AMOXICILLIN AND CLAVULANATE POTASSIUM 875; 125 MG/1; MG/1
1 TABLET, FILM COATED ORAL 2 TIMES DAILY
Qty: 20 TABLET | Refills: 0 | Status: SHIPPED | OUTPATIENT
Start: 2021-06-04 | End: 2021-06-14

## 2021-06-04 RX ORDER — AMOXICILLIN AND CLAVULANATE POTASSIUM 875; 125 MG/1; MG/1
1 TABLET, FILM COATED ORAL ONCE
Status: COMPLETED | OUTPATIENT
Start: 2021-06-04 | End: 2021-06-04

## 2021-06-04 RX ADMIN — TETANUS TOXOID, REDUCED DIPHTHERIA TOXOID AND ACELLULAR PERTUSSIS VACCINE, ADSORBED 0.5 ML: 5; 2.5; 8; 8; 2.5 SUSPENSION INTRAMUSCULAR at 05:43

## 2021-06-04 RX ADMIN — AMOXICILLIN AND CLAVULANATE POTASSIUM 1 TABLET: 875; 125 TABLET, FILM COATED ORAL at 06:47

## 2021-06-04 NOTE — ED NOTES
Patient presents to the emergency room after a fall, patient has a laceration to the left eyebrow, patient is in no apparent distress, respirations even and unlabored, patient is alert, patient is at baseline per caregiver.     Denver Sampson RN  06/04/21 0516

## 2021-06-04 NOTE — ED PROVIDER NOTES
"Subjective   65-year-old white male with a history of autism, cognitive disability, nonverbal, and seizure disorder presents to the emergency department chief complaint of fall.  Per caregiver at the long-term care facility where patient resides patient got up to use the restroom when he fell sustaining a head injury.  She reports patient had loss of consciousness lasted a few minutes.  No seizure activity.  No other injury.  Patient's caregiver relates \"he's acting normal now.\"          Review of Systems   Unable to perform ROS: Patient nonverbal       Past Medical History:   Diagnosis Date   • Alopecia    • Autism    • COPD (chronic obstructive pulmonary disease) (CMS/HCC)    • GERD (gastroesophageal reflux disease)    • Hyperlipidemia    • Insomnia    • Intellectual disability    • Lennox-Gastaut syndrome (CMS/HCC)    • Major depressive disorder    • Orthostatic hypotension    • Osteoporosis    • Right bundle branch block    • Scoliosis    • Seizures (CMS/HCC)        Allergies   Allergen Reactions   • Haldol [Haloperidol] Unknown (See Comments)     Unknown     • Levaquin [Levofloxacin] Other (See Comments)     Lowers seizure threshold       Past Surgical History:   Procedure Laterality Date   • COLONOSCOPY N/A 1/8/2021    Procedure: COLONOSCOPY;  Surgeon: Wanda Lang MD;  Location: [facility] ENDOSCOPY;  Service: Gastroenterology;  Laterality: N/A;   • ENDOSCOPY N/A 1/7/2021    Procedure: ESOPHAGOGASTRODUODENOSCOPY;  Surgeon: Wanda Lang MD;  Location: [facility] ENDOSCOPY;  Service: Gastroenterology;  Laterality: N/A;   • ENDOSCOPY N/A 1/22/2021    Procedure: ESOPHAGOGASTRODUODENOSCOPY;  Surgeon: Wanda Lang MD;  Location: [facility] ENDOSCOPY;  Service: Gastroenterology;  Laterality: N/A;   • UPPER GASTROINTESTINAL ENDOSCOPY  01/22/2021       Family History   Problem Relation Age of Onset   • Hypertension Father        Social History     Socioeconomic History   • Marital status: Single     Spouse name: " Not on file   • Number of children: Not on file   • Years of education: Not on file   • Highest education level: Not on file   Tobacco Use   • Smoking status: Never Smoker   • Smokeless tobacco: Never Used   Vaping Use   • Vaping Use: Never used   Substance and Sexual Activity   • Alcohol use: Not Currently   • Drug use: Not Currently   • Sexual activity: Not Currently           Objective   Physical Exam  Vitals and nursing note reviewed.   Constitutional:       General: He is not in acute distress.     Appearance: He is not toxic-appearing or diaphoretic.   HENT:      Head:      Comments: There is a hematoma above the lateral aspect of the left eye with 1 cm superficial laceration without active bleeding.     Right Ear: External ear normal.      Left Ear: External ear normal.      Nose: Nose normal.      Mouth/Throat:      Mouth: Mucous membranes are moist.      Pharynx: Oropharynx is clear.   Eyes:      Conjunctiva/sclera: Conjunctivae normal.      Pupils: Pupils are equal, round, and reactive to light.   Neck:      Comments: Cervical collar applied in the emergency department.  No tenderness of the cervical spine.  Cardiovascular:      Rate and Rhythm: Normal rate and regular rhythm.      Pulses: Normal pulses.      Heart sounds: Normal heart sounds.   Pulmonary:      Effort: Pulmonary effort is normal.      Breath sounds: Normal breath sounds.   Abdominal:      General: Bowel sounds are normal. There is no distension.      Palpations: Abdomen is soft.      Tenderness: There is no abdominal tenderness.   Musculoskeletal:         General: No swelling or tenderness.   Skin:     General: Skin is warm and dry.   Neurological:      General: No focal deficit present.      Mental Status: He is alert. Mental status is at baseline.   Psychiatric:         Speech: He is noncommunicative.         Procedures           ED Course  ED Course as of Jun 04 0642   Fri Jun 04, 2021   0641 I reviewed the results of his evaluation  with his caregiver.  I recommended primary care follow-up.  I advised her to return to the emergency department if his symptoms change or worsen.    [DR]      ED Course User Index  [DR] Jose Morton MD           Labs Reviewed - No data to display  CT Head Without Contrast    Result Date: 6/4/2021  Narrative: EXAM:   CT Head Without Intravenous Contrast CLINICAL HISTORY:   The patient is 65 years old and is Male; head injury with LOC TECHNIQUE:   Axial computed tomography images of the head/brain without intravenous contrast.  Sagittal and coronal reformatted images were created and reviewed.  This CT exam was performed using one or more of the following dose reduction techniques:  automated exposure control, adjustment of the mA and/or kV according to patient size, and/or use of iterative reconstruction technique. COMPARISON:   CT brain from 1/19/2020 FINDINGS:   BRAIN:  Diffuse cerebral atrophy. Gray-white matter differentiation is well-maintained. No obvious signs of acute infarct. No intracranial hemorrhage.    VENTRICLES: A cavum septum pellucidum incidentally noted. No hydrocephalus.   BONES/JOINTS:  No acute fracture.   SOFT TISSUES:  Right lateral scalp hematoma noted. Soft tissue swelling extends into the right cheek.  Small amount of soft tissue air and soft tissue swelling in the left periorbital region.   SINUSES:  Minimal scattered mucosal thickening in the paranasal sinuses.   MASTOID AIR CELLS:  Unremarkable.  No mastoid effusion.   ORBITS:  The globes appear intact. No retrobulbar region identified.     Impression: 1.  No acute intracranial findings visualized. 2.  Diffuse cerebral atrophy. Electronically signed by:  Alea Briones MD  6/4/2021 6:38 AM CDT Workstation: [id number]    CT Cervical Spine Without Contrast    Result Date: 6/4/2021  Narrative: CT cervical spine without contrast on 6/4/2021 CLINICAL INDICATION: Head injury, loss of consciousness, per protocol for mechanism of injury TECHNIQUE:  Multiple axial images are obtained throughout the cervical spine without the administration of contrast. Sagittal and coronal reformatted images are also performed and reviewed. This exam was performed according to our departmental dose-optimization program, which includes automated exposure control, adjustment of the mA and/or kV according to patient size and/or use of iterative reconstruction technique. Total DLP is 242.1 mGy*cm. COMPARISON: 1/20/2020 FINDINGS: Degenerative disc disease is noted worse from C4 through C7. Reformatted images reveal normal alignment of the cervical spine. There is no prevertebral soft tissue swelling. Degenerative facet disease is noted bilaterally but worse on the right in the mid to upper cervical spine. There are no acute fracture lines. No definite disc herniation is noted. Tree-in-bud nodular opacities in the right upper lung is likely related to an infectious bronchiolitis.     Impression: 1.  Degenerative changes with no acute fracture or acute malalignment of the cervical spine. 2.  Tree-in-bud nodular opacities in the right upper lung likely related to an infectious bronchiolitis. Electronically signed by:  Melvin Larsen  6/4/2021 6:32 AM CDT Workstation: [id number]                                  [facility]    Final diagnoses:   Concussion with loss of consciousness, initial encounter   Facial laceration, initial encounter   Pneumonia of right upper lobe due to infectious organism       ED Disposition  ED Disposition     ED Disposition Condition Comment    Discharge Stable           Bautista James MD  [address] [facility] [address] [phone]    Schedule an appointment as soon as possible for a visit in 1 week           Medication List      New Prescriptions    amoxicillin-clavulanate 875-125 MG per tablet  Commonly known as: AUGMENTIN  Take 1 tablet by mouth 2 (Two) Times a Day for 10 days.        Changed    midodrine 5 MG tablet  Commonly known as:  PROAMATINE  Take 1 tablet by mouth 3 (Three) Times a Day.  What changed: how much to take           Where to Get Your Medications      These medications were sent to [facility], [facility] - [place], [place] - [address] - [phone]  - [phone]   [address] [facility] [address]    Phone: [phone]   · amoxicillin-clavulanate 875-125 MG per tablet          Jose Morton MD  06/04/21 0611

## 2021-06-08 DIAGNOSIS — Z01.818 PRE-PROCEDURAL EXAMINATION: Primary | ICD-10-CM

## 2021-06-08 RX ORDER — DIAZEPAM 10 MG/1
10 TABLET ORAL ONCE
Qty: 1 TABLET | Refills: 0 | Status: SHIPPED | OUTPATIENT
Start: 2021-06-08 | End: 2021-06-08

## 2021-06-16 ENCOUNTER — TRANSCRIBE ORDERS (OUTPATIENT)
Dept: GENERAL RADIOLOGY | Facility: HOSPITAL | Age: 66
End: 2021-06-16

## 2021-06-16 DIAGNOSIS — Z01.818 PRE-OP TESTING: Primary | ICD-10-CM

## 2021-06-21 ENCOUNTER — LAB (OUTPATIENT)
Dept: LAB | Facility: HOSPITAL | Age: 66
End: 2021-06-21

## 2021-06-21 DIAGNOSIS — Z01.818 PRE-OP TESTING: ICD-10-CM

## 2021-06-21 LAB — SARS-COV-2 N GENE RESP QL NAA+PROBE: NOT DETECTED

## 2021-06-21 PROCEDURE — C9803 HOPD COVID-19 SPEC COLLECT: HCPCS

## 2021-06-21 PROCEDURE — 87635 SARS-COV-2 COVID-19 AMP PRB: CPT

## 2021-06-23 ENCOUNTER — OFFICE VISIT (OUTPATIENT)
Dept: CARDIOLOGY | Facility: CLINIC | Age: 66
End: 2021-06-23

## 2021-06-23 VITALS
BODY MASS INDEX: 19.32 KG/M2 | HEART RATE: 87 BPM | WEIGHT: 138 LBS | OXYGEN SATURATION: 91 % | RESPIRATION RATE: 18 BRPM | HEIGHT: 71 IN

## 2021-06-23 DIAGNOSIS — E78.5 HYPERLIPIDEMIA, UNSPECIFIED HYPERLIPIDEMIA TYPE: ICD-10-CM

## 2021-06-23 DIAGNOSIS — I95.1 ORTHOSTATIC HYPOTENSION: ICD-10-CM

## 2021-06-23 DIAGNOSIS — R55 VASOVAGAL SYNCOPE: Primary | ICD-10-CM

## 2021-06-23 PROCEDURE — 99213 OFFICE O/P EST LOW 20 MIN: CPT | Performed by: INTERNAL MEDICINE

## 2021-06-23 NOTE — PROGRESS NOTES
Bautista Grubbs  65 y.o. male    6/23/2021      1. Vasovagal syncope    2. Hyperlipidemia, unspecified hyperlipidemia type    3. Orthostatic hypotension        History of Present Illness:    Patient's Body mass index is 19.25 kg/m². BMI is within normal parameters. No follow-up required..    65 years old patient with a history of mental handicap nursing home resident brought to the office by caregiver  and asked if there is any question the need to addressed told he is doing fine  with history of recurrent postural syncope and documented history of orthostasis.  Previously evaluated by Dr. Can and subsequently had evaluation performed by neurosurgeon for management of abnormal CT scan finding no intervention performed.  Patient have recurrent postural syncope with a documented history of orthostasis  The patient is intellectually disabled and resides at a facility.  He also has been diagnosed with major depressive disorder, seizure disorder and autism.  He is nonverbal.  There was a report of fluctuating blood pressure.    during previous ER evaluation for syncope reported orthostasis and similar finding also reported by caregiver.  The patient has had documented postural changes in his blood pressure.   patient unable to provide information has a history of intellectually disability .  A 2-D TTE was obtained that was technically difficult because he was uncooperative.  All valvular structures were not visualized.  However, his left ventricular ejection fraction appeared to be normal.  .      ECHO 12/2018  · The quality of the study is limited due to poor acoustic windows related to limited views obtained and an uncooperative patient  · Grossly preserved systolic biventricular function. Estimated LVEF 60%.  · No significant valvular abnormalities given the limitations of the study.        CT 1/2019  FINDINGS:   There is acute extra-axial blood in the posterior RIGHT parietal region  that measures 7 mm in  thickness. The midline structures are  nondisplaced. There is moderate, generalized volume loss.     A small amount of intracranial hemorrhage is noted in the inferior RIGHT  frontal region on coronal image 10.     A nondepressed skull fracture extends through the LEFT parietal and  temporal bones and into the skull base. This further extends into the  greater wing of the LEFT sphenoid on axial image 8.     IMPRESSION:  1. Small extra-axial hemorrhage in the RIGHT parietal region is likely  due to a contrecoup injury. There is no significant mass effect.  2. Nondepressed skull fracture extending from the through the LEFT  parietal and temporal bones and into the greater wing of the LEFT  sphenoid.  3. Small amount of intraparenchymal hemorrhage in the RIGHT frontal  lobe, inferiorly.          SUBJECTIVE:    Allergies   Allergen Reactions   • Haldol [Haloperidol] Unknown (See Comments)     Unknown     • Levaquin [Levofloxacin] Other (See Comments)     Lowers seizure threshold         Past Medical History:   Diagnosis Date   • Alopecia    • Autism    • COPD (chronic obstructive pulmonary disease) (CMS/HCC)    • GERD (gastroesophageal reflux disease)    • Hyperlipidemia    • Insomnia    • Intellectual disability    • Lennox-Gastaut syndrome (CMS/HCC)    • Major depressive disorder    • Orthostatic hypotension    • Osteoporosis    • Right bundle branch block    • Scoliosis    • Seizures (CMS/HCC)          Past Surgical History:   Procedure Laterality Date   • COLONOSCOPY N/A 1/8/2021    Procedure: COLONOSCOPY;  Surgeon: Wanda Lang MD;  Location: [facility] ENDOSCOPY;  Service: Gastroenterology;  Laterality: N/A;   • ENDOSCOPY N/A 1/7/2021    Procedure: ESOPHAGOGASTRODUODENOSCOPY;  Surgeon: Wanda Lang MD;  Location: [facility] ENDOSCOPY;  Service: Gastroenterology;  Laterality: N/A;   • ENDOSCOPY N/A 1/22/2021    Procedure: ESOPHAGOGASTRODUODENOSCOPY;  Surgeon: Wanda Lang MD;  Location: [facility] ENDOSCOPY;   Service: Gastroenterology;  Laterality: N/A;   • UPPER GASTROINTESTINAL ENDOSCOPY  01/22/2021         Family History   Problem Relation Age of Onset   • Hypertension Father          Social History     Socioeconomic History   • Marital status: Single     Spouse name: Not on file   • Number of children: Not on file   • Years of education: Not on file   • Highest education level: Not on file   Tobacco Use   • Smoking status: Never Smoker   • Smokeless tobacco: Never Used   Vaping Use   • Vaping Use: Never used   Substance and Sexual Activity   • Alcohol use: Not Currently   • Drug use: Not Currently   • Sexual activity: Not Currently         Current Outpatient Medications   Medication Sig Dispense Refill   • calcium carbonate-vitamin d (CALCIUM 600+D) 600-400 MG-UNIT per tablet Take 1 tablet by mouth 2 (Two) Times a Day.     • denosumab (Prolia) 60 MG/ML solution prefilled syringe syringe Inject  under the skin into the appropriate area as directed 1 (One) Time.     • DIAZEPAM RE Insert  into the rectum.     • Emollient (Bag Balm) ointment ointment Apply 1 application topically to the appropriate area as directed 2 (Two) Times a Day.     • ferrous sulfate 325 (65 FE) MG tablet Take 325 mg by mouth Daily With Breakfast.     • lamoTRIgine (LaMICtal) 200 MG tablet Take 200 mg by mouth 2 (Two) Times a Day.     • levETIRAcetam (KEPPRA) 1000 MG tablet Take 1,000 mg by mouth 2 (Two) Times a Day. 1/21/21: Patient takes 1750 mg total (1000 mg + 750 mg tablet) twice daily per nursing home     • levETIRAcetam (KEPPRA) 750 MG tablet Take 750 mg by mouth 2 (Two) Times a Day. 1/21/21: Patient takes 1750 mg total (1000 mg + 750 mg tablet) twice daily per nursing home     • LORazepam (ATIVAN) 1 MG tablet Take 1 tablet by mouth 2 (two) times a day. 60 tablet 5   • metoclopramide (REGLAN) 5 MG/5ML solution Take 5 mL by mouth 4 (Four) Times a Day Before Meals & at Bedtime.     • midodrine (PROAMATINE) 5 MG tablet Take 1 tablet by mouth 3  "(Three) Times a Day. (Patient taking differently: Take 10 mg by mouth 3 (Three) Times a Day.) 90 tablet 3   • Multiple Vitamins-Minerals (CERTAVITE/ANTIOXIDANTS PO) Take 1 tablet by mouth 1 (One) Time.     • NON FORMULARY Insert 10 mg into the rectum As Needed (for seizure activity. do not exceed 2 doses in 24hr). Diazepam gel (diastat)     • ondansetron ODT (ZOFRAN-ODT) 4 MG disintegrating tablet Place 1 tablet on the tongue Every 6 (Six) Hours As Needed for Nausea or Vomiting. 15 tablet 0   • pantoprazole (PROTONIX) 40 MG injection Infuse 10 mL into a venous catheter Every Morning Before Breakfast. Indications: Gastrointestinal (GI) Bleed     • polyethylene glycol (MIRALAX) packet Take 17 g by mouth Every Night.     • simvastatin (ZOCOR) 20 MG tablet Take 20 mg by mouth Every Night.     • sodium chloride 0.9 % solution Infuse 75 mL/hr into a venous catheter Continuous.     • sucralfate (CARAFATE) 1 g tablet Take 1 tablet by mouth 4 (Four) Times a Day Before Meals & at Bedtime. 120 tablet 0   • thioridazine (MELLARIL) 100 MG tablet Take 200 mg by mouth 2 (Two) Times a Day.       No current facility-administered medications for this visit.           Review of Systems:     Difficult to obtain a review of system given the mental status however no significant change reported and no further syncope reported.  The patient had a history of intracranial bleeding medically managed no recurrent seizure reported       OBJECTIVE:    Pulse 87   Resp 18   Ht 180.3 cm (71\")   Wt 62.6 kg (138 lb)   SpO2 91%   BMI 19.25 kg/m²       Physical Exam:     Patient is sitting upright in chair no apparent distress lung poor inspiratory effort cardiovascular regular rate rhythm cannot perform the detailed examination given the patient's body habitus        Procedures      Lab Results   Component Value Date    WBC 8.48 05/21/2021    HGB 10.6 (L) 05/21/2021    HCT 31.1 (L) 05/21/2021    MCV 88.1 05/21/2021     05/21/2021     Lab " Results   Component Value Date    GLUCOSE 93 05/19/2021    BUN 28 (H) 05/19/2021    CREATININE 0.88 05/19/2021    EGFRIFNONA 87 05/19/2021    BCR 31.8 (H) 05/19/2021    CO2 27.0 05/19/2021    CALCIUM 9.6 05/19/2021    ALBUMIN 3.60 05/19/2021    AST 17 05/19/2021    ALT 18 05/19/2021     No results found for: CHOL  No results found for: TRIG  No results found for: HDL  No components found for: LDLCALC  No results found for: LDL  No results found for: HDLLDLRATIO  No components found for: CHOLHDL  No results found for: HGBA1C  No results found for: TSH, L1WQTRC, E5FCXWB, THYROIDAB        ASSESSMENT AND PLAN:  #1 recurrent syncope  #2 postural orthostasis #  3 intellectual disability with history of seizure disorder  Per caregiver no further syncope reported we will continue midodrine and see him in 1 year or depending upon patient clinical condition            Diagnoses and all orders for this visit:    1. Vasovagal syncope (Primary)    2. Hyperlipidemia, unspecified hyperlipidemia type    3. Orthostatic hypotension        Earnestine Reid MD  6/23/2021  10:43 CDT

## 2021-06-24 ENCOUNTER — HOSPITAL ENCOUNTER (OUTPATIENT)
Dept: GENERAL RADIOLOGY | Facility: HOSPITAL | Age: 66
Discharge: HOME | End: 2021-06-24
Admitting: NURSE PRACTITIONER

## 2021-06-24 ENCOUNTER — OFFICE VISIT (OUTPATIENT)
Dept: GASTROENTEROLOGY | Facility: CLINIC | Age: 66
End: 2021-06-24

## 2021-06-24 VITALS — BODY MASS INDEX: 19.32 KG/M2 | HEIGHT: 71 IN | WEIGHT: 138 LBS

## 2021-06-24 DIAGNOSIS — E64.0 SEQUELAE OF PROTEIN-CALORIE MALNUTRITION (HCC): ICD-10-CM

## 2021-06-24 DIAGNOSIS — R13.12 OROPHARYNGEAL DYSPHAGIA: Primary | ICD-10-CM

## 2021-06-24 DIAGNOSIS — R13.10 DYSPHAGIA, IDIOPATHIC: ICD-10-CM

## 2021-06-24 PROCEDURE — 92611 MOTION FLUOROSCOPY/SWALLOW: CPT | Performed by: SPEECH-LANGUAGE PATHOLOGIST

## 2021-06-24 PROCEDURE — 99213 OFFICE O/P EST LOW 20 MIN: CPT | Performed by: INTERNAL MEDICINE

## 2021-06-24 PROCEDURE — A9270 NON-COVERED ITEM OR SERVICE: HCPCS | Performed by: NURSE PRACTITIONER

## 2021-06-24 PROCEDURE — 63710000001 BARIUM SULFATE 96 % RECONSTITUTED SUSPENSION: Performed by: NURSE PRACTITIONER

## 2021-06-24 PROCEDURE — 74230 X-RAY XM SWLNG FUNCJ C+: CPT

## 2021-06-24 RX ADMIN — BARIUM SULFATE 10 ML: 960 POWDER, FOR SUSPENSION ORAL at 10:08

## 2021-06-24 NOTE — PROGRESS NOTES
Chief Complaint   Patient presents with   • abn modified barium swollow study bad. Dr. James called   • schedule peg tub       Subjective    Bautista Grubbs is a 65 y.o. male.    History of Present Illness  Patient presented to the office today with nursing attendant.  Noted to have significant aspiration upon speech evaluation and modified barium swallow.  Currently n.p.o.  PEG tube placement was requested by Dr. James.       The following portions of the patient's history were reviewed and updated as appropriate:   Past Medical History:   Diagnosis Date   • Alopecia    • Autism    • COPD (chronic obstructive pulmonary disease) (CMS/HCC)    • GERD (gastroesophageal reflux disease)    • Hyperlipidemia    • Insomnia    • Intellectual disability    • Lennox-Gastaut syndrome (CMS/HCC)    • Major depressive disorder    • Orthostatic hypotension    • Osteoporosis    • Right bundle branch block    • Scoliosis    • Seizures (CMS/HCC)      Past Surgical History:   Procedure Laterality Date   • COLONOSCOPY N/A 1/8/2021    Procedure: COLONOSCOPY;  Surgeon: Wanda Lang MD;  Location: [facility] ENDOSCOPY;  Service: Gastroenterology;  Laterality: N/A;   • ENDOSCOPY N/A 1/7/2021    Procedure: ESOPHAGOGASTRODUODENOSCOPY;  Surgeon: Wanda Lang MD;  Location: [facility] ENDOSCOPY;  Service: Gastroenterology;  Laterality: N/A;   • ENDOSCOPY N/A 1/22/2021    Procedure: ESOPHAGOGASTRODUODENOSCOPY;  Surgeon: Wanda Lang MD;  Location: [facility] ENDOSCOPY;  Service: Gastroenterology;  Laterality: N/A;   • UPPER GASTROINTESTINAL ENDOSCOPY  01/22/2021     Family History   Problem Relation Age of Onset   • Hypertension Father        Prior to Admission medications    Medication Sig Start Date End Date Taking? Authorizing Provider   calcium carbonate-vitamin d (CALCIUM 600+D) 600-400 MG-UNIT per tablet Take 1 tablet by mouth 2 (Two) Times a Day.   Yes Provider, MD Patricia   denosumab (Prolia) 60 MG/ML solution prefilled  syringe syringe Inject  under the skin into the appropriate area as directed 1 (One) Time.   Yes Patricia Driscoll MD   DIAZEPAM RE Insert  into the rectum.   Yes Patricia Driscoll MD   Emollient (Bag Balm) ointment ointment Apply 1 application topically to the appropriate area as directed 2 (Two) Times a Day. 1/28/21  Yes Светлана Ladd MD   ferrous sulfate 325 (65 FE) MG tablet Take 325 mg by mouth Daily With Breakfast.   Yes Patricia Driscoll MD   lamoTRIgine (LaMICtal) 200 MG tablet Take 200 mg by mouth 2 (Two) Times a Day.   Yes Patricia Driscoll MD   levETIRAcetam (KEPPRA) 1000 MG tablet Take 1,000 mg by mouth 2 (Two) Times a Day. 1/21/21: Patient takes 1750 mg total (1000 mg + 750 mg tablet) twice daily per nursing home   Yes Patricia Driscoll MD   levETIRAcetam (KEPPRA) 750 MG tablet Take 750 mg by mouth 2 (Two) Times a Day. 1/21/21: Patient takes 1750 mg total (1000 mg + 750 mg tablet) twice daily per nursing home   Yes Patricia Driscoll MD   LORazepam (ATIVAN) 1 MG tablet Take 1 tablet by mouth 2 (two) times a day. 4/20/21  Yes Bautista James MD   metoclopramide (REGLAN) 5 MG/5ML solution Take 5 mL by mouth 4 (Four) Times a Day Before Meals & at Bedtime. 1/28/21  Yes Светлана Ladd MD   midodrine (PROAMATINE) 5 MG tablet Take 1 tablet by mouth 3 (Three) Times a Day.  Patient taking differently: Take 10 mg by mouth 3 (Three) Times a Day. 2/4/19  Yes Earnestine Reid MD   Multiple Vitamins-Minerals (CERTAVITE/ANTIOXIDANTS PO) Take 1 tablet by mouth 1 (One) Time.   Yes Patricia Driscoll MD   NON FORMULARY Insert 10 mg into the rectum As Needed (for seizure activity. do not exceed 2 doses in 24hr). Diazepam gel (diastat)   Yes Patricia Driscoll MD   ondansetron ODT (ZOFRAN-ODT) 4 MG disintegrating tablet Place 1 tablet on the tongue Every 6 (Six) Hours As Needed for Nausea or Vomiting. 5/19/21  Yes Sandeep Fernandez MD   pantoprazole (PROTONIX) 40 MG  "injection Infuse 10 mL into a venous catheter Every Morning Before Breakfast. Indications: Gastrointestinal (GI) Bleed 1/28/21  Yes Светлана Ladd MD   polyethylene glycol (MIRALAX) packet Take 17 g by mouth Every Night.   Yes Patricia Driscoll MD   simvastatin (ZOCOR) 20 MG tablet Take 20 mg by mouth Every Night.   Yes Patricia Driscoll MD   sodium chloride 0.9 % solution Infuse 75 mL/hr into a venous catheter Continuous. 1/28/21  Yes Светлана Ladd MD   sucralfate (CARAFATE) 1 g tablet Take 1 tablet by mouth 4 (Four) Times a Day Before Meals & at Bedtime. 5/19/21  Yes Sandeep Fernandez MD   thioridazine (MELLARIL) 100 MG tablet Take 200 mg by mouth 2 (Two) Times a Day.   Yes Patricia Driscoll MD     Allergies   Allergen Reactions   • Haldol [Haloperidol] Unknown (See Comments)     Unknown     • Levaquin [Levofloxacin] Other (See Comments)     Lowers seizure threshold     Social History     Socioeconomic History   • Marital status: Single     Spouse name: Not on file   • Number of children: Not on file   • Years of education: Not on file   • Highest education level: Not on file   Tobacco Use   • Smoking status: Never Smoker   • Smokeless tobacco: Never Used   Vaping Use   • Vaping Use: Never used   Substance and Sexual Activity   • Alcohol use: Not Currently   • Drug use: Not Currently   • Sexual activity: Not Currently       Review of Systems  Review of Systems   Unable to perform ROS: Patient nonverbal        Ht 180.3 cm (71\")   Wt 62.6 kg (138 lb)   BMI 19.25 kg/m²     Objective    Physical Exam  Constitutional:       Appearance: He is well-developed.   HENT:      Head: Normocephalic and atraumatic.   Eyes:      Conjunctiva/sclera: Conjunctivae normal.      Pupils: Pupils are equal, round, and reactive to light.   Neck:      Thyroid: No thyromegaly.   Cardiovascular:      Rate and Rhythm: Normal rate and regular rhythm.      Heart sounds: Normal heart sounds. No murmur heard. "     Pulmonary:      Effort: Pulmonary effort is normal.      Breath sounds: Normal breath sounds. No wheezing.   Abdominal:      General: Bowel sounds are normal. There is no distension.      Palpations: Abdomen is soft. There is no mass.      Tenderness: There is no abdominal tenderness.      Hernia: No hernia is present.   Genitourinary:     Comments: No lesions noted  Musculoskeletal:         General: No tenderness. Normal range of motion.      Cervical back: Normal range of motion and neck supple.   Lymphadenopathy:      Cervical: No cervical adenopathy.   Skin:     General: Skin is warm and dry.      Findings: No rash.   Neurological:      Mental Status: He is alert.      Cranial Nerves: Cranial nerve deficit present.      Sensory: Sensory deficit present.      Motor: Weakness present.      Coordination: Coordination abnormal.      Gait: Gait abnormal.      Deep Tendon Reflexes: Reflexes abnormal.       Lab on 06/21/2021   Component Date Value Ref Range Status   • COVID19 06/21/2021 Not Detected  Not Detected - Ref. Range Final     Assessment/Plan      1. Oropharyngeal dysphagia    2. Sequelae of protein-calorie malnutrition (CMS/HCC)     1.  Oropharyngeal dysphagia with the malnutrition, proceed with PEG placement in a.m. for nutritional support.  Obtain PT and INR.  2.  Esophagitis, continue PPI.  3.  Coffee-ground emesis, resolved.  4.  Recurrent pneumonia, likely due to aspiration.      Orders placed during this encounter include:  Orders Placed This Encounter   Procedures   • Protime-INR     Order Specific Question:   Release to patient     Answer:   Immediate   • Follow Anesthesia Guidelines / Protocol     Standing Status:   Future   • Obtain Informed Consent     Standing Status:   Future     Order Specific Question:   Informed Consent Given For     Answer:   ESOPHAGOGASTRODUODENOSCOPY WITH PERCUTANEOUS ENDOSCOPIC GASTROSTOMY TUBE INSERTION WITH ANESTHESIA       ESOPHAGOGASTRODUODENOSCOPY WITH  PERCUTANEOUS ENDOSCOPIC GASTROSTOMY TUBE INSERTION WITH ANESTHESIA (N/A)    Review and/or summary of lab tests, radiology, procedures, medications. Review and summary of old records and obtaining of history. The risks and benefits of my recommendations, as well as other treatment options were discussed with the patient and nursing attendant today. Questions were answered.    No orders of the defined types were placed in this encounter.      Follow-up: No follow-ups on file.               Results for orders placed or performed in visit on 06/21/21   COVID-19, [facility] IN-HOUSE, NP SWAB IN TRANSPORT MEDIA 8-10 HR TAT - Swab, Nasopharynx    Specimen: Nasopharynx; Swab   Result Value Ref Range    COVID19 Not Detected Not Detected - Ref. Range   Results for orders placed or performed in visit on 05/21/21   CBC (No Diff)    Specimen: Blood   Result Value Ref Range    WBC 8.48 3.40 - 10.80 10*3/mm3    RBC 3.53 (L) 4.14 - 5.80 10*6/mm3    Hemoglobin 10.6 (L) 13.0 - 17.7 g/dL    Hematocrit 31.1 (L) 37.5 - 51.0 %    MCV 88.1 79.0 - 97.0 fL    MCH 30.0 26.6 - 33.0 pg    MCHC 34.1 31.5 - 35.7 g/dL    RDW 18.2 (H) 12.3 - 15.4 %    RDW-SD 58.2 (H) 37.0 - 54.0 fl    MPV 11.6 6.0 - 12.0 fL    Platelets 229 140 - 450 10*3/mm3   Results for orders placed or performed during the hospital encounter of 05/19/21   Gold Top - SST   Result Value Ref Range    Extra Tube Hold for add-ons.    Green Top (Gel)   Result Value Ref Range    Extra Tube Hold for add-ons.    CBC Auto Differential    Specimen: Blood   Result Value Ref Range    WBC 10.15 3.40 - 10.80 10*3/mm3    RBC 3.84 (L) 4.14 - 5.80 10*6/mm3    Hemoglobin 11.2 (L) 13.0 - 17.7 g/dL    Hematocrit 33.6 (L) 37.5 - 51.0 %    MCV 87.5 79.0 - 97.0 fL    MCH 29.2 26.6 - 33.0 pg    MCHC 33.3 31.5 - 35.7 g/dL    RDW 19.0 (H) 12.3 - 15.4 %    RDW-SD 60.8 (H) 37.0 - 54.0 fl    MPV 10.2 6.0 - 12.0 fL    Platelets 247 140 - 450 10*3/mm3    Neutrophil % 88.0 (H) 42.7 - 76.0 %    Lymphocyte %  5.9 (L) 19.6 - 45.3 %    Monocyte % 4.7 (L) 5.0 - 12.0 %    Eosinophil % 0.7 0.3 - 6.2 %    Basophil % 0.4 0.0 - 1.5 %    Immature Grans % 0.3 0.0 - 0.5 %    Neutrophils, Absolute 8.93 (H) 1.70 - 7.00 10*3/mm3    Lymphocytes, Absolute 0.60 (L) 0.70 - 3.10 10*3/mm3    Monocytes, Absolute 0.48 0.10 - 0.90 10*3/mm3    Eosinophils, Absolute 0.07 0.00 - 0.40 10*3/mm3    Basophils, Absolute 0.04 0.00 - 0.20 10*3/mm3    Immature Grans, Absolute 0.03 0.00 - 0.05 10*3/mm3    nRBC 0.0 0.0 - 0.2 /100 WBC   Lavender Top   Result Value Ref Range    Extra Tube hold for add-on    Lavender Top   Result Value Ref Range    Extra Tube hold for add-on    Light Blue Top   Result Value Ref Range    Extra Tube hold for add-on    Lipase    Specimen: Blood   Result Value Ref Range    Lipase 17 13 - 60 U/L   Comprehensive Metabolic Panel    Specimen: Blood   Result Value Ref Range    Glucose 93 65 - 99 mg/dL    BUN 28 (H) 8 - 23 mg/dL    Creatinine 0.88 0.76 - 1.27 mg/dL    Sodium 141 136 - 145 mmol/L    Potassium 3.7 3.5 - 5.2 mmol/L    Chloride 105 98 - 107 mmol/L    CO2 27.0 22.0 - 29.0 mmol/L    Calcium 9.6 8.6 - 10.5 mg/dL    Total Protein 6.8 6.0 - 8.5 g/dL    Albumin 3.60 3.50 - 5.20 g/dL    ALT (SGPT) 18 1 - 41 U/L    AST (SGOT) 17 1 - 40 U/L    Alkaline Phosphatase 88 39 - 117 U/L    Total Bilirubin <0.2 0.0 - 1.2 mg/dL    eGFR Non African Amer 87 >60 mL/min/1.73    Globulin 3.2 gm/dL    A/G Ratio 1.1 g/dL    BUN/Creatinine Ratio 31.8 (H) 7.0 - 25.0    Anion Gap 9.0 5.0 - 15.0 mmol/L   Results for orders placed or performed during the hospital encounter of 01/28/21   Respiratory Panel PCR w/COVID-19(SARS-CoV-2) [facility] In-House, NP Swab in UTM/VTM, 3-4 HR TAT - Swab, Nasopharynx    Specimen: Nasopharynx; Swab   Result Value Ref Range    ADENOVIRUS, PCR Not Detected Not Detected    Coronavirus 229E Not Detected Not Detected    Coronavirus HKU1 Not Detected Not Detected    Coronavirus NL63 Not Detected Not Detected     Coronavirus OC43 Not Detected Not Detected    COVID19 Not Detected Not Detected - Ref. Range    Human Metapneumovirus Not Detected Not Detected    Human Rhinovirus/Enterovirus Not Detected Not Detected    Influenza A PCR Not Detected Not Detected    Influenza B PCR Not Detected Not Detected    Parainfluenza Virus 1 Not Detected Not Detected    Parainfluenza Virus 2 Not Detected Not Detected    Parainfluenza Virus 3 Not Detected Not Detected    Parainfluenza Virus 4 Not Detected Not Detected    RSV, PCR Not Detected Not Detected    Bordetella pertussis pcr Not Detected Not Detected    Bordetella parapertussis PCR Not Detected Not Detected    Chlamydophila pneumoniae PCR Not Detected Not Detected    Mycoplasma pneumo by PCR Not Detected Not Detected   CBC Auto Differential    Specimen: Blood   Result Value Ref Range    WBC 6.26 3.40 - 10.80 10*3/mm3    RBC 2.76 (L) 4.14 - 5.80 10*6/mm3    Hemoglobin 8.7 (L) 13.0 - 17.7 g/dL    Hematocrit 24.7 (L) 37.5 - 51.0 %    MCV 89.5 79.0 - 97.0 fL    MCH 31.5 26.6 - 33.0 pg    MCHC 35.2 31.5 - 35.7 g/dL    RDW 13.3 12.3 - 15.4 %    RDW-SD 43.8 37.0 - 54.0 fl    MPV 9.9 6.0 - 12.0 fL    Platelets 308 140 - 450 10*3/mm3    Neutrophil % 67.6 42.7 - 76.0 %    Lymphocyte % 13.4 (L) 19.6 - 45.3 %    Monocyte % 11.8 5.0 - 12.0 %    Eosinophil % 6.1 0.3 - 6.2 %    Basophil % 0.6 0.0 - 1.5 %    Immature Grans % 0.5 0.0 - 0.5 %    Neutrophils, Absolute 4.23 1.70 - 7.00 10*3/mm3    Lymphocytes, Absolute 0.84 0.70 - 3.10 10*3/mm3    Monocytes, Absolute 0.74 0.10 - 0.90 10*3/mm3    Eosinophils, Absolute 0.38 0.00 - 0.40 10*3/mm3    Basophils, Absolute 0.04 0.00 - 0.20 10*3/mm3    Immature Grans, Absolute 0.03 0.00 - 0.05 10*3/mm3    nRBC 0.0 0.0 - 0.2 /100 WBC     *Note: Due to a large number of results and/or encounters for the requested time period, some results have not been displayed. A complete set of results can be found in Results Review.         This document has been  electronically signed by Wanda Lang MD on June 24, 2021 14:50 CDT

## 2021-06-24 NOTE — THERAPY EVALUATION
Speech Language Pathology   [facility] FEES / Discharge Summary  [facility]       Patient Name: Bautista Grubbs  : 1955  MRN: [id number]    Today's Date: 2021      Visit Date: 2021   SPEECH PATHOLOGY MODIFIED BARIUM SWALLOW STUDY         Views: Patient seated at 90 degrees in:    __lateral view    x__A/P    Ability to follow instructions: __Excellent        __Good          __Fair  x__Poor      Dentition:x __Natural  __Dentures   __Edentulous         __Partials    Presence of Oral Motor Weakness: yes    The following consistencies were given, with symptoms as noted:  Consistency Method Labial Seal Oral Prep AP Transit Premature Spillage Delayed Swallow Reflex Laryngeal Penetration Aspiration Pooling Valleculae Pooling pyriform sinuses   Thin 5ml  spoon  none vasquez yes Level of vocal folds  Yes; prior to swallow initiation and during the swallow Trace residue after the swallow     nectar 5ml spoon  none vasquez yes Head of bolus in laryngeal vestibule  Yes: prior to swallow initation and during the swallow Trace residue afer the swallow    Impairments:  x__Premature loss of bolus  _x_Reduced velopharyngeal closure  x__Decreased lingual propulsion  x__Reduced epiglottic inversion  x__Decreased hyolaryngeal elevation  x__Reduced laryngeal closure  __Reduced esophageal sphincter opening        Visit Dx:     ICD-10-CM ICD-9-CM   1. Dysphagia, idiopathic  R13.10 787.20       Patient Active Problem List   Diagnosis   • Syncope   • Autism   • COPD (chronic obstructive pulmonary disease) (CMS/[facility])   • GERD (gastroesophageal reflux disease)   • Hyperlipidemia   • Seizures (CMS/[facility])   • Orthostatic hypotension   • Anemia   • Gastrointestinal hemorrhage   • Iron deficiency anemia due to chronic blood loss   • UGIB (upper gastrointestinal bleed)   • MRSA bacteremia   • Right upper lobe pneumonia   • Bacteremia        Past Medical History:   Diagnosis Date   • Alopecia    • Autism    • COPD (chronic obstructive  pulmonary disease) (CMS/HCC)    • GERD (gastroesophageal reflux disease)    • Hyperlipidemia    • Insomnia    • Intellectual disability    • Lennox-Gastaut syndrome (CMS/HCC)    • Major depressive disorder    • Orthostatic hypotension    • Osteoporosis    • Right bundle branch block    • Scoliosis    • Seizures (CMS/HCC)         Past Surgical History:   Procedure Laterality Date   • COLONOSCOPY N/A 1/8/2021    Procedure: COLONOSCOPY;  Surgeon: Wanda Lang MD;  Location: [facility] ENDOSCOPY;  Service: Gastroenterology;  Laterality: N/A;   • ENDOSCOPY N/A 1/7/2021    Procedure: ESOPHAGOGASTRODUODENOSCOPY;  Surgeon: Wanda Lang MD;  Location: [facility] ENDOSCOPY;  Service: Gastroenterology;  Laterality: N/A;   • ENDOSCOPY N/A 1/22/2021    Procedure: ESOPHAGOGASTRODUODENOSCOPY;  Surgeon: Wanda Lang MD;  Location: [facility] ENDOSCOPY;  Service: Gastroenterology;  Laterality: N/A;   • UPPER GASTROINTESTINAL ENDOSCOPY  01/22/2021                 SLP Adult Swallow Evaluation     Row Name 06/24/21 0950       Rehab Evaluation    Document Type  evaluation  -EC    Subjective Information  no complaints  -EC    Patient Observations  alert;cooperative;agree to therapy  -EC    Patient/Family/Caregiver Comments/Observations  caregiver from [facility] present  -EC    Care Plan Review  evaluation/treatment results reviewed;care plan/treatment goals reviewed;risks/benefits reviewed;current/potential barriers reviewed;patient/other agree to care plan no evidence of learning  -EC    Care Plan Review, Other Participant(s)  caregiver;other (see comments) referring APRN, and nsg at [facility] informed of results.  -EC    Patient Effort  adequate  -EC       General Information    Patient Profile Reviewed  yes  -EC    Pertinent History Of Current Problem  cough with PO  -EC    Current Method of Nutrition  nectar/syrup-thick liquids;pureed  -EC       Oral Motor Structure and Function    Dentition Assessment  natural, present and  adequate  -EC    Secretion Management  WNL/WFL  -EC    Mucosal Quality  moist, healthy  -EC       MBS/VFSS    Utensils Used  spoon  -EC    Consistencies Trialed  thin liquids;nectar/syrup-thick liquids  -EC       MBS/VFSS Interpretation    Oral Prep Phase  impaired oral phase of swallowing  -EC    Oral Transit Phase  impaired  -EC    VFSS Summary  pt with poor oral control of the bolus resulting in very vasquez a-p with premature spillage of liquid into the laryngeal vestibule .  aspiration of more than half the bolus noted with very minimal residue in the valleculae after the swallow  -EC       Oral Preparatory Phase    Oral Preparatory Phase  inadequate manipulation  -EC    Inadequate Manipulation  thin liquids;nectar-thick liquids  -EC       Oral Transit Phase    Impaired Oral Transit Phase  premature spillage of liquids into pharynx  -EC    Premature Spillage of Liquids into Pharynx  thin liquids;nectar-thick liquids  -EC       Initiation of Pharyngeal Swallow    Initiation of Pharyngeal Swallow  other (see comments) bolus in the laryngeal vestibule  -EC    Pharyngeal Phase  impaired pharyngeal phase of swallowing  -EC    Aspiration During the Swallow  thin liquids;nectar-thick liquids;secondary to delayed swallow initiation or mistiming;secondary to reduced laryngeal elevation;all consistencies tested  -EC    Response to Aspiration  cough  -EC       Clinical Impression    Barriers to Overall Progress (SLP)  cognition  -EC    SLP Swallowing Diagnosis  profound;oral dysphagia;pharyngeal dysphagia  -EC    Functional Impact  risk of dehydration;risk of malnutrition;risk of aspiration/pneumonia  -EC    Plan for Continued Treatment (SLP)  results called to referring APRN and SNF  -EC       Recommendations    Therapy Frequency (Swallow)  evaluation only  -EC    SLP Diet Recommendation  NPO  -EC    Oral Care Recommendations  Oral Care BID/PRN  -EC    SLP Rec. for Method of Medication Administration  meds via alternate  route  -EC      User Key  (r) = Recorded By, (t) = Taken By, (c) = Cosigned By    Initials Name Provider Type    Светлана Hall CCC-SLP Speech and Language Pathologist                             OP SLP Assessment/Plan - 06/24/21 1057        SLP Assessment    Functional Problems  Swallowing   -EC    Impact on Function: Swallowing  Risk of aspiration   -EC    Clinical Impression: Swallowing  Profound:;pharyngoesophageal dysphagia   -EC    Prognosis  Poor (comment)   -EC    Patient/caregiver participated in establishment of treatment plan and goals  Unable   -EC       SLP Plan    Frequency  results called to SNF and to referring APRN   -EC      User Key  (r) = Recorded By, (t) = Taken By, (c) = Cosigned By    Initials Name Provider Type    Светлана Hall CCC-SLP Speech and Language Pathologist                           Time Calculation:   Untimed Charges  SLP Eval/Re-eval : ST Motion Fluoro Eval Swallow - 24680  [id number]-DW Motion Fluoro Eval Swallow Minutes: 20  Total Minutes  Untimed Charges Total Minutes: 20   Total Minutes: 20    Therapy Charges for Today     Code Description Service Date Service Provider Modifiers Qty    [id number]  ST MOTION FLUORO EVAL SWALLOW 3 6/24/2021 Светлана Coronado CCC-SLP GN 1                  TORIBIO Barone  6/24/2021

## 2021-06-25 ENCOUNTER — ANESTHESIA (OUTPATIENT)
Dept: GASTROENTEROLOGY | Facility: HOSPITAL | Age: 66
End: 2021-06-25

## 2021-06-25 ENCOUNTER — HOSPITAL ENCOUNTER (OUTPATIENT)
Facility: HOSPITAL | Age: 66
Setting detail: HOSPITAL OUTPATIENT SURGERY
Discharge: HOME | End: 2021-06-25
Attending: INTERNAL MEDICINE | Admitting: FAMILY MEDICINE

## 2021-06-25 ENCOUNTER — ANESTHESIA EVENT (OUTPATIENT)
Dept: GASTROENTEROLOGY | Facility: HOSPITAL | Age: 66
End: 2021-06-25

## 2021-06-25 ENCOUNTER — OUTSIDE FACILITY SERVICE (OUTPATIENT)
Dept: FAMILY MEDICINE CLINIC | Facility: CLINIC | Age: 66
End: 2021-06-25

## 2021-06-25 VITALS
OXYGEN SATURATION: 98 % | HEART RATE: 69 BPM | SYSTOLIC BLOOD PRESSURE: 98 MMHG | TEMPERATURE: 97 F | RESPIRATION RATE: 18 BRPM | DIASTOLIC BLOOD PRESSURE: 57 MMHG

## 2021-06-25 LAB
FLUAV SUBTYP SPEC NAA+PROBE: NOT DETECTED
FLUBV RNA ISLT QL NAA+PROBE: NOT DETECTED
INR PPP: 1.12 (ref 0.8–1.2)
PROTHROMBIN TIME: 14.3 SECONDS (ref 11.1–15.3)
SARS-COV-2 RNA PNL SPEC NAA+PROBE: NOT DETECTED

## 2021-06-25 PROCEDURE — 87636 SARSCOV2 & INF A&B AMP PRB: CPT | Performed by: INTERNAL MEDICINE

## 2021-06-25 PROCEDURE — 25010000002 PROPOFOL 10 MG/ML EMULSION: Performed by: NURSE ANESTHETIST, CERTIFIED REGISTERED

## 2021-06-25 PROCEDURE — 43246 EGD PLACE GASTROSTOMY TUBE: CPT | Performed by: INTERNAL MEDICINE

## 2021-06-25 PROCEDURE — C9803 HOPD COVID-19 SPEC COLLECT: HCPCS

## 2021-06-25 PROCEDURE — 85610 PROTHROMBIN TIME: CPT | Performed by: INTERNAL MEDICINE

## 2021-06-25 PROCEDURE — 36415 COLL VENOUS BLD VENIPUNCTURE: CPT | Performed by: INTERNAL MEDICINE

## 2021-06-25 PROCEDURE — 99221 1ST HOSP IP/OBS SF/LOW 40: CPT | Performed by: FAMILY MEDICINE

## 2021-06-25 RX ORDER — DEXTROSE AND SODIUM CHLORIDE 5; .45 G/100ML; G/100ML
20 INJECTION, SOLUTION INTRAVENOUS CONTINUOUS
Status: DISCONTINUED | OUTPATIENT
Start: 2021-06-25 | End: 2021-06-25 | Stop reason: HOSPADM

## 2021-06-25 RX ORDER — LIDOCAINE HYDROCHLORIDE 20 MG/ML
INJECTION, SOLUTION INTRAVENOUS AS NEEDED
Status: DISCONTINUED | OUTPATIENT
Start: 2021-06-25 | End: 2021-06-25 | Stop reason: SURG

## 2021-06-25 RX ORDER — PROPOFOL 10 MG/ML
VIAL (ML) INTRAVENOUS AS NEEDED
Status: DISCONTINUED | OUTPATIENT
Start: 2021-06-25 | End: 2021-06-25 | Stop reason: SURG

## 2021-06-25 RX ADMIN — LIDOCAINE HYDROCHLORIDE 50 MG: 20 INJECTION, SOLUTION INTRAVENOUS at 14:36

## 2021-06-25 RX ADMIN — PROPOFOL 60 MG: 10 INJECTION, EMULSION INTRAVENOUS at 14:36

## 2021-06-25 RX ADMIN — DEXTROSE AND SODIUM CHLORIDE: 5; 450 INJECTION, SOLUTION INTRAVENOUS at 14:39

## 2021-06-25 RX ADMIN — PROPOFOL 30 MG: 10 INJECTION, EMULSION INTRAVENOUS at 14:42

## 2021-06-25 NOTE — ANESTHESIA PREPROCEDURE EVALUATION
Anesthesia Evaluation     Patient summary reviewed and Nursing notes reviewed   NPO Solid Status: > 8 hours  NPO Liquid Status: > 8 hours           Airway   Mallampati: II  TM distance: >3 FB  Neck ROM: full  No difficulty expected  Dental    (+) poor dentition    Pulmonary    (+) pneumonia , COPD moderate, decreased breath sounds,   Cardiovascular - normal exam    Rhythm: regular  Rate: normal    (+) dysrhythmias, hyperlipidemia,     ROS comment: Right bundle branch block    Neuro/Psych  (+) seizures poorly controlled, syncope, psychiatric history Depression,       ROS Comment: Mentally handicapped  GI/Hepatic/Renal/Endo    (+)  GERD poorly controlled, GI bleeding upper active bleeding,     Musculoskeletal     Abdominal  - normal exam   Substance History      OB/GYN          Other                          Anesthesia Plan    ASA 3     MAC   (Consent from brother 1/21/2021)  intravenous induction     Anesthetic plan, all risks, benefits, and alternatives have been provided, discussed and informed consent has been obtained with: legal guardian.    Plan discussed with CRNA.

## 2021-06-25 NOTE — ANESTHESIA POSTPROCEDURE EVALUATION
Patient: Bautista Grubbs    Procedure Summary     Date: 06/25/21 Room / Location: [facility] ENDOSCOPY 1 / [facility] ENDOSCOPY    Anesthesia Start: 1435 Anesthesia Stop: 1455    Procedure: ESOPHAGOGASTRODUODENOSCOPY WITH PERCUTANEOUS ENDOSCOPIC GASTROSTOMY TUBE INSERTION WITH ANESTHESIA (N/A ) Diagnosis:       Oropharyngeal dysphagia      (Oropharyngeal dysphagia [R13.12])    Surgeons: Wanda Lang MD Provider: Xavier Quinonez CRNA    Anesthesia Type: MAC ASA Status: 3          Anesthesia Type: MAC    Vitals  No vitals data found for the desired time range.          Post Anesthesia Care and Evaluation    Patient location during evaluation: bedside  Patient participation: complete - patient participated  Level of consciousness: sleepy but conscious  Pain score: 0  Pain management: adequate  Airway patency: patent  Anesthetic complications: No anesthetic complications  PONV Status: none  Cardiovascular status: acceptable  Respiratory status: acceptable  Hydration status: acceptable    Comments: 95/54  68  18  97%

## 2021-06-26 PROCEDURE — 99231 SBSQ HOSP IP/OBS SF/LOW 25: CPT | Performed by: FAMILY MEDICINE

## 2021-06-27 PROCEDURE — 99231 SBSQ HOSP IP/OBS SF/LOW 25: CPT | Performed by: FAMILY MEDICINE

## 2021-06-28 PROCEDURE — 99238 HOSP IP/OBS DSCHRG MGMT 30/<: CPT | Performed by: FAMILY MEDICINE

## 2021-06-29 ENCOUNTER — OFFICE VISIT (OUTPATIENT)
Dept: GASTROENTEROLOGY | Facility: CLINIC | Age: 66
End: 2021-06-29

## 2021-06-29 VITALS — BODY MASS INDEX: 19.4 KG/M2 | HEIGHT: 71 IN | WEIGHT: 138.6 LBS

## 2021-06-29 DIAGNOSIS — R13.12 OROPHARYNGEAL DYSPHAGIA: Primary | ICD-10-CM

## 2021-06-29 PROCEDURE — 99212 OFFICE O/P EST SF 10 MIN: CPT | Performed by: INTERNAL MEDICINE

## 2021-06-29 NOTE — PROGRESS NOTES
Chief Complaint   Patient presents with   • f/u after peg tube placement       Subjective    Bautista Grubbs is a 65 y.o. male.    History of Present Illness  Patient presented to GI clinic for follow-up visit today.  No GI complaints at this time.  Tolerating tube feeds well.  Had PEG tube placed last Friday for oropharyngeal dysphagia with aspiration.       The following portions of the patient's history were reviewed and updated as appropriate:   Past Medical History:   Diagnosis Date   • Alopecia    • Autism    • COPD (chronic obstructive pulmonary disease) (CMS/HCC)    • GERD (gastroesophageal reflux disease)    • Hyperlipidemia    • Insomnia    • Intellectual disability    • Lennox-Gastaut syndrome (CMS/HCC)    • Major depressive disorder    • Orthostatic hypotension    • Osteoporosis    • Right bundle branch block    • Scoliosis    • Seizures (CMS/HCC)      Past Surgical History:   Procedure Laterality Date   • COLONOSCOPY N/A 1/8/2021    Procedure: COLONOSCOPY;  Surgeon: Wanda Lang MD;  Location: [facility] ENDOSCOPY;  Service: Gastroenterology;  Laterality: N/A;   • ENDOSCOPY N/A 1/7/2021    Procedure: ESOPHAGOGASTRODUODENOSCOPY;  Surgeon: Wanda Lang MD;  Location: [facility] ENDOSCOPY;  Service: Gastroenterology;  Laterality: N/A;   • ENDOSCOPY N/A 1/22/2021    Procedure: ESOPHAGOGASTRODUODENOSCOPY;  Surgeon: Wanda Lang MD;  Location: [facility] ENDOSCOPY;  Service: Gastroenterology;  Laterality: N/A;   • ENDOSCOPY W/ PEG TUBE PLACEMENT N/A 6/25/2021    Procedure: ESOPHAGOGASTRODUODENOSCOPY WITH PERCUTANEOUS ENDOSCOPIC GASTROSTOMY TUBE INSERTION WITH ANESTHESIA;  Surgeon: Wanda Lang MD;  Location: [facility] ENDOSCOPY;  Service: Gastroenterology;  Laterality: N/A;   • UPPER GASTROINTESTINAL ENDOSCOPY  01/22/2021   • UPPER GASTROINTESTINAL ENDOSCOPY  06/25/2021     Family History   Problem Relation Age of Onset   • Hypertension Father        Prior to Admission medications    Medication Sig  Start Date End Date Taking? Authorizing Provider   calcium carbonate-vitamin d (CALCIUM 600+D) 600-400 MG-UNIT per tablet Take 1 tablet by mouth 2 (Two) Times a Day.   Yes Patricia Driscoll MD   denosumab (Prolia) 60 MG/ML solution prefilled syringe syringe Inject  under the skin into the appropriate area as directed 1 (One) Time.   Yes Patricia Driscoll MD   DIAZEPAM RE Insert  into the rectum.   Yes Patricia Driscoll MD   Emollient (Bag Balm) ointment ointment Apply 1 application topically to the appropriate area as directed 2 (Two) Times a Day. 1/28/21  Yes Светлана Ladd MD   ferrous sulfate 325 (65 FE) MG tablet Take 325 mg by mouth Daily With Breakfast.   Yes Patricia Driscoll MD   lamoTRIgine (LaMICtal) 200 MG tablet Take 200 mg by mouth 2 (Two) Times a Day.   Yes Patricia Driscoll MD   levETIRAcetam (KEPPRA) 1000 MG tablet Take 1,000 mg by mouth 2 (Two) Times a Day. 1/21/21: Patient takes 1750 mg total (1000 mg + 750 mg tablet) twice daily per nursing home   Yes Patricia Driscoll MD   levETIRAcetam (KEPPRA) 750 MG tablet Take 750 mg by mouth 2 (Two) Times a Day. 1/21/21: Patient takes 1750 mg total (1000 mg + 750 mg tablet) twice daily per nursing home   Yes Patricia Driscoll MD   LORazepam (ATIVAN) 1 MG tablet Take 1 tablet by mouth 2 (two) times a day. 4/20/21  Yes Bautista James MD   metoclopramide (REGLAN) 5 MG/5ML solution Take 5 mL by mouth 4 (Four) Times a Day Before Meals & at Bedtime. 1/28/21  Yes Светлана Ladd MD   midodrine (PROAMATINE) 5 MG tablet Take 1 tablet by mouth 3 (Three) Times a Day.  Patient taking differently: Take 10 mg by mouth 3 (Three) Times a Day. 2/4/19  Yes Earnestine Reid MD   Multiple Vitamins-Minerals (CERTAVITE/ANTIOXIDANTS PO) Take 1 tablet by mouth 1 (One) Time.   Yes Patricia Driscoll MD   NON FORMULARY Insert 10 mg into the rectum As Needed (for seizure activity. do not exceed 2 doses in 24hr). Diazepam gel (diastat)   " Yes Patricia Driscoll MD   ondansetron ODT (ZOFRAN-ODT) 4 MG disintegrating tablet Place 1 tablet on the tongue Every 6 (Six) Hours As Needed for Nausea or Vomiting. 5/19/21  Yes Sandeep Fernandez MD   pantoprazole (PROTONIX) 40 MG injection Infuse 10 mL into a venous catheter Every Morning Before Breakfast. Indications: Gastrointestinal (GI) Bleed 1/28/21  Yes Светлана Ladd MD   polyethylene glycol (MIRALAX) packet Take 17 g by mouth Every Night.   Yes Patricia Driscoll MD   simvastatin (ZOCOR) 20 MG tablet Take 20 mg by mouth Every Night.   Yes Patricia Driscoll MD   sodium chloride 0.9 % solution Infuse 75 mL/hr into a venous catheter Continuous. 1/28/21  Yes Светлана Ladd MD   sucralfate (CARAFATE) 1 g tablet Take 1 tablet by mouth 4 (Four) Times a Day Before Meals & at Bedtime. 5/19/21  Yes Sandeep Fernandez MD   thioridazine (MELLARIL) 100 MG tablet Take 200 mg by mouth 2 (Two) Times a Day.   Yes Patricia Driscoll MD     Allergies   Allergen Reactions   • Haldol [Haloperidol] Unknown (See Comments)     Unknown     • Levaquin [Levofloxacin] Other (See Comments)     Lowers seizure threshold     Social History     Socioeconomic History   • Marital status: Single     Spouse name: Not on file   • Number of children: Not on file   • Years of education: Not on file   • Highest education level: Not on file   Tobacco Use   • Smoking status: Never Smoker   • Smokeless tobacco: Never Used   Vaping Use   • Vaping Use: Never used   Substance and Sexual Activity   • Alcohol use: Not Currently   • Drug use: Not Currently   • Sexual activity: Not Currently       Review of Systems  Review of Systems   Unable to perform ROS: Patient nonverbal        Ht 180.3 cm (71\")   Wt 62.9 kg (138 lb 9.6 oz)   BMI 19.33 kg/m²     Objective    Physical Exam  Constitutional:       Appearance: He is well-developed.   HENT:      Head: Normocephalic and atraumatic.   Eyes:      Conjunctiva/sclera: Conjunctivae " normal.      Pupils: Pupils are equal, round, and reactive to light.   Neck:      Thyroid: No thyromegaly.   Cardiovascular:      Rate and Rhythm: Normal rate and regular rhythm.      Heart sounds: Normal heart sounds. No murmur heard.     Pulmonary:      Effort: Pulmonary effort is normal.      Breath sounds: Normal breath sounds. No wheezing.   Abdominal:      General: Bowel sounds are normal. There is no distension.      Palpations: Abdomen is soft. There is no mass.      Tenderness: There is no abdominal tenderness.      Hernia: No hernia is present.   Genitourinary:     Comments: No lesions noted  Musculoskeletal:         General: No tenderness. Normal range of motion.      Cervical back: Normal range of motion and neck supple.   Lymphadenopathy:      Cervical: No cervical adenopathy.   Skin:     General: Skin is warm and dry.      Findings: No rash.   Neurological:      Mental Status: He is alert.      Cranial Nerves: No cranial nerve deficit.     PEG site clean  Admission on 06/25/2021, Discharged on 06/25/2021   Component Date Value Ref Range Status   • COVID19 06/25/2021 Not Detected  Not Detected - Ref. Range Final   • Influenza A PCR 06/25/2021 Not Detected  Not Detected Final   • Influenza B PCR 06/25/2021 Not Detected  Not Detected Final     Assessment/Plan    No diagnosis found..   1.  Oropharyngeal dysphagia with aspiration, s/p PEG placement.  Continue PEG tube feeds.  2.  Esophagitis, continue PPI.  3.  Anemia continue iron supplements.    Orders placed during this encounter include:  No orders of the defined types were placed in this encounter.      * Surgery not found *    Review and/or summary of lab tests, radiology, procedures, medications. Review and summary of old records and obtaining of history. The risks and benefits of my recommendations, as well as other treatment options were discussed with the patient and nursing attendant today. Questions were answered.    No orders of the defined  types were placed in this encounter.      Follow-up: No follow-ups on file.               Results for orders placed or performed during the hospital encounter of 06/25/21   COVID-19 and FLU A/B PCR - Swab, Nasopharynx    Specimen: Nasopharynx; Swab   Result Value Ref Range    COVID19 Not Detected Not Detected - Ref. Range    Influenza A PCR Not Detected Not Detected    Influenza B PCR Not Detected Not Detected   Results for orders placed or performed in visit on 06/24/21   Protime-INR    Specimen: Blood   Result Value Ref Range    Protime 14.3 11.1 - 15.3 Seconds    INR 1.12 0.80 - 1.20   Results for orders placed or performed in visit on 06/21/21   COVID-19, [facility] IN-HOUSE, NP SWAB IN TRANSPORT MEDIA 8-10 HR TAT - Swab, Nasopharynx    Specimen: Nasopharynx; Swab   Result Value Ref Range    COVID19 Not Detected Not Detected - Ref. Range   Results for orders placed or performed in visit on 05/21/21   CBC (No Diff)    Specimen: Blood   Result Value Ref Range    WBC 8.48 3.40 - 10.80 10*3/mm3    RBC 3.53 (L) 4.14 - 5.80 10*6/mm3    Hemoglobin 10.6 (L) 13.0 - 17.7 g/dL    Hematocrit 31.1 (L) 37.5 - 51.0 %    MCV 88.1 79.0 - 97.0 fL    MCH 30.0 26.6 - 33.0 pg    MCHC 34.1 31.5 - 35.7 g/dL    RDW 18.2 (H) 12.3 - 15.4 %    RDW-SD 58.2 (H) 37.0 - 54.0 fl    MPV 11.6 6.0 - 12.0 fL    Platelets 229 140 - 450 10*3/mm3   Results for orders placed or performed during the hospital encounter of 05/19/21   Gold Top - SST   Result Value Ref Range    Extra Tube Hold for add-ons.    Green Top (Gel)   Result Value Ref Range    Extra Tube Hold for add-ons.    CBC Auto Differential    Specimen: Blood   Result Value Ref Range    WBC 10.15 3.40 - 10.80 10*3/mm3    RBC 3.84 (L) 4.14 - 5.80 10*6/mm3    Hemoglobin 11.2 (L) 13.0 - 17.7 g/dL    Hematocrit 33.6 (L) 37.5 - 51.0 %    MCV 87.5 79.0 - 97.0 fL    MCH 29.2 26.6 - 33.0 pg    MCHC 33.3 31.5 - 35.7 g/dL    RDW 19.0 (H) 12.3 - 15.4 %    RDW-SD 60.8 (H) 37.0 - 54.0 fl    MPV 10.2 6.0  - 12.0 fL    Platelets 247 140 - 450 10*3/mm3    Neutrophil % 88.0 (H) 42.7 - 76.0 %    Lymphocyte % 5.9 (L) 19.6 - 45.3 %    Monocyte % 4.7 (L) 5.0 - 12.0 %    Eosinophil % 0.7 0.3 - 6.2 %    Basophil % 0.4 0.0 - 1.5 %    Immature Grans % 0.3 0.0 - 0.5 %    Neutrophils, Absolute 8.93 (H) 1.70 - 7.00 10*3/mm3    Lymphocytes, Absolute 0.60 (L) 0.70 - 3.10 10*3/mm3    Monocytes, Absolute 0.48 0.10 - 0.90 10*3/mm3    Eosinophils, Absolute 0.07 0.00 - 0.40 10*3/mm3    Basophils, Absolute 0.04 0.00 - 0.20 10*3/mm3    Immature Grans, Absolute 0.03 0.00 - 0.05 10*3/mm3    nRBC 0.0 0.0 - 0.2 /100 WBC   Lavender Top   Result Value Ref Range    Extra Tube hold for add-on    Lavender Top   Result Value Ref Range    Extra Tube hold for add-on    Light Blue Top   Result Value Ref Range    Extra Tube hold for add-on    Lipase    Specimen: Blood   Result Value Ref Range    Lipase 17 13 - 60 U/L   Comprehensive Metabolic Panel    Specimen: Blood   Result Value Ref Range    Glucose 93 65 - 99 mg/dL    BUN 28 (H) 8 - 23 mg/dL    Creatinine 0.88 0.76 - 1.27 mg/dL    Sodium 141 136 - 145 mmol/L    Potassium 3.7 3.5 - 5.2 mmol/L    Chloride 105 98 - 107 mmol/L    CO2 27.0 22.0 - 29.0 mmol/L    Calcium 9.6 8.6 - 10.5 mg/dL    Total Protein 6.8 6.0 - 8.5 g/dL    Albumin 3.60 3.50 - 5.20 g/dL    ALT (SGPT) 18 1 - 41 U/L    AST (SGOT) 17 1 - 40 U/L    Alkaline Phosphatase 88 39 - 117 U/L    Total Bilirubin <0.2 0.0 - 1.2 mg/dL    eGFR Non African Amer 87 >60 mL/min/1.73    Globulin 3.2 gm/dL    A/G Ratio 1.1 g/dL    BUN/Creatinine Ratio 31.8 (H) 7.0 - 25.0    Anion Gap 9.0 5.0 - 15.0 mmol/L   Results for orders placed or performed during the hospital encounter of 01/28/21   Respiratory Panel PCR w/COVID-19(SARS-CoV-2) [facility] In-House, NP Swab in UTM/VTM, 3-4 HR TAT - Swab, Nasopharynx    Specimen: Nasopharynx; Swab   Result Value Ref Range    ADENOVIRUS, PCR Not Detected Not Detected    Coronavirus 229E Not Detected Not  Detected    Coronavirus HKU1 Not Detected Not Detected    Coronavirus NL63 Not Detected Not Detected    Coronavirus OC43 Not Detected Not Detected    COVID19 Not Detected Not Detected - Ref. Range    Human Metapneumovirus Not Detected Not Detected    Human Rhinovirus/Enterovirus Not Detected Not Detected    Influenza A PCR Not Detected Not Detected    Influenza B PCR Not Detected Not Detected    Parainfluenza Virus 1 Not Detected Not Detected    Parainfluenza Virus 2 Not Detected Not Detected    Parainfluenza Virus 3 Not Detected Not Detected    Parainfluenza Virus 4 Not Detected Not Detected    RSV, PCR Not Detected Not Detected    Bordetella pertussis pcr Not Detected Not Detected    Bordetella parapertussis PCR Not Detected Not Detected    Chlamydophila pneumoniae PCR Not Detected Not Detected    Mycoplasma pneumo by PCR Not Detected Not Detected   CBC Auto Differential    Specimen: Blood   Result Value Ref Range    WBC 6.26 3.40 - 10.80 10*3/mm3    RBC 2.76 (L) 4.14 - 5.80 10*6/mm3    Hemoglobin 8.7 (L) 13.0 - 17.7 g/dL    Hematocrit 24.7 (L) 37.5 - 51.0 %    MCV 89.5 79.0 - 97.0 fL    MCH 31.5 26.6 - 33.0 pg    MCHC 35.2 31.5 - 35.7 g/dL    RDW 13.3 12.3 - 15.4 %    RDW-SD 43.8 37.0 - 54.0 fl    MPV 9.9 6.0 - 12.0 fL    Platelets 308 140 - 450 10*3/mm3    Neutrophil % 67.6 42.7 - 76.0 %    Lymphocyte % 13.4 (L) 19.6 - 45.3 %    Monocyte % 11.8 5.0 - 12.0 %    Eosinophil % 6.1 0.3 - 6.2 %    Basophil % 0.6 0.0 - 1.5 %    Immature Grans % 0.5 0.0 - 0.5 %    Neutrophils, Absolute 4.23 1.70 - 7.00 10*3/mm3    Lymphocytes, Absolute 0.84 0.70 - 3.10 10*3/mm3    Monocytes, Absolute 0.74 0.10 - 0.90 10*3/mm3    Eosinophils, Absolute 0.38 0.00 - 0.40 10*3/mm3    Basophils, Absolute 0.04 0.00 - 0.20 10*3/mm3    Immature Grans, Absolute 0.03 0.00 - 0.05 10*3/mm3    nRBC 0.0 0.0 - 0.2 /100 WBC     *Note: Due to a large number of results and/or encounters for the requested time period, some results have not been displayed.  A complete set of results can be found in Results Review.         This document has been electronically signed by Wanda Lang MD on June 29, 2021 11:41 CDT

## 2021-07-06 ENCOUNTER — HOSPITAL ENCOUNTER (EMERGENCY)
Facility: HOSPITAL | Age: 66
Discharge: NURSING, REHAB OR LONG-TERM CARE | End: 2021-07-06
Attending: FAMILY MEDICINE | Admitting: FAMILY MEDICINE

## 2021-07-06 ENCOUNTER — APPOINTMENT (OUTPATIENT)
Dept: CT IMAGING | Facility: HOSPITAL | Age: 66
End: 2021-07-06

## 2021-07-06 VITALS
SYSTOLIC BLOOD PRESSURE: 116 MMHG | OXYGEN SATURATION: 98 % | DIASTOLIC BLOOD PRESSURE: 72 MMHG | RESPIRATION RATE: 18 BRPM | HEART RATE: 54 BPM | TEMPERATURE: 99.3 F | BODY MASS INDEX: 19.37 KG/M2 | WEIGHT: 138.38 LBS | HEIGHT: 71 IN

## 2021-07-06 DIAGNOSIS — S01.01XA LACERATION OF SCALP, INITIAL ENCOUNTER: ICD-10-CM

## 2021-07-06 DIAGNOSIS — S09.90XA INJURY OF HEAD, INITIAL ENCOUNTER: Primary | ICD-10-CM

## 2021-07-06 DIAGNOSIS — H92.12 OTORRHEA OF LEFT EAR: ICD-10-CM

## 2021-07-06 LAB
HOLD SPECIMEN: NORMAL
HOLD SPECIMEN: NORMAL
WHOLE BLOOD HOLD SPECIMEN: NORMAL

## 2021-07-06 PROCEDURE — 70450 CT HEAD/BRAIN W/O DYE: CPT

## 2021-07-06 PROCEDURE — 72125 CT NECK SPINE W/O DYE: CPT

## 2021-07-06 PROCEDURE — 99284 EMERGENCY DEPT VISIT MOD MDM: CPT

## 2021-07-06 RX ORDER — SODIUM CHLORIDE 0.9 % (FLUSH) 0.9 %
10 SYRINGE (ML) INJECTION AS NEEDED
Status: DISCONTINUED | OUTPATIENT
Start: 2021-07-06 | End: 2021-07-06 | Stop reason: HOSPADM

## 2021-07-06 NOTE — ED PROVIDER NOTES
Subjective   Patient presents to the emergency department from [facility].  According to the caregiver, patient had a fall at 1130 this morning from a standing position.  He sustained a laceration to his scalp which has been stapled prior to presentation to the emergency department.  He currently has hemotympanum and a bloody discharge from his left ear canal.  Patient is in no acute distress.  Patient is autistic and does not give any history.          Fall  Mechanism of injury: fall    Injury location:  Head/neck  Head/neck injury location:  Head and scalp  Incident location:  Home  Time since incident:  6 hours  Arrived directly from scene: no    Suspicion of alcohol use: no    Suspicion of drug use: no    Prior to arrival data:     Patient ambulatory at scene: yes      Responsiveness at scene:  Alert    Breathing interventions:  None  Associated symptoms: no abdominal pain, no chest pain, no headaches, no nausea, no neck pain, no seizures and no vomiting        Review of Systems   Constitutional: Negative for appetite change, chills, diaphoresis, fatigue and fever.   HENT: Negative for congestion, ear discharge, ear pain, nosebleeds, rhinorrhea, sinus pressure, sore throat and trouble swallowing.    Eyes: Negative for discharge and redness.   Respiratory: Negative for apnea, cough, chest tightness, shortness of breath and wheezing.    Cardiovascular: Negative for chest pain.   Gastrointestinal: Negative for abdominal pain, diarrhea, nausea and vomiting.   Endocrine: Negative for polyuria.   Genitourinary: Negative for dysuria, frequency and urgency.   Musculoskeletal: Negative for myalgias and neck pain.   Skin: Negative for color change and rash.   Allergic/Immunologic: Negative for immunocompromised state.   Neurological: Negative for dizziness, seizures, syncope, weakness, light-headedness and headaches.   Hematological: Negative for adenopathy. Does not bruise/bleed easily.    Psychiatric/Behavioral: Negative for behavioral problems and confusion.   All other systems reviewed and are negative.      Past Medical History:   Diagnosis Date   • Alopecia    • Autism    • COPD (chronic obstructive pulmonary disease) (CMS/HCC)    • GERD (gastroesophageal reflux disease)    • Hyperlipidemia    • Insomnia    • Intellectual disability    • Lennox-Gastaut syndrome (CMS/HCC)    • Major depressive disorder    • Orthostatic hypotension    • Osteoporosis    • Right bundle branch block    • Scoliosis    • Seizures (CMS/HCC)        Allergies   Allergen Reactions   • Haldol [Haloperidol] Unknown (See Comments)     Unknown     • Levaquin [Levofloxacin] Other (See Comments)     Lowers seizure threshold       Past Surgical History:   Procedure Laterality Date   • COLONOSCOPY N/A 1/8/2021    Procedure: COLONOSCOPY;  Surgeon: Wanda Lang MD;  Location: [facility] ENDOSCOPY;  Service: Gastroenterology;  Laterality: N/A;   • ENDOSCOPY N/A 1/7/2021    Procedure: ESOPHAGOGASTRODUODENOSCOPY;  Surgeon: Wanda Lang MD;  Location: [facility] ENDOSCOPY;  Service: Gastroenterology;  Laterality: N/A;   • ENDOSCOPY N/A 1/22/2021    Procedure: ESOPHAGOGASTRODUODENOSCOPY;  Surgeon: Wanda Lang MD;  Location: [facility] ENDOSCOPY;  Service: Gastroenterology;  Laterality: N/A;   • ENDOSCOPY W/ PEG TUBE PLACEMENT N/A 6/25/2021    Procedure: ESOPHAGOGASTRODUODENOSCOPY WITH PERCUTANEOUS ENDOSCOPIC GASTROSTOMY TUBE INSERTION WITH ANESTHESIA;  Surgeon: Wanda Lang MD;  Location: [facility] ENDOSCOPY;  Service: Gastroenterology;  Laterality: N/A;   • UPPER GASTROINTESTINAL ENDOSCOPY  01/22/2021   • UPPER GASTROINTESTINAL ENDOSCOPY  06/25/2021       Family History   Problem Relation Age of Onset   • Hypertension Father        Social History     Socioeconomic History   • Marital status: Single     Spouse name: Not on file   • Number of children: Not on file   • Years of education: Not on file   • Highest education level:  Not on file   Tobacco Use   • Smoking status: Never Smoker   • Smokeless tobacco: Never Used   Vaping Use   • Vaping Use: Never used   Substance and Sexual Activity   • Alcohol use: Not Currently   • Drug use: Not Currently   • Sexual activity: Not Currently           Objective   Physical Exam  Vitals and nursing note reviewed.   Constitutional:       Appearance: He is well-developed.   HENT:      Head: Normocephalic and atraumatic.      Left Ear: Drainage (bloody) present. There is hemotympanum.      Nose: Nose normal.   Eyes:      General: No scleral icterus.        Right eye: No discharge.         Left eye: No discharge.      Conjunctiva/sclera: Conjunctivae normal.      Pupils: Pupils are equal, round, and reactive to light.   Neck:      Trachea: No tracheal deviation.   Cardiovascular:      Rate and Rhythm: Normal rate and regular rhythm.      Heart sounds: Normal heart sounds. No murmur heard.     Pulmonary:      Effort: Pulmonary effort is normal. No respiratory distress.      Breath sounds: Normal breath sounds. No stridor. No wheezing or rales.   Abdominal:      General: Bowel sounds are normal. There is no distension.      Palpations: Abdomen is soft. There is no mass.      Tenderness: There is no abdominal tenderness. There is no guarding or rebound.   Musculoskeletal:      Cervical back: Normal range of motion and neck supple.   Skin:     General: Skin is warm and dry.      Findings: No erythema or rash.   Neurological:      Mental Status: He is alert and oriented to person, place, and time.      Coordination: Coordination normal.   Psychiatric:         Behavior: Behavior normal.         Thought Content: Thought content normal.         Procedures           ED Course              Labs Reviewed   RAINBOW DRAW    Narrative:     The following orders were created for panel order [place] Draw.  Procedure                               Abnormality         Status                     ---------                                -----------         ------                     Green Top (Gel)[[id number]]                                  Final result               Lavender Top[[id number]]                                     Final result               Gold Top - SST[[id number]]                                   Final result                 Please view results for these tests on the individual orders.   GREEN TOP   LAVENDER TOP   GOLD TOP - SST       CT Head Without Contrast   Final Result   CONCLUSION:   No intracranial injury or acute process.   Minimal cerebral and cerebellar atrophy.      [id number]      Electronically signed by:  Dar Whitten MD  7/6/2021 6:22 PM CDT   Workstation: [id number]      CT Cervical Spine Without Contrast   Final Result   CONCLUSION:   No cervical fracture identified.    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   Multilevel facet arthropathy with stable mild anterolisthesis of   C3 on C4.   Multilevel degenerative disc disease and spondylotic change with   multilevel bony encroachment of neural foramina bilaterally.      [id number]      Electronically signed by:  Dar Whitten MD  7/6/2021 6:29 PM CDT   Workstation: [id number]                                            [facility]    Final diagnoses:   Injury of head, initial encounter   Otorrhea of left ear   Laceration of scalp, initial encounter       ED Disposition  ED Disposition     ED Disposition Condition Comment    Discharge Stable           Bautista James MD  [address] [id number] [phone]    In 2 days           Medication List      Changed    midodrine 5 MG tablet  Commonly known as: PROAMATINE  Take 1 tablet by mouth 3 (Three) Times a Day.  What changed: how much to take             Wilian Moore MD  07/08/21 [id number]

## 2021-07-06 NOTE — ED NOTES
Pt arrived via caregiver around noon. Caregiver stated he was using the bathroom and fell forward. The RN nurse at [facility] cleaned the head laceration on the L side and covered it. The RN at [facility] according to caregiver said his pupils were pin point and fluid/blood was leaking from the L ear. Upon presentation in ED dried up blood is visible on the L ear. Vitals are stable. Pt disoriented x4. Hx of seizure in the past 6 mo according to caregiver.      Miracle Brandt, RN  07/06/21 [id number]

## 2021-08-23 ENCOUNTER — OFFICE VISIT (OUTPATIENT)
Dept: GASTROENTEROLOGY | Facility: CLINIC | Age: 66
End: 2021-08-23

## 2021-08-23 VITALS
HEART RATE: 96 BPM | SYSTOLIC BLOOD PRESSURE: 96 MMHG | HEIGHT: 71 IN | DIASTOLIC BLOOD PRESSURE: 67 MMHG | BODY MASS INDEX: 19.57 KG/M2 | WEIGHT: 139.8 LBS

## 2021-08-23 DIAGNOSIS — D12.6 ADENOMATOUS POLYP OF COLON, UNSPECIFIED PART OF COLON: Primary | ICD-10-CM

## 2021-08-23 PROCEDURE — 99213 OFFICE O/P EST LOW 20 MIN: CPT | Performed by: INTERNAL MEDICINE

## 2021-08-23 RX ORDER — FAMOTIDINE 20 MG/1
20 TABLET, FILM COATED ORAL DAILY
COMMUNITY

## 2021-08-23 RX ORDER — DEXTROSE AND SODIUM CHLORIDE 5; .45 G/100ML; G/100ML
30 INJECTION, SOLUTION INTRAVENOUS CONTINUOUS PRN
Status: CANCELLED | OUTPATIENT
Start: 2021-09-02

## 2021-08-23 RX ORDER — LAMOTRIGINE 25 MG/1
25 TABLET ORAL 2 TIMES DAILY
COMMUNITY

## 2021-08-23 NOTE — PROGRESS NOTES
Chief Complaint   Patient presents with   • Oropharyngeal Dysphagia       Subjective    Bautista Grubbs is a 65 y.o. male.    History of Present Illness  Patient presented to GI clinic for follow-up visit today.  Remains nonverbal.  Tolerating tube feeds well.  No further nausea or vomiting per nursing attendants.  Due for colonoscopy.  Colonoscopy in January was consistent with rectal polyp which was removed with piecemeal polypectomy, inadequate prep and colon polyps.       The following portions of the patient's history were reviewed and updated as appropriate:   Past Medical History:   Diagnosis Date   • Alopecia    • Autism    • COPD (chronic obstructive pulmonary disease) (CMS/HCC)    • GERD (gastroesophageal reflux disease)    • Hyperlipidemia    • Insomnia    • Intellectual disability    • Lennox-Gastaut syndrome (CMS/HCC)    • Major depressive disorder    • Orthostatic hypotension    • Osteoporosis    • Right bundle branch block    • Scoliosis    • Seizures (CMS/HCC)      Past Surgical History:   Procedure Laterality Date   • COLONOSCOPY N/A 1/8/2021    Procedure: COLONOSCOPY;  Surgeon: Wanda Lang MD;  Location: [facility] ENDOSCOPY;  Service: Gastroenterology;  Laterality: N/A;   • ENDOSCOPY N/A 1/7/2021    Procedure: ESOPHAGOGASTRODUODENOSCOPY;  Surgeon: Wanda Lang MD;  Location: [facility] ENDOSCOPY;  Service: Gastroenterology;  Laterality: N/A;   • ENDOSCOPY N/A 1/22/2021    Procedure: ESOPHAGOGASTRODUODENOSCOPY;  Surgeon: Wanda Lang MD;  Location: [facility] ENDOSCOPY;  Service: Gastroenterology;  Laterality: N/A;   • ENDOSCOPY W/ PEG TUBE PLACEMENT N/A 6/25/2021    Procedure: ESOPHAGOGASTRODUODENOSCOPY WITH PERCUTANEOUS ENDOSCOPIC GASTROSTOMY TUBE INSERTION WITH ANESTHESIA;  Surgeon: Wanda Lang MD;  Location: [facility] ENDOSCOPY;  Service: Gastroenterology;  Laterality: N/A;   • UPPER GASTROINTESTINAL ENDOSCOPY  01/22/2021   • UPPER GASTROINTESTINAL ENDOSCOPY  06/25/2021     Family  History   Problem Relation Age of Onset   • Hypertension Father        Prior to Admission medications    Medication Sig Start Date End Date Taking? Authorizing Provider   calcium carbonate-vitamin d (CALCIUM 600+D) 600-400 MG-UNIT per tablet Take 1 tablet by mouth 2 (Two) Times a Day. Per G-Tube   Yes Patricia Driscoll MD   denosumab (Prolia) 60 MG/ML solution prefilled syringe syringe Inject 60 mg under the skin into the appropriate area as directed. Every 6 Months   Yes Patricia Driscoll MD   DIAZEPAM RE Insert 10 mg into the rectum As Needed (For Seizure Activity).   Yes Patricia Driscoll MD   famotidine (PEPCID) 20 MG tablet Take 20 mg by mouth Daily. Per G-Tube   Yes Patricia Driscoll MD   ferrous sulfate 325 (65 FE) MG tablet Take 325 mg by mouth Daily With Breakfast. Per G-Tube   Yes Patricia Driscoll MD   lamoTRIgine (LaMICtal) 200 MG tablet Take 200 mg by mouth 2 (Two) Times a Day. Per G-Tube   Yes Patricia Driscoll MD   lamoTRIgine (LaMICtal) 25 MG tablet Take 25 mg by mouth 2 (Two) Times a Day. Per G-Tube To Equal 225 MG   Yes Patricia Driscoll MD   levETIRAcetam (KEPPRA) 1000 MG tablet Take 1,000 mg by mouth 2 (Two) Times a Day. Per G-Tube   Yes Patricia Driscoll MD   levETIRAcetam (KEPPRA) 750 MG tablet Take 750 mg by mouth 2 (Two) Times a Day. Per G-Tube To Equal 1750 MG   Yes Patricia Driscoll MD   LORazepam (ATIVAN) 1 MG tablet Take 1 tablet by mouth 2 (two) times a day.  Patient taking differently: Take 1 mg by mouth 2 (two) times a day. Per G-Tube 4/20/21  Yes Bautista James MD   midodrine (PROAMATINE) 5 MG tablet Take 1 tablet by mouth 3 (Three) Times a Day.  Patient taking differently: Take 10 mg by mouth 3 (Three) Times a Day. Per G-Tube 2/4/19  Yes Earnestine Reid MD   simvastatin (ZOCOR) 20 MG tablet Take 20 mg by mouth Daily. Per G-Tube   Yes Patricia Driscoll MD   sucralfate (CARAFATE) 1 g tablet Take 1 tablet by mouth 4 (Four) Times a Day  Before Meals & at Bedtime.  Patient taking differently: Take 1 g by mouth 4 (Four) Times a Day Before Meals & at Bedtime. Per G-Tube 5/19/21  Yes Sandeep Fernandez MD   thioridazine (MELLARIL) 100 MG tablet Take 200 mg by mouth 2 (Two) Times a Day. Per G-Tube   Yes Patricia Driscoll MD   polyethylene glycol (GoLYTELY) 236 g solution Utilize as directed per instructions received from office of Dr. Wanda Lang M.D. 8/23/21   Wanda Lang MD   Emollient (Bag Balm) ointment ointment Apply 1 application topically to the appropriate area as directed 2 (Two) Times a Day. 1/28/21 8/23/21  Светлана Ladd MD   metoclopramide (REGLAN) 5 MG/5ML solution Take 5 mL by mouth 4 (Four) Times a Day Before Meals & at Bedtime. 1/28/21 8/23/21  Светлана Ladd MD   Multiple Vitamins-Minerals (CERTAVITE/ANTIOXIDANTS PO) Take 1 tablet by mouth 1 (One) Time.  8/23/21  Patricia Driscoll MD   NON FORMULARY Insert 10 mg into the rectum As Needed (for seizure activity. do not exceed 2 doses in 24hr). Diazepam gel (diastat)  8/23/21  Patricia Driscoll MD   ondansetron ODT (ZOFRAN-ODT) 4 MG disintegrating tablet Place 1 tablet on the tongue Every 6 (Six) Hours As Needed for Nausea or Vomiting. 5/19/21 8/23/21  Sandeep Fernandez MD   pantoprazole (PROTONIX) 40 MG injection Infuse 10 mL into a venous catheter Every Morning Before Breakfast. Indications: Gastrointestinal (GI) Bleed 1/28/21 8/23/21  Светлана Ladd MD   polyethylene glycol (MIRALAX) packet Take 17 g by mouth Every Night.  8/23/21  Patricia Driscoll MD   sodium chloride 0.9 % solution Infuse 75 mL/hr into a venous catheter Continuous. 1/28/21 8/23/21  Светлана Ladd MD     Allergies   Allergen Reactions   • Haldol [Haloperidol] Unknown (See Comments)     Unknown     • Levaquin [Levofloxacin] Other (See Comments)     Lowers seizure threshold     Social History     Socioeconomic History   • Marital status: Single     Spouse name:  "Not on file   • Number of children: Not on file   • Years of education: Not on file   • Highest education level: Not on file   Tobacco Use   • Smoking status: Never Smoker   • Smokeless tobacco: Never Used   Vaping Use   • Vaping Use: Never used   Substance and Sexual Activity   • Alcohol use: Never   • Drug use: Never   • Sexual activity: Defer       Review of Systems  Review of Systems   Unable to perform ROS: Patient nonverbal        BP 96/67   Pulse 96   Ht 180.3 cm (71\")   Wt 63.4 kg (139 lb 12.8 oz)   BMI 19.50 kg/m²     Objective    Physical Exam  Constitutional:       Appearance: He is well-developed.   HENT:      Head: Normocephalic and atraumatic.   Eyes:      Conjunctiva/sclera: Conjunctivae normal.      Pupils: Pupils are equal, round, and reactive to light.   Neck:      Thyroid: No thyromegaly.   Cardiovascular:      Rate and Rhythm: Normal rate and regular rhythm.      Heart sounds: Normal heart sounds. No murmur heard.     Pulmonary:      Effort: Pulmonary effort is normal.      Breath sounds: Normal breath sounds. No wheezing.   Abdominal:      General: Bowel sounds are normal. There is no distension.      Palpations: Abdomen is soft. There is no mass.      Tenderness: There is no abdominal tenderness.      Hernia: No hernia is present.   Genitourinary:     Comments: No lesions noted  Musculoskeletal:         General: No tenderness. Normal range of motion.      Cervical back: Normal range of motion and neck supple.   Lymphadenopathy:      Cervical: No cervical adenopathy.   Skin:     General: Skin is warm and dry.      Findings: No rash.   Neurological:      Mental Status: He is alert.      Cranial Nerves: No cranial nerve deficit.       Admission on 07/06/2021, Discharged on 07/06/2021   Component Date Value Ref Range Status   • Extra Tube 07/06/2021 Hold for add-ons.   Final    Auto resulted.   • Extra Tube 07/06/2021 hold for add-on   Final    Auto resulted   • Extra Tube 07/06/2021 Hold for " add-ons.   Final    Auto resulted.     Assessment/Plan      1. Adenomatous polyp of colon, unspecified part of colon    1.  Rectal polyp with piecemeal polypectomy and inadequate prep upon colonoscopy in January, repeat colonoscopy for reevaluation and surveillance.  2.  Oropharyngeal dysphagia, on PEG feeds.  3.  Nausea and vomiting, resolved.  4.  Anemia, improving.      Orders placed during this encounter include:  Orders Placed This Encounter   Procedures   • Follow Anesthesia Guidelines / Standing Orders     Standing Status:   Future   • Obtain Informed Consent     Standing Status:   Future     Order Specific Question:   Informed Consent Given For     Answer:   colonoscopy       COLONOSCOPY (N/A)    Review and/or summary of lab tests, radiology, procedures, medications. Review and summary of old records and obtaining of history. The risks and benefits of my recommendations, as well as other treatment options were discussed with the patient and nursing attendant today. Questions were answered.    New Medications Ordered This Visit   Medications   • polyethylene glycol (GoLYTELY) 236 g solution     Sig: Utilize as directed per instructions received from office of Dr. Wanda Lang M.D.     Dispense:  4000 mL     Refill:  0       Follow-up: No follow-ups on file.               Results for orders placed or performed during the hospital encounter of 07/06/21   Gold Top - SST   Result Value Ref Range    Extra Tube Hold for add-ons.    Green Top (Gel)   Result Value Ref Range    Extra Tube Hold for add-ons.    Lavender Top   Result Value Ref Range    Extra Tube hold for add-on    Results for orders placed or performed during the hospital encounter of 06/25/21   COVID-19 and FLU A/B PCR - Swab, Nasopharynx    Specimen: Nasopharynx; Swab   Result Value Ref Range    COVID19 Not Detected Not Detected - Ref. Range    Influenza A PCR Not Detected Not Detected    Influenza B PCR Not Detected Not Detected   Results for  orders placed or performed in visit on 06/24/21   Protime-INR    Specimen: Blood   Result Value Ref Range    Protime 14.3 11.1 - 15.3 Seconds    INR 1.12 0.80 - 1.20   Results for orders placed or performed in visit on 06/21/21   COVID-19, [facility] IN-HOUSE, NP SWAB IN TRANSPORT MEDIA 8-10 HR TAT - Swab, Nasopharynx    Specimen: Nasopharynx; Swab   Result Value Ref Range    COVID19 Not Detected Not Detected - Ref. Range   Results for orders placed or performed in visit on 05/21/21   CBC (No Diff)    Specimen: Blood   Result Value Ref Range    WBC 8.48 3.40 - 10.80 10*3/mm3    RBC 3.53 (L) 4.14 - 5.80 10*6/mm3    Hemoglobin 10.6 (L) 13.0 - 17.7 g/dL    Hematocrit 31.1 (L) 37.5 - 51.0 %    MCV 88.1 79.0 - 97.0 fL    MCH 30.0 26.6 - 33.0 pg    MCHC 34.1 31.5 - 35.7 g/dL    RDW 18.2 (H) 12.3 - 15.4 %    RDW-SD 58.2 (H) 37.0 - 54.0 fl    MPV 11.6 6.0 - 12.0 fL    Platelets 229 140 - 450 10*3/mm3   Results for orders placed or performed during the hospital encounter of 05/19/21   Gold Top - SST   Result Value Ref Range    Extra Tube Hold for add-ons.    Green Top (Gel)   Result Value Ref Range    Extra Tube Hold for add-ons.    CBC Auto Differential    Specimen: Blood   Result Value Ref Range    WBC 10.15 3.40 - 10.80 10*3/mm3    RBC 3.84 (L) 4.14 - 5.80 10*6/mm3    Hemoglobin 11.2 (L) 13.0 - 17.7 g/dL    Hematocrit 33.6 (L) 37.5 - 51.0 %    MCV 87.5 79.0 - 97.0 fL    MCH 29.2 26.6 - 33.0 pg    MCHC 33.3 31.5 - 35.7 g/dL    RDW 19.0 (H) 12.3 - 15.4 %    RDW-SD 60.8 (H) 37.0 - 54.0 fl    MPV 10.2 6.0 - 12.0 fL    Platelets 247 140 - 450 10*3/mm3    Neutrophil % 88.0 (H) 42.7 - 76.0 %    Lymphocyte % 5.9 (L) 19.6 - 45.3 %    Monocyte % 4.7 (L) 5.0 - 12.0 %    Eosinophil % 0.7 0.3 - 6.2 %    Basophil % 0.4 0.0 - 1.5 %    Immature Grans % 0.3 0.0 - 0.5 %    Neutrophils, Absolute 8.93 (H) 1.70 - 7.00 10*3/mm3    Lymphocytes, Absolute 0.60 (L) 0.70 - 3.10 10*3/mm3    Monocytes, Absolute 0.48 0.10 - 0.90 10*3/mm3     Eosinophils, Absolute 0.07 0.00 - 0.40 10*3/mm3    Basophils, Absolute 0.04 0.00 - 0.20 10*3/mm3    Immature Grans, Absolute 0.03 0.00 - 0.05 10*3/mm3    nRBC 0.0 0.0 - 0.2 /100 WBC   Lavender Top   Result Value Ref Range    Extra Tube hold for add-on    Lavender Top   Result Value Ref Range    Extra Tube hold for add-on    Light Blue Top   Result Value Ref Range    Extra Tube hold for add-on    Lipase    Specimen: Blood   Result Value Ref Range    Lipase 17 13 - 60 U/L   Comprehensive Metabolic Panel    Specimen: Blood   Result Value Ref Range    Glucose 93 65 - 99 mg/dL    BUN 28 (H) 8 - 23 mg/dL    Creatinine 0.88 0.76 - 1.27 mg/dL    Sodium 141 136 - 145 mmol/L    Potassium 3.7 3.5 - 5.2 mmol/L    Chloride 105 98 - 107 mmol/L    CO2 27.0 22.0 - 29.0 mmol/L    Calcium 9.6 8.6 - 10.5 mg/dL    Total Protein 6.8 6.0 - 8.5 g/dL    Albumin 3.60 3.50 - 5.20 g/dL    ALT (SGPT) 18 1 - 41 U/L    AST (SGOT) 17 1 - 40 U/L    Alkaline Phosphatase 88 39 - 117 U/L    Total Bilirubin <0.2 0.0 - 1.2 mg/dL    eGFR Non African Amer 87 >60 mL/min/1.73    Globulin 3.2 gm/dL    A/G Ratio 1.1 g/dL    BUN/Creatinine Ratio 31.8 (H) 7.0 - 25.0    Anion Gap 9.0 5.0 - 15.0 mmol/L   Results for orders placed or performed during the hospital encounter of 01/28/21   Respiratory Panel PCR w/COVID-19(SARS-CoV-2) [facility] In-House, NP Swab in [facility], 3-4 HR TAT - Swab, Nasopharynx    Specimen: Nasopharynx; Swab   Result Value Ref Range    ADENOVIRUS, PCR Not Detected Not Detected    Coronavirus 229E Not Detected Not Detected    Coronavirus HKU1 Not Detected Not Detected    Coronavirus NL63 Not Detected Not Detected    Coronavirus OC43 Not Detected Not Detected    COVID19 Not Detected Not Detected - Ref. Range    Human Metapneumovirus Not Detected Not Detected    Human Rhinovirus/Enterovirus Not Detected Not Detected    Influenza A PCR Not Detected Not Detected    Influenza B PCR Not Detected Not Detected    Parainfluenza  Virus 1 Not Detected Not Detected    Parainfluenza Virus 2 Not Detected Not Detected    Parainfluenza Virus 3 Not Detected Not Detected    Parainfluenza Virus 4 Not Detected Not Detected    RSV, PCR Not Detected Not Detected    Bordetella pertussis pcr Not Detected Not Detected    Bordetella parapertussis PCR Not Detected Not Detected    Chlamydophila pneumoniae PCR Not Detected Not Detected    Mycoplasma pneumo by PCR Not Detected Not Detected   CBC Auto Differential    Specimen: Blood   Result Value Ref Range    WBC 6.26 3.40 - 10.80 10*3/mm3    RBC 2.76 (L) 4.14 - 5.80 10*6/mm3    Hemoglobin 8.7 (L) 13.0 - 17.7 g/dL    Hematocrit 24.7 (L) 37.5 - 51.0 %    MCV 89.5 79.0 - 97.0 fL    MCH 31.5 26.6 - 33.0 pg    MCHC 35.2 31.5 - 35.7 g/dL    RDW 13.3 12.3 - 15.4 %    RDW-SD 43.8 37.0 - 54.0 fl    MPV 9.9 6.0 - 12.0 fL    Platelets 308 140 - 450 10*3/mm3    Neutrophil % 67.6 42.7 - 76.0 %    Lymphocyte % 13.4 (L) 19.6 - 45.3 %    Monocyte % 11.8 5.0 - 12.0 %    Eosinophil % 6.1 0.3 - 6.2 %    Basophil % 0.6 0.0 - 1.5 %    Immature Grans % 0.5 0.0 - 0.5 %    Neutrophils, Absolute 4.23 1.70 - 7.00 10*3/mm3    Lymphocytes, Absolute 0.84 0.70 - 3.10 10*3/mm3    Monocytes, Absolute 0.74 0.10 - 0.90 10*3/mm3    Eosinophils, Absolute 0.38 0.00 - 0.40 10*3/mm3    Basophils, Absolute 0.04 0.00 - 0.20 10*3/mm3    Immature Grans, Absolute 0.03 0.00 - 0.05 10*3/mm3    nRBC 0.0 0.0 - 0.2 /100 WBC     *Note: Due to a large number of results and/or encounters for the requested time period, some results have not been displayed. A complete set of results can be found in Results Review.         This document has been electronically signed by Wanda Lang MD on August 23, 2021 10:54 CDT

## 2021-09-01 DIAGNOSIS — F41.9 ANXIETY: ICD-10-CM

## 2021-09-01 DIAGNOSIS — F84.0 AUTISM: ICD-10-CM

## 2021-09-01 RX ORDER — LORAZEPAM 1 MG/1
TABLET ORAL
Qty: 60 TABLET | Refills: 5 | Status: SHIPPED | OUTPATIENT
Start: 2021-09-01 | End: 2022-02-02

## 2021-09-02 ENCOUNTER — HOSPITAL ENCOUNTER (OUTPATIENT)
Facility: HOSPITAL | Age: 66
Setting detail: HOSPITAL OUTPATIENT SURGERY
Discharge: HOME | End: 2021-09-02
Attending: INTERNAL MEDICINE | Admitting: INTERNAL MEDICINE

## 2021-09-02 ENCOUNTER — ANESTHESIA EVENT (OUTPATIENT)
Dept: GASTROENTEROLOGY | Facility: HOSPITAL | Age: 66
End: 2021-09-02

## 2021-09-02 ENCOUNTER — ANESTHESIA (OUTPATIENT)
Dept: GASTROENTEROLOGY | Facility: HOSPITAL | Age: 66
End: 2021-09-02

## 2021-09-02 VITALS
HEIGHT: 71 IN | SYSTOLIC BLOOD PRESSURE: 95 MMHG | RESPIRATION RATE: 18 BRPM | OXYGEN SATURATION: 95 % | WEIGHT: 142.86 LBS | HEART RATE: 63 BPM | TEMPERATURE: 96.3 F | BODY MASS INDEX: 20 KG/M2 | DIASTOLIC BLOOD PRESSURE: 68 MMHG

## 2021-09-02 DIAGNOSIS — D12.6 ADENOMATOUS POLYP OF COLON, UNSPECIFIED PART OF COLON: ICD-10-CM

## 2021-09-02 PROCEDURE — 45385 COLONOSCOPY W/LESION REMOVAL: CPT | Performed by: INTERNAL MEDICINE

## 2021-09-02 PROCEDURE — 25010000002 PROPOFOL 10 MG/ML EMULSION: Performed by: NURSE ANESTHETIST, CERTIFIED REGISTERED

## 2021-09-02 PROCEDURE — 88305 TISSUE EXAM BY PATHOLOGIST: CPT

## 2021-09-02 RX ORDER — PROPOFOL 10 MG/ML
VIAL (ML) INTRAVENOUS AS NEEDED
Status: DISCONTINUED | OUTPATIENT
Start: 2021-09-02 | End: 2021-09-02 | Stop reason: SURG

## 2021-09-02 RX ORDER — DEXTROSE AND SODIUM CHLORIDE 5; .45 G/100ML; G/100ML
30 INJECTION, SOLUTION INTRAVENOUS CONTINUOUS PRN
Status: DISCONTINUED | OUTPATIENT
Start: 2021-09-02 | End: 2021-09-02 | Stop reason: HOSPADM

## 2021-09-02 RX ADMIN — PROPOFOL 50 MG: 10 INJECTION, EMULSION INTRAVENOUS at 14:10

## 2021-09-02 RX ADMIN — PROPOFOL 20 MG: 10 INJECTION, EMULSION INTRAVENOUS at 14:17

## 2021-09-02 RX ADMIN — PROPOFOL 30 MG: 10 INJECTION, EMULSION INTRAVENOUS at 14:15

## 2021-09-02 RX ADMIN — DEXTROSE AND SODIUM CHLORIDE 30 ML/HR: 5; 450 INJECTION, SOLUTION INTRAVENOUS at 14:01

## 2021-09-02 RX ADMIN — PROPOFOL 20 MG: 10 INJECTION, EMULSION INTRAVENOUS at 14:24

## 2021-09-02 RX ADMIN — PROPOFOL 20 MG: 10 INJECTION, EMULSION INTRAVENOUS at 14:20

## 2021-09-02 RX ADMIN — PROPOFOL 30 MG: 10 INJECTION, EMULSION INTRAVENOUS at 14:13

## 2021-09-02 RX ADMIN — PROPOFOL 30 MG: 10 INJECTION, EMULSION INTRAVENOUS at 14:11

## 2021-09-02 NOTE — NURSING NOTE
I spoke with Valdemar Grubbs (guardian) to obtain phone consent for anesthesia & colonoscopy.   
Murphy ([employer] Employee) present at bedside & will remain in Waiting room during the procedure.   
verbal instruction

## 2021-09-02 NOTE — ANESTHESIA PREPROCEDURE EVALUATION
Anesthesia Evaluation     NPO Solid Status: > 8 hours  NPO Liquid Status: > 2 hours           Airway   Mallampati: II  TM distance: >3 FB  Neck ROM: full  Possible difficult intubation  Dental    (+) poor dentition    Pulmonary - normal exam   (+) pneumonia , COPD,   Cardiovascular - normal exam    (+) hypertension, dysrhythmias, hyperlipidemia,       Neuro/Psych  (+) seizures, syncope, psychiatric history,     GI/Hepatic/Renal/Endo    (+)  GERD, GI bleeding ,     Musculoskeletal     Abdominal    Substance History      OB/GYN          Other   arthritis,                    Anesthesia Plan    ASA 3     MAC     intravenous induction     Anesthetic plan, all risks, benefits, and alternatives have been provided, discussed and informed consent has been obtained with: patient.

## 2021-09-02 NOTE — ANESTHESIA POSTPROCEDURE EVALUATION
Patient: Bautista Grubbs    Procedure Summary     Date: 09/02/21 Room / Location: [facility] ENDOSCOPY 4 / [facility] ENDOSCOPY    Anesthesia Start: 1403 Anesthesia Stop: 1430    Procedure: COLONOSCOPY (N/A ) Diagnosis:       Adenomatous polyp of colon, unspecified part of colon      (Adenomatous polyp of colon, unspecified part of colon [D12.6])    Surgeons: Wanda Lang MD Provider: Leodan Navas CRNA    Anesthesia Type: MAC ASA Status: 3          Anesthesia Type: MAC    Vitals  No vitals data found for the desired time range.          Post Anesthesia Care and Evaluation    Patient location during evaluation: bedside  Patient participation: waiting for patient participation  Level of consciousness: responsive to verbal stimuli  Pain management: adequate  Airway patency: patent  Anesthetic complications: No anesthetic complications  PONV Status: none  Cardiovascular status: acceptable  Respiratory status: acceptable  Hydration status: acceptable    Comments: -----------------  ---------------------------

## 2021-09-08 LAB
LAB AP CASE REPORT: NORMAL
PATH REPORT.FINAL DX SPEC: NORMAL

## 2021-09-09 ENCOUNTER — OFFICE VISIT (OUTPATIENT)
Dept: GASTROENTEROLOGY | Facility: CLINIC | Age: 66
End: 2021-09-09

## 2021-09-09 VITALS — WEIGHT: 138.6 LBS | BODY MASS INDEX: 19.4 KG/M2 | HEIGHT: 71 IN

## 2021-09-09 DIAGNOSIS — D12.6 ADENOMATOUS POLYP OF COLON, UNSPECIFIED PART OF COLON: Primary | ICD-10-CM

## 2021-09-09 PROCEDURE — 99213 OFFICE O/P EST LOW 20 MIN: CPT | Performed by: INTERNAL MEDICINE

## 2021-09-09 RX ORDER — DEXTROSE AND SODIUM CHLORIDE 5; .45 G/100ML; G/100ML
30 INJECTION, SOLUTION INTRAVENOUS CONTINUOUS PRN
Status: CANCELLED | OUTPATIENT
Start: 2021-10-11

## 2021-09-09 NOTE — PATIENT INSTRUCTIONS
"BMI for Adults  What is BMI?  Body mass index (BMI) is a number that is calculated from a person's weight and height. BMI can help estimate how much of a person's weight is composed of fat. BMI does not measure body fat directly. Rather, it is an alternative to procedures that directly measure body fat, which can be difficult and expensive.  BMI can help identify people who may be at higher risk for certain medical problems.  What are BMI measurements used for?  BMI is used as a screening tool to identify possible weight problems. It helps determine whether a person is obese, overweight, a healthy weight, or underweight.  BMI is useful for:  · Identifying a weight problem that may be related to a medical condition or may increase the risk for medical problems.  · Promoting changes, such as changes in diet and exercise, to help reach a healthy weight. BMI screening can be repeated to see if these changes are working.  How is BMI calculated?  BMI involves measuring your weight in relation to your height. Both height and weight are measured, and the BMI is calculated from those numbers. This can be done either in English ([place] or metric measurements. Note that charts and online BMI calculators are available to help you find your BMI quickly and easily without having to do these calculations yourself.  To calculate your BMI in English ([place] measurements:    1. Measure your weight in pounds (lb).  2. Multiply the number of pounds by 703.  ? For example, for a person who weighs 180 lb, multiply that number by 703, which equals 126,540.  3. Measure your height in inches. Then multiply that number by itself to get a measurement called \"inches squared.\"  ? For example, for a person who is 70 inches tall, the \"inches squared\" measurement is 70 inches x 70 inches, which equals 4,900 inches squared.  4. Divide the total from step 2 (number of lb x 703) by the total from step 3 (inches squared): 126,540 ÷ 4,900 = 25.8. This is " "your BMI.    To calculate your BMI in metric measurements:  1. Measure your weight in kilograms (kg).  2. Measure your height in meters (m). Then multiply that number by itself to get a measurement called \"meters squared.\"  ? For example, for a person who is 1.75 m tall, the \"meters squared\" measurement is 1.75 m x 1.75 m, which is equal to 3.1 meters squared.  3. Divide the number of kilograms (your weight) by the meters squared number. In this example: 70 ÷ 3.1 = 22.6. This is your BMI.  What do the results mean?  BMI charts are used to identify whether you are underweight, normal weight, overweight, or obese. The following guidelines will be used:  · Underweight: BMI less than 18.5.  · Normal weight: BMI between 18.5 and 24.9.  · Overweight: BMI between 25 and 29.9.  · Obese: BMI of 30 or above.  Keep these notes in mind:  · Weight includes both fat and muscle, so someone with a muscular build, such as an athlete, may have a BMI that is higher than 24.9. In cases like these, BMI is not an accurate measure of body fat.  · To determine if excess body fat is the cause of a BMI of 25 or higher, further assessments may need to be done by a health care provider.  · BMI is usually interpreted in the same way for men and women.  Where to find more information  For more information about BMI, including tools to quickly calculate your BMI, go to these websites:  · Centers for Disease Control and Prevention: www.cdc.gov  · American Heart Association: www.heart.org  · National Heart, Lung, and Blood [place]: www.nhlbi.nih.gov  Summary  · Body mass index (BMI) is a number that is calculated from a person's weight and height.  · BMI may help estimate how much of a person's weight is composed of fat. BMI can help identify those who may be at higher risk for certain medical problems.  · BMI can be measured using English measurements or metric measurements.  · BMI charts are used to identify whether you are underweight, normal " weight, overweight, or obese.  This information is not intended to replace advice given to you by your health care provider. Make sure you discuss any questions you have with your health care provider.  Document Revised: 09/09/2020 Document Reviewed: 07/17/2020  Elsevier Patient Education © 2021 [employer]

## 2021-09-09 NOTE — PROGRESS NOTES
Chief Complaint   Patient presents with   • endo f/u       Subjective    Bautista Grubbs is a 65 y.o. male.    History of Present Illness  Patient presented to GI clinic for follow-up visit today. He remains nonverbal. No GI complaints at this time per nursing staff. Colonoscopy was consistent with large rectal polyp status post piecemeal polypectomy.       The following portions of the patient's history were reviewed and updated as appropriate:   Past Medical History:   Diagnosis Date   • Alopecia    • Arthritis    • Autism    • COPD (chronic obstructive pulmonary disease) (CMS/HCC)    • GERD (gastroesophageal reflux disease)    • Hyperlipidemia    • Hypertension    • Insomnia    • Intellectual disability    • Lennox-Gastaut syndrome (CMS/HCC)    • Major depressive disorder    • Orthostatic hypotension    • Osteoporosis    • Right bundle branch block    • Scoliosis    • Seizures (CMS/[facility])      Past Surgical History:   Procedure Laterality Date   • COLONOSCOPY N/A 1/8/2021    Procedure: COLONOSCOPY;  Surgeon: Wanda Lang MD;  Location: [facility] ENDOSCOPY;  Service: Gastroenterology;  Laterality: N/A;   • COLONOSCOPY N/A 9/2/2021    Procedure: COLONOSCOPY;  Surgeon: Wanda Lang MD;  Location: [facility] ENDOSCOPY;  Service: Gastroenterology;  Laterality: N/A;   • ENDOSCOPY N/A 1/7/2021    Procedure: ESOPHAGOGASTRODUODENOSCOPY;  Surgeon: Wanda Lang MD;  Location: [facility] ENDOSCOPY;  Service: Gastroenterology;  Laterality: N/A;   • ENDOSCOPY N/A 1/22/2021    Procedure: ESOPHAGOGASTRODUODENOSCOPY;  Surgeon: Wanda Lang MD;  Location: [facility] ENDOSCOPY;  Service: Gastroenterology;  Laterality: N/A;   • ENDOSCOPY W/ PEG TUBE PLACEMENT N/A 6/25/2021    Procedure: ESOPHAGOGASTRODUODENOSCOPY WITH PERCUTANEOUS ENDOSCOPIC GASTROSTOMY TUBE INSERTION WITH ANESTHESIA;  Surgeon: Wanda Lang MD;  Location: [facility] ENDOSCOPY;  Service: Gastroenterology;  Laterality: N/A;   • UPPER GASTROINTESTINAL  ENDOSCOPY  01/22/2021   • UPPER GASTROINTESTINAL ENDOSCOPY  06/25/2021     Family History   Problem Relation Age of Onset   • Hypertension Father        Prior to Admission medications    Medication Sig Start Date End Date Taking? Authorizing Provider   calcium carbonate-vitamin d (CALCIUM 600+D) 600-400 MG-UNIT per tablet Take 1 tablet by mouth 2 (Two) Times a Day. Per G-Tube   Yes Patricia Driscoll MD   denosumab (Prolia) 60 MG/ML solution prefilled syringe syringe Inject 60 mg under the skin into the appropriate area as directed. Every 6 Months   Yes Patricia Driscoll MD   DIAZEPAM RE Insert 10 mg into the rectum As Needed (For Seizure Activity).   Yes Patricia Driscoll MD   famotidine (PEPCID) 20 MG tablet Take 20 mg by mouth Daily. Per G-Tube   Yes Patricia Driscoll MD   ferrous sulfate 325 (65 FE) MG tablet Take 325 mg by mouth Daily With Breakfast. Per G-Tube   Yes Patricia Driscoll MD   lamoTRIgine (LaMICtal) 200 MG tablet Take 200 mg by mouth 2 (Two) Times a Day. Per G-Tube   Yes Patricia Driscoll MD   lamoTRIgine (LaMICtal) 25 MG tablet Take 25 mg by mouth 2 (Two) Times a Day. Per G-Tube To Equal 225 MG   Yes Patricia Driscoll MD   levETIRAcetam (KEPPRA) 1000 MG tablet Take 1,000 mg by mouth 2 (Two) Times a Day. Per G-Tube   Yes Patricia Driscoll MD   levETIRAcetam (KEPPRA) 750 MG tablet Take 750 mg by mouth 2 (Two) Times a Day. Per G-Tube To Equal 1750 MG   Yes Patricia Driscoll MD   LORazepam (ATIVAN) 1 MG tablet GIVE ONE TABLET PER G-TUBE TWICE DAILY FOR ANXIETY AND AGITATION 9/1/21  Yes Christelle Smith APRN   midodrine (PROAMATINE) 5 MG tablet Take 1 tablet by mouth 3 (Three) Times a Day.  Patient taking differently: Take 10 mg by mouth 3 (Three) Times a Day. Per G-Tube 2/4/19  Yes Earnestine Reid MD   Nutritional Supplements (JEVITY 1.5 JEFFERY PO) Take  by mouth. Gets 390mL bolus every 6 hours 1A-7A-1P-7P   Yes Patricia Driscoll MD   simvastatin (ZOCOR) 20 MG  "tablet Take 20 mg by mouth Daily. Per G-Tube   Yes ProviderPatricia MD   sucralfate (CARAFATE) 1 g tablet Take 1 tablet by mouth 4 (Four) Times a Day Before Meals & at Bedtime.  Patient taking differently: Take 1 g by mouth 4 (Four) Times a Day Before Meals & at Bedtime. Per G-Tube 5/19/21  Yes Sandeep Fernandez MD   thioridazine (MELLARIL) 100 MG tablet Take 200 mg by mouth 2 (Two) Times a Day. Per G-Tube   Yes ProviderPatricia MD   polyethylene glycol (GoLYTELY) 236 g solution Utilize as directed per instructions received from office of Dr. Wanda Lang M.D. 8/23/21 9/9/21 Yes Wanda Lang MD     Allergies   Allergen Reactions   • Haldol [Haloperidol] Unknown (See Comments)     Unknown     • Levaquin [Levofloxacin] Other (See Comments)     Lowers seizure threshold     Social History     Socioeconomic History   • Marital status: Single     Spouse name: Not on file   • Number of children: Not on file   • Years of education: Not on file   • Highest education level: Not on file   Tobacco Use   • Smoking status: Never Smoker   • Smokeless tobacco: Never Used   Vaping Use   • Vaping Use: Never used   Substance and Sexual Activity   • Alcohol use: Never   • Drug use: Never   • Sexual activity: Defer       Review of Systems  Review of Systems   Unable to perform ROS: Patient nonverbal        Ht 180.3 cm (71\")   Wt 62.9 kg (138 lb 9.6 oz)   BMI 19.33 kg/m²     Objective    Physical Exam  Constitutional:       Appearance: He is well-developed.   HENT:      Head: Normocephalic and atraumatic.   Eyes:      Conjunctiva/sclera: Conjunctivae normal.      Pupils: Pupils are equal, round, and reactive to light.   Neck:      Thyroid: No thyromegaly.   Cardiovascular:      Rate and Rhythm: Normal rate and regular rhythm.      Heart sounds: Normal heart sounds. No murmur heard.     Pulmonary:      Effort: Pulmonary effort is normal.      Breath sounds: Normal breath sounds. No wheezing.   Abdominal:      " General: Bowel sounds are normal. There is no distension.      Palpations: Abdomen is soft. There is no mass.      Tenderness: There is no abdominal tenderness.      Hernia: No hernia is present.   Genitourinary:     Comments: No lesions noted  Musculoskeletal:         General: No tenderness. Normal range of motion.      Cervical back: Normal range of motion and neck supple.   Lymphadenopathy:      Cervical: No cervical adenopathy.   Skin:     General: Skin is warm and dry.      Findings: No rash.   Neurological:      Mental Status: He is alert.      Cranial Nerves: No cranial nerve deficit.       Admission on 09/02/2021, Discharged on 09/02/2021   Component Date Value Ref Range Status   • Case Report 09/02/2021    Final                    Value:Surgical Pathology Report                         Case: [id number]                                  Authorizing Provider:  Wanda Lang MD      Collected:           09/02/2021 02:22 PM          Ordering Location:     [facility]   Received:            09/03/2021 07:06 AM                                 [place] [facility]                                                     Pathologist:           Fitz Tavarez MD                                                           Specimen:    Large Intestine, Rectum, rectal polyp  cold snare by cj                                   • Final Diagnosis 09/02/2021    Final                    Value:This result contains rich text formatting which cannot be displayed here.     Assessment/Plan      1. Adenomatous polyp of colon, unspecified part of colon    1.  Rectal polyp with piecemeal polypectomy, schedule flex sig in 1 month for reevaluation and repeat colonoscopy in 1 year surveillance.  2.  Oropharyngeal dysphagia, on PEG feeds.  3.  Nausea and vomiting, resolved.  4.  Anemia, improving.         Orders placed during this encounter include:  Orders Placed This Encounter   Procedures   • Follow Anesthesia  Guidelines / Standing Orders     Standing Status:   Future   • Obtain Informed Consent     Standing Status:   Future     Order Specific Question:   Informed Consent Given For     Answer:   flexible sigmoidoscopy       SIGMOIDOSCOPY FLEXIBLE (N/A)    Review and/or summary of lab tests, radiology, procedures, medications. Review and summary of old records and obtaining of history. The risks and benefits of my recommendations, as well as other treatment options were discussed with the patient and nursing attendant today. Questions were answered.    No orders of the defined types were placed in this encounter.      Follow-up: Return in about 2 months (around 11/9/2021).               Results for orders placed or performed during the hospital encounter of 09/02/21   Tissue Pathology Exam    Specimen: Large Intestine, Rectum; Tissue   Result Value Ref Range    Case Report       Surgical Pathology Report                         Case: [id number]                                  Authorizing Provider:  Wanda Lang MD      Collected:           09/02/2021 02:22 PM          Ordering Location:     [facility]   Received:            09/03/2021 07:06 AM                                 [place] [facility]                                                     Pathologist:           Fitz Tavarez MD                                                           Specimen:    Large Intestine, Rectum, rectal polyp  cold snare by cj                                    Final Diagnosis       See Scanned Report       Results for orders placed or performed during the hospital encounter of 07/06/21   Gold Top - SST   Result Value Ref Range    Extra Tube Hold for add-ons.    Green Top (Gel)   Result Value Ref Range    Extra Tube Hold for add-ons.    Lavender Top   Result Value Ref Range    Extra Tube hold for add-on    Results for orders placed or performed during the hospital encounter of 06/25/21   COVID-19 and FLU A/B PCR  - Swab, Nasopharynx    Specimen: Nasopharynx; Swab   Result Value Ref Range    COVID19 Not Detected Not Detected - Ref. Range    Influenza A PCR Not Detected Not Detected    Influenza B PCR Not Detected Not Detected   Results for orders placed or performed in visit on 06/24/21   Protime-INR    Specimen: Blood   Result Value Ref Range    Protime 14.3 11.1 - 15.3 Seconds    INR 1.12 0.80 - 1.20   Results for orders placed or performed in visit on 06/21/21   COVID-19,  [facility] IN-HOUSE, NP SWAB IN TRANSPORT MEDIA 8-10 HR TAT - Swab, Nasopharynx    Specimen: Nasopharynx; Swab   Result Value Ref Range    COVID19 Not Detected Not Detected - Ref. Range   Results for orders placed or performed in visit on 05/21/21   CBC (No Diff)    Specimen: Blood   Result Value Ref Range    WBC 8.48 3.40 - 10.80 10*3/mm3    RBC 3.53 (L) 4.14 - 5.80 10*6/mm3    Hemoglobin 10.6 (L) 13.0 - 17.7 g/dL    Hematocrit 31.1 (L) 37.5 - 51.0 %    MCV 88.1 79.0 - 97.0 fL    MCH 30.0 26.6 - 33.0 pg    MCHC 34.1 31.5 - 35.7 g/dL    RDW 18.2 (H) 12.3 - 15.4 %    RDW-SD 58.2 (H) 37.0 - 54.0 fl    MPV 11.6 6.0 - 12.0 fL    Platelets 229 140 - 450 10*3/mm3   Results for orders placed or performed during the hospital encounter of 05/19/21   Gold Top - SST   Result Value Ref Range    Extra Tube Hold for add-ons.    Green Top (Gel)   Result Value Ref Range    Extra Tube Hold for add-ons.    CBC Auto Differential    Specimen: Blood   Result Value Ref Range    WBC 10.15 3.40 - 10.80 10*3/mm3    RBC 3.84 (L) 4.14 - 5.80 10*6/mm3    Hemoglobin 11.2 (L) 13.0 - 17.7 g/dL    Hematocrit 33.6 (L) 37.5 - 51.0 %    MCV 87.5 79.0 - 97.0 fL    MCH 29.2 26.6 - 33.0 pg    MCHC 33.3 31.5 - 35.7 g/dL    RDW 19.0 (H) 12.3 - 15.4 %    RDW-SD 60.8 (H) 37.0 - 54.0 fl    MPV 10.2 6.0 - 12.0 fL    Platelets 247 140 - 450 10*3/mm3    Neutrophil % 88.0 (H) 42.7 - 76.0 %    Lymphocyte % 5.9 (L) 19.6 - 45.3 %    Monocyte % 4.7 (L) 5.0 - 12.0 %    Eosinophil % 0.7 0.3 - 6.2 %     Basophil % 0.4 0.0 - 1.5 %    Immature Grans % 0.3 0.0 - 0.5 %    Neutrophils, Absolute 8.93 (H) 1.70 - 7.00 10*3/mm3    Lymphocytes, Absolute 0.60 (L) 0.70 - 3.10 10*3/mm3    Monocytes, Absolute 0.48 0.10 - 0.90 10*3/mm3    Eosinophils, Absolute 0.07 0.00 - 0.40 10*3/mm3    Basophils, Absolute 0.04 0.00 - 0.20 10*3/mm3    Immature Grans, Absolute 0.03 0.00 - 0.05 10*3/mm3    nRBC 0.0 0.0 - 0.2 /100 WBC   Lavender Top   Result Value Ref Range    Extra Tube hold for add-on    Lavender Top   Result Value Ref Range    Extra Tube hold for add-on    Light Blue Top   Result Value Ref Range    Extra Tube hold for add-on    Lipase    Specimen: Blood   Result Value Ref Range    Lipase 17 13 - 60 U/L   Comprehensive Metabolic Panel    Specimen: Blood   Result Value Ref Range    Glucose 93 65 - 99 mg/dL    BUN 28 (H) 8 - 23 mg/dL    Creatinine 0.88 0.76 - 1.27 mg/dL    Sodium 141 136 - 145 mmol/L    Potassium 3.7 3.5 - 5.2 mmol/L    Chloride 105 98 - 107 mmol/L    CO2 27.0 22.0 - 29.0 mmol/L    Calcium 9.6 8.6 - 10.5 mg/dL    Total Protein 6.8 6.0 - 8.5 g/dL    Albumin 3.60 3.50 - 5.20 g/dL    ALT (SGPT) 18 1 - 41 U/L    AST (SGOT) 17 1 - 40 U/L    Alkaline Phosphatase 88 39 - 117 U/L    Total Bilirubin <0.2 0.0 - 1.2 mg/dL    eGFR Non African Amer 87 >60 mL/min/1.73    Globulin 3.2 gm/dL    A/G Ratio 1.1 g/dL    BUN/Creatinine Ratio 31.8 (H) 7.0 - 25.0    Anion Gap 9.0 5.0 - 15.0 mmol/L   Results for orders placed or performed during the hospital encounter of 01/28/21   Respiratory Panel PCR w/COVID-19(SARS-CoV-2) [facility] In-House, NP Swab in UTM/VTM, 3-4 HR TAT - Swab, Nasopharynx    Specimen: Nasopharynx; Swab   Result Value Ref Range    ADENOVIRUS, PCR Not Detected Not Detected    Coronavirus 229E Not Detected Not Detected    Coronavirus HKU1 Not Detected Not Detected    Coronavirus NL63 Not Detected Not Detected    Coronavirus OC43 Not Detected Not Detected    COVID19 Not Detected Not Detected - Ref.  Range    Human Metapneumovirus Not Detected Not Detected    Human Rhinovirus/Enterovirus Not Detected Not Detected    Influenza A PCR Not Detected Not Detected    Influenza B PCR Not Detected Not Detected    Parainfluenza Virus 1 Not Detected Not Detected    Parainfluenza Virus 2 Not Detected Not Detected    Parainfluenza Virus 3 Not Detected Not Detected    Parainfluenza Virus 4 Not Detected Not Detected    RSV, PCR Not Detected Not Detected    Bordetella pertussis pcr Not Detected Not Detected    Bordetella parapertussis PCR Not Detected Not Detected    Chlamydophila pneumoniae PCR Not Detected Not Detected    Mycoplasma pneumo by PCR Not Detected Not Detected   CBC Auto Differential    Specimen: Blood   Result Value Ref Range    WBC 6.26 3.40 - 10.80 10*3/mm3    RBC 2.76 (L) 4.14 - 5.80 10*6/mm3    Hemoglobin 8.7 (L) 13.0 - 17.7 g/dL    Hematocrit 24.7 (L) 37.5 - 51.0 %    MCV 89.5 79.0 - 97.0 fL    MCH 31.5 26.6 - 33.0 pg    MCHC 35.2 31.5 - 35.7 g/dL    RDW 13.3 12.3 - 15.4 %    RDW-SD 43.8 37.0 - 54.0 fl    MPV 9.9 6.0 - 12.0 fL    Platelets 308 140 - 450 10*3/mm3    Neutrophil % 67.6 42.7 - 76.0 %    Lymphocyte % 13.4 (L) 19.6 - 45.3 %    Monocyte % 11.8 5.0 - 12.0 %    Eosinophil % 6.1 0.3 - 6.2 %    Basophil % 0.6 0.0 - 1.5 %    Immature Grans % 0.5 0.0 - 0.5 %    Neutrophils, Absolute 4.23 1.70 - 7.00 10*3/mm3    Lymphocytes, Absolute 0.84 0.70 - 3.10 10*3/mm3    Monocytes, Absolute 0.74 0.10 - 0.90 10*3/mm3    Eosinophils, Absolute 0.38 0.00 - 0.40 10*3/mm3    Basophils, Absolute 0.04 0.00 - 0.20 10*3/mm3    Immature Grans, Absolute 0.03 0.00 - 0.05 10*3/mm3    nRBC 0.0 0.0 - 0.2 /100 WBC     *Note: Due to a large number of results and/or encounters for the requested time period, some results have not been displayed. A complete set of results can be found in Results Review.         This document has been electronically signed by Wanda Lang MD on September 9, 2021 11:53 CDT

## 2021-09-15 ENCOUNTER — TELEPHONE (OUTPATIENT)
Dept: FAMILY MEDICINE CLINIC | Facility: CLINIC | Age: 66
End: 2021-09-15

## 2021-09-15 NOTE — TELEPHONE ENCOUNTER
Caller: Ladonna Mohan/[employer]    Relationship to patient:     Best call back number: [phone]    They are needing to know Dr. Murphy opinion on some fall that the patient has had. Also, they would like to discuss the quality of care the patient is receiving at the current facility. Please advise.

## 2021-10-11 ENCOUNTER — ANESTHESIA (OUTPATIENT)
Dept: GASTROENTEROLOGY | Facility: HOSPITAL | Age: 66
End: 2021-10-11

## 2021-10-11 ENCOUNTER — HOSPITAL ENCOUNTER (OUTPATIENT)
Facility: HOSPITAL | Age: 66
Setting detail: HOSPITAL OUTPATIENT SURGERY
Discharge: HOME | End: 2021-10-11
Attending: INTERNAL MEDICINE | Admitting: INTERNAL MEDICINE

## 2021-10-11 ENCOUNTER — ANESTHESIA EVENT (OUTPATIENT)
Dept: GASTROENTEROLOGY | Facility: HOSPITAL | Age: 66
End: 2021-10-11

## 2021-10-11 VITALS
HEIGHT: 69 IN | SYSTOLIC BLOOD PRESSURE: 90 MMHG | TEMPERATURE: 96.4 F | OXYGEN SATURATION: 97 % | HEART RATE: 64 BPM | BODY MASS INDEX: 21.03 KG/M2 | RESPIRATION RATE: 20 BRPM | DIASTOLIC BLOOD PRESSURE: 64 MMHG | WEIGHT: 142 LBS

## 2021-10-11 DIAGNOSIS — D12.6 ADENOMATOUS POLYP OF COLON, UNSPECIFIED PART OF COLON: ICD-10-CM

## 2021-10-11 PROCEDURE — 88305 TISSUE EXAM BY PATHOLOGIST: CPT

## 2021-10-11 PROCEDURE — 25010000002 PROPOFOL 10 MG/ML EMULSION: Performed by: NURSE ANESTHETIST, CERTIFIED REGISTERED

## 2021-10-11 PROCEDURE — 45338 SIGMOIDOSCOPY W/TUMR REMOVE: CPT | Performed by: INTERNAL MEDICINE

## 2021-10-11 RX ORDER — MEPERIDINE HYDROCHLORIDE 25 MG/ML
12.5 INJECTION INTRAMUSCULAR; INTRAVENOUS; SUBCUTANEOUS
Status: DISCONTINUED | OUTPATIENT
Start: 2021-10-11 | End: 2021-10-11 | Stop reason: HOSPADM

## 2021-10-11 RX ORDER — LIDOCAINE HYDROCHLORIDE 20 MG/ML
INJECTION, SOLUTION INTRAVENOUS AS NEEDED
Status: DISCONTINUED | OUTPATIENT
Start: 2021-10-11 | End: 2021-10-11 | Stop reason: SURG

## 2021-10-11 RX ORDER — PROMETHAZINE HYDROCHLORIDE 25 MG/1
25 TABLET ORAL ONCE AS NEEDED
Status: DISCONTINUED | OUTPATIENT
Start: 2021-10-11 | End: 2021-10-11 | Stop reason: HOSPADM

## 2021-10-11 RX ORDER — DEXTROSE AND SODIUM CHLORIDE 5; .45 G/100ML; G/100ML
30 INJECTION, SOLUTION INTRAVENOUS CONTINUOUS PRN
Status: DISCONTINUED | OUTPATIENT
Start: 2021-10-11 | End: 2021-10-11 | Stop reason: HOSPADM

## 2021-10-11 RX ORDER — PROMETHAZINE HYDROCHLORIDE 25 MG/1
25 SUPPOSITORY RECTAL ONCE AS NEEDED
Status: DISCONTINUED | OUTPATIENT
Start: 2021-10-11 | End: 2021-10-11 | Stop reason: HOSPADM

## 2021-10-11 RX ORDER — ONDANSETRON 2 MG/ML
4 INJECTION INTRAMUSCULAR; INTRAVENOUS ONCE AS NEEDED
Status: DISCONTINUED | OUTPATIENT
Start: 2021-10-11 | End: 2021-10-11 | Stop reason: HOSPADM

## 2021-10-11 RX ORDER — PROPOFOL 10 MG/ML
VIAL (ML) INTRAVENOUS AS NEEDED
Status: DISCONTINUED | OUTPATIENT
Start: 2021-10-11 | End: 2021-10-11 | Stop reason: SURG

## 2021-10-11 RX ADMIN — DEXTROSE AND SODIUM CHLORIDE: 5; 450 INJECTION, SOLUTION INTRAVENOUS at 11:39

## 2021-10-11 RX ADMIN — PROPOFOL 100 MG: 10 INJECTION, EMULSION INTRAVENOUS at 11:49

## 2021-10-11 RX ADMIN — PROPOFOL 10 MG: 10 INJECTION, EMULSION INTRAVENOUS at 11:53

## 2021-10-11 RX ADMIN — LIDOCAINE HYDROCHLORIDE 20 MG: 20 INJECTION, SOLUTION INTRAVENOUS at 11:49

## 2021-10-11 NOTE — ANESTHESIA PREPROCEDURE EVALUATION
Anesthesia Evaluation     Patient summary reviewed and Nursing notes reviewed   NPO Solid Status: > 8 hours  NPO Liquid Status: > 8 hours           Airway   Comment: Does not cooperate with airway exam  Dental      Pulmonary - normal exam   (+) COPD,   (-) pneumonia  Cardiovascular - normal exam    (+) hypertension well controlled, hyperlipidemia,   (-) dysrhythmias      Neuro/Psych  (+) seizures, syncope, psychiatric history Bipolar,       ROS Comment: autism  GI/Hepatic/Renal/Endo    (+)  GERD, GI bleeding lower ,     Musculoskeletal     Abdominal  - normal exam   Substance History - negative use     OB/GYN negative ob/gyn ROS         Other   arthritis,                      Anesthesia Plan    ASA 3     MAC     intravenous induction     Anesthetic plan, all risks, benefits, and alternatives have been provided, discussed and informed consent has been obtained with: child.

## 2021-10-11 NOTE — H&P
No chief complaint on file.      Subjective    Bautista Grubbs is a 65 y.o. male.    History of Present Illness  Patient presented to GI clinic for follow-up visit today. He remains nonverbal. No GI complaints at this time per nursing staff. Colonoscopy was consistent with large rectal polyp status post piecemeal polypectomy.       The following portions of the patient's history were reviewed and updated as appropriate:   Past Medical History:   Diagnosis Date   • Alopecia    • Arthritis    • Autism    • COPD (chronic obstructive pulmonary disease) (HCC)    • GERD (gastroesophageal reflux disease)    • Hyperlipidemia    • Hypertension    • Insomnia    • Intellectual disability    • Lennox-Gastaut syndrome (HCC)    • Major depressive disorder    • Orthostatic hypotension    • Osteoporosis    • Right bundle branch block    • Scoliosis    • Seizures (Formerly [facility])      Past Surgical History:   Procedure Laterality Date   • COLONOSCOPY N/A 1/8/2021    Procedure: COLONOSCOPY;  Surgeon: Wanda Lang MD;  Location: [facility] ENDOSCOPY;  Service: Gastroenterology;  Laterality: N/A;   • COLONOSCOPY N/A 9/2/2021    Procedure: COLONOSCOPY;  Surgeon: Wanda Lang MD;  Location: [facility] ENDOSCOPY;  Service: Gastroenterology;  Laterality: N/A;   • ENDOSCOPY N/A 1/7/2021    Procedure: ESOPHAGOGASTRODUODENOSCOPY;  Surgeon: Wanda Lang MD;  Location: [facility] ENDOSCOPY;  Service: Gastroenterology;  Laterality: N/A;   • ENDOSCOPY N/A 1/22/2021    Procedure: ESOPHAGOGASTRODUODENOSCOPY;  Surgeon: Wanda Lang MD;  Location: [facility] ENDOSCOPY;  Service: Gastroenterology;  Laterality: N/A;   • ENDOSCOPY W/ PEG TUBE PLACEMENT N/A 6/25/2021    Procedure: ESOPHAGOGASTRODUODENOSCOPY WITH PERCUTANEOUS ENDOSCOPIC GASTROSTOMY TUBE INSERTION WITH ANESTHESIA;  Surgeon: Wanda Lang MD;  Location: [facility] ENDOSCOPY;  Service: Gastroenterology;  Laterality: N/A;   • UPPER GASTROINTESTINAL ENDOSCOPY  01/22/2021   • UPPER  GASTROINTESTINAL ENDOSCOPY  06/25/2021     Family History   Problem Relation Age of Onset   • Hypertension Father        Prior to Admission medications    Medication Sig Start Date End Date Taking? Authorizing Provider   calcium carbonate-vitamin d (CALCIUM 600+D) 600-400 MG-UNIT per tablet Take 1 tablet by mouth 2 (Two) Times a Day. Per G-Tube   Yes Patricia Driscoll MD   denosumab (Prolia) 60 MG/ML solution prefilled syringe syringe Inject 60 mg under the skin into the appropriate area as directed. Every 6 Months   Yes Patricia Driscoll MD   DIAZEPAM RE Insert 10 mg into the rectum As Needed (For Seizure Activity).   Yes Patricia Driscoll MD   famotidine (PEPCID) 20 MG tablet Take 20 mg by mouth Daily. Per G-Tube   Yes Patricia Driscoll MD   ferrous sulfate 325 (65 FE) MG tablet Take 325 mg by mouth Daily With Breakfast. Per G-Tube   Yes Patricia Driscoll MD   lamoTRIgine (LaMICtal) 200 MG tablet Take 200 mg by mouth 2 (Two) Times a Day. Per G-Tube   Yes Patricia Driscoll MD   lamoTRIgine (LaMICtal) 25 MG tablet Take 25 mg by mouth 2 (Two) Times a Day. Per G-Tube To Equal 225 MG   Yes Patricia Driscoll MD   levETIRAcetam (KEPPRA) 1000 MG tablet Take 1,000 mg by mouth 2 (Two) Times a Day. Per G-Tube   Yes Patricia Driscoll MD   levETIRAcetam (KEPPRA) 750 MG tablet Take 750 mg by mouth 2 (Two) Times a Day. Per G-Tube To Equal 1750 MG   Yes Patricia Driscoll MD   LORazepam (ATIVAN) 1 MG tablet GIVE ONE TABLET PER G-TUBE TWICE DAILY FOR ANXIETY AND AGITATION 9/1/21  Yes Christelle Smith APRN   midodrine (PROAMATINE) 5 MG tablet Take 1 tablet by mouth 3 (Three) Times a Day.  Patient taking differently: Take 10 mg by mouth 3 (Three) Times a Day. Per G-Tube 2/4/19  Yes Earnestine Reid MD   Nutritional Supplements (JEVITY 1.5 JEFFERY PO) Take  by mouth. Gets 390mL bolus every 6 hours 1A-7A-1P-7P   Yes Patricia Driscoll MD   simvastatin (ZOCOR) 20 MG tablet Take 20 mg by mouth  "Daily. Per G-Tube   Yes ProviderPatricia MD   sucralfate (CARAFATE) 1 g tablet Take 1 tablet by mouth 4 (Four) Times a Day Before Meals & at Bedtime.  Patient taking differently: Take 1 g by mouth 4 (Four) Times a Day Before Meals & at Bedtime. Per G-Tube 5/19/21  Yes Sandeep Fernandez MD   thioridazine (MELLARIL) 100 MG tablet Take 200 mg by mouth 2 (Two) Times a Day. Per G-Tube   Yes ProviderPatricia MD   polyethylene glycol (GoLYTELY) 236 g solution Utilize as directed per instructions received from office of Dr. Wanda Lang M.D. 8/23/21 9/9/21 Yes Wanda Lang MD     Allergies   Allergen Reactions   • Haldol [Haloperidol] Unknown (See Comments)     Unknown     • Levaquin [Levofloxacin] Other (See Comments)     Lowers seizure threshold     Social History     Socioeconomic History   • Marital status: Single   Tobacco Use   • Smoking status: Never Smoker   • Smokeless tobacco: Never Used   Vaping Use   • Vaping Use: Never used   Substance and Sexual Activity   • Alcohol use: Never   • Drug use: Never   • Sexual activity: Defer       Review of Systems  Review of Systems   Unable to perform ROS: Patient nonverbal        BP 93/67   Pulse 93   Temp 98 °F (36.7 °C)   Resp 18   Ht 175.3 cm (69\")   Wt 64.4 kg (142 lb)   SpO2 97%   BMI 20.97 kg/m²     Objective    Physical Exam  Constitutional:       Appearance: He is well-developed.   HENT:      Head: Normocephalic and atraumatic.   Eyes:      Conjunctiva/sclera: Conjunctivae normal.      Pupils: Pupils are equal, round, and reactive to light.   Neck:      Thyroid: No thyromegaly.   Cardiovascular:      Rate and Rhythm: Normal rate and regular rhythm.      Heart sounds: Normal heart sounds. No murmur heard.      Pulmonary:      Effort: Pulmonary effort is normal.      Breath sounds: Normal breath sounds. No wheezing.   Abdominal:      General: Bowel sounds are normal. There is no distension.      Palpations: Abdomen is soft. There is no mass. "      Tenderness: There is no abdominal tenderness.      Hernia: No hernia is present.   Genitourinary:     Comments: No lesions noted  Musculoskeletal:         General: No tenderness. Normal range of motion.      Cervical back: Normal range of motion and neck supple.   Lymphadenopathy:      Cervical: No cervical adenopathy.   Skin:     General: Skin is warm and dry.      Findings: No rash.   Neurological:      Mental Status: He is alert.      Cranial Nerves: No cranial nerve deficit.       Admission on 09/02/2021, Discharged on 09/02/2021   Component Date Value Ref Range Status   • Case Report 09/02/2021    Final                    Value:Surgical Pathology Report                         Case: [id number]                                  Authorizing Provider:  Wanda Lang MD      Collected:           09/02/2021 02:22 PM          Ordering Location:     [facility]   Received:            09/03/2021 07:06 AM                                 [facility]                                                     Pathologist:           Fitz Tavarez MD                                                           Specimen:    Large Intestine, Rectum, rectal polyp  cold snare by cj                                   • Final Diagnosis 09/02/2021    Final                    Value:This result contains rich text formatting which cannot be displayed here.     Assessment/Plan      1. Adenomatous polyp of colon, unspecified part of colon    1.  Rectal polyp with piecemeal polypectomy, schedule flex sig in 1 month for reevaluation and repeat colonoscopy in 1 year surveillance.  2.  Oropharyngeal dysphagia, on PEG feeds.  3.  Nausea and vomiting, resolved.  4.  Anemia, improving.         Orders placed during this encounter include:  Orders Placed This Encounter   Procedures   • Implement Anesthesia Orders Day of Procedure     Standing Status:   Standing     Number of Occurrences:   1   • Obtain Informed Consent      Standing Status:   Standing     Number of Occurrences:   1     Order Specific Question:   Informed Consent Given For     Answer:   flexible sigmoidoscopy   • POC Glucose Once     Prior to Procedure on ALL Diabetic Patients     Standing Status:   Standing     Number of Occurrences:   1     Order Specific Question:   Release to patient     Answer:   Immediate   • Insert Peripheral IV     Standing Status:   Standing     Number of Occurrences:   1       SIGMOIDOSCOPY FLEXIBLE (N/A)    Review and/or summary of lab tests, radiology, procedures, medications. Review and summary of old records and obtaining of history. The risks and benefits of my recommendations, as well as other treatment options were discussed with the patient and nursing attendant today. Questions were answered.    New Medications Ordered This Visit   Medications   • dextrose 5 % and sodium chloride 0.45 % infusion       Follow-up: No follow-ups on file.               Results for orders placed or performed during the hospital encounter of 09/02/21   Tissue Pathology Exam    Specimen: Large Intestine, Rectum; Tissue   Result Value Ref Range    Case Report       Surgical Pathology Report                         Case: [id number]                                  Authorizing Provider:  Wanda Lang MD      Collected:           09/02/2021 02:22 PM          Ordering Location:     [facility]   Received:            09/03/2021 07:06 AM                                 [facility]                                                     Pathologist:           Fitz Tavarez MD                                                           Specimen:    Large Intestine, Rectum, rectal polyp  cold snare by cj                                    Final Diagnosis       See Scanned Report       Results for orders placed or performed during the hospital encounter of 07/06/21   Gold Top - SST   Result Value Ref Range    Extra Tube Hold for add-ons.     Green Top (Gel)   Result Value Ref Range    Extra Tube Hold for add-ons.    Lavender Top   Result Value Ref Range    Extra Tube hold for add-on    Results for orders placed or performed during the hospital encounter of 06/25/21   COVID-19 and FLU A/B PCR - Swab, Nasopharynx    Specimen: Nasopharynx; Swab   Result Value Ref Range    COVID19 Not Detected Not Detected - Ref. Range    Influenza A PCR Not Detected Not Detected    Influenza B PCR Not Detected Not Detected   Results for orders placed or performed in visit on 06/24/21   Protime-INR    Specimen: Blood   Result Value Ref Range    Protime 14.3 11.1 - 15.3 Seconds    INR 1.12 0.80 - 1.20   Results for orders placed or performed in visit on 06/21/21   COVID-19,  [facility] IN-HOUSE, NP SWAB IN TRANSPORT MEDIA 8-10 HR TAT - Swab, Nasopharynx    Specimen: Nasopharynx; Swab   Result Value Ref Range    COVID19 Not Detected Not Detected - Ref. Range   Results for orders placed or performed in visit on 05/21/21   CBC (No Diff)    Specimen: Blood   Result Value Ref Range    WBC 8.48 3.40 - 10.80 10*3/mm3    RBC 3.53 (L) 4.14 - 5.80 10*6/mm3    Hemoglobin 10.6 (L) 13.0 - 17.7 g/dL    Hematocrit 31.1 (L) 37.5 - 51.0 %    MCV 88.1 79.0 - 97.0 fL    MCH 30.0 26.6 - 33.0 pg    MCHC 34.1 31.5 - 35.7 g/dL    RDW 18.2 (H) 12.3 - 15.4 %    RDW-SD 58.2 (H) 37.0 - 54.0 fl    MPV 11.6 6.0 - 12.0 fL    Platelets 229 140 - 450 10*3/mm3   Results for orders placed or performed during the hospital encounter of 05/19/21   Gold Top - SST   Result Value Ref Range    Extra Tube Hold for add-ons.    Green Top (Gel)   Result Value Ref Range    Extra Tube Hold for add-ons.    CBC Auto Differential    Specimen: Blood   Result Value Ref Range    WBC 10.15 3.40 - 10.80 10*3/mm3    RBC 3.84 (L) 4.14 - 5.80 10*6/mm3    Hemoglobin 11.2 (L) 13.0 - 17.7 g/dL    Hematocrit 33.6 (L) 37.5 - 51.0 %    MCV 87.5 79.0 - 97.0 fL    MCH 29.2 26.6 - 33.0 pg    MCHC 33.3 31.5 - 35.7 g/dL    RDW 19.0 (H) 12.3  - 15.4 %    RDW-SD 60.8 (H) 37.0 - 54.0 fl    MPV 10.2 6.0 - 12.0 fL    Platelets 247 140 - 450 10*3/mm3    Neutrophil % 88.0 (H) 42.7 - 76.0 %    Lymphocyte % 5.9 (L) 19.6 - 45.3 %    Monocyte % 4.7 (L) 5.0 - 12.0 %    Eosinophil % 0.7 0.3 - 6.2 %    Basophil % 0.4 0.0 - 1.5 %    Immature Grans % 0.3 0.0 - 0.5 %    Neutrophils, Absolute 8.93 (H) 1.70 - 7.00 10*3/mm3    Lymphocytes, Absolute 0.60 (L) 0.70 - 3.10 10*3/mm3    Monocytes, Absolute 0.48 0.10 - 0.90 10*3/mm3    Eosinophils, Absolute 0.07 0.00 - 0.40 10*3/mm3    Basophils, Absolute 0.04 0.00 - 0.20 10*3/mm3    Immature Grans, Absolute 0.03 0.00 - 0.05 10*3/mm3    nRBC 0.0 0.0 - 0.2 /100 WBC   Lavender Top   Result Value Ref Range    Extra Tube hold for add-on    Lavender Top   Result Value Ref Range    Extra Tube hold for add-on    Light Blue Top   Result Value Ref Range    Extra Tube hold for add-on    Lipase    Specimen: Blood   Result Value Ref Range    Lipase 17 13 - 60 U/L   Comprehensive Metabolic Panel    Specimen: Blood   Result Value Ref Range    Glucose 93 65 - 99 mg/dL    BUN 28 (H) 8 - 23 mg/dL    Creatinine 0.88 0.76 - 1.27 mg/dL    Sodium 141 136 - 145 mmol/L    Potassium 3.7 3.5 - 5.2 mmol/L    Chloride 105 98 - 107 mmol/L    CO2 27.0 22.0 - 29.0 mmol/L    Calcium 9.6 8.6 - 10.5 mg/dL    Total Protein 6.8 6.0 - 8.5 g/dL    Albumin 3.60 3.50 - 5.20 g/dL    ALT (SGPT) 18 1 - 41 U/L    AST (SGOT) 17 1 - 40 U/L    Alkaline Phosphatase 88 39 - 117 U/L    Total Bilirubin <0.2 0.0 - 1.2 mg/dL    eGFR Non African Amer 87 >60 mL/min/1.73    Globulin 3.2 gm/dL    A/G Ratio 1.1 g/dL    BUN/Creatinine Ratio 31.8 (H) 7.0 - 25.0    Anion Gap 9.0 5.0 - 15.0 mmol/L   Results for orders placed or performed during the hospital encounter of 01/28/21   Respiratory Panel PCR w/COVID-19(SARS-CoV-2) [facility] In-House, NP Swab in UTM/VTM, 3-4 HR TAT - Swab, Nasopharynx    Specimen: Nasopharynx; Swab   Result Value Ref Range    ADENOVIRUS, PCR Not  Detected Not Detected    Coronavirus 229E Not Detected Not Detected    Coronavirus HKU1 Not Detected Not Detected    Coronavirus NL63 Not Detected Not Detected    Coronavirus OC43 Not Detected Not Detected    COVID19 Not Detected Not Detected - Ref. Range    Human Metapneumovirus Not Detected Not Detected    Human Rhinovirus/Enterovirus Not Detected Not Detected    Influenza A PCR Not Detected Not Detected    Influenza B PCR Not Detected Not Detected    Parainfluenza Virus 1 Not Detected Not Detected    Parainfluenza Virus 2 Not Detected Not Detected    Parainfluenza Virus 3 Not Detected Not Detected    Parainfluenza Virus 4 Not Detected Not Detected    RSV, PCR Not Detected Not Detected    Bordetella pertussis pcr Not Detected Not Detected    Bordetella parapertussis PCR Not Detected Not Detected    Chlamydophila pneumoniae PCR Not Detected Not Detected    Mycoplasma pneumo by PCR Not Detected Not Detected   CBC Auto Differential    Specimen: Blood   Result Value Ref Range    WBC 6.26 3.40 - 10.80 10*3/mm3    RBC 2.76 (L) 4.14 - 5.80 10*6/mm3    Hemoglobin 8.7 (L) 13.0 - 17.7 g/dL    Hematocrit 24.7 (L) 37.5 - 51.0 %    MCV 89.5 79.0 - 97.0 fL    MCH 31.5 26.6 - 33.0 pg    MCHC 35.2 31.5 - 35.7 g/dL    RDW 13.3 12.3 - 15.4 %    RDW-SD 43.8 37.0 - 54.0 fl    MPV 9.9 6.0 - 12.0 fL    Platelets 308 140 - 450 10*3/mm3    Neutrophil % 67.6 42.7 - 76.0 %    Lymphocyte % 13.4 (L) 19.6 - 45.3 %    Monocyte % 11.8 5.0 - 12.0 %    Eosinophil % 6.1 0.3 - 6.2 %    Basophil % 0.6 0.0 - 1.5 %    Immature Grans % 0.5 0.0 - 0.5 %    Neutrophils, Absolute 4.23 1.70 - 7.00 10*3/mm3    Lymphocytes, Absolute 0.84 0.70 - 3.10 10*3/mm3    Monocytes, Absolute 0.74 0.10 - 0.90 10*3/mm3    Eosinophils, Absolute 0.38 0.00 - 0.40 10*3/mm3    Basophils, Absolute 0.04 0.00 - 0.20 10*3/mm3    Immature Grans, Absolute 0.03 0.00 - 0.05 10*3/mm3    nRBC 0.0 0.0 - 0.2 /100 WBC     *Note: Due to a large number of results and/or encounters for the  requested time period, some results have not been displayed. A complete set of results can be found in Results Review.         This document has been electronically signed by Wanda Lang MD on October 11, 2021 11:15 CDT

## 2021-10-11 NOTE — ANESTHESIA POSTPROCEDURE EVALUATION
Patient: Bautista Grubbs    Procedure Summary     Date: 10/11/21 Room / Location: [facility] ENDOSCOPY 1 / [facility] ENDOSCOPY    Anesthesia Start: 1139 Anesthesia Stop: 1200    Procedure: SIGMOIDOSCOPY FLEXIBLE (N/A ) Diagnosis:       Adenomatous polyp of colon, unspecified part of colon      (Adenomatous polyp of colon, unspecified part of colon [D12.6])    Surgeons: Wanda Lang MD Provider: Karen Fallon CRNA    Anesthesia Type: MAC ASA Status: 3          Anesthesia Type: MAC    Vitals  No vitals data found for the desired time range.          Post Anesthesia Care and Evaluation    Patient location during evaluation: PACU  Patient participation: waiting for patient participation  Level of consciousness: sleepy but conscious  Pain score: 0  Pain management: adequate  Airway patency: patent  Anesthetic complications: No anesthetic complications    Cardiovascular status: acceptable  Respiratory status: acceptable  Hydration status: acceptable

## 2021-10-13 LAB
LAB AP CASE REPORT: NORMAL
PATH REPORT.FINAL DX SPEC: NORMAL

## 2021-11-09 ENCOUNTER — OFFICE VISIT (OUTPATIENT)
Dept: GASTROENTEROLOGY | Facility: CLINIC | Age: 66
End: 2021-11-09

## 2021-11-09 VITALS — HEIGHT: 69 IN | WEIGHT: 138.6 LBS | BODY MASS INDEX: 20.53 KG/M2

## 2021-11-09 DIAGNOSIS — D12.6 ADENOMATOUS POLYP OF COLON, UNSPECIFIED PART OF COLON: Primary | ICD-10-CM

## 2021-11-09 PROCEDURE — 99213 OFFICE O/P EST LOW 20 MIN: CPT | Performed by: INTERNAL MEDICINE

## 2021-11-09 RX ORDER — DEXTROSE AND SODIUM CHLORIDE 5; .45 G/100ML; G/100ML
30 INJECTION, SOLUTION INTRAVENOUS CONTINUOUS PRN
Status: CANCELLED | OUTPATIENT
Start: 2021-11-30

## 2021-11-09 NOTE — PATIENT INSTRUCTIONS
"BMI for Adults  What is BMI?  Body mass index (BMI) is a number that is calculated from a person's weight and height. BMI can help estimate how much of a person's weight is composed of fat. BMI does not measure body fat directly. Rather, it is an alternative to procedures that directly measure body fat, which can be difficult and expensive.  BMI can help identify people who may be at higher risk for certain medical problems.  What are BMI measurements used for?  BMI is used as a screening tool to identify possible weight problems. It helps determine whether a person is obese, overweight, a healthy weight, or underweight.  BMI is useful for:  · Identifying a weight problem that may be related to a medical condition or may increase the risk for medical problems.  · Promoting changes, such as changes in diet and exercise, to help reach a healthy weight. BMI screening can be repeated to see if these changes are working.  How is BMI calculated?  BMI involves measuring your weight in relation to your height. Both height and weight are measured, and the BMI is calculated from those numbers. This can be done either in English ([place] or metric measurements. Note that charts and online BMI calculators are available to help you find your BMI quickly and easily without having to do these calculations yourself.  To calculate your BMI in English ([place].S[place] measurements:    1. Measure your weight in pounds (lb).  2. Multiply the number of pounds by 703.  ? For example, for a person who weighs 180 lb, multiply that number by 703, which equals 126,540.  3. Measure your height in inches. Then multiply that number by itself to get a measurement called \"inches squared.\"  ? For example, for a person who is 70 inches tall, the \"inches squared\" measurement is 70 inches x 70 inches, which equals 4,900 inches squared.  4. Divide the total from step 2 (number of lb x 703) by the total from step 3 (inches squared): 126,540 ÷ 4,900 = 25.8. This is " "your BMI.    To calculate your BMI in metric measurements:  1. Measure your weight in kilograms (kg).  2. Measure your height in meters (m). Then multiply that number by itself to get a measurement called \"meters squared.\"  ? For example, for a person who is 1.75 m tall, the \"meters squared\" measurement is 1.75 m x 1.75 m, which is equal to 3.1 meters squared.  3. Divide the number of kilograms (your weight) by the meters squared number. In this example: 70 ÷ 3.1 = 22.6. This is your BMI.  What do the results mean?  BMI charts are used to identify whether you are underweight, normal weight, overweight, or obese. The following guidelines will be used:  · Underweight: BMI less than 18.5.  · Normal weight: BMI between 18.5 and 24.9.  · Overweight: BMI between 25 and 29.9.  · Obese: BMI of 30 or above.  Keep these notes in mind:  · Weight includes both fat and muscle, so someone with a muscular build, such as an athlete, may have a BMI that is higher than 24.9. In cases like these, BMI is not an accurate measure of body fat.  · To determine if excess body fat is the cause of a BMI of 25 or higher, further assessments may need to be done by a health care provider.  · BMI is usually interpreted in the same way for men and women.  Where to find more information  For more information about BMI, including tools to quickly calculate your BMI, go to these websites:  · Centers for Disease Control and Prevention: www.cdc.gov  · American Heart Association: www.heart.org  · National Heart, Lung, and [employer] [place]: www.nhlbi.nih.gov  Summary  · Body mass index (BMI) is a number that is calculated from a person's weight and height.  · BMI may help estimate how much of a person's weight is composed of fat. BMI can help identify those who may be at higher risk for certain medical problems.  · BMI can be measured using English measurements or metric measurements.  · BMI charts are used to identify whether you are underweight, normal " weight, overweight, or obese.  This information is not intended to replace advice given to you by your health care provider. Make sure you discuss any questions you have with your health care provider.  Document Revised: 09/09/2020 Document Reviewed: 07/17/2020  Elsevier Patient Education © 2021 [employer]

## 2021-11-09 NOTE — PROGRESS NOTES
Chief Complaint   Patient presents with   • endo f/u       Subjective    Bautista Grubbs is a 65 y.o. male.    History of Present Illness  Patient presented to GI clinic for follow-up visit today.  No GI complaints at this time.  Underwent flex sig with piecemeal polypectomy of the rectal polyp.  Path was consistent with tubular adenoma.  Due for repeat flex sig.       The following portions of the patient's history were reviewed and updated as appropriate:   Past Medical History:   Diagnosis Date   • Alopecia    • Arthritis    • Autism    • COPD (chronic obstructive pulmonary disease) (HCC)    • GERD (gastroesophageal reflux disease)    • Hyperlipidemia    • Hypertension    • Insomnia    • Intellectual disability    • Lennox-Gastaut syndrome (HCC)    • Major depressive disorder    • Orthostatic hypotension    • Osteoporosis    • Right bundle branch block    • Scoliosis    • Seizures (HCC)      Past Surgical History:   Procedure Laterality Date   • COLONOSCOPY N/A 1/8/2021    Procedure: COLONOSCOPY;  Surgeon: Wanda Lang MD;  Location: [facility] ENDOSCOPY;  Service: Gastroenterology;  Laterality: N/A;   • COLONOSCOPY N/A 9/2/2021    Procedure: COLONOSCOPY;  Surgeon: Wanda Lang MD;  Location: [facility] ENDOSCOPY;  Service: Gastroenterology;  Laterality: N/A;   • ENDOSCOPY N/A 1/7/2021    Procedure: ESOPHAGOGASTRODUODENOSCOPY;  Surgeon: Wanda Lang MD;  Location: [facility] ENDOSCOPY;  Service: Gastroenterology;  Laterality: N/A;   • ENDOSCOPY N/A 1/22/2021    Procedure: ESOPHAGOGASTRODUODENOSCOPY;  Surgeon: Wanda Lang MD;  Location: [facility] ENDOSCOPY;  Service: Gastroenterology;  Laterality: N/A;   • ENDOSCOPY W/ PEG TUBE PLACEMENT N/A 6/25/2021    Procedure: ESOPHAGOGASTRODUODENOSCOPY WITH PERCUTANEOUS ENDOSCOPIC GASTROSTOMY TUBE INSERTION WITH ANESTHESIA;  Surgeon: Wanda Lang MD;  Location: [facility] ENDOSCOPY;  Service: Gastroenterology;  Laterality: N/A;   • SIGMOIDOSCOPY N/A 10/11/2021     Procedure: SIGMOIDOSCOPY FLEXIBLE;  Surgeon: Wanda Lang MD;  Location: [facility] ENDOSCOPY;  Service: Gastroenterology;  Laterality: N/A;   • UPPER GASTROINTESTINAL ENDOSCOPY  01/22/2021   • UPPER GASTROINTESTINAL ENDOSCOPY  06/25/2021     Family History   Problem Relation Age of Onset   • Hypertension Father        Prior to Admission medications    Medication Sig Start Date End Date Taking? Authorizing Provider   calcium carbonate-vitamin d (CALCIUM 600+D) 600-400 MG-UNIT per tablet Take 1 tablet by mouth 2 (Two) Times a Day. Per G-Tube   Yes Patricia Driscoll MD   denosumab (Prolia) 60 MG/ML solution prefilled syringe syringe Inject 60 mg under the skin into the appropriate area as directed. Every 6 Months   Yes Patricia Driscoll MD   DIAZEPAM RE Insert 10 mg into the rectum As Needed (For Seizure Activity).   Yes Patricia Driscoll MD   famotidine (PEPCID) 20 MG tablet Take 20 mg by mouth Daily. Per G-Tube   Yes Patricia Driscoll MD   ferrous sulfate 325 (65 FE) MG tablet Take 325 mg by mouth Daily With Breakfast. Per G-Tube   Yes Patricia Driscoll MD   lamoTRIgine (LaMICtal) 200 MG tablet Take 200 mg by mouth 2 (Two) Times a Day. Per G-Tube   Yes Patricia Driscoll MD   lamoTRIgine (LaMICtal) 25 MG tablet Take 25 mg by mouth 2 (Two) Times a Day. Per G-Tube To Equal 225 MG   Yes Patricia Driscoll MD   levETIRAcetam (KEPPRA) 1000 MG tablet Take 1,000 mg by mouth 2 (Two) Times a Day. Per G-Tube   Yes Patricia Driscoll MD   levETIRAcetam (KEPPRA) 750 MG tablet Take 750 mg by mouth 2 (Two) Times a Day. Per G-Tube To Equal 1750 MG   Yes Patricia Driscoll MD   LORazepam (ATIVAN) 1 MG tablet GIVE ONE TABLET PER G-TUBE TWICE DAILY FOR ANXIETY AND AGITATION 9/1/21  Yes Christelle Smith APRN   midodrine (PROAMATINE) 5 MG tablet Take 1 tablet by mouth 3 (Three) Times a Day.  Patient taking differently: Take 10 mg by mouth 3 (Three) Times a Day. Per G-Tube 2/4/19  Yes Akram  "MD Earnestine   Nutritional Supplements (JEVITY 1.5 JEFFERY PO) Take  by mouth. Gets 390mL bolus every 6 hours 1A-7A-1P-7P   Yes ProviderPatricia MD   simvastatin (ZOCOR) 20 MG tablet Take 20 mg by mouth Daily. Per G-Tube   Yes ProviderPatricia MD   sucralfate (CARAFATE) 1 g tablet Take 1 tablet by mouth 4 (Four) Times a Day Before Meals & at Bedtime.  Patient taking differently: Take 1 g by mouth 4 (Four) Times a Day Before Meals & at Bedtime. Per G-Tube 5/19/21  Yes Sandeep Fernandez MD   thioridazine (MELLARIL) 100 MG tablet Take 200 mg by mouth 2 (Two) Times a Day. Per G-Tube   Yes ProviderPatricia MD     Allergies   Allergen Reactions   • Haldol [Haloperidol] Unknown (See Comments)     Unknown     • Levaquin [Levofloxacin] Other (See Comments)     Lowers seizure threshold     Social History     Socioeconomic History   • Marital status: Single   Tobacco Use   • Smoking status: Never Smoker   • Smokeless tobacco: Never Used   Vaping Use   • Vaping Use: Never used   Substance and Sexual Activity   • Alcohol use: Never   • Drug use: Never   • Sexual activity: Defer       Review of Systems  Review of Systems   Unable to perform ROS: Patient nonverbal        Ht 175.3 cm (69\")   Wt 62.9 kg (138 lb 9.6 oz) Comment: used weight from [employer]  BMI 20.47 kg/m²     Objective    Physical Exam  Constitutional:       Appearance: He is well-developed.   HENT:      Head: Normocephalic and atraumatic.   Eyes:      Conjunctiva/sclera: Conjunctivae normal.      Pupils: Pupils are equal, round, and reactive to light.   Neck:      Thyroid: No thyromegaly.   Cardiovascular:      Rate and Rhythm: Normal rate and regular rhythm.      Heart sounds: Normal heart sounds. No murmur heard.      Pulmonary:      Effort: Pulmonary effort is normal.      Breath sounds: Normal breath sounds. No wheezing.   Abdominal:      General: Bowel sounds are normal. There is no distension.      Palpations: Abdomen is soft. There is no mass.      " Tenderness: There is no abdominal tenderness.      Hernia: No hernia is present.   Genitourinary:     Comments: No lesions noted  Musculoskeletal:         General: No tenderness. Normal range of motion.      Cervical back: Normal range of motion and neck supple.   Lymphadenopathy:      Cervical: No cervical adenopathy.   Skin:     General: Skin is warm and dry.      Findings: No rash.   Neurological:      Mental Status: He is alert.      Cranial Nerves: No cranial nerve deficit.       Admission on 10/11/2021, Discharged on 10/11/2021   Component Date Value Ref Range Status   • Case Report 10/11/2021    Final                    Value:Surgical Pathology Report                         Case: [id number]                                  Authorizing Provider:  Wanda Lang MD      Collected:           10/11/2021 11:58 AM          Ordering Location:     [facility]   Received:            10/11/2021 01:32 PM                                 [facility]                                                     Pathologist:           Fitz Tavarez MD                                                           Specimen:    Large Intestine, Rectum, polyp   JA                                                       • Final Diagnosis 10/11/2021    Final                    Value:This result contains rich text formatting which cannot be displayed here.     Assessment/Plan      1. Adenomatous polyp of colon, unspecified part of colon    1.  Rectal polyp with piecemeal polypectomy, schedule flex sig in 1 month for reevaluation and repeat colonoscopy in 1 year surveillance.  2.  Oropharyngeal dysphagia, on PEG feeds.  3.  Nausea and vomiting, resolved.  4.  Anemia, improving.      Orders placed during this encounter include:  Orders Placed This Encounter   Procedures   • Follow Anesthesia Guidelines / Standing Orders     Standing Status:   Future   • Obtain Informed Consent     Standing Status:   Future     Order  Specific Question:   Informed Consent Given For     Answer:   flexible sigmoidoscopy       SIGMOIDOSCOPY FLEXIBLE (N/A)    Review and/or summary of lab tests, radiology, procedures, medications. Review and summary of old records and obtaining of history. The risks and benefits of my recommendations, as well as other treatment options were discussed with the patient and nursing attendant today. Questions were answered.    No orders of the defined types were placed in this encounter.      Follow-up: Return in about 1 month (around 12/9/2021).               Results for orders placed or performed during the hospital encounter of 10/11/21   Tissue Pathology Exam    Specimen: Large Intestine, Rectum; Polyp   Result Value Ref Range    Case Report       Surgical Pathology Report                         Case: [id number]                                  Authorizing Provider:  Wanda Lang MD      Collected:           10/11/2021 11:58 AM          Ordering Location:     [facility]   Received:            10/11/2021 01:32 PM                                 [facility] SUITES                                                     Pathologist:           Fizt Tavarez MD                                                           Specimen:    Large Intestine, Rectum, polyp   JA                                                        Final Diagnosis       See Scanned Report       Results for orders placed or performed during the hospital encounter of 09/02/21   Tissue Pathology Exam    Specimen: Large Intestine, Rectum; Tissue   Result Value Ref Range    Case Report       Surgical Pathology Report                         Case: [id number]                                  Authorizing Provider:  Wanda Lang MD      Collected:           09/02/2021 02:22 PM          Ordering Location:     [facility]   Received:            09/03/2021 07:06 AM                                 [facility]  SUITES                                                     Pathologist:           Fitz Tavarez MD                                                           Specimen:    Large Intestine, Rectum, rectal polyp  cold snare by morgan                                    Final Diagnosis       See Scanned Report       Results for orders placed or performed during the hospital encounter of 07/06/21   Gold Top - SST   Result Value Ref Range    Extra Tube Hold for add-ons.    Green Top (Gel)   Result Value Ref Range    Extra Tube Hold for add-ons.    Lavender Top   Result Value Ref Range    Extra Tube hold for add-on    Results for orders placed or performed during the hospital encounter of 06/25/21   COVID-19 and FLU A/B PCR - Swab, Nasopharynx    Specimen: Nasopharynx; Swab   Result Value Ref Range    COVID19 Not Detected Not Detected - Ref. Range    Influenza A PCR Not Detected Not Detected    Influenza B PCR Not Detected Not Detected   Results for orders placed or performed in visit on 06/24/21   Protime-INR    Specimen: Blood   Result Value Ref Range    Protime 14.3 11.1 - 15.3 Seconds    INR 1.12 0.80 - 1.20   Results for orders placed or performed in visit on 06/21/21   COVID-19,  [facility] IN-HOUSE, NP SWAB IN TRANSPORT MEDIA 8-10 HR TAT - Swab, Nasopharynx    Specimen: Nasopharynx; Swab   Result Value Ref Range    COVID19 Not Detected Not Detected - Ref. Range   Results for orders placed or performed in visit on 05/21/21   CBC (No Diff)    Specimen: Blood   Result Value Ref Range    WBC 8.48 3.40 - 10.80 10*3/mm3    RBC 3.53 (L) 4.14 - 5.80 10*6/mm3    Hemoglobin 10.6 (L) 13.0 - 17.7 g/dL    Hematocrit 31.1 (L) 37.5 - 51.0 %    MCV 88.1 79.0 - 97.0 fL    MCH 30.0 26.6 - 33.0 pg    MCHC 34.1 31.5 - 35.7 g/dL    RDW 18.2 (H) 12.3 - 15.4 %    RDW-SD 58.2 (H) 37.0 - 54.0 fl    MPV 11.6 6.0 - 12.0 fL    Platelets 229 140 - 450 10*3/mm3   Results for orders placed or performed during the hospital encounter of 05/19/21   Gold  Top - SST   Result Value Ref Range    Extra Tube Hold for add-ons.    Green Top (Gel)   Result Value Ref Range    Extra Tube Hold for add-ons.    CBC Auto Differential    Specimen: Blood   Result Value Ref Range    WBC 10.15 3.40 - 10.80 10*3/mm3    RBC 3.84 (L) 4.14 - 5.80 10*6/mm3    Hemoglobin 11.2 (L) 13.0 - 17.7 g/dL    Hematocrit 33.6 (L) 37.5 - 51.0 %    MCV 87.5 79.0 - 97.0 fL    MCH 29.2 26.6 - 33.0 pg    MCHC 33.3 31.5 - 35.7 g/dL    RDW 19.0 (H) 12.3 - 15.4 %    RDW-SD 60.8 (H) 37.0 - 54.0 fl    MPV 10.2 6.0 - 12.0 fL    Platelets 247 140 - 450 10*3/mm3    Neutrophil % 88.0 (H) 42.7 - 76.0 %    Lymphocyte % 5.9 (L) 19.6 - 45.3 %    Monocyte % 4.7 (L) 5.0 - 12.0 %    Eosinophil % 0.7 0.3 - 6.2 %    Basophil % 0.4 0.0 - 1.5 %    Immature Grans % 0.3 0.0 - 0.5 %    Neutrophils, Absolute 8.93 (H) 1.70 - 7.00 10*3/mm3    Lymphocytes, Absolute 0.60 (L) 0.70 - 3.10 10*3/mm3    Monocytes, Absolute 0.48 0.10 - 0.90 10*3/mm3    Eosinophils, Absolute 0.07 0.00 - 0.40 10*3/mm3    Basophils, Absolute 0.04 0.00 - 0.20 10*3/mm3    Immature Grans, Absolute 0.03 0.00 - 0.05 10*3/mm3    nRBC 0.0 0.0 - 0.2 /100 WBC   Lavender Top   Result Value Ref Range    Extra Tube hold for add-on    Lavender Top   Result Value Ref Range    Extra Tube hold for add-on    Light Blue Top   Result Value Ref Range    Extra Tube hold for add-on    Lipase    Specimen: Blood   Result Value Ref Range    Lipase 17 13 - 60 U/L   Comprehensive Metabolic Panel    Specimen: Blood   Result Value Ref Range    Glucose 93 65 - 99 mg/dL    BUN 28 (H) 8 - 23 mg/dL    Creatinine 0.88 0.76 - 1.27 mg/dL    Sodium 141 136 - 145 mmol/L    Potassium 3.7 3.5 - 5.2 mmol/L    Chloride 105 98 - 107 mmol/L    CO2 27.0 22.0 - 29.0 mmol/L    Calcium 9.6 8.6 - 10.5 mg/dL    Total Protein 6.8 6.0 - 8.5 g/dL    Albumin 3.60 3.50 - 5.20 g/dL    ALT (SGPT) 18 1 - 41 U/L    AST (SGOT) 17 1 - 40 U/L    Alkaline Phosphatase 88 39 - 117 U/L    Total Bilirubin <0.2 0.0 - 1.2  mg/dL    eGFR Non African Amer 87 >60 mL/min/1.73    Globulin 3.2 gm/dL    A/G Ratio 1.1 g/dL    BUN/Creatinine Ratio 31.8 (H) 7.0 - 25.0    Anion Gap 9.0 5.0 - 15.0 mmol/L   Results for orders placed or performed during the hospital encounter of 01/28/21   Respiratory Panel PCR w/COVID-19(SARS-CoV-2) [facility] In-House, NP Swab in UTM/VTM, 3-4 HR TAT - Swab, Nasopharynx    Specimen: Nasopharynx; Swab   Result Value Ref Range    ADENOVIRUS, PCR Not Detected Not Detected    Coronavirus 229E Not Detected Not Detected    Coronavirus HKU1 Not Detected Not Detected    Coronavirus NL63 Not Detected Not Detected    Coronavirus OC43 Not Detected Not Detected    COVID19 Not Detected Not Detected - Ref. Range    Human Metapneumovirus Not Detected Not Detected    Human Rhinovirus/Enterovirus Not Detected Not Detected    Influenza A PCR Not Detected Not Detected    Influenza B PCR Not Detected Not Detected    Parainfluenza Virus 1 Not Detected Not Detected    Parainfluenza Virus 2 Not Detected Not Detected    Parainfluenza Virus 3 Not Detected Not Detected    Parainfluenza Virus 4 Not Detected Not Detected    RSV, PCR Not Detected Not Detected    Bordetella pertussis pcr Not Detected Not Detected    Bordetella parapertussis PCR Not Detected Not Detected    Chlamydophila pneumoniae PCR Not Detected Not Detected    Mycoplasma pneumo by PCR Not Detected Not Detected   CBC Auto Differential    Specimen: Blood   Result Value Ref Range    WBC 6.26 3.40 - 10.80 10*3/mm3    RBC 2.76 (L) 4.14 - 5.80 10*6/mm3    Hemoglobin 8.7 (L) 13.0 - 17.7 g/dL    Hematocrit 24.7 (L) 37.5 - 51.0 %    MCV 89.5 79.0 - 97.0 fL    MCH 31.5 26.6 - 33.0 pg    MCHC 35.2 31.5 - 35.7 g/dL    RDW 13.3 12.3 - 15.4 %    RDW-SD 43.8 37.0 - 54.0 fl    MPV 9.9 6.0 - 12.0 fL    Platelets 308 140 - 450 10*3/mm3    Neutrophil % 67.6 42.7 - 76.0 %    Lymphocyte % 13.4 (L) 19.6 - 45.3 %    Monocyte % 11.8 5.0 - 12.0 %    Eosinophil % 6.1 0.3 - 6.2 %     Basophil % 0.6 0.0 - 1.5 %    Immature Grans % 0.5 0.0 - 0.5 %    Neutrophils, Absolute 4.23 1.70 - 7.00 10*3/mm3    Lymphocytes, Absolute 0.84 0.70 - 3.10 10*3/mm3    Monocytes, Absolute 0.74 0.10 - 0.90 10*3/mm3    Eosinophils, Absolute 0.38 0.00 - 0.40 10*3/mm3    Basophils, Absolute 0.04 0.00 - 0.20 10*3/mm3    Immature Grans, Absolute 0.03 0.00 - 0.05 10*3/mm3    nRBC 0.0 0.0 - 0.2 /100 WBC     *Note: Due to a large number of results and/or encounters for the requested time period, some results have not been displayed. A complete set of results can be found in Results Review.         This document has been electronically signed by Wanda Lang MD on November 9, 2021 12:53 CST

## 2021-11-22 ENCOUNTER — OFFICE VISIT (OUTPATIENT)
Dept: NEUROLOGY | Facility: CLINIC | Age: 66
End: 2021-11-22

## 2021-11-22 VITALS
RESPIRATION RATE: 17 BRPM | BODY MASS INDEX: 20.44 KG/M2 | OXYGEN SATURATION: 98 % | HEART RATE: 88 BPM | HEIGHT: 69 IN | WEIGHT: 138 LBS

## 2021-11-22 DIAGNOSIS — G40.219 COMPLEX PARTIAL EPILEPSY WITH GENERALIZATION AND WITH INTRACTABLE EPILEPSY (HCC): Primary | ICD-10-CM

## 2021-11-22 PROCEDURE — 99214 OFFICE O/P EST MOD 30 MIN: CPT | Performed by: NURSE PRACTITIONER

## 2021-11-22 RX ORDER — ALBUTEROL SULFATE 2.5 MG/3ML
SOLUTION RESPIRATORY (INHALATION)
COMMUNITY
Start: 2021-10-12

## 2021-11-22 NOTE — PROGRESS NOTES
Neurology Consult Note    Referring Provider:   GARRET Cason     Reason for Consultation:    Seizures    Subjective     History of Present Illness:  Bautista Grubbs is a 65 y.o. male who presents to the office today for seizures.  He is currently a resident at [facility] in [place], [place].  He is accompanied today by one of the staff members at the facility.  He is followed by Dr. Bautista James MD and GARRET Walsh for his care while a resident.  He was referred to our office for management of his longstanding history of seizures.  He has had a severe intellectual delay as well.  He is nearly nonverbal and minimally interacts with me while in the visit.  He has previously been followed by [facility] but was last seen in August 2020. Per their records, he has Lennox Gastaut Syndrome with intractable epilepsy.  He is currently on Lamictal 225mg twice daily and Keppra 1750 twice daily for therapy.  He has been on Lamictal and Keppra for some time now.  The Lamictal was increased from 200mg twice daily to 225mg twice daily by Christelle thurman in June secondary to multiple breakthrough seizures while having pneumonia.  According to the caregiver present today, he has not had any since that time.  I was able to review his records, brought from the facility, and this was supported through documentation.  According to other progress notes from providers, he does have breakthrough seizures as ar result of acute illness.  However, his recent aspiration pneumonia in June 2021 seemed to have quite a few breakthrough cases.  Regardless, he is back to his baseline and is stable with medications at this time.  The caregiver at the appointment today has little information to add as she is not his routine caregiver at his facility for medical purposes.  She states that he got little sleep last night and is a little more tired today than normal.  Otherwise, he is at his baseline with no immediate  complaints.     Past Medical History:   Diagnosis Date   • Alopecia    • Arthritis    • Autism    • COPD (chronic obstructive pulmonary disease) ([facility])    • GERD (gastroesophageal reflux disease)    • Hyperlipidemia    • Hypertension    • Insomnia    • Intellectual disability    • Lennox-Gastaut syndrome ([facility])    • Major depressive disorder    • Orthostatic hypotension    • Osteoporosis    • Right bundle branch block    • Scoliosis    • Seizures ([facility])        Allergies   Allergen Reactions   • Haldol [Haloperidol] Unknown (See Comments)     Unknown     • Levaquin [Levofloxacin] Other (See Comments)     Lowers seizure threshold     Current Outpatient Medications on File Prior to Visit   Medication Sig   • albuterol (PROVENTIL) (2.5 MG/3ML) 0.083% nebulizer solution    • calcium carbonate-vitamin d (CALCIUM 600+D) 600-400 MG-UNIT per tablet Take 1 tablet by mouth 2 (Two) Times a Day. Per G-Tube   • denosumab (Prolia) 60 MG/ML solution prefilled syringe syringe Inject 60 mg under the skin into the appropriate area as directed. Every 6 Months   • DIAZEPAM RE Insert 10 mg into the rectum As Needed (For Seizure Activity).   • famotidine (PEPCID) 20 MG tablet Take 20 mg by mouth Daily. Per G-Tube   • ferrous sulfate 325 (65 FE) MG tablet Take 325 mg by mouth Daily With Breakfast. Per G-Tube   • lamoTRIgine (LaMICtal) 200 MG tablet Take 200 mg by mouth 2 (Two) Times a Day. Per G-Tube   • lamoTRIgine (LaMICtal) 25 MG tablet Take 25 mg by mouth 2 (Two) Times a Day. Per G-Tube To Equal 225 MG   • levETIRAcetam (KEPPRA) 1000 MG tablet Take 1,000 mg by mouth 2 (Two) Times a Day. Per G-Tube   • levETIRAcetam (KEPPRA) 750 MG tablet Take 750 mg by mouth 2 (Two) Times a Day. Per G-Tube To Equal 1750 MG   • LORazepam (ATIVAN) 1 MG tablet GIVE ONE TABLET PER G-TUBE TWICE DAILY FOR ANXIETY AND AGITATION   • midodrine (PROAMATINE) 5 MG tablet Take 1 tablet by mouth 3 (Three) Times a Day. (Patient taking differently: Take 10 mg by mouth 3  (Three) Times a Day. Per G-Tube)   • Nutritional Supplements (JEVITY 1.5 JEFFERY PO) Take  by mouth. Gets 390mL bolus every 6 hours 1A-7A-1P-7P   • simvastatin (ZOCOR) 20 MG tablet Take 20 mg by mouth Daily. Per G-Tube   • sucralfate (CARAFATE) 1 g tablet Take 1 tablet by mouth 4 (Four) Times a Day Before Meals & at Bedtime. (Patient taking differently: Take 1 g by mouth 4 (Four) Times a Day Before Meals & at Bedtime. Per G-Tube)   • thioridazine (MELLARIL) 100 MG tablet Take 200 mg by mouth 2 (Two) Times a Day. Per G-Tube     No current facility-administered medications on file prior to visit.     Current outpatient and discharge medications have been reconciled for the patient.  Reviewed by: GARRET Del Valle      Family History   Problem Relation Age of Onset   • Hypertension Father      Social History     Socioeconomic History   • Marital status: Single   Tobacco Use   • Smoking status: Never Smoker   • Smokeless tobacco: Never Used   Vaping Use   • Vaping Use: Never used   Substance and Sexual Activity   • Alcohol use: Never   • Drug use: Never   • Sexual activity: Defer     Review of Systems  Review of Systems   Neurological: Positive for seizures.   All other systems reviewed and are negative.      Objective      Vital Signs  Heart Rate:  [88] 88  Resp:  [17] 17    Physical Examination:  Physical Exam  Vitals reviewed.   Constitutional:       Appearance: Normal appearance. He is well-groomed.   HENT:      Head: Normocephalic.      Comments: Helmet on.     Right Ear: Hearing normal.      Left Ear: Hearing normal.   Eyes:      Extraocular Movements: Extraocular movements intact.      Pupils: Pupils are equal, round, and reactive to light.   Cardiovascular:      Rate and Rhythm: Normal rate and regular rhythm.      Pulses: Normal pulses.   Pulmonary:      Effort: Pulmonary effort is normal.   Musculoskeletal:         General: Normal range of motion.      Cervical back: Normal range of motion and neck supple.    Skin:     General: Skin is warm and dry.      Capillary Refill: Capillary refill takes less than 2 seconds.   Neurological:      Mental Status: He is alert.      Gait: Gait is intact.   Psychiatric:         Mood and Affect: Mood normal.         Neurologic Exam:  Mental Status:    -Awake. Alert. Oriented to person, place & time.  -No word finding difficulties.  -No aphasia.  -No dysarthria.  -Follows simple commands.     CN II:  Unable to fully assess due to patient's mental capacity. Pupils equally reactive to light.  CN III, IV, VI:  Extraocular muscles function intact with no nystagmus.  This was done by watching the patient track around the room.  He is unable to follow this command.  CN V:  Unable to test.  CN VII:  As can be seen, facial motor is symmetric.  He is unable to follow command but he does move all parts of his face in a relatively normal fashion spontaneously.  CN VIII:  Gross hearing intact bilaterally as can be seen.  CN IX/X:  Unable to test  CN XI:  Unable to test  CN XII: Unable to test     Motor: (strength out of 5:  1= minimal movement, 2 = movement in plane of gravity, 3 = movement against gravity, 4 = movement against some resistance, 5 = full strength)     -5/5 in bilateral biceps, triceps, brachioradialis, wrist extensors and intrinsic muscles of the hand.  He was unable to follow commands very well but he would push and pull to stimulus and this was equal and strong.    -5/5 in bilateral hip flexors, quadriceps, hamstrings, gastrocsoleus complex, anterior tibialis and extensor hallucis longus.  He was unable to follow commands very well but he would push and pull to stimulus and this was equal and strong.       Deep Tendon Reflexes:  -Right              Biceps: 2+         Triceps: 2+      Brachioradialis: 2+              Patella: 2+       Ankle: 2+         Babinski:  negative  -Left              Biceps: 2+         Triceps: 2+      Brachioradialis: 2+              Patella: 2+        Ankle: 2+         Babinski:  negative    Tone (Modified Srikanth Scale):  No appreciable increase in tone or rigidity noted.     Sensory:  -Intact to light touch, pinprick BUE (C5-T1) and BLE (L2-S1).     Coordination:  -Unable to test finger to nose or heel to shin.     Gait  -Nonambulatory.  In a wheelchair.    Results Review:  Lab Results   Component Value Date    GLUCOSE 93 05/19/2021    BUN 28 (H) 05/19/2021    CREATININE 0.88 05/19/2021    EGFRIFNONA 87 05/19/2021    BCR 31.8 (H) 05/19/2021    K 3.7 05/19/2021    CO2 27.0 05/19/2021    CALCIUM 9.6 05/19/2021    ALBUMIN 3.60 05/19/2021    AST 17 05/19/2021    ALT 18 05/19/2021     Lab Results   Component Value Date    WBC 8.48 05/21/2021    HGB 10.6 (L) 05/21/2021    HCT 31.1 (L) 05/21/2021    MCV 88.1 05/21/2021     05/21/2021     No results found for: CHOL, CHLPL, TRIG, HDL, LDL, LDLDIRECT  No results found for: HGBA1C       Assessment/Plan     Impression:  · Lennox Gastaut Syndrome   · Complex Partial Epilepsy    Plan:  • Continue Keppra 1750mg twice daily.     • Continue Lamictal 225mg twice daily.      • Facility does routine management of lab work and therapeutic drug levels.  Upon chart review while in the office, therapeutic drug levels were adequate.  I will obtain copy for our records.       • The facility is to let me know of any breakthrough seizure activity.     • It is not uncommon for breakthrough seizures to occur due to acute illness.  Precautions should be taken to ensure that he is healthy and for prevention of illness.  Timely medication administration is also crucial for ensuring that breakthrough seizure is prevented.     • It is recommended you observe all seizure precautions, including, but not limited to: no driving until seizure free for more than 3 months- as per State driving regulation / law, avoid all high-risk activities, take showers instead of baths, avoid swimming without observation, avoid open heat sources, avoid  working at heights, and avoid engaging in all potentially hazardous activities.   He does not drive.     • Recommend seizure first aid as directed at facility.     · I will reach out to GARRET Walsh to ensure that proper communication of needs is met for Bautista.     The patient and I have discussed the plan of care and He is in full agreement at this time.       Thank you, Christelle, for the opportunity to participate in the care of Bautista.  Please feel free to notify me of any questions or concerns you may have in the interm.      Follow-Up:  Return in about 3 months (around 2/22/2022) for Seizure follow-up.      GARRET Del Valle  11/22/21  16:07 CST

## 2021-11-23 ENCOUNTER — TELEPHONE (OUTPATIENT)
Dept: NEUROLOGY | Facility: CLINIC | Age: 66
End: 2021-11-23

## 2021-11-23 NOTE — TELEPHONE ENCOUNTER
----- Message from GARRET Del Valle sent at 11/22/2021  4:09 PM CST -----  Could someone reach out to the facility for a 3 month follow-up for this patient?  Thank you!

## 2021-11-30 ENCOUNTER — HOSPITAL ENCOUNTER (OUTPATIENT)
Facility: HOSPITAL | Age: 66
Setting detail: HOSPITAL OUTPATIENT SURGERY
Discharge: HOME | End: 2021-11-30
Attending: INTERNAL MEDICINE | Admitting: INTERNAL MEDICINE

## 2021-11-30 ENCOUNTER — ANESTHESIA (OUTPATIENT)
Dept: GASTROENTEROLOGY | Facility: HOSPITAL | Age: 66
End: 2021-11-30

## 2021-11-30 ENCOUNTER — ANESTHESIA EVENT (OUTPATIENT)
Dept: GASTROENTEROLOGY | Facility: HOSPITAL | Age: 66
End: 2021-11-30

## 2021-11-30 VITALS
TEMPERATURE: 96.9 F | HEIGHT: 71 IN | OXYGEN SATURATION: 93 % | BODY MASS INDEX: 20.16 KG/M2 | HEART RATE: 64 BPM | WEIGHT: 144 LBS | DIASTOLIC BLOOD PRESSURE: 65 MMHG | RESPIRATION RATE: 18 BRPM | SYSTOLIC BLOOD PRESSURE: 105 MMHG

## 2021-11-30 DIAGNOSIS — D12.6 ADENOMATOUS POLYP OF COLON, UNSPECIFIED PART OF COLON: ICD-10-CM

## 2021-11-30 PROCEDURE — 88305 TISSUE EXAM BY PATHOLOGIST: CPT

## 2021-11-30 PROCEDURE — 25010000002 PROPOFOL 10 MG/ML EMULSION: Performed by: NURSE ANESTHETIST, CERTIFIED REGISTERED

## 2021-11-30 PROCEDURE — 45338 SIGMOIDOSCOPY W/TUMR REMOVE: CPT | Performed by: INTERNAL MEDICINE

## 2021-11-30 RX ORDER — DEXTROSE AND SODIUM CHLORIDE 5; .45 G/100ML; G/100ML
30 INJECTION, SOLUTION INTRAVENOUS CONTINUOUS PRN
Status: DISCONTINUED | OUTPATIENT
Start: 2021-11-30 | End: 2021-11-30 | Stop reason: HOSPADM

## 2021-11-30 RX ORDER — PROPOFOL 10 MG/ML
VIAL (ML) INTRAVENOUS AS NEEDED
Status: DISCONTINUED | OUTPATIENT
Start: 2021-11-30 | End: 2021-11-30 | Stop reason: SURG

## 2021-11-30 RX ORDER — LIDOCAINE HYDROCHLORIDE 20 MG/ML
INJECTION, SOLUTION INTRAVENOUS AS NEEDED
Status: DISCONTINUED | OUTPATIENT
Start: 2021-11-30 | End: 2021-11-30 | Stop reason: SURG

## 2021-11-30 RX ADMIN — PROPOFOL 30 MG: 10 INJECTION, EMULSION INTRAVENOUS at 15:39

## 2021-11-30 RX ADMIN — PROPOFOL 30 MG: 10 INJECTION, EMULSION INTRAVENOUS at 15:43

## 2021-11-30 RX ADMIN — PROPOFOL 30 MG: 10 INJECTION, EMULSION INTRAVENOUS at 15:35

## 2021-11-30 RX ADMIN — LIDOCAINE HYDROCHLORIDE 50 MG: 20 INJECTION, SOLUTION INTRAVENOUS at 15:35

## 2021-11-30 RX ADMIN — PROPOFOL 30 MG: 10 INJECTION, EMULSION INTRAVENOUS at 15:47

## 2021-11-30 RX ADMIN — DEXTROSE AND SODIUM CHLORIDE 30 ML/HR: 5; 450 INJECTION, SOLUTION INTRAVENOUS at 15:03

## 2021-11-30 NOTE — ANESTHESIA POSTPROCEDURE EVALUATION
Patient: Bautista Grubbs    Procedure Summary     Date: 11/30/21 Room / Location: [facility] ENDOSCOPY 2 / [facility] ENDOSCOPY    Anesthesia Start: 1532 Anesthesia Stop: 1550    Procedure: SIGMOIDOSCOPY FLEXIBLE (N/A ) Diagnosis:       Adenomatous polyp of colon, unspecified part of colon      (Adenomatous polyp of colon, unspecified part of colon [D12.6])    Surgeons: Wanda Lang MD Provider: Xavier Quinonez CRNA    Anesthesia Type: MAC ASA Status: 3          Anesthesia Type: MAC    Vitals  No vitals data found for the desired time range.          Post Anesthesia Care and Evaluation    Patient location during evaluation: bedside  Patient participation: complete - patient participated  Level of consciousness: sleepy but conscious  Pain score: 0  Pain management: adequate  Airway patency: patent  Anesthetic complications: No anesthetic complications  PONV Status: none  Cardiovascular status: acceptable  Respiratory status: acceptable  Hydration status: acceptable    Comments: ---------------------------               11/30/21                      1550       ---------------------------   BP:          101/78         Pulse:         83           Resp:          16           Temp:   97.2 °F (36.2 °C)   SpO2:          94%         ---------------------------

## 2021-11-30 NOTE — ANESTHESIA PREPROCEDURE EVALUATION
Anesthesia Evaluation     Patient summary reviewed and Nursing notes reviewed   NPO Solid Status: > 8 hours  NPO Liquid Status: > 8 hours           Airway   No difficulty expected  Comment: Does not cooperate with airway exam  Dental      Pulmonary - normal exam   (+) COPD,   (-) pneumonia  Cardiovascular - normal exam    (+) hypertension well controlled, hyperlipidemia,   (-) dysrhythmias      Neuro/Psych  (+) seizures, syncope, psychiatric history Bipolar,       ROS Comment: autism  GI/Hepatic/Renal/Endo    (+)  GERD, GI bleeding lower ,     Musculoskeletal     Abdominal  - normal exam   Substance History - negative use     OB/GYN negative ob/gyn ROS         Other   arthritis,                        Anesthesia Plan    ASA 3     MAC     intravenous induction     Anesthetic plan, all risks, benefits, and alternatives have been provided, discussed and informed consent has been obtained with: child.

## 2021-12-03 LAB
LAB AP CASE REPORT: NORMAL
PATH REPORT.FINAL DX SPEC: NORMAL

## 2021-12-07 ENCOUNTER — OFFICE VISIT (OUTPATIENT)
Dept: GASTROENTEROLOGY | Facility: CLINIC | Age: 66
End: 2021-12-07

## 2021-12-07 VITALS — BODY MASS INDEX: 20.16 KG/M2 | HEIGHT: 71 IN | WEIGHT: 144 LBS

## 2021-12-07 DIAGNOSIS — D12.6 ADENOMATOUS POLYP OF COLON, UNSPECIFIED PART OF COLON: Primary | ICD-10-CM

## 2021-12-07 PROCEDURE — 99213 OFFICE O/P EST LOW 20 MIN: CPT | Performed by: INTERNAL MEDICINE

## 2021-12-07 NOTE — PROGRESS NOTES
Chief Complaint   Patient presents with   • endo f/u       Subjective    Bautista Grubbs is a 66 y.o. male.    History of Present Illness  Patient presented to GI clinic for follow-up visit today.  No GI complaints at this time.    Tolerating PEG tube feeds.  GERD is well controlled.  Taking Pepcid daily.  Underwent flex sig with piecemeal polypectomy of the rectal polyp.  Path was consistent with tubular adenoma.        The following portions of the patient's history were reviewed and updated as appropriate:   Past Medical History:   Diagnosis Date   • Alopecia    • Arthritis    • Autism    • COPD (chronic obstructive pulmonary disease) (HCC)    • GERD (gastroesophageal reflux disease)    • Hyperlipidemia    • Hypertension    • Insomnia    • Intellectual disability    • Lennox-Gastaut syndrome (HCC)    • Major depressive disorder    • Orthostatic hypotension    • Osteoporosis    • Right bundle branch block    • Scoliosis    • Seizures (HCC)      Past Surgical History:   Procedure Laterality Date   • COLONOSCOPY N/A 1/8/2021    Procedure: COLONOSCOPY;  Surgeon: Wanda Lang MD;  Location: [facility] ENDOSCOPY;  Service: Gastroenterology;  Laterality: N/A;   • COLONOSCOPY N/A 9/2/2021    Procedure: COLONOSCOPY;  Surgeon: Wanda Lang MD;  Location: [facility] ENDOSCOPY;  Service: Gastroenterology;  Laterality: N/A;   • ENDOSCOPY N/A 1/7/2021    Procedure: ESOPHAGOGASTRODUODENOSCOPY;  Surgeon: Wanda Lang MD;  Location: [facility] ENDOSCOPY;  Service: Gastroenterology;  Laterality: N/A;   • ENDOSCOPY N/A 1/22/2021    Procedure: ESOPHAGOGASTRODUODENOSCOPY;  Surgeon: Wanda Lang MD;  Location: [facility] ENDOSCOPY;  Service: Gastroenterology;  Laterality: N/A;   • ENDOSCOPY W/ PEG TUBE PLACEMENT N/A 6/25/2021    Procedure: ESOPHAGOGASTRODUODENOSCOPY WITH PERCUTANEOUS ENDOSCOPIC GASTROSTOMY TUBE INSERTION WITH ANESTHESIA;  Surgeon: Wanda Lang MD;  Location: [facility] ENDOSCOPY;  Service:  Gastroenterology;  Laterality: N/A;   • SIGMOIDOSCOPY N/A 10/11/2021    Procedure: SIGMOIDOSCOPY FLEXIBLE;  Surgeon: Wanda Lang MD;  Location: [facility] ENDOSCOPY;  Service: Gastroenterology;  Laterality: N/A;   • SIGMOIDOSCOPY N/A 11/30/2021    Procedure: SIGMOIDOSCOPY FLEXIBLE;  Surgeon: Wanda Lang MD;  Location: [facility] ENDOSCOPY;  Service: Gastroenterology;  Laterality: N/A;   • UPPER GASTROINTESTINAL ENDOSCOPY  01/22/2021   • UPPER GASTROINTESTINAL ENDOSCOPY  06/25/2021     Family History   Problem Relation Age of Onset   • Hypertension Father        Prior to Admission medications    Medication Sig Start Date End Date Taking? Authorizing Provider   albuterol (PROVENTIL) (2.5 MG/3ML) 0.083% nebulizer solution  10/12/21  Yes Patricia Driscoll MD   calcium carbonate-vitamin d (CALCIUM 600+D) 600-400 MG-UNIT per tablet Take 1 tablet by mouth 2 (Two) Times a Day. Per G-Tube   Yes Patricia Driscoll MD   denosumab (Prolia) 60 MG/ML solution prefilled syringe syringe Inject 60 mg under the skin into the appropriate area as directed. Every 6 Months   Yes Patricia Driscoll MD   DIAZEPAM RE Insert 10 mg into the rectum As Needed (For Seizure Activity).   Yes Patricia Driscoll MD   famotidine (PEPCID) 20 MG tablet Take 20 mg by mouth Daily. Per G-Tube   Yes Patricia Driscoll MD   ferrous sulfate 325 (65 FE) MG tablet Take 325 mg by mouth Daily With Breakfast. Per G-Tube   Yes Patricia Driscoll MD   lamoTRIgine (LaMICtal) 200 MG tablet Take 200 mg by mouth 2 (Two) Times a Day. Per G-Tube   Yes Patricia Driscoll MD   lamoTRIgine (LaMICtal) 25 MG tablet Take 25 mg by mouth 2 (Two) Times a Day. Per G-Tube To Equal 225 MG   Yes Patricia Driscoll MD   levETIRAcetam (KEPPRA) 1000 MG tablet Take 1,000 mg by mouth 2 (Two) Times a Day. Per G-Tube   Yes Patricia Driscoll MD   levETIRAcetam (KEPPRA) 750 MG tablet Take 750 mg by mouth 2 (Two) Times a Day. Per G-Tube To Equal 1750 MG   Yes  "Provider, MD Patricia   LORazepam (ATIVAN) 1 MG tablet GIVE ONE TABLET PER G-TUBE TWICE DAILY FOR ANXIETY AND AGITATION 9/1/21  Yes Christelle Smith APRN   midodrine (PROAMATINE) 5 MG tablet Take 1 tablet by mouth 3 (Three) Times a Day.  Patient taking differently: Take 10 mg by mouth 3 (Three) Times a Day. Per G-Tube 2/4/19  Yes Earnestine Reid MD   Nutritional Supplements (JEVITY 1.5 JEFFERY PO) Take  by mouth. Gets 390mL bolus every 6 hours 1A-7A-1P-7P   Yes Patricia Driscoll MD   simvastatin (ZOCOR) 20 MG tablet Take 20 mg by mouth Daily. Per G-Tube   Yes Patricia Driscoll MD   sucralfate (CARAFATE) 1 g tablet Take 1 tablet by mouth 4 (Four) Times a Day Before Meals & at Bedtime.  Patient taking differently: Take 1 g by mouth 4 (Four) Times a Day Before Meals & at Bedtime. Per G-Tube 5/19/21  Yes Sandeep Fernandez MD   thioridazine (MELLARIL) 100 MG tablet Take 200 mg by mouth 2 (Two) Times a Day. Per G-Tube   Yes Patricia Driscoll MD     Allergies   Allergen Reactions   • Haldol [Haloperidol] Unknown (See Comments)     Unknown     • Levaquin [Levofloxacin] Other (See Comments)     Lowers seizure threshold     Social History     Socioeconomic History   • Marital status: Single   Tobacco Use   • Smoking status: Never Smoker   • Smokeless tobacco: Never Used   Vaping Use   • Vaping Use: Never used   Substance and Sexual Activity   • Alcohol use: Never   • Drug use: Never   • Sexual activity: Defer       Review of Systems  Review of Systems   Unable to perform ROS: Patient nonverbal        Ht 180.3 cm (71\")   Wt 65.3 kg (144 lb) Comment: used weight from 11/30/2021  BMI 20.08 kg/m²     Objective    Physical Exam  Constitutional:       Appearance: He is well-developed.   HENT:      Head: Normocephalic and atraumatic.   Eyes:      Conjunctiva/sclera: Conjunctivae normal.      Pupils: Pupils are equal, round, and reactive to light.   Neck:      Thyroid: No thyromegaly.   Cardiovascular:      Rate and " Rhythm: Normal rate and regular rhythm.      Heart sounds: Normal heart sounds. No murmur heard.      Pulmonary:      Effort: Pulmonary effort is normal.      Breath sounds: Normal breath sounds. No wheezing.   Abdominal:      General: Bowel sounds are normal. There is no distension.      Palpations: Abdomen is soft. There is no mass.      Tenderness: There is no abdominal tenderness.      Hernia: No hernia is present.   Genitourinary:     Comments: No lesions noted  Musculoskeletal:         General: No tenderness. Normal range of motion.      Cervical back: Normal range of motion and neck supple.   Lymphadenopathy:      Cervical: No cervical adenopathy.   Skin:     General: Skin is warm and dry.      Findings: No rash.   Neurological:      General: No focal deficit present.      Mental Status: He is alert.      Cranial Nerves: No cranial nerve deficit.       Admission on 11/30/2021, Discharged on 11/30/2021   Component Date Value Ref Range Status   • Case Report 11/30/2021    Final                    Value:Surgical Pathology Report                         Case: [id number]                                  Authorizing Provider:  Wanda Lang MD      Collected:           11/30/2021 03:52 PM          Ordering Location:     [facility]   Received:            12/01/2021 07:07 AM                                 [place] [facility]                                                     Pathologist:           Fitz Tavarez MD                                                           Specimen:    Large Intestine, Rectum, rectum polyp                                                     • Final Diagnosis 11/30/2021    Final                    Value:This result contains rich text formatting which cannot be displayed here.     Assessment/Plan    No diagnosis found..   1.  Rectal polyp with piecemeal polypectomy, repeat flex sig in 6 months for reevaluation and repeat colonoscopy in 1 year surveillance.  2.   Oropharyngeal dysphagia, on PEG feeds.  3.  Nausea and vomiting, resolved.  4.  Anemia, improving.  Orders placed during this encounter include:  No orders of the defined types were placed in this encounter.      * Surgery not found *    Review and/or summary of lab tests, radiology, procedures, medications. Review and summary of old records and obtaining of history. The risks and benefits of my recommendations, as well as other treatment options were discussed with the patient and nursing attendant today. Questions were answered.    No orders of the defined types were placed in this encounter.      Follow-up: Return in about 6 months (around 6/7/2022).               Results for orders placed or performed during the hospital encounter of 11/30/21   Tissue Pathology Exam    Specimen: Large Intestine, Rectum; Polyp   Result Value Ref Range    Case Report       Surgical Pathology Report                         Case: [id number]                                  Authorizing Provider:  Wanda Lang MD      Collected:           11/30/2021 03:52 PM          Ordering Location:     [facility]   Received:            12/01/2021 07:07 AM                                 [place] [facility]                                                     Pathologist:           Fitz Tavarez MD                                                           Specimen:    Large Intestine, Rectum, rectum polyp                                                      Final Diagnosis       SEE SCANNED REPORT       Results for orders placed or performed during the hospital encounter of 10/11/21   Tissue Pathology Exam    Specimen: Large Intestine, Rectum; Polyp   Result Value Ref Range    Case Report       Surgical Pathology Report                         Case: [id number]                                  Authorizing Provider:  Wanda Lang MD      Collected:           10/11/2021 11:58 AM          Ordering Location:     [facility]   Received:            10/11/2021 01:32 PM                                 [place] [facility]                                                     Pathologist:           Fitz Tavarez MD                                                           Specimen:    Large Intestine, Rectum, polyp   JA                                                        Final Diagnosis       See Scanned Report       Results for orders placed or performed during the hospital encounter of 09/02/21   Tissue Pathology Exam    Specimen: Large Intestine, Rectum; Tissue   Result Value Ref Range    Case Report       Surgical Pathology Report                         Case: [id number]                                  Authorizing Provider:  Wanda Lang MD      Collected:           09/02/2021 02:22 PM          [facility]   Received:            09/03/2021 07:06 AM                                 [place] [facility]                                                     Pathologist:           Fitz Tavarez MD                                                           Specimen:    Large Intestine, Rectum, rectal polyp  cold snare by cj                                    Final Diagnosis       See Scanned Report       Results for orders placed or performed during the hospital encounter of 07/06/21   Gold Top - SST   Result Value Ref Range    Extra Tube Hold for add-ons.    Green Top (Gel)   Result Value Ref Range    Extra Tube Hold for add-ons.    Lavender Top   Result Value Ref Range    Extra Tube hold for add-on    Results for orders placed or performed during the hospital encounter of 06/25/21   COVID-19 and FLU A/B PCR - Swab, Nasopharynx    Specimen: Nasopharynx; Swab   Result Value Ref Range    COVID19 Not Detected Not Detected - Ref. Range    Influenza A PCR Not Detected Not Detected    Influenza B PCR Not Detected Not Detected   Results for orders placed or performed in visit on  06/24/21   Protime-INR    Specimen: Blood   Result Value Ref Range    Protime 14.3 11.1 - 15.3 Seconds    INR 1.12 0.80 - 1.20   Results for orders placed or performed in visit on 06/21/21   COVID-19, [facility] IN-HOUSE, NP SWAB IN TRANSPORT MEDIA 8-10 HR TAT - Swab, Nasopharynx    Specimen: Nasopharynx; Swab   Result Value Ref Range    COVID19 Not Detected Not Detected - Ref. Range   Results for orders placed or performed in visit on 05/21/21   CBC (No Diff)    Specimen: Blood   Result Value Ref Range    WBC 8.48 3.40 - 10.80 10*3/mm3    RBC 3.53 (L) 4.14 - 5.80 10*6/mm3    Hemoglobin 10.6 (L) 13.0 - 17.7 g/dL    Hematocrit 31.1 (L) 37.5 - 51.0 %    MCV 88.1 79.0 - 97.0 fL    MCH 30.0 26.6 - 33.0 pg    MCHC 34.1 31.5 - 35.7 g/dL    RDW 18.2 (H) 12.3 - 15.4 %    RDW-SD 58.2 (H) 37.0 - 54.0 fl    MPV 11.6 6.0 - 12.0 fL    Platelets 229 140 - 450 10*3/mm3   Results for orders placed or performed during the hospital encounter of 05/19/21   Gold Top - SST   Result Value Ref Range    Extra Tube Hold for add-ons.    Green Top (Gel)   Result Value Ref Range    Extra Tube Hold for add-ons.    CBC Auto Differential    Specimen: Blood   Result Value Ref Range    WBC 10.15 3.40 - 10.80 10*3/mm3    RBC 3.84 (L) 4.14 - 5.80 10*6/mm3    Hemoglobin 11.2 (L) 13.0 - 17.7 g/dL    Hematocrit 33.6 (L) 37.5 - 51.0 %    MCV 87.5 79.0 - 97.0 fL    MCH 29.2 26.6 - 33.0 pg    MCHC 33.3 31.5 - 35.7 g/dL    RDW 19.0 (H) 12.3 - 15.4 %    RDW-SD 60.8 (H) 37.0 - 54.0 fl    MPV 10.2 6.0 - 12.0 fL    Platelets 247 140 - 450 10*3/mm3    Neutrophil % 88.0 (H) 42.7 - 76.0 %    Lymphocyte % 5.9 (L) 19.6 - 45.3 %    Monocyte % 4.7 (L) 5.0 - 12.0 %    Eosinophil % 0.7 0.3 - 6.2 %    Basophil % 0.4 0.0 - 1.5 %    Immature Grans % 0.3 0.0 - 0.5 %    Neutrophils, Absolute 8.93 (H) 1.70 - 7.00 10*3/mm3    Lymphocytes, Absolute 0.60 (L) 0.70 - 3.10 10*3/mm3    Monocytes, Absolute 0.48 0.10 - 0.90 10*3/mm3    Eosinophils, Absolute 0.07 0.00 - 0.40 10*3/mm3     Basophils, Absolute 0.04 0.00 - 0.20 10*3/mm3    Immature Grans, Absolute 0.03 0.00 - 0.05 10*3/mm3    nRBC 0.0 0.0 - 0.2 /100 WBC   Lavender Top   Result Value Ref Range    Extra Tube hold for add-on    Lavender Top   Result Value Ref Range    Extra Tube hold for add-on    Light Blue Top   Result Value Ref Range    Extra Tube hold for add-on    Lipase    Specimen: Blood   Result Value Ref Range    Lipase 17 13 - 60 U/L   Comprehensive Metabolic Panel    Specimen: Blood   Result Value Ref Range    Glucose 93 65 - 99 mg/dL    BUN 28 (H) 8 - 23 mg/dL    Creatinine 0.88 0.76 - 1.27 mg/dL    Sodium 141 136 - 145 mmol/L    Potassium 3.7 3.5 - 5.2 mmol/L    Chloride 105 98 - 107 mmol/L    CO2 27.0 22.0 - 29.0 mmol/L    Calcium 9.6 8.6 - 10.5 mg/dL    Total Protein 6.8 6.0 - 8.5 g/dL    Albumin 3.60 3.50 - 5.20 g/dL    ALT (SGPT) 18 1 - 41 U/L    AST (SGOT) 17 1 - 40 U/L    Alkaline Phosphatase 88 39 - 117 U/L    Total Bilirubin <0.2 0.0 - 1.2 mg/dL    eGFR Non African Amer 87 >60 mL/min/1.73    Globulin 3.2 gm/dL    A/G Ratio 1.1 g/dL    BUN/Creatinine Ratio 31.8 (H) 7.0 - 25.0    Anion Gap 9.0 5.0 - 15.0 mmol/L   Results for orders placed or performed during the hospital encounter of 01/28/21   Respiratory Panel PCR w/COVID-19(SARS-CoV-2) [facility] In-House, NP Swab in [facility], 3-4 HR TAT - Swab, Nasopharynx    Specimen: Nasopharynx; Swab   Result Value Ref Range    ADENOVIRUS, PCR Not Detected Not Detected    Coronavirus 229E Not Detected Not Detected    Coronavirus HKU1 Not Detected Not Detected    Coronavirus NL63 Not Detected Not Detected    Coronavirus OC43 Not Detected Not Detected    COVID19 Not Detected Not Detected - Ref. Range    Human Metapneumovirus Not Detected Not Detected    Human Rhinovirus/Enterovirus Not Detected Not Detected    Influenza A PCR Not Detected Not Detected    Influenza B PCR Not Detected Not Detected    Parainfluenza Virus 1 Not Detected Not Detected    Parainfluenza  Virus 2 Not Detected Not Detected    Parainfluenza Virus 3 Not Detected Not Detected    Parainfluenza Virus 4 Not Detected Not Detected    RSV, PCR Not Detected Not Detected    Bordetella pertussis pcr Not Detected Not Detected    Bordetella parapertussis PCR Not Detected Not Detected    Chlamydophila pneumoniae PCR Not Detected Not Detected    Mycoplasma pneumo by PCR Not Detected Not Detected   CBC Auto Differential    Specimen: Blood   Result Value Ref Range    WBC 6.26 3.40 - 10.80 10*3/mm3    RBC 2.76 (L) 4.14 - 5.80 10*6/mm3    Hemoglobin 8.7 (L) 13.0 - 17.7 g/dL    Hematocrit 24.7 (L) 37.5 - 51.0 %    MCV 89.5 79.0 - 97.0 fL    MCH 31.5 26.6 - 33.0 pg    MCHC 35.2 31.5 - 35.7 g/dL    RDW 13.3 12.3 - 15.4 %    RDW-SD 43.8 37.0 - 54.0 fl    MPV 9.9 6.0 - 12.0 fL    Platelets 308 140 - 450 10*3/mm3    Neutrophil % 67.6 42.7 - 76.0 %    Lymphocyte % 13.4 (L) 19.6 - 45.3 %    Monocyte % 11.8 5.0 - 12.0 %    Eosinophil % 6.1 0.3 - 6.2 %    Basophil % 0.6 0.0 - 1.5 %    Immature Grans % 0.5 0.0 - 0.5 %    Neutrophils, Absolute 4.23 1.70 - 7.00 10*3/mm3    Lymphocytes, Absolute 0.84 0.70 - 3.10 10*3/mm3    Monocytes, Absolute 0.74 0.10 - 0.90 10*3/mm3    Eosinophils, Absolute 0.38 0.00 - 0.40 10*3/mm3    Basophils, Absolute 0.04 0.00 - 0.20 10*3/mm3    Immature Grans, Absolute 0.03 0.00 - 0.05 10*3/mm3    nRBC 0.0 0.0 - 0.2 /100 WBC     *Note: Due to a large number of results and/or encounters for the requested time period, some results have not been displayed. A complete set of results can be found in Results Review.         This document has been electronically signed by Wanda Lang MD on December 7, 2021 15:48 CST

## 2021-12-07 NOTE — PATIENT INSTRUCTIONS
"BMI for Adults  What is BMI?  Body mass index (BMI) is a number that is calculated from a person's weight and height. BMI can help estimate how much of a person's weight is composed of fat. BMI does not measure body fat directly. Rather, it is an alternative to procedures that directly measure body fat, which can be difficult and expensive.  BMI can help identify people who may be at higher risk for certain medical problems.  What are BMI measurements used for?  BMI is used as a screening tool to identify possible weight problems. It helps determine whether a person is obese, overweight, a healthy weight, or underweight.  BMI is useful for:  · Identifying a weight problem that may be related to a medical condition or may increase the risk for medical problems.  · Promoting changes, such as changes in diet and exercise, to help reach a healthy weight. BMI screening can be repeated to see if these changes are working.  How is BMI calculated?  BMI involves measuring your weight in relation to your height. Both height and weight are measured, and the BMI is calculated from those numbers. This can be done either in English ([place] or metric measurements. Note that charts and online BMI calculators are available to help you find your BMI quickly and easily without having to do these calculations yourself.  To calculate your BMI in English (U.S[place] measurements:    1. Measure your weight in pounds (lb).  2. Multiply the number of pounds by 703.  ? For example, for a person who weighs 180 lb, multiply that number by 703, which equals 126,540.  3. Measure your height in inches. Then multiply that number by itself to get a measurement called \"inches squared.\"  ? For example, for a person who is 70 inches tall, the \"inches squared\" measurement is 70 inches x 70 inches, which equals 4,900 inches squared.  4. Divide the total from step 2 (number of lb x 703) by the total from step 3 (inches squared): 126,540 ÷ 4,900 = 25.8. This is " "your BMI.    To calculate your BMI in metric measurements:  1. Measure your weight in kilograms (kg).  2. Measure your height in meters (m). Then multiply that number by itself to get a measurement called \"meters squared.\"  ? For example, for a person who is 1.75 m tall, the \"meters squared\" measurement is 1.75 m x 1.75 m, which is equal to 3.1 meters squared.  3. Divide the number of kilograms (your weight) by the meters squared number. In this example: 70 ÷ 3.1 = 22.6. This is your BMI.  What do the results mean?  BMI charts are used to identify whether you are underweight, normal weight, overweight, or obese. The following guidelines will be used:  · Underweight: BMI less than 18.5.  · Normal weight: BMI between 18.5 and 24.9.  · Overweight: BMI between 25 and 29.9.  · Obese: BMI of 30 or above.  Keep these notes in mind:  · Weight includes both fat and muscle, so someone with a muscular build, such as an athlete, may have a BMI that is higher than 24.9. In cases like these, BMI is not an accurate measure of body fat.  · To determine if excess body fat is the cause of a BMI of 25 or higher, further assessments may need to be done by a health care provider.  · BMI is usually interpreted in the same way for men and women.  Where to find more information  For more information about BMI, including tools to quickly calculate your BMI, go to these websites:  · Centers for Disease Control and Prevention: www.cdc.gov  · American Heart Association: www.heart.org  · National Heart, Lung, and Blood [place]: www.nhlbi.nih.gov  Summary  · Body mass index (BMI) is a number that is calculated from a person's weight and height.  · BMI may help estimate how much of a person's weight is composed of fat. BMI can help identify those who may be at higher risk for certain medical problems.  · BMI can be measured using English measurements or metric measurements.  · BMI charts are used to identify whether you are underweight, normal " weight, overweight, or obese.  This information is not intended to replace advice given to you by your health care provider. Make sure you discuss any questions you have with your health care provider.  Document Revised: 09/09/2020 Document Reviewed: 07/17/2020  Elsevier Patient Education © 2021 [employer]

## 2022-01-19 ENCOUNTER — APPOINTMENT (OUTPATIENT)
Dept: CT IMAGING | Facility: HOSPITAL | Age: 67
End: 2022-01-19

## 2022-01-19 ENCOUNTER — HOSPITAL ENCOUNTER (EMERGENCY)
Facility: HOSPITAL | Age: 67
Discharge: NURSING, REHAB OR LONG-TERM CARE | End: 2022-01-19
Attending: EMERGENCY MEDICINE | Admitting: EMERGENCY MEDICINE

## 2022-01-19 ENCOUNTER — APPOINTMENT (OUTPATIENT)
Dept: GENERAL RADIOLOGY | Facility: HOSPITAL | Age: 67
End: 2022-01-19

## 2022-01-19 VITALS
HEART RATE: 80 BPM | RESPIRATION RATE: 18 BRPM | SYSTOLIC BLOOD PRESSURE: 97 MMHG | DIASTOLIC BLOOD PRESSURE: 73 MMHG | HEIGHT: 73 IN | TEMPERATURE: 98 F | BODY MASS INDEX: 19.22 KG/M2 | WEIGHT: 145 LBS | OXYGEN SATURATION: 93 %

## 2022-01-19 DIAGNOSIS — S22.42XA CLOSED FRACTURE OF MULTIPLE RIBS OF LEFT SIDE, INITIAL ENCOUNTER: Primary | ICD-10-CM

## 2022-01-19 DIAGNOSIS — R06.03 ACUTE RESPIRATORY DISTRESS: ICD-10-CM

## 2022-01-19 LAB
ALBUMIN SERPL-MCNC: 3.5 G/DL (ref 3.5–5.2)
ALBUMIN/GLOB SERPL: 1.2 G/DL
ALP SERPL-CCNC: 103 U/L (ref 39–117)
ALT SERPL W P-5'-P-CCNC: 31 U/L (ref 1–41)
ANION GAP SERPL CALCULATED.3IONS-SCNC: 8 MMOL/L (ref 5–15)
AST SERPL-CCNC: 27 U/L (ref 1–40)
BASOPHILS # BLD AUTO: 0.04 10*3/MM3 (ref 0–0.2)
BASOPHILS NFR BLD AUTO: 0.6 % (ref 0–1.5)
BILIRUB SERPL-MCNC: 0.2 MG/DL (ref 0–1.2)
BILIRUB UR QL STRIP: NEGATIVE
BUN SERPL-MCNC: 22 MG/DL (ref 8–23)
BUN/CREAT SERPL: 26.8 (ref 7–25)
CALCIUM SPEC-SCNC: 8.7 MG/DL (ref 8.6–10.5)
CHLORIDE SERPL-SCNC: 103 MMOL/L (ref 98–107)
CLARITY UR: ABNORMAL
CO2 SERPL-SCNC: 28 MMOL/L (ref 22–29)
COLOR UR: YELLOW
CREAT SERPL-MCNC: 0.82 MG/DL (ref 0.76–1.27)
D-DIMER, QUANTITATIVE (MAD,POW, STR): 1196 NG/ML (FEU) (ref 0–470)
D-LACTATE SERPL-SCNC: 1.8 MMOL/L (ref 0.5–2)
DEPRECATED RDW RBC AUTO: 46.1 FL (ref 37–54)
EOSINOPHIL # BLD AUTO: 0.26 10*3/MM3 (ref 0–0.4)
EOSINOPHIL NFR BLD AUTO: 3.7 % (ref 0.3–6.2)
ERYTHROCYTE [DISTWIDTH] IN BLOOD BY AUTOMATED COUNT: 14.1 % (ref 12.3–15.4)
FLUAV SUBTYP SPEC NAA+PROBE: NOT DETECTED
FLUBV RNA ISLT QL NAA+PROBE: NOT DETECTED
GFR SERPL CREATININE-BSD FRML MDRD: 94 ML/MIN/1.73
GLOBULIN UR ELPH-MCNC: 3 GM/DL
GLUCOSE SERPL-MCNC: 108 MG/DL (ref 65–99)
GLUCOSE UR STRIP-MCNC: NEGATIVE MG/DL
HCT VFR BLD AUTO: 41 % (ref 37.5–51)
HGB BLD-MCNC: 14.1 G/DL (ref 13–17.7)
HGB UR QL STRIP.AUTO: NEGATIVE
HOLD SPECIMEN: NORMAL
IMM GRANULOCYTES # BLD AUTO: 0.03 10*3/MM3 (ref 0–0.05)
IMM GRANULOCYTES NFR BLD AUTO: 0.4 % (ref 0–0.5)
KETONES UR QL STRIP: NEGATIVE
LEUKOCYTE ESTERASE UR QL STRIP.AUTO: NEGATIVE
LYMPHOCYTES # BLD AUTO: 0.67 10*3/MM3 (ref 0.7–3.1)
LYMPHOCYTES NFR BLD AUTO: 9.6 % (ref 19.6–45.3)
MCH RBC QN AUTO: 30.9 PG (ref 26.6–33)
MCHC RBC AUTO-ENTMCNC: 34.4 G/DL (ref 31.5–35.7)
MCV RBC AUTO: 89.9 FL (ref 79–97)
MONOCYTES # BLD AUTO: 0.67 10*3/MM3 (ref 0.1–0.9)
MONOCYTES NFR BLD AUTO: 9.6 % (ref 5–12)
NEUTROPHILS NFR BLD AUTO: 5.32 10*3/MM3 (ref 1.7–7)
NEUTROPHILS NFR BLD AUTO: 76.1 % (ref 42.7–76)
NITRITE UR QL STRIP: NEGATIVE
NRBC BLD AUTO-RTO: 0 /100 WBC (ref 0–0.2)
NT-PROBNP SERPL-MCNC: 26.3 PG/ML (ref 0–900)
PH UR STRIP.AUTO: 8.5 [PH] (ref 5–9)
PLATELET # BLD AUTO: 173 10*3/MM3 (ref 140–450)
PMV BLD AUTO: 11.5 FL (ref 6–12)
POTASSIUM SERPL-SCNC: 4.5 MMOL/L (ref 3.5–5.2)
PROT SERPL-MCNC: 6.5 G/DL (ref 6–8.5)
PROT UR QL STRIP: NEGATIVE
QT INTERVAL: 380 MS
QTC INTERVAL: 452 MS
RBC # BLD AUTO: 4.56 10*6/MM3 (ref 4.14–5.8)
SARS-COV-2 RNA PNL SPEC NAA+PROBE: NOT DETECTED
SODIUM SERPL-SCNC: 139 MMOL/L (ref 136–145)
SP GR UR STRIP: 1.02 (ref 1–1.03)
TROPONIN T SERPL-MCNC: <0.01 NG/ML (ref 0–0.03)
UROBILINOGEN UR QL STRIP: ABNORMAL
WBC NRBC COR # BLD: 6.99 10*3/MM3 (ref 3.4–10.8)
WHOLE BLOOD HOLD SPECIMEN: NORMAL
WHOLE BLOOD HOLD SPECIMEN: NORMAL

## 2022-01-19 PROCEDURE — 71045 X-RAY EXAM CHEST 1 VIEW: CPT

## 2022-01-19 PROCEDURE — 36415 COLL VENOUS BLD VENIPUNCTURE: CPT

## 2022-01-19 PROCEDURE — 96361 HYDRATE IV INFUSION ADD-ON: CPT

## 2022-01-19 PROCEDURE — 93005 ELECTROCARDIOGRAM TRACING: CPT | Performed by: EMERGENCY MEDICINE

## 2022-01-19 PROCEDURE — 71275 CT ANGIOGRAPHY CHEST: CPT

## 2022-01-19 PROCEDURE — 80053 COMPREHEN METABOLIC PANEL: CPT | Performed by: EMERGENCY MEDICINE

## 2022-01-19 PROCEDURE — 96360 HYDRATION IV INFUSION INIT: CPT

## 2022-01-19 PROCEDURE — 85025 COMPLETE CBC W/AUTO DIFF WBC: CPT | Performed by: EMERGENCY MEDICINE

## 2022-01-19 PROCEDURE — 99283 EMERGENCY DEPT VISIT LOW MDM: CPT

## 2022-01-19 PROCEDURE — 87636 SARSCOV2 & INF A&B AMP PRB: CPT | Performed by: EMERGENCY MEDICINE

## 2022-01-19 PROCEDURE — 83605 ASSAY OF LACTIC ACID: CPT | Performed by: EMERGENCY MEDICINE

## 2022-01-19 PROCEDURE — 85379 FIBRIN DEGRADATION QUANT: CPT | Performed by: EMERGENCY MEDICINE

## 2022-01-19 PROCEDURE — 81003 URINALYSIS AUTO W/O SCOPE: CPT | Performed by: EMERGENCY MEDICINE

## 2022-01-19 PROCEDURE — 0 IOPAMIDOL PER 1 ML: Performed by: EMERGENCY MEDICINE

## 2022-01-19 PROCEDURE — 83880 ASSAY OF NATRIURETIC PEPTIDE: CPT | Performed by: EMERGENCY MEDICINE

## 2022-01-19 PROCEDURE — 93010 ELECTROCARDIOGRAM REPORT: CPT | Performed by: INTERNAL MEDICINE

## 2022-01-19 PROCEDURE — 84484 ASSAY OF TROPONIN QUANT: CPT | Performed by: EMERGENCY MEDICINE

## 2022-01-19 PROCEDURE — 87040 BLOOD CULTURE FOR BACTERIA: CPT | Performed by: EMERGENCY MEDICINE

## 2022-01-19 RX ORDER — AZITHROMYCIN 250 MG/1
TABLET, FILM COATED ORAL
Qty: 6 TABLET | Refills: 0 | Status: SHIPPED | OUTPATIENT
Start: 2022-01-19 | End: 2022-04-15 | Stop reason: HOSPADM

## 2022-01-19 RX ORDER — SODIUM CHLORIDE 0.9 % (FLUSH) 0.9 %
10 SYRINGE (ML) INJECTION AS NEEDED
Status: DISCONTINUED | OUTPATIENT
Start: 2022-01-19 | End: 2022-01-19 | Stop reason: HOSPADM

## 2022-01-19 RX ORDER — SODIUM CHLORIDE 9 MG/ML
125 INJECTION, SOLUTION INTRAVENOUS CONTINUOUS
Status: DISCONTINUED | OUTPATIENT
Start: 2022-01-19 | End: 2022-01-19 | Stop reason: HOSPADM

## 2022-01-19 RX ORDER — IBUPROFEN 600 MG/1
600 TABLET ORAL EVERY 8 HOURS PRN
Qty: 30 TABLET | Refills: 0 | Status: SHIPPED | OUTPATIENT
Start: 2022-01-19

## 2022-01-19 RX ADMIN — SODIUM CHLORIDE 500 ML: 9 INJECTION, SOLUTION INTRAVENOUS at 12:16

## 2022-01-19 RX ADMIN — SODIUM CHLORIDE 125 ML/HR: 9 INJECTION, SOLUTION INTRAVENOUS at 13:05

## 2022-01-19 RX ADMIN — IOPAMIDOL 88 ML: 755 INJECTION, SOLUTION INTRAVENOUS at 13:47

## 2022-01-19 NOTE — ED NOTES
Discharge instructions reviewed with pt. Pt verbalized understanding. VSS. NAD noted. Pt at baseline when discharged     Saul Felix, RN  01/19/22 1541       Saul Felix RN  01/19/22 1548

## 2022-01-19 NOTE — ED PROVIDER NOTES
Subjective   Patient is a developmentally delayed gentleman with history of COPD who comes in with possible rales and increased shortness of breath.  Patient with arrival oxygen saturation of 91%.  Patient is nonverbal at this time.  Patient's caregiver at the bedside states that he will talk, not really respond to questioning but will just say what ever he feels like saying.  Patient has had reported fevers as well though is afebrile at this time.  It is unsure of the patient's COVID-19 status.  Patient arrives in no distress and is calm and cooperative in bed.          Review of Systems   Constitutional: Positive for fever. Negative for appetite change and chills.   HENT: Negative.  Negative for congestion.    Eyes: Negative.  Negative for photophobia and visual disturbance.   Respiratory: Positive for cough and shortness of breath. Negative for chest tightness.    Cardiovascular: Negative.  Negative for chest pain and palpitations.   Gastrointestinal: Negative.  Negative for abdominal pain, constipation, diarrhea, nausea and vomiting.   Endocrine: Negative.    Genitourinary: Negative.  Negative for decreased urine volume, dysuria, flank pain and hematuria.   Musculoskeletal: Negative.  Negative for arthralgias, back pain, myalgias, neck pain and neck stiffness.   Skin: Negative.  Negative for pallor.   Neurological: Negative.  Negative for dizziness, syncope, weakness, light-headedness, numbness and headaches.   Psychiatric/Behavioral: Negative.  Negative for confusion and suicidal ideas. The patient is not nervous/anxious.    All other systems reviewed and are negative.      Past Medical History:   Diagnosis Date   • Alopecia    • Arthritis    • Autism    • COPD (chronic obstructive pulmonary disease) (HCC)    • GERD (gastroesophageal reflux disease)    • Hyperlipidemia    • Hypertension    • Insomnia    • Intellectual disability    • Lennox-Gastaut syndrome (HCC)    • Major depressive disorder    • Orthostatic  hypotension    • Osteoporosis    • Right bundle branch block    • Scoliosis    • Seizures (HCC)        Allergies   Allergen Reactions   • Haldol [Haloperidol] Unknown (See Comments)     Unknown     • Levaquin [Levofloxacin] Other (See Comments)     Lowers seizure threshold       Past Surgical History:   Procedure Laterality Date   • COLONOSCOPY N/A 1/8/2021    Procedure: COLONOSCOPY;  Surgeon: Wanda Lang MD;  Location: [facility] ENDOSCOPY;  Service: Gastroenterology;  Laterality: N/A;   • COLONOSCOPY N/A 9/2/2021    Procedure: COLONOSCOPY;  Surgeon: Wanda Lang MD;  Location: [facility] ENDOSCOPY;  Service: Gastroenterology;  Laterality: N/A;   • ENDOSCOPY N/A 1/7/2021    Procedure: ESOPHAGOGASTRODUODENOSCOPY;  Surgeon: Wanda Lang MD;  Location: [facility] ENDOSCOPY;  Service: Gastroenterology;  Laterality: N/A;   • ENDOSCOPY N/A 1/22/2021    Procedure: ESOPHAGOGASTRODUODENOSCOPY;  Surgeon: Wanda Lang MD;  Location: [facility] ENDOSCOPY;  Service: Gastroenterology;  Laterality: N/A;   • ENDOSCOPY W/ PEG TUBE PLACEMENT N/A 6/25/2021    Procedure: ESOPHAGOGASTRODUODENOSCOPY WITH PERCUTANEOUS ENDOSCOPIC GASTROSTOMY TUBE INSERTION WITH ANESTHESIA;  Surgeon: Wanda Lang MD;  Location: [facility] ENDOSCOPY;  Service: Gastroenterology;  Laterality: N/A;   • SIGMOIDOSCOPY N/A 10/11/2021    Procedure: SIGMOIDOSCOPY FLEXIBLE;  Surgeon: Wanda Lang MD;  Location: [facility] ENDOSCOPY;  Service: Gastroenterology;  Laterality: N/A;   • SIGMOIDOSCOPY N/A 11/30/2021    Procedure: SIGMOIDOSCOPY FLEXIBLE;  Surgeon: Wanda Lang MD;  Location: [facility] ENDOSCOPY;  Service: Gastroenterology;  Laterality: N/A;   • UPPER GASTROINTESTINAL ENDOSCOPY  01/22/2021   • UPPER GASTROINTESTINAL ENDOSCOPY  06/25/2021       Family History   Problem Relation Age of Onset   • Hypertension Father        Social History     Socioeconomic History   • Marital status: Single   Tobacco Use   • Smoking status: Never Smoker   •  Smokeless tobacco: Never Used   Vaping Use   • Vaping Use: Never used   Substance and Sexual Activity   • Alcohol use: Never   • Drug use: Never   • Sexual activity: Defer           Objective   Physical Exam  Vitals and nursing note reviewed.   Constitutional:       General: He is not in acute distress.     Appearance: He is well-developed. He is not ill-appearing or toxic-appearing.   HENT:      Head: Normocephalic and atraumatic.   Eyes:      Conjunctiva/sclera: Conjunctivae normal.   Neck:      Vascular: No JVD.   Cardiovascular:      Rate and Rhythm: Normal rate and regular rhythm.      Heart sounds: Normal heart sounds. No murmur heard.  No friction rub. No gallop.    Pulmonary:      Effort: Pulmonary effort is normal. No tachypnea or respiratory distress.      Breath sounds: No wheezing or rales.   Chest:      Chest wall: No tenderness.   Abdominal:      General: Bowel sounds are normal. There is no distension.      Palpations: Abdomen is soft. There is no mass.      Tenderness: There is no abdominal tenderness. There is no guarding or rebound.   Musculoskeletal:         General: Normal range of motion.      Cervical back: Normal range of motion and neck supple.   Skin:     General: Skin is warm and dry.      Capillary Refill: Capillary refill takes less than 2 seconds.   Neurological:      General: No focal deficit present.      Comments: Flat affect, aphasic.   Psychiatric:         Thought Content: Thought content normal.         Judgment: Judgment normal.         Procedures           ED Course                                         Labs Reviewed   COMPREHENSIVE METABOLIC PANEL - Abnormal; Notable for the following components:       Result Value    Glucose 108 (*)     BUN/Creatinine Ratio 26.8 (*)     All other components within normal limits    Narrative:     GFR Normal >60  Chronic Kidney Disease <60  Kidney Failure <15     CBC WITH AUTO DIFFERENTIAL - Abnormal; Notable for the following components:     Neutrophil % 76.1 (*)     Lymphocyte % 9.6 (*)     Lymphocytes, Absolute 0.67 (*)     All other components within normal limits   D-DIMER, QUANTITATIVE - Abnormal; Notable for the following components:    D-Dimer, Quantitative 1,196 (*)     All other components within normal limits    Narrative:     Dimer values <500 ng/ml FEU are FDA approved as aid in diagnosis of deep venous thrombosis and pulmonary embolism.  This test should not be used in an exclusion strategy with pretest probability alone.    A recent guideline regarding diagnosis for pulmonary thromboembolism recommends an adjusted exclusion criterion of age x 10 ng/ml FEU for patients >50 years of age ([employer] Intern Med 2015; 163: 701-711).     URINALYSIS W/ MICROSCOPIC IF INDICATED (NO CULTURE) - Abnormal; Notable for the following components:    Appearance, UA Turbid (*)     All other components within normal limits    Narrative:     Urine microscopic not indicated.   COVID-19 AND FLU A/B, NP SWAB IN TRANSPORT MEDIA 8-12 HR TAT - Normal    Narrative:     Fact sheet for providers: https://www.fda.gov/media/175906/download    Fact sheet for patients: https://www.fda.gov/media/728566/download    Test performed by PCR.   BNP (IN-HOUSE) - Normal    Narrative:     Among patients with dyspnea, NT-proBNP is highly sensitive for the detection of acute congestive heart failure. In addition NT-proBNP of <300 pg/ml effectively rules out acute congestive heart failure with 99% negative predictive value.    Results may be falsely decreased if patient taking Biotin.     TROPONIN (IN-HOUSE) - Normal    Narrative:     Troponin T Reference Range:  <= 0.03 ng/mL-   Negative for AMI  >0.03 ng/mL-     Abnormal for myocardial necrosis.  Clinicians would have to utilize clinical acumen, EKG, Troponin and serial changes to determine if it is an Acute Myocardial Infarction or myocardial injury due to an underlying chronic condition.       Results may be falsely decreased if patient  taking Biotin.     LACTIC ACID, PLASMA - Normal   BLOOD CULTURE   BLOOD CULTURE   RAINBOW DRAW    Narrative:     The following orders were created for panel order [facility] Draw.  Procedure                               Abnormality         Status                     ---------                               -----------         ------                     Green Top (Gel)[[id number]]                                  Final result               Lavender Top[[id number]]                                     Final result               Gold Top - SST[[id number]]                                                              Light Blue Top[[id number]]                                   Final result                 Please view results for these tests on the individual orders.   CBC AND DIFFERENTIAL    Narrative:     The following orders were created for panel order CBC & Differential.  Procedure                               Abnormality         Status                     ---------                               -----------         ------                     CBC Auto Differential[[id number]]        Abnormal            Final result                 Please view results for these tests on the individual orders.   GREEN TOP   LAVENDER TOP   LIGHT BLUE TOP       CT Angiogram Chest   Final Result   No evidence of pulmonary embolism.   4 mm nodule noted in the superior segment of the right lower   lobe. Recommend follow-up chest CT in one year.   Multiple acute appearing left posterior rib fractures are noted   involving ribs eight through nine. There are multiple bilateral   old healed rib fractures.      Electronically signed by:  Jorge Davis MD  1/19/2022 2:22 PM CST   Workstation: [id number]      XR Chest 1 View   Final Result   Interval development of patchy left basilar, left   lower lobe infiltrative changes suggesting mild or early changes   pneumonitis.      Electronically signed by:  Ezequiel Posada MD  1/19/2022 11:31 AM CST    Workstation: [id number]                    [facility]    Final diagnoses:   Closed fracture of multiple ribs of left side, initial encounter   Acute respiratory distress       ED Disposition  ED Disposition     ED Disposition Condition Comment    Discharge Stable           Bautista James MD  [address] [facility] [address] [phone]    Schedule an appointment as soon as possible for a visit   As needed, For further evaluation and management         Medication List      New Prescriptions    azithromycin 250 MG tablet  Commonly known as: ZITHROMAX  Take 2 tablets the first day, then 1 tablet daily for 4 days.     ibuprofen 600 MG tablet  Commonly known as: ADVIL,MOTRIN  Take 1 tablet by mouth Every 8 (Eight) Hours As Needed for Moderate Pain .        Changed    midodrine 5 MG tablet  Commonly known as: PROAMATINE  Take 1 tablet by mouth 3 (Three) Times a Day.  What changed:   · how much to take  · additional instructions     sucralfate 1 g tablet  Commonly known as: CARAFATE  Take 1 tablet by mouth 4 (Four) Times a Day Before Meals & at Bedtime.  What changed: additional instructions           Where to Get Your Medications      These medications were sent to [facility] [place], [place] - [address] [facility] - [phone] [place] [phone]   [address] [facility] [address]    Phone: [phone]   · azithromycin 250 MG tablet  · ibuprofen 600 MG tablet          Sandeep Fernandez MD  01/19/22 9980

## 2022-01-20 ENCOUNTER — HOSPITAL ENCOUNTER (EMERGENCY)
Facility: HOSPITAL | Age: 67
Discharge: HOME | End: 2022-01-20
Attending: EMERGENCY MEDICINE | Admitting: EMERGENCY MEDICINE

## 2022-01-20 ENCOUNTER — APPOINTMENT (OUTPATIENT)
Dept: GENERAL RADIOLOGY | Facility: HOSPITAL | Age: 67
End: 2022-01-20

## 2022-01-20 VITALS
RESPIRATION RATE: 18 BRPM | WEIGHT: 163 LBS | HEART RATE: 86 BPM | DIASTOLIC BLOOD PRESSURE: 75 MMHG | HEIGHT: 73 IN | OXYGEN SATURATION: 95 % | SYSTOLIC BLOOD PRESSURE: 119 MMHG | TEMPERATURE: 97.6 F | BODY MASS INDEX: 21.6 KG/M2

## 2022-01-20 DIAGNOSIS — R91.8 INFILTRATE OF LUNG PRESENT ON CHEST X-RAY: Primary | ICD-10-CM

## 2022-01-20 LAB
ALBUMIN SERPL-MCNC: 3.9 G/DL (ref 3.5–5.2)
ALBUMIN/GLOB SERPL: 1.6 G/DL
ALP SERPL-CCNC: 107 U/L (ref 39–117)
ALT SERPL W P-5'-P-CCNC: 31 U/L (ref 1–41)
ANION GAP SERPL CALCULATED.3IONS-SCNC: 7 MMOL/L (ref 5–15)
AST SERPL-CCNC: 28 U/L (ref 1–40)
BASOPHILS # BLD AUTO: 0.05 10*3/MM3 (ref 0–0.2)
BASOPHILS NFR BLD AUTO: 0.6 % (ref 0–1.5)
BILIRUB SERPL-MCNC: 0.2 MG/DL (ref 0–1.2)
BUN SERPL-MCNC: 23 MG/DL (ref 8–23)
BUN/CREAT SERPL: 27.1 (ref 7–25)
CALCIUM SPEC-SCNC: 9.3 MG/DL (ref 8.6–10.5)
CHLORIDE SERPL-SCNC: 104 MMOL/L (ref 98–107)
CO2 SERPL-SCNC: 29 MMOL/L (ref 22–29)
CREAT SERPL-MCNC: 0.85 MG/DL (ref 0.76–1.27)
D-LACTATE SERPL-SCNC: 1.8 MMOL/L (ref 0.5–2)
DEPRECATED RDW RBC AUTO: 48 FL (ref 37–54)
EOSINOPHIL # BLD AUTO: 0.28 10*3/MM3 (ref 0–0.4)
EOSINOPHIL NFR BLD AUTO: 3.2 % (ref 0.3–6.2)
ERYTHROCYTE [DISTWIDTH] IN BLOOD BY AUTOMATED COUNT: 14.1 % (ref 12.3–15.4)
FLUAV SUBTYP SPEC NAA+PROBE: NOT DETECTED
FLUBV RNA ISLT QL NAA+PROBE: NOT DETECTED
GFR SERPL CREATININE-BSD FRML MDRD: 90 ML/MIN/1.73
GLOBULIN UR ELPH-MCNC: 2.5 GM/DL
GLUCOSE SERPL-MCNC: 133 MG/DL (ref 65–99)
HCT VFR BLD AUTO: 37.6 % (ref 37.5–51)
HGB BLD-MCNC: 12.6 G/DL (ref 13–17.7)
IMM GRANULOCYTES # BLD AUTO: 0.03 10*3/MM3 (ref 0–0.05)
IMM GRANULOCYTES NFR BLD AUTO: 0.3 % (ref 0–0.5)
LYMPHOCYTES # BLD AUTO: 0.67 10*3/MM3 (ref 0.7–3.1)
LYMPHOCYTES NFR BLD AUTO: 7.6 % (ref 19.6–45.3)
MCH RBC QN AUTO: 31 PG (ref 26.6–33)
MCHC RBC AUTO-ENTMCNC: 33.5 G/DL (ref 31.5–35.7)
MCV RBC AUTO: 92.4 FL (ref 79–97)
MONOCYTES # BLD AUTO: 0.69 10*3/MM3 (ref 0.1–0.9)
MONOCYTES NFR BLD AUTO: 7.8 % (ref 5–12)
NEUTROPHILS NFR BLD AUTO: 7.09 10*3/MM3 (ref 1.7–7)
NEUTROPHILS NFR BLD AUTO: 80.5 % (ref 42.7–76)
NRBC BLD AUTO-RTO: 0 /100 WBC (ref 0–0.2)
PLATELET # BLD AUTO: 164 10*3/MM3 (ref 140–450)
PMV BLD AUTO: 11.2 FL (ref 6–12)
POTASSIUM SERPL-SCNC: 4.7 MMOL/L (ref 3.5–5.2)
PROCALCITONIN SERPL-MCNC: 0.04 NG/ML (ref 0–0.25)
PROT SERPL-MCNC: 6.4 G/DL (ref 6–8.5)
QT INTERVAL: 362 MS
QTC INTERVAL: 435 MS
RBC # BLD AUTO: 4.07 10*6/MM3 (ref 4.14–5.8)
SARS-COV-2 RNA PNL SPEC NAA+PROBE: NOT DETECTED
SODIUM SERPL-SCNC: 140 MMOL/L (ref 136–145)
TROPONIN T SERPL-MCNC: <0.01 NG/ML (ref 0–0.03)
WBC NRBC COR # BLD: 8.81 10*3/MM3 (ref 3.4–10.8)

## 2022-01-20 PROCEDURE — 87636 SARSCOV2 & INF A&B AMP PRB: CPT | Performed by: NURSE PRACTITIONER

## 2022-01-20 PROCEDURE — 36415 COLL VENOUS BLD VENIPUNCTURE: CPT

## 2022-01-20 PROCEDURE — 25010000002 LEVETIRACETAM IN NACL 0.82% 500 MG/100ML SOLUTION: Performed by: NURSE PRACTITIONER

## 2022-01-20 PROCEDURE — 83605 ASSAY OF LACTIC ACID: CPT | Performed by: NURSE PRACTITIONER

## 2022-01-20 PROCEDURE — 71045 X-RAY EXAM CHEST 1 VIEW: CPT

## 2022-01-20 PROCEDURE — 84484 ASSAY OF TROPONIN QUANT: CPT | Performed by: NURSE PRACTITIONER

## 2022-01-20 PROCEDURE — 25010000002 LORAZEPAM PER 2 MG: Performed by: NURSE PRACTITIONER

## 2022-01-20 PROCEDURE — 99284 EMERGENCY DEPT VISIT MOD MDM: CPT

## 2022-01-20 PROCEDURE — 93010 ELECTROCARDIOGRAM REPORT: CPT | Performed by: INTERNAL MEDICINE

## 2022-01-20 PROCEDURE — 93005 ELECTROCARDIOGRAM TRACING: CPT | Performed by: NURSE PRACTITIONER

## 2022-01-20 PROCEDURE — 85025 COMPLETE CBC W/AUTO DIFF WBC: CPT | Performed by: NURSE PRACTITIONER

## 2022-01-20 PROCEDURE — 80177 DRUG SCRN QUAN LEVETIRACETAM: CPT | Performed by: NURSE PRACTITIONER

## 2022-01-20 PROCEDURE — 84145 PROCALCITONIN (PCT): CPT | Performed by: NURSE PRACTITIONER

## 2022-01-20 PROCEDURE — 96374 THER/PROPH/DIAG INJ IV PUSH: CPT

## 2022-01-20 PROCEDURE — 80053 COMPREHEN METABOLIC PANEL: CPT | Performed by: NURSE PRACTITIONER

## 2022-01-20 PROCEDURE — 96375 TX/PRO/DX INJ NEW DRUG ADDON: CPT

## 2022-01-20 RX ORDER — LORAZEPAM 2 MG/ML
1 INJECTION INTRAMUSCULAR ONCE
Status: COMPLETED | OUTPATIENT
Start: 2022-01-20 | End: 2022-01-20

## 2022-01-20 RX ORDER — SODIUM CHLORIDE 0.9 % (FLUSH) 0.9 %
10 SYRINGE (ML) INJECTION AS NEEDED
Status: DISCONTINUED | OUTPATIENT
Start: 2022-01-20 | End: 2022-01-20 | Stop reason: HOSPADM

## 2022-01-20 RX ORDER — LEVETIRACETAM 5 MG/ML
500 INJECTION INTRAVASCULAR EVERY 12 HOURS SCHEDULED
Status: DISCONTINUED | OUTPATIENT
Start: 2022-01-20 | End: 2022-01-20 | Stop reason: HOSPADM

## 2022-01-20 RX ADMIN — LEVETIRACETAM 500 MG: 5 INJECTION INTRAVENOUS at 11:09

## 2022-01-20 RX ADMIN — LORAZEPAM 1 MG: 2 INJECTION INTRAMUSCULAR; INTRAVENOUS at 11:09

## 2022-01-20 NOTE — ED NOTES
Per EMS patient had two seizure episodes, 10 seconds long. One at 0807. One at 0817. Has hx of seizures. Unknown if had medications today. Is from [facility]. Glucose with . They gave him openex neb en route. States patient coughs, gets red in the face, and has a blank stare look at times. Patient was discharged yesterday with rib fractures and pneumonia.     Emma Major, RN  01/20/22 2408

## 2022-01-20 NOTE — ED NOTES
Patient is at risk for falls. Yellow arm band placed. Yellow non-skid socks applied. Bed alarm activated. Gait belt is readidly accessible. Yellow light above door on. Call light within reach.     Elsie Stanton RN  01/20/22 4026

## 2022-01-20 NOTE — ED NOTES
Per nurse at [facility] pt was seen here yesterday for a fall. Pt was diagnosed with with 2 broken ribs. Pt had 2 seizures this morning, but has a history of seizures. Nurse states that seizures lasted about 30 seconds to one minute and returned to baseline afterward.      Rocio Borrero RN  01/20/22 0901

## 2022-01-20 NOTE — ED PROVIDER NOTES
"Subjective   Patient present to the ER vis EMS for worsening cough and seizures. Patient has known seizures and is on Keppra. Per EMS, patient has two seizures where he was having \"blank stares\". Patient was dx yesterday with some rib fractures and pneumonia. He was started on Azithromycin. Reports his cough today is worsened and he can't \"cough it up\". EMS states when he starts to cough he turns red in the face for a few seconds. Patient is cognitively at baseline of nonverbal states but making good eye contact. Patient was given Xopenex in route by EMS.           Review of Systems   Unable to perform ROS: Patient nonverbal       Past Medical History:   Diagnosis Date   • Alopecia    • Arthritis    • Autism    • COPD (chronic obstructive pulmonary disease) ([facility])    • GERD (gastroesophageal reflux disease)    • Hyperlipidemia    • Hypertension    • Insomnia    • Intellectual disability    • Lennox-Gastaut syndrome ([facility])    • Major depressive disorder    • Orthostatic hypotension    • Osteoporosis    • Right bundle branch block    • Scoliosis    • Seizures ([facility])        Allergies   Allergen Reactions   • Haldol [Haloperidol] Unknown (See Comments)     Unknown     • Levaquin [Levofloxacin] Other (See Comments)     Lowers seizure threshold       Past Surgical History:   Procedure Laterality Date   • COLONOSCOPY N/A 1/8/2021    Procedure: COLONOSCOPY;  Surgeon: Wanda Lang MD;  Location: [facility] ENDOSCOPY;  Service: Gastroenterology;  Laterality: N/A;   • COLONOSCOPY N/A 9/2/2021    Procedure: COLONOSCOPY;  Surgeon: Wanda Lang MD;  Location: [facility] ENDOSCOPY;  Service: Gastroenterology;  Laterality: N/A;   • ENDOSCOPY N/A 1/7/2021    Procedure: ESOPHAGOGASTRODUODENOSCOPY;  Surgeon: Wanda Lang MD;  Location: [facility] ENDOSCOPY;  Service: Gastroenterology;  Laterality: N/A;   • ENDOSCOPY N/A 1/22/2021    Procedure: ESOPHAGOGASTRODUODENOSCOPY;  Surgeon: Wanda Lang MD;  Location: [facility] ENDOSCOPY; " " Service: Gastroenterology;  Laterality: N/A;   • ENDOSCOPY W/ PEG TUBE PLACEMENT N/A 6/25/2021    Procedure: ESOPHAGOGASTRODUODENOSCOPY WITH PERCUTANEOUS ENDOSCOPIC GASTROSTOMY TUBE INSERTION WITH ANESTHESIA;  Surgeon: Wanda Lang MD;  Location: [facility] ENDOSCOPY;  Service: Gastroenterology;  Laterality: N/A;   • SIGMOIDOSCOPY N/A 10/11/2021    Procedure: SIGMOIDOSCOPY FLEXIBLE;  Surgeon: Wanda Lang MD;  Location: [facility] ENDOSCOPY;  Service: Gastroenterology;  Laterality: N/A;   • SIGMOIDOSCOPY N/A 11/30/2021    Procedure: SIGMOIDOSCOPY FLEXIBLE;  Surgeon: Wanda Lang MD;  Location: [facility] ENDOSCOPY;  Service: Gastroenterology;  Laterality: N/A;   • UPPER GASTROINTESTINAL ENDOSCOPY  01/22/2021   • UPPER GASTROINTESTINAL ENDOSCOPY  06/25/2021       Family History   Problem Relation Age of Onset   • Hypertension Father        Social History     Socioeconomic History   • Marital status: Single   Tobacco Use   • Smoking status: Never Smoker   • Smokeless tobacco: Never Used   Vaping Use   • Vaping Use: Never used   Substance and Sexual Activity   • Alcohol use: Never   • Drug use: Never   • Sexual activity: Defer           Objective    /84   Pulse 80   Temp 97.6 °F (36.4 °C) (Axillary)   Resp 16   Ht 185.4 cm (73\")   Wt 73.9 kg (163 lb)   SpO2 97%   BMI 21.51 kg/m²     Physical Exam  Vitals and nursing note reviewed.   Constitutional:       Appearance: He is not ill-appearing.   HENT:      Head: Normocephalic and atraumatic.      Comments: Wearing seizure helmet  Eyes:      Extraocular Movements: Extraocular movements intact.   Cardiovascular:      Rate and Rhythm: Normal rate and regular rhythm.   Pulmonary:      Effort: Pulmonary effort is normal.      Breath sounds: No rales.      Comments: Lungs sound clear. Phlem noted to back of throat. Will have resp deep suction to remove.   Abdominal:      Palpations: Abdomen is soft.   Musculoskeletal:         General: Normal range of motion. "      Cervical back: Neck supple.      Comments: Patient's baseline   Skin:     General: Skin is warm and dry.      Capillary Refill: Capillary refill takes less than 2 seconds.   Neurological:      Mental Status: He is alert. Mental status is at baseline.   Psychiatric:         Behavior: Behavior normal.         Procedures  Results for orders placed or performed during the hospital encounter of 01/20/22   COVID-19 and FLU A/B PCR - Swab, Nasopharynx    Specimen: Nasopharynx; Swab   Result Value Ref Range    COVID19 Not Detected Not Detected - Ref. Range    Influenza A PCR Not Detected Not Detected    Influenza B PCR Not Detected Not Detected   Comprehensive Metabolic Panel    Specimen: Blood   Result Value Ref Range    Glucose 133 (H) 65 - 99 mg/dL    BUN 23 8 - 23 mg/dL    Creatinine 0.85 0.76 - 1.27 mg/dL    Sodium 140 136 - 145 mmol/L    Potassium 4.7 3.5 - 5.2 mmol/L    Chloride 104 98 - 107 mmol/L    CO2 29.0 22.0 - 29.0 mmol/L    Calcium 9.3 8.6 - 10.5 mg/dL    Total Protein 6.4 6.0 - 8.5 g/dL    Albumin 3.90 3.50 - 5.20 g/dL    ALT (SGPT) 31 1 - 41 U/L    AST (SGOT) 28 1 - 40 U/L    Alkaline Phosphatase 107 39 - 117 U/L    Total Bilirubin 0.2 0.0 - 1.2 mg/dL    eGFR Non African Amer 90 >60 mL/min/1.73    Globulin 2.5 gm/dL    A/G Ratio 1.6 g/dL    BUN/Creatinine Ratio 27.1 (H) 7.0 - 25.0    Anion Gap 7.0 5.0 - 15.0 mmol/L   Lactic Acid, Plasma    Specimen: Blood   Result Value Ref Range    Lactate 1.8 0.5 - 2.0 mmol/L   Procalcitonin    Specimen: Blood   Result Value Ref Range    Procalcitonin 0.04 0.00 - 0.25 ng/mL   Troponin    Specimen: Blood   Result Value Ref Range    Troponin T <0.010 0.000 - 0.030 ng/mL   CBC Auto Differential    Specimen: Blood   Result Value Ref Range    WBC 8.81 3.40 - 10.80 10*3/mm3    RBC 4.07 (L) 4.14 - 5.80 10*6/mm3    Hemoglobin 12.6 (L) 13.0 - 17.7 g/dL    Hematocrit 37.6 37.5 - 51.0 %    MCV 92.4 79.0 - 97.0 fL    MCH 31.0 26.6 - 33.0 pg    MCHC 33.5 31.5 - 35.7 g/dL    RDW  14.1 12.3 - 15.4 %    RDW-SD 48.0 37.0 - 54.0 fl    MPV 11.2 6.0 - 12.0 fL    Platelets 164 140 - 450 10*3/mm3    Neutrophil % 80.5 (H) 42.7 - 76.0 %    Lymphocyte % 7.6 (L) 19.6 - 45.3 %    Monocyte % 7.8 5.0 - 12.0 %    Eosinophil % 3.2 0.3 - 6.2 %    Basophil % 0.6 0.0 - 1.5 %    Immature Grans % 0.3 0.0 - 0.5 %    Neutrophils, Absolute 7.09 (H) 1.70 - 7.00 10*3/mm3    Lymphocytes, Absolute 0.67 (L) 0.70 - 3.10 10*3/mm3    Monocytes, Absolute 0.69 0.10 - 0.90 10*3/mm3    Eosinophils, Absolute 0.28 0.00 - 0.40 10*3/mm3    Basophils, Absolute 0.05 0.00 - 0.20 10*3/mm3    Immature Grans, Absolute 0.03 0.00 - 0.05 10*3/mm3    nRBC 0.0 0.0 - 0.2 /100 WBC     XR Chest 1 View    Result Date: 1/20/2022  Narrative: PORTABLE CHEST HISTORY: Shortness of air. Worsening cough. Portable AP film of the chest was obtained at 10:20 AM. COMPARISON: January 19, 2022 FINDINGS: EKG leads. Minimal subsegmental interstitial infiltrates mid lungs and lung bases. The heart is not enlarged. The pulmonary vasculature is not increased. No pleural effusion. No pneumothorax. No acute osseous abnormality. Minimal dextroscoliosis thoracic spine. Old rib fractures. Old left clavicular fracture.     Impression: CONCLUSION: Minimal subsegmental interstitial infiltrates mid lungs and lung bases. [id number] Electronically signed by:  Dar Whitten MD  1/20/2022 10:49 AM CST Workstation: [id number]    XR Chest 1 View    Result Date: 1/19/2022  Narrative: Chest x-ray single view. CLINICAL INDICATION: Shortness of breath COMPARISON: Chest January 29, 2021. FINDINGS: Cardiac silhouette is normal in size. Pulmonary vascularity is unremarkable. Interval development of patchy left basilar, lower lobe infiltrative changes. Lungs are otherwise clear.     Impression: Interval development of patchy left basilar, left lower lobe infiltrative changes suggesting mild or early changes pneumonitis. Electronically signed by:  Ezequiel Posada MD  1/19/2022 11:31 AM CST  Workstation: [id number]    CT Angiogram Chest    Result Date: 1/19/2022  Narrative: PROCEDURE: CT/CTA THORAX/PULMONARY WITH CONTRAST CLINICAL INFORMATION:  sob, elevated d.dimer   TECHNIQUE: Axial images were obtained and multiplanar reconstructions were made.  This exam was performed using radiation doses that are as low as reasonably achievable (ALARA). This exam was performed according to our departmental dose optimization program, which includes automated exposure control, adjustment of the mA and/or KV according to patient size and/or use of iterative reconstruction technique. CONTRAST:   88 cc intravenous Isovue 370 COMPARISON: CT chest dated 1/26/2021. Note: Reconstructed MIP images were obtained in multiple obliquities. No 3-D surface rendered images were obtained for this exam. FINDINGS: PULMONARY CTA: The main pulmonary arteries and their major branches are opacified with contrast without evidence of abnormal filling defects. OTHER VASCULAR: Unremarkable LUNGS/PLEURA/AIRWAYS: Scattered areas of scarring or atelectasis are seen bilaterally, most pronounced in the lung bases.  4 mm nodule noted in the superior segment of the right lower lobe. MEDIASTINUM, KRISTI AND LYMPH NODES: The mediastinum, kristi and lymph nodes are normal. HEART AND PERICARDIUM: The heart and pericardium are normal. UPPER ABDOMEN: Unremarkable OSSEOUS STRUCTURES: Multiple acute appearing left posterior rib fractures are noted involving ribs eight through nine. There are multiple bilateral old healed rib fractures. There is an old healed compression fracture of the inferior endplate of T9. There are degenerative changes of the mid to lower cervical spine.     Impression: No evidence of pulmonary embolism. 4 mm nodule noted in the superior segment of the right lower lobe. Recommend follow-up chest CT in one year. Multiple acute appearing left posterior rib fractures are noted involving ribs eight through nine. There are multiple bilateral old  healed rib fractures. Electronically signed by:  Jorge Davis MD  1/19/2022 2:22 PM CST Workstation: [id number]             ED Course  ED Course as of 01/20/22 1300   Thu Jan 20, 2022   1248 Patient care tech who knows patient well at bedside and states what she saw did not seem to be a typical seizure for patient. She states patient appears to be trying to cough up his phlem and get choked where he hold his breath for several seconds. She states he did not have any altered mental status with the episodes this morning. She states patient is acting at base line. Discussed discharge back to care facility with staff to encourage patient to cough/deep breath to prevent further development of pneumonia and to help expand lungs with rib fracture.  [SH]      ED Course User Index  [SH] Joanne Sorensen APRN                                                 MDM  Number of Diagnoses or Management Options  Infiltrate of lung present on chest x-ray  Diagnosis management comments: Deep suction by Resp not completed due to patient able to cough of phlem with repositioning and encouragement of cough. VS have been stable while here. Work up unchanged from yesterday.        Amount and/or Complexity of Data Reviewed  Clinical lab tests: reviewed  Tests in the radiology section of CPT®: reviewed  Tests in the medicine section of CPT®: reviewed  Decide to obtain previous medical records or to obtain history from someone other than the patient: yes        Final diagnoses:   Infiltrate of lung present on chest x-ray       ED Disposition  ED Disposition     ED Disposition Condition Comment    Discharge Stable           Bautista James MD  [address] [facility] [id number] [phone]    Schedule an appointment as soon as possible for a visit   ER follow up         Medication List      Changed    midodrine 5 MG tablet  Commonly known as: PROAMATINE  Take 1 tablet by mouth 3 (Three) Times a Day.  What changed:   · how  much to take  · additional instructions     sucralfate 1 g tablet  Commonly known as: CARAFATE  Take 1 tablet by mouth 4 (Four) Times a Day Before Meals & at Bedtime.  What changed: additional instructions             Joanne Sorensen, GARRET  01/20/22 1257       Joanne Sorensen, GARRET  01/20/22 1300

## 2022-01-20 NOTE — DISCHARGE INSTRUCTIONS
Continue home medications including antibiotic. Encourage patient to deep breath and get good coughs to clear phlem from back of throat. May cushing chair behind patient with pillows to provide comfort for rib fractures. Follow up with his primary care provider for reevaluation. Return to the ER as needed.

## 2022-01-24 LAB
BACTERIA SPEC AEROBE CULT: NORMAL
BACTERIA SPEC AEROBE CULT: NORMAL
LEVETIRACETAM SERPL-MCNC: 53.7 UG/ML (ref 10–40)

## 2022-02-01 DIAGNOSIS — F84.0 AUTISM: ICD-10-CM

## 2022-02-01 DIAGNOSIS — F41.9 ANXIETY: ICD-10-CM

## 2022-02-02 RX ORDER — LORAZEPAM 1 MG/1
TABLET ORAL
Qty: 60 TABLET | Refills: 5 | Status: SHIPPED | OUTPATIENT
Start: 2022-02-02

## 2022-02-21 ENCOUNTER — OFFICE VISIT (OUTPATIENT)
Dept: NEUROLOGY | Facility: CLINIC | Age: 67
End: 2022-02-21

## 2022-02-21 VITALS
RESPIRATION RATE: 24 BRPM | HEIGHT: 73 IN | SYSTOLIC BLOOD PRESSURE: 118 MMHG | HEART RATE: 20 BPM | DIASTOLIC BLOOD PRESSURE: 72 MMHG | BODY MASS INDEX: 21.6 KG/M2 | WEIGHT: 163 LBS

## 2022-02-21 DIAGNOSIS — G40.219 COMPLEX PARTIAL EPILEPSY WITH GENERALIZATION AND WITH INTRACTABLE EPILEPSY: Primary | ICD-10-CM

## 2022-02-21 PROCEDURE — 99213 OFFICE O/P EST LOW 20 MIN: CPT | Performed by: NURSE PRACTITIONER

## 2022-02-21 NOTE — PROGRESS NOTES
Neurology Progress Note      Chief Complaint:    Seizures    Subjective     Subjective:  Bautista Grubbs is a 66 y.o. male who presents today for seizures.  He is followed by Dr. Bautista James MD for primary and he lives in a facility due to severe intellectual disability.  He was last seen by me on 11/22/2021.  At that time he had pneumonia in June 2021 which as a result caused some breakthrough seizure.  After his pneumonia resolved, so did his seizures.  He has been followed in the past by [facility] Epilepsy department but had not seen them since August 2020.  According to the chart it appears he also has an appointment with Dr. Arteaga on 3/28/2022.  Chart review from his chart at [facility] shows 2 reported seizures on 1/20/2022 and 1/21/2022.  The employee who brought Mr Baum reports that he knows of 4 seizures rather than 2.  He is a non-medical personnel.  Review of nursing notes indicates a reported seizure on 2/17/2022 but this was noted to not have actually been a seizure.  Review of progress notes from providers reveals Dr. James reporting 2 seizures on 1/20/2022.  No other seizures noted within these progress notes    Today, he continues to be non-verbal and not able to follow commands.  He is accompanied by Calos who states he has been rather grumpy this morning but otherwise in his normal state of health.  There are no further concerns minus the reported seizures from a neurological standpoint.  It appears that a CXR revealed some minimal infiltrates when he presented to the ED on 1/20/2022.  He had been treated for this.  He does continue to have a cough at this point.  Calos is unsure of any medication dose misses but there is nothing to indicate in his chart that he would have missed doses.  There have been reports of emesis over the past few weeks and that could indicate less dosing of his antiepileptics during those periods.  His last Keppra level on 1/21/2021 was 24.5 and  Lamictal level on 1/19/2021 was 4.4.      Past Medical History:   Diagnosis Date   • Alopecia    • Arthritis    • Autism    • COPD (chronic obstructive pulmonary disease) (HCC)    • GERD (gastroesophageal reflux disease)    • Hyperlipidemia    • Hypertension    • Insomnia    • Intellectual disability    • Lennox-Gastaut syndrome (HCC)    • Major depressive disorder    • Orthostatic hypotension    • Osteoporosis    • Right bundle branch block    • Scoliosis    • Seizures (HCC)      Past Surgical History:   Procedure Laterality Date   • COLONOSCOPY N/A 1/8/2021    Procedure: COLONOSCOPY;  Surgeon: Wanda Lang MD;  Location: [facility] ENDOSCOPY;  Service: Gastroenterology;  Laterality: N/A;   • COLONOSCOPY N/A 9/2/2021    Procedure: COLONOSCOPY;  Surgeon: Wanda Lang MD;  Location: [facility] ENDOSCOPY;  Service: Gastroenterology;  Laterality: N/A;   • ENDOSCOPY N/A 1/7/2021    Procedure: ESOPHAGOGASTRODUODENOSCOPY;  Surgeon: Wanda Lang MD;  Location: [facility] ENDOSCOPY;  Service: Gastroenterology;  Laterality: N/A;   • ENDOSCOPY N/A 1/22/2021    Procedure: ESOPHAGOGASTRODUODENOSCOPY;  Surgeon: Wanda Lang MD;  Location: [facility] ENDOSCOPY;  Service: Gastroenterology;  Laterality: N/A;   • ENDOSCOPY W/ PEG TUBE PLACEMENT N/A 6/25/2021    Procedure: ESOPHAGOGASTRODUODENOSCOPY WITH PERCUTANEOUS ENDOSCOPIC GASTROSTOMY TUBE INSERTION WITH ANESTHESIA;  Surgeon: Wanda Lang MD;  Location: [facility] ENDOSCOPY;  Service: Gastroenterology;  Laterality: N/A;   • SIGMOIDOSCOPY N/A 10/11/2021    Procedure: SIGMOIDOSCOPY FLEXIBLE;  Surgeon: Wanda Lang MD;  Location: [facility] ENDOSCOPY;  Service: Gastroenterology;  Laterality: N/A;   • SIGMOIDOSCOPY N/A 11/30/2021    Procedure: SIGMOIDOSCOPY FLEXIBLE;  Surgeon: Wanda Lang MD;  Location: [facility] ENDOSCOPY;  Service: Gastroenterology;  Laterality: N/A;   • UPPER GASTROINTESTINAL ENDOSCOPY  01/22/2021   • UPPER GASTROINTESTINAL ENDOSCOPY  06/25/2021      Family History   Problem Relation Age of Onset   • Hypertension Father      Social History     Tobacco Use   • Smoking status: Never Smoker   • Smokeless tobacco: Never Used   Vaping Use   • Vaping Use: Never used   Substance Use Topics   • Alcohol use: Never   • Drug use: Never     Medcations:  Current Outpatient Medications   Medication Sig Dispense Refill   • albuterol (PROVENTIL) (2.5 MG/3ML) 0.083% nebulizer solution      • calcium carbonate-vitamin d (CALCIUM 600+D) 600-400 MG-UNIT per tablet Take 1 tablet by mouth 2 (Two) Times a Day. Per G-Tube     • denosumab (Prolia) 60 MG/ML solution prefilled syringe syringe Inject 60 mg under the skin into the appropriate area as directed. Every 6 Months     • DIAZEPAM RE Insert 10 mg into the rectum As Needed (For Seizure Activity).     • famotidine (PEPCID) 20 MG tablet Take 20 mg by mouth Daily. Per G-Tube     • ferrous sulfate 325 (65 FE) MG tablet Take 325 mg by mouth Daily With Breakfast. Per G-Tube     • ibuprofen (ADVIL,MOTRIN) 600 MG tablet Take 1 tablet by mouth Every 8 (Eight) Hours As Needed for Moderate Pain . 30 tablet 0   • lamoTRIgine (LaMICtal) 200 MG tablet Take 200 mg by mouth 2 (Two) Times a Day. Per G-Tube     • lamoTRIgine (LaMICtal) 25 MG tablet Take 25 mg by mouth 2 (Two) Times a Day. Per G-Tube To Equal 225 MG     • levETIRAcetam (KEPPRA) 1000 MG tablet Take 1,000 mg by mouth 2 (Two) Times a Day. Per G-Tube     • levETIRAcetam (KEPPRA) 750 MG tablet Take 750 mg by mouth 2 (Two) Times a Day. Per G-Tube To Equal 1750 MG     • LORazepam (ATIVAN) 1 MG tablet GIVE ONE TABLET PER G-TUBE TWICE DAILY FOR ANXIETY AND AGITATION 60 tablet 5   • midodrine (PROAMATINE) 5 MG tablet Take 1 tablet by mouth 3 (Three) Times a Day. (Patient taking differently: Take 10 mg by mouth 3 (Three) Times a Day. Per G-Tube) 90 tablet 3   • Nutritional Supplements (JEVITY 1.5 JEFFERY PO) Take  by mouth. Gets 390mL bolus every 6 hours 1A-7A-1P-7P     • simvastatin (ZOCOR) 20  MG tablet Take 20 mg by mouth Daily. Per G-Tube     • sucralfate (CARAFATE) 1 g tablet Take 1 tablet by mouth 4 (Four) Times a Day Before Meals & at Bedtime. (Patient taking differently: Take 1 g by mouth 4 (Four) Times a Day Before Meals & at Bedtime. Per G-Tube) 120 tablet 0   • thioridazine (MELLARIL) 100 MG tablet Take 200 mg by mouth 2 (Two) Times a Day. Per G-Tube     • azithromycin (ZITHROMAX) 250 MG tablet Take 2 tablets the first day, then 1 tablet daily for 4 days. 6 tablet 0     No current facility-administered medications for this visit.     Current outpatient and discharge medications have been reconciled for the patient.  Reviewed by: GARRET Del Valle      Allergies:    Haldol [haloperidol] and Levaquin [levofloxacin]    Review of Systems:   Review of Systems   Neurological: Positive for seizures.     Objective      Vital Signs  Heart Rate:  [20] 20  Resp:  [24] 24  BP: (118)/(72) 118/72    Physical Exam:  Physical Exam  Constitutional:       Appearance: Normal appearance.   HENT:      Head: Normocephalic.   Eyes:      Extraocular Movements: Extraocular movements intact.      Pupils: Pupils are equal, round, and reactive to light.   Cardiovascular:      Rate and Rhythm: Normal rate and regular rhythm.      Pulses: Normal pulses.   Pulmonary:      Effort: Pulmonary effort is normal. No tachypnea or accessory muscle usage.      Comments: Cough Noted.  Musculoskeletal:         General: Normal range of motion.      Cervical back: Normal range of motion and neck supple.   Skin:     General: Skin is warm and dry.      Capillary Refill: Capillary refill takes less than 2 seconds.   Neurological:      Gait: Gait is intact.   Psychiatric:         Mood and Affect: Mood normal.       Neurologic Exam:  Mental Status:    -Awake. Alert. Unable to assess orientation as patient is non-verbal.  -Does not follow commands.     CN II:  Full visual fields with confrontation.  Pupils equally reactive to light.  CN III,  IV, VI:  Extraocular muscles function intact with no nystagmus.  Would trach around room well.   CN V:  Patient would not follow command to assess.   CN VII:  Facial motor symmetric. Unable to follow commands and this was done with passive assessment.  CN VIII:  Gross hearing intact bilaterally as can be assessed.  This was limited assessment but the patient was able to adequately interact with noise being performed around him.   CN IX/X:  Unable to assess as the patient would not follow commands.  CN XI:  Would not shrug shoulders for assessment.   CN XII:  Would not stick tongue out for assessment.     Motor: (strength out of 5:  1= minimal movement, 2 = movement in plane of gravity, 3 = movement against gravity, 4 = movement against some resistance, 5 = full strength)     -5/5 in bilateral biceps, triceps, brachioradialis, wrist extensors and intrinsic muscles of the hand.    -5/5 in bilateral hip flexors, quadriceps, hamstrings, gastrocsoleus complex, anterior tibialis and extensor hallucis longus.    The patient is unable to follow commands.  However, as I was interacting with him he would resist with great strength on 4/4 extremities.      Deep Tendon Reflexes:  -Right              Biceps: 2+         Triceps: 2+      Brachioradialis: 2+              Patella: 2+       Ankle: 2+         Babinski:  negative  -Left              Biceps: 2+         Triceps: 2+      Brachioradialis: 2+              Patella: 2+       Ankle: 2+         Babinski:  negative    Tone (Modified Srikanth Scale):  No appreciable increase in tone or rigidity noted.     Sensory:  -Intact to light touch, pinprick BUE (C5-T1) and BLE (L2-S1).     Coordination:  -Patient does not follow commands.     Gait  -Nonambulatory     Results Review:    Lab Results   Component Value Date    GLUCOSE 133 (H) 01/20/2022    BUN 23 01/20/2022    CREATININE 0.85 01/20/2022    EGFRIFNONA 90 01/20/2022    BCR 27.1 (H) 01/20/2022    K 4.7 01/20/2022    CO2 29.0  01/20/2022    CALCIUM 9.3 01/20/2022    ALBUMIN 3.90 01/20/2022    AST 28 01/20/2022    ALT 31 01/20/2022     Lab Results   Component Value Date    WBC 8.81 01/20/2022    HGB 12.6 (L) 01/20/2022    HCT 37.6 01/20/2022    MCV 92.4 01/20/2022     01/20/2022     Lab Results   Component Value Date    LEVETIRACETA 53.7 (H) 01/20/2022     Assessment/Plan     Impression:  · Lennox Gastaut Syndrome   · Complex Partial Epilepsy    Plan:  · Continue Keppra 1750mg twice daily.     · Continue Lamictal 225mg twice daily.      · Facility does routine management of lab work and therapeutic drug levels.  Upon chart review while in the office, therapeutic drug levels were adequate.  I will obtain copy for our records.       · The facility is to let me know of any breakthrough seizure activity.     · It is not uncommon for breakthrough seizures to occur due to acute illness.  Precautions should be taken to ensure that he is healthy and for prevention of illness.  Timely medication administration is also crucial for ensuring that breakthrough seizure is prevented.     · It is recommended you observe all seizure precautions, including, but not limited to: no driving until seizure free for more than 3 months- as per State driving regulation / law, avoid all high-risk activities, take showers instead of baths, avoid swimming without observation, avoid open heat sources, avoid working at heights, and avoid engaging in all potentially hazardous activities.   He does not drive.     · Recommend seizure first aid as directed at facility.      I have reached out to GARRET Walsh to discuss his care today.  She states that there has been no further seizure activity since the two reported in the setting of acute infection (pneumonia).  At this time I do believe that we keep his medications the same at this point with no further changes due to acute infection.  I will follow-up in 3 more months due to this.     The patient and I have  discussed the plan of care and he is in full agreement at this time.     Follow-Up:  Return in about 3 months (around 5/21/2022) for Seizure follow-up.      GARRET Del Valle  02/21/22  16:49 CST

## 2022-04-11 ENCOUNTER — HOSPITAL ENCOUNTER (EMERGENCY)
Facility: HOSPITAL | Age: 67
Discharge: NURSING, REHAB OR LONG-TERM CARE | End: 2022-04-11
Attending: EMERGENCY MEDICINE | Admitting: EMERGENCY MEDICINE

## 2022-04-11 ENCOUNTER — APPOINTMENT (OUTPATIENT)
Dept: CT IMAGING | Facility: HOSPITAL | Age: 67
End: 2022-04-11

## 2022-04-11 ENCOUNTER — APPOINTMENT (OUTPATIENT)
Dept: GENERAL RADIOLOGY | Facility: HOSPITAL | Age: 67
End: 2022-04-11

## 2022-04-11 ENCOUNTER — HOSPITAL ENCOUNTER (INPATIENT)
Facility: HOSPITAL | Age: 67
LOS: 4 days | Discharge: NURSING, REHAB OR LONG-TERM CARE | End: 2022-04-15
Attending: EMERGENCY MEDICINE | Admitting: HOSPITALIST

## 2022-04-11 VITALS
DIASTOLIC BLOOD PRESSURE: 67 MMHG | TEMPERATURE: 98.3 F | SYSTOLIC BLOOD PRESSURE: 113 MMHG | HEIGHT: 69 IN | WEIGHT: 145.5 LBS | OXYGEN SATURATION: 97 % | HEART RATE: 106 BPM | BODY MASS INDEX: 21.55 KG/M2 | RESPIRATION RATE: 18 BRPM

## 2022-04-11 DIAGNOSIS — J96.01 ACUTE RESPIRATORY FAILURE WITH HYPOXIA: ICD-10-CM

## 2022-04-11 DIAGNOSIS — R50.9 FEBRILE ILLNESS, ACUTE: ICD-10-CM

## 2022-04-11 DIAGNOSIS — R09.02 HYPOXIA: Primary | ICD-10-CM

## 2022-04-11 DIAGNOSIS — R05.9 COUGH: ICD-10-CM

## 2022-04-11 DIAGNOSIS — T17.908A ASPIRATION INTO AIRWAY, INITIAL ENCOUNTER: Primary | ICD-10-CM

## 2022-04-11 LAB
ALBUMIN SERPL-MCNC: 4.1 G/DL (ref 3.5–5.2)
ALBUMIN/GLOB SERPL: 1.1 G/DL
ALP SERPL-CCNC: 127 U/L (ref 39–117)
ALT SERPL W P-5'-P-CCNC: 25 U/L (ref 1–41)
ANION GAP SERPL CALCULATED.3IONS-SCNC: 11 MMOL/L (ref 5–15)
ARTERIAL PATENCY WRIST A: ABNORMAL
AST SERPL-CCNC: 23 U/L (ref 1–40)
ATMOSPHERIC PRESS: 742 MMHG
BACTERIA UR QL AUTO: ABNORMAL /HPF
BASE EXCESS BLDA CALC-SCNC: 3.7 MMOL/L (ref 0–2)
BASOPHILS # BLD AUTO: 0.03 10*3/MM3 (ref 0–0.2)
BASOPHILS NFR BLD AUTO: 0.3 % (ref 0–1.5)
BDY SITE: ABNORMAL
BILIRUB SERPL-MCNC: 0.4 MG/DL (ref 0–1.2)
BILIRUB UR QL STRIP: NEGATIVE
BUN SERPL-MCNC: 33 MG/DL (ref 8–23)
BUN/CREAT SERPL: 30.8 (ref 7–25)
CALCIUM SPEC-SCNC: 9.2 MG/DL (ref 8.6–10.5)
CHLORIDE SERPL-SCNC: 102 MMOL/L (ref 98–107)
CLARITY UR: ABNORMAL
CO2 SERPL-SCNC: 26 MMOL/L (ref 22–29)
COLOR UR: ABNORMAL
CREAT SERPL-MCNC: 1.07 MG/DL (ref 0.76–1.27)
DEPRECATED RDW RBC AUTO: 45.6 FL (ref 37–54)
EGFRCR SERPLBLD CKD-EPI 2021: 77 ML/MIN/1.73
EOSINOPHIL # BLD AUTO: 0.08 10*3/MM3 (ref 0–0.4)
EOSINOPHIL NFR BLD AUTO: 0.7 % (ref 0.3–6.2)
ERYTHROCYTE [DISTWIDTH] IN BLOOD BY AUTOMATED COUNT: 13.4 % (ref 12.3–15.4)
FLUAV SUBTYP SPEC NAA+PROBE: NOT DETECTED
FLUBV RNA ISLT QL NAA+PROBE: NOT DETECTED
GLOBULIN UR ELPH-MCNC: 3.8 GM/DL
GLUCOSE SERPL-MCNC: 113 MG/DL (ref 65–99)
GLUCOSE UR STRIP-MCNC: NEGATIVE MG/DL
HCO3 BLDA-SCNC: 27.4 MMOL/L (ref 20–26)
HCT VFR BLD AUTO: 44.4 % (ref 37.5–51)
HGB BLD-MCNC: 15.4 G/DL (ref 13–17.7)
HGB UR QL STRIP.AUTO: NEGATIVE
HOLD SPECIMEN: NORMAL
HOLD SPECIMEN: NORMAL
HYALINE CASTS UR QL AUTO: ABNORMAL /LPF
IMM GRANULOCYTES # BLD AUTO: 0.05 10*3/MM3 (ref 0–0.05)
IMM GRANULOCYTES NFR BLD AUTO: 0.4 % (ref 0–0.5)
KETONES UR QL STRIP: ABNORMAL
LEUKOCYTE ESTERASE UR QL STRIP.AUTO: ABNORMAL
LYMPHOCYTES # BLD AUTO: 0.24 10*3/MM3 (ref 0.7–3.1)
LYMPHOCYTES NFR BLD AUTO: 2 % (ref 19.6–45.3)
Lab: ABNORMAL
MCH RBC QN AUTO: 32.1 PG (ref 26.6–33)
MCHC RBC AUTO-ENTMCNC: 34.7 G/DL (ref 31.5–35.7)
MCV RBC AUTO: 92.5 FL (ref 79–97)
MODALITY: ABNORMAL
MONOCYTES # BLD AUTO: 0.51 10*3/MM3 (ref 0.1–0.9)
MONOCYTES NFR BLD AUTO: 4.3 % (ref 5–12)
NEUTROPHILS NFR BLD AUTO: 11.09 10*3/MM3 (ref 1.7–7)
NEUTROPHILS NFR BLD AUTO: 92.3 % (ref 42.7–76)
NITRITE UR QL STRIP: NEGATIVE
NRBC BLD AUTO-RTO: 0 /100 WBC (ref 0–0.2)
NT-PROBNP SERPL-MCNC: 46.1 PG/ML (ref 0–900)
PCO2 BLDA: 37.7 MM HG (ref 35–45)
PH BLDA: 7.47 PH UNITS (ref 7.35–7.45)
PH UR STRIP.AUTO: 7.5 [PH] (ref 5–9)
PLATELET # BLD AUTO: 168 10*3/MM3 (ref 140–450)
PMV BLD AUTO: 12 FL (ref 6–12)
PO2 BLDA: 61.3 MM HG (ref 83–108)
POTASSIUM SERPL-SCNC: 4.4 MMOL/L (ref 3.5–5.2)
PROT SERPL-MCNC: 7.9 G/DL (ref 6–8.5)
PROT UR QL STRIP: NEGATIVE
QT INTERVAL: 296 MS
QTC INTERVAL: 411 MS
RBC # BLD AUTO: 4.8 10*6/MM3 (ref 4.14–5.8)
RBC # UR STRIP: ABNORMAL /HPF
REF LAB TEST METHOD: ABNORMAL
SAO2 % BLDCOA: 92.9 % (ref 94–99)
SARS-COV-2 RNA PNL SPEC NAA+PROBE: NOT DETECTED
SODIUM SERPL-SCNC: 139 MMOL/L (ref 136–145)
SP GR UR STRIP: 1.03 (ref 1–1.03)
SQUAMOUS #/AREA URNS HPF: ABNORMAL /HPF
TROPONIN T SERPL-MCNC: <0.01 NG/ML (ref 0–0.03)
UROBILINOGEN UR QL STRIP: ABNORMAL
VENTILATOR MODE: ABNORMAL
WBC # UR STRIP: ABNORMAL /HPF
WBC NRBC COR # BLD: 12 10*3/MM3 (ref 3.4–10.8)
WHOLE BLOOD HOLD SPECIMEN: NORMAL
WHOLE BLOOD HOLD SPECIMEN: NORMAL

## 2022-04-11 PROCEDURE — 80053 COMPREHEN METABOLIC PANEL: CPT | Performed by: EMERGENCY MEDICINE

## 2022-04-11 PROCEDURE — 71275 CT ANGIOGRAPHY CHEST: CPT

## 2022-04-11 PROCEDURE — 71045 X-RAY EXAM CHEST 1 VIEW: CPT

## 2022-04-11 PROCEDURE — 87636 SARSCOV2 & INF A&B AMP PRB: CPT | Performed by: EMERGENCY MEDICINE

## 2022-04-11 PROCEDURE — 83880 ASSAY OF NATRIURETIC PEPTIDE: CPT | Performed by: EMERGENCY MEDICINE

## 2022-04-11 PROCEDURE — 93005 ELECTROCARDIOGRAM TRACING: CPT | Performed by: EMERGENCY MEDICINE

## 2022-04-11 PROCEDURE — 94799 UNLISTED PULMONARY SVC/PX: CPT

## 2022-04-11 PROCEDURE — 96365 THER/PROPH/DIAG IV INF INIT: CPT

## 2022-04-11 PROCEDURE — 94640 AIRWAY INHALATION TREATMENT: CPT

## 2022-04-11 PROCEDURE — 99284 EMERGENCY DEPT VISIT MOD MDM: CPT

## 2022-04-11 PROCEDURE — 81001 URINALYSIS AUTO W/SCOPE: CPT | Performed by: EMERGENCY MEDICINE

## 2022-04-11 PROCEDURE — 94760 N-INVAS EAR/PLS OXIMETRY 1: CPT

## 2022-04-11 PROCEDURE — 93010 ELECTROCARDIOGRAM REPORT: CPT | Performed by: INTERNAL MEDICINE

## 2022-04-11 PROCEDURE — 85025 COMPLETE CBC W/AUTO DIFF WBC: CPT | Performed by: EMERGENCY MEDICINE

## 2022-04-11 PROCEDURE — 0 IOPAMIDOL PER 1 ML: Performed by: EMERGENCY MEDICINE

## 2022-04-11 PROCEDURE — 82803 BLOOD GASES ANY COMBINATION: CPT

## 2022-04-11 PROCEDURE — 25010000002 PIPERACILLIN SOD-TAZOBACTAM PER 1 G: Performed by: HOSPITALIST

## 2022-04-11 PROCEDURE — 25010000002 ENOXAPARIN PER 10 MG: Performed by: INTERNAL MEDICINE

## 2022-04-11 PROCEDURE — 84484 ASSAY OF TROPONIN QUANT: CPT | Performed by: EMERGENCY MEDICINE

## 2022-04-11 RX ORDER — SODIUM CHLORIDE 0.9 % (FLUSH) 0.9 %
10 SYRINGE (ML) INJECTION AS NEEDED
Status: DISCONTINUED | OUTPATIENT
Start: 2022-04-11 | End: 2022-04-11 | Stop reason: HOSPADM

## 2022-04-11 RX ORDER — SODIUM CHLORIDE 0.9 % (FLUSH) 0.9 %
10 SYRINGE (ML) INJECTION AS NEEDED
Status: DISCONTINUED | OUTPATIENT
Start: 2022-04-11 | End: 2022-04-15 | Stop reason: HOSPADM

## 2022-04-11 RX ORDER — ATORVASTATIN CALCIUM 10 MG/1
10 TABLET, FILM COATED ORAL DAILY
Status: DISCONTINUED | OUTPATIENT
Start: 2022-04-11 | End: 2022-04-15 | Stop reason: HOSPADM

## 2022-04-11 RX ORDER — ONDANSETRON 4 MG/1
4 TABLET, FILM COATED ORAL EVERY 6 HOURS PRN
Status: DISCONTINUED | OUTPATIENT
Start: 2022-04-11 | End: 2022-04-15 | Stop reason: HOSPADM

## 2022-04-11 RX ORDER — SODIUM CHLORIDE 9 MG/ML
50 INJECTION, SOLUTION INTRAVENOUS CONTINUOUS
Status: DISCONTINUED | OUTPATIENT
Start: 2022-04-11 | End: 2022-04-15 | Stop reason: HOSPADM

## 2022-04-11 RX ORDER — SODIUM CHLORIDE 9 MG/ML
125 INJECTION, SOLUTION INTRAVENOUS CONTINUOUS
Status: DISCONTINUED | OUTPATIENT
Start: 2022-04-11 | End: 2022-04-11 | Stop reason: HOSPADM

## 2022-04-11 RX ORDER — CLINDAMYCIN PHOSPHATE 900 MG/50ML
900 INJECTION INTRAVENOUS ONCE
Status: COMPLETED | OUTPATIENT
Start: 2022-04-11 | End: 2022-04-11

## 2022-04-11 RX ORDER — ACETAMINOPHEN 650 MG/1
650 SUPPOSITORY RECTAL EVERY 4 HOURS PRN
Status: DISCONTINUED | OUTPATIENT
Start: 2022-04-11 | End: 2022-04-15 | Stop reason: HOSPADM

## 2022-04-11 RX ORDER — ONDANSETRON 2 MG/ML
4 INJECTION INTRAMUSCULAR; INTRAVENOUS EVERY 6 HOURS PRN
Status: DISCONTINUED | OUTPATIENT
Start: 2022-04-11 | End: 2022-04-15 | Stop reason: HOSPADM

## 2022-04-11 RX ORDER — LEVETIRACETAM 500 MG/1
1000 TABLET ORAL 2 TIMES DAILY
Status: DISCONTINUED | OUTPATIENT
Start: 2022-04-11 | End: 2022-04-13

## 2022-04-11 RX ORDER — ACETAMINOPHEN 325 MG/1
650 TABLET ORAL EVERY 4 HOURS PRN
Status: DISCONTINUED | OUTPATIENT
Start: 2022-04-11 | End: 2022-04-15 | Stop reason: HOSPADM

## 2022-04-11 RX ORDER — MIDODRINE HYDROCHLORIDE 5 MG/1
10 TABLET ORAL
Status: DISCONTINUED | OUTPATIENT
Start: 2022-04-11 | End: 2022-04-15 | Stop reason: HOSPADM

## 2022-04-11 RX ORDER — CLINDAMYCIN HYDROCHLORIDE 300 MG/1
300 CAPSULE ORAL 3 TIMES DAILY
Qty: 21 CAPSULE | Refills: 0 | Status: SHIPPED | OUTPATIENT
Start: 2022-04-11 | End: 2022-04-15 | Stop reason: HOSPADM

## 2022-04-11 RX ORDER — FAMOTIDINE 10 MG/ML
20 INJECTION, SOLUTION INTRAVENOUS DAILY
Status: DISCONTINUED | OUTPATIENT
Start: 2022-04-11 | End: 2022-04-15 | Stop reason: HOSPADM

## 2022-04-11 RX ORDER — ACETAMINOPHEN 160 MG/5ML
650 SOLUTION ORAL EVERY 4 HOURS PRN
Status: DISCONTINUED | OUTPATIENT
Start: 2022-04-11 | End: 2022-04-15 | Stop reason: HOSPADM

## 2022-04-11 RX ORDER — LAMOTRIGINE 25 MG/1
25 TABLET ORAL 2 TIMES DAILY
Status: DISCONTINUED | OUTPATIENT
Start: 2022-04-11 | End: 2022-04-15 | Stop reason: HOSPADM

## 2022-04-11 RX ORDER — LORAZEPAM 0.5 MG/1
1 TABLET ORAL 2 TIMES DAILY
Status: DISCONTINUED | OUTPATIENT
Start: 2022-04-11 | End: 2022-04-15 | Stop reason: HOSPADM

## 2022-04-11 RX ORDER — SODIUM CHLORIDE 9 MG/ML
125 INJECTION, SOLUTION INTRAVENOUS CONTINUOUS
Status: DISCONTINUED | OUTPATIENT
Start: 2022-04-11 | End: 2022-04-11

## 2022-04-11 RX ORDER — IPRATROPIUM BROMIDE AND ALBUTEROL SULFATE 2.5; .5 MG/3ML; MG/3ML
3 SOLUTION RESPIRATORY (INHALATION)
Status: DISCONTINUED | OUTPATIENT
Start: 2022-04-11 | End: 2022-04-13

## 2022-04-11 RX ORDER — LAMOTRIGINE 100 MG/1
200 TABLET ORAL 2 TIMES DAILY
Status: DISCONTINUED | OUTPATIENT
Start: 2022-04-11 | End: 2022-04-15 | Stop reason: HOSPADM

## 2022-04-11 RX ORDER — CLINDAMYCIN PHOSPHATE 600 MG/50ML
600 INJECTION INTRAVENOUS ONCE
Status: COMPLETED | OUTPATIENT
Start: 2022-04-11 | End: 2022-04-11

## 2022-04-11 RX ORDER — SUCRALFATE 1 G/1
1 TABLET ORAL
Status: DISCONTINUED | OUTPATIENT
Start: 2022-04-11 | End: 2022-04-15 | Stop reason: HOSPADM

## 2022-04-11 RX ORDER — SODIUM CHLORIDE 0.9 % (FLUSH) 0.9 %
10 SYRINGE (ML) INJECTION EVERY 12 HOURS SCHEDULED
Status: DISCONTINUED | OUTPATIENT
Start: 2022-04-11 | End: 2022-04-15 | Stop reason: HOSPADM

## 2022-04-11 RX ADMIN — SODIUM CHLORIDE 125 ML/HR: 9 INJECTION, SOLUTION INTRAVENOUS at 17:59

## 2022-04-11 RX ADMIN — LEVETIRACETAM 750 MG: 250 TABLET ORAL at 22:41

## 2022-04-11 RX ADMIN — SODIUM CHLORIDE 100 ML/HR: 9 INJECTION, SOLUTION INTRAVENOUS at 22:26

## 2022-04-11 RX ADMIN — SODIUM CHLORIDE 1000 ML: 9 INJECTION, SOLUTION INTRAVENOUS at 07:28

## 2022-04-11 RX ADMIN — MIDODRINE HYDROCHLORIDE 10 MG: 5 TABLET ORAL at 22:23

## 2022-04-11 RX ADMIN — THIORIDAZINE HYDROCHLORIDE 200 MG: 50 TABLET, FILM COATED ORAL at 22:39

## 2022-04-11 RX ADMIN — ATORVASTATIN CALCIUM 10 MG: 10 TABLET, FILM COATED ORAL at 22:24

## 2022-04-11 RX ADMIN — LEVETIRACETAM 1000 MG: 250 TABLET ORAL at 22:24

## 2022-04-11 RX ADMIN — LAMOTRIGINE 25 MG: 25 TABLET ORAL at 22:25

## 2022-04-11 RX ADMIN — PIPERACILLIN SODIUM AND TAZOBACTAM SODIUM 3.38 G: 3; .375 INJECTION, POWDER, LYOPHILIZED, FOR SOLUTION INTRAVENOUS at 22:37

## 2022-04-11 RX ADMIN — SUCRALFATE 1 G: 1 TABLET ORAL at 22:24

## 2022-04-11 RX ADMIN — Medication 10 ML: at 22:26

## 2022-04-11 RX ADMIN — ENOXAPARIN SODIUM 40 MG: 40 INJECTION SUBCUTANEOUS at 22:39

## 2022-04-11 RX ADMIN — LAMOTRIGINE 200 MG: 100 TABLET ORAL at 22:24

## 2022-04-11 RX ADMIN — CLINDAMYCIN IN 5 PERCENT DEXTROSE 600 MG: 12 INJECTION, SOLUTION INTRAVENOUS at 08:32

## 2022-04-11 RX ADMIN — LORAZEPAM 1 MG: 0.5 TABLET ORAL at 22:24

## 2022-04-11 RX ADMIN — SODIUM CHLORIDE 500 ML: 9 INJECTION, SOLUTION INTRAVENOUS at 16:36

## 2022-04-11 RX ADMIN — IPRATROPIUM BROMIDE AND ALBUTEROL SULFATE 3 ML: .5; 3 SOLUTION RESPIRATORY (INHALATION) at 23:01

## 2022-04-11 RX ADMIN — FAMOTIDINE 20 MG: 10 INJECTION INTRAVENOUS at 22:25

## 2022-04-11 RX ADMIN — CLINDAMYCIN PHOSPHATE 900 MG: 900 INJECTION, SOLUTION INTRAVENOUS at 18:00

## 2022-04-11 RX ADMIN — IOPAMIDOL 90 ML: 755 INJECTION, SOLUTION INTRAVENOUS at 17:24

## 2022-04-12 LAB
ANION GAP SERPL CALCULATED.3IONS-SCNC: 8 MMOL/L (ref 5–15)
BASOPHILS # BLD AUTO: 0.02 10*3/MM3 (ref 0–0.2)
BASOPHILS NFR BLD AUTO: 0.2 % (ref 0–1.5)
BUN SERPL-MCNC: 24 MG/DL (ref 8–23)
BUN/CREAT SERPL: 30.8 (ref 7–25)
CALCIUM SPEC-SCNC: 7.5 MG/DL (ref 8.6–10.5)
CHLORIDE SERPL-SCNC: 106 MMOL/L (ref 98–107)
CO2 SERPL-SCNC: 23 MMOL/L (ref 22–29)
CRE SCREEN PCR: NOT DETECTED
CREAT SERPL-MCNC: 0.78 MG/DL (ref 0.76–1.27)
DEPRECATED RDW RBC AUTO: 47 FL (ref 37–54)
EGFRCR SERPLBLD CKD-EPI 2021: 99 ML/MIN/1.73
EOSINOPHIL # BLD AUTO: 0.06 10*3/MM3 (ref 0–0.4)
EOSINOPHIL NFR BLD AUTO: 0.7 % (ref 0.3–6.2)
ERYTHROCYTE [DISTWIDTH] IN BLOOD BY AUTOMATED COUNT: 13.9 % (ref 12.3–15.4)
GLUCOSE SERPL-MCNC: 92 MG/DL (ref 65–99)
HCT VFR BLD AUTO: 36.9 % (ref 37.5–51)
HCT VFR BLD AUTO: 37.5 % (ref 37.5–51)
HGB BLD-MCNC: 12.7 G/DL (ref 13–17.7)
HGB BLD-MCNC: 13 G/DL (ref 13–17.7)
IMM GRANULOCYTES # BLD AUTO: 0.03 10*3/MM3 (ref 0–0.05)
IMM GRANULOCYTES NFR BLD AUTO: 0.4 % (ref 0–0.5)
IMP STRAIN: NOT DETECTED
KPC STRAIN: NOT DETECTED
LYMPHOCYTES # BLD AUTO: 0.41 10*3/MM3 (ref 0.7–3.1)
LYMPHOCYTES NFR BLD AUTO: 4.8 % (ref 19.6–45.3)
MCH RBC QN AUTO: 31.9 PG (ref 26.6–33)
MCHC RBC AUTO-ENTMCNC: 34.4 G/DL (ref 31.5–35.7)
MCV RBC AUTO: 92.7 FL (ref 79–97)
MONOCYTES # BLD AUTO: 0.43 10*3/MM3 (ref 0.1–0.9)
MONOCYTES NFR BLD AUTO: 5 % (ref 5–12)
NDM STRAIN: NOT DETECTED
NEUTROPHILS NFR BLD AUTO: 7.58 10*3/MM3 (ref 1.7–7)
NEUTROPHILS NFR BLD AUTO: 88.9 % (ref 42.7–76)
NRBC BLD AUTO-RTO: 0 /100 WBC (ref 0–0.2)
OXA 48 STRAIN: NOT DETECTED
PLATELET # BLD AUTO: 146 10*3/MM3 (ref 140–450)
PMV BLD AUTO: 11.7 FL (ref 6–12)
POTASSIUM SERPL-SCNC: 3.5 MMOL/L (ref 3.5–5.2)
QT INTERVAL: 330 MS
QTC INTERVAL: 458 MS
RBC # BLD AUTO: 3.98 10*6/MM3 (ref 4.14–5.8)
SODIUM SERPL-SCNC: 137 MMOL/L (ref 136–145)
VIM STRAIN: NOT DETECTED
WBC NRBC COR # BLD: 8.53 10*3/MM3 (ref 3.4–10.8)

## 2022-04-12 PROCEDURE — 87081 CULTURE SCREEN ONLY: CPT

## 2022-04-12 PROCEDURE — 36415 COLL VENOUS BLD VENIPUNCTURE: CPT | Performed by: INTERNAL MEDICINE

## 2022-04-12 PROCEDURE — 94799 UNLISTED PULMONARY SVC/PX: CPT

## 2022-04-12 PROCEDURE — 25010000002 ENOXAPARIN PER 10 MG: Performed by: INTERNAL MEDICINE

## 2022-04-12 PROCEDURE — 25010000002 PIPERACILLIN SOD-TAZOBACTAM PER 1 G: Performed by: HOSPITALIST

## 2022-04-12 PROCEDURE — 85025 COMPLETE CBC W/AUTO DIFF WBC: CPT | Performed by: INTERNAL MEDICINE

## 2022-04-12 PROCEDURE — 85014 HEMATOCRIT: CPT | Performed by: NURSE PRACTITIONER

## 2022-04-12 PROCEDURE — 80048 BASIC METABOLIC PNL TOTAL CA: CPT | Performed by: INTERNAL MEDICINE

## 2022-04-12 PROCEDURE — 85018 HEMOGLOBIN: CPT | Performed by: NURSE PRACTITIONER

## 2022-04-12 RX ADMIN — LORAZEPAM 1 MG: 0.5 TABLET ORAL at 08:45

## 2022-04-12 RX ADMIN — LEVETIRACETAM 750 MG: 250 TABLET ORAL at 08:47

## 2022-04-12 RX ADMIN — PIPERACILLIN AND TAZOBACTAM 3.38 G: 3; .375 INJECTION, POWDER, FOR SOLUTION INTRAVENOUS at 22:39

## 2022-04-12 RX ADMIN — IPRATROPIUM BROMIDE AND ALBUTEROL SULFATE 3 ML: .5; 3 SOLUTION RESPIRATORY (INHALATION) at 07:22

## 2022-04-12 RX ADMIN — Medication 10 ML: at 22:43

## 2022-04-12 RX ADMIN — SODIUM CHLORIDE 50 ML/HR: 9 INJECTION, SOLUTION INTRAVENOUS at 09:10

## 2022-04-12 RX ADMIN — PIPERACILLIN AND TAZOBACTAM 3.38 G: 3; .375 INJECTION, POWDER, FOR SOLUTION INTRAVENOUS at 13:34

## 2022-04-12 RX ADMIN — LAMOTRIGINE 200 MG: 100 TABLET ORAL at 08:45

## 2022-04-12 RX ADMIN — SUCRALFATE 1 G: 1 TABLET ORAL at 11:43

## 2022-04-12 RX ADMIN — SUCRALFATE 1 G: 1 TABLET ORAL at 17:28

## 2022-04-12 RX ADMIN — LORAZEPAM 1 MG: 0.5 TABLET ORAL at 22:39

## 2022-04-12 RX ADMIN — MIDODRINE HYDROCHLORIDE 10 MG: 5 TABLET ORAL at 17:28

## 2022-04-12 RX ADMIN — MIDODRINE HYDROCHLORIDE 10 MG: 5 TABLET ORAL at 08:45

## 2022-04-12 RX ADMIN — LAMOTRIGINE 200 MG: 100 TABLET ORAL at 22:40

## 2022-04-12 RX ADMIN — MIDODRINE HYDROCHLORIDE 10 MG: 5 TABLET ORAL at 11:43

## 2022-04-12 RX ADMIN — IPRATROPIUM BROMIDE AND ALBUTEROL SULFATE 3 ML: .5; 3 SOLUTION RESPIRATORY (INHALATION) at 14:20

## 2022-04-12 RX ADMIN — LEVETIRACETAM 1000 MG: 250 TABLET ORAL at 08:44

## 2022-04-12 RX ADMIN — IPRATROPIUM BROMIDE AND ALBUTEROL SULFATE 3 ML: .5; 3 SOLUTION RESPIRATORY (INHALATION) at 18:29

## 2022-04-12 RX ADMIN — ENOXAPARIN SODIUM 40 MG: 40 INJECTION SUBCUTANEOUS at 22:43

## 2022-04-12 RX ADMIN — THIORIDAZINE HYDROCHLORIDE 200 MG: 50 TABLET, FILM COATED ORAL at 08:46

## 2022-04-12 RX ADMIN — ATORVASTATIN CALCIUM 10 MG: 10 TABLET, FILM COATED ORAL at 22:40

## 2022-04-12 RX ADMIN — LAMOTRIGINE 25 MG: 25 TABLET ORAL at 22:42

## 2022-04-12 RX ADMIN — PIPERACILLIN AND TAZOBACTAM 3.38 G: 3; .375 INJECTION, POWDER, FOR SOLUTION INTRAVENOUS at 04:33

## 2022-04-12 RX ADMIN — Medication 10 ML: at 08:46

## 2022-04-12 RX ADMIN — LAMOTRIGINE 25 MG: 25 TABLET ORAL at 08:46

## 2022-04-12 RX ADMIN — SUCRALFATE 1 G: 1 TABLET ORAL at 22:41

## 2022-04-12 RX ADMIN — IPRATROPIUM BROMIDE AND ALBUTEROL SULFATE 3 ML: .5; 3 SOLUTION RESPIRATORY (INHALATION) at 10:37

## 2022-04-12 RX ADMIN — SUCRALFATE 1 G: 1 TABLET ORAL at 08:45

## 2022-04-12 RX ADMIN — THIORIDAZINE HYDROCHLORIDE 200 MG: 50 TABLET, FILM COATED ORAL at 22:41

## 2022-04-12 RX ADMIN — LEVETIRACETAM 750 MG: 250 TABLET ORAL at 22:39

## 2022-04-12 RX ADMIN — LEVETIRACETAM 1000 MG: 250 TABLET ORAL at 22:42

## 2022-04-13 LAB
ANION GAP SERPL CALCULATED.3IONS-SCNC: 6 MMOL/L (ref 5–15)
BASOPHILS # BLD AUTO: 0.01 10*3/MM3 (ref 0–0.2)
BASOPHILS NFR BLD AUTO: 0.2 % (ref 0–1.5)
BUN SERPL-MCNC: 17 MG/DL (ref 8–23)
BUN/CREAT SERPL: 24.6 (ref 7–25)
CALCIUM SPEC-SCNC: 8.2 MG/DL (ref 8.6–10.5)
CHLORIDE SERPL-SCNC: 110 MMOL/L (ref 98–107)
CO2 SERPL-SCNC: 24 MMOL/L (ref 22–29)
CREAT SERPL-MCNC: 0.69 MG/DL (ref 0.76–1.27)
DEPRECATED RDW RBC AUTO: 46.5 FL (ref 37–54)
EGFRCR SERPLBLD CKD-EPI 2021: 102.7 ML/MIN/1.73
EOSINOPHIL # BLD AUTO: 0.36 10*3/MM3 (ref 0–0.4)
EOSINOPHIL NFR BLD AUTO: 5.6 % (ref 0.3–6.2)
ERYTHROCYTE [DISTWIDTH] IN BLOOD BY AUTOMATED COUNT: 13.7 % (ref 12.3–15.4)
GLUCOSE SERPL-MCNC: 102 MG/DL (ref 65–99)
HCT VFR BLD AUTO: 36.3 % (ref 37.5–51)
HGB BLD-MCNC: 12.5 G/DL (ref 13–17.7)
IMM GRANULOCYTES # BLD AUTO: 0.01 10*3/MM3 (ref 0–0.05)
IMM GRANULOCYTES NFR BLD AUTO: 0.2 % (ref 0–0.5)
IRON 24H UR-MRATE: 30 MCG/DL (ref 59–158)
IRON SATN MFR SERPL: 10 % (ref 20–50)
LYMPHOCYTES # BLD AUTO: 0.68 10*3/MM3 (ref 0.7–3.1)
LYMPHOCYTES NFR BLD AUTO: 10.6 % (ref 19.6–45.3)
MCH RBC QN AUTO: 32 PG (ref 26.6–33)
MCHC RBC AUTO-ENTMCNC: 34.4 G/DL (ref 31.5–35.7)
MCV RBC AUTO: 92.8 FL (ref 79–97)
MONOCYTES # BLD AUTO: 0.6 10*3/MM3 (ref 0.1–0.9)
MONOCYTES NFR BLD AUTO: 9.4 % (ref 5–12)
NEUTROPHILS NFR BLD AUTO: 4.75 10*3/MM3 (ref 1.7–7)
NEUTROPHILS NFR BLD AUTO: 74 % (ref 42.7–76)
NRBC BLD AUTO-RTO: 0 /100 WBC (ref 0–0.2)
PLATELET # BLD AUTO: 140 10*3/MM3 (ref 140–450)
PMV BLD AUTO: 12.1 FL (ref 6–12)
POTASSIUM SERPL-SCNC: 3.8 MMOL/L (ref 3.5–5.2)
RBC # BLD AUTO: 3.91 10*6/MM3 (ref 4.14–5.8)
SODIUM SERPL-SCNC: 140 MMOL/L (ref 136–145)
TIBC SERPL-MCNC: 292 MCG/DL (ref 298–536)
TRANSFERRIN SERPL-MCNC: 196 MG/DL (ref 200–360)
WBC NRBC COR # BLD: 6.41 10*3/MM3 (ref 3.4–10.8)

## 2022-04-13 PROCEDURE — 94799 UNLISTED PULMONARY SVC/PX: CPT

## 2022-04-13 PROCEDURE — 85025 COMPLETE CBC W/AUTO DIFF WBC: CPT | Performed by: INTERNAL MEDICINE

## 2022-04-13 PROCEDURE — 25010000002 ENOXAPARIN PER 10 MG: Performed by: INTERNAL MEDICINE

## 2022-04-13 PROCEDURE — 87040 BLOOD CULTURE FOR BACTERIA: CPT | Performed by: NURSE PRACTITIONER

## 2022-04-13 PROCEDURE — 94760 N-INVAS EAR/PLS OXIMETRY 1: CPT

## 2022-04-13 PROCEDURE — 25010000002 PIPERACILLIN SOD-TAZOBACTAM PER 1 G: Performed by: HOSPITALIST

## 2022-04-13 PROCEDURE — 36415 COLL VENOUS BLD VENIPUNCTURE: CPT | Performed by: INTERNAL MEDICINE

## 2022-04-13 PROCEDURE — 84466 ASSAY OF TRANSFERRIN: CPT | Performed by: NURSE PRACTITIONER

## 2022-04-13 PROCEDURE — 83540 ASSAY OF IRON: CPT | Performed by: NURSE PRACTITIONER

## 2022-04-13 PROCEDURE — 80048 BASIC METABOLIC PNL TOTAL CA: CPT | Performed by: INTERNAL MEDICINE

## 2022-04-13 RX ORDER — LEVETIRACETAM 100 MG/ML
1000 SOLUTION ORAL EVERY 12 HOURS SCHEDULED
Status: DISCONTINUED | OUTPATIENT
Start: 2022-04-13 | End: 2022-04-14

## 2022-04-13 RX ORDER — LEVETIRACETAM 100 MG/ML
750 SOLUTION ORAL EVERY 12 HOURS SCHEDULED
Status: DISCONTINUED | OUTPATIENT
Start: 2022-04-13 | End: 2022-04-14

## 2022-04-13 RX ORDER — IPRATROPIUM BROMIDE AND ALBUTEROL SULFATE 2.5; .5 MG/3ML; MG/3ML
3 SOLUTION RESPIRATORY (INHALATION) EVERY 4 HOURS PRN
Status: DISCONTINUED | OUTPATIENT
Start: 2022-04-13 | End: 2022-04-15 | Stop reason: HOSPADM

## 2022-04-13 RX ADMIN — ENOXAPARIN SODIUM 40 MG: 40 INJECTION SUBCUTANEOUS at 21:10

## 2022-04-13 RX ADMIN — LEVETIRACETAM 750 MG: 100 SOLUTION ORAL at 08:57

## 2022-04-13 RX ADMIN — PIPERACILLIN AND TAZOBACTAM 3.38 G: 3; .375 INJECTION, POWDER, FOR SOLUTION INTRAVENOUS at 13:25

## 2022-04-13 RX ADMIN — LORAZEPAM 1 MG: 0.5 TABLET ORAL at 08:58

## 2022-04-13 RX ADMIN — LORAZEPAM 1 MG: 0.5 TABLET ORAL at 21:10

## 2022-04-13 RX ADMIN — LAMOTRIGINE 25 MG: 25 TABLET ORAL at 21:11

## 2022-04-13 RX ADMIN — ATORVASTATIN CALCIUM 10 MG: 10 TABLET, FILM COATED ORAL at 21:10

## 2022-04-13 RX ADMIN — SODIUM CHLORIDE 50 ML/HR: 9 INJECTION, SOLUTION INTRAVENOUS at 08:59

## 2022-04-13 RX ADMIN — LAMOTRIGINE 200 MG: 100 TABLET ORAL at 08:59

## 2022-04-13 RX ADMIN — MIDODRINE HYDROCHLORIDE 10 MG: 5 TABLET ORAL at 08:58

## 2022-04-13 RX ADMIN — SODIUM CHLORIDE 50 ML/HR: 9 INJECTION, SOLUTION INTRAVENOUS at 18:51

## 2022-04-13 RX ADMIN — SUCRALFATE 1 G: 1 TABLET ORAL at 08:58

## 2022-04-13 RX ADMIN — IPRATROPIUM BROMIDE AND ALBUTEROL SULFATE 3 ML: .5; 3 SOLUTION RESPIRATORY (INHALATION) at 07:11

## 2022-04-13 RX ADMIN — FAMOTIDINE 20 MG: 10 INJECTION INTRAVENOUS at 09:00

## 2022-04-13 RX ADMIN — MIDODRINE HYDROCHLORIDE 10 MG: 5 TABLET ORAL at 11:32

## 2022-04-13 RX ADMIN — LAMOTRIGINE 25 MG: 25 TABLET ORAL at 09:03

## 2022-04-13 RX ADMIN — SUCRALFATE 1 G: 1 TABLET ORAL at 21:10

## 2022-04-13 RX ADMIN — THIORIDAZINE HYDROCHLORIDE 200 MG: 50 TABLET, FILM COATED ORAL at 21:10

## 2022-04-13 RX ADMIN — Medication 10 ML: at 09:01

## 2022-04-13 RX ADMIN — LEVETIRACETAM 1000 MG: 100 SOLUTION ORAL at 21:10

## 2022-04-13 RX ADMIN — SUCRALFATE 1 G: 1 TABLET ORAL at 11:32

## 2022-04-13 RX ADMIN — SUCRALFATE 1 G: 1 TABLET ORAL at 18:52

## 2022-04-13 RX ADMIN — LAMOTRIGINE 200 MG: 100 TABLET ORAL at 21:10

## 2022-04-13 RX ADMIN — PIPERACILLIN AND TAZOBACTAM 3.38 G: 3; .375 INJECTION, POWDER, FOR SOLUTION INTRAVENOUS at 22:08

## 2022-04-13 RX ADMIN — THIORIDAZINE HYDROCHLORIDE 200 MG: 50 TABLET, FILM COATED ORAL at 09:01

## 2022-04-13 RX ADMIN — LEVETIRACETAM 1000 MG: 100 SOLUTION ORAL at 08:57

## 2022-04-13 RX ADMIN — PIPERACILLIN AND TAZOBACTAM 3.38 G: 3; .375 INJECTION, POWDER, FOR SOLUTION INTRAVENOUS at 04:07

## 2022-04-13 RX ADMIN — MIDODRINE HYDROCHLORIDE 10 MG: 5 TABLET ORAL at 18:52

## 2022-04-13 RX ADMIN — LEVETIRACETAM 750 MG: 100 SOLUTION ORAL at 21:10

## 2022-04-14 LAB
ANION GAP SERPL CALCULATED.3IONS-SCNC: 8 MMOL/L (ref 5–15)
BASOPHILS # BLD AUTO: 0.03 10*3/MM3 (ref 0–0.2)
BASOPHILS NFR BLD AUTO: 0.4 % (ref 0–1.5)
BUN SERPL-MCNC: 12 MG/DL (ref 8–23)
BUN/CREAT SERPL: 16.9 (ref 7–25)
CALCIUM SPEC-SCNC: 8.8 MG/DL (ref 8.6–10.5)
CHLORIDE SERPL-SCNC: 108 MMOL/L (ref 98–107)
CO2 SERPL-SCNC: 25 MMOL/L (ref 22–29)
CREAT SERPL-MCNC: 0.71 MG/DL (ref 0.76–1.27)
DEPRECATED RDW RBC AUTO: 45.6 FL (ref 37–54)
EGFRCR SERPLBLD CKD-EPI 2021: 101.8 ML/MIN/1.73
EOSINOPHIL # BLD AUTO: 0.42 10*3/MM3 (ref 0–0.4)
EOSINOPHIL NFR BLD AUTO: 6.3 % (ref 0.3–6.2)
ERYTHROCYTE [DISTWIDTH] IN BLOOD BY AUTOMATED COUNT: 13.3 % (ref 12.3–15.4)
GLUCOSE SERPL-MCNC: 90 MG/DL (ref 65–99)
HCT VFR BLD AUTO: 40.1 % (ref 37.5–51)
HGB BLD-MCNC: 13.8 G/DL (ref 13–17.7)
IMM GRANULOCYTES # BLD AUTO: 0.02 10*3/MM3 (ref 0–0.05)
IMM GRANULOCYTES NFR BLD AUTO: 0.3 % (ref 0–0.5)
LYMPHOCYTES # BLD AUTO: 1.21 10*3/MM3 (ref 0.7–3.1)
LYMPHOCYTES NFR BLD AUTO: 18 % (ref 19.6–45.3)
MCH RBC QN AUTO: 31.7 PG (ref 26.6–33)
MCHC RBC AUTO-ENTMCNC: 34.4 G/DL (ref 31.5–35.7)
MCV RBC AUTO: 92.2 FL (ref 79–97)
MONOCYTES # BLD AUTO: 0.66 10*3/MM3 (ref 0.1–0.9)
MONOCYTES NFR BLD AUTO: 9.8 % (ref 5–12)
NEUTROPHILS NFR BLD AUTO: 4.37 10*3/MM3 (ref 1.7–7)
NEUTROPHILS NFR BLD AUTO: 65.2 % (ref 42.7–76)
NRBC BLD AUTO-RTO: 0 /100 WBC (ref 0–0.2)
PLATELET # BLD AUTO: 166 10*3/MM3 (ref 140–450)
PMV BLD AUTO: 12.2 FL (ref 6–12)
POTASSIUM SERPL-SCNC: 3.7 MMOL/L (ref 3.5–5.2)
RBC # BLD AUTO: 4.35 10*6/MM3 (ref 4.14–5.8)
SODIUM SERPL-SCNC: 141 MMOL/L (ref 136–145)
WBC NRBC COR # BLD: 6.71 10*3/MM3 (ref 3.4–10.8)

## 2022-04-14 PROCEDURE — 36415 COLL VENOUS BLD VENIPUNCTURE: CPT | Performed by: INTERNAL MEDICINE

## 2022-04-14 PROCEDURE — 85025 COMPLETE CBC W/AUTO DIFF WBC: CPT | Performed by: INTERNAL MEDICINE

## 2022-04-14 PROCEDURE — 25010000002 ENOXAPARIN PER 10 MG: Performed by: INTERNAL MEDICINE

## 2022-04-14 PROCEDURE — 25010000002 PIPERACILLIN SOD-TAZOBACTAM PER 1 G: Performed by: HOSPITALIST

## 2022-04-14 PROCEDURE — 80048 BASIC METABOLIC PNL TOTAL CA: CPT | Performed by: INTERNAL MEDICINE

## 2022-04-14 RX ORDER — LEVETIRACETAM 100 MG/ML
1750 SOLUTION ORAL EVERY 12 HOURS SCHEDULED
Status: DISCONTINUED | OUTPATIENT
Start: 2022-04-14 | End: 2022-04-15 | Stop reason: HOSPADM

## 2022-04-14 RX ADMIN — LAMOTRIGINE 25 MG: 25 TABLET ORAL at 11:59

## 2022-04-14 RX ADMIN — LORAZEPAM 1 MG: 0.5 TABLET ORAL at 20:28

## 2022-04-14 RX ADMIN — ATORVASTATIN CALCIUM 10 MG: 10 TABLET, FILM COATED ORAL at 20:27

## 2022-04-14 RX ADMIN — PIPERACILLIN AND TAZOBACTAM 3.38 G: 3; .375 INJECTION, POWDER, FOR SOLUTION INTRAVENOUS at 14:51

## 2022-04-14 RX ADMIN — PIPERACILLIN AND TAZOBACTAM 3.38 G: 3; .375 INJECTION, POWDER, FOR SOLUTION INTRAVENOUS at 20:43

## 2022-04-14 RX ADMIN — PIPERACILLIN AND TAZOBACTAM 3.38 G: 3; .375 INJECTION, POWDER, FOR SOLUTION INTRAVENOUS at 05:34

## 2022-04-14 RX ADMIN — ACETAMINOPHEN ORAL SOLUTION 650 MG: 650 SOLUTION ORAL at 01:36

## 2022-04-14 RX ADMIN — THIORIDAZINE HYDROCHLORIDE 200 MG: 50 TABLET, FILM COATED ORAL at 09:58

## 2022-04-14 RX ADMIN — LORAZEPAM 1 MG: 0.5 TABLET ORAL at 09:58

## 2022-04-14 RX ADMIN — LEVETIRACETAM 1750 MG: 100 SOLUTION ORAL at 09:57

## 2022-04-14 RX ADMIN — SUCRALFATE 1 G: 1 TABLET ORAL at 09:57

## 2022-04-14 RX ADMIN — LAMOTRIGINE 200 MG: 100 TABLET ORAL at 11:59

## 2022-04-14 RX ADMIN — LAMOTRIGINE 25 MG: 25 TABLET ORAL at 20:28

## 2022-04-14 RX ADMIN — THIORIDAZINE HYDROCHLORIDE 200 MG: 50 TABLET, FILM COATED ORAL at 20:28

## 2022-04-14 RX ADMIN — SUCRALFATE 1 G: 1 TABLET ORAL at 20:28

## 2022-04-14 RX ADMIN — LAMOTRIGINE 200 MG: 100 TABLET ORAL at 20:27

## 2022-04-14 RX ADMIN — MIDODRINE HYDROCHLORIDE 10 MG: 5 TABLET ORAL at 09:58

## 2022-04-14 RX ADMIN — FAMOTIDINE 20 MG: 10 INJECTION INTRAVENOUS at 09:59

## 2022-04-14 RX ADMIN — LEVETIRACETAM 1750 MG: 100 SOLUTION ORAL at 20:27

## 2022-04-14 RX ADMIN — SUCRALFATE 1 G: 1 TABLET ORAL at 17:43

## 2022-04-14 RX ADMIN — ENOXAPARIN SODIUM 40 MG: 40 INJECTION SUBCUTANEOUS at 23:29

## 2022-04-15 VITALS
OXYGEN SATURATION: 93 % | TEMPERATURE: 98.9 F | BODY MASS INDEX: 24.28 KG/M2 | HEIGHT: 65 IN | HEART RATE: 71 BPM | WEIGHT: 145.7 LBS | DIASTOLIC BLOOD PRESSURE: 80 MMHG | SYSTOLIC BLOOD PRESSURE: 132 MMHG | RESPIRATION RATE: 18 BRPM

## 2022-04-15 LAB
ANION GAP SERPL CALCULATED.3IONS-SCNC: 9 MMOL/L (ref 5–15)
BASOPHILS # BLD AUTO: 0.03 10*3/MM3 (ref 0–0.2)
BASOPHILS NFR BLD AUTO: 0.4 % (ref 0–1.5)
BUN SERPL-MCNC: 12 MG/DL (ref 8–23)
BUN/CREAT SERPL: 15.8 (ref 7–25)
CALCIUM SPEC-SCNC: 9.2 MG/DL (ref 8.6–10.5)
CHLORIDE SERPL-SCNC: 108 MMOL/L (ref 98–107)
CO2 SERPL-SCNC: 25 MMOL/L (ref 22–29)
CREAT SERPL-MCNC: 0.76 MG/DL (ref 0.76–1.27)
DEPRECATED RDW RBC AUTO: 43.4 FL (ref 37–54)
EGFRCR SERPLBLD CKD-EPI 2021: 99.7 ML/MIN/1.73
EOSINOPHIL # BLD AUTO: 0.3 10*3/MM3 (ref 0–0.4)
EOSINOPHIL NFR BLD AUTO: 4.4 % (ref 0.3–6.2)
ERYTHROCYTE [DISTWIDTH] IN BLOOD BY AUTOMATED COUNT: 13.2 % (ref 12.3–15.4)
GLUCOSE SERPL-MCNC: 114 MG/DL (ref 65–99)
HCT VFR BLD AUTO: 39.3 % (ref 37.5–51)
HGB BLD-MCNC: 13.7 G/DL (ref 13–17.7)
IMM GRANULOCYTES # BLD AUTO: 0.03 10*3/MM3 (ref 0–0.05)
IMM GRANULOCYTES NFR BLD AUTO: 0.4 % (ref 0–0.5)
LYMPHOCYTES # BLD AUTO: 0.99 10*3/MM3 (ref 0.7–3.1)
LYMPHOCYTES NFR BLD AUTO: 14.6 % (ref 19.6–45.3)
MAGNESIUM SERPL-MCNC: 1.9 MG/DL (ref 1.6–2.4)
MCH RBC QN AUTO: 31.3 PG (ref 26.6–33)
MCHC RBC AUTO-ENTMCNC: 34.9 G/DL (ref 31.5–35.7)
MCV RBC AUTO: 89.7 FL (ref 79–97)
MONOCYTES # BLD AUTO: 0.64 10*3/MM3 (ref 0.1–0.9)
MONOCYTES NFR BLD AUTO: 9.4 % (ref 5–12)
NEUTROPHILS NFR BLD AUTO: 4.81 10*3/MM3 (ref 1.7–7)
NEUTROPHILS NFR BLD AUTO: 70.8 % (ref 42.7–76)
NRBC BLD AUTO-RTO: 0 /100 WBC (ref 0–0.2)
PHOSPHATE SERPL-MCNC: 2.4 MG/DL (ref 2.5–4.5)
PLATELET # BLD AUTO: 197 10*3/MM3 (ref 140–450)
PMV BLD AUTO: 11.1 FL (ref 6–12)
POTASSIUM SERPL-SCNC: 3.5 MMOL/L (ref 3.5–5.2)
RBC # BLD AUTO: 4.38 10*6/MM3 (ref 4.14–5.8)
SODIUM SERPL-SCNC: 142 MMOL/L (ref 136–145)
WBC NRBC COR # BLD: 6.8 10*3/MM3 (ref 3.4–10.8)

## 2022-04-15 PROCEDURE — 25010000002 PIPERACILLIN SOD-TAZOBACTAM PER 1 G: Performed by: HOSPITALIST

## 2022-04-15 PROCEDURE — 84100 ASSAY OF PHOSPHORUS: CPT | Performed by: INTERNAL MEDICINE

## 2022-04-15 PROCEDURE — 80048 BASIC METABOLIC PNL TOTAL CA: CPT | Performed by: INTERNAL MEDICINE

## 2022-04-15 PROCEDURE — 83735 ASSAY OF MAGNESIUM: CPT | Performed by: INTERNAL MEDICINE

## 2022-04-15 PROCEDURE — 85025 COMPLETE CBC W/AUTO DIFF WBC: CPT | Performed by: INTERNAL MEDICINE

## 2022-04-15 RX ORDER — AMOXICILLIN AND CLAVULANATE POTASSIUM 875; 125 MG/1; MG/1
1 TABLET, FILM COATED ORAL 2 TIMES DAILY
Start: 2022-04-15 | End: 2022-04-20

## 2022-04-15 RX ORDER — IPRATROPIUM BROMIDE AND ALBUTEROL SULFATE 2.5; .5 MG/3ML; MG/3ML
3 SOLUTION RESPIRATORY (INHALATION) EVERY 4 HOURS PRN
Qty: 360 ML
Start: 2022-04-15

## 2022-04-15 RX ADMIN — MIDODRINE HYDROCHLORIDE 10 MG: 5 TABLET ORAL at 08:00

## 2022-04-15 RX ADMIN — SODIUM CHLORIDE 50 ML/HR: 9 INJECTION, SOLUTION INTRAVENOUS at 03:35

## 2022-04-15 RX ADMIN — LORAZEPAM 1 MG: 0.5 TABLET ORAL at 08:01

## 2022-04-15 RX ADMIN — LAMOTRIGINE 200 MG: 100 TABLET ORAL at 08:54

## 2022-04-15 RX ADMIN — PIPERACILLIN AND TAZOBACTAM 3.38 G: 3; .375 INJECTION, POWDER, FOR SOLUTION INTRAVENOUS at 04:33

## 2022-04-15 RX ADMIN — POTASSIUM & SODIUM PHOSPHATES POWDER PACK 280-160-250 MG 2 PACKET: 280-160-250 PACK at 12:18

## 2022-04-15 RX ADMIN — FAMOTIDINE 20 MG: 10 INJECTION INTRAVENOUS at 08:00

## 2022-04-15 RX ADMIN — LEVETIRACETAM 1750 MG: 100 SOLUTION ORAL at 09:00

## 2022-04-15 RX ADMIN — SUCRALFATE 1 G: 1 TABLET ORAL at 08:00

## 2022-04-15 RX ADMIN — MIDODRINE HYDROCHLORIDE 10 MG: 5 TABLET ORAL at 11:09

## 2022-04-15 RX ADMIN — SUCRALFATE 1 G: 1 TABLET ORAL at 11:07

## 2022-04-15 RX ADMIN — LAMOTRIGINE 25 MG: 25 TABLET ORAL at 08:55

## 2022-04-15 RX ADMIN — THIORIDAZINE HYDROCHLORIDE 200 MG: 50 TABLET, FILM COATED ORAL at 09:00

## 2022-04-18 LAB
BACTERIA SPEC AEROBE CULT: NORMAL
BACTERIA SPEC AEROBE CULT: NORMAL

## 2022-06-07 ENCOUNTER — OFFICE VISIT (OUTPATIENT)
Dept: GASTROENTEROLOGY | Facility: CLINIC | Age: 67
End: 2022-06-07

## 2022-06-07 VITALS
SYSTOLIC BLOOD PRESSURE: 134 MMHG | HEART RATE: 103 BPM | HEIGHT: 65 IN | BODY MASS INDEX: 24.25 KG/M2 | DIASTOLIC BLOOD PRESSURE: 72 MMHG

## 2022-06-07 DIAGNOSIS — D12.6 ADENOMATOUS POLYP OF COLON, UNSPECIFIED PART OF COLON: Primary | ICD-10-CM

## 2022-06-07 PROCEDURE — 99213 OFFICE O/P EST LOW 20 MIN: CPT | Performed by: INTERNAL MEDICINE

## 2022-06-07 RX ORDER — DEXTROSE AND SODIUM CHLORIDE 5; .45 G/100ML; G/100ML
30 INJECTION, SOLUTION INTRAVENOUS CONTINUOUS PRN
Status: CANCELLED | OUTPATIENT
Start: 2022-06-16

## 2022-06-15 NOTE — H&P (VIEW-ONLY)
Chief Complaint   Patient presents with   • 6 month f/u        Subjective    Bautista Grubbs is a 66 y.o. male.    History of Present Illness  Patient presented to GI clinic for follow-up visit today.  No GI complaints at this time.  Bowel movements are regular.  Taking iron supplements daily.  Due for sigmoidoscopy for reevaluation of the rectal polypectomy site.       The following portions of the patient's history were reviewed and updated as appropriate:   Past Medical History:   Diagnosis Date   • Alopecia    • Arthritis    • Autism    • COPD (chronic obstructive pulmonary disease) (HCC)    • GERD (gastroesophageal reflux disease)    • Hyperlipidemia    • Hypertension    • Insomnia    • Intellectual disability    • Lennox-Gastaut syndrome (HCC)    • Major depressive disorder    • Orthostatic hypotension    • Osteoporosis    • Right bundle branch block    • Scoliosis    • Seizures (HCC)      Past Surgical History:   Procedure Laterality Date   • COLONOSCOPY N/A 1/8/2021    Procedure: COLONOSCOPY;  Surgeon: Wanda Lang MD;  Location: [facility] ENDOSCOPY;  Service: Gastroenterology;  Laterality: N/A;   • COLONOSCOPY N/A 9/2/2021    Procedure: COLONOSCOPY;  Surgeon: Wanda Lang MD;  Location: [facility] ENDOSCOPY;  Service: Gastroenterology;  Laterality: N/A;   • ENDOSCOPY N/A 1/7/2021    Procedure: ESOPHAGOGASTRODUODENOSCOPY;  Surgeon: Wanda Lang MD;  Location: [facility] ENDOSCOPY;  Service: Gastroenterology;  Laterality: N/A;   • ENDOSCOPY N/A 1/22/2021    Procedure: ESOPHAGOGASTRODUODENOSCOPY;  Surgeon: Wanda Lang MD;  Location: [facility] ENDOSCOPY;  Service: Gastroenterology;  Laterality: N/A;   • ENDOSCOPY W/ PEG TUBE PLACEMENT N/A 6/25/2021    Procedure: ESOPHAGOGASTRODUODENOSCOPY WITH PERCUTANEOUS ENDOSCOPIC GASTROSTOMY TUBE INSERTION WITH ANESTHESIA;  Surgeon: Wanda Lang MD;  Location: [facility] ENDOSCOPY;  Service: Gastroenterology;  Laterality: N/A;   • SIGMOIDOSCOPY N/A 10/11/2021     Procedure: SIGMOIDOSCOPY FLEXIBLE;  Surgeon: Wanda Lang MD;  Location: [facility] ENDOSCOPY;  Service: Gastroenterology;  Laterality: N/A;   • SIGMOIDOSCOPY N/A 11/30/2021    Procedure: SIGMOIDOSCOPY FLEXIBLE;  Surgeon: Wanda Lang MD;  Location: [facility] ENDOSCOPY;  Service: Gastroenterology;  Laterality: N/A;   • UPPER GASTROINTESTINAL ENDOSCOPY  01/22/2021   • UPPER GASTROINTESTINAL ENDOSCOPY  06/25/2021     Family History   Problem Relation Age of Onset   • Hypertension Father        Prior to Admission medications    Medication Sig Start Date End Date Taking? Authorizing Provider   albuterol (PROVENTIL) (2.5 MG/3ML) 0.083% nebulizer solution  10/12/21  Yes Patricia Driscoll MD   calcium carbonate-vitamin d 600-400 MG-UNIT per tablet Take 1 tablet by mouth 2 (Two) Times a Day. Per G-Tube   Yes Patricia Driscoll MD   denosumab (Prolia) 60 MG/ML solution prefilled syringe syringe Inject 60 mg under the skin into the appropriate area as directed. Every 6 Months   Yes Patricia Driscoll MD   DIAZEPAM RE Insert 10 mg into the rectum As Needed (For Seizure Activity).   Yes Patricia Driscoll MD   famotidine (PEPCID) 20 MG tablet Take 20 mg by mouth Daily. Per G-Tube   Yes Patricia Driscoll MD   ferrous sulfate 325 (65 FE) MG tablet Take 325 mg by mouth Daily With Breakfast. Per G-Tube   Yes Patricia Driscoll MD   ibuprofen (ADVIL,MOTRIN) 600 MG tablet Take 1 tablet by mouth Every 8 (Eight) Hours As Needed for Moderate Pain . 1/19/22  Yes Sandeep Fernandez MD   ipratropium-albuterol (DUO-NEB) 0.5-2.5 mg/3 ml nebulizer Take 3 mL by nebulization Every 4 (Four) Hours As Needed for Shortness of Air. 4/15/22  Yes Leander Meade MD   lamoTRIgine (LaMICtal) 200 MG tablet Take 200 mg by mouth 2 (Two) Times a Day. Per G-Tube   Yes Patricia Driscoll MD   lamoTRIgine (LaMICtal) 25 MG tablet Take 25 mg by mouth 2 (Two) Times a Day. Per G-Tube To Equal 225 MG   Yes Patricia Driscoll MD    levETIRAcetam (KEPPRA) 1000 MG tablet Take 1,000 mg by mouth 2 (Two) Times a Day. Per G-Tube   Yes Patricia Driscoll MD   levETIRAcetam (KEPPRA) 750 MG tablet Take 750 mg by mouth 2 (Two) Times a Day. Per G-Tube To Equal 1750 MG   Yes Patricia Driscoll MD   LORazepam (ATIVAN) 1 MG tablet GIVE ONE TABLET PER G-TUBE TWICE DAILY FOR ANXIETY AND AGITATION 2/2/22  Yes Christelle Smith APRN   midodrine (PROAMATINE) 5 MG tablet Take 1 tablet by mouth 3 (Three) Times a Day.  Patient taking differently: Take 10 mg by mouth 3 (Three) Times a Day. Per G-Tube 2/4/19  Yes Earnestine Reid MD   Nutritional Supplements (JEVITY 1.5 JEFFERY PO) Take  by mouth. Gets 390mL bolus every 6 hours 1A-7A-1P-7P   Yes Patricia Driscoll MD   simvastatin (ZOCOR) 20 MG tablet Take 20 mg by mouth Daily. Per G-Tube   Yes Patricia Driscoll MD   sucralfate (CARAFATE) 1 g tablet Take 1 tablet by mouth 4 (Four) Times a Day Before Meals & at Bedtime.  Patient taking differently: Take 1 g by mouth 4 (Four) Times a Day Before Meals & at Bedtime. Per G-Tube 5/19/21  Yes Sandeep Fernandez MD   thioridazine (MELLARIL) 100 MG tablet Take 200 mg by mouth 2 (Two) Times a Day. Per G-Tube   Yes Patricia Driscoll MD     Allergies   Allergen Reactions   • Haldol [Haloperidol] Unknown (See Comments)     Unknown     • Levaquin [Levofloxacin] Other (See Comments)     Lowers seizure threshold     Social History     Socioeconomic History   • Marital status: Single   Tobacco Use   • Smoking status: Never Smoker   • Smokeless tobacco: Never Used   Vaping Use   • Vaping Use: Never used   Substance and Sexual Activity   • Alcohol use: Never   • Drug use: Never   • Sexual activity: Defer       Review of Systems  Review of Systems   Constitutional: Negative for chills, fatigue, fever and unexpected weight change.   HENT: Negative for congestion, ear discharge, hearing loss, nosebleeds and sore throat.    Eyes: Negative for pain, discharge and redness.  "  Respiratory: Negative for cough, chest tightness, shortness of breath and wheezing.    Cardiovascular: Negative for chest pain and palpitations.   Gastrointestinal: Negative for abdominal distention, abdominal pain, blood in stool, constipation, diarrhea, nausea and vomiting.   Endocrine: Negative for cold intolerance, polydipsia, polyphagia and polyuria.   Genitourinary: Negative for dysuria, flank pain, frequency, hematuria and urgency.   Musculoskeletal: Negative for arthralgias, back pain, joint swelling and myalgias.   Skin: Negative for color change, pallor and rash.   Neurological: Negative for tremors, seizures, syncope, weakness and headaches.   Hematological: Negative for adenopathy. Does not bruise/bleed easily.   Psychiatric/Behavioral: Negative for behavioral problems, confusion, dysphoric mood, hallucinations and suicidal ideas. The patient is not nervous/anxious.         /72   Pulse 103   Ht 165.1 cm (65\")   BMI 24.25 kg/m²     Objective    Physical Exam  Constitutional:       Appearance: He is well-developed.   HENT:      Head: Normocephalic and atraumatic.   Eyes:      Conjunctiva/sclera: Conjunctivae normal.      Pupils: Pupils are equal, round, and reactive to light.   Neck:      Thyroid: No thyromegaly.   Cardiovascular:      Rate and Rhythm: Normal rate and regular rhythm.      Heart sounds: Normal heart sounds. No murmur heard.  Pulmonary:      Effort: Pulmonary effort is normal.      Breath sounds: Normal breath sounds. No wheezing.   Abdominal:      General: Bowel sounds are normal. There is no distension.      Palpations: Abdomen is soft. There is no mass.      Tenderness: There is no abdominal tenderness.      Hernia: No hernia is present.   Genitourinary:     Comments: No lesions noted  Musculoskeletal:         General: No tenderness. Normal range of motion.      Cervical back: Normal range of motion and neck supple.   Lymphadenopathy:      Cervical: No cervical adenopathy. "   Skin:     General: Skin is warm and dry.      Findings: No rash.   Neurological:      Mental Status: He is alert.      Cranial Nerves: No cranial nerve deficit.       Admission on 04/11/2022, Discharged on 04/15/2022   Component Date Value Ref Range Status   • QT Interval 04/11/2022 330  ms Final   • QTC Interval 04/11/2022 458  ms Final   • Site 04/11/2022 Arterial Line   Final   • Otoniel's Test 04/11/2022 N/A   Final   • pH, Arterial 04/11/2022 7.470 (A) 7.350 - 7.450 pH units Final    83 Value above reference range   • pCO2, Arterial 04/11/2022 37.7  35.0 - 45.0 mm Hg Final   • pO2, Arterial 04/11/2022 61.3 (A) 83.0 - 108.0 mm Hg Final    84 Value below reference range   • HCO3, Arterial 04/11/2022 27.4 (A) 20.0 - 26.0 mmol/L Final    83 Value above reference range   • Base Excess, Arterial 04/11/2022 3.7 (A) 0.0 - 2.0 mmol/L Final    83 Value above reference range   • O2 Saturation, Arterial 04/11/2022 92.9 (A) 94.0 - 99.0 % Final    84 Value below reference range   • Barometric Pressure for Blood Gas 04/11/2022 742  mmHg Final   • Modality 04/11/2022 Room Air   Final   • Ventilator Mode 04/11/2022 NA   Final   • Collected by 04/11/2022 RT   Final    Meter: [id number]     :  [id number]   • COVID19 04/11/2022 Not Detected  Not Detected - Ref. Range Final   • Influenza A PCR 04/11/2022 Not Detected  Not Detected Final   • Influenza B PCR 04/11/2022 Not Detected  Not Detected Final   • Glucose 04/12/2022 92  65 - 99 mg/dL Final   • BUN 04/12/2022 24 (A) 8 - 23 mg/dL Final   • Creatinine 04/12/2022 0.78  0.76 - 1.27 mg/dL Final   • Sodium 04/12/2022 137  136 - 145 mmol/L Final   • Potassium 04/12/2022 3.5  3.5 - 5.2 mmol/L Final   • Chloride 04/12/2022 106  98 - 107 mmol/L Final   • CO2 04/12/2022 23.0  22.0 - 29.0 mmol/L Final   • Calcium 04/12/2022 7.5 (A) 8.6 - 10.5 mg/dL Final   • BUN/Creatinine Ratio 04/12/2022 30.8 (A) 7.0 - 25.0 Final   • Anion Gap 04/12/2022 8.0  5.0 - 15.0 mmol/L Final   • eGFR  04/12/2022 99.0  >60.0 mL/min/1.73 Final    National Kidney Foundation and American Society of Nephrology (ASN) Task Force recommended calculation based on the Chronic Kidney Disease Epidemiology Collaboration (CKD-EPI) equation refit without adjustment for race.   • WBC 04/12/2022 8.53  3.40 - 10.80 10*3/mm3 Final   • RBC 04/12/2022 3.98 (A) 4.14 - 5.80 10*6/mm3 Final   • Hemoglobin 04/12/2022 12.7 (A) 13.0 - 17.7 g/dL Final   • Hematocrit 04/12/2022 36.9 (A) 37.5 - 51.0 % Final   • MCV 04/12/2022 92.7  79.0 - 97.0 fL Final   • MCH 04/12/2022 31.9  26.6 - 33.0 pg Final   • MCHC 04/12/2022 34.4  31.5 - 35.7 g/dL Final   • RDW 04/12/2022 13.9  12.3 - 15.4 % Final   • RDW-SD 04/12/2022 47.0  37.0 - 54.0 fl Final   • MPV 04/12/2022 11.7  6.0 - 12.0 fL Final   • Platelets 04/12/2022 146  140 - 450 10*3/mm3 Final   • Neutrophil % 04/12/2022 88.9 (A) 42.7 - 76.0 % Final   • Lymphocyte % 04/12/2022 4.8 (A) 19.6 - 45.3 % Final   • Monocyte % 04/12/2022 5.0  5.0 - 12.0 % Final   • Eosinophil % 04/12/2022 0.7  0.3 - 6.2 % Final   • Basophil % 04/12/2022 0.2  0.0 - 1.5 % Final   • Immature Grans % 04/12/2022 0.4  0.0 - 0.5 % Final   • Neutrophils, Absolute 04/12/2022 7.58 (A) 1.70 - 7.00 10*3/mm3 Final   • Lymphocytes, Absolute 04/12/2022 0.41 (A) 0.70 - 3.10 10*3/mm3 Final   • Monocytes, Absolute 04/12/2022 0.43  0.10 - 0.90 10*3/mm3 Final   • Eosinophils, Absolute 04/12/2022 0.06  0.00 - 0.40 10*3/mm3 Final   • Basophils, Absolute 04/12/2022 0.02  0.00 - 0.20 10*3/mm3 Final   • Immature Grans, Absolute 04/12/2022 0.03  0.00 - 0.05 10*3/mm3 Final   • nRBC 04/12/2022 0.0  0.0 - 0.2 /100 WBC Final   • CRE SCREEN 04/12/2022 Not Detected  Not Detected, Invalid Final    Test performed by real-time polymerase chain reaction (qPCR).   • OXA 48 Strain 04/12/2022 Not Detected   Final   • IMP STRAIN 04/12/2022 Not Detected   Final   • VIM STRAIN 04/12/2022 Not Detected   Final   • NDM Strain 04/12/2022 Not Detected   Final   • KPC  Strain 04/12/2022 Not Detected   Final   • Hemoglobin 04/12/2022 13.0  13.0 - 17.7 g/dL Final   • Hematocrit 04/12/2022 37.5  37.5 - 51.0 % Final   • Glucose 04/13/2022 102 (A) 65 - 99 mg/dL Final   • BUN 04/13/2022 17  8 - 23 mg/dL Final   • Creatinine 04/13/2022 0.69 (A) 0.76 - 1.27 mg/dL Final   • Sodium 04/13/2022 140  136 - 145 mmol/L Final   • Potassium 04/13/2022 3.8  3.5 - 5.2 mmol/L Final   • Chloride 04/13/2022 110 (A) 98 - 107 mmol/L Final   • CO2 04/13/2022 24.0  22.0 - 29.0 mmol/L Final   • Calcium 04/13/2022 8.2 (A) 8.6 - 10.5 mg/dL Final   • BUN/Creatinine Ratio 04/13/2022 24.6  7.0 - 25.0 Final   • Anion Gap 04/13/2022 6.0  5.0 - 15.0 mmol/L Final   • eGFR 04/13/2022 102.7  >60.0 mL/min/1.73 Final    National Kidney Foundation and American Society of Nephrology (ASN) Task Force recommended calculation based on the Chronic Kidney Disease Epidemiology Collaboration (CKD-EPI) equation refit without adjustment for race.   • WBC 04/13/2022 6.41  3.40 - 10.80 10*3/mm3 Final   • RBC 04/13/2022 3.91 (A) 4.14 - 5.80 10*6/mm3 Final   • Hemoglobin 04/13/2022 12.5 (A) 13.0 - 17.7 g/dL Final   • Hematocrit 04/13/2022 36.3 (A) 37.5 - 51.0 % Final   • MCV 04/13/2022 92.8  79.0 - 97.0 fL Final   • MCH 04/13/2022 32.0  26.6 - 33.0 pg Final   • MCHC 04/13/2022 34.4  31.5 - 35.7 g/dL Final   • RDW 04/13/2022 13.7  12.3 - 15.4 % Final   • RDW-SD 04/13/2022 46.5  37.0 - 54.0 fl Final   • MPV 04/13/2022 12.1 (A) 6.0 - 12.0 fL Final   • Platelets 04/13/2022 140  140 - 450 10*3/mm3 Final   • Neutrophil % 04/13/2022 74.0  42.7 - 76.0 % Final   • Lymphocyte % 04/13/2022 10.6 (A) 19.6 - 45.3 % Final   • Monocyte % 04/13/2022 9.4  5.0 - 12.0 % Final   • Eosinophil % 04/13/2022 5.6  0.3 - 6.2 % Final   • Basophil % 04/13/2022 0.2  0.0 - 1.5 % Final   • Immature Grans % 04/13/2022 0.2  0.0 - 0.5 % Final   • Neutrophils, Absolute 04/13/2022 4.75  1.70 - 7.00 10*3/mm3 Final   • Lymphocytes, Absolute 04/13/2022 0.68 (A) 0.70 - 3.10  10*3/mm3 Final   • Monocytes, Absolute 04/13/2022 0.60  0.10 - 0.90 10*3/mm3 Final   • Eosinophils, Absolute 04/13/2022 0.36  0.00 - 0.40 10*3/mm3 Final   • Basophils, Absolute 04/13/2022 0.01  0.00 - 0.20 10*3/mm3 Final   • Immature Grans, Absolute 04/13/2022 0.01  0.00 - 0.05 10*3/mm3 Final   • nRBC 04/13/2022 0.0  0.0 - 0.2 /100 WBC Final   • Blood Culture 04/13/2022 No growth at 5 days   Final   • Blood Culture 04/13/2022 No growth at 5 days   Final   • Iron 04/13/2022 30 (A) 59 - 158 mcg/dL Final   • Iron Saturation 04/13/2022 10 (A) 20 - 50 % Final   • Transferrin 04/13/2022 196 (A) 200 - 360 mg/dL Final   • TIBC 04/13/2022 292 (A) 298 - 536 mcg/dL Final   • Glucose 04/14/2022 90  65 - 99 mg/dL Final   • BUN 04/14/2022 12  8 - 23 mg/dL Final   • Creatinine 04/14/2022 0.71 (A) 0.76 - 1.27 mg/dL Final   • Sodium 04/14/2022 141  136 - 145 mmol/L Final   • Potassium 04/14/2022 3.7  3.5 - 5.2 mmol/L Final   • Chloride 04/14/2022 108 (A) 98 - 107 mmol/L Final   • CO2 04/14/2022 25.0  22.0 - 29.0 mmol/L Final   • Calcium 04/14/2022 8.8  8.6 - 10.5 mg/dL Final   • BUN/Creatinine Ratio 04/14/2022 16.9  7.0 - 25.0 Final   • Anion Gap 04/14/2022 8.0  5.0 - 15.0 mmol/L Final   • eGFR 04/14/2022 101.8  >60.0 mL/min/1.73 Final    National Kidney Foundation and American Society of Nephrology (ASN) Task Force recommended calculation based on the Chronic Kidney Disease Epidemiology Collaboration (CKD-EPI) equation refit without adjustment for race.   • WBC 04/14/2022 6.71  3.40 - 10.80 10*3/mm3 Final   • RBC 04/14/2022 4.35  4.14 - 5.80 10*6/mm3 Final   • Hemoglobin 04/14/2022 13.8  13.0 - 17.7 g/dL Final   • Hematocrit 04/14/2022 40.1  37.5 - 51.0 % Final   • MCV 04/14/2022 92.2  79.0 - 97.0 fL Final   • MCH 04/14/2022 31.7  26.6 - 33.0 pg Final   • MCHC 04/14/2022 34.4  31.5 - 35.7 g/dL Final   • RDW 04/14/2022 13.3  12.3 - 15.4 % Final   • RDW-SD 04/14/2022 45.6  37.0 - 54.0 fl Final   • MPV 04/14/2022 12.2 (A) 6.0 - 12.0  fL Final   • Platelets 04/14/2022 166  140 - 450 10*3/mm3 Final   • Neutrophil % 04/14/2022 65.2  42.7 - 76.0 % Final   • Lymphocyte % 04/14/2022 18.0 (A) 19.6 - 45.3 % Final   • Monocyte % 04/14/2022 9.8  5.0 - 12.0 % Final   • Eosinophil % 04/14/2022 6.3 (A) 0.3 - 6.2 % Final   • Basophil % 04/14/2022 0.4  0.0 - 1.5 % Final   • Immature Grans % 04/14/2022 0.3  0.0 - 0.5 % Final   • Neutrophils, Absolute 04/14/2022 4.37  1.70 - 7.00 10*3/mm3 Final   • Lymphocytes, Absolute 04/14/2022 1.21  0.70 - 3.10 10*3/mm3 Final   • Monocytes, Absolute 04/14/2022 0.66  0.10 - 0.90 10*3/mm3 Final   • Eosinophils, Absolute 04/14/2022 0.42 (A) 0.00 - 0.40 10*3/mm3 Final   • Basophils, Absolute 04/14/2022 0.03  0.00 - 0.20 10*3/mm3 Final   • Immature Grans, Absolute 04/14/2022 0.02  0.00 - 0.05 10*3/mm3 Final   • nRBC 04/14/2022 0.0  0.0 - 0.2 /100 WBC Final   • Magnesium 04/15/2022 1.9  1.6 - 2.4 mg/dL Final   • Phosphorus 04/15/2022 2.4 (A) 2.5 - 4.5 mg/dL Final   • Glucose 04/15/2022 114 (A) 65 - 99 mg/dL Final   • BUN 04/15/2022 12  8 - 23 mg/dL Final   • Creatinine 04/15/2022 0.76  0.76 - 1.27 mg/dL Final   • Sodium 04/15/2022 142  136 - 145 mmol/L Final   • Potassium 04/15/2022 3.5  3.5 - 5.2 mmol/L Final   • Chloride 04/15/2022 108 (A) 98 - 107 mmol/L Final   • CO2 04/15/2022 25.0  22.0 - 29.0 mmol/L Final   • Calcium 04/15/2022 9.2  8.6 - 10.5 mg/dL Final   • BUN/Creatinine Ratio 04/15/2022 15.8  7.0 - 25.0 Final   • Anion Gap 04/15/2022 9.0  5.0 - 15.0 mmol/L Final   • eGFR 04/15/2022 99.7  >60.0 mL/min/1.73 Final    National Kidney Foundation and American Society of Nephrology (ASN) Task Force recommended calculation based on the Chronic Kidney Disease Epidemiology Collaboration (CKD-EPI) equation refit without adjustment for race.   • WBC 04/15/2022 6.80  3.40 - 10.80 10*3/mm3 Final   • RBC 04/15/2022 4.38  4.14 - 5.80 10*6/mm3 Final   • Hemoglobin 04/15/2022 13.7  13.0 - 17.7 g/dL Final   • Hematocrit 04/15/2022 39.3   37.5 - 51.0 % Final   • MCV 04/15/2022 89.7  79.0 - 97.0 fL Final   • MCH 04/15/2022 31.3  26.6 - 33.0 pg Final   • MCHC 04/15/2022 34.9  31.5 - 35.7 g/dL Final   • RDW 04/15/2022 13.2  12.3 - 15.4 % Final   • RDW-SD 04/15/2022 43.4  37.0 - 54.0 fl Final   • MPV 04/15/2022 11.1  6.0 - 12.0 fL Final   • Platelets 04/15/2022 197  140 - 450 10*3/mm3 Final   • Neutrophil % 04/15/2022 70.8  42.7 - 76.0 % Final   • Lymphocyte % 04/15/2022 14.6 (A) 19.6 - 45.3 % Final   • Monocyte % 04/15/2022 9.4  5.0 - 12.0 % Final   • Eosinophil % 04/15/2022 4.4  0.3 - 6.2 % Final   • Basophil % 04/15/2022 0.4  0.0 - 1.5 % Final   • Immature Grans % 04/15/2022 0.4  0.0 - 0.5 % Final   • Neutrophils, Absolute 04/15/2022 4.81  1.70 - 7.00 10*3/mm3 Final   • Lymphocytes, Absolute 04/15/2022 0.99  0.70 - 3.10 10*3/mm3 Final   • Monocytes, Absolute 04/15/2022 0.64  0.10 - 0.90 10*3/mm3 Final   • Eosinophils, Absolute 04/15/2022 0.30  0.00 - 0.40 10*3/mm3 Final   • Basophils, Absolute 04/15/2022 0.03  0.00 - 0.20 10*3/mm3 Final   • Immature Grans, Absolute 04/15/2022 0.03  0.00 - 0.05 10*3/mm3 Final   • nRBC 04/15/2022 0.0  0.0 - 0.2 /100 WBC Final     Assessment & Plan      1. Adenomatous polyp of colon, unspecified part of colon    1.  Rectal polyp with piecemeal polypectomy, repeat flex sig for reevaluation.  2.  Oropharyngeal dysphagia, on PEG feeds.  3.  Nausea and vomiting, resolved.  4.  Anemia, improving.      Orders placed during this encounter include:  Orders Placed This Encounter   Procedures   • Follow Anesthesia Guidelines / Standing Orders     Standing Status:   Future   • Obtain Informed Consent     Standing Status:   Future     Order Specific Question:   Informed Consent Given For     Answer:   flexible sigmoidoscopy       SIGMOIDOSCOPY FLEXIBLE (N/A)    Review and/or summary of lab tests, radiology, procedures, medications. Review and summary of old records and obtaining of history. The risks and benefits of my  recommendations, as well as other treatment options were discussed with the patient and nursing attendant today. Questions were answered.    No orders of the defined types were placed in this encounter.      Follow-up: No follow-ups on file.               Results for orders placed or performed during the hospital encounter of 04/11/22   CRE Screen by PCR - Swab, Large Intestine, Rectum    Specimen: Large Intestine, Rectum; Swab   Result Value Ref Range    CRE SCREEN Not Detected Not Detected, Invalid    OXA 48 Strain Not Detected     IMP STRAIN Not Detected     VIM STRAIN Not Detected     NDM Strain Not Detected     KPC Strain Not Detected    COVID-19 and FLU A/B PCR - Swab, Nasopharynx    Specimen: Nasopharynx; Swab   Result Value Ref Range    COVID19 Not Detected Not Detected - Ref. Range    Influenza A PCR Not Detected Not Detected    Influenza B PCR Not Detected Not Detected   CBC Auto Differential    Specimen: Blood   Result Value Ref Range    WBC 6.80 3.40 - 10.80 10*3/mm3    RBC 4.38 4.14 - 5.80 10*6/mm3    Hemoglobin 13.7 13.0 - 17.7 g/dL    Hematocrit 39.3 37.5 - 51.0 %    MCV 89.7 79.0 - 97.0 fL    MCH 31.3 26.6 - 33.0 pg    MCHC 34.9 31.5 - 35.7 g/dL    RDW 13.2 12.3 - 15.4 %    RDW-SD 43.4 37.0 - 54.0 fl    MPV 11.1 6.0 - 12.0 fL    Platelets 197 140 - 450 10*3/mm3    Neutrophil % 70.8 42.7 - 76.0 %    Lymphocyte % 14.6 (L) 19.6 - 45.3 %    Monocyte % 9.4 5.0 - 12.0 %    Eosinophil % 4.4 0.3 - 6.2 %    Basophil % 0.4 0.0 - 1.5 %    Immature Grans % 0.4 0.0 - 0.5 %    Neutrophils, Absolute 4.81 1.70 - 7.00 10*3/mm3    Lymphocytes, Absolute 0.99 0.70 - 3.10 10*3/mm3    Monocytes, Absolute 0.64 0.10 - 0.90 10*3/mm3    Eosinophils, Absolute 0.30 0.00 - 0.40 10*3/mm3    Basophils, Absolute 0.03 0.00 - 0.20 10*3/mm3    Immature Grans, Absolute 0.03 0.00 - 0.05 10*3/mm3    nRBC 0.0 0.0 - 0.2 /100 WBC   CBC Auto Differential    Specimen: Blood   Result Value Ref Range    WBC 6.71 3.40 - 10.80 10*3/mm3    RBC  4.35 4.14 - 5.80 10*6/mm3    Hemoglobin 13.8 13.0 - 17.7 g/dL    Hematocrit 40.1 37.5 - 51.0 %    MCV 92.2 79.0 - 97.0 fL    MCH 31.7 26.6 - 33.0 pg    MCHC 34.4 31.5 - 35.7 g/dL    RDW 13.3 12.3 - 15.4 %    RDW-SD 45.6 37.0 - 54.0 fl    MPV 12.2 (H) 6.0 - 12.0 fL    Platelets 166 140 - 450 10*3/mm3    Neutrophil % 65.2 42.7 - 76.0 %    Lymphocyte % 18.0 (L) 19.6 - 45.3 %    Monocyte % 9.8 5.0 - 12.0 %    Eosinophil % 6.3 (H) 0.3 - 6.2 %    Basophil % 0.4 0.0 - 1.5 %    Immature Grans % 0.3 0.0 - 0.5 %    Neutrophils, Absolute 4.37 1.70 - 7.00 10*3/mm3    Lymphocytes, Absolute 1.21 0.70 - 3.10 10*3/mm3    Monocytes, Absolute 0.66 0.10 - 0.90 10*3/mm3    Eosinophils, Absolute 0.42 (H) 0.00 - 0.40 10*3/mm3    Basophils, Absolute 0.03 0.00 - 0.20 10*3/mm3    Immature Grans, Absolute 0.02 0.00 - 0.05 10*3/mm3    nRBC 0.0 0.0 - 0.2 /100 WBC   CBC Auto Differential    Specimen: Blood   Result Value Ref Range    WBC 6.41 3.40 - 10.80 10*3/mm3    RBC 3.91 (L) 4.14 - 5.80 10*6/mm3    Hemoglobin 12.5 (L) 13.0 - 17.7 g/dL    Hematocrit 36.3 (L) 37.5 - 51.0 %    MCV 92.8 79.0 - 97.0 fL    MCH 32.0 26.6 - 33.0 pg    MCHC 34.4 31.5 - 35.7 g/dL    RDW 13.7 12.3 - 15.4 %    RDW-SD 46.5 37.0 - 54.0 fl    MPV 12.1 (H) 6.0 - 12.0 fL    Platelets 140 140 - 450 10*3/mm3    Neutrophil % 74.0 42.7 - 76.0 %    Lymphocyte % 10.6 (L) 19.6 - 45.3 %    Monocyte % 9.4 5.0 - 12.0 %    Eosinophil % 5.6 0.3 - 6.2 %    Basophil % 0.2 0.0 - 1.5 %    Immature Grans % 0.2 0.0 - 0.5 %    Neutrophils, Absolute 4.75 1.70 - 7.00 10*3/mm3    Lymphocytes, Absolute 0.68 (L) 0.70 - 3.10 10*3/mm3    Monocytes, Absolute 0.60 0.10 - 0.90 10*3/mm3    Eosinophils, Absolute 0.36 0.00 - 0.40 10*3/mm3    Basophils, Absolute 0.01 0.00 - 0.20 10*3/mm3    Immature Grans, Absolute 0.01 0.00 - 0.05 10*3/mm3    nRBC 0.0 0.0 - 0.2 /100 WBC   CBC Auto Differential    Specimen: Blood   Result Value Ref Range    WBC 8.53 3.40 - 10.80 10*3/mm3    RBC 3.98 (L) 4.14 - 5.80  10*6/mm3    Hemoglobin 12.7 (L) 13.0 - 17.7 g/dL    Hematocrit 36.9 (L) 37.5 - 51.0 %    MCV 92.7 79.0 - 97.0 fL    MCH 31.9 26.6 - 33.0 pg    MCHC 34.4 31.5 - 35.7 g/dL    RDW 13.9 12.3 - 15.4 %    RDW-SD 47.0 37.0 - 54.0 fl    MPV 11.7 6.0 - 12.0 fL    Platelets 146 140 - 450 10*3/mm3    Neutrophil % 88.9 (H) 42.7 - 76.0 %    Lymphocyte % 4.8 (L) 19.6 - 45.3 %    Monocyte % 5.0 5.0 - 12.0 %    Eosinophil % 0.7 0.3 - 6.2 %    Basophil % 0.2 0.0 - 1.5 %    Immature Grans % 0.4 0.0 - 0.5 %    Neutrophils, Absolute 7.58 (H) 1.70 - 7.00 10*3/mm3    Lymphocytes, Absolute 0.41 (L) 0.70 - 3.10 10*3/mm3    Monocytes, Absolute 0.43 0.10 - 0.90 10*3/mm3    Eosinophils, Absolute 0.06 0.00 - 0.40 10*3/mm3    Basophils, Absolute 0.02 0.00 - 0.20 10*3/mm3    Immature Grans, Absolute 0.03 0.00 - 0.05 10*3/mm3    nRBC 0.0 0.0 - 0.2 /100 WBC     *Note: Due to a large number of results and/or encounters for the requested time period, some results have not been displayed. A complete set of results can be found in Results Review.         This document has been electronically signed by Wanda Lang MD on Ade 15, 2022 17:29 CDT

## 2022-06-15 NOTE — PROGRESS NOTES
Chief Complaint   Patient presents with   • 6 month f/u        Subjective    Bautista Grubbs is a 66 y.o. male.    History of Present Illness  Patient presented to GI clinic for follow-up visit today.  No GI complaints at this time.  Bowel movements are regular.  Taking iron supplements daily.  Due for sigmoidoscopy for reevaluation of the rectal polypectomy site.       The following portions of the patient's history were reviewed and updated as appropriate:   Past Medical History:   Diagnosis Date   • Alopecia    • Arthritis    • Autism    • COPD (chronic obstructive pulmonary disease) (HCC)    • GERD (gastroesophageal reflux disease)    • Hyperlipidemia    • Hypertension    • Insomnia    • Intellectual disability    • Lennox-Gastaut syndrome (HCC)    • Major depressive disorder    • Orthostatic hypotension    • Osteoporosis    • Right bundle branch block    • Scoliosis    • Seizures (HCC)      Past Surgical History:   Procedure Laterality Date   • COLONOSCOPY N/A 1/8/2021    Procedure: COLONOSCOPY;  Surgeon: Wanda Lang MD;  Location: [facility] ENDOSCOPY;  Service: Gastroenterology;  Laterality: N/A;   • COLONOSCOPY N/A 9/2/2021    Procedure: COLONOSCOPY;  Surgeon: Wanda Lang MD;  Location: [facility] ENDOSCOPY;  Service: Gastroenterology;  Laterality: N/A;   • ENDOSCOPY N/A 1/7/2021    Procedure: ESOPHAGOGASTRODUODENOSCOPY;  Surgeon: Wanda Lang MD;  Location: [facility] ENDOSCOPY;  Service: Gastroenterology;  Laterality: N/A;   • ENDOSCOPY N/A 1/22/2021    Procedure: ESOPHAGOGASTRODUODENOSCOPY;  Surgeon: Wanda Lang MD;  Location: [facility] ENDOSCOPY;  Service: Gastroenterology;  Laterality: N/A;   • ENDOSCOPY W/ PEG TUBE PLACEMENT N/A 6/25/2021    Procedure: ESOPHAGOGASTRODUODENOSCOPY WITH PERCUTANEOUS ENDOSCOPIC GASTROSTOMY TUBE INSERTION WITH ANESTHESIA;  Surgeon: Wanda Lang MD;  Location: [facility] ENDOSCOPY;  Service: Gastroenterology;  Laterality: N/A;   • SIGMOIDOSCOPY N/A 10/11/2021     Procedure: SIGMOIDOSCOPY FLEXIBLE;  Surgeon: Wanda Lang MD;  Location: [facility] ENDOSCOPY;  Service: Gastroenterology;  Laterality: N/A;   • SIGMOIDOSCOPY N/A 11/30/2021    Procedure: SIGMOIDOSCOPY FLEXIBLE;  Surgeon: Wanda Lang MD;  Location: [facility] ENDOSCOPY;  Service: Gastroenterology;  Laterality: N/A;   • UPPER GASTROINTESTINAL ENDOSCOPY  01/22/2021   • UPPER GASTROINTESTINAL ENDOSCOPY  06/25/2021     Family History   Problem Relation Age of Onset   • Hypertension Father        Prior to Admission medications    Medication Sig Start Date End Date Taking? Authorizing Provider   albuterol (PROVENTIL) (2.5 MG/3ML) 0.083% nebulizer solution  10/12/21  Yes Patricia Driscoll MD   calcium carbonate-vitamin d 600-400 MG-UNIT per tablet Take 1 tablet by mouth 2 (Two) Times a Day. Per G-Tube   Yes Patricia Driscoll MD   denosumab (Prolia) 60 MG/ML solution prefilled syringe syringe Inject 60 mg under the skin into the appropriate area as directed. Every 6 Months   Yes Patricia Driscoll MD   DIAZEPAM RE Insert 10 mg into the rectum As Needed (For Seizure Activity).   Yes Patricia Driscoll MD   famotidine (PEPCID) 20 MG tablet Take 20 mg by mouth Daily. Per G-Tube   Yes Patricia Driscoll MD   ferrous sulfate 325 (65 FE) MG tablet Take 325 mg by mouth Daily With Breakfast. Per G-Tube   Yes Patricia Driscoll MD   ibuprofen (ADVIL,MOTRIN) 600 MG tablet Take 1 tablet by mouth Every 8 (Eight) Hours As Needed for Moderate Pain . 1/19/22  Yes Sandeep Fernandez MD   ipratropium-albuterol (DUO-NEB) 0.5-2.5 mg/3 ml nebulizer Take 3 mL by nebulization Every 4 (Four) Hours As Needed for Shortness of Air. 4/15/22  Yes Leander Meade MD   lamoTRIgine (LaMICtal) 200 MG tablet Take 200 mg by mouth 2 (Two) Times a Day. Per G-Tube   Yes Patricia Driscoll MD   lamoTRIgine (LaMICtal) 25 MG tablet Take 25 mg by mouth 2 (Two) Times a Day. Per G-Tube To Equal 225 MG   Yes Patricia Driscoll MD    levETIRAcetam (KEPPRA) 1000 MG tablet Take 1,000 mg by mouth 2 (Two) Times a Day. Per G-Tube   Yes Patricia Driscoll MD   levETIRAcetam (KEPPRA) 750 MG tablet Take 750 mg by mouth 2 (Two) Times a Day. Per G-Tube To Equal 1750 MG   Yes Patricia Driscoll MD   LORazepam (ATIVAN) 1 MG tablet GIVE ONE TABLET PER G-TUBE TWICE DAILY FOR ANXIETY AND AGITATION 2/2/22  Yes Christelle Smith APRN   midodrine (PROAMATINE) 5 MG tablet Take 1 tablet by mouth 3 (Three) Times a Day.  Patient taking differently: Take 10 mg by mouth 3 (Three) Times a Day. Per G-Tube 2/4/19  Yes Earnestine Reid MD   Nutritional Supplements (JEVITY 1.5 JEFFERY PO) Take  by mouth. Gets 390mL bolus every 6 hours 1A-7A-1P-7P   Yes Patricia Driscoll MD   simvastatin (ZOCOR) 20 MG tablet Take 20 mg by mouth Daily. Per G-Tube   Yes Patricia Driscoll MD   sucralfate (CARAFATE) 1 g tablet Take 1 tablet by mouth 4 (Four) Times a Day Before Meals & at Bedtime.  Patient taking differently: Take 1 g by mouth 4 (Four) Times a Day Before Meals & at Bedtime. Per G-Tube 5/19/21  Yes Sandeep Fernandez MD   thioridazine (MELLARIL) 100 MG tablet Take 200 mg by mouth 2 (Two) Times a Day. Per G-Tube   Yes Patricia Driscoll MD     Allergies   Allergen Reactions   • Haldol [Haloperidol] Unknown (See Comments)     Unknown     • Levaquin [Levofloxacin] Other (See Comments)     Lowers seizure threshold     Social History     Socioeconomic History   • Marital status: Single   Tobacco Use   • Smoking status: Never Smoker   • Smokeless tobacco: Never Used   Vaping Use   • Vaping Use: Never used   Substance and Sexual Activity   • Alcohol use: Never   • Drug use: Never   • Sexual activity: Defer       Review of Systems  Review of Systems   Constitutional: Negative for chills, fatigue, fever and unexpected weight change.   HENT: Negative for congestion, ear discharge, hearing loss, nosebleeds and sore throat.    Eyes: Negative for pain, discharge and redness.  "  Respiratory: Negative for cough, chest tightness, shortness of breath and wheezing.    Cardiovascular: Negative for chest pain and palpitations.   Gastrointestinal: Negative for abdominal distention, abdominal pain, blood in stool, constipation, diarrhea, nausea and vomiting.   Endocrine: Negative for cold intolerance, polydipsia, polyphagia and polyuria.   Genitourinary: Negative for dysuria, flank pain, frequency, hematuria and urgency.   Musculoskeletal: Negative for arthralgias, back pain, joint swelling and myalgias.   Skin: Negative for color change, pallor and rash.   Neurological: Negative for tremors, seizures, syncope, weakness and headaches.   Hematological: Negative for adenopathy. Does not bruise/bleed easily.   Psychiatric/Behavioral: Negative for behavioral problems, confusion, dysphoric mood, hallucinations and suicidal ideas. The patient is not nervous/anxious.         /72   Pulse 103   Ht 165.1 cm (65\")   BMI 24.25 kg/m²     Objective    Physical Exam  Constitutional:       Appearance: He is well-developed.   HENT:      Head: Normocephalic and atraumatic.   Eyes:      Conjunctiva/sclera: Conjunctivae normal.      Pupils: Pupils are equal, round, and reactive to light.   Neck:      Thyroid: No thyromegaly.   Cardiovascular:      Rate and Rhythm: Normal rate and regular rhythm.      Heart sounds: Normal heart sounds. No murmur heard.  Pulmonary:      Effort: Pulmonary effort is normal.      Breath sounds: Normal breath sounds. No wheezing.   Abdominal:      General: Bowel sounds are normal. There is no distension.      Palpations: Abdomen is soft. There is no mass.      Tenderness: There is no abdominal tenderness.      Hernia: No hernia is present.   Genitourinary:     Comments: No lesions noted  Musculoskeletal:         General: No tenderness. Normal range of motion.      Cervical back: Normal range of motion and neck supple.   Lymphadenopathy:      Cervical: No cervical adenopathy. "   Skin:     General: Skin is warm and dry.      Findings: No rash.   Neurological:      Mental Status: He is alert.      Cranial Nerves: No cranial nerve deficit.       Admission on 04/11/2022, Discharged on 04/15/2022   Component Date Value Ref Range Status   • QT Interval 04/11/2022 330  ms Final   • QTC Interval 04/11/2022 458  ms Final   • Site 04/11/2022 Arterial Line   Final   • Otoniel's Test 04/11/2022 N/A   Final   • pH, Arterial 04/11/2022 7.470 (A) 7.350 - 7.450 pH units Final    83 Value above reference range   • pCO2, Arterial 04/11/2022 37.7  35.0 - 45.0 mm Hg Final   • pO2, Arterial 04/11/2022 61.3 (A) 83.0 - 108.0 mm Hg Final    84 Value below reference range   • HCO3, Arterial 04/11/2022 27.4 (A) 20.0 - 26.0 mmol/L Final    83 Value above reference range   • Base Excess, Arterial 04/11/2022 3.7 (A) 0.0 - 2.0 mmol/L Final    83 Value above reference range   • O2 Saturation, Arterial 04/11/2022 92.9 (A) 94.0 - 99.0 % Final    84 Value below reference range   • Barometric Pressure for Blood Gas 04/11/2022 742  mmHg Final   • Modality 04/11/2022 Room Air   Final   • Ventilator Mode 04/11/2022 NA   Final   • Collected by 04/11/2022 RT   Final    Meter: [id number]     :  [id number]   • COVID19 04/11/2022 Not Detected  Not Detected - Ref. Range Final   • Influenza A PCR 04/11/2022 Not Detected  Not Detected Final   • Influenza B PCR 04/11/2022 Not Detected  Not Detected Final   • Glucose 04/12/2022 92  65 - 99 mg/dL Final   • BUN 04/12/2022 24 (A) 8 - 23 mg/dL Final   • Creatinine 04/12/2022 0.78  0.76 - 1.27 mg/dL Final   • Sodium 04/12/2022 137  136 - 145 mmol/L Final   • Potassium 04/12/2022 3.5  3.5 - 5.2 mmol/L Final   • Chloride 04/12/2022 106  98 - 107 mmol/L Final   • CO2 04/12/2022 23.0  22.0 - 29.0 mmol/L Final   • Calcium 04/12/2022 7.5 (A) 8.6 - 10.5 mg/dL Final   • BUN/Creatinine Ratio 04/12/2022 30.8 (A) 7.0 - 25.0 Final   • Anion Gap 04/12/2022 8.0  5.0 - 15.0 mmol/L Final   • eGFR  04/12/2022 99.0  >60.0 mL/min/1.73 Final    National Kidney Foundation and American Society of Nephrology (ASN) Task Force recommended calculation based on the Chronic Kidney Disease Epidemiology Collaboration (CKD-EPI) equation refit without adjustment for race.   • WBC 04/12/2022 8.53  3.40 - 10.80 10*3/mm3 Final   • RBC 04/12/2022 3.98 (A) 4.14 - 5.80 10*6/mm3 Final   • Hemoglobin 04/12/2022 12.7 (A) 13.0 - 17.7 g/dL Final   • Hematocrit 04/12/2022 36.9 (A) 37.5 - 51.0 % Final   • MCV 04/12/2022 92.7  79.0 - 97.0 fL Final   • MCH 04/12/2022 31.9  26.6 - 33.0 pg Final   • MCHC 04/12/2022 34.4  31.5 - 35.7 g/dL Final   • RDW 04/12/2022 13.9  12.3 - 15.4 % Final   • RDW-SD 04/12/2022 47.0  37.0 - 54.0 fl Final   • MPV 04/12/2022 11.7  6.0 - 12.0 fL Final   • Platelets 04/12/2022 146  140 - 450 10*3/mm3 Final   • Neutrophil % 04/12/2022 88.9 (A) 42.7 - 76.0 % Final   • Lymphocyte % 04/12/2022 4.8 (A) 19.6 - 45.3 % Final   • Monocyte % 04/12/2022 5.0  5.0 - 12.0 % Final   • Eosinophil % 04/12/2022 0.7  0.3 - 6.2 % Final   • Basophil % 04/12/2022 0.2  0.0 - 1.5 % Final   • Immature Grans % 04/12/2022 0.4  0.0 - 0.5 % Final   • Neutrophils, Absolute 04/12/2022 7.58 (A) 1.70 - 7.00 10*3/mm3 Final   • Lymphocytes, Absolute 04/12/2022 0.41 (A) 0.70 - 3.10 10*3/mm3 Final   • Monocytes, Absolute 04/12/2022 0.43  0.10 - 0.90 10*3/mm3 Final   • Eosinophils, Absolute 04/12/2022 0.06  0.00 - 0.40 10*3/mm3 Final   • Basophils, Absolute 04/12/2022 0.02  0.00 - 0.20 10*3/mm3 Final   • Immature Grans, Absolute 04/12/2022 0.03  0.00 - 0.05 10*3/mm3 Final   • nRBC 04/12/2022 0.0  0.0 - 0.2 /100 WBC Final   • CRE SCREEN 04/12/2022 Not Detected  Not Detected, Invalid Final    Test performed by real-time polymerase chain reaction (qPCR).   • OXA 48 Strain 04/12/2022 Not Detected   Final   • IMP STRAIN 04/12/2022 Not Detected   Final   • VIM STRAIN 04/12/2022 Not Detected   Final   • NDM Strain 04/12/2022 Not Detected   Final   • KPC  Strain 04/12/2022 Not Detected   Final   • Hemoglobin 04/12/2022 13.0  13.0 - 17.7 g/dL Final   • Hematocrit 04/12/2022 37.5  37.5 - 51.0 % Final   • Glucose 04/13/2022 102 (A) 65 - 99 mg/dL Final   • BUN 04/13/2022 17  8 - 23 mg/dL Final   • Creatinine 04/13/2022 0.69 (A) 0.76 - 1.27 mg/dL Final   • Sodium 04/13/2022 140  136 - 145 mmol/L Final   • Potassium 04/13/2022 3.8  3.5 - 5.2 mmol/L Final   • Chloride 04/13/2022 110 (A) 98 - 107 mmol/L Final   • CO2 04/13/2022 24.0  22.0 - 29.0 mmol/L Final   • Calcium 04/13/2022 8.2 (A) 8.6 - 10.5 mg/dL Final   • BUN/Creatinine Ratio 04/13/2022 24.6  7.0 - 25.0 Final   • Anion Gap 04/13/2022 6.0  5.0 - 15.0 mmol/L Final   • eGFR 04/13/2022 102.7  >60.0 mL/min/1.73 Final    National Kidney Foundation and American Society of Nephrology (ASN) Task Force recommended calculation based on the Chronic Kidney Disease Epidemiology Collaboration (CKD-EPI) equation refit without adjustment for race.   • WBC 04/13/2022 6.41  3.40 - 10.80 10*3/mm3 Final   • RBC 04/13/2022 3.91 (A) 4.14 - 5.80 10*6/mm3 Final   • Hemoglobin 04/13/2022 12.5 (A) 13.0 - 17.7 g/dL Final   • Hematocrit 04/13/2022 36.3 (A) 37.5 - 51.0 % Final   • MCV 04/13/2022 92.8  79.0 - 97.0 fL Final   • MCH 04/13/2022 32.0  26.6 - 33.0 pg Final   • MCHC 04/13/2022 34.4  31.5 - 35.7 g/dL Final   • RDW 04/13/2022 13.7  12.3 - 15.4 % Final   • RDW-SD 04/13/2022 46.5  37.0 - 54.0 fl Final   • MPV 04/13/2022 12.1 (A) 6.0 - 12.0 fL Final   • Platelets 04/13/2022 140  140 - 450 10*3/mm3 Final   • Neutrophil % 04/13/2022 74.0  42.7 - 76.0 % Final   • Lymphocyte % 04/13/2022 10.6 (A) 19.6 - 45.3 % Final   • Monocyte % 04/13/2022 9.4  5.0 - 12.0 % Final   • Eosinophil % 04/13/2022 5.6  0.3 - 6.2 % Final   • Basophil % 04/13/2022 0.2  0.0 - 1.5 % Final   • Immature Grans % 04/13/2022 0.2  0.0 - 0.5 % Final   • Neutrophils, Absolute 04/13/2022 4.75  1.70 - 7.00 10*3/mm3 Final   • Lymphocytes, Absolute 04/13/2022 0.68 (A) 0.70 - 3.10  10*3/mm3 Final   • Monocytes, Absolute 04/13/2022 0.60  0.10 - 0.90 10*3/mm3 Final   • Eosinophils, Absolute 04/13/2022 0.36  0.00 - 0.40 10*3/mm3 Final   • Basophils, Absolute 04/13/2022 0.01  0.00 - 0.20 10*3/mm3 Final   • Immature Grans, Absolute 04/13/2022 0.01  0.00 - 0.05 10*3/mm3 Final   • nRBC 04/13/2022 0.0  0.0 - 0.2 /100 WBC Final   • Blood Culture 04/13/2022 No growth at 5 days   Final   • Blood Culture 04/13/2022 No growth at 5 days   Final   • Iron 04/13/2022 30 (A) 59 - 158 mcg/dL Final   • Iron Saturation 04/13/2022 10 (A) 20 - 50 % Final   • Transferrin 04/13/2022 196 (A) 200 - 360 mg/dL Final   • TIBC 04/13/2022 292 (A) 298 - 536 mcg/dL Final   • Glucose 04/14/2022 90  65 - 99 mg/dL Final   • BUN 04/14/2022 12  8 - 23 mg/dL Final   • Creatinine 04/14/2022 0.71 (A) 0.76 - 1.27 mg/dL Final   • Sodium 04/14/2022 141  136 - 145 mmol/L Final   • Potassium 04/14/2022 3.7  3.5 - 5.2 mmol/L Final   • Chloride 04/14/2022 108 (A) 98 - 107 mmol/L Final   • CO2 04/14/2022 25.0  22.0 - 29.0 mmol/L Final   • Calcium 04/14/2022 8.8  8.6 - 10.5 mg/dL Final   • BUN/Creatinine Ratio 04/14/2022 16.9  7.0 - 25.0 Final   • Anion Gap 04/14/2022 8.0  5.0 - 15.0 mmol/L Final   • eGFR 04/14/2022 101.8  >60.0 mL/min/1.73 Final    National Kidney Foundation and American Society of Nephrology (ASN) Task Force recommended calculation based on the Chronic Kidney Disease Epidemiology Collaboration (CKD-EPI) equation refit without adjustment for race.   • WBC 04/14/2022 6.71  3.40 - 10.80 10*3/mm3 Final   • RBC 04/14/2022 4.35  4.14 - 5.80 10*6/mm3 Final   • Hemoglobin 04/14/2022 13.8  13.0 - 17.7 g/dL Final   • Hematocrit 04/14/2022 40.1  37.5 - 51.0 % Final   • MCV 04/14/2022 92.2  79.0 - 97.0 fL Final   • MCH 04/14/2022 31.7  26.6 - 33.0 pg Final   • MCHC 04/14/2022 34.4  31.5 - 35.7 g/dL Final   • RDW 04/14/2022 13.3  12.3 - 15.4 % Final   • RDW-SD 04/14/2022 45.6  37.0 - 54.0 fl Final   • MPV 04/14/2022 12.2 (A) 6.0 - 12.0  fL Final   • Platelets 04/14/2022 166  140 - 450 10*3/mm3 Final   • Neutrophil % 04/14/2022 65.2  42.7 - 76.0 % Final   • Lymphocyte % 04/14/2022 18.0 (A) 19.6 - 45.3 % Final   • Monocyte % 04/14/2022 9.8  5.0 - 12.0 % Final   • Eosinophil % 04/14/2022 6.3 (A) 0.3 - 6.2 % Final   • Basophil % 04/14/2022 0.4  0.0 - 1.5 % Final   • Immature Grans % 04/14/2022 0.3  0.0 - 0.5 % Final   • Neutrophils, Absolute 04/14/2022 4.37  1.70 - 7.00 10*3/mm3 Final   • Lymphocytes, Absolute 04/14/2022 1.21  0.70 - 3.10 10*3/mm3 Final   • Monocytes, Absolute 04/14/2022 0.66  0.10 - 0.90 10*3/mm3 Final   • Eosinophils, Absolute 04/14/2022 0.42 (A) 0.00 - 0.40 10*3/mm3 Final   • Basophils, Absolute 04/14/2022 0.03  0.00 - 0.20 10*3/mm3 Final   • Immature Grans, Absolute 04/14/2022 0.02  0.00 - 0.05 10*3/mm3 Final   • nRBC 04/14/2022 0.0  0.0 - 0.2 /100 WBC Final   • Magnesium 04/15/2022 1.9  1.6 - 2.4 mg/dL Final   • Phosphorus 04/15/2022 2.4 (A) 2.5 - 4.5 mg/dL Final   • Glucose 04/15/2022 114 (A) 65 - 99 mg/dL Final   • BUN 04/15/2022 12  8 - 23 mg/dL Final   • Creatinine 04/15/2022 0.76  0.76 - 1.27 mg/dL Final   • Sodium 04/15/2022 142  136 - 145 mmol/L Final   • Potassium 04/15/2022 3.5  3.5 - 5.2 mmol/L Final   • Chloride 04/15/2022 108 (A) 98 - 107 mmol/L Final   • CO2 04/15/2022 25.0  22.0 - 29.0 mmol/L Final   • Calcium 04/15/2022 9.2  8.6 - 10.5 mg/dL Final   • BUN/Creatinine Ratio 04/15/2022 15.8  7.0 - 25.0 Final   • Anion Gap 04/15/2022 9.0  5.0 - 15.0 mmol/L Final   • eGFR 04/15/2022 99.7  >60.0 mL/min/1.73 Final    National Kidney Foundation and American Society of Nephrology (ASN) Task Force recommended calculation based on the Chronic Kidney Disease Epidemiology Collaboration (CKD-EPI) equation refit without adjustment for race.   • WBC 04/15/2022 6.80  3.40 - 10.80 10*3/mm3 Final   • RBC 04/15/2022 4.38  4.14 - 5.80 10*6/mm3 Final   • Hemoglobin 04/15/2022 13.7  13.0 - 17.7 g/dL Final   • Hematocrit 04/15/2022 39.3   37.5 - 51.0 % Final   • MCV 04/15/2022 89.7  79.0 - 97.0 fL Final   • MCH 04/15/2022 31.3  26.6 - 33.0 pg Final   • MCHC 04/15/2022 34.9  31.5 - 35.7 g/dL Final   • RDW 04/15/2022 13.2  12.3 - 15.4 % Final   • RDW-SD 04/15/2022 43.4  37.0 - 54.0 fl Final   • MPV 04/15/2022 11.1  6.0 - 12.0 fL Final   • Platelets 04/15/2022 197  140 - 450 10*3/mm3 Final   • Neutrophil % 04/15/2022 70.8  42.7 - 76.0 % Final   • Lymphocyte % 04/15/2022 14.6 (A) 19.6 - 45.3 % Final   • Monocyte % 04/15/2022 9.4  5.0 - 12.0 % Final   • Eosinophil % 04/15/2022 4.4  0.3 - 6.2 % Final   • Basophil % 04/15/2022 0.4  0.0 - 1.5 % Final   • Immature Grans % 04/15/2022 0.4  0.0 - 0.5 % Final   • Neutrophils, Absolute 04/15/2022 4.81  1.70 - 7.00 10*3/mm3 Final   • Lymphocytes, Absolute 04/15/2022 0.99  0.70 - 3.10 10*3/mm3 Final   • Monocytes, Absolute 04/15/2022 0.64  0.10 - 0.90 10*3/mm3 Final   • Eosinophils, Absolute 04/15/2022 0.30  0.00 - 0.40 10*3/mm3 Final   • Basophils, Absolute 04/15/2022 0.03  0.00 - 0.20 10*3/mm3 Final   • Immature Grans, Absolute 04/15/2022 0.03  0.00 - 0.05 10*3/mm3 Final   • nRBC 04/15/2022 0.0  0.0 - 0.2 /100 WBC Final     Assessment & Plan      1. Adenomatous polyp of colon, unspecified part of colon    1.  Rectal polyp with piecemeal polypectomy, repeat flex sig for reevaluation.  2.  Oropharyngeal dysphagia, on PEG feeds.  3.  Nausea and vomiting, resolved.  4.  Anemia, improving.      Orders placed during this encounter include:  Orders Placed This Encounter   Procedures   • Follow Anesthesia Guidelines / Standing Orders     Standing Status:   Future   • Obtain Informed Consent     Standing Status:   Future     Order Specific Question:   Informed Consent Given For     Answer:   flexible sigmoidoscopy       SIGMOIDOSCOPY FLEXIBLE (N/A)    Review and/or summary of lab tests, radiology, procedures, medications. Review and summary of old records and obtaining of history. The risks and benefits of my  recommendations, as well as other treatment options were discussed with the patient and nursing attendant today. Questions were answered.    No orders of the defined types were placed in this encounter.      Follow-up: No follow-ups on file.               Results for orders placed or performed during the hospital encounter of 04/11/22   CRE Screen by PCR - Swab, Large Intestine, Rectum    Specimen: Large Intestine, Rectum; Swab   Result Value Ref Range    CRE SCREEN Not Detected Not Detected, Invalid    OXA 48 Strain Not Detected     IMP STRAIN Not Detected     VIM STRAIN Not Detected     NDM Strain Not Detected     KPC Strain Not Detected    COVID-19 and FLU A/B PCR - Swab, Nasopharynx    Specimen: Nasopharynx; Swab   Result Value Ref Range    COVID19 Not Detected Not Detected - Ref. Range    Influenza A PCR Not Detected Not Detected    Influenza B PCR Not Detected Not Detected   CBC Auto Differential    Specimen: Blood   Result Value Ref Range    WBC 6.80 3.40 - 10.80 10*3/mm3    RBC 4.38 4.14 - 5.80 10*6/mm3    Hemoglobin 13.7 13.0 - 17.7 g/dL    Hematocrit 39.3 37.5 - 51.0 %    MCV 89.7 79.0 - 97.0 fL    MCH 31.3 26.6 - 33.0 pg    MCHC 34.9 31.5 - 35.7 g/dL    RDW 13.2 12.3 - 15.4 %    RDW-SD 43.4 37.0 - 54.0 fl    MPV 11.1 6.0 - 12.0 fL    Platelets 197 140 - 450 10*3/mm3    Neutrophil % 70.8 42.7 - 76.0 %    Lymphocyte % 14.6 (L) 19.6 - 45.3 %    Monocyte % 9.4 5.0 - 12.0 %    Eosinophil % 4.4 0.3 - 6.2 %    Basophil % 0.4 0.0 - 1.5 %    Immature Grans % 0.4 0.0 - 0.5 %    Neutrophils, Absolute 4.81 1.70 - 7.00 10*3/mm3    Lymphocytes, Absolute 0.99 0.70 - 3.10 10*3/mm3    Monocytes, Absolute 0.64 0.10 - 0.90 10*3/mm3    Eosinophils, Absolute 0.30 0.00 - 0.40 10*3/mm3    Basophils, Absolute 0.03 0.00 - 0.20 10*3/mm3    Immature Grans, Absolute 0.03 0.00 - 0.05 10*3/mm3    nRBC 0.0 0.0 - 0.2 /100 WBC   CBC Auto Differential    Specimen: Blood   Result Value Ref Range    WBC 6.71 3.40 - 10.80 10*3/mm3    RBC  4.35 4.14 - 5.80 10*6/mm3    Hemoglobin 13.8 13.0 - 17.7 g/dL    Hematocrit 40.1 37.5 - 51.0 %    MCV 92.2 79.0 - 97.0 fL    MCH 31.7 26.6 - 33.0 pg    MCHC 34.4 31.5 - 35.7 g/dL    RDW 13.3 12.3 - 15.4 %    RDW-SD 45.6 37.0 - 54.0 fl    MPV 12.2 (H) 6.0 - 12.0 fL    Platelets 166 140 - 450 10*3/mm3    Neutrophil % 65.2 42.7 - 76.0 %    Lymphocyte % 18.0 (L) 19.6 - 45.3 %    Monocyte % 9.8 5.0 - 12.0 %    Eosinophil % 6.3 (H) 0.3 - 6.2 %    Basophil % 0.4 0.0 - 1.5 %    Immature Grans % 0.3 0.0 - 0.5 %    Neutrophils, Absolute 4.37 1.70 - 7.00 10*3/mm3    Lymphocytes, Absolute 1.21 0.70 - 3.10 10*3/mm3    Monocytes, Absolute 0.66 0.10 - 0.90 10*3/mm3    Eosinophils, Absolute 0.42 (H) 0.00 - 0.40 10*3/mm3    Basophils, Absolute 0.03 0.00 - 0.20 10*3/mm3    Immature Grans, Absolute 0.02 0.00 - 0.05 10*3/mm3    nRBC 0.0 0.0 - 0.2 /100 WBC   CBC Auto Differential    Specimen: Blood   Result Value Ref Range    WBC 6.41 3.40 - 10.80 10*3/mm3    RBC 3.91 (L) 4.14 - 5.80 10*6/mm3    Hemoglobin 12.5 (L) 13.0 - 17.7 g/dL    Hematocrit 36.3 (L) 37.5 - 51.0 %    MCV 92.8 79.0 - 97.0 fL    MCH 32.0 26.6 - 33.0 pg    MCHC 34.4 31.5 - 35.7 g/dL    RDW 13.7 12.3 - 15.4 %    RDW-SD 46.5 37.0 - 54.0 fl    MPV 12.1 (H) 6.0 - 12.0 fL    Platelets 140 140 - 450 10*3/mm3    Neutrophil % 74.0 42.7 - 76.0 %    Lymphocyte % 10.6 (L) 19.6 - 45.3 %    Monocyte % 9.4 5.0 - 12.0 %    Eosinophil % 5.6 0.3 - 6.2 %    Basophil % 0.2 0.0 - 1.5 %    Immature Grans % 0.2 0.0 - 0.5 %    Neutrophils, Absolute 4.75 1.70 - 7.00 10*3/mm3    Lymphocytes, Absolute 0.68 (L) 0.70 - 3.10 10*3/mm3    Monocytes, Absolute 0.60 0.10 - 0.90 10*3/mm3    Eosinophils, Absolute 0.36 0.00 - 0.40 10*3/mm3    Basophils, Absolute 0.01 0.00 - 0.20 10*3/mm3    Immature Grans, Absolute 0.01 0.00 - 0.05 10*3/mm3    nRBC 0.0 0.0 - 0.2 /100 WBC   CBC Auto Differential    Specimen: Blood   Result Value Ref Range    WBC 8.53 3.40 - 10.80 10*3/mm3    RBC 3.98 (L) 4.14 - 5.80  10*6/mm3    Hemoglobin 12.7 (L) 13.0 - 17.7 g/dL    Hematocrit 36.9 (L) 37.5 - 51.0 %    MCV 92.7 79.0 - 97.0 fL    MCH 31.9 26.6 - 33.0 pg    MCHC 34.4 31.5 - 35.7 g/dL    RDW 13.9 12.3 - 15.4 %    RDW-SD 47.0 37.0 - 54.0 fl    MPV 11.7 6.0 - 12.0 fL    Platelets 146 140 - 450 10*3/mm3    Neutrophil % 88.9 (H) 42.7 - 76.0 %    Lymphocyte % 4.8 (L) 19.6 - 45.3 %    Monocyte % 5.0 5.0 - 12.0 %    Eosinophil % 0.7 0.3 - 6.2 %    Basophil % 0.2 0.0 - 1.5 %    Immature Grans % 0.4 0.0 - 0.5 %    Neutrophils, Absolute 7.58 (H) 1.70 - 7.00 10*3/mm3    Lymphocytes, Absolute 0.41 (L) 0.70 - 3.10 10*3/mm3    Monocytes, Absolute 0.43 0.10 - 0.90 10*3/mm3    Eosinophils, Absolute 0.06 0.00 - 0.40 10*3/mm3    Basophils, Absolute 0.02 0.00 - 0.20 10*3/mm3    Immature Grans, Absolute 0.03 0.00 - 0.05 10*3/mm3    nRBC 0.0 0.0 - 0.2 /100 WBC     *Note: Due to a large number of results and/or encounters for the requested time period, some results have not been displayed. A complete set of results can be found in Results Review.         This document has been electronically signed by Wanda Lang MD on Ade 15, 2022 17:29 CDT

## 2022-06-16 ENCOUNTER — ANESTHESIA (OUTPATIENT)
Dept: GASTROENTEROLOGY | Facility: HOSPITAL | Age: 67
End: 2022-06-16

## 2022-06-16 ENCOUNTER — HOSPITAL ENCOUNTER (OUTPATIENT)
Facility: HOSPITAL | Age: 67
Setting detail: HOSPITAL OUTPATIENT SURGERY
Discharge: HOME | End: 2022-06-16
Attending: INTERNAL MEDICINE | Admitting: INTERNAL MEDICINE

## 2022-06-16 ENCOUNTER — ANESTHESIA EVENT (OUTPATIENT)
Dept: GASTROENTEROLOGY | Facility: HOSPITAL | Age: 67
End: 2022-06-16

## 2022-06-16 VITALS
DIASTOLIC BLOOD PRESSURE: 59 MMHG | HEIGHT: 65 IN | SYSTOLIC BLOOD PRESSURE: 85 MMHG | TEMPERATURE: 97.3 F | BODY MASS INDEX: 24.16 KG/M2 | RESPIRATION RATE: 20 BRPM | OXYGEN SATURATION: 98 % | HEART RATE: 62 BPM | WEIGHT: 145 LBS

## 2022-06-16 DIAGNOSIS — D12.6 ADENOMATOUS POLYP OF COLON, UNSPECIFIED PART OF COLON: ICD-10-CM

## 2022-06-16 PROCEDURE — 88305 TISSUE EXAM BY PATHOLOGIST: CPT

## 2022-06-16 PROCEDURE — 25010000002 PROPOFOL 10 MG/ML EMULSION

## 2022-06-16 PROCEDURE — 45346 SIGMOIDOSCOPY W/ABLATION: CPT | Performed by: INTERNAL MEDICINE

## 2022-06-16 RX ORDER — PROPOFOL 10 MG/ML
VIAL (ML) INTRAVENOUS AS NEEDED
Status: DISCONTINUED | OUTPATIENT
Start: 2022-06-16 | End: 2022-06-16 | Stop reason: SURG

## 2022-06-16 RX ORDER — DEXTROSE AND SODIUM CHLORIDE 5; .45 G/100ML; G/100ML
30 INJECTION, SOLUTION INTRAVENOUS CONTINUOUS PRN
Status: DISCONTINUED | OUTPATIENT
Start: 2022-06-16 | End: 2022-06-16 | Stop reason: HOSPADM

## 2022-06-16 RX ADMIN — PROPOFOL 20 MG: 10 INJECTION, EMULSION INTRAVENOUS at 15:45

## 2022-06-16 RX ADMIN — PROPOFOL 20 MG: 10 INJECTION, EMULSION INTRAVENOUS at 15:43

## 2022-06-16 RX ADMIN — DEXTROSE AND SODIUM CHLORIDE 30 ML/HR: 5; 450 INJECTION, SOLUTION INTRAVENOUS at 15:21

## 2022-06-16 RX ADMIN — PROPOFOL 10 MG: 10 INJECTION, EMULSION INTRAVENOUS at 15:39

## 2022-06-16 RX ADMIN — PROPOFOL 10 MG: 10 INJECTION, EMULSION INTRAVENOUS at 15:41

## 2022-06-16 RX ADMIN — PROPOFOL 10 MG: 10 INJECTION, EMULSION INTRAVENOUS at 15:47

## 2022-06-16 RX ADMIN — PROPOFOL 50 MG: 10 INJECTION, EMULSION INTRAVENOUS at 15:36

## 2022-06-16 NOTE — ANESTHESIA PREPROCEDURE EVALUATION
Anesthesia Evaluation     Patient summary reviewed and Nursing notes reviewed   NPO Solid Status: > 8 hours  NPO Liquid Status: > 4 hours           Airway   No difficulty expected  Comment: Does not cooperate with airway exam  Dental      Pulmonary - normal exam   (+) COPD,   (-) pneumonia  Cardiovascular     Rhythm: regular  Rate: normal    (+) hypertension well controlled, hyperlipidemia,   (-) dysrhythmias      Neuro/Psych  (+) seizures, syncope, psychiatric history Bipolar,      ROS Comment: autism  GI/Hepatic/Renal/Endo    (+)  GERD, GI bleeding lower ,     Musculoskeletal     Abdominal  - normal exam   Substance History - negative use     OB/GYN negative ob/gyn ROS         Other   arthritis,                        Anesthesia Plan    ASA 3     MAC     intravenous induction     Anesthetic plan, risks, benefits, and alternatives have been provided, discussed and informed consent has been obtained with: child.

## 2022-06-16 NOTE — ANESTHESIA POSTPROCEDURE EVALUATION
Patient: Bautista Grubbs    Procedure Summary     Date: 06/16/22 Room / Location: [facility] ENDOSCOPY 2 / [facility] ENDOSCOPY    Anesthesia Start: 1527 Anesthesia Stop: 1551    Procedure: SIGMOIDOSCOPY FLEXIBLE (N/A ) Diagnosis:       Adenomatous polyp of colon, unspecified part of colon      (Adenomatous polyp of colon, unspecified part of colon [D12.6])    Surgeons: Wanda Lang MD Provider: Kelsi Mattson CRNA    Anesthesia Type: MAC ASA Status: 3          Anesthesia Type: MAC    Vitals  No vitals data found for the desired time range.          Post Anesthesia Care and Evaluation    Patient location during evaluation: bedside  Patient participation: waiting for patient participation  Level of consciousness: responsive to verbal stimuli  Pain management: adequate    Airway patency: patent  Anesthetic complications: No anesthetic complications  PONV Status: none  Cardiovascular status: acceptable  Respiratory status: acceptable  Hydration status: acceptable    Comments: -------------------------              06/16/22                    1519        -------------------------   BP:        (!) 84/60      Pulse:        73          Resp:         18          Temp:   98 °F (36.7 °C)   SpO2:         95%        -------------------------

## 2022-06-20 LAB — REF LAB TEST METHOD: NORMAL

## 2022-06-24 ENCOUNTER — OFFICE VISIT (OUTPATIENT)
Dept: GASTROENTEROLOGY | Facility: CLINIC | Age: 67
End: 2022-06-24

## 2022-06-24 VITALS
DIASTOLIC BLOOD PRESSURE: 74 MMHG | WEIGHT: 145 LBS | BODY MASS INDEX: 24.16 KG/M2 | HEART RATE: 98 BPM | HEIGHT: 65 IN | SYSTOLIC BLOOD PRESSURE: 111 MMHG

## 2022-06-24 DIAGNOSIS — D12.6 ADENOMATOUS POLYP OF COLON, UNSPECIFIED PART OF COLON: Primary | ICD-10-CM

## 2022-06-24 PROCEDURE — 99213 OFFICE O/P EST LOW 20 MIN: CPT | Performed by: INTERNAL MEDICINE

## 2022-07-11 NOTE — PROGRESS NOTES
Chief Complaint   Patient presents with   • Follow-up     Endo follow up       Subjective    Bautista Grubbs is a 66 y.o. male.    History of Present Illness  Patient presented to the GI clinic for follow-up visit today.  Remains nonverbal nonverbal.  No GI complaints per nursing attendant.  Sigmoidoscopy was consistent with rectal polyp status post piecemeal resection and APC cauterization.  No further rectal bleeding, nausea or vomiting.  Tolerating PEG tube feeds well.       The following portions of the patient's history were reviewed and updated as appropriate:   Past Medical History:   Diagnosis Date   • Alopecia    • Arthritis    • Autism    • COPD (chronic obstructive pulmonary disease) (HCC)    • GERD (gastroesophageal reflux disease)    • Hyperlipidemia    • Hypertension    • Insomnia    • Intellectual disability    • Lennox-Gastaut syndrome (HCC)    • Major depressive disorder    • Orthostatic hypotension    • Osteoporosis    • Right bundle branch block    • Scoliosis    • Seizures (HCC)      Past Surgical History:   Procedure Laterality Date   • COLONOSCOPY N/A 1/8/2021    Procedure: COLONOSCOPY;  Surgeon: Wanda Lang MD;  Location: [facility] ENDOSCOPY;  Service: Gastroenterology;  Laterality: N/A;   • COLONOSCOPY N/A 9/2/2021    Procedure: COLONOSCOPY;  Surgeon: Wanda Lang MD;  Location: [facility] ENDOSCOPY;  Service: Gastroenterology;  Laterality: N/A;   • ENDOSCOPY N/A 1/7/2021    Procedure: ESOPHAGOGASTRODUODENOSCOPY;  Surgeon: Wanda Lang MD;  Location: [facility] ENDOSCOPY;  Service: Gastroenterology;  Laterality: N/A;   • ENDOSCOPY N/A 1/22/2021    Procedure: ESOPHAGOGASTRODUODENOSCOPY;  Surgeon: Wanda Lang MD;  Location: [facility] ENDOSCOPY;  Service: Gastroenterology;  Laterality: N/A;   • ENDOSCOPY W/ PEG TUBE PLACEMENT N/A 6/25/2021    Procedure: ESOPHAGOGASTRODUODENOSCOPY WITH PERCUTANEOUS ENDOSCOPIC GASTROSTOMY TUBE INSERTION WITH ANESTHESIA;  Surgeon: Wanda Lang  MD;  Location: [facility] ENDOSCOPY;  Service: Gastroenterology;  Laterality: N/A;   • SIGMOIDOSCOPY N/A 10/11/2021    Procedure: SIGMOIDOSCOPY FLEXIBLE;  Surgeon: Wanda Lang MD;  Location: [facility] ENDOSCOPY;  Service: Gastroenterology;  Laterality: N/A;   • SIGMOIDOSCOPY N/A 11/30/2021    Procedure: SIGMOIDOSCOPY FLEXIBLE;  Surgeon: Wanda Lang MD;  Location: [facility] ENDOSCOPY;  Service: Gastroenterology;  Laterality: N/A;   • SIGMOIDOSCOPY N/A 6/16/2022    Procedure: SIGMOIDOSCOPY FLEXIBLE;  Surgeon: Wanda Lang MD;  Location: [facility] ENDOSCOPY;  Service: Gastroenterology;  Laterality: N/A;  argon at rectal polypectomy site   • UPPER GASTROINTESTINAL ENDOSCOPY  01/22/2021   • UPPER GASTROINTESTINAL ENDOSCOPY  06/25/2021     Family History   Problem Relation Age of Onset   • Hypertension Father        Prior to Admission medications    Medication Sig Start Date End Date Taking? Authorizing Provider   albuterol (PROVENTIL) (2.5 MG/3ML) 0.083% nebulizer solution  10/12/21  Yes Patricia Driscoll MD   calcium carbonate-vitamin d 600-400 MG-UNIT per tablet Take 1 tablet by mouth 2 (Two) Times a Day. Per G-Tube   Yes Patricia Driscoll MD   denosumab (Prolia) 60 MG/ML solution prefilled syringe syringe Inject 60 mg under the skin into the appropriate area as directed. Every 6 Months   Yes Patricia Driscoll MD   DIAZEPAM RE Insert 10 mg into the rectum As Needed (For Seizure Activity).   Yes Patricia Driscoll MD   famotidine (PEPCID) 20 MG tablet Take 20 mg by mouth Daily. Per G-Tube   Yes Patricia Driscoll MD   ferrous sulfate 325 (65 FE) MG tablet Take 325 mg by mouth Daily With Breakfast. Per G-Tube   Yes Patricia Driscoll MD   ibuprofen (ADVIL,MOTRIN) 600 MG tablet Take 1 tablet by mouth Every 8 (Eight) Hours As Needed for Moderate Pain . 1/19/22  Yes Sandeep Fernandez MD   ipratropium-albuterol (DUO-NEB) 0.5-2.5 mg/3 ml nebulizer Take 3 mL by nebulization Every 4 (Four) Hours As  Needed for Shortness of Air. 4/15/22  Yes Leander Meade MD   lamoTRIgine (LaMICtal) 200 MG tablet Take 200 mg by mouth 2 (Two) Times a Day. Per G-Tube   Yes Patricia Driscoll MD   lamoTRIgine (LaMICtal) 25 MG tablet Take 25 mg by mouth 2 (Two) Times a Day. Per G-Tube To Equal 225 MG   Yes Patricia Driscoll MD   levETIRAcetam (KEPPRA) 1000 MG tablet Take 1,000 mg by mouth 2 (Two) Times a Day. Per G-Tube   Yes Patricia Driscoll MD   levETIRAcetam (KEPPRA) 750 MG tablet Take 750 mg by mouth 2 (Two) Times a Day. Per G-Tube To Equal 1750 MG   Yes Patricia Driscoll MD   LORazepam (ATIVAN) 1 MG tablet GIVE ONE TABLET PER G-TUBE TWICE DAILY FOR ANXIETY AND AGITATION 2/2/22  Yes Christelle Smith APRN   midodrine (PROAMATINE) 5 MG tablet Take 1 tablet by mouth 3 (Three) Times a Day.  Patient taking differently: Take 10 mg by mouth 3 (Three) Times a Day. Per G-Tube 2/4/19  Yes Earnestine Reid MD   Nutritional Supplements (JEVITY 1.5 JEFFERY PO) Take  by mouth. Gets 390mL bolus every 6 hours 1A-7A-1P-7P   Yes Patricia Driscoll MD   simvastatin (ZOCOR) 20 MG tablet Take 20 mg by mouth Daily. Per G-Tube   Yes Patricia Driscoll MD   sucralfate (CARAFATE) 1 g tablet Take 1 tablet by mouth 4 (Four) Times a Day Before Meals & at Bedtime.  Patient taking differently: Take 1 g by mouth 4 (Four) Times a Day Before Meals & at Bedtime. Per G-Tube 5/19/21  Yes Sandeep Fernandez MD   thioridazine (MELLARIL) 100 MG tablet Take 200 mg by mouth 2 (Two) Times a Day. Per G-Tube   Yes Patricia Driscoll MD     Allergies   Allergen Reactions   • Haldol [Haloperidol] Unknown (See Comments)     Unknown     • Levaquin [Levofloxacin] Other (See Comments)     Lowers seizure threshold     Social History     Socioeconomic History   • Marital status: Single   Tobacco Use   • Smoking status: Never Smoker   • Smokeless tobacco: Never Used   Vaping Use   • Vaping Use: Never used   Substance and Sexual Activity   • Alcohol  "use: Never   • Drug use: Never   • Sexual activity: Defer       Review of Systems  Review of Systems   Unable to perform ROS: Patient nonverbal        /74   Pulse 98   Ht 165.1 cm (65\")   Wt 65.8 kg (145 lb)   BMI 24.13 kg/m²     Objective    Physical Exam  Constitutional:       Appearance: He is well-developed.   HENT:      Head: Normocephalic and atraumatic.   Eyes:      Conjunctiva/sclera: Conjunctivae normal.      Pupils: Pupils are equal, round, and reactive to light.   Neck:      Thyroid: No thyromegaly.   Cardiovascular:      Rate and Rhythm: Normal rate and regular rhythm.      Heart sounds: Normal heart sounds. No murmur heard.  Pulmonary:      Effort: Pulmonary effort is normal.      Breath sounds: Normal breath sounds. No wheezing.   Abdominal:      General: Bowel sounds are normal. There is no distension.      Palpations: Abdomen is soft. There is no mass.      Tenderness: There is no abdominal tenderness.      Hernia: No hernia is present.   Genitourinary:     Comments: No lesions noted  Musculoskeletal:         General: No tenderness. Normal range of motion.      Cervical back: Normal range of motion and neck supple.   Lymphadenopathy:      Cervical: No cervical adenopathy.   Skin:     General: Skin is warm and dry.      Findings: No rash.   Neurological:      Mental Status: He is alert.      Cranial Nerves: No cranial nerve deficit.       Admission on 2022, Discharged on 2022   Component Date Value Ref Range Status   • Reference Lab Report 2022    Final                    Value:[facility]  [address] [place] [address]  Phone: [phone] or [phone]  Fax: [phone]  Nils Manzanares M.D., Medical Director    PATIENT NAME                                     LABORATORY NO.  1800   CORDELIA ONEIL.                              [id number]  [id number]                                 AGE                    SEX   SSN              " "CLIENT REF #  [facility]                   66        1955           [id number]      [id number]    [place]                               REQUESTING M.D.           ATTENDING M.D.         COPY TO.  [address] [place] [address]                         [place] [address]                     ABDOUL  DATE COLLECTED            DATE RECEIVED          DATE REPORTED  06/16/2022 06/17/2022 06/20/2022    DIAGNOSIS:  RECTAL POLYP:  Tubular adenoma, multiple fragments    JBS/pah    CLINICAL HISTORY:  Adenomatous                           polyp of colon, unspecified part of colon    SPECIMENS RECEIVED:  RECTAL POLYP    MICROSCOPIC DESCRIPTION:  Tissue blocks are prepared and slides are examined microscopically on all  specimens. See diagnosis for details.    Professional interpretation rendered by Fitz Tavarez M.D. at [facility], [address] [place] [address] , [place] [address].    GROSS DESCRIPTION:  Specimen is received in 1 formalin filled container labeled \"rectum polyp\" and  consists of multiple portions of tan soft tissue and possible vegetative matter  measuring 1.7 x 1.1 x 0.3 cm in aggregate.  The specimen is filtered and  submitted entirely in 1 cassette.  BW    REVIEWED, DIAGNOSED AND ELECTRONICALLY  SIGNED BY:    Fitz Tavarez M.D.  CPT CODES:  90666       Assessment & Plan    No diagnosis found..   1.  Rectal polyp status post piecemeal polypectomy, repeat flex sig in 3 months for reevaluation.  2.  Oropharyngeal dysphagia, on PEG feeds.  3.  Nausea and vomiting, resolved.  4.  Anemia, improving.  Orders placed during this encounter include:  No orders of the defined types were placed in this encounter.      * Surgery not found *    Review and/or summary of lab tests, radiology, procedures, medications. Review and summary of old records and obtaining of history. The risks and benefits of my recommendations, as well as " "other treatment options were discussed with the patient and his nursing attendant today. Questions were answered.    No orders of the defined types were placed in this encounter.      Follow-up: No follow-ups on file.               Results for orders placed or performed during the hospital encounter of 22   TISSUE EXAM, [facility] ([facility])    Specimen: Large Intestine, Rectum; Polyp   Result Value Ref Range    Reference Lab Report       [facility]  [address] [place] [address]  Phone: [phone] or [phone]  Fax: [phone]  Nils Manzanares M.D., Medical Director    PATIENT NAME                                     LABORATORY NO.  CORDELIA QIU.                              [id number]  [id number]                                 AGE                    SEX   SSN              CLIENT REF #  [facility]                   66        1955           [id number]      [id number]    [place]                               REQUESTING M.D.           ATTENDING M.D.         COPY TO.  [address] [place] [address]                         [address]  DATE COLLECTED            DATE RECEIVED          DATE REPORTED  2022    DIAGNOSIS:  RECTAL POLYP:  Tubular adenoma, multiple fragments    JBS/pah    CLINICAL HISTORY:  Adenomatous  polyp of colon, unspecified part of colon    SPECIMENS RECEIVED:  RECTAL POLYP    MICROSCOPIC DESCRIPTION:  Tissue blocks are prepared and slides are examined microscopically on all  specimens. See diagnosis for details.    Professional interpretation rendered by Fitz Tavarez M.D. at [facility], [address] [place] [address].    GROSS DESCRIPTION:  Specimen is received in 1 formalin filled container labeled \"rectum polyp\" and  consists of multiple portions of tan soft " tissue and possible vegetative matter  measuring 1.7 x 1.1 x 0.3 cm in aggregate.  The specimen is filtered and  submitted entirely in 1 cassette.  BW    REVIEWED, DIAGNOSED AND ELECTRONICALLY  SIGNED BY:    Fitz Tavarez M.D.  CPT CODES:  10719     Results for orders placed or performed during the hospital encounter of 04/11/22   CRE Screen by PCR - Swab, Large Intestine, Rectum    Specimen: Large Intestine, Rectum; Swab   Result Value Ref Range    CRE SCREEN Not Detected Not Detected, Invalid    OXA 48 Strain Not Detected     IMP STRAIN Not Detected     VIM STRAIN Not Detected     NDM Strain Not Detected     KPC Strain Not Detected    COVID-19 and FLU A/B PCR - Swab, Nasopharynx    Specimen: Nasopharynx; Swab   Result Value Ref Range    COVID19 Not Detected Not Detected - Ref. Range    Influenza A PCR Not Detected Not Detected    Influenza B PCR Not Detected Not Detected   CBC Auto Differential    Specimen: Blood   Result Value Ref Range    WBC 6.80 3.40 - 10.80 10*3/mm3    RBC 4.38 4.14 - 5.80 10*6/mm3    Hemoglobin 13.7 13.0 - 17.7 g/dL    Hematocrit 39.3 37.5 - 51.0 %    MCV 89.7 79.0 - 97.0 fL    MCH 31.3 26.6 - 33.0 pg    MCHC 34.9 31.5 - 35.7 g/dL    RDW 13.2 12.3 - 15.4 %    RDW-SD 43.4 37.0 - 54.0 fl    MPV 11.1 6.0 - 12.0 fL    Platelets 197 140 - 450 10*3/mm3    Neutrophil % 70.8 42.7 - 76.0 %    Lymphocyte % 14.6 (L) 19.6 - 45.3 %    Monocyte % 9.4 5.0 - 12.0 %    Eosinophil % 4.4 0.3 - 6.2 %    Basophil % 0.4 0.0 - 1.5 %    Immature Grans % 0.4 0.0 - 0.5 %    Neutrophils, Absolute 4.81 1.70 - 7.00 10*3/mm3    Lymphocytes, Absolute 0.99 0.70 - 3.10 10*3/mm3    Monocytes, Absolute 0.64 0.10 - 0.90 10*3/mm3    Eosinophils, Absolute 0.30 0.00 - 0.40 10*3/mm3    Basophils, Absolute 0.03 0.00 - 0.20 10*3/mm3    Immature Grans, Absolute 0.03 0.00 - 0.05 10*3/mm3    nRBC 0.0 0.0 - 0.2 /100 WBC   CBC Auto Differential    Specimen: Blood   Result Value Ref Range    WBC 6.71 3.40 - 10.80 10*3/mm3    RBC 4.35  4.14 - 5.80 10*6/mm3    Hemoglobin 13.8 13.0 - 17.7 g/dL    Hematocrit 40.1 37.5 - 51.0 %    MCV 92.2 79.0 - 97.0 fL    MCH 31.7 26.6 - 33.0 pg    MCHC 34.4 31.5 - 35.7 g/dL    RDW 13.3 12.3 - 15.4 %    RDW-SD 45.6 37.0 - 54.0 fl    MPV 12.2 (H) 6.0 - 12.0 fL    Platelets 166 140 - 450 10*3/mm3    Neutrophil % 65.2 42.7 - 76.0 %    Lymphocyte % 18.0 (L) 19.6 - 45.3 %    Monocyte % 9.8 5.0 - 12.0 %    Eosinophil % 6.3 (H) 0.3 - 6.2 %    Basophil % 0.4 0.0 - 1.5 %    Immature Grans % 0.3 0.0 - 0.5 %    Neutrophils, Absolute 4.37 1.70 - 7.00 10*3/mm3    Lymphocytes, Absolute 1.21 0.70 - 3.10 10*3/mm3    Monocytes, Absolute 0.66 0.10 - 0.90 10*3/mm3    Eosinophils, Absolute 0.42 (H) 0.00 - 0.40 10*3/mm3    Basophils, Absolute 0.03 0.00 - 0.20 10*3/mm3    Immature Grans, Absolute 0.02 0.00 - 0.05 10*3/mm3    nRBC 0.0 0.0 - 0.2 /100 WBC   CBC Auto Differential    Specimen: Blood   Result Value Ref Range    WBC 6.41 3.40 - 10.80 10*3/mm3    RBC 3.91 (L) 4.14 - 5.80 10*6/mm3    Hemoglobin 12.5 (L) 13.0 - 17.7 g/dL    Hematocrit 36.3 (L) 37.5 - 51.0 %    MCV 92.8 79.0 - 97.0 fL    MCH 32.0 26.6 - 33.0 pg    MCHC 34.4 31.5 - 35.7 g/dL    RDW 13.7 12.3 - 15.4 %    RDW-SD 46.5 37.0 - 54.0 fl    MPV 12.1 (H) 6.0 - 12.0 fL    Platelets 140 140 - 450 10*3/mm3    Neutrophil % 74.0 42.7 - 76.0 %    Lymphocyte % 10.6 (L) 19.6 - 45.3 %    Monocyte % 9.4 5.0 - 12.0 %    Eosinophil % 5.6 0.3 - 6.2 %    Basophil % 0.2 0.0 - 1.5 %    Immature Grans % 0.2 0.0 - 0.5 %    Neutrophils, Absolute 4.75 1.70 - 7.00 10*3/mm3    Lymphocytes, Absolute 0.68 (L) 0.70 - 3.10 10*3/mm3    Monocytes, Absolute 0.60 0.10 - 0.90 10*3/mm3    Eosinophils, Absolute 0.36 0.00 - 0.40 10*3/mm3    Basophils, Absolute 0.01 0.00 - 0.20 10*3/mm3    Immature Grans, Absolute 0.01 0.00 - 0.05 10*3/mm3    nRBC 0.0 0.0 - 0.2 /100 WBC   CBC Auto Differential    Specimen: Blood   Result Value Ref Range    WBC 8.53 3.40 - 10.80 10*3/mm3    RBC 3.98 (L) 4.14 - 5.80 10*6/mm3     Hemoglobin 12.7 (L) 13.0 - 17.7 g/dL    Hematocrit 36.9 (L) 37.5 - 51.0 %    MCV 92.7 79.0 - 97.0 fL    MCH 31.9 26.6 - 33.0 pg    MCHC 34.4 31.5 - 35.7 g/dL    RDW 13.9 12.3 - 15.4 %    RDW-SD 47.0 37.0 - 54.0 fl    MPV 11.7 6.0 - 12.0 fL    Platelets 146 140 - 450 10*3/mm3    Neutrophil % 88.9 (H) 42.7 - 76.0 %    Lymphocyte % 4.8 (L) 19.6 - 45.3 %    Monocyte % 5.0 5.0 - 12.0 %    Eosinophil % 0.7 0.3 - 6.2 %    Basophil % 0.2 0.0 - 1.5 %    Immature Grans % 0.4 0.0 - 0.5 %    Neutrophils, Absolute 7.58 (H) 1.70 - 7.00 10*3/mm3    Lymphocytes, Absolute 0.41 (L) 0.70 - 3.10 10*3/mm3    Monocytes, Absolute 0.43 0.10 - 0.90 10*3/mm3    Eosinophils, Absolute 0.06 0.00 - 0.40 10*3/mm3    Basophils, Absolute 0.02 0.00 - 0.20 10*3/mm3    Immature Grans, Absolute 0.03 0.00 - 0.05 10*3/mm3    nRBC 0.0 0.0 - 0.2 /100 WBC     *Note: Due to a large number of results and/or encounters for the requested time period, some results have not been displayed. A complete set of results can be found in Results Review.         This document has been electronically signed by Wanda Lang MD on July 11, 2022 08:07 CDT

## 2022-08-27 NOTE — THERAPY DISCHARGE NOTE
Acute Care - Physical Therapy Discharge Summary  [facility]       Patient Name: Bautista Grubbs  : 1955  MRN: [id number]    Today's Date: 2018  Onset of Illness/Injury or Date of Surgery: 18       Referring Physician: Dr. EDUARD Clements       Admit Date: 2018      PT Recommendation and Plan    Visit Dx:    ICD-10-CM ICD-9-CM   1. Syncope, unspecified syncope type R55 780.2   2. Pneumonia of right lower lobe due to infectious organism (CMS/HCC) J18.1 486   3. Dysphagia, unspecified type R13.10 787.20   4. Impaired functional mobility, balance, gait, and endurance Z74.09 V49.89   5. Impaired mobility and ADLs Z74.09 799.89       Outcome Measures     Row Name 12/10/18 1244 12/10/18 1200 18 1200       How much help from another person do you currently need...    Turning from your back to your side while in flat bed without using bedrails?  --  2  -TA  2  -GB    Moving from lying on back to sitting on the side of a flat bed without bedrails?  --  2  -TA  2  -GB    Moving to and from a bed to a chair (including a wheelchair)?  --  1  -TA  1  -GB    Standing up from a chair using your arms (e.g., wheelchair, bedside chair)?  --  1  -TA  1  -GB    Climbing 3-5 steps with a railing?  --  1  -TA  1  -GB    To walk in hospital room?  --  1  -TA  1  -GB    AM-PAC 6 Clicks Score  --  8  -TA  8  -GB       How much help from another is currently needed...    Putting on and taking off regular lower body clothing?  2  -MH  --  --    Bathing (including washing, rinsing, and drying)  1  -MH  --  --    Toileting (which includes using toilet bed pan or urinal)  1  -MH  --  --    Putting on and taking off regular upper body clothing  2  -MH  --  --    Taking care of personal grooming (such as brushing teeth)  2  -MH  --  --    Eating meals  4  -MH  --  --    Score  12  -  --  --       Functional Assessment    Outcome Measure Options  AM-PAC 6 Clicks Daily Activity (OT)  -  AM-PAC 6 Clicks Basic  Refill Routing Note   Medication(s) are not appropriate for processing by [facility] for the following reason(s):      - Required laboratory values are outdated    [facility] action(s):  Defer Medication-related problems identified:   Requires labs  Requires appointment        Medication reconciliation completed: No     Appointments  past 12m or future 3m with PCP    Date Provider   Last Visit   7/23/2021 Oliva Campoverde MD   Next Visit   9/14/2022 Oliva Campoverde MD   ED visits in past 90 days: 0        Note composed:10:50 AM 08/27/2022            Mobility (PT)  -ROMI  AM-PAC 6 Clicks Basic Mobility (PT)  -GUILLERMINA      User Key  (r) = Recorded By, (t) = Taken By, (c) = Cosigned By    Initials Name Provider Type    Kati Shah, PT Physical Therapist    Liza Pan, PTA Physical Therapy Assistant    Zeeshan Loya Occupational Therapist            Therapy Suggested Charges     Code   Minutes Charges    None                     PT Discharge Summary  Anticipated Discharge Disposition (PT): home with home health, home with 24/7 care, anticipate therapy at next level of care  Reason for Discharge: Discharge from facility, Per MD order  Outcomes Achieved: Unable to make functional progress toward goals at this time, Refer to plan of care for updates on goals achieved  Discharge Destination: Home      Edelmiraantonia Schaffertheodore, PT   12/11/2018

## 2022-09-26 ENCOUNTER — OFFICE VISIT (OUTPATIENT)
Dept: GASTROENTEROLOGY | Facility: CLINIC | Age: 67
End: 2022-09-26

## 2022-09-26 VITALS
DIASTOLIC BLOOD PRESSURE: 73 MMHG | WEIGHT: 151 LBS | BODY MASS INDEX: 25.16 KG/M2 | HEIGHT: 65 IN | HEART RATE: 86 BPM | SYSTOLIC BLOOD PRESSURE: 101 MMHG

## 2022-09-26 DIAGNOSIS — D12.6 ADENOMATOUS POLYP OF COLON, UNSPECIFIED PART OF COLON: Primary | ICD-10-CM

## 2022-09-26 PROCEDURE — 99213 OFFICE O/P EST LOW 20 MIN: CPT | Performed by: INTERNAL MEDICINE

## 2022-09-26 RX ORDER — DEXTROSE AND SODIUM CHLORIDE 5; .45 G/100ML; G/100ML
30 INJECTION, SOLUTION INTRAVENOUS CONTINUOUS PRN
Status: CANCELLED | OUTPATIENT
Start: 2022-10-14

## 2022-09-26 RX ORDER — TRIAMCINOLONE ACETONIDE 1 MG/G
CREAM TOPICAL
COMMUNITY
Start: 2022-08-25

## 2022-09-26 RX ORDER — BETAMETHASONE DIPROPIONATE 0.05 %
OINTMENT (GRAM) TOPICAL
COMMUNITY
Start: 2022-09-21

## 2022-10-14 ENCOUNTER — ANESTHESIA (OUTPATIENT)
Dept: GASTROENTEROLOGY | Facility: HOSPITAL | Age: 67
End: 2022-10-14

## 2022-10-14 ENCOUNTER — ANESTHESIA EVENT (OUTPATIENT)
Dept: GASTROENTEROLOGY | Facility: HOSPITAL | Age: 67
End: 2022-10-14

## 2022-10-14 ENCOUNTER — HOSPITAL ENCOUNTER (OUTPATIENT)
Facility: HOSPITAL | Age: 67
Setting detail: HOSPITAL OUTPATIENT SURGERY
Discharge: HOME | End: 2022-10-14
Attending: INTERNAL MEDICINE | Admitting: INTERNAL MEDICINE

## 2022-10-14 VITALS
DIASTOLIC BLOOD PRESSURE: 65 MMHG | RESPIRATION RATE: 18 BRPM | OXYGEN SATURATION: 95 % | HEIGHT: 65 IN | SYSTOLIC BLOOD PRESSURE: 94 MMHG | BODY MASS INDEX: 25.16 KG/M2 | TEMPERATURE: 97 F | HEART RATE: 81 BPM | WEIGHT: 151.01 LBS

## 2022-10-14 DIAGNOSIS — D12.6 ADENOMATOUS POLYP OF COLON, UNSPECIFIED PART OF COLON: ICD-10-CM

## 2022-10-14 PROCEDURE — 45385 COLONOSCOPY W/LESION REMOVAL: CPT | Performed by: INTERNAL MEDICINE

## 2022-10-14 PROCEDURE — 25010000002 PROPOFOL 10 MG/ML EMULSION: Performed by: NURSE ANESTHETIST, CERTIFIED REGISTERED

## 2022-10-14 PROCEDURE — 45380 COLONOSCOPY AND BIOPSY: CPT | Performed by: INTERNAL MEDICINE

## 2022-10-14 PROCEDURE — 88305 TISSUE EXAM BY PATHOLOGIST: CPT

## 2022-10-14 RX ORDER — PROPOFOL 10 MG/ML
VIAL (ML) INTRAVENOUS AS NEEDED
Status: DISCONTINUED | OUTPATIENT
Start: 2022-10-14 | End: 2022-10-14 | Stop reason: SURG

## 2022-10-14 RX ORDER — DEXTROSE AND SODIUM CHLORIDE 5; .45 G/100ML; G/100ML
30 INJECTION, SOLUTION INTRAVENOUS CONTINUOUS PRN
Status: DISCONTINUED | OUTPATIENT
Start: 2022-10-14 | End: 2022-10-14 | Stop reason: HOSPADM

## 2022-10-14 RX ADMIN — PROPOFOL 30 MG: 10 INJECTION, EMULSION INTRAVENOUS at 16:21

## 2022-10-14 RX ADMIN — DEXTROSE AND SODIUM CHLORIDE 30 ML/HR: 5; 450 INJECTION, SOLUTION INTRAVENOUS at 14:40

## 2022-10-14 RX ADMIN — PROPOFOL 30 MG: 10 INJECTION, EMULSION INTRAVENOUS at 16:32

## 2022-10-14 RX ADMIN — PROPOFOL 30 MG: 10 INJECTION, EMULSION INTRAVENOUS at 16:24

## 2022-10-14 RX ADMIN — PROPOFOL 30 MG: 10 INJECTION, EMULSION INTRAVENOUS at 16:18

## 2022-10-14 RX ADMIN — PROPOFOL 50 MG: 10 INJECTION, EMULSION INTRAVENOUS at 16:13

## 2022-10-14 RX ADMIN — PROPOFOL 30 MG: 10 INJECTION, EMULSION INTRAVENOUS at 16:15

## 2022-10-14 RX ADMIN — PROPOFOL 30 MG: 10 INJECTION, EMULSION INTRAVENOUS at 16:28

## 2022-10-14 NOTE — PERIOPERATIVE NURSING NOTE
Spoke with guardian regarding procedure , anesthesia risks and benefits. Stated understanding yet relates the MD had not spoke with him. Assured that Dr Lang would speak with him asap. Dr Lang notified and pt/guardian/phone number provided to her

## 2022-10-14 NOTE — ANESTHESIA POSTPROCEDURE EVALUATION
Patient: Bautista Grubbs    Procedure Summary     Date: 10/14/22 Room / Location: [facility] ENDOSCOPY 1 / [facility] ENDOSCOPY    Anesthesia Start: 1556 Anesthesia Stop: 1641    Procedure: COLONOSCOPY Diagnosis:       Adenomatous polyp of colon, unspecified part of colon      (Adenomatous polyp of colon, unspecified part of colon [D12.6])    Surgeons: Wanda Lang MD Provider: Xavier Quinonez CRNA    Anesthesia Type: general ASA Status: 3          Anesthesia Type: general    Vitals  No vitals data found for the desired time range.          Post Anesthesia Care and Evaluation    Patient location during evaluation: bedside  Patient participation: complete - patient cannot participate  Level of consciousness: responsive to light touch  Anesthetic complications: No anesthetic complications  PONV Status: none  Cardiovascular status: hemodynamically stable  Respiratory status: spontaneous ventilation    Comments: 114/72  58  98  12  98

## 2022-10-14 NOTE — ANESTHESIA PREPROCEDURE EVALUATION
Anesthesia Evaluation     Patient summary reviewed and Nursing notes reviewed   NPO Solid Status: > 8 hours  NPO Liquid Status: > 4 hours           Airway   No difficulty expected  Comment: Does not cooperate with airway exam  Dental      Pulmonary - normal exam   (+) COPD,   (-) pneumonia  Cardiovascular     Rhythm: regular  Rate: normal    (+) hypertension well controlled, hyperlipidemia,   (-) dysrhythmias      Neuro/Psych  (+) seizures, syncope, psychiatric history Bipolar,      ROS Comment: autism  GI/Hepatic/Renal/Endo    (+)  GERD, GI bleeding lower ,     Musculoskeletal     Abdominal  - normal exam   Substance History - negative use     OB/GYN negative ob/gyn ROS         Other   arthritis,                        Anesthesia Plan    ASA 3     general   total IV anesthesia  intravenous induction     Anesthetic plan, risks, benefits, and alternatives have been provided, discussed and informed consent has been obtained with: child.    Plan discussed with CRNA.

## 2022-10-14 NOTE — H&P
No chief complaint on file.      Subjective    Bautista Grubbs is a 66 y.o. male.    History of Present Illness  Patient presented to the GI clinic for follow-up visit today.  Remains nonverbal nonverbal.  No GI complaints per nursing attendant.  Sigmoidoscopy was consistent with rectal polyp status post piecemeal resection and APC cauterization.  No further rectal bleeding, nausea or vomiting.  Tolerating PEG tube feeds well.       The following portions of the patient's history were reviewed and updated as appropriate:   Past Medical History:   Diagnosis Date   • Alopecia    • Arthritis    • Autism    • COPD (chronic obstructive pulmonary disease) (HCC)    • GERD (gastroesophageal reflux disease)    • Hyperlipidemia    • Hypertension    • Insomnia    • Intellectual disability    • Lennox-Gastaut syndrome (HCC)    • Major depressive disorder    • Orthostatic hypotension    • Osteoporosis    • Right bundle branch block    • Scoliosis    • Seizures (HCC)      Past Surgical History:   Procedure Laterality Date   • COLONOSCOPY N/A 1/8/2021    Procedure: COLONOSCOPY;  Surgeon: Wanda Lang MD;  Location: [facility] ENDOSCOPY;  Service: Gastroenterology;  Laterality: N/A;   • COLONOSCOPY N/A 9/2/2021    Procedure: COLONOSCOPY;  Surgeon: Wanda Lang MD;  Location: [facility] ENDOSCOPY;  Service: Gastroenterology;  Laterality: N/A;   • ENDOSCOPY N/A 1/7/2021    Procedure: ESOPHAGOGASTRODUODENOSCOPY;  Surgeon: Wanda Lang MD;  Location: [facility] ENDOSCOPY;  Service: Gastroenterology;  Laterality: N/A;   • ENDOSCOPY N/A 1/22/2021    Procedure: ESOPHAGOGASTRODUODENOSCOPY;  Surgeon: Wanda Lang MD;  Location: [facility] ENDOSCOPY;  Service: Gastroenterology;  Laterality: N/A;   • ENDOSCOPY W/ PEG TUBE PLACEMENT N/A 6/25/2021    Procedure: ESOPHAGOGASTRODUODENOSCOPY WITH PERCUTANEOUS ENDOSCOPIC GASTROSTOMY TUBE INSERTION WITH ANESTHESIA;  Surgeon: Wanda Lang MD;  Location: [facility] ENDOSCOPY;  Service:  Gastroenterology;  Laterality: N/A;   • SIGMOIDOSCOPY N/A 10/11/2021    Procedure: SIGMOIDOSCOPY FLEXIBLE;  Surgeon: Wanda Lang MD;  Location: [facility] ENDOSCOPY;  Service: Gastroenterology;  Laterality: N/A;   • SIGMOIDOSCOPY N/A 11/30/2021    Procedure: SIGMOIDOSCOPY FLEXIBLE;  Surgeon: Wanda Lang MD;  Location: [facility] ENDOSCOPY;  Service: Gastroenterology;  Laterality: N/A;   • SIGMOIDOSCOPY N/A 6/16/2022    Procedure: SIGMOIDOSCOPY FLEXIBLE;  Surgeon: Wanda Lang MD;  Location: [facility] ENDOSCOPY;  Service: Gastroenterology;  Laterality: N/A;  argon at rectal polypectomy site   • UPPER GASTROINTESTINAL ENDOSCOPY  01/22/2021   • UPPER GASTROINTESTINAL ENDOSCOPY  06/25/2021     Family History   Problem Relation Age of Onset   • Hypertension Father        Prior to Admission medications    Medication Sig Start Date End Date Taking? Authorizing Provider   albuterol (PROVENTIL) (2.5 MG/3ML) 0.083% nebulizer solution  10/12/21  Yes Patricia Driscoll MD   calcium carbonate-vitamin d 600-400 MG-UNIT per tablet Take 1 tablet by mouth 2 (Two) Times a Day. Per G-Tube   Yes Patricia Driscoll MD   denosumab (Prolia) 60 MG/ML solution prefilled syringe syringe Inject 60 mg under the skin into the appropriate area as directed. Every 6 Months   Yes Patricia Driscoll MD   DIAZEPAM RE Insert 10 mg into the rectum As Needed (For Seizure Activity).   Yes Patricia Driscoll MD   famotidine (PEPCID) 20 MG tablet Take 20 mg by mouth Daily. Per G-Tube   Yes Patricia Driscoll MD   ferrous sulfate 325 (65 FE) MG tablet Take 325 mg by mouth Daily With Breakfast. Per G-Tube   Yes Patricia Driscoll MD   ibuprofen (ADVIL,MOTRIN) 600 MG tablet Take 1 tablet by mouth Every 8 (Eight) Hours As Needed for Moderate Pain . 1/19/22  Yes Sandeep Fernandez MD   ipratropium-albuterol (DUO-NEB) 0.5-2.5 mg/3 ml nebulizer Take 3 mL by nebulization Every 4 (Four) Hours As Needed for Shortness of Air. 4/15/22  Yes  Leander Meade MD   lamoTRIgine (LaMICtal) 200 MG tablet Take 200 mg by mouth 2 (Two) Times a Day. Per G-Tube   Yes Patricia Driscoll MD   lamoTRIgine (LaMICtal) 25 MG tablet Take 25 mg by mouth 2 (Two) Times a Day. Per G-Tube To Equal 225 MG   Yes Patricia Driscoll MD   levETIRAcetam (KEPPRA) 1000 MG tablet Take 1,000 mg by mouth 2 (Two) Times a Day. Per G-Tube   Yes Patricia Driscoll MD   levETIRAcetam (KEPPRA) 750 MG tablet Take 750 mg by mouth 2 (Two) Times a Day. Per G-Tube To Equal 1750 MG   Yes Patricia Driscoll MD   LORazepam (ATIVAN) 1 MG tablet GIVE ONE TABLET PER G-TUBE TWICE DAILY FOR ANXIETY AND AGITATION 2/2/22  Yes Christelle Smith APRN   midodrine (PROAMATINE) 5 MG tablet Take 1 tablet by mouth 3 (Three) Times a Day.  Patient taking differently: Take 10 mg by mouth 3 (Three) Times a Day. Per G-Tube 2/4/19  Yes Earnestine Reid MD   Nutritional Supplements (JEVITY 1.5 JEFFERY PO) Take  by mouth. Gets 390mL bolus every 6 hours 1A-7A-1P-7P   Yes Patricia Driscoll MD   simvastatin (ZOCOR) 20 MG tablet Take 20 mg by mouth Daily. Per G-Tube   Yes Patricia Driscoll MD   sucralfate (CARAFATE) 1 g tablet Take 1 tablet by mouth 4 (Four) Times a Day Before Meals & at Bedtime.  Patient taking differently: Take 1 g by mouth 4 (Four) Times a Day Before Meals & at Bedtime. Per G-Tube 5/19/21  Yes Sandeep Fernandez MD   thioridazine (MELLARIL) 100 MG tablet Take 200 mg by mouth 2 (Two) Times a Day. Per G-Tube   Yes Patricia Driscoll MD     Allergies   Allergen Reactions   • Haldol [Haloperidol] Unknown (See Comments)     Unknown     • Levaquin [Levofloxacin] Other (See Comments)     Lowers seizure threshold     Social History     Socioeconomic History   • Marital status: Single   Tobacco Use   • Smoking status: Never   • Smokeless tobacco: Never   Vaping Use   • Vaping Use: Never used   Substance and Sexual Activity   • Alcohol use: Never   • Drug use: Never   • Sexual activity: Defer  "      Review of Systems  Review of Systems   Unable to perform ROS: Patient nonverbal        /81   Pulse 101   Temp 97.3 °F (36.3 °C) (Tympanic)   Resp 20   Ht 165.1 cm (65\")   Wt 68.5 kg (151 lb 0.2 oz)   SpO2 94%   BMI 25.13 kg/m²     Objective    Physical Exam  Constitutional:       Appearance: He is well-developed.   HENT:      Head: Normocephalic and atraumatic.   Eyes:      Conjunctiva/sclera: Conjunctivae normal.      Pupils: Pupils are equal, round, and reactive to light.   Neck:      Thyroid: No thyromegaly.   Cardiovascular:      Rate and Rhythm: Normal rate and regular rhythm.      Heart sounds: Normal heart sounds. No murmur heard.  Pulmonary:      Effort: Pulmonary effort is normal.      Breath sounds: Normal breath sounds. No wheezing.   Abdominal:      General: Bowel sounds are normal. There is no distension.      Palpations: Abdomen is soft. There is no mass.      Tenderness: There is no abdominal tenderness.      Hernia: No hernia is present.   Genitourinary:     Comments: No lesions noted  Musculoskeletal:         General: No tenderness. Normal range of motion.      Cervical back: Normal range of motion and neck supple.   Lymphadenopathy:      Cervical: No cervical adenopathy.   Skin:     General: Skin is warm and dry.      Findings: No rash.   Neurological:      Mental Status: He is alert.      Cranial Nerves: No cranial nerve deficit.       Admission on 2022, Discharged on 2022   Component Date Value Ref Range Status   • Reference Lab Report 2022    Final                    Value:[facility]  [address] [place] [address]  Phone: [phone] or [phone]  Fax: [phone]  Nils Manzanares M.D., Medical Director    PATIENT NAME                                     LABORATORY NO.  CORDELIA QIU.                              [id number]  [id number]                                 AGE                    SEX   SSN    " "          CLIENT REF #  [facility]                   66        1955           [id number]      [id number]    [place]                               REQUESTING M.D.           ATTENDING M.D.         COPY TO.  [address] [place] [address]                         [place] [address]                     ABDOUL  DATE COLLECTED            DATE RECEIVED          DATE REPORTED  06/16/2022 06/17/2022 06/20/2022    DIAGNOSIS:  RECTAL POLYP:  Tubular adenoma, multiple fragments    JBS/pah    CLINICAL HISTORY:  Adenomatous                           polyp of colon, unspecified part of colon    SPECIMENS RECEIVED:  RECTAL POLYP    MICROSCOPIC DESCRIPTION:  Tissue blocks are prepared and slides are examined microscopically on all  specimens. See diagnosis for details.    Professional interpretation rendered by Fitz Tavarez M.D. at [employer], [address] [place] [id number].    GROSS DESCRIPTION:  Specimen is received in 1 formalin filled container labeled \"rectum polyp\" and  consists of multiple portions of tan soft tissue and possible vegetative matter  measuring 1.7 x 1.1 x 0.3 cm in aggregate.  The specimen is filtered and  submitted entirely in 1 cassette.  BW    REVIEWED, DIAGNOSED AND ELECTRONICALLY  SIGNED BY:    Fitz Tavarez M.D.  CPT CODES:  46165       Assessment & Plan      1. Adenomatous polyp of colon, unspecified part of colon    .   1.  Rectal polyp status post piecemeal polypectomy, repeat flex sig in 3 months for reevaluation.  2.  Oropharyngeal dysphagia, on PEG feeds.  3.  Nausea and vomiting, resolved.  4.  Anemia, improving.  Orders placed during this encounter include:  Orders Placed This Encounter   Procedures   • Implement Anesthesia Orders Day of Procedure     Standing Status:   Standing     Number of Occurrences:   1   • Obtain Informed Consent     Standing Status:   Standing     Number of " Occurrences:   1     Order Specific Question:   Informed Consent Given For     Answer:   colonoscopy   • POC Glucose Once     Prior to Procedure on ALL Diabetic Patients     Standing Status:   Standing     Number of Occurrences:   1     Order Specific Question:   Release to patient     Answer:   Routine Release   • Insert Peripheral IV     Standing Status:   Standing     Number of Occurrences:   1       COLONOSCOPY (N/A)    Review and/or summary of lab tests, radiology, procedures, medications. Review and summary of old records and obtaining of history. The risks and benefits of my recommendations, as well as other treatment options were discussed with the patient and his nursing attendant today. Questions were answered.    New Medications Ordered This Visit   Medications   • dextrose 5 % and sodium chloride 0.45 % infusion       Follow-up: No follow-ups on file.               Results for orders placed or performed during the hospital encounter of 22   TISSUE EXAM, [facility] ([facility]ENIFER,COR,[facility])    Specimen: Large Intestine, Rectum; Polyp   Result Value Ref Range    Reference Lab Report       [facility]  [address] [place] [address]  Phone: [phone] or [phone]  Fax: [phone]  Nils Manzanares M.D., Medical Director    PATIENT NAME                                     LABORATORY NO.  1800   CORDELIA ONEIL.                              [id number]  [id number]                                 AGE                    SEX   SSN              CLIENT REF #  [facility]                   66        1955      [place]     [id number]      [id number]    [place]                               REQUESTING M.D.           ATTENDING M.D.         COPY TO[address]                         [place] [address]                     [facility]  DATE COLLECTED            DATE RECEIVED          DATE REPORTED  2022     "            06/17/2022 06/20/2022    DIAGNOSIS:  RECTAL POLYP:  Tubular adenoma, multiple fragments    JBS/pah    CLINICAL HISTORY:  Adenomatous  polyp of colon, unspecified part of colon    SPECIMENS RECEIVED:  RECTAL POLYP    MICROSCOPIC DESCRIPTION:  Tissue blocks are prepared and slides are examined microscopically on all  specimens. See diagnosis for details.    Professional interpretation rendered by Fitz Tavarez M.D. at [facility],  [facility], [address] [place] [address].    GROSS DESCRIPTION:  Specimen is received in 1 formalin filled container labeled \"rectum polyp\" and  consists of multiple portions of tan soft tissue and possible vegetative matter  measuring 1.7 x 1.1 x 0.3 cm in aggregate.  The specimen is filtered and  submitted entirely in 1 cassette.  BW    REVIEWED, DIAGNOSED AND ELECTRONICALLY  SIGNED BY:    Fitz Tavarez M.D.  CPT CODES:  34934     Results for orders placed or performed during the hospital encounter of 04/11/22   CRE Screen by PCR - Swab, Large Intestine, Rectum    Specimen: Large Intestine, Rectum; Swab   Result Value Ref Range    CRE SCREEN Not Detected Not Detected, Invalid    OXA 48 Strain Not Detected     IMP STRAIN Not Detected     VIM STRAIN Not Detected     NDM Strain Not Detected     KPC Strain Not Detected    COVID-19 and FLU A/B PCR - Swab, Nasopharynx    Specimen: Nasopharynx; Swab   Result Value Ref Range    COVID19 Not Detected Not Detected - Ref. Range    Influenza A PCR Not Detected Not Detected    Influenza B PCR Not Detected Not Detected   CBC Auto Differential    Specimen: Blood   Result Value Ref Range    WBC 6.80 3.40 - 10.80 10*3/mm3    RBC 4.38 4.14 - 5.80 10*6/mm3    Hemoglobin 13.7 13.0 - 17.7 g/dL    Hematocrit 39.3 37.5 - 51.0 %    MCV 89.7 79.0 - 97.0 fL    MCH 31.3 26.6 - 33.0 pg    MCHC 34.9 31.5 - 35.7 g/dL    RDW 13.2 12.3 - 15.4 %    RDW-SD 43.4 37.0 - 54.0 fl    MPV 11.1 6.0 - 12.0 fL    Platelets 197 140 - 450 10*3/mm3 "    Neutrophil % 70.8 42.7 - 76.0 %    Lymphocyte % 14.6 (L) 19.6 - 45.3 %    Monocyte % 9.4 5.0 - 12.0 %    Eosinophil % 4.4 0.3 - 6.2 %    Basophil % 0.4 0.0 - 1.5 %    Immature Grans % 0.4 0.0 - 0.5 %    Neutrophils, Absolute 4.81 1.70 - 7.00 10*3/mm3    Lymphocytes, Absolute 0.99 0.70 - 3.10 10*3/mm3    Monocytes, Absolute 0.64 0.10 - 0.90 10*3/mm3    Eosinophils, Absolute 0.30 0.00 - 0.40 10*3/mm3    Basophils, Absolute 0.03 0.00 - 0.20 10*3/mm3    Immature Grans, Absolute 0.03 0.00 - 0.05 10*3/mm3    nRBC 0.0 0.0 - 0.2 /100 WBC   CBC Auto Differential    Specimen: Blood   Result Value Ref Range    WBC 6.71 3.40 - 10.80 10*3/mm3    RBC 4.35 4.14 - 5.80 10*6/mm3    Hemoglobin 13.8 13.0 - 17.7 g/dL    Hematocrit 40.1 37.5 - 51.0 %    MCV 92.2 79.0 - 97.0 fL    MCH 31.7 26.6 - 33.0 pg    MCHC 34.4 31.5 - 35.7 g/dL    RDW 13.3 12.3 - 15.4 %    RDW-SD 45.6 37.0 - 54.0 fl    MPV 12.2 (H) 6.0 - 12.0 fL    Platelets 166 140 - 450 10*3/mm3    Neutrophil % 65.2 42.7 - 76.0 %    Lymphocyte % 18.0 (L) 19.6 - 45.3 %    Monocyte % 9.8 5.0 - 12.0 %    Eosinophil % 6.3 (H) 0.3 - 6.2 %    Basophil % 0.4 0.0 - 1.5 %    Immature Grans % 0.3 0.0 - 0.5 %    Neutrophils, Absolute 4.37 1.70 - 7.00 10*3/mm3    Lymphocytes, Absolute 1.21 0.70 - 3.10 10*3/mm3    Monocytes, Absolute 0.66 0.10 - 0.90 10*3/mm3    Eosinophils, Absolute 0.42 (H) 0.00 - 0.40 10*3/mm3    Basophils, Absolute 0.03 0.00 - 0.20 10*3/mm3    Immature Grans, Absolute 0.02 0.00 - 0.05 10*3/mm3    nRBC 0.0 0.0 - 0.2 /100 WBC   CBC Auto Differential    Specimen: Blood   Result Value Ref Range    WBC 6.41 3.40 - 10.80 10*3/mm3    RBC 3.91 (L) 4.14 - 5.80 10*6/mm3    Hemoglobin 12.5 (L) 13.0 - 17.7 g/dL    Hematocrit 36.3 (L) 37.5 - 51.0 %    MCV 92.8 79.0 - 97.0 fL    MCH 32.0 26.6 - 33.0 pg    MCHC 34.4 31.5 - 35.7 g/dL    RDW 13.7 12.3 - 15.4 %    RDW-SD 46.5 37.0 - 54.0 fl    MPV 12.1 (H) 6.0 - 12.0 fL    Platelets 140 140 - 450 10*3/mm3    Neutrophil % 74.0 42.7 -  76.0 %    Lymphocyte % 10.6 (L) 19.6 - 45.3 %    Monocyte % 9.4 5.0 - 12.0 %    Eosinophil % 5.6 0.3 - 6.2 %    Basophil % 0.2 0.0 - 1.5 %    Immature Grans % 0.2 0.0 - 0.5 %    Neutrophils, Absolute 4.75 1.70 - 7.00 10*3/mm3    Lymphocytes, Absolute 0.68 (L) 0.70 - 3.10 10*3/mm3    Monocytes, Absolute 0.60 0.10 - 0.90 10*3/mm3    Eosinophils, Absolute 0.36 0.00 - 0.40 10*3/mm3    Basophils, Absolute 0.01 0.00 - 0.20 10*3/mm3    Immature Grans, Absolute 0.01 0.00 - 0.05 10*3/mm3    nRBC 0.0 0.0 - 0.2 /100 WBC   CBC Auto Differential    Specimen: Blood   Result Value Ref Range    WBC 8.53 3.40 - 10.80 10*3/mm3    RBC 3.98 (L) 4.14 - 5.80 10*6/mm3    Hemoglobin 12.7 (L) 13.0 - 17.7 g/dL    Hematocrit 36.9 (L) 37.5 - 51.0 %    MCV 92.7 79.0 - 97.0 fL    MCH 31.9 26.6 - 33.0 pg    MCHC 34.4 31.5 - 35.7 g/dL    RDW 13.9 12.3 - 15.4 %    RDW-SD 47.0 37.0 - 54.0 fl    MPV 11.7 6.0 - 12.0 fL    Platelets 146 140 - 450 10*3/mm3    Neutrophil % 88.9 (H) 42.7 - 76.0 %    Lymphocyte % 4.8 (L) 19.6 - 45.3 %    Monocyte % 5.0 5.0 - 12.0 %    Eosinophil % 0.7 0.3 - 6.2 %    Basophil % 0.2 0.0 - 1.5 %    Immature Grans % 0.4 0.0 - 0.5 %    Neutrophils, Absolute 7.58 (H) 1.70 - 7.00 10*3/mm3    Lymphocytes, Absolute 0.41 (L) 0.70 - 3.10 10*3/mm3    Monocytes, Absolute 0.43 0.10 - 0.90 10*3/mm3    Eosinophils, Absolute 0.06 0.00 - 0.40 10*3/mm3    Basophils, Absolute 0.02 0.00 - 0.20 10*3/mm3    Immature Grans, Absolute 0.03 0.00 - 0.05 10*3/mm3    nRBC 0.0 0.0 - 0.2 /100 WBC     *Note: Due to a large number of results and/or encounters for the requested time period, some results have not been displayed. A complete set of results can be found in Results Review.         This document has been electronically signed by Wanda Lang MD on October 14, 2022 15:47 CDT

## 2022-10-14 NOTE — INTERVAL H&P NOTE
Risks and benefits discussed with patient's guardian Bautista Grubbs.  Patient's guardian verbalized understanding and would like to proceed with procedure.   H&P reviewed. The patient was examined and there are no changes to the H&P.

## 2022-10-14 NOTE — ANESTHESIA PROCEDURE NOTES
Peripheral IV    Line placed for Fluids/Medication Admin.  Performed By   CRNA/CAA: Xavier Quinonez CRNA  Preanesthetic Checklist  Completed: patient identified, IV checked, site marked, risks and benefits discussed, surgical consent, monitors and equipment checked, pre-op evaluation and timeout performed  Peripheral IV Prep    Prep: ChloraPrep  Peripheral IV Procedure   Laterality:right  Location:  Hand  Catheter size: 22 G          Post Assessment     IV Dressing/Site: clean, dry and intact

## 2022-10-16 NOTE — PROGRESS NOTES
Chief Complaint   Patient presents with   • Follow-up     Colon polyp       Subjective    Bautista Grubbs is a 66 y.o. male.    History of Present Illness  Patient presented to GI clinic for follow-up visit today.  Remains nonverbal and is accompanied by nursing staff. Tolerating tube feeds.  Bowel movements are regular per nursing staff.  Due for colonoscopy.       The following portions of the patient's history were reviewed and updated as appropriate:   Past Medical History:   Diagnosis Date   • Alopecia    • Arthritis    • Autism    • COPD (chronic obstructive pulmonary disease) (HCC)    • GERD (gastroesophageal reflux disease)    • Hyperlipidemia    • Hypertension    • Insomnia    • Intellectual disability    • Lennox-Gastaut syndrome (HCC)    • Major depressive disorder    • Orthostatic hypotension    • Osteoporosis    • Right bundle branch block    • Scoliosis    • Seizures ([facility])      Past Surgical History:   Procedure Laterality Date   • COLONOSCOPY N/A 1/8/2021    Procedure: COLONOSCOPY;  Surgeon: Wanda Lang MD;  Location: [facility] ENDOSCOPY;  Service: Gastroenterology;  Laterality: N/A;   • COLONOSCOPY N/A 9/2/2021    Procedure: COLONOSCOPY;  Surgeon: Wanda Lang MD;  Location: [facility] ENDOSCOPY;  Service: Gastroenterology;  Laterality: N/A;   • ENDOSCOPY N/A 1/7/2021    Procedure: ESOPHAGOGASTRODUODENOSCOPY;  Surgeon: Wanda Lang MD;  Location: [facility] ENDOSCOPY;  Service: Gastroenterology;  Laterality: N/A;   • ENDOSCOPY N/A 1/22/2021    Procedure: ESOPHAGOGASTRODUODENOSCOPY;  Surgeon: Wanda Lang MD;  Location: [facility] ENDOSCOPY;  Service: Gastroenterology;  Laterality: N/A;   • ENDOSCOPY W/ PEG TUBE PLACEMENT N/A 6/25/2021    Procedure: ESOPHAGOGASTRODUODENOSCOPY WITH PERCUTANEOUS ENDOSCOPIC GASTROSTOMY TUBE INSERTION WITH ANESTHESIA;  Surgeon: Wanda Lang MD;  Location: [facility] ENDOSCOPY;  Service: Gastroenterology;  Laterality: N/A;   • SIGMOIDOSCOPY N/A 10/11/2021     Procedure: SIGMOIDOSCOPY FLEXIBLE;  Surgeon: Wanda Lang MD;  Location: [facility] ENDOSCOPY;  Service: Gastroenterology;  Laterality: N/A;   • SIGMOIDOSCOPY N/A 11/30/2021    Procedure: SIGMOIDOSCOPY FLEXIBLE;  Surgeon: Wanda Lang MD;  Location: [facility] ENDOSCOPY;  Service: Gastroenterology;  Laterality: N/A;   • SIGMOIDOSCOPY N/A 6/16/2022    Procedure: SIGMOIDOSCOPY FLEXIBLE;  Surgeon: Wanda Lang MD;  Location: [facility] ENDOSCOPY;  Service: Gastroenterology;  Laterality: N/A;  argon at rectal polypectomy site   • UPPER GASTROINTESTINAL ENDOSCOPY  01/22/2021   • UPPER GASTROINTESTINAL ENDOSCOPY  06/25/2021     Family History   Problem Relation Age of Onset   • Hypertension Father        Prior to Admission medications    Medication Sig Start Date End Date Taking? Authorizing Provider   albuterol (PROVENTIL) (2.5 MG/3ML) 0.083% nebulizer solution  10/12/21  Yes Patricia Driscoll MD   betamethasone dipropionate (DIPROLENE) 0.05 % ointment  9/21/22  Yes Patricia Driscoll MD   calcium carbonate-vitamin d 600-400 MG-UNIT per tablet Take 1 tablet by mouth 2 (Two) Times a Day. Per G-Tube   Yes Patricia Driscoll MD   denosumab (Prolia) 60 MG/ML solution prefilled syringe syringe Inject 60 mg under the skin into the appropriate area as directed. Every 6 Months   Yes Patricia Driscoll MD   DIAZEPAM RE Insert 10 mg into the rectum As Needed (For Seizure Activity).   Yes Patricia Driscoll MD   famotidine (PEPCID) 20 MG tablet Take 20 mg by mouth Daily. Per G-Tube   Yes Patricia Driscoll MD   ferrous sulfate 325 (65 FE) MG tablet Take 325 mg by mouth Daily With Breakfast. Per G-Tube   Yes Patricia Driscoll MD   ibuprofen (ADVIL,MOTRIN) 600 MG tablet Take 1 tablet by mouth Every 8 (Eight) Hours As Needed for Moderate Pain . 1/19/22  Yes Sandeep Fernandez MD   lamoTRIgine (LaMICtal) 200 MG tablet Take 200 mg by mouth 2 (Two) Times a Day. Per G-Tube   Yes Patricia Driscoll MD    lamoTRIgine (LaMICtal) 25 MG tablet Take 25 mg by mouth 2 (Two) Times a Day. Per G-Tube To Equal 225 MG   Yes Patricia Driscoll MD   levETIRAcetam (KEPPRA) 1000 MG tablet Take 1,000 mg by mouth 2 (Two) Times a Day. Per G-Tube   Yes Patricia Driscoll MD   LORazepam (ATIVAN) 1 MG tablet GIVE ONE TABLET PER G-TUBE TWICE DAILY FOR ANXIETY AND AGITATION 2/2/22  Yes Christelle Smith APRN   midodrine (PROAMATINE) 5 MG tablet Take 1 tablet by mouth 3 (Three) Times a Day.  Patient taking differently: Take 2 tablets by mouth 3 (Three) Times a Day. Per G-Tube 2/4/19  Yes Earnestine Reid MD   Nutritional Supplements (JEVITY 1.5 JEFFERY PO) Take  by mouth. Gets 390mL bolus every 6 hours 1A-7A-1P-7P   Yes Patricia Driscoll MD   simvastatin (ZOCOR) 20 MG tablet Take 20 mg by mouth Daily. Per G-Tube   Yes Patricia Driscoll MD   sucralfate (CARAFATE) 1 g tablet Take 1 tablet by mouth 4 (Four) Times a Day Before Meals & at Bedtime. 5/19/21  Yes Sandeep Fernandez MD   thioridazine (MELLARIL) 100 MG tablet Take 200 mg by mouth 2 (Two) Times a Day. Per G-Tube   Yes Patricia Driscoll MD   triamcinolone (KENALOG) 0.1 % cream  8/25/22  Yes Patricia Driscoll MD   ipratropium-albuterol (DUO-NEB) 0.5-2.5 mg/3 ml nebulizer Take 3 mL by nebulization Every 4 (Four) Hours As Needed for Shortness of Air. 4/15/22   Leander Meade MD   polyethylene glycol (GoLYTELY) 236 g solution Starting at noon on day prior to procedure, drink 8 ounces every 30 minutes until all gone or stools are clear. May add flavor packet. 9/26/22   Wanda Lang MD     Allergies   Allergen Reactions   • Haldol [Haloperidol] Unknown (See Comments)     Unknown     • Levaquin [Levofloxacin] Other (See Comments)     Lowers seizure threshold     Social History     Socioeconomic History   • Marital status: Single   Tobacco Use   • Smoking status: Never   • Smokeless tobacco: Never   Vaping Use   • Vaping Use: Never used   Substance and Sexual  "Activity   • Alcohol use: Never   • Drug use: Never   • Sexual activity: Defer       Review of Systems  Review of Systems   Unable to perform ROS: Patient nonverbal        /73 (BP Location: Left arm, Patient Position: Sitting)   Pulse 86   Ht 165.1 cm (65\")   Wt 68.5 kg (151 lb) Comment: per caregiver, in wheelchair  BMI 25.13 kg/m²     Objective    Physical Exam  Constitutional:       Appearance: He is well-developed.   HENT:      Head: Normocephalic and atraumatic.   Eyes:      Conjunctiva/sclera: Conjunctivae normal.      Pupils: Pupils are equal, round, and reactive to light.   Neck:      Thyroid: No thyromegaly.   Cardiovascular:      Rate and Rhythm: Normal rate and regular rhythm.      Heart sounds: Normal heart sounds. No murmur heard.  Pulmonary:      Effort: Pulmonary effort is normal.      Breath sounds: Normal breath sounds. No wheezing.   Abdominal:      General: Bowel sounds are normal. There is no distension.      Palpations: Abdomen is soft. There is no mass.      Tenderness: There is no abdominal tenderness.      Hernia: No hernia is present.   Genitourinary:     Comments: No lesions noted  Musculoskeletal:         General: No tenderness. Normal range of motion.      Cervical back: Normal range of motion and neck supple.   Lymphadenopathy:      Cervical: No cervical adenopathy.   Skin:     General: Skin is warm and dry.      Findings: No rash.   Neurological:      Mental Status: He is alert. Mental status is at baseline.       Admission on 06/16/2022, Discharged on 06/16/2022   Component Date Value Ref Range Status   • Reference Lab Report 06/16/2022    Final                    Value:[facility]  [address] [place] [address]  Phone: [phone] or [phone]  Fax: [phone]  Nils Manzanares M.D., Medical Director    PATIENT NAME                                     LABORATORY NO.  1800   CORDELIA ONEIL SUZANNA.                              " "[id number]  [id number]                                 AGE                    SEX   SSN              CLIENT REF #  [facility]                   66        1955           [id number]      [id number]    Lovilia                               REQUESTING M.D.           ATTENDING MDAMIAN.         COPY TO.  [address] [place] [address]                         TONYA EUGENE DAVID  [place] [id number]                     ABDOUL  DATE COLLECTED            DATE RECEIVED          DATE REPORTED  2022    DIAGNOSIS:  RECTAL POLYP:  Tubular adenoma, multiple fragments    JBS/pah    CLINICAL HISTORY:  Adenomatous                           polyp of colon, unspecified part of colon    SPECIMENS RECEIVED:  RECTAL POLYP    MICROSCOPIC DESCRIPTION:  Tissue blocks are prepared and slides are examined microscopically on all  specimens. See diagnosis for details.    Professional interpretation rendered by Fitz Tavarez M.D. at [facility], [address] [place] [address].    GROSS DESCRIPTION:  Specimen is received in 1 formalin filled container labeled \"rectum polyp\" and  consists of multiple portions of tan soft tissue and possible vegetative matter  measuring 1.7 x 1.1 x 0.3 cm in aggregate.  The specimen is filtered and  submitted entirely in 1 cassette.  BW    REVIEWED, DIAGNOSED AND ELECTRONICALLY  SIGNED BY:    Fitz Tavarez M.D.  CPT CODES:  05778       Assessment & Plan      1. Adenomatous polyp of colon, unspecified part of colon    1.  Colon polyps, schedule colonoscopy for surveillance.  2.  Oropharyngeal dysphagia, on PEG tube feeds.  3.  Nausea and vomiting, resolved.  4.  Anemia, improving.  Repeat CBC today.       Orders placed during this encounter include:  Orders Placed This Encounter   Procedures   • Follow Anesthesia Guidelines / Standing Orders     Standing Status:   Future   • Obtain Informed Consent "     Standing Status:   Future     Order Specific Question:   Informed Consent Given For     Answer:   colonoscopy       COLONOSCOPY (N/A)    Review and/or summary of lab tests, radiology, procedures, medications. Review and summary of old records and obtaining of history. The risks and benefits of my recommendations, as well as other treatment options were discussed with the patient today. Questions were answered.    No orders of the defined types were placed in this encounter.      Follow-up: No follow-ups on file.               Results for orders placed or performed during the hospital encounter of 22   TISSUE EXAM, [facility] ([facility])    Specimen: Large Intestine, Rectum; Polyp   Result Value Ref Range    Reference Lab Report       [facility]  [address] [place] [address]  Phone: [phone] or [phone]  Fax: [phone]  Nils Manzanares M.D., Medical Director    PATIENT NAME                                     LABORATORY NO.  1800   CORDELIA ONEIL.                              [id number]  [id number]                                 AGE                    SEX   N              CLIENT REF #  [facility]                   66        1955           [id number]      [id number]    [place]                               REQUESTING M.D.           ATTENDING M.D.         COPY TO.  [address] [place] [address]                         TONYA EUGENE DAVID  [address]  DATE COLLECTED            DATE RECEIVED          DATE REPORTED  2022    DIAGNOSIS:  RECTAL POLYP:  Tubular adenoma, multiple fragments    JBS/stella    CLINICAL HISTORY:  Adenomatous  polyp of colon, unspecified part of colon    SPECIMENS RECEIVED:  RECTAL POLYP    MICROSCOPIC DESCRIPTION:  Tissue blocks are prepared and slides are examined microscopically on all  specimens.  "See diagnosis for details.    Professional interpretation rendered by Fitz Tavarez M.D. at [facility], [address] [place] [address] , [place] [address].    GROSS DESCRIPTION:  Specimen is received in 1 formalin filled container labeled \"rectum polyp\" and  consists of multiple portions of tan soft tissue and possible vegetative matter  measuring 1.7 x 1.1 x 0.3 cm in aggregate.  The specimen is filtered and  submitted entirely in 1 cassette.  BW    REVIEWED, DIAGNOSED AND ELECTRONICALLY  SIGNED BY:    Fitz Tavarez M.D.  CPT CODES:  96423     Results for orders placed or performed during the hospital encounter of 04/11/22   CRE Screen by PCR - Swab, Large Intestine, Rectum    Specimen: Large Intestine, Rectum; Swab   Result Value Ref Range    CRE SCREEN Not Detected Not Detected, Invalid    OXA 48 Strain Not Detected     IMP STRAIN Not Detected     VIM STRAIN Not Detected     NDM Strain Not Detected     KPC Strain Not Detected    COVID-19 and FLU A/B PCR - Swab, Nasopharynx    Specimen: Nasopharynx; Swab   Result Value Ref Range    COVID19 Not Detected Not Detected - Ref. Range    Influenza A PCR Not Detected Not Detected    Influenza B PCR Not Detected Not Detected   CBC Auto Differential    Specimen: Blood   Result Value Ref Range    WBC 6.80 3.40 - 10.80 10*3/mm3    RBC 4.38 4.14 - 5.80 10*6/mm3    Hemoglobin 13.7 13.0 - 17.7 g/dL    Hematocrit 39.3 37.5 - 51.0 %    MCV 89.7 79.0 - 97.0 fL    MCH 31.3 26.6 - 33.0 pg    MCHC 34.9 31.5 - 35.7 g/dL    RDW 13.2 12.3 - 15.4 %    RDW-SD 43.4 37.0 - 54.0 fl    MPV 11.1 6.0 - 12.0 fL    Platelets 197 140 - 450 10*3/mm3    Neutrophil % 70.8 42.7 - 76.0 %    Lymphocyte % 14.6 (L) 19.6 - 45.3 %    Monocyte % 9.4 5.0 - 12.0 %    Eosinophil % 4.4 0.3 - 6.2 %    Basophil % 0.4 0.0 - 1.5 %    Immature Grans % 0.4 0.0 - 0.5 %    Neutrophils, Absolute 4.81 1.70 - 7.00 10*3/mm3    Lymphocytes, Absolute 0.99 0.70 - 3.10 10*3/mm3    Monocytes, Absolute 0.64 0.10 - 0.90 " 10*3/mm3    Eosinophils, Absolute 0.30 0.00 - 0.40 10*3/mm3    Basophils, Absolute 0.03 0.00 - 0.20 10*3/mm3    Immature Grans, Absolute 0.03 0.00 - 0.05 10*3/mm3    nRBC 0.0 0.0 - 0.2 /100 WBC   CBC Auto Differential    Specimen: Blood   Result Value Ref Range    WBC 6.71 3.40 - 10.80 10*3/mm3    RBC 4.35 4.14 - 5.80 10*6/mm3    Hemoglobin 13.8 13.0 - 17.7 g/dL    Hematocrit 40.1 37.5 - 51.0 %    MCV 92.2 79.0 - 97.0 fL    MCH 31.7 26.6 - 33.0 pg    MCHC 34.4 31.5 - 35.7 g/dL    RDW 13.3 12.3 - 15.4 %    RDW-SD 45.6 37.0 - 54.0 fl    MPV 12.2 (H) 6.0 - 12.0 fL    Platelets 166 140 - 450 10*3/mm3    Neutrophil % 65.2 42.7 - 76.0 %    Lymphocyte % 18.0 (L) 19.6 - 45.3 %    Monocyte % 9.8 5.0 - 12.0 %    Eosinophil % 6.3 (H) 0.3 - 6.2 %    Basophil % 0.4 0.0 - 1.5 %    Immature Grans % 0.3 0.0 - 0.5 %    Neutrophils, Absolute 4.37 1.70 - 7.00 10*3/mm3    Lymphocytes, Absolute 1.21 0.70 - 3.10 10*3/mm3    Monocytes, Absolute 0.66 0.10 - 0.90 10*3/mm3    Eosinophils, Absolute 0.42 (H) 0.00 - 0.40 10*3/mm3    Basophils, Absolute 0.03 0.00 - 0.20 10*3/mm3    Immature Grans, Absolute 0.02 0.00 - 0.05 10*3/mm3    nRBC 0.0 0.0 - 0.2 /100 WBC   CBC Auto Differential    Specimen: Blood   Result Value Ref Range    WBC 6.41 3.40 - 10.80 10*3/mm3    RBC 3.91 (L) 4.14 - 5.80 10*6/mm3    Hemoglobin 12.5 (L) 13.0 - 17.7 g/dL    Hematocrit 36.3 (L) 37.5 - 51.0 %    MCV 92.8 79.0 - 97.0 fL    MCH 32.0 26.6 - 33.0 pg    MCHC 34.4 31.5 - 35.7 g/dL    RDW 13.7 12.3 - 15.4 %    RDW-SD 46.5 37.0 - 54.0 fl    MPV 12.1 (H) 6.0 - 12.0 fL    Platelets 140 140 - 450 10*3/mm3    Neutrophil % 74.0 42.7 - 76.0 %    Lymphocyte % 10.6 (L) 19.6 - 45.3 %    Monocyte % 9.4 5.0 - 12.0 %    Eosinophil % 5.6 0.3 - 6.2 %    Basophil % 0.2 0.0 - 1.5 %    Immature Grans % 0.2 0.0 - 0.5 %    Neutrophils, Absolute 4.75 1.70 - 7.00 10*3/mm3    Lymphocytes, Absolute 0.68 (L) 0.70 - 3.10 10*3/mm3    Monocytes, Absolute 0.60 0.10 - 0.90 10*3/mm3    Eosinophils,  Absolute 0.36 0.00 - 0.40 10*3/mm3    Basophils, Absolute 0.01 0.00 - 0.20 10*3/mm3    Immature Grans, Absolute 0.01 0.00 - 0.05 10*3/mm3    nRBC 0.0 0.0 - 0.2 /100 WBC   CBC Auto Differential    Specimen: Blood   Result Value Ref Range    WBC 8.53 3.40 - 10.80 10*3/mm3    RBC 3.98 (L) 4.14 - 5.80 10*6/mm3    Hemoglobin 12.7 (L) 13.0 - 17.7 g/dL    Hematocrit 36.9 (L) 37.5 - 51.0 %    MCV 92.7 79.0 - 97.0 fL    MCH 31.9 26.6 - 33.0 pg    MCHC 34.4 31.5 - 35.7 g/dL    RDW 13.9 12.3 - 15.4 %    RDW-SD 47.0 37.0 - 54.0 fl    MPV 11.7 6.0 - 12.0 fL    Platelets 146 140 - 450 10*3/mm3    Neutrophil % 88.9 (H) 42.7 - 76.0 %    Lymphocyte % 4.8 (L) 19.6 - 45.3 %    Monocyte % 5.0 5.0 - 12.0 %    Eosinophil % 0.7 0.3 - 6.2 %    Basophil % 0.2 0.0 - 1.5 %    Immature Grans % 0.4 0.0 - 0.5 %    Neutrophils, Absolute 7.58 (H) 1.70 - 7.00 10*3/mm3    Lymphocytes, Absolute 0.41 (L) 0.70 - 3.10 10*3/mm3    Monocytes, Absolute 0.43 0.10 - 0.90 10*3/mm3    Eosinophils, Absolute 0.06 0.00 - 0.40 10*3/mm3    Basophils, Absolute 0.02 0.00 - 0.20 10*3/mm3    Immature Grans, Absolute 0.03 0.00 - 0.05 10*3/mm3    nRBC 0.0 0.0 - 0.2 /100 WBC     *Note: Due to a large number of results and/or encounters for the requested time period, some results have not been displayed. A complete set of results can be found in Results Review.         This document has been electronically signed by Wanda Lang MD on October 16, 2022 07:10 CDT

## 2022-10-18 LAB — REF LAB TEST METHOD: NORMAL

## 2022-10-21 ENCOUNTER — OFFICE VISIT (OUTPATIENT)
Dept: GASTROENTEROLOGY | Facility: CLINIC | Age: 67
End: 2022-10-21

## 2022-10-21 VITALS
WEIGHT: 151 LBS | SYSTOLIC BLOOD PRESSURE: 113 MMHG | DIASTOLIC BLOOD PRESSURE: 79 MMHG | HEIGHT: 65 IN | HEART RATE: 105 BPM | BODY MASS INDEX: 25.16 KG/M2

## 2022-10-21 DIAGNOSIS — D12.6 ADENOMATOUS POLYP OF COLON, UNSPECIFIED PART OF COLON: Primary | ICD-10-CM

## 2022-10-21 PROCEDURE — 99213 OFFICE O/P EST LOW 20 MIN: CPT | Performed by: INTERNAL MEDICINE

## 2022-11-06 NOTE — PROGRESS NOTES
Chief Complaint   Patient presents with   • Colon Polyps       Subjective    Bautista Grubbs is a 66 y.o. male.    History of Present Illness  Patient presented to the GI clinic for follow-up visit today.  Accompanied by nursing attendant.  Remains nonverbal.  No further nausea or vomiting per nursing attendant.  Bowel movements regular.  Colonoscopy was consistent with cecal and rectal polyps s/p piecemeal polypectomy.       The following portions of the patient's history were reviewed and updated as appropriate:   Past Medical History:   Diagnosis Date   • Alopecia    • Arthritis    • Autism    • COPD (chronic obstructive pulmonary disease) (HCC)    • GERD (gastroesophageal reflux disease)    • Hyperlipidemia    • Hypertension    • Insomnia    • Intellectual disability    • Lennox-Gastaut syndrome (HCC)    • Major depressive disorder    • Orthostatic hypotension    • Osteoporosis    • Right bundle branch block    • Scoliosis    • Seizures (HCC)      Past Surgical History:   Procedure Laterality Date   • COLONOSCOPY N/A 1/8/2021    Procedure: COLONOSCOPY;  Surgeon: Wanda Lang MD;  Location: [facility] ENDOSCOPY;  Service: Gastroenterology;  Laterality: N/A;   • COLONOSCOPY N/A 9/2/2021    Procedure: COLONOSCOPY;  Surgeon: Wanda Lang MD;  Location: [facility] ENDOSCOPY;  Service: Gastroenterology;  Laterality: N/A;   • COLONOSCOPY N/A 10/14/2022    Procedure: COLONOSCOPY;  Surgeon: Wanda Lang MD;  Location: [facility] ENDOSCOPY;  Service: Gastroenterology;  Laterality: N/A;   • ENDOSCOPY N/A 1/7/2021    Procedure: ESOPHAGOGASTRODUODENOSCOPY;  Surgeon: Wanda Lang MD;  Location: [facility] ENDOSCOPY;  Service: Gastroenterology;  Laterality: N/A;   • ENDOSCOPY N/A 1/22/2021    Procedure: ESOPHAGOGASTRODUODENOSCOPY;  Surgeon: Wanda Lang MD;  Location: [facility] ENDOSCOPY;  Service: Gastroenterology;  Laterality: N/A;   • ENDOSCOPY W/ PEG TUBE PLACEMENT N/A 6/25/2021    Procedure:  ESOPHAGOGASTRODUODENOSCOPY WITH PERCUTANEOUS ENDOSCOPIC GASTROSTOMY TUBE INSERTION WITH ANESTHESIA;  Surgeon: Wanda Lang MD;  Location: [facility] ENDOSCOPY;  Service: Gastroenterology;  Laterality: N/A;   • SIGMOIDOSCOPY N/A 10/11/2021    Procedure: SIGMOIDOSCOPY FLEXIBLE;  Surgeon: Wanda Lang MD;  Location: [facility] ENDOSCOPY;  Service: Gastroenterology;  Laterality: N/A;   • SIGMOIDOSCOPY N/A 11/30/2021    Procedure: SIGMOIDOSCOPY FLEXIBLE;  Surgeon: Wanda Lang MD;  Location: [facility] ENDOSCOPY;  Service: Gastroenterology;  Laterality: N/A;   • SIGMOIDOSCOPY N/A 6/16/2022    Procedure: SIGMOIDOSCOPY FLEXIBLE;  Surgeon: Wanda Lang MD;  Location: [facility] ENDOSCOPY;  Service: Gastroenterology;  Laterality: N/A;  argon at rectal polypectomy site   • UPPER GASTROINTESTINAL ENDOSCOPY  01/22/2021   • UPPER GASTROINTESTINAL ENDOSCOPY  06/25/2021     Family History   Problem Relation Age of Onset   • Hypertension Father        Prior to Admission medications    Medication Sig Start Date End Date Taking? Authorizing Provider   albuterol (PROVENTIL) (2.5 MG/3ML) 0.083% nebulizer solution  10/12/21   Patricia Driscoll MD   betamethasone dipropionate (DIPROLENE) 0.05 % ointment  9/21/22   Patricia Driscoll MD   calcium carbonate-vitamin d 600-400 MG-UNIT per tablet Take 1 tablet by mouth 2 (Two) Times a Day. Per G-Tube    Patricia Driscoll MD   denosumab (Prolia) 60 MG/ML solution prefilled syringe syringe Inject 60 mg under the skin into the appropriate area as directed. Every 6 Months    Patricia Driscoll MD   DIAZEPAM RE Insert 10 mg into the rectum As Needed (For Seizure Activity).    Patricia Driscoll MD   famotidine (PEPCID) 20 MG tablet Take 20 mg by mouth Daily. Per G-Tube    Patricia Driscoll MD   ferrous sulfate 325 (65 FE) MG tablet Take 325 mg by mouth Daily With Breakfast. Per G-Tube    Patricia Driscoll MD   ibuprofen (ADVIL,MOTRIN) 600 MG tablet Take 1 tablet by  mouth Every 8 (Eight) Hours As Needed for Moderate Pain . 1/19/22   Sandeep Fernandez MD   ipratropium-albuterol (DUO-NEB) 0.5-2.5 mg/3 ml nebulizer Take 3 mL by nebulization Every 4 (Four) Hours As Needed for Shortness of Air. 4/15/22   Leander Meade MD   lamoTRIgine (LaMICtal) 200 MG tablet Take 200 mg by mouth 2 (Two) Times a Day. Per G-Tube    Patricia Driscoll MD   lamoTRIgine (LaMICtal) 25 MG tablet Take 25 mg by mouth 2 (Two) Times a Day. Per G-Tube To Equal 225 MG    Ptaricia Driscoll MD   levETIRAcetam (KEPPRA) 1000 MG tablet Take 1,000 mg by mouth 2 (Two) Times a Day. Per G-Tube    Patricia Driscoll MD   LORazepam (ATIVAN) 1 MG tablet GIVE ONE TABLET PER G-TUBE TWICE DAILY FOR ANXIETY AND AGITATION 2/2/22   Christelle Smith APRN   midodrine (PROAMATINE) 5 MG tablet Take 1 tablet by mouth 3 (Three) Times a Day.  Patient taking differently: Take 2 tablets by mouth 3 (Three) Times a Day. Per G-Tube 2/4/19   Earnestine Reid MD   Nutritional Supplements (JEVITY 1.5 JEFFERY PO) Take  by mouth. Gets 390mL bolus every 6 hours 1A-7A-1P-7P    Patricia Driscoll MD   polyethylene glycol (GoLYTELY) 236 g solution Starting at noon on day prior to procedure, drink 8 ounces every 30 minutes until all gone or stools are clear. May add flavor packet. 9/26/22   Wanda Lang MD   simvastatin (ZOCOR) 20 MG tablet Take 20 mg by mouth Daily. Per G-Tube    Patricia Driscoll MD   sucralfate (CARAFATE) 1 g tablet Take 1 tablet by mouth 4 (Four) Times a Day Before Meals & at Bedtime. 5/19/21   Sandeep Fernandez MD   thioridazine (MELLARIL) 100 MG tablet Take 200 mg by mouth 2 (Two) Times a Day. Per G-Tube    Patricia Driscoll MD   triamcinolone (KENALOG) 0.1 % cream  8/25/22   Patricia Driscoll MD     Allergies   Allergen Reactions   • Haldol [Haloperidol] Unknown (See Comments)     Unknown     • Levaquin [Levofloxacin] Other (See Comments)     Lowers seizure threshold     Social History  "    Socioeconomic History   • Marital status: Single   Tobacco Use   • Smoking status: Never   • Smokeless tobacco: Never   Vaping Use   • Vaping Use: Never used   Substance and Sexual Activity   • Alcohol use: Never   • Drug use: Never   • Sexual activity: Defer       Review of Systems  Review of Systems   Unable to perform ROS: Patient nonverbal        /79 (BP Location: Left arm)   Pulse 105   Ht 165.1 cm (65\")   Wt 68.5 kg (151 lb)   BMI 25.13 kg/m²     Objective    Physical Exam  Constitutional:       Appearance: He is well-developed.   HENT:      Head: Normocephalic and atraumatic.   Eyes:      Conjunctiva/sclera: Conjunctivae normal.      Pupils: Pupils are equal, round, and reactive to light.   Neck:      Thyroid: No thyromegaly.   Cardiovascular:      Rate and Rhythm: Normal rate and regular rhythm.      Heart sounds: Normal heart sounds. No murmur heard.  Pulmonary:      Effort: Pulmonary effort is normal.      Breath sounds: Normal breath sounds. No wheezing.   Abdominal:      General: Bowel sounds are normal. There is no distension.      Palpations: Abdomen is soft. There is no mass.      Tenderness: There is no abdominal tenderness.      Hernia: No hernia is present.   Genitourinary:     Comments: No lesions noted  Musculoskeletal:         General: No tenderness. Normal range of motion.      Cervical back: Normal range of motion and neck supple.   Lymphadenopathy:      Cervical: No cervical adenopathy.   Skin:     General: Skin is warm and dry.      Findings: No rash.   Neurological:      Mental Status: He is alert.      Cranial Nerves: No cranial nerve deficit.       Admission on 10/14/2022, Discharged on 10/14/2022   Component Date Value Ref Range Status   • Reference Lab Report 10/14/2022    Final                    Value:[facility]  [address] [place] [address]  Phone: [phone] or [phone]  Fax: [phone]  Nils Manzanares M.D., Medical " "Director    PATIENT NAME                           LABORATORY NO.  [address]  CORDELIA ONEIL.                    [id number]  [id number]                         AGE              SEX  N           CLIENT REF #  [facility]           66      1955  [place]    [id number]   [id number]    [place]                       REQUESTING M.D.     ATTENDING M.D.     COPY TO.  [address] [place] [address]                 [place] [address]             [facility]  [facility]      DATE RECEIVED      DATE REPORTED  10/14/2022          10/17/2022         10/18/2022    DIAGNOSIS:  A.   CECUM POLYP:  Fragments of tubular adenoma  Negative for carcinoma or high-grade dysplasia    B.   ASCENDING COLON POLYP:  Fragments of tubular adenoma  Negative for carcinoma or high-grade dysplasia    C.                             RECTAL POLYP:  Fragments of tubular adenoma  Negative for carcinoma or high-grade dysplasia    CLINICAL HISTORY:  Adenomatous polyp of colon, unspecified part of colon    SPECIMENS RECEIVED:  A.  CECUM POLYP  B.  ASCENDING COLON POLYP  C.  RECTAL POLYP    MICROSCOPIC DESCRIPTION:  Tissue blocks are prepared and slides are examined microscopically on all  specimens. See diagnosis for details.    Professional interpretation rendered by Markus Sahni M.D.,F.C.A.P. at [facility], [place], [address] [place] [address].    GROSS DESCRIPTION:  A.  Specimen is received in 1 formalin filled container labeled \"large intestine,  cecum polyp\" and consists of multiple pieces of tan soft tissue measuring  0.5 x 0.3 x 0.2 cm in aggregate.  Specimen is submitted entirely in 1  cassette.  JANET  B.  Specimen is received in 1 formalin filled container labeled \"large intestine,  right/ascending colon polyp\" and consists of multiple pieces of tan soft  tissue measuring 1.7 x 1.3 x 0.3 cm in aggregate.                            Specimen is filtered and  submitted entirely in 1 cassette.  C.  " "Specimen is received in 1 formalin filled container labeled \"large intestine,  rectum polyp\" and consists of 3 pieces of tan soft tissue measuring 0.7 x  0.5 x 0.3 cm in aggregate.  The largest piece was trisected and the  specimen is submitted entirely in 1 cassette.    REVIEWED, DIAGNOSED AND ELECTRONICALLY  SIGNED BY:    Markus Sahni M.D.,LAURENT.  CPT CODES:  88305x3       Assessment & Plan    No diagnosis found..   1.  Colon polyp status post piecemeal polypectomy, repeat colonoscopy in 3 months.  2.  Oropharyngeal dysphagia, on PEG tube feeds.  3.  Nausea and vomiting, resolved.  4.  Anemia, improving.  Repeat CBC next visit.    Orders placed during this encounter include:  No orders of the defined types were placed in this encounter.      * Surgery not found *    Review and/or summary of lab tests, radiology, procedures, medications. Review and summary of old records and obtaining of history. The risks and benefits of my recommendations, as well as other treatment options were discussed with the patient and his nursing attendant today. Questions were answered.    No orders of the defined types were placed in this encounter.      Follow-up: Return in about 3 months (around 2023).               Results for orders placed or performed during the hospital encounter of 10/14/22   TISSUE EXAM, [facility] ([facility]IFER,[facility])    Specimen: A: Large Intestine, Cecum; Polyp    B: Large Intestine, Right / Ascending Colon; Polyp    C: Large Intestine, Rectum; Polyp   Result Value Ref Range    Reference Lab Report       [facility]  [address] [place] [address]  Phone: [phone] or [phone]  Fax: [phone]  Nils Manzanares M.D., Medical Director    PATIENT NAME                           LABORATORY NO.  [address]  CORDELIA ONEIL.                    [id number]  [id number]                         AGE              SEX  SSN           CLIENT REF #  [facility]          " " 66      1955      [id number]   [id number]    [place]                       REQUESTING M.D.     ATTENDING M.D.     COPY TO.  [address] [place] [address] [place] [address]             ABDOUL  DATE COLLECTED      DATE RECEIVED      DATE REPORTED  10/14/2022          10/17/2022         10/18/2022    DIAGNOSIS:  A.   CECUM POLYP:  Fragments of tubular adenoma  Negative for carcinoma or high-grade dysplasia    B.   ASCENDING COLON POLYP:  Fragments of tubular adenoma  Negative for carcinoma or high-grade dysplasia    C.    RECTAL POLYP:  Fragments of tubular adenoma  Negative for carcinoma or high-grade dysplasia    CLINICAL HISTORY:  Adenomatous polyp of colon, unspecified part of colon    SPECIMENS RECEIVED:  A.  CECUM POLYP  B.  ASCENDING COLON POLYP  C.  RECTAL POLYP    MICROSCOPIC DESCRIPTION:  Tissue blocks are prepared and slides are examined microscopically on all  specimens. See diagnosis for details.    Professional interpretation rendered by Markus Sahni M.D.,F.C.A.P. at [employer], [place], [address] [place] [address].    GROSS DESCRIPTION:  A.  Specimen is received in 1 formalin filled container labeled \"large intestine,  cecum polyp\" and consists of multiple pieces of tan soft tissue measuring  0.5 x 0.3 x 0.2 cm in aggregate.  Specimen is submitted entirely in 1  cassette.  JANET  B.  Specimen is received in 1 formalin filled container labeled \"large intestine,  right/ascending colon polyp\" and consists of multiple pieces of tan soft  tissue measuring 1.7 x 1.3 x 0.3 cm in aggregate.   Specimen is filtered and  submitted entirely in 1 cassette.  C.  Specimen is received in 1 formalin filled container labeled \"large intestine,  rectum polyp\" and consists of 3 pieces of tan soft tissue measuring 0.7 x  0.5 x 0.3 cm in aggregate.  The largest piece was trisected and the  specimen is submitted entirely in 1 cassette.    REVIEWED, DIAGNOSED AND " "ELECTRONICALLY  SIGNED BY:    Markus Sahni M.D.,F.C.A.P.  CPT CODES:  88305x3     Results for orders placed or performed during the hospital encounter of 22   TISSUE EXAM, [facility] ([facility])    Specimen: Large Intestine, Rectum; Polyp   Result Value Ref Range    Reference Lab Report       [facility]  [address] [place] [address]  Phone: [phone] or [phone]  Fax: [phone]  Nils Manzanares M.D., Medical Director    PATIENT NAME                                     LABORATORY NO.  1800   CORDELIA ONEIL.                              [id number]  [id number]                                 AGE                    SEX   SSN              CLIENT REF #  [facility]                   66        1955           [id number]      [id number]    [place]                               REQUESTING M.D.           ATTENDING MRUIZ         COPY TO.  [address] [place] [address]                         TONYA EUGENE DAVID  [address]  DATE COLLECTED            DATE RECEIVED          DATE REPORTED  2022    DIAGNOSIS:  RECTAL POLYP:  Tubular adenoma, multiple fragments    JBS/pah    CLINICAL HISTORY:  Adenomatous  polyp of colon, unspecified part of colon    SPECIMENS RECEIVED:  RECTAL POLYP    MICROSCOPIC DESCRIPTION:  Tissue blocks are prepared and slides are examined microscopically on all  specimens. See diagnosis for details.    Professional interpretation rendered by Fitz Tavarez M.D. at [facility], [address] [place] [address].    GROSS DESCRIPTION:  Specimen is received in 1 formalin filled container labeled \"rectum polyp\" and  consists of multiple portions of tan soft tissue and possible vegetative matter  measuring 1.7 x 1.1 x 0.3 cm in aggregate.  The specimen is filtered and  submitted entirely in 1 cassette.  " BW    REVIEWED, DIAGNOSED AND ELECTRONICALLY  SIGNED BY:    Fitz Tavarez M.D.  CPT CODES:  [id number]     Results for orders placed or performed during the hospital encounter of 04/11/22   CRE Screen by PCR - Swab, Large Intestine, Rectum    Specimen: Large Intestine, Rectum; Swab   Result Value Ref Range    CRE SCREEN Not Detected Not Detected, Invalid    OXA 48 Strain Not Detected     IMP STRAIN Not Detected     VIM STRAIN Not Detected     NDM Strain Not Detected     KPC Strain Not Detected    COVID-19 and FLU A/B PCR - Swab, Nasopharynx    Specimen: Nasopharynx; Swab   Result Value Ref Range    COVID19 Not Detected Not Detected - Ref. Range    Influenza A PCR Not Detected Not Detected    Influenza B PCR Not Detected Not Detected   CBC Auto Differential    Specimen: Blood   Result Value Ref Range    WBC 6.80 3.40 - 10.80 10*3/mm3    RBC 4.38 4.14 - 5.80 10*6/mm3    Hemoglobin 13.7 13.0 - 17.7 g/dL    Hematocrit 39.3 37.5 - 51.0 %    MCV 89.7 79.0 - 97.0 fL    MCH 31.3 26.6 - 33.0 pg    MCHC 34.9 31.5 - 35.7 g/dL    RDW 13.2 12.3 - 15.4 %    RDW-SD 43.4 37.0 - 54.0 fl    MPV 11.1 6.0 - 12.0 fL    Platelets 197 140 - 450 10*3/mm3    Neutrophil % 70.8 42.7 - 76.0 %    Lymphocyte % 14.6 (L) 19.6 - 45.3 %    Monocyte % 9.4 5.0 - 12.0 %    Eosinophil % 4.4 0.3 - 6.2 %    Basophil % 0.4 0.0 - 1.5 %    Immature Grans % 0.4 0.0 - 0.5 %    Neutrophils, Absolute 4.81 1.70 - 7.00 10*3/mm3    Lymphocytes, Absolute 0.99 0.70 - 3.10 10*3/mm3    Monocytes, Absolute 0.64 0.10 - 0.90 10*3/mm3    Eosinophils, Absolute 0.30 0.00 - 0.40 10*3/mm3    Basophils, Absolute 0.03 0.00 - 0.20 10*3/mm3    Immature Grans, Absolute 0.03 0.00 - 0.05 10*3/mm3    nRBC 0.0 0.0 - 0.2 /100 WBC   CBC Auto Differential    Specimen: Blood   Result Value Ref Range    WBC 6.71 3.40 - 10.80 10*3/mm3    RBC 4.35 4.14 - 5.80 10*6/mm3    Hemoglobin 13.8 13.0 - 17.7 g/dL    Hematocrit 40.1 37.5 - 51.0 %    MCV 92.2 79.0 - 97.0 fL    MCH 31.7 26.6 - 33.0 pg     MCHC 34.4 31.5 - 35.7 g/dL    RDW 13.3 12.3 - 15.4 %    RDW-SD 45.6 37.0 - 54.0 fl    MPV 12.2 (H) 6.0 - 12.0 fL    Platelets 166 140 - 450 10*3/mm3    Neutrophil % 65.2 42.7 - 76.0 %    Lymphocyte % 18.0 (L) 19.6 - 45.3 %    Monocyte % 9.8 5.0 - 12.0 %    Eosinophil % 6.3 (H) 0.3 - 6.2 %    Basophil % 0.4 0.0 - 1.5 %    Immature Grans % 0.3 0.0 - 0.5 %    Neutrophils, Absolute 4.37 1.70 - 7.00 10*3/mm3    Lymphocytes, Absolute 1.21 0.70 - 3.10 10*3/mm3    Monocytes, Absolute 0.66 0.10 - 0.90 10*3/mm3    Eosinophils, Absolute 0.42 (H) 0.00 - 0.40 10*3/mm3    Basophils, Absolute 0.03 0.00 - 0.20 10*3/mm3    Immature Grans, Absolute 0.02 0.00 - 0.05 10*3/mm3    nRBC 0.0 0.0 - 0.2 /100 WBC   CBC Auto Differential    Specimen: Blood   Result Value Ref Range    WBC 6.41 3.40 - 10.80 10*3/mm3    RBC 3.91 (L) 4.14 - 5.80 10*6/mm3    Hemoglobin 12.5 (L) 13.0 - 17.7 g/dL    Hematocrit 36.3 (L) 37.5 - 51.0 %    MCV 92.8 79.0 - 97.0 fL    MCH 32.0 26.6 - 33.0 pg    MCHC 34.4 31.5 - 35.7 g/dL    RDW 13.7 12.3 - 15.4 %    RDW-SD 46.5 37.0 - 54.0 fl    MPV 12.1 (H) 6.0 - 12.0 fL    Platelets 140 140 - 450 10*3/mm3    Neutrophil % 74.0 42.7 - 76.0 %    Lymphocyte % 10.6 (L) 19.6 - 45.3 %    Monocyte % 9.4 5.0 - 12.0 %    Eosinophil % 5.6 0.3 - 6.2 %    Basophil % 0.2 0.0 - 1.5 %    Immature Grans % 0.2 0.0 - 0.5 %    Neutrophils, Absolute 4.75 1.70 - 7.00 10*3/mm3    Lymphocytes, Absolute 0.68 (L) 0.70 - 3.10 10*3/mm3    Monocytes, Absolute 0.60 0.10 - 0.90 10*3/mm3    Eosinophils, Absolute 0.36 0.00 - 0.40 10*3/mm3    Basophils, Absolute 0.01 0.00 - 0.20 10*3/mm3    Immature Grans, Absolute 0.01 0.00 - 0.05 10*3/mm3    nRBC 0.0 0.0 - 0.2 /100 WBC   CBC Auto Differential    Specimen: Blood   Result Value Ref Range    WBC 8.53 3.40 - 10.80 10*3/mm3    RBC 3.98 (L) 4.14 - 5.80 10*6/mm3    Hemoglobin 12.7 (L) 13.0 - 17.7 g/dL    Hematocrit 36.9 (L) 37.5 - 51.0 %    MCV 92.7 79.0 - 97.0 fL    MCH 31.9 26.6 - 33.0 pg    MCHC 34.4 31.5  - 35.7 g/dL    RDW 13.9 12.3 - 15.4 %    RDW-SD 47.0 37.0 - 54.0 fl    MPV 11.7 6.0 - 12.0 fL    Platelets 146 140 - 450 10*3/mm3    Neutrophil % 88.9 (H) 42.7 - 76.0 %    Lymphocyte % 4.8 (L) 19.6 - 45.3 %    Monocyte % 5.0 5.0 - 12.0 %    Eosinophil % 0.7 0.3 - 6.2 %    Basophil % 0.2 0.0 - 1.5 %    Immature Grans % 0.4 0.0 - 0.5 %    Neutrophils, Absolute 7.58 (H) 1.70 - 7.00 10*3/mm3    Lymphocytes, Absolute 0.41 (L) 0.70 - 3.10 10*3/mm3    Monocytes, Absolute 0.43 0.10 - 0.90 10*3/mm3    Eosinophils, Absolute 0.06 0.00 - 0.40 10*3/mm3    Basophils, Absolute 0.02 0.00 - 0.20 10*3/mm3    Immature Grans, Absolute 0.03 0.00 - 0.05 10*3/mm3    nRBC 0.0 0.0 - 0.2 /100 WBC     *Note: Due to a large number of results and/or encounters for the requested time period, some results have not been displayed. A complete set of results can be found in Results Review.         This document has been electronically signed by Wanda Lang MD on November 5, 2022 22:02 CDT

## 2023-01-07 ENCOUNTER — HOSPITAL ENCOUNTER (EMERGENCY)
Facility: HOSPITAL | Age: 68
Discharge: HOME | End: 2023-01-07
Attending: EMERGENCY MEDICINE | Admitting: EMERGENCY MEDICINE
Payer: MEDICARE

## 2023-01-07 ENCOUNTER — APPOINTMENT (OUTPATIENT)
Dept: GENERAL RADIOLOGY | Facility: HOSPITAL | Age: 68
End: 2023-01-07
Payer: MEDICARE

## 2023-01-07 VITALS
OXYGEN SATURATION: 96 % | HEART RATE: 74 BPM | RESPIRATION RATE: 18 BRPM | BODY MASS INDEX: 21.14 KG/M2 | TEMPERATURE: 97.3 F | SYSTOLIC BLOOD PRESSURE: 92 MMHG | WEIGHT: 151 LBS | HEIGHT: 71 IN | DIASTOLIC BLOOD PRESSURE: 71 MMHG

## 2023-01-07 DIAGNOSIS — J18.9 PNEUMONIA OF BOTH LOWER LOBES DUE TO INFECTIOUS ORGANISM: ICD-10-CM

## 2023-01-07 DIAGNOSIS — U07.1 COVID-19 VIRUS INFECTION: Primary | ICD-10-CM

## 2023-01-07 LAB
ALBUMIN SERPL-MCNC: 3.4 G/DL (ref 3.5–5.2)
ALBUMIN/GLOB SERPL: 0.9 G/DL
ALP SERPL-CCNC: 143 U/L (ref 39–117)
ALT SERPL W P-5'-P-CCNC: 47 U/L (ref 1–41)
ANION GAP SERPL CALCULATED.3IONS-SCNC: 9 MMOL/L (ref 5–15)
AST SERPL-CCNC: 35 U/L (ref 1–40)
BASOPHILS # BLD AUTO: 0.04 10*3/MM3 (ref 0–0.2)
BASOPHILS NFR BLD AUTO: 0.4 % (ref 0–1.5)
BILIRUB SERPL-MCNC: 0.2 MG/DL (ref 0–1.2)
BUN SERPL-MCNC: 24 MG/DL (ref 8–23)
BUN/CREAT SERPL: 30.4 (ref 7–25)
CALCIUM SPEC-SCNC: 9.4 MG/DL (ref 8.6–10.5)
CHLORIDE SERPL-SCNC: 103 MMOL/L (ref 98–107)
CO2 SERPL-SCNC: 28 MMOL/L (ref 22–29)
CREAT SERPL-MCNC: 0.79 MG/DL (ref 0.76–1.27)
DEPRECATED RDW RBC AUTO: 43.3 FL (ref 37–54)
EGFRCR SERPLBLD CKD-EPI 2021: 98 ML/MIN/1.73
EOSINOPHIL # BLD AUTO: 0.32 10*3/MM3 (ref 0–0.4)
EOSINOPHIL NFR BLD AUTO: 3.5 % (ref 0.3–6.2)
ERYTHROCYTE [DISTWIDTH] IN BLOOD BY AUTOMATED COUNT: 13.3 % (ref 12.3–15.4)
FLUAV RNA RESP QL NAA+PROBE: NOT DETECTED
FLUBV RNA RESP QL NAA+PROBE: NOT DETECTED
GLOBULIN UR ELPH-MCNC: 3.7 GM/DL
GLUCOSE SERPL-MCNC: 105 MG/DL (ref 65–99)
HCT VFR BLD AUTO: 40.1 % (ref 37.5–51)
HGB BLD-MCNC: 13 G/DL (ref 13–17.7)
HOLD SPECIMEN: NORMAL
IMM GRANULOCYTES # BLD AUTO: 0.05 10*3/MM3 (ref 0–0.05)
IMM GRANULOCYTES NFR BLD AUTO: 0.5 % (ref 0–0.5)
LYMPHOCYTES # BLD AUTO: 0.92 10*3/MM3 (ref 0.7–3.1)
LYMPHOCYTES NFR BLD AUTO: 10.1 % (ref 19.6–45.3)
MCH RBC QN AUTO: 29.3 PG (ref 26.6–33)
MCHC RBC AUTO-ENTMCNC: 32.4 G/DL (ref 31.5–35.7)
MCV RBC AUTO: 90.3 FL (ref 79–97)
MONOCYTES # BLD AUTO: 0.77 10*3/MM3 (ref 0.1–0.9)
MONOCYTES NFR BLD AUTO: 8.5 % (ref 5–12)
NEUTROPHILS NFR BLD AUTO: 7.01 10*3/MM3 (ref 1.7–7)
NEUTROPHILS NFR BLD AUTO: 77 % (ref 42.7–76)
NRBC BLD AUTO-RTO: 0 /100 WBC (ref 0–0.2)
PLATELET # BLD AUTO: 254 10*3/MM3 (ref 140–450)
PMV BLD AUTO: 11 FL (ref 6–12)
POTASSIUM SERPL-SCNC: 4.6 MMOL/L (ref 3.5–5.2)
PROT SERPL-MCNC: 7.1 G/DL (ref 6–8.5)
RBC # BLD AUTO: 4.44 10*6/MM3 (ref 4.14–5.8)
SARS-COV-2 RNA RESP QL NAA+PROBE: DETECTED
SODIUM SERPL-SCNC: 140 MMOL/L (ref 136–145)
WBC NRBC COR # BLD: 9.11 10*3/MM3 (ref 3.4–10.8)
WHOLE BLOOD HOLD COAG: NORMAL

## 2023-01-07 PROCEDURE — 85025 COMPLETE CBC W/AUTO DIFF WBC: CPT | Performed by: EMERGENCY MEDICINE

## 2023-01-07 PROCEDURE — 25010000002 CEFTRIAXONE PER 250 MG: Performed by: EMERGENCY MEDICINE

## 2023-01-07 PROCEDURE — 96365 THER/PROPH/DIAG IV INF INIT: CPT

## 2023-01-07 PROCEDURE — 99284 EMERGENCY DEPT VISIT MOD MDM: CPT

## 2023-01-07 PROCEDURE — 87636 SARSCOV2 & INF A&B AMP PRB: CPT

## 2023-01-07 PROCEDURE — 80053 COMPREHEN METABOLIC PANEL: CPT | Performed by: EMERGENCY MEDICINE

## 2023-01-07 PROCEDURE — 71045 X-RAY EXAM CHEST 1 VIEW: CPT

## 2023-01-07 RX ORDER — DEXTROMETHORPHAN HYDROBROMIDE AND PROMETHAZINE HYDROCHLORIDE 15; 6.25 MG/5ML; MG/5ML
5 SYRUP ORAL 4 TIMES DAILY PRN
Qty: 118 ML | Refills: 0 | Status: SHIPPED | OUTPATIENT
Start: 2023-01-07

## 2023-01-07 RX ORDER — AZITHROMYCIN 200 MG/5ML
500 POWDER, FOR SUSPENSION ORAL ONCE
Status: COMPLETED | OUTPATIENT
Start: 2023-01-07 | End: 2023-01-07

## 2023-01-07 RX ORDER — SODIUM CHLORIDE 0.9 % (FLUSH) 0.9 %
10 SYRINGE (ML) INJECTION AS NEEDED
Status: DISCONTINUED | OUTPATIENT
Start: 2023-01-07 | End: 2023-01-08 | Stop reason: HOSPADM

## 2023-01-07 RX ORDER — AMOXICILLIN AND CLAVULANATE POTASSIUM 600; 42.9 MG/5ML; MG/5ML
875 POWDER, FOR SUSPENSION ORAL ONCE
Status: DISCONTINUED | OUTPATIENT
Start: 2023-01-07 | End: 2023-01-07 | Stop reason: RX

## 2023-01-07 RX ORDER — AMOXICILLIN AND CLAVULANATE POTASSIUM 600; 42.9 MG/5ML; MG/5ML
875 POWDER, FOR SUSPENSION ORAL 2 TIMES DAILY
Qty: 102.2 ML | Refills: 0 | Status: SHIPPED | OUTPATIENT
Start: 2023-01-07 | End: 2023-01-14

## 2023-01-07 RX ORDER — ALBUTEROL SULFATE 90 UG/1
2 AEROSOL, METERED RESPIRATORY (INHALATION) EVERY 4 HOURS PRN
Qty: 6.7 G | Refills: 0 | Status: SHIPPED | OUTPATIENT
Start: 2023-01-07

## 2023-01-07 RX ADMIN — AZITHROMYCIN 500 MG: 200 POWDER, FOR SUSPENSION ORAL at 22:06

## 2023-01-07 RX ADMIN — CEFTRIAXONE SODIUM 1 G: 1 INJECTION, POWDER, FOR SOLUTION INTRAMUSCULAR; INTRAVENOUS at 22:00

## 2023-01-08 NOTE — ED PROVIDER NOTES
Subjective   History of Present Illness  68[facility] significant hyperlipidemia/dysphagia/PEG tube/Lennox-Gastaut syndrome/MR, with reported recent COVID 19 (+) test 12.27.2022, presents ED with reported continued nonproductive cough as well as decreased interactiveness today.  Pt unable to provide hx secondary to nonverbal status at baseline.  History obtained from chart review and accompanying caregiver.    History provided by:  Patient  History limited by:  Psychiatric disorder  URI  Presenting symptoms: cough        Review of Systems   Unable to perform ROS: Patient nonverbal   Respiratory: Positive for cough.        Past Medical History:   Diagnosis Date   • Alopecia    • Arthritis    • Autism    • COPD (chronic obstructive pulmonary disease) ([facility])    • GERD (gastroesophageal reflux disease)    • Hyperlipidemia    • Hypertension    • Insomnia    • Intellectual disability    • Lennox-Gastaut syndrome (HCC)    • Major depressive disorder    • Orthostatic hypotension    • Osteoporosis    • Right bundle branch block    • Scoliosis    • Seizures ([facility])        Allergies   Allergen Reactions   • Haldol [Haloperidol] Unknown (See Comments)     Unknown     • Levaquin [Levofloxacin] Other (See Comments)     Lowers seizure threshold       Past Surgical History:   Procedure Laterality Date   • COLONOSCOPY N/A 1/8/2021    Procedure: COLONOSCOPY;  Surgeon: Wanda Lang MD;  Location: [facility] ENDOSCOPY;  Service: Gastroenterology;  Laterality: N/A;   • COLONOSCOPY N/A 9/2/2021    Procedure: COLONOSCOPY;  Surgeon: Wanda Lang MD;  Location: [facility] ENDOSCOPY;  Service: Gastroenterology;  Laterality: N/A;   • COLONOSCOPY N/A 10/14/2022    Procedure: COLONOSCOPY;  Surgeon: Wanda Lang MD;  Location: [facility] ENDOSCOPY;  Service: Gastroenterology;  Laterality: N/A;   • ENDOSCOPY N/A 1/7/2021    Procedure: ESOPHAGOGASTRODUODENOSCOPY;  Surgeon: Wanda Lang MD;  Location:   [facility] ENDOSCOPY;  Service: Gastroenterology;  Laterality: N/A;   • ENDOSCOPY N/A 1/22/2021    Procedure: ESOPHAGOGASTRODUODENOSCOPY;  Surgeon: Wanda Lang MD;  Location: [facility] ENDOSCOPY;  Service: Gastroenterology;  Laterality: N/A;   • ENDOSCOPY W/ PEG TUBE PLACEMENT N/A 6/25/2021    Procedure: ESOPHAGOGASTRODUODENOSCOPY WITH PERCUTANEOUS ENDOSCOPIC GASTROSTOMY TUBE INSERTION WITH ANESTHESIA;  Surgeon: Wanda Lang MD;  Location: [facility] ENDOSCOPY;  Service: Gastroenterology;  Laterality: N/A;   • SIGMOIDOSCOPY N/A 10/11/2021    Procedure: SIGMOIDOSCOPY FLEXIBLE;  Surgeon: Wanda Lang MD;  Location: [facility] ENDOSCOPY;  Service: Gastroenterology;  Laterality: N/A;   • SIGMOIDOSCOPY N/A 11/30/2021    Procedure: SIGMOIDOSCOPY FLEXIBLE;  Surgeon: Wanda Lang MD;  Location: [facility] ENDOSCOPY;  Service: Gastroenterology;  Laterality: N/A;   • SIGMOIDOSCOPY N/A 6/16/2022    Procedure: SIGMOIDOSCOPY FLEXIBLE;  Surgeon: Wanda Lang MD;  Location: [facility] ENDOSCOPY;  Service: Gastroenterology;  Laterality: N/A;  argon at rectal polypectomy site   • UPPER GASTROINTESTINAL ENDOSCOPY  01/22/2021   • UPPER GASTROINTESTINAL ENDOSCOPY  06/25/2021       Family History   Problem Relation Age of Onset   • Hypertension Father        Social History     Socioeconomic History   • Marital status: Single   Tobacco Use   • Smoking status: Never   • Smokeless tobacco: Never   Vaping Use   • Vaping Use: Never used   Substance and Sexual Activity   • Alcohol use: Never   • Drug use: Never   • Sexual activity: Defer           Objective   Physical Exam  Vitals and nursing note reviewed.   Constitutional:       Appearance: Normal appearance.   HENT:      Nose: Nose normal.      Mouth/Throat:      Mouth: Mucous membranes are moist.   Eyes:      Pupils: Pupils are equal, round, and reactive to light.   Cardiovascular:      Rate and Rhythm: Normal rate and regular rhythm.      Pulses: Normal pulses.      Heart sounds:  Normal heart sounds. No murmur heard.    No friction rub. No gallop.   Pulmonary:      Effort: Pulmonary effort is normal. No respiratory distress.      Breath sounds: Normal breath sounds. No wheezing, rhonchi or rales.   Abdominal:      General: Abdomen is flat. Bowel sounds are normal. There is no distension.      Palpations: Abdomen is soft.      Tenderness: There is no abdominal tenderness. There is no guarding or rebound.       Musculoskeletal:         General: No swelling or deformity. Normal range of motion.      Cervical back: Normal range of motion and neck supple.      Right lower leg: No edema.      Left lower leg: No edema.   Skin:     General: Skin is warm and dry.   Neurological:      General: No focal deficit present.      Mental Status: He is alert.      GCS: GCS eye subscore is 4. GCS verbal subscore is 1. GCS motor subscore is 5.      Sensory: Sensation is intact.      Motor: Motor function is intact.         Procedures           ED Course      Labs Reviewed   COVID-19 AND FLU A/B, NP SWAB IN TRANSPORT MEDIA 8-12 HR TAT - Abnormal; Notable for the following components:       Result Value    COVID19 Detected (*)     All other components within normal limits    Narrative:     Fact sheet for providers: https://www.fda.gov/media/700027/download    Fact sheet for patients: https://www.fda.gov/media/513349/download    Test performed by PCR.  Influenza A and Influenza B negative results should be considered presumptive in samples that have a positive SARS-CoV-2 result.    Competitive inhibition studies showed that SARS-CoV-2 virus, when present at concentrations above 3.6E+04 copies/mL, can inhibit the detection and amplification of influenza A and influenza B virus RNA if present at or below 1.8E+02 copies/mL or 4.9E+02 copies/mL, respectively, and may lead to false negative influenza virus results. If co-infection with influenza A or influenza B virus is suspected in samples with a positive SARS-CoV-2  result, the sample should be re-tested with another FDA cleared, approved, or authorized influenza test, if influenza virus detection would change clinical management.   COMPREHENSIVE METABOLIC PANEL - Abnormal; Notable for the following components:    Glucose 105 (*)     BUN 24 (*)     Albumin 3.4 (*)     ALT (SGPT) 47 (*)     Alkaline Phosphatase 143 (*)     BUN/Creatinine Ratio 30.4 (*)     All other components within normal limits    Narrative:     GFR Normal >60  Chronic Kidney Disease <60  Kidney Failure <15     CBC WITH AUTO DIFFERENTIAL - Abnormal; Notable for the following components:    Neutrophil % 77.0 (*)     Lymphocyte % 10.1 (*)     Neutrophils, Absolute 7.01 (*)     All other components within normal limits   CBC AND DIFFERENTIAL    Narrative:     The following orders were created for panel order CBC & Differential.  Procedure                               Abnormality         Status                     ---------                               -----------         ------                     CBC Auto Differential[[id number]]        Abnormal            Final result                 Please view results for these tests on the individual orders.   EXTRA TUBES    Narrative:     The following orders were created for panel order Extra Tubes.  Procedure                               Abnormality         Status                     ---------                               -----------         ------                     Gold Top - SST[[id number]]                                   Final result               Light Blue Top[[id number]]                                   Final result                 Please view results for these tests on the individual orders.   GOLD TOP - SST   LIGHT BLUE TOP     XR Chest 1 View    Result Date: 1/7/2023  Narrative: EXAM: Single XR view of the chest INDICATION: covid19 COMPARISON: 4/11/2022 radiograph and CT FINDINGS: The cardiomediastinal silhouette is within normal limits. Bilateral patchy  interstitial/airspace opacities. No large pleural effusion or pneumothorax.     Impression: Bilateral opacities concerning for multifocal infectious process. Electronically signed by:  Randy Alcazar MD  1/7/2023 9:10 PM CST Workstation: [id number]                                         Medical Decision Making  Labs/radiographic studies reviewed. cxr significant for bilateral lower lobe interstitial infiltrates, likely secondary to recent covid19 infection.  sao2 96%. Chest clear auscultation. No evidence respiratory distress. Nontoxic appearance.  Afebrile/no leukocytosis/negative SIRS criteria.  Pneumonia Severity Score=76, class III (low risk).  Stable discharge back to nursing home.  Plan empiric rocephin 1G IV/zithromax 500mg per peg tube prior to discharge.  Will complete 5d course azithromycin and 7d course augmentin at NH.  Prn albuterol HFA/phenergan dm.  Stable discharge. paxlovid contraindicated secondary to covid19 (+) test 12.27.2022.    COVID-19 virus infection: acute illness or injury  Pneumonia of both lower lobes due to infectious organism: acute illness or injury  Amount and/or Complexity of Data Reviewed  Labs: ordered.  Radiology: ordered.      Risk  Prescription drug management.          Final diagnoses:   COVID-19 virus infection   Pneumonia of both lower lobes due to infectious organism       ED Disposition  ED Disposition     ED Disposition   Discharge    Condition   Good    Comment   --             Bautista James MD  [address] [address]  [phone]    In 1 day           Medication List      New Prescriptions    amoxicillin-clavulanate 600-42.9 MG/5ML suspension  Commonly known as: AUGMENTIN  Administer 7.3 mL per G tube 2 (Two) Times a Day for 7 days.     azithromycin 100 MG/5ML suspension  Commonly known as: ZITHROMAX  Administer 12.5 mL per G tube Daily for 4 days. Give the patient 686 mg (34.3 ml) by mouth the first day then 342 mg (17.1 ml) daily for 4  days.     promethazine-dextromethorphan 6.25-15 MG/5ML syrup  Commonly known as: PROMETHAZINE-DM  Administer 5 mL per G tube 4 (Four) Times a Day As Needed for Cough.        Changed    * albuterol (2.5 MG/3ML) 0.083% nebulizer solution  Commonly known as: PROVENTIL  What changed: Another medication with the same name was added. Make sure you understand how and when to take each.     * albuterol sulfate  (90 Base) MCG/ACT inhaler  Commonly known as: PROVENTIL HFA;VENTOLIN HFA;PROAIR HFA  Inhale 2 puffs Every 4 (Four) Hours As Needed for Shortness of Air or Wheezing.  What changed: You were already taking a medication with the same name, and this prescription was added. Make sure you understand how and when to take each.     midodrine 5 MG tablet  Commonly known as: PROAMATINE  Take 1 tablet by mouth 3 (Three) Times a Day.  What changed:   · how much to take  · additional instructions         * This list has 2 medication(s) that are the same as other medications prescribed for you. Read the directions carefully, and ask your doctor or other care provider to review them with you.               Where to Get Your Medications      These medications were sent to [facility] [place], [place] - [address] [facility] - [phone] [id number]   [address] [facility] [address]    Phone: [phone]   · albuterol sulfate  (90 Base) MCG/ACT inhaler  · amoxicillin-clavulanate 600-42.9 MG/5ML suspension  · azithromycin 100 MG/5ML suspension  · promethazine-dextromethorphan 6.25-15 MG/5ML syrup          Carlos A Ely MD  01/07/23 2145       Carlos A Ely MD  01/07/23 2203

## 2023-01-23 ENCOUNTER — OFFICE VISIT (OUTPATIENT)
Dept: GASTROENTEROLOGY | Facility: CLINIC | Age: 68
End: 2023-01-23
Payer: MEDICARE

## 2023-01-23 VITALS
SYSTOLIC BLOOD PRESSURE: 129 MMHG | BODY MASS INDEX: 21.06 KG/M2 | HEIGHT: 71 IN | DIASTOLIC BLOOD PRESSURE: 73 MMHG | HEART RATE: 93 BPM

## 2023-01-23 DIAGNOSIS — D12.6 ADENOMATOUS POLYP OF COLON, UNSPECIFIED PART OF COLON: Primary | ICD-10-CM

## 2023-01-23 PROCEDURE — 99213 OFFICE O/P EST LOW 20 MIN: CPT | Performed by: INTERNAL MEDICINE

## 2023-01-23 RX ORDER — SODIUM CHLORIDE 9 MG/ML
40 INJECTION, SOLUTION INTRAVENOUS AS NEEDED
Status: CANCELLED | OUTPATIENT
Start: 2023-02-10

## 2023-01-23 RX ORDER — DEXTROSE AND SODIUM CHLORIDE 5; .45 G/100ML; G/100ML
30 INJECTION, SOLUTION INTRAVENOUS CONTINUOUS PRN
Status: CANCELLED | OUTPATIENT
Start: 2023-02-10

## 2023-02-11 NOTE — PROGRESS NOTES
Chief Complaint   Patient presents with   • 3 month f/u      polyp       Subjective    Bautista Grubbs is a 67 y.o. male.    History of Present Illness  Patient presented to GI clinic for follow-up visit today.  Feels better currently.  No further nausea or emesis.  Bowel movements are regular.  Tolerating tube feeds well.  No GI complaints at this time.  Due for sigmoidoscopy for evaluation of the rectal polyp.       The following portions of the patient's history were reviewed and updated as appropriate:   Past Medical History:   Diagnosis Date   • Alopecia    • Arthritis    • Autism    • COPD (chronic obstructive pulmonary disease) (HCC)    • GERD (gastroesophageal reflux disease)    • Hyperlipidemia    • Hypertension    • Insomnia    • Intellectual disability    • Lennox-Gastaut syndrome (HCC)    • Major depressive disorder    • Orthostatic hypotension    • Osteoporosis    • Right bundle branch block    • Scoliosis    • Seizures (HCC)      Past Surgical History:   Procedure Laterality Date   • COLONOSCOPY N/A 1/8/2021    Procedure: COLONOSCOPY;  Surgeon: Wanda Lang MD;  Location: [facility] ENDOSCOPY;  Service: Gastroenterology;  Laterality: N/A;   • COLONOSCOPY N/A 9/2/2021    Procedure: COLONOSCOPY;  Surgeon: Wanda Lang MD;  Location: [facility] ENDOSCOPY;  Service: Gastroenterology;  Laterality: N/A;   • COLONOSCOPY N/A 10/14/2022    Procedure: COLONOSCOPY;  Surgeon: Wanda Lang MD;  Location: [facility] ENDOSCOPY;  Service: Gastroenterology;  Laterality: N/A;   • ENDOSCOPY N/A 1/7/2021    Procedure: ESOPHAGOGASTRODUODENOSCOPY;  Surgeon: Wanda Lang MD;  Location: [facility] ENDOSCOPY;  Service: Gastroenterology;  Laterality: N/A;   • ENDOSCOPY N/A 1/22/2021    Procedure: ESOPHAGOGASTRODUODENOSCOPY;  Surgeon: Wanda Lang MD;  Location: [facility] ENDOSCOPY;  Service: Gastroenterology;  Laterality: N/A;   • ENDOSCOPY W/ PEG TUBE PLACEMENT N/A 6/25/2021    Procedure:  ESOPHAGOGASTRODUODENOSCOPY WITH PERCUTANEOUS ENDOSCOPIC GASTROSTOMY TUBE INSERTION WITH ANESTHESIA;  Surgeon: Wanda Lang MD;  Location: [facility] ENDOSCOPY;  Service: Gastroenterology;  Laterality: N/A;   • SIGMOIDOSCOPY N/A 10/11/2021    Procedure: SIGMOIDOSCOPY FLEXIBLE;  Surgeon: Wanda Lang MD;  Location: [facility] ENDOSCOPY;  Service: Gastroenterology;  Laterality: N/A;   • SIGMOIDOSCOPY N/A 11/30/2021    Procedure: SIGMOIDOSCOPY FLEXIBLE;  Surgeon: Wanda Lang MD;  Location: [facility] ENDOSCOPY;  Service: Gastroenterology;  Laterality: N/A;   • SIGMOIDOSCOPY N/A 6/16/2022    Procedure: SIGMOIDOSCOPY FLEXIBLE;  Surgeon: Wanda Lang MD;  Location: [facility] ENDOSCOPY;  Service: Gastroenterology;  Laterality: N/A;  argon at rectal polypectomy site   • UPPER GASTROINTESTINAL ENDOSCOPY  01/22/2021   • UPPER GASTROINTESTINAL ENDOSCOPY  06/25/2021     Family History   Problem Relation Age of Onset   • Hypertension Father        Prior to Admission medications    Medication Sig Start Date End Date Taking? Authorizing Provider   albuterol (PROVENTIL) (2.5 MG/3ML) 0.083% nebulizer solution  10/12/21  Yes Patricia Driscoll MD   albuterol sulfate  (90 Base) MCG/ACT inhaler Inhale 2 puffs Every 4 (Four) Hours As Needed for Shortness of Air or Wheezing. 1/7/23  Yes Carlos A Ely MD   betamethasone dipropionate (DIPROLENE) 0.05 % ointment  9/21/22  Yes Patricia Driscoll MD   calcium carbonate-vitamin d 600-400 MG-UNIT per tablet Take 1 tablet by mouth 2 (Two) Times a Day. Per G-Tube   Yes Patricia Driscoll MD   denosumab (Prolia) 60 MG/ML solution prefilled syringe syringe Inject 60 mg under the skin into the appropriate area as directed. Every 6 Months   Yes Patricia Driscoll MD   DIAZEPAM RE Insert 10 mg into the rectum As Needed (For Seizure Activity).   Yes Patricia Driscoll MD   famotidine (PEPCID) 20 MG tablet Take 20 mg by mouth Daily. Per G-Tube   Yes Patricia Driscoll  MD   ferrous sulfate 325 (65 FE) MG tablet Take 325 mg by mouth Daily With Breakfast. Per G-Tube   Yes Patricia Driscoll MD   ibuprofen (ADVIL,MOTRIN) 600 MG tablet Take 1 tablet by mouth Every 8 (Eight) Hours As Needed for Moderate Pain . 1/19/22  Yes Sandeep Fernandez MD   ipratropium-albuterol (DUO-NEB) 0.5-2.5 mg/3 ml nebulizer Take 3 mL by nebulization Every 4 (Four) Hours As Needed for Shortness of Air. 4/15/22  Yes Leander Meade MD   lamoTRIgine (LaMICtal) 200 MG tablet Take 200 mg by mouth 2 (Two) Times a Day. Per G-Tube   Yes Patricia Driscoll MD   lamoTRIgine (LaMICtal) 25 MG tablet Take 25 mg by mouth 2 (Two) Times a Day. Per G-Tube To Equal 225 MG   Yes Patricia Driscoll MD   levETIRAcetam (KEPPRA) 1000 MG tablet Take 1,000 mg by mouth 2 (Two) Times a Day. Per G-Tube   Yes Patricia Driscoll MD   LORazepam (ATIVAN) 1 MG tablet GIVE ONE TABLET PER G-TUBE TWICE DAILY FOR ANXIETY AND AGITATION 2/2/22  Yes Christelle Smith APRN   midodrine (PROAMATINE) 5 MG tablet Take 1 tablet by mouth 3 (Three) Times a Day.  Patient taking differently: Take 10 mg by mouth 3 (Three) Times a Day. Per G-Tube 2/4/19  Yes Earnestine Reid MD   Nutritional Supplements (JEVITY 1.5 JEFFERY PO) Take  by mouth. Gets 390mL bolus every 6 hours 1A-7A-1P-7P   Yes ProviderPatricia MD   polyethylene glycol (GoLYTELY) 236 g solution Starting at noon on day prior to procedure, drink 8 ounces every 30 minutes until all gone or stools are clear. May add flavor packet. 9/26/22  Yes Wanda Lang MD   promethazine-dextromethorphan (PROMETHAZINE-DM) 6.25-15 MG/5ML syrup Administer 5 mL per G tube 4 (Four) Times a Day As Needed for Cough. 1/7/23  Yes Carlos A Ely MD   simvastatin (ZOCOR) 20 MG tablet Take 20 mg by mouth Daily. Per G-Tube   Yes Provider, MD Patricia   sucralfate (CARAFATE) 1 g tablet Take 1 tablet by mouth 4 (Four) Times a Day Before Meals & at Bedtime. 5/19/21  Yes Sandeep Fernandez MD  "  thioridazine (MELLARIL) 100 MG tablet Take 200 mg by mouth 2 (Two) Times a Day. Per G-Tube   Yes Provider, Patricia, MD   triamcinolone (KENALOG) 0.1 % cream  8/25/22  Yes Provider, Historical, MD     Allergies   Allergen Reactions   • Haldol [Haloperidol] Unknown (See Comments)     Unknown     • Levaquin [Levofloxacin] Other (See Comments)     Lowers seizure threshold     Social History     Socioeconomic History   • Marital status: Single   Tobacco Use   • Smoking status: Never   • Smokeless tobacco: Never   Vaping Use   • Vaping Use: Never used   Substance and Sexual Activity   • Alcohol use: Never   • Drug use: Never   • Sexual activity: Defer       Review of Systems  Review of Systems   Unable to perform ROS: Patient nonverbal        /73 (BP Location: Left arm)   Pulse 93   Ht 180.3 cm (71\")   BMI 21.06 kg/m²     Objective    Physical Exam  Constitutional:       Appearance: He is well-developed.   HENT:      Head: Normocephalic and atraumatic.   Eyes:      Conjunctiva/sclera: Conjunctivae normal.      Pupils: Pupils are equal, round, and reactive to light.   Neck:      Thyroid: No thyromegaly.   Cardiovascular:      Rate and Rhythm: Normal rate and regular rhythm.      Heart sounds: Normal heart sounds. No murmur heard.  Pulmonary:      Effort: Pulmonary effort is normal.      Breath sounds: Normal breath sounds. No wheezing.   Abdominal:      General: Bowel sounds are normal. There is no distension.      Palpations: Abdomen is soft. There is no mass.      Tenderness: There is no abdominal tenderness.      Hernia: No hernia is present.   Genitourinary:     Comments: No lesions noted  Musculoskeletal:         General: No tenderness. Normal range of motion.      Cervical back: Normal range of motion and neck supple.   Lymphadenopathy:      Cervical: No cervical adenopathy.   Skin:     General: Skin is warm and dry.      Findings: No rash.   Neurological:      Mental Status: He is alert.      Cranial " Nerves: No cranial nerve deficit.       Admission on 01/07/2023, Discharged on 01/07/2023   Component Date Value Ref Range Status   • COVID19 01/07/2023 Detected (C)  Not Detected - Ref. Range Final   • Influenza A PCR 01/07/2023 Not Detected  Not Detected Final   • Influenza B PCR 01/07/2023 Not Detected  Not Detected Final   • Glucose 01/07/2023 105 (H)  65 - 99 mg/dL Final   • BUN 01/07/2023 24 (H)  8 - 23 mg/dL Final   • Creatinine 01/07/2023 0.79  0.76 - 1.27 mg/dL Final   • Sodium 01/07/2023 140  136 - 145 mmol/L Final   • Potassium 01/07/2023 4.6  3.5 - 5.2 mmol/L Final   • Chloride 01/07/2023 103  98 - 107 mmol/L Final   • CO2 01/07/2023 28.0  22.0 - 29.0 mmol/L Final   • Calcium 01/07/2023 9.4  8.6 - 10.5 mg/dL Final   • Total Protein 01/07/2023 7.1  6.0 - 8.5 g/dL Final   • Albumin 01/07/2023 3.4 (L)  3.5 - 5.2 g/dL Final   • ALT (SGPT) 01/07/2023 47 (H)  1 - 41 U/L Final   • AST (SGOT) 01/07/2023 35  1 - 40 U/L Final   • Alkaline Phosphatase 01/07/2023 143 (H)  39 - 117 U/L Final   • Total Bilirubin 01/07/2023 0.2  0.0 - 1.2 mg/dL Final   • Globulin 01/07/2023 3.7  gm/dL Final   • A/G Ratio 01/07/2023 0.9  g/dL Final   • BUN/Creatinine Ratio 01/07/2023 30.4 (H)  7.0 - 25.0 Final   • Anion Gap 01/07/2023 9.0  5.0 - 15.0 mmol/L Final   • eGFR 01/07/2023 98.0  >60.0 mL/min/1.73 Final    National Kidney Foundation and American Society of Nephrology (ASN) Task Force recommended calculation based on the Chronic Kidney Disease Epidemiology Collaboration (CKD-EPI) equation refit without adjustment for race.   • WBC 01/07/2023 9.11  3.40 - 10.80 10*3/mm3 Final   • RBC 01/07/2023 4.44  4.14 - 5.80 10*6/mm3 Final   • Hemoglobin 01/07/2023 13.0  13.0 - 17.7 g/dL Final   • Hematocrit 01/07/2023 40.1  37.5 - 51.0 % Final   • MCV 01/07/2023 90.3  79.0 - 97.0 fL Final   • MCH 01/07/2023 29.3  26.6 - 33.0 pg Final   • MCHC 01/07/2023 32.4  31.5 - 35.7 g/dL Final   • RDW 01/07/2023 13.3  12.3 - 15.4 % Final   • RDW-SD  01/07/2023 43.3  37.0 - 54.0 fl Final   • MPV 01/07/2023 11.0  6.0 - 12.0 fL Final   • Platelets 01/07/2023 254  140 - 450 10*3/mm3 Final   • Neutrophil % 01/07/2023 77.0 (H)  42.7 - 76.0 % Final   • Lymphocyte % 01/07/2023 10.1 (L)  19.6 - 45.3 % Final   • Monocyte % 01/07/2023 8.5  5.0 - 12.0 % Final   • Eosinophil % 01/07/2023 3.5  0.3 - 6.2 % Final   • Basophil % 01/07/2023 0.4  0.0 - 1.5 % Final   • Immature Grans % 01/07/2023 0.5  0.0 - 0.5 % Final   • Neutrophils, Absolute 01/07/2023 7.01 (H)  1.70 - 7.00 10*3/mm3 Final   • Lymphocytes, Absolute 01/07/2023 0.92  0.70 - 3.10 10*3/mm3 Final   • Monocytes, Absolute 01/07/2023 0.77  0.10 - 0.90 10*3/mm3 Final   • Eosinophils, Absolute 01/07/2023 0.32  0.00 - 0.40 10*3/mm3 Final   • Basophils, Absolute 01/07/2023 0.04  0.00 - 0.20 10*3/mm3 Final   • Immature Grans, Absolute 01/07/2023 0.05  0.00 - 0.05 10*3/mm3 Final   • nRBC 01/07/2023 0.0  0.0 - 0.2 /100 WBC Final   • Extra Tube 01/07/2023 Hold for add-ons.   Final    Auto resulted.   • Extra Tube 01/07/2023 Hold for add-ons.   Final    Auto resulted     Assessment & Plan      1. Adenomatous polyp of colon, unspecified part of colon    1.  Colon polyp status post piecemeal polypectomy, proceed with sigmoidoscopy for further evaluation.  2.  Oropharyngeal dysphagia, on PEG tube feeds.  3.  Nausea and vomiting, resolved.  4.  Anemia, improving.  Repeat CBC next visit.      Orders placed during this encounter include:  Orders Placed This Encounter   Procedures   • Obtain Informed Consent     Standing Status:   Future     Order Specific Question:   Informed Consent Given For     Answer:   flexible sigmoidoscopy       SIGMOIDOSCOPY FLEXIBLE enemas on call at facility (N/A)    Review and/or summary of lab tests, radiology, procedures, medications. Review and summary of old records and obtaining of history. The risks and benefits of my recommendations, as well as other treatment options were discussed with the patient  and his nursing attendant today. Questions were answered.    No orders of the defined types were placed in this encounter.      Follow-up: Return in about 1 month (around 2/23/2023).               Results for orders placed or performed during the hospital encounter of 01/07/23   Gold Top - SST   Result Value Ref Range    Extra Tube Hold for add-ons.    COVID-19 and FLU A/B PCR - Swab, Nasopharynx    Specimen: Nasopharynx; Swab   Result Value Ref Range    COVID19 Detected (C) Not Detected - Ref. Range    Influenza A PCR Not Detected Not Detected    Influenza B PCR Not Detected Not Detected   CBC Auto Differential    Specimen: Blood   Result Value Ref Range    WBC 9.11 3.40 - 10.80 10*3/mm3    RBC 4.44 4.14 - 5.80 10*6/mm3    Hemoglobin 13.0 13.0 - 17.7 g/dL    Hematocrit 40.1 37.5 - 51.0 %    MCV 90.3 79.0 - 97.0 fL    MCH 29.3 26.6 - 33.0 pg    MCHC 32.4 31.5 - 35.7 g/dL    RDW 13.3 12.3 - 15.4 %    RDW-SD 43.3 37.0 - 54.0 fl    MPV 11.0 6.0 - 12.0 fL    Platelets 254 140 - 450 10*3/mm3    Neutrophil % 77.0 (H) 42.7 - 76.0 %    Lymphocyte % 10.1 (L) 19.6 - 45.3 %    Monocyte % 8.5 5.0 - 12.0 %    Eosinophil % 3.5 0.3 - 6.2 %    Basophil % 0.4 0.0 - 1.5 %    Immature Grans % 0.5 0.0 - 0.5 %    Neutrophils, Absolute 7.01 (H) 1.70 - 7.00 10*3/mm3    Lymphocytes, Absolute 0.92 0.70 - 3.10 10*3/mm3    Monocytes, Absolute 0.77 0.10 - 0.90 10*3/mm3    Eosinophils, Absolute 0.32 0.00 - 0.40 10*3/mm3    Basophils, Absolute 0.04 0.00 - 0.20 10*3/mm3    Immature Grans, Absolute 0.05 0.00 - 0.05 10*3/mm3    nRBC 0.0 0.0 - 0.2 /100 WBC   Light Blue Top   Result Value Ref Range    Extra Tube Hold for add-ons.    Comprehensive Metabolic Panel    Specimen: Blood   Result Value Ref Range    Glucose 105 (H) 65 - 99 mg/dL    BUN 24 (H) 8 - 23 mg/dL    Creatinine 0.79 0.76 - 1.27 mg/dL    Sodium 140 136 - 145 mmol/L    Potassium 4.6 3.5 - 5.2 mmol/L    Chloride 103 98 - 107 mmol/L    CO2 28.0 22.0 - 29.0 mmol/L    Calcium 9.4 8.6 -  10.5 mg/dL    Total Protein 7.1 6.0 - 8.5 g/dL    Albumin 3.4 (L) 3.5 - 5.2 g/dL    ALT (SGPT) 47 (H) 1 - 41 U/L    AST (SGOT) 35 1 - 40 U/L    Alkaline Phosphatase 143 (H) 39 - 117 U/L    Total Bilirubin 0.2 0.0 - 1.2 mg/dL    Globulin 3.7 gm/dL    A/G Ratio 0.9 g/dL    BUN/Creatinine Ratio 30.4 (H) 7.0 - 25.0    Anion Gap 9.0 5.0 - 15.0 mmol/L    eGFR 98.0 >60.0 mL/min/1.73   Results for orders placed or performed during the hospital encounter of 10/14/22   TISSUE EXAM, [facility] (JENIFER,COR,MAD)    Specimen: A: Large Intestine, Cecum; Polyp    B: Large Intestine, Right / Ascending Colon; Polyp    C: Large Intestine, Rectum; Polyp   Result Value Ref Range    Reference Lab Report       [facility]  [address] [place] [address]  Phone: [phone] or [phone]  Fax: [phone]  Nils Manzanares M.D., Medical Director    PATIENT NAME                           LABORATORY NO.  CORDELIA QIU.                    [id number]  [id number]                         AGE              SEX  SSN           CLIENT REF #  [facility]           66      1955      [id number]   [id number]    [place]                       REQUESTING M.D.     ATTENDING M.D.     COPY TO[address]  DATE COLLECTED      DATE RECEIVED      DATE REPORTED  10/14/2022          10/17/2022         10/18/2022    DIAGNOSIS:  A.   CECUM POLYP:  Fragments of tubular adenoma  Negative for carcinoma or high-grade dysplasia    B.   ASCENDING COLON POLYP:  Fragments of tubular adenoma  Negative for carcinoma or high-grade dysplasia    C.    RECTAL POLYP:  Fragments of tubular adenoma  Negative for carcinoma or high-grade dysplasia    CLINICAL HISTORY:  Adenomatous polyp of colon, unspecified part of colon    SPECIMENS RECEIVED:  A.  CECUM POLYP  B.  ASCENDING COLON POLYP  C.  RECTAL  "POLYP    MICROSCOPIC DESCRIPTION:  Tissue blocks are prepared and slides are examined microscopically on all  specimens. See diagnosis for details.    Professional interpretation rendered by Markus Sahni M.D.,NEEMA at [facility], [facility], [address] [place] [address].    GROSS DESCRIPTION:  A.  Specimen is received in 1 formalin filled container labeled \"large intestine,  cecum polyp\" and consists of multiple pieces of tan soft tissue measuring  0.5 x 0.3 x 0.2 cm in aggregate.  Specimen is submitted entirely in 1  cassette.  JANET  B.  Specimen is received in 1 formalin filled container labeled \"large intestine,  right/ascending colon polyp\" and consists of multiple pieces of tan soft  tissue measuring 1.7 x 1.3 x 0.3 cm in aggregate.   Specimen is filtered and  submitted entirely in 1 cassette.  C.  Specimen is received in 1 formalin filled container labeled \"large intestine,  rectum polyp\" and consists of 3 pieces of tan soft tissue measuring 0.7 x  0.5 x 0.3 cm in aggregate.  The largest piece was trisected and the  specimen is submitted entirely in 1 cassette.    REVIEWED, DIAGNOSED AND ELECTRONICALLY  SIGNED BY:    Markus Sahni M.D.,LAURENT.  CPT CODES:  88305x3     Results for orders placed or performed during the hospital encounter of 22   TISSUE EXAM, [facility] (JENIFER,COR,MAD)    Specimen: Large Intestine, Rectum; Polyp   Result Value Ref Range    Reference Lab Report       [facility]  [address] [place] [address]  Phone: [phone] or [phone]  Fax: [phone]  Nils Manzanares M.D., Medical Director    PATIENT NAME                                     LABORATORY NO.  1800   CORDELIA ONEIL.                              [id number]  [id number]                                 AGE                    SEX   N              CLIENT REF #  [facility]                   66        1955           [id number]      " "[id number]    [place]                               REQUESTING M.D.           ATTENDING MRUIZ         COPY TO.  [address] [place] [address]                         [place] [address]                     ABDOUL  DATE COLLECTED            DATE RECEIVED          DATE REPORTED  06/16/2022 06/17/2022 06/20/2022    DIAGNOSIS:  RECTAL POLYP:  Tubular adenoma, multiple fragments    JBS/pah    CLINICAL HISTORY:  Adenomatous  polyp of colon, unspecified part of colon    SPECIMENS RECEIVED:  RECTAL POLYP    MICROSCOPIC DESCRIPTION:  Tissue blocks are prepared and slides are examined microscopically on all  specimens. See diagnosis for details.    Professional interpretation rendered by Fitz Tavarez M.D. at [facility], [address] [place] [address] , [place] [address].    GROSS DESCRIPTION:  Specimen is received in 1 formalin filled container labeled \"rectum polyp\" and  consists of multiple portions of tan soft tissue and possible vegetative matter  measuring 1.7 x 1.1 x 0.3 cm in aggregate.  The specimen is filtered and  submitted entirely in 1 cassette.  BW    REVIEWED, DIAGNOSED AND ELECTRONICALLY  SIGNED BY:    Fitz Tavarez M.D.  CPT CODES:  84370     Results for orders placed or performed during the hospital encounter of 04/11/22   CRE Screen by PCR - Swab, Large Intestine, Rectum    Specimen: Large Intestine, Rectum; Swab   Result Value Ref Range    CRE SCREEN Not Detected Not Detected, Invalid    OXA 48 Strain Not Detected     IMP STRAIN Not Detected     VIM STRAIN Not Detected     NDM Strain Not Detected     KPC Strain Not Detected    COVID-19 and FLU A/B PCR - Swab, Nasopharynx    Specimen: Nasopharynx; Swab   Result Value Ref Range    COVID19 Not Detected Not Detected - Ref. Range    Influenza A PCR Not Detected Not Detected    Influenza B PCR Not Detected Not Detected   CBC Auto Differential    Specimen: Blood   Result Value Ref Range    " WBC 6.80 3.40 - 10.80 10*3/mm3    RBC 4.38 4.14 - 5.80 10*6/mm3    Hemoglobin 13.7 13.0 - 17.7 g/dL    Hematocrit 39.3 37.5 - 51.0 %    MCV 89.7 79.0 - 97.0 fL    MCH 31.3 26.6 - 33.0 pg    MCHC 34.9 31.5 - 35.7 g/dL    RDW 13.2 12.3 - 15.4 %    RDW-SD 43.4 37.0 - 54.0 fl    MPV 11.1 6.0 - 12.0 fL    Platelets 197 140 - 450 10*3/mm3    Neutrophil % 70.8 42.7 - 76.0 %    Lymphocyte % 14.6 (L) 19.6 - 45.3 %    Monocyte % 9.4 5.0 - 12.0 %    Eosinophil % 4.4 0.3 - 6.2 %    Basophil % 0.4 0.0 - 1.5 %    Immature Grans % 0.4 0.0 - 0.5 %    Neutrophils, Absolute 4.81 1.70 - 7.00 10*3/mm3    Lymphocytes, Absolute 0.99 0.70 - 3.10 10*3/mm3    Monocytes, Absolute 0.64 0.10 - 0.90 10*3/mm3    Eosinophils, Absolute 0.30 0.00 - 0.40 10*3/mm3    Basophils, Absolute 0.03 0.00 - 0.20 10*3/mm3    Immature Grans, Absolute 0.03 0.00 - 0.05 10*3/mm3    nRBC 0.0 0.0 - 0.2 /100 WBC   CBC Auto Differential    Specimen: Blood   Result Value Ref Range    WBC 6.71 3.40 - 10.80 10*3/mm3    RBC 4.35 4.14 - 5.80 10*6/mm3    Hemoglobin 13.8 13.0 - 17.7 g/dL    Hematocrit 40.1 37.5 - 51.0 %    MCV 92.2 79.0 - 97.0 fL    MCH 31.7 26.6 - 33.0 pg    MCHC 34.4 31.5 - 35.7 g/dL    RDW 13.3 12.3 - 15.4 %    RDW-SD 45.6 37.0 - 54.0 fl    MPV 12.2 (H) 6.0 - 12.0 fL    Platelets 166 140 - 450 10*3/mm3    Neutrophil % 65.2 42.7 - 76.0 %    Lymphocyte % 18.0 (L) 19.6 - 45.3 %    Monocyte % 9.8 5.0 - 12.0 %    Eosinophil % 6.3 (H) 0.3 - 6.2 %    Basophil % 0.4 0.0 - 1.5 %    Immature Grans % 0.3 0.0 - 0.5 %    Neutrophils, Absolute 4.37 1.70 - 7.00 10*3/mm3    Lymphocytes, Absolute 1.21 0.70 - 3.10 10*3/mm3    Monocytes, Absolute 0.66 0.10 - 0.90 10*3/mm3    Eosinophils, Absolute 0.42 (H) 0.00 - 0.40 10*3/mm3    Basophils, Absolute 0.03 0.00 - 0.20 10*3/mm3    Immature Grans, Absolute 0.02 0.00 - 0.05 10*3/mm3    nRBC 0.0 0.0 - 0.2 /100 WBC     *Note: Due to a large number of results and/or encounters for the requested time period, some results have not  been displayed. A complete set of results can be found in Results Review.         This document has been electronically signed by Wanda Lang MD on February 11, 2023 07:20 CST

## 2023-02-24 ENCOUNTER — HOSPITAL ENCOUNTER (OUTPATIENT)
Facility: HOSPITAL | Age: 68
Setting detail: HOSPITAL OUTPATIENT SURGERY
Discharge: HOME | End: 2023-02-24
Attending: INTERNAL MEDICINE | Admitting: INTERNAL MEDICINE
Payer: MEDICARE

## 2023-02-24 ENCOUNTER — ANESTHESIA EVENT (OUTPATIENT)
Dept: GASTROENTEROLOGY | Facility: HOSPITAL | Age: 68
End: 2023-02-24
Payer: MEDICARE

## 2023-02-24 ENCOUNTER — ANESTHESIA (OUTPATIENT)
Dept: GASTROENTEROLOGY | Facility: HOSPITAL | Age: 68
End: 2023-02-24
Payer: MEDICARE

## 2023-02-24 VITALS
BODY MASS INDEX: 21.14 KG/M2 | HEIGHT: 71 IN | OXYGEN SATURATION: 93 % | TEMPERATURE: 96.9 F | DIASTOLIC BLOOD PRESSURE: 61 MMHG | HEART RATE: 71 BPM | RESPIRATION RATE: 19 BRPM | SYSTOLIC BLOOD PRESSURE: 94 MMHG | WEIGHT: 151 LBS

## 2023-02-24 DIAGNOSIS — D12.6 ADENOMATOUS POLYP OF COLON, UNSPECIFIED PART OF COLON: ICD-10-CM

## 2023-02-24 PROCEDURE — 25010000002 PROPOFOL 10 MG/ML EMULSION

## 2023-02-24 PROCEDURE — 88305 TISSUE EXAM BY PATHOLOGIST: CPT

## 2023-02-24 PROCEDURE — 45346 SIGMOIDOSCOPY W/ABLATION: CPT | Performed by: INTERNAL MEDICINE

## 2023-02-24 RX ORDER — SODIUM CHLORIDE 9 MG/ML
40 INJECTION, SOLUTION INTRAVENOUS AS NEEDED
Status: DISCONTINUED | OUTPATIENT
Start: 2023-02-24 | End: 2023-02-24 | Stop reason: HOSPADM

## 2023-02-24 RX ORDER — PROPOFOL 10 MG/ML
VIAL (ML) INTRAVENOUS AS NEEDED
Status: DISCONTINUED | OUTPATIENT
Start: 2023-02-24 | End: 2023-02-24 | Stop reason: SURG

## 2023-02-24 RX ORDER — DEXTROSE AND SODIUM CHLORIDE 5; .45 G/100ML; G/100ML
30 INJECTION, SOLUTION INTRAVENOUS CONTINUOUS PRN
Status: DISCONTINUED | OUTPATIENT
Start: 2023-02-24 | End: 2023-02-24 | Stop reason: HOSPADM

## 2023-02-24 RX ADMIN — DEXTROSE AND SODIUM CHLORIDE 30 ML/HR: 5; 450 INJECTION, SOLUTION INTRAVENOUS at 15:36

## 2023-02-24 RX ADMIN — PROPOFOL 10 MG: 10 INJECTION, EMULSION INTRAVENOUS at 16:26

## 2023-02-24 RX ADMIN — PROPOFOL 20 MG: 10 INJECTION, EMULSION INTRAVENOUS at 16:34

## 2023-02-24 RX ADMIN — PROPOFOL 20 MG: 10 INJECTION, EMULSION INTRAVENOUS at 16:30

## 2023-02-24 RX ADMIN — PROPOFOL 20 MG: 10 INJECTION, EMULSION INTRAVENOUS at 16:32

## 2023-02-24 RX ADMIN — PROPOFOL 20 MG: 10 INJECTION, EMULSION INTRAVENOUS at 16:28

## 2023-02-24 RX ADMIN — PROPOFOL 50 MG: 10 INJECTION, EMULSION INTRAVENOUS at 16:25

## 2023-02-24 NOTE — ANESTHESIA POSTPROCEDURE EVALUATION
Patient: Bautista Grubbs    Procedure Summary     Date: 02/24/23 Room / Location: [facility] ENDOSCOPY 1 / [facility] ENDOSCOPY    Anesthesia Start: 1619 Anesthesia Stop: 1635    Procedure: SIGMOIDOSCOPY FLEXIBLE enemas on call at facility Diagnosis:       Adenomatous polyp of colon, unspecified part of colon      (Adenomatous polyp of colon, unspecified part of colon [D12.6])    Surgeons: Wanda Lang MD Provider: Kelsi Mattson CRNA    Anesthesia Type: general ASA Status: 3          Anesthesia Type: general    Vitals  No vitals data found for the desired time range.          Post Anesthesia Care and Evaluation    Patient location during evaluation: bedside  Patient participation: waiting for patient participation  Level of consciousness: responsive to noxious stimuli  Pain score: 0  Pain management: adequate    Airway patency: patent  Anesthetic complications: No anesthetic complications  PONV Status: none  Cardiovascular status: acceptable  Respiratory status: acceptable and room air  Hydration status: acceptable    Comments: ---------------------------               02/24/23                      1517         ---------------------------   BP:          101/63         Pulse:         91           Resp:          18           Temp:   97.3 °F (36.3 °C)   SpO2:          92%         ---------------------------

## 2023-02-24 NOTE — ANESTHESIA PREPROCEDURE EVALUATION
Anesthesia Evaluation     Patient summary reviewed and Nursing notes reviewed   no history of anesthetic complications:  NPO Solid Status: > 8 hours  NPO Liquid Status: > 4 hours           Airway   No difficulty expected  Comment: Does not cooperate with airway exam  Dental    (+) poor dentition    Pulmonary - normal exam   (+) COPD,   (-) pneumonia  Cardiovascular     Rhythm: regular  Rate: normal    (+) hypertension well controlled, hyperlipidemia,   (-) dysrhythmias      Neuro/Psych  (+) seizures, syncope, psychiatric history Bipolar,      ROS Comment: autism  GI/Hepatic/Renal/Endo    (+)  GERD, GI bleeding lower ,     Musculoskeletal     Abdominal  - normal exam   Substance History - negative use     OB/GYN negative ob/gyn ROS         Other   arthritis,                      Anesthesia Plan    ASA 3     general   total IV anesthesia  intravenous induction     Anesthetic plan, risks, benefits, and alternatives have been provided, discussed and informed consent has been obtained with: other and patient (brother Valdemar).    Plan discussed with CRNA.

## 2023-02-28 LAB — REF LAB TEST METHOD: NORMAL

## 2023-03-02 ENCOUNTER — HOSPITAL ENCOUNTER (EMERGENCY)
Facility: HOSPITAL | Age: 68
Discharge: HOME | End: 2023-03-02
Attending: FAMILY MEDICINE | Admitting: FAMILY MEDICINE
Payer: MEDICARE

## 2023-03-02 ENCOUNTER — APPOINTMENT (OUTPATIENT)
Dept: GENERAL RADIOLOGY | Facility: HOSPITAL | Age: 68
End: 2023-03-02
Payer: MEDICARE

## 2023-03-02 ENCOUNTER — APPOINTMENT (OUTPATIENT)
Dept: CT IMAGING | Facility: HOSPITAL | Age: 68
End: 2023-03-02
Payer: MEDICARE

## 2023-03-02 VITALS
TEMPERATURE: 98.5 F | HEART RATE: 75 BPM | BODY MASS INDEX: 21.14 KG/M2 | HEIGHT: 71 IN | SYSTOLIC BLOOD PRESSURE: 101 MMHG | WEIGHT: 151 LBS | DIASTOLIC BLOOD PRESSURE: 69 MMHG | OXYGEN SATURATION: 93 % | RESPIRATION RATE: 20 BRPM

## 2023-03-02 DIAGNOSIS — R53.1 GENERALIZED WEAKNESS: Primary | ICD-10-CM

## 2023-03-02 LAB
ABO GROUP BLD: NORMAL
ALBUMIN SERPL-MCNC: 3.3 G/DL (ref 3.5–5.2)
ALBUMIN/GLOB SERPL: 0.9 G/DL
ALP SERPL-CCNC: 142 U/L (ref 39–117)
ALT SERPL W P-5'-P-CCNC: 33 U/L (ref 1–41)
ANION GAP SERPL CALCULATED.3IONS-SCNC: 7 MMOL/L (ref 5–15)
APTT PPP: 33.9 SECONDS (ref 20–40.3)
AST SERPL-CCNC: 26 U/L (ref 1–40)
BASOPHILS # BLD AUTO: 0.04 10*3/MM3 (ref 0–0.2)
BASOPHILS NFR BLD AUTO: 0.3 % (ref 0–1.5)
BILIRUB SERPL-MCNC: 0.2 MG/DL (ref 0–1.2)
BILIRUB UR QL STRIP: NEGATIVE
BLD GP AB SCN SERPL QL: NEGATIVE
BUN SERPL-MCNC: 23 MG/DL (ref 8–23)
BUN/CREAT SERPL: 30.7 (ref 7–25)
CALCIUM SPEC-SCNC: 9.5 MG/DL (ref 8.6–10.5)
CHLORIDE SERPL-SCNC: 100 MMOL/L (ref 98–107)
CK SERPL-CCNC: 57 U/L (ref 20–200)
CLARITY UR: CLEAR
CO2 SERPL-SCNC: 30 MMOL/L (ref 22–29)
COLOR UR: YELLOW
CREAT SERPL-MCNC: 0.75 MG/DL (ref 0.76–1.27)
DEPRECATED RDW RBC AUTO: 44.2 FL (ref 37–54)
EGFRCR SERPLBLD CKD-EPI 2021: 99.5 ML/MIN/1.73
EOSINOPHIL # BLD AUTO: 0.31 10*3/MM3 (ref 0–0.4)
EOSINOPHIL NFR BLD AUTO: 2.5 % (ref 0.3–6.2)
ERYTHROCYTE [DISTWIDTH] IN BLOOD BY AUTOMATED COUNT: 13.5 % (ref 12.3–15.4)
GLOBULIN UR ELPH-MCNC: 3.8 GM/DL
GLUCOSE SERPL-MCNC: 74 MG/DL (ref 65–99)
GLUCOSE UR STRIP-MCNC: NEGATIVE MG/DL
HCT VFR BLD AUTO: 36.3 % (ref 37.5–51)
HGB BLD-MCNC: 11.7 G/DL (ref 13–17.7)
HGB UR QL STRIP.AUTO: NEGATIVE
HOLD SPECIMEN: NORMAL
HOLD SPECIMEN: NORMAL
IMM GRANULOCYTES # BLD AUTO: 0.04 10*3/MM3 (ref 0–0.05)
IMM GRANULOCYTES NFR BLD AUTO: 0.3 % (ref 0–0.5)
INR PPP: 1.06 (ref 0.8–1.2)
KETONES UR QL STRIP: NEGATIVE
LEUKOCYTE ESTERASE UR QL STRIP.AUTO: NEGATIVE
LYMPHOCYTES # BLD AUTO: 0.81 10*3/MM3 (ref 0.7–3.1)
LYMPHOCYTES NFR BLD AUTO: 6.5 % (ref 19.6–45.3)
Lab: NORMAL
MAGNESIUM SERPL-MCNC: 2.3 MG/DL (ref 1.6–2.4)
MCH RBC QN AUTO: 28.6 PG (ref 26.6–33)
MCHC RBC AUTO-ENTMCNC: 32.2 G/DL (ref 31.5–35.7)
MCV RBC AUTO: 88.8 FL (ref 79–97)
MONOCYTES # BLD AUTO: 1.09 10*3/MM3 (ref 0.1–0.9)
MONOCYTES NFR BLD AUTO: 8.7 % (ref 5–12)
NEUTROPHILS NFR BLD AUTO: 10.24 10*3/MM3 (ref 1.7–7)
NEUTROPHILS NFR BLD AUTO: 81.7 % (ref 42.7–76)
NITRITE UR QL STRIP: NEGATIVE
NRBC BLD AUTO-RTO: 0 /100 WBC (ref 0–0.2)
NT-PROBNP SERPL-MCNC: <36 PG/ML (ref 0–900)
PH UR STRIP.AUTO: 8 [PH] (ref 5–9)
PLATELET # BLD AUTO: 288 10*3/MM3 (ref 140–450)
PMV BLD AUTO: 10.7 FL (ref 6–12)
POTASSIUM SERPL-SCNC: 4.6 MMOL/L (ref 3.5–5.2)
PROT SERPL-MCNC: 7.1 G/DL (ref 6–8.5)
PROT UR QL STRIP: NEGATIVE
PROTHROMBIN TIME: 13.7 SECONDS (ref 11.1–15.3)
RBC # BLD AUTO: 4.09 10*6/MM3 (ref 4.14–5.8)
RH BLD: NEGATIVE
SODIUM SERPL-SCNC: 137 MMOL/L (ref 136–145)
SP GR UR STRIP: 1.03 (ref 1–1.03)
T&S EXPIRATION DATE: NORMAL
TROPONIN T SERPL HS-MCNC: 8 NG/L
UROBILINOGEN UR QL STRIP: ABNORMAL
WBC NRBC COR # BLD: 12.53 10*3/MM3 (ref 3.4–10.8)
WHOLE BLOOD HOLD COAG: NORMAL
WHOLE BLOOD HOLD SPECIMEN: NORMAL

## 2023-03-02 PROCEDURE — 99283 EMERGENCY DEPT VISIT LOW MDM: CPT

## 2023-03-02 PROCEDURE — 81003 URINALYSIS AUTO W/O SCOPE: CPT | Performed by: FAMILY MEDICINE

## 2023-03-02 PROCEDURE — 85610 PROTHROMBIN TIME: CPT | Performed by: FAMILY MEDICINE

## 2023-03-02 PROCEDURE — 80053 COMPREHEN METABOLIC PANEL: CPT | Performed by: FAMILY MEDICINE

## 2023-03-02 PROCEDURE — 86900 BLOOD TYPING SEROLOGIC ABO: CPT | Performed by: FAMILY MEDICINE

## 2023-03-02 PROCEDURE — 83880 ASSAY OF NATRIURETIC PEPTIDE: CPT | Performed by: FAMILY MEDICINE

## 2023-03-02 PROCEDURE — 93010 ELECTROCARDIOGRAM REPORT: CPT | Performed by: INTERNAL MEDICINE

## 2023-03-02 PROCEDURE — 25510000001 IOPAMIDOL PER 1 ML

## 2023-03-02 PROCEDURE — 70498 CT ANGIOGRAPHY NECK: CPT

## 2023-03-02 PROCEDURE — 85730 THROMBOPLASTIN TIME PARTIAL: CPT | Performed by: FAMILY MEDICINE

## 2023-03-02 PROCEDURE — 83735 ASSAY OF MAGNESIUM: CPT | Performed by: FAMILY MEDICINE

## 2023-03-02 PROCEDURE — 70450 CT HEAD/BRAIN W/O DYE: CPT

## 2023-03-02 PROCEDURE — 85025 COMPLETE CBC W/AUTO DIFF WBC: CPT | Performed by: FAMILY MEDICINE

## 2023-03-02 PROCEDURE — 84484 ASSAY OF TROPONIN QUANT: CPT | Performed by: FAMILY MEDICINE

## 2023-03-02 PROCEDURE — 86850 RBC ANTIBODY SCREEN: CPT | Performed by: FAMILY MEDICINE

## 2023-03-02 PROCEDURE — 36415 COLL VENOUS BLD VENIPUNCTURE: CPT

## 2023-03-02 PROCEDURE — 71045 X-RAY EXAM CHEST 1 VIEW: CPT

## 2023-03-02 PROCEDURE — 82550 ASSAY OF CK (CPK): CPT | Performed by: FAMILY MEDICINE

## 2023-03-02 PROCEDURE — 93005 ELECTROCARDIOGRAM TRACING: CPT | Performed by: FAMILY MEDICINE

## 2023-03-02 PROCEDURE — 86901 BLOOD TYPING SEROLOGIC RH(D): CPT | Performed by: FAMILY MEDICINE

## 2023-03-02 PROCEDURE — 99284 EMERGENCY DEPT VISIT MOD MDM: CPT

## 2023-03-02 PROCEDURE — 70496 CT ANGIOGRAPHY HEAD: CPT

## 2023-03-02 PROCEDURE — 99282 EMERGENCY DEPT VISIT SF MDM: CPT | Performed by: NURSE PRACTITIONER

## 2023-03-02 RX ORDER — METHYLPREDNISOLONE 4 MG/1
TABLET ORAL
Qty: 21 TABLET | Refills: 0 | Status: SHIPPED | OUTPATIENT
Start: 2023-03-02 | End: 2023-03-02 | Stop reason: SDUPTHER

## 2023-03-02 RX ORDER — SODIUM CHLORIDE 0.9 % (FLUSH) 0.9 %
10 SYRINGE (ML) INJECTION AS NEEDED
Status: DISCONTINUED | OUTPATIENT
Start: 2023-03-02 | End: 2023-03-02 | Stop reason: HOSPADM

## 2023-03-02 RX ORDER — METHYLPREDNISOLONE 4 MG/1
TABLET ORAL
Qty: 21 TABLET | Refills: 0 | Status: SHIPPED | OUTPATIENT
Start: 2023-03-02

## 2023-03-02 RX ADMIN — IOPAMIDOL 90 ML: 755 INJECTION, SOLUTION INTRAVENOUS at 11:41

## 2023-03-02 RX ADMIN — SODIUM CHLORIDE 1000 ML: 9 INJECTION, SOLUTION INTRAVENOUS at 12:26

## 2023-03-02 NOTE — ED PROVIDER NOTES
"Subjective   History of Present Illness  Patient presents to the emergency department with weakness that began at 9:30 AM when patient was on the toilet.  His caregiver came to help him 5 minutes later and states that the patient \"flopped over\" and had generalized fatigue.  Patient does have a chronic cough.      Stroke  Presenting symptoms: weakness    Presenting symptoms: no confusion and no headaches    Timing:  Constant  Progression:  Improving  Associated symptoms: no chest pain, no difficulty swallowing, no dizziness, no fever, no nausea, no neck pain, no seizures and no vomiting    Weakness - Generalized  Associated symptoms: cough (chronic)    Associated symptoms: no abdominal pain, no chest pain, no diarrhea, no dizziness, no dysuria, no fever, no frequency, no headaches, no myalgias, no nausea, no seizures, no shortness of breath, no urgency and no vomiting        Review of Systems   Constitutional: Positive for activity change and fatigue. Negative for appetite change, chills, diaphoresis and fever.   HENT: Negative for congestion, ear discharge, ear pain, nosebleeds, rhinorrhea, sinus pressure, sore throat and trouble swallowing.    Eyes: Negative for discharge and redness.   Respiratory: Positive for cough (chronic). Negative for apnea, chest tightness, shortness of breath and wheezing.    Cardiovascular: Negative for chest pain.   Gastrointestinal: Negative for abdominal pain, diarrhea, nausea and vomiting.   Endocrine: Negative for polyuria.   Genitourinary: Negative for dysuria, frequency and urgency.   Musculoskeletal: Negative for myalgias and neck pain.   Skin: Negative for color change and rash.   Allergic/Immunologic: Negative for immunocompromised state.   Neurological: Positive for weakness. Negative for dizziness, seizures, syncope, light-headedness and headaches.   Hematological: Negative for adenopathy. Does not bruise/bleed easily.   Psychiatric/Behavioral: Negative for behavioral problems " and confusion.   All other systems reviewed and are negative.      Past Medical History:   Diagnosis Date   • Alopecia    • Arthritis    • Autism    • COPD (chronic obstructive pulmonary disease) ([facility])    • GERD (gastroesophageal reflux disease)    • Hyperlipidemia    • Hypertension    • Insomnia    • Intellectual disability    • Lennox-Gastaut syndrome (HCC)    • Major depressive disorder    • Orthostatic hypotension    • Osteoporosis    • Right bundle branch block    • Scoliosis    • Seizures ([facility])        Allergies   Allergen Reactions   • Haldol [Haloperidol] Unknown (See Comments)     Unknown     • Levaquin [Levofloxacin] Other (See Comments)     Lowers seizure threshold       Past Surgical History:   Procedure Laterality Date   • COLONOSCOPY N/A 1/8/2021    Procedure: COLONOSCOPY;  Surgeon: Wanda Lang MD;  Location: [facility] ENDOSCOPY;  Service: Gastroenterology;  Laterality: N/A;   • COLONOSCOPY N/A 9/2/2021    Procedure: COLONOSCOPY;  Surgeon: Wanda Lang MD;  Location: [facility] ENDOSCOPY;  Service: Gastroenterology;  Laterality: N/A;   • COLONOSCOPY N/A 10/14/2022    Procedure: COLONOSCOPY;  Surgeon: Wanda Lang MD;  Location: [facility] ENDOSCOPY;  Service: Gastroenterology;  Laterality: N/A;   • ENDOSCOPY N/A 1/7/2021    Procedure: ESOPHAGOGASTRODUODENOSCOPY;  Surgeon: Wanda Lang MD;  Location: [facility] ENDOSCOPY;  Service: Gastroenterology;  Laterality: N/A;   • ENDOSCOPY N/A 1/22/2021    Procedure: ESOPHAGOGASTRODUODENOSCOPY;  Surgeon: Wanda Lang MD;  Location: [facility] ENDOSCOPY;  Service: Gastroenterology;  Laterality: N/A;   • ENDOSCOPY W/ PEG TUBE PLACEMENT N/A 6/25/2021    Procedure: ESOPHAGOGASTRODUODENOSCOPY WITH PERCUTANEOUS ENDOSCOPIC GASTROSTOMY TUBE INSERTION WITH ANESTHESIA;  Surgeon: Wanda Lang MD;  Location: [facility] ENDOSCOPY;  Service: Gastroenterology;  Laterality: N/A;   • SIGMOIDOSCOPY N/A 10/11/2021    Procedure: SIGMOIDOSCOPY FLEXIBLE;  Surgeon:  Wanda Lang MD;  Location: [facility] ENDOSCOPY;  Service: Gastroenterology;  Laterality: N/A;   • SIGMOIDOSCOPY N/A 11/30/2021    Procedure: SIGMOIDOSCOPY FLEXIBLE;  Surgeon: Wanda Lang MD;  Location: [facility] ENDOSCOPY;  Service: Gastroenterology;  Laterality: N/A;   • SIGMOIDOSCOPY N/A 6/16/2022    Procedure: SIGMOIDOSCOPY FLEXIBLE;  Surgeon: Wanda Lang MD;  Location: [facility] ENDOSCOPY;  Service: Gastroenterology;  Laterality: N/A;  argon at rectal polypectomy site   • UPPER GASTROINTESTINAL ENDOSCOPY  01/22/2021   • UPPER GASTROINTESTINAL ENDOSCOPY  06/25/2021       Family History   Problem Relation Age of Onset   • Hypertension Father        Social History     Socioeconomic History   • Marital status: Single   Tobacco Use   • Smoking status: Never   • Smokeless tobacco: Never   Vaping Use   • Vaping Use: Never used   Substance and Sexual Activity   • Alcohol use: Never   • Drug use: Never   • Sexual activity: Defer           Objective   Physical Exam  Vitals and nursing note reviewed.   Constitutional:       Appearance: He is well-developed.   HENT:      Head: Normocephalic and atraumatic.      Nose: Nose normal.   Eyes:      General: No scleral icterus.        Right eye: No discharge.         Left eye: No discharge.      Conjunctiva/sclera: Conjunctivae normal.      Pupils: Pupils are equal, round, and reactive to light.   Neck:      Trachea: No tracheal deviation.   Cardiovascular:      Rate and Rhythm: Normal rate and regular rhythm.      Heart sounds: Normal heart sounds. No murmur heard.  Pulmonary:      Effort: Pulmonary effort is normal. No respiratory distress.      Breath sounds: Normal breath sounds. No stridor. No wheezing or rales.   Abdominal:      General: Bowel sounds are normal. There is no distension.      Palpations: Abdomen is soft. There is no mass.      Tenderness: There is no abdominal tenderness. There is no guarding or rebound.   Musculoskeletal:      Cervical back:  Normal range of motion and neck supple.   Skin:     General: Skin is warm and dry.      Findings: No erythema or rash.   Neurological:      Mental Status: He is alert and oriented to person, place, and time.      Coordination: Coordination normal.   Psychiatric:         Behavior: Behavior normal.         Thought Content: Thought content normal.         ECG 12 Lead      Date/Time: 3/2/2023 4:43 PM  Performed by: Wilian Moore MD  Authorized by: Wilian Moore MD   Interpreted by physician  Rhythm: sinus rhythm  BPM: 75  ST Segments: ST segments normal                   ED Course  ED Course as of 03/02/23 1652   Thu Mar 02, 2023   1641 Patient is resting comfortably in no acute distress.  He is at his usual baseline self according to the patient's caregiver who is at bedside.  Findings were discussed with the caregiver.  Patient will be discharged home and was given instructions to return back to the emergency department if his symptoms worsen or change. [CB]      ED Course User Index  [CB] Wilian Moore MD             Labs Reviewed   COMPREHENSIVE METABOLIC PANEL - Abnormal; Notable for the following components:       Result Value    Creatinine 0.75 (*)     CO2 30.0 (*)     Albumin 3.3 (*)     Alkaline Phosphatase 142 (*)     BUN/Creatinine Ratio 30.7 (*)     All other components within normal limits    Narrative:     GFR Normal >60  Chronic Kidney Disease <60  Kidney Failure <15     CBC WITH AUTO DIFFERENTIAL - Abnormal; Notable for the following components:    WBC 12.53 (*)     RBC 4.09 (*)     Hemoglobin 11.7 (*)     Hematocrit 36.3 (*)     Neutrophil % 81.7 (*)     Lymphocyte % 6.5 (*)     Neutrophils, Absolute 10.24 (*)     Monocytes, Absolute 1.09 (*)     All other components within normal limits   URINALYSIS W/ CULTURE IF INDICATED - Abnormal; Notable for the following components:    Specific Gravity, UA 1.031 (*)     All other components within normal limits    Narrative:     In absence  of clinical symptoms, the presence of pyuria, bacteria, and/or nitrites on the urinalysis result does not correlate with infection.  Urine microscopic not indicated.   PROTIME-INR - Normal    Narrative:     Therapeutic range for most indications is 2.0-3.0 INR,  or 2.5-3.5 for mechanical heart valves.   APTT - Normal    Narrative:     The recommended Heparin therapeutic range is 68-97 seconds.   SINGLE HSTROPONIN T - Normal    Narrative:     High Sensitive Troponin T Reference Range:  <10.0 ng/L- Negative Female for AMI  <15.0 ng/L- Negative Male for AMI  >=10 - Abnormal Female indicating possible myocardial injury.  >=15 - Abnormal Male indicating possible myocardial injury.   Clinicians would have to utilize clinical acumen, EKG, Troponin, and serial changes to determine if it is an Acute Myocardial Infarction or myocardial injury due to an underlying chronic condition.        BNP (IN-HOUSE) - Normal    Narrative:     Among patients with dyspnea, NT-proBNP is highly sensitive for the detection of acute congestive heart failure. In addition NT-proBNP of <300 pg/ml effectively rules out acute congestive heart failure with 99% negative predictive value.     CK - Normal   MAGNESIUM - Normal   RAINBOW DRAW    Narrative:     The following orders were created for panel order [place] Draw.  Procedure                               Abnormality         Status                     ---------                               -----------         ------                     Green Top (Gel)[[id number]]                                  Final result               Lavender Top[[id number]]                                     Final result               Gold Top - SST[[id number]]                                   Final result               Light Blue Top[[id number]]                                   Final result                 Please view results for these tests on the individual orders.   POCT GLUCOSE FINGERSTICK   TYPE AND SCREEN   PREVIOUS  HISTORY   CBC AND DIFFERENTIAL    Narrative:     The following orders were created for panel order CBC & Differential.  Procedure                               Abnormality         Status                     ---------                               -----------         ------                     CBC Auto Differential[[id number]]        Abnormal            Final result                 Please view results for these tests on the individual orders.   GREEN TOP   LAVENDER TOP   GOLD TOP - SST   LIGHT BLUE TOP       XR Chest 1 View   Final Result   More prominent diffuse interstitial markings than on prior study   suspicious for acute interstitial inflammatory process or edema   without focal consolidation or pleural effusion.         [id number]      Electronically signed by:  Nils Pantoja MD  3/2/2023 11:59   AM CST Workstation: [id number]      CT Angiogram Head w AI Analysis of LVO   Final Result   1. Widely patent bilateral common and internal carotid arteries,   without associated stenosis (i.e. well less than 50%).   2. Patent bilateral vertebral arteries, largely codominant in   their supply of the basilar artery.   3. No large vessel intracranial arterial stenosis, occlusion, or   aneurysm.        Electronically signed by:  Anderson Fay MD  3/2/2023 12:05 PM CST   Workstation: [id number]      CT Angiogram Neck   Final Result   1. Widely patent bilateral common and internal carotid arteries,   without associated stenosis (i.e. well less than 50%).   2. Patent bilateral vertebral arteries, largely codominant in   their supply of the basilar artery.   3. No large vessel intracranial arterial stenosis, occlusion, or   aneurysm.        Electronically signed by:  Anderson Fay MD  3/2/2023 12:05 PM CST   Workstation: [id number]      CT Head Without Contrast Stroke Protocol   Final Result   CONCLUSION:       1. No acute intracranial abnormality identified.   2. Age-related atrophy and microvascular changes.   3. Possible acute  on chronic paranasal sinus disease.      Consider MRI brain if additional concern.          Electronically signed by:  Jorge Taylor DO  3/2/2023 11:47 AM   CST Workstation: [id number]                                          [facility]    Final diagnoses:   Generalized weakness       ED Disposition  ED Disposition     ED Disposition   Discharge    Condition   Stable    Comment   --             Bautista James MD  [address] [facility] [id number] [phone]    In 4 days           Medication List      New Prescriptions    methylPREDNISolone 4 MG dose pack  Commonly known as: MEDROL  Take as directed on package instructions.        Changed    midodrine 5 MG tablet  Commonly known as: PROAMATINE  Take 1 tablet by mouth 3 (Three) Times a Day.  What changed:   · how much to take  · additional instructions           Where to Get Your Medications      You can get these medications from any pharmacy    Bring a paper prescription for each of these medications  · methylPREDNISolone 4 MG dose pack          Wilian Moore MD  03/02/23 1642       Wilian Moore MD  03/02/23 [id number]       Wilian Moore MD  03/02/23 [id number]

## 2023-03-02 NOTE — CONSULTS
[facility]   Teleneurology Note    Patient Name: Bautista Grubbs  : 1955  MRN: [id number]  Primary Care Physician: Bautista James MD       TIME STROKE TEAM CALLED: 11:40 CST     TIME PATIENT SEEN:11:55 CST         Subjective   Teleneurology Initial Data     Arrival Date Telestroke Site: 23 Arrival Time Telestroke Site: 1136   Neurologist Evaluation Date: 23 Neurologist Evaluation Time: 1150   Date Last Known Well: 23 Time Last Known Well: 1000     History     Chief Complaint: Weakness  HPI: Pt is a 67-yr-old autistic male with known diagnosis of hyperlipidemia, COPD, and seizures who lives at [facility] who was noted to have weakness this morniing when getting up. Staff stated patient usually doesn't like to move much but when he helped him to the bathroom he became weak and went down. Worker stated he did not appear to be weak on one side. Pt is nonverbal at baseline with just a few words occasionally. Upon exam he is weak bilaterally generalized.    Past Medical History:   Diagnosis Date   • Alopecia    • Arthritis    • Autism    • COPD (chronic obstructive pulmonary disease) ([facility])    • GERD (gastroesophageal reflux disease)    • Hyperlipidemia    • Hypertension    • Insomnia    • Intellectual disability    • Lennox-Gastaut syndrome (HCC)    • Major depressive disorder    • Orthostatic hypotension    • Osteoporosis    • Right bundle branch block    • Scoliosis    • Seizures ([facility])      Past Surgical History:   Procedure Laterality Date   • COLONOSCOPY N/A 2021    Procedure: COLONOSCOPY;  Surgeon: Wanda Lang MD;  Location: [facility] ENDOSCOPY;  Service: Gastroenterology;  Laterality: N/A;   • COLONOSCOPY N/A 2021    Procedure: COLONOSCOPY;  Surgeon: Wanda Lang MD;  Location: [facility] ENDOSCOPY;  Service: Gastroenterology;  Laterality: N/A;   • COLONOSCOPY N/A 10/14/2022    Procedure: COLONOSCOPY;  Surgeon: Wanda Lang MD;   Location: [facility] ENDOSCOPY;  Service: Gastroenterology;  Laterality: N/A;   • ENDOSCOPY N/A 1/7/2021    Procedure: ESOPHAGOGASTRODUODENOSCOPY;  Surgeon: Wanda Lang MD;  Location: [facility] ENDOSCOPY;  Service: Gastroenterology;  Laterality: N/A;   • ENDOSCOPY N/A 1/22/2021    Procedure: ESOPHAGOGASTRODUODENOSCOPY;  Surgeon: Wanda Lang MD;  Location: [facility] ENDOSCOPY;  Service: Gastroenterology;  Laterality: N/A;   • ENDOSCOPY W/ PEG TUBE PLACEMENT N/A 6/25/2021    Procedure: ESOPHAGOGASTRODUODENOSCOPY WITH PERCUTANEOUS ENDOSCOPIC GASTROSTOMY TUBE INSERTION WITH ANESTHESIA;  Surgeon: Wanda Lagn MD;  Location: [facility] ENDOSCOPY;  Service: Gastroenterology;  Laterality: N/A;   • SIGMOIDOSCOPY N/A 10/11/2021    Procedure: SIGMOIDOSCOPY FLEXIBLE;  Surgeon: Wanda Lang MD;  Location: [facility] ENDOSCOPY;  Service: Gastroenterology;  Laterality: N/A;   • SIGMOIDOSCOPY N/A 11/30/2021    Procedure: SIGMOIDOSCOPY FLEXIBLE;  Surgeon: Wanda Lang MD;  Location: [facility] ENDOSCOPY;  Service: Gastroenterology;  Laterality: N/A;   • SIGMOIDOSCOPY N/A 6/16/2022    Procedure: SIGMOIDOSCOPY FLEXIBLE;  Surgeon: Wanda Lang MD;  Location: [facility] ENDOSCOPY;  Service: Gastroenterology;  Laterality: N/A;  argon at rectal polypectomy site   • UPPER GASTROINTESTINAL ENDOSCOPY  01/22/2021   • UPPER GASTROINTESTINAL ENDOSCOPY  06/25/2021     Family History   Problem Relation Age of Onset   • Hypertension Father      Social History     Socioeconomic History   • Marital status: Single   Tobacco Use   • Smoking status: Never   • Smokeless tobacco: Never   Vaping Use   • Vaping Use: Never used   Substance and Sexual Activity   • Alcohol use: Never   • Drug use: Never   • Sexual activity: Defer     Allergies   Allergen Reactions   • Haldol [Haloperidol] Unknown (See Comments)     Unknown     • Levaquin [Levofloxacin] Other (See Comments)     Lowers seizure threshold     Prior to Admission medications     Medication Sig Start Date End Date Taking? Authorizing Provider   albuterol (PROVENTIL) (2.5 MG/3ML) 0.083% nebulizer solution  10/12/21   Patricia Driscoll MD   albuterol sulfate  (90 Base) MCG/ACT inhaler Inhale 2 puffs Every 4 (Four) Hours As Needed for Shortness of Air or Wheezing. 1/7/23   Carlos A Ely MD   betamethasone dipropionate (DIPROLENE) 0.05 % ointment  9/21/22   Patricia Driscoll MD   calcium carbonate-vitamin d 600-400 MG-UNIT per tablet Take 1 tablet by mouth 2 (Two) Times a Day. Per G-Tube    Patricia Driscoll MD   denosumab (Prolia) 60 MG/ML solution prefilled syringe syringe Inject 60 mg under the skin into the appropriate area as directed. Every 6 Months    Patricia Driscoll MD   DIAZEPAM RE Insert 10 mg into the rectum As Needed (For Seizure Activity).    Patricia Driscoll MD   famotidine (PEPCID) 20 MG tablet Take 20 mg by mouth Daily. Per G-Tube    Patricia Driscoll MD   ferrous sulfate 325 (65 FE) MG tablet Take 325 mg by mouth Daily With Breakfast. Per G-Tube    Patricia Driscoll MD   ibuprofen (ADVIL,MOTRIN) 600 MG tablet Take 1 tablet by mouth Every 8 (Eight) Hours As Needed for Moderate Pain . 1/19/22   Sandeep Fernandez MD   ipratropium-albuterol (DUO-NEB) 0.5-2.5 mg/3 ml nebulizer Take 3 mL by nebulization Every 4 (Four) Hours As Needed for Shortness of Air. 4/15/22   Leander Meade MD   lamoTRIgine (LaMICtal) 200 MG tablet Take 200 mg by mouth 2 (Two) Times a Day. Per G-Tube    Patricia Driscoll MD   lamoTRIgine (LaMICtal) 25 MG tablet Take 25 mg by mouth 2 (Two) Times a Day. Per G-Tube To Equal 225 MG    Patricia Driscoll MD   levETIRAcetam (KEPPRA) 1000 MG tablet Take 1,000 mg by mouth 2 (Two) Times a Day. Per G-Tube    Patricia Driscoll MD   LORazepam (ATIVAN) 1 MG tablet GIVE ONE TABLET PER G-TUBE TWICE DAILY FOR ANXIETY AND AGITATION 2/2/22   Christelle Smith APRN   midodrine (PROAMATINE) 5 MG tablet Take 1 tablet by  mouth 3 (Three) Times a Day.  Patient taking differently: Take 10 mg by mouth 3 (Three) Times a Day. Per G-Tube 2/4/19   Earnestine Reid MD   Nutritional Supplements (JEVITY 1.5 JEFFERY PO) Take  by mouth. Gets 390mL bolus every 6 hours 1A-7A-1P-7P    Patricia Driscoll MD   polyethylene glycol (GoLYTELY) 236 g solution Starting at noon on day prior to procedure, drink 8 ounces every 30 minutes until all gone or stools are clear. May add flavor packet. 9/26/22   Wanda Lang MD   promethazine-dextromethorphan (PROMETHAZINE-DM) 6.25-15 MG/5ML syrup Administer 5 mL per G tube 4 (Four) Times a Day As Needed for Cough. 1/7/23   Carlos A Ely MD   simvastatin (ZOCOR) 20 MG tablet Take 20 mg by mouth Daily. Per G-Tube    Patricia Driscoll MD   sucralfate (CARAFATE) 1 g tablet Take 1 tablet by mouth 4 (Four) Times a Day Before Meals & at Bedtime. 5/19/21   Sandeep Fernandez MD   thioridazine (MELLARIL) 100 MG tablet Take 200 mg by mouth 2 (Two) Times a Day. Per G-Tube    Patricia Driscoll MD   triamcinolone (KENALOG) 0.1 % cream  8/25/22   Patricia Driscoll MD       Stroke Risk Factors/ Pertinent Data     Anticoagulants prior to arrival: none  Antiplatelets prior to arrival: none  Statins prior to arrival: atorvastatin (Lipitor)     Pre- Stroke Modified Latrice Scale          NIH Stroke Scale     NIHSS Performed Date: 03/02/23 NIHSS Performed Time: 1155   Interval: baseline ([place] to do NIHSS, pt is nonverbal)  1a. Level of Consciousness:  (Pt is nonverbal)     Review of Systems     Review of Systems   Unable to perform ROS: Patient nonverbal       Objective   Exam     Exam performed with the help of support staff from the referring site  Neurological Exam  Mental Status    DARYL pt is nonverbal.      Result Review    Results          Personal review of CNS imaging:(Official report by radiologist pending)  Imaging  CT Imaging Review: CT Imaging reviewed, NO acute infarct/ hemorrhage seen  CTA Imaging  Review: CTA Imaging reviewed, NO large vessel occlusion or severe stenosis seen    Thrombolytic         Assessment & Plan   Assessment/ Plan     Assessment: Chief Complaint: Weakness  HPI: Pt is a 67-yr-old autistic male with known diagnosis of hyperlipidemia, COPD, and seizures who lives at [facility] who was noted to have weakness this morniing when getting up. Staff stated patient usually doesn't like to move much but when he helped him to the bathroom he became weak and went down. Worker stated he did not appear to be weak on one side. Pt is nonverbal at baseline with just a few words occasionally. Upon exam he is weak bilaterally generalized.          Plan:  1. Generalized weakness, caregiver states pt is at his baseline only some mild generalized weakness. Has equal resistance on both sides. CT and CTA neg for any acute changes. Metabolic work-up in progress. No other stroke work-up needed. Thank you for the consult.         Disposition     Disposition: The patient will remain at the referring institution for further evaluation and management    Medical Decision Making  Medical Data Reviewed: Data reviewed including: clinical labs, radiology and/or medical tests, Obtaining/ reviewing old medical records, Obtaining case history from another source, Independent review of CNS images    I spent 60 minutes caring for Bautista Grubbs  on this date of service. This time includes time spent by me in the following activities: preparing for the visit, reviewing tests, obtaining and/or reviewing a separately obtained history, performing a medically appropriate examination and/or evaluation, counseling and educating the patient/family/caregiver, ordering medications, tests, or procedures, referring and communicating with other health care professionals, documenting information in the medical record, independently interpreting results and communicating that information with the  patient/family/caregiver and care coordination                         Jann Ellis, APRN

## 2023-03-05 LAB
QT INTERVAL: 398 MS
QTC INTERVAL: 444 MS

## 2023-03-06 ENCOUNTER — OFFICE VISIT (OUTPATIENT)
Dept: GASTROENTEROLOGY | Facility: CLINIC | Age: 68
End: 2023-03-06
Payer: MEDICARE

## 2023-03-06 VITALS
HEART RATE: 99 BPM | BODY MASS INDEX: 21.06 KG/M2 | HEIGHT: 71 IN | SYSTOLIC BLOOD PRESSURE: 116 MMHG | DIASTOLIC BLOOD PRESSURE: 71 MMHG

## 2023-03-06 DIAGNOSIS — D12.6 ADENOMATOUS POLYP OF COLON, UNSPECIFIED PART OF COLON: Primary | ICD-10-CM

## 2023-03-06 PROCEDURE — 1160F RVW MEDS BY RX/DR IN RCRD: CPT | Performed by: INTERNAL MEDICINE

## 2023-03-06 PROCEDURE — 1159F MED LIST DOCD IN RCRD: CPT | Performed by: INTERNAL MEDICINE

## 2023-03-06 PROCEDURE — 99213 OFFICE O/P EST LOW 20 MIN: CPT | Performed by: INTERNAL MEDICINE

## 2023-03-25 NOTE — PROGRESS NOTES
Chief Complaint   Patient presents with   • endo f/u        Subjective    Bautista Grubbs is a 67 y.o. male.    History of Present Illness  Patient presented to GI clinic for follow-up visit today.  No GI complaints per nursing staff.  Tolerating tube feeds well.  Rectal polyp path was consistent with tubular adenoma.       The following portions of the patient's history were reviewed and updated as appropriate:   Past Medical History:   Diagnosis Date   • Alopecia    • Arthritis    • Autism    • COPD (chronic obstructive pulmonary disease) (HCC)    • GERD (gastroesophageal reflux disease)    • Hyperlipidemia    • Hypertension    • Insomnia    • Intellectual disability    • Lennox-Gastaut syndrome (HCC)    • Major depressive disorder    • Orthostatic hypotension    • Osteoporosis    • Right bundle branch block    • Scoliosis    • Seizures (HCC)      Past Surgical History:   Procedure Laterality Date   • COLONOSCOPY N/A 1/8/2021    Procedure: COLONOSCOPY;  Surgeon: Wanda Lang MD;  Location: [facility] ENDOSCOPY;  Service: Gastroenterology;  Laterality: N/A;   • COLONOSCOPY N/A 9/2/2021    Procedure: COLONOSCOPY;  Surgeon: Wanda Lang MD;  Location: [facility] ENDOSCOPY;  Service: Gastroenterology;  Laterality: N/A;   • COLONOSCOPY N/A 10/14/2022    Procedure: COLONOSCOPY;  Surgeon: Wanda Lang MD;  Location: [facility] ENDOSCOPY;  Service: Gastroenterology;  Laterality: N/A;   • ENDOSCOPY N/A 1/7/2021    Procedure: ESOPHAGOGASTRODUODENOSCOPY;  Surgeon: Wanda Lang MD;  Location: [facility] ENDOSCOPY;  Service: Gastroenterology;  Laterality: N/A;   • ENDOSCOPY N/A 1/22/2021    Procedure: ESOPHAGOGASTRODUODENOSCOPY;  Surgeon: Wanda Lang MD;  Location: [facility] ENDOSCOPY;  Service: Gastroenterology;  Laterality: N/A;   • ENDOSCOPY W/ PEG TUBE PLACEMENT N/A 6/25/2021    Procedure: ESOPHAGOGASTRODUODENOSCOPY WITH PERCUTANEOUS ENDOSCOPIC GASTROSTOMY TUBE INSERTION WITH ANESTHESIA;  Surgeon: Precious  MD Wanda;  Location: [facility] ENDOSCOPY;  Service: Gastroenterology;  Laterality: N/A;   • SIGMOIDOSCOPY N/A 10/11/2021    Procedure: SIGMOIDOSCOPY FLEXIBLE;  Surgeon: Wanda Lang MD;  Location: [facility] ENDOSCOPY;  Service: Gastroenterology;  Laterality: N/A;   • SIGMOIDOSCOPY N/A 11/30/2021    Procedure: SIGMOIDOSCOPY FLEXIBLE;  Surgeon: Wanda Lang MD;  Location: [facility] ENDOSCOPY;  Service: Gastroenterology;  Laterality: N/A;   • SIGMOIDOSCOPY N/A 6/16/2022    Procedure: SIGMOIDOSCOPY FLEXIBLE;  Surgeon: Wanda Lang MD;  Location: [facility] ENDOSCOPY;  Service: Gastroenterology;  Laterality: N/A;  argon at rectal polypectomy site   • SIGMOIDOSCOPY N/A 2/24/2023    Procedure: SIGMOIDOSCOPY FLEXIBLE enemas on call at facility;  Surgeon: Wanda Lang MD;  Location: [facility] ENDOSCOPY;  Service: Gastroenterology;  Laterality: N/A;  argon to rectal polyp site   • UPPER GASTROINTESTINAL ENDOSCOPY  01/22/2021   • UPPER GASTROINTESTINAL ENDOSCOPY  06/25/2021     Family History   Problem Relation Age of Onset   • Hypertension Father        Prior to Admission medications    Medication Sig Start Date End Date Taking? Authorizing Provider   albuterol (PROVENTIL) (2.5 MG/3ML) 0.083% nebulizer solution  10/12/21  Yes Patricia Driscoll MD   albuterol sulfate  (90 Base) MCG/ACT inhaler Inhale 2 puffs Every 4 (Four) Hours As Needed for Shortness of Air or Wheezing. 1/7/23  Yes Carlos A Ely MD   betamethasone dipropionate (DIPROLENE) 0.05 % ointment  9/21/22  Yes Patricia Driscoll MD   calcium carbonate-vitamin d 600-400 MG-UNIT per tablet Take 1 tablet by mouth 2 (Two) Times a Day. Per G-Tube   Yes Patricia Driscoll MD   denosumab (Prolia) 60 MG/ML solution prefilled syringe syringe Inject 1 mL under the skin into the appropriate area as directed. Every 6 Months   Yes Patricia Driscoll MD   DIAZEPAM RE Insert 10 mg into the rectum As Needed (For Seizure Activity).   Yes Provider  MD Patricia   famotidine (PEPCID) 20 MG tablet Take 1 tablet by mouth Daily. Per G-Tube   Yes ProviderPatricia MD   ferrous sulfate 325 (65 FE) MG tablet Take 1 tablet by mouth Daily With Breakfast. Per G-Tube   Yes ProviderPatricia MD   ibuprofen (ADVIL,MOTRIN) 600 MG tablet Take 1 tablet by mouth Every 8 (Eight) Hours As Needed for Moderate Pain . 1/19/22  Yes Sandeep Fernandez MD   ipratropium-albuterol (DUO-NEB) 0.5-2.5 mg/3 ml nebulizer Take 3 mL by nebulization Every 4 (Four) Hours As Needed for Shortness of Air. 4/15/22  Yes Leander Meade MD   lamoTRIgine (LaMICtal) 200 MG tablet Take 1 tablet by mouth 2 (Two) Times a Day. Per G-Tube   Yes Patricia Driscoll MD   lamoTRIgine (LaMICtal) 25 MG tablet Take 1 tablet by mouth 2 (Two) Times a Day. Per G-Tube To Equal 225 MG   Yes ProviderPatricia MD   levETIRAcetam (KEPPRA) 1000 MG tablet Take 1 tablet by mouth 2 (Two) Times a Day. Per G-Tube   Yes Patricia Driscoll MD   LORazepam (ATIVAN) 1 MG tablet GIVE ONE TABLET PER G-TUBE TWICE DAILY FOR ANXIETY AND AGITATION 2/2/22  Yes Christelle Smith APRN   methylPREDNISolone (MEDROL) 4 MG dose pack Take as directed on package instructions. 3/2/23  Yes Wilian Moore MD   midodrine (PROAMATINE) 5 MG tablet Take 1 tablet by mouth 3 (Three) Times a Day.  Patient taking differently: Take 2 tablets by mouth 3 (Three) Times a Day. Per G-Tube 2/4/19  Yes Earnestine Reid MD   Nutritional Supplements (JEVITY 1.5 JEFFERY PO) Take  by mouth. Gets 390mL bolus every 6 hours 1A-7A-1P-7P   Yes ProviderPatricia MD   promethazine-dextromethorphan (PROMETHAZINE-DM) 6.25-15 MG/5ML syrup Administer 5 mL per G tube 4 (Four) Times a Day As Needed for Cough. 1/7/23  Yes Carlos A Ely MD   simvastatin (ZOCOR) 20 MG tablet Take 1 tablet by mouth Daily. Per G-Tube   Yes Provider, MD Patricia   sucralfate (CARAFATE) 1 g tablet Take 1 tablet by mouth 4 (Four) Times a Day Before Meals & at Bedtime.  "5/19/21  Yes Sandeep Fernandez MD   thioridazine (MELLARIL) 100 MG tablet Take 2 tablets by mouth 2 (Two) Times a Day. Per G-Tube   Yes Provider, MD Patricia   triamcinolone (KENALOG) 0.1 % cream  8/25/22  Yes Provider, MD Patricia     Allergies   Allergen Reactions   • Haldol [Haloperidol] Unknown (See Comments)     Unknown     • Levaquin [Levofloxacin] Other (See Comments)     Lowers seizure threshold     Social History     Socioeconomic History   • Marital status: Single   Tobacco Use   • Smoking status: Never   • Smokeless tobacco: Never   Vaping Use   • Vaping Use: Never used   Substance and Sexual Activity   • Alcohol use: Never   • Drug use: Never   • Sexual activity: Defer       Review of Systems  Review of Systems   Unable to perform ROS: Patient nonverbal        /71 (BP Location: Left arm)   Pulse 99   Ht 180.3 cm (71\")   BMI 21.06 kg/m²     Objective    Physical Exam  Constitutional:       Appearance: He is well-developed.   HENT:      Head: Normocephalic and atraumatic.   Eyes:      Conjunctiva/sclera: Conjunctivae normal.      Pupils: Pupils are equal, round, and reactive to light.   Neck:      Thyroid: No thyromegaly.   Cardiovascular:      Rate and Rhythm: Normal rate and regular rhythm.      Heart sounds: Normal heart sounds. No murmur heard.  Pulmonary:      Effort: Pulmonary effort is normal.      Breath sounds: Normal breath sounds. No wheezing.   Abdominal:      General: Bowel sounds are normal. There is no distension.      Palpations: Abdomen is soft. There is no mass.      Tenderness: There is no abdominal tenderness.      Hernia: No hernia is present.   Genitourinary:     Comments: No lesions noted  Musculoskeletal:         General: No tenderness. Normal range of motion.      Cervical back: Normal range of motion and neck supple.   Lymphadenopathy:      Cervical: No cervical adenopathy.   Skin:     General: Skin is warm and dry.      Findings: No rash.   Neurological:      " Mental Status: He is alert.      Cranial Nerves: No cranial nerve deficit.       Admission on 03/02/2023, Discharged on 03/02/2023   Component Date Value Ref Range Status   • QT Interval 03/02/2023 398  ms Final   • QTC Interval 03/02/2023 444  ms Final   • Glucose 03/02/2023 74  65 - 99 mg/dL Final   • BUN 03/02/2023 23  8 - 23 mg/dL Final   • Creatinine 03/02/2023 0.75 (L)  0.76 - 1.27 mg/dL Final   • Sodium 03/02/2023 137  136 - 145 mmol/L Final   • Potassium 03/02/2023 4.6  3.5 - 5.2 mmol/L Final   • Chloride 03/02/2023 100  98 - 107 mmol/L Final   • CO2 03/02/2023 30.0 (H)  22.0 - 29.0 mmol/L Final   • Calcium 03/02/2023 9.5  8.6 - 10.5 mg/dL Final   • Total Protein 03/02/2023 7.1  6.0 - 8.5 g/dL Final   • Albumin 03/02/2023 3.3 (L)  3.5 - 5.2 g/dL Final   • ALT (SGPT) 03/02/2023 33  1 - 41 U/L Final   • AST (SGOT) 03/02/2023 26  1 - 40 U/L Final   • Alkaline Phosphatase 03/02/2023 142 (H)  39 - 117 U/L Final   • Total Bilirubin 03/02/2023 0.2  0.0 - 1.2 mg/dL Final   • Globulin 03/02/2023 3.8  gm/dL Final   • A/G Ratio 03/02/2023 0.9  g/dL Final   • BUN/Creatinine Ratio 03/02/2023 30.7 (H)  7.0 - 25.0 Final   • Anion Gap 03/02/2023 7.0  5.0 - 15.0 mmol/L Final   • eGFR 03/02/2023 99.5  >60.0 mL/min/1.73 Final   • Protime 03/02/2023 13.7  11.1 - 15.3 Seconds Final   • INR 03/02/2023 1.06  0.80 - 1.20 Final   • PTT 03/02/2023 33.9  20.0 - 40.3 seconds Final   • HS Troponin T 03/02/2023 8  <15 ng/L Final   • ABO Type 03/02/2023 O   Final   • RH type 03/02/2023 Negative   Final   • Antibody Screen 03/02/2023 Negative   Final   • T&S Expiration Date 03/02/2023 3/5/2023 11:59:59 PM   Final   • Extra Tube 03/02/2023 Hold for add-ons.   Final    Auto resulted.   • Extra Tube 03/02/2023 hold for add-on   Final    Auto resulted   • Extra Tube 03/02/2023 Hold for add-ons.   Final    Auto resulted.   • Extra Tube 03/02/2023 Hold for add-ons.   Final    Auto resulted   • WBC 03/02/2023 12.53 (H)  3.40 - 10.80 10*3/mm3  Final   • RBC 03/02/2023 4.09 (L)  4.14 - 5.80 10*6/mm3 Final   • Hemoglobin 03/02/2023 11.7 (L)  13.0 - 17.7 g/dL Final   • Hematocrit 03/02/2023 36.3 (L)  37.5 - 51.0 % Final   • MCV 03/02/2023 88.8  79.0 - 97.0 fL Final   • MCH 03/02/2023 28.6  26.6 - 33.0 pg Final   • MCHC 03/02/2023 32.2  31.5 - 35.7 g/dL Final   • RDW 03/02/2023 13.5  12.3 - 15.4 % Final   • RDW-SD 03/02/2023 44.2  37.0 - 54.0 fl Final   • MPV 03/02/2023 10.7  6.0 - 12.0 fL Final   • Platelets 03/02/2023 288  140 - 450 10*3/mm3 Final   • Neutrophil % 03/02/2023 81.7 (H)  42.7 - 76.0 % Final   • Lymphocyte % 03/02/2023 6.5 (L)  19.6 - 45.3 % Final   • Monocyte % 03/02/2023 8.7  5.0 - 12.0 % Final   • Eosinophil % 03/02/2023 2.5  0.3 - 6.2 % Final   • Basophil % 03/02/2023 0.3  0.0 - 1.5 % Final   • Immature Grans % 03/02/2023 0.3  0.0 - 0.5 % Final   • Neutrophils, Absolute 03/02/2023 10.24 (H)  1.70 - 7.00 10*3/mm3 Final   • Lymphocytes, Absolute 03/02/2023 0.81  0.70 - 3.10 10*3/mm3 Final   • Monocytes, Absolute 03/02/2023 1.09 (H)  0.10 - 0.90 10*3/mm3 Final   • Eosinophils, Absolute 03/02/2023 0.31  0.00 - 0.40 10*3/mm3 Final   • Basophils, Absolute 03/02/2023 0.04  0.00 - 0.20 10*3/mm3 Final   • Immature Grans, Absolute 03/02/2023 0.04  0.00 - 0.05 10*3/mm3 Final   • nRBC 03/02/2023 0.0  0.0 - 0.2 /100 WBC Final   • Color, UA 03/02/2023 Yellow  Yellow, Straw, Dark Yellow, Niharika Final   • Appearance, UA 03/02/2023 Clear  Clear Final   • pH, UA 03/02/2023 8.0  5.0 - 9.0 Final   • Specific Gravity, UA 03/02/2023 1.031 (H)  1.003 - 1.030 Final    Result obtained by Refractometer   • Glucose, UA 03/02/2023 Negative  Negative Final   • Ketones, UA 03/02/2023 Negative  Negative Final   • Bilirubin, UA 03/02/2023 Negative  Negative Final   • Blood, UA 03/02/2023 Negative  Negative Final   • Protein, UA 03/02/2023 Negative  Negative Final   • Leuk Esterase, UA 03/02/2023 Negative  Negative Final   • Nitrite, UA 03/02/2023 Negative  Negative Final    • Urobilinogen, UA 03/02/2023 0.2 E.U./dL  0.2 - 1.0 E.U./dL Final   • proBNP 03/02/2023 <36.0  0.0 - 900.0 pg/mL Final   • Creatine Kinase 03/02/2023 57  20 - 200 U/L Final   • Magnesium 03/02/2023 2.3  1.6 - 2.4 mg/dL Final   • Previous History 03/02/2023 Previous Record on File   Final     Assessment & Plan    No diagnosis found..   1.  Rectal polyp, status post piecemeal polypectomy and APC cauterization.  Repeat flexible sigmoidoscopy in 6 months.  2.  Oropharyngeal dysphagia, on tube feeds.  Patient to continue PEG tube feeds.  3.  Nausea and vomiting, resolved.  4.  Anemia, improved.  Repeat CBC next visit.    Orders placed during this encounter include:  No orders of the defined types were placed in this encounter.      * Surgery not found *    Review and/or summary of lab tests, radiology, procedures, medications. Review and summary of old records and obtaining of history. The risks and benefits of my recommendations, as well as other treatment options were discussed with the patient and his nursing attendant today. Questions were answered.    No orders of the defined types were placed in this encounter.      Follow-up: Return in about 6 months (around 9/6/2023).               Results for orders placed or performed during the hospital encounter of 03/02/23   PREVIOUS HISTORY    Specimen: Blood   Result Value Ref Range    Previous History Previous Record on File    Single High Sensitivity Troponin T    Specimen: Blood   Result Value Ref Range    HS Troponin T 8 <15 ng/L   Gold Top - SST   Result Value Ref Range    Extra Tube Hold for add-ons.    Green Top (Gel)   Result Value Ref Range    Extra Tube Hold for add-ons.    Urinalysis With Culture If Indicated - Urine, Clean Catch    Specimen: Urine, Clean Catch   Result Value Ref Range    Color, UA Yellow Yellow, Straw, Dark Yellow, Niharika    Appearance, UA Clear Clear    pH, UA 8.0 5.0 - 9.0    Specific Gravity, UA 1.031 (H) 1.003 - 1.030    Glucose, UA  Negative Negative    Ketones, UA Negative Negative    Bilirubin, UA Negative Negative    Blood, UA Negative Negative    Protein, UA Negative Negative    Leuk Esterase, UA Negative Negative    Nitrite, UA Negative Negative    Urobilinogen, UA 0.2 E.U./dL 0.2 - 1.0 E.U./dL   CBC Auto Differential    Specimen: Blood   Result Value Ref Range    WBC 12.53 (H) 3.40 - 10.80 10*3/mm3    RBC 4.09 (L) 4.14 - 5.80 10*6/mm3    Hemoglobin 11.7 (L) 13.0 - 17.7 g/dL    Hematocrit 36.3 (L) 37.5 - 51.0 %    MCV 88.8 79.0 - 97.0 fL    MCH 28.6 26.6 - 33.0 pg    MCHC 32.2 31.5 - 35.7 g/dL    RDW 13.5 12.3 - 15.4 %    RDW-SD 44.2 37.0 - 54.0 fl    MPV 10.7 6.0 - 12.0 fL    Platelets 288 140 - 450 10*3/mm3    Neutrophil % 81.7 (H) 42.7 - 76.0 %    Lymphocyte % 6.5 (L) 19.6 - 45.3 %    Monocyte % 8.7 5.0 - 12.0 %    Eosinophil % 2.5 0.3 - 6.2 %    Basophil % 0.3 0.0 - 1.5 %    Immature Grans % 0.3 0.0 - 0.5 %    Neutrophils, Absolute 10.24 (H) 1.70 - 7.00 10*3/mm3    Lymphocytes, Absolute 0.81 0.70 - 3.10 10*3/mm3    Monocytes, Absolute 1.09 (H) 0.10 - 0.90 10*3/mm3    Eosinophils, Absolute 0.31 0.00 - 0.40 10*3/mm3    Basophils, Absolute 0.04 0.00 - 0.20 10*3/mm3    Immature Grans, Absolute 0.04 0.00 - 0.05 10*3/mm3    nRBC 0.0 0.0 - 0.2 /100 WBC   Lavender Top   Result Value Ref Range    Extra Tube hold for add-on    Light Blue Top   Result Value Ref Range    Extra Tube Hold for add-ons.    aPTT    Specimen: Blood   Result Value Ref Range    PTT 33.9 20.0 - 40.3 seconds   Protime-INR    Specimen: Blood   Result Value Ref Range    Protime 13.7 11.1 - 15.3 Seconds    INR 1.06 0.80 - 1.20   Type & Screen    Specimen: Blood   Result Value Ref Range    ABO Type O     RH type Negative     Antibody Screen Negative     T&S Expiration Date 3/5/2023 11:59:59 PM    BNP    Specimen: Blood   Result Value Ref Range    proBNP <36.0 0.0 - 900.0 pg/mL   Magnesium    Specimen: Blood   Result Value Ref Range    Magnesium 2.3 1.6 - 2.4 mg/dL   CK     Specimen: Blood   Result Value Ref Range    Creatine Kinase 57 20 - 200 U/L   Comprehensive Metabolic Panel    Specimen: Blood   Result Value Ref Range    Glucose 74 65 - 99 mg/dL    BUN 23 8 - 23 mg/dL    Creatinine 0.75 (L) 0.76 - 1.27 mg/dL    Sodium 137 136 - 145 mmol/L    Potassium 4.6 3.5 - 5.2 mmol/L    Chloride 100 98 - 107 mmol/L    CO2 30.0 (H) 22.0 - 29.0 mmol/L    Calcium 9.5 8.6 - 10.5 mg/dL    Total Protein 7.1 6.0 - 8.5 g/dL    Albumin 3.3 (L) 3.5 - 5.2 g/dL    ALT (SGPT) 33 1 - 41 U/L    AST (SGOT) 26 1 - 40 U/L    Alkaline Phosphatase 142 (H) 39 - 117 U/L    Total Bilirubin 0.2 0.0 - 1.2 mg/dL    Globulin 3.8 gm/dL    A/G Ratio 0.9 g/dL    BUN/Creatinine Ratio 30.7 (H) 7.0 - 25.0    Anion Gap 7.0 5.0 - 15.0 mmol/L    eGFR 99.5 >60.0 mL/min/1.73   ECG 12 Lead ED Triage Standing Order; Acute Stroke (Onset <24 hrs)   Result Value Ref Range    QT Interval 398 ms    QTC Interval 444 ms   Results for orders placed or performed during the hospital encounter of 23   TISSUE EXAM, [facility] ([facility])    Specimen: Large Intestine, Rectum; Polyp   Result Value Ref Range    Reference Lab Report       [facility]  [address] [place] [address]  Phone: [phone] or [phone]  Fax: [phone]  Nils Manzanares M.D., Medical Director    PATIENT NAME                                     LABORATORY NO.  [id number]   CORDELIA ONEIL.                              [id number]  [id number]                                 AGE                    SEX   SSN              CLIENT REF #  [facility]                   67        1955      CHICHI     [id number]      [id number]    [place]                               REQUESTING M.D.           ATTENDING M.D.         COPY TO.  [address] [place] [address]                         TONYA EUGENE DAVID  [place], [place] [id number] [place] [address]  DATE COLLECTED            DATE RECEIVED          DATE  REPORTED  02/24/2023 02/27/2023 02/28/2023    DIAGNOSIS:  RECTAL POLYP:  Tubular adenoma    JBS/pah    CLINICAL HISTORY:  Adenomatous polyp of colon,  unspecified part of colon    SPECIMENS RECEIVED:  RECTAL POLYP    MICROSCOPIC DESCRIPTION:  Tissue blocks are prepared and slides are examined microscopically on all  specimens. See diagnosis for details.    Professional interpretation rendered by Fitz Tavarez M.D. at [facility],  [place], [address] [place] [address].    GROSS DESCRIPTION:  Labeled rectum polyp are multiple portions of tan soft tissue mixed with possible  vegetable matter measuring 1.2 x 0.9 x 0.3 cm in aggregate, filtered and  submitted entirely in 1 block. MTH    REVIEWED, DIAGNOSED AND ELECTRONICALLY  SIGNED BY:    Fitz Tavarez M.D.  CPT CODES:  71899     Results for orders placed or performed during the hospital encounter of 01/07/23   Gold Top - SST   Result Value Ref Range    Extra Tube Hold for add-ons.    COVID-19 and FLU A/B PCR - Swab, Nasopharynx    Specimen: Nasopharynx; Swab   Result Value Ref Range    COVID19 Detected (C) Not Detected - Ref. Range    Influenza A PCR Not Detected Not Detected    Influenza B PCR Not Detected Not Detected   CBC Auto Differential    Specimen: Blood   Result Value Ref Range    WBC 9.11 3.40 - 10.80 10*3/mm3    RBC 4.44 4.14 - 5.80 10*6/mm3    Hemoglobin 13.0 13.0 - 17.7 g/dL    Hematocrit 40.1 37.5 - 51.0 %    MCV 90.3 79.0 - 97.0 fL    MCH 29.3 26.6 - 33.0 pg    MCHC 32.4 31.5 - 35.7 g/dL    RDW 13.3 12.3 - 15.4 %    RDW-SD 43.3 37.0 - 54.0 fl    MPV 11.0 6.0 - 12.0 fL    Platelets 254 140 - 450 10*3/mm3    Neutrophil % 77.0 (H) 42.7 - 76.0 %    Lymphocyte % 10.1 (L) 19.6 - 45.3 %    Monocyte % 8.5 5.0 - 12.0 %    Eosinophil % 3.5 0.3 - 6.2 %    Basophil % 0.4 0.0 - 1.5 %    Immature Grans % 0.5 0.0 - 0.5 %    Neutrophils, Absolute 7.01 (H) 1.70 - 7.00 10*3/mm3    Lymphocytes, Absolute 0.92 0.70 - 3.10 10*3/mm3     Monocytes, Absolute 0.77 0.10 - 0.90 10*3/mm3    Eosinophils, Absolute 0.32 0.00 - 0.40 10*3/mm3    Basophils, Absolute 0.04 0.00 - 0.20 10*3/mm3    Immature Grans, Absolute 0.05 0.00 - 0.05 10*3/mm3    nRBC 0.0 0.0 - 0.2 /100 WBC     *Note: Due to a large number of results and/or encounters for the requested time period, some results have not been displayed. A complete set of results can be found in Results Review.         This document has been electronically signed by Wanda Lang MD on March 24, 2023 21:59 CDT

## 2023-04-17 ENCOUNTER — OFFICE VISIT (OUTPATIENT)
Dept: NEUROLOGY | Facility: CLINIC | Age: 68
End: 2023-04-17
Payer: MEDICARE

## 2023-04-17 VITALS
HEART RATE: 101 BPM | DIASTOLIC BLOOD PRESSURE: 62 MMHG | SYSTOLIC BLOOD PRESSURE: 120 MMHG | BODY MASS INDEX: 21.98 KG/M2 | OXYGEN SATURATION: 92 % | HEIGHT: 71 IN | WEIGHT: 157 LBS

## 2023-04-17 DIAGNOSIS — G40.812 NONINTRACTABLE LENNOX-GASTAUT SYNDROME WITHOUT STATUS EPILEPTICUS: Primary | ICD-10-CM

## 2023-04-17 DIAGNOSIS — G40.219 COMPLEX PARTIAL EPILEPSY WITH GENERALIZATION AND WITH INTRACTABLE EPILEPSY: ICD-10-CM

## 2023-04-17 PROCEDURE — 1160F RVW MEDS BY RX/DR IN RCRD: CPT | Performed by: NURSE PRACTITIONER

## 2023-04-17 PROCEDURE — 1159F MED LIST DOCD IN RCRD: CPT | Performed by: NURSE PRACTITIONER

## 2023-04-17 PROCEDURE — 99214 OFFICE O/P EST MOD 30 MIN: CPT | Performed by: NURSE PRACTITIONER

## 2023-04-17 RX ORDER — LEVETIRACETAM 100 MG/ML
1750 SOLUTION ORAL 2 TIMES DAILY
COMMUNITY
Start: 2023-03-21

## 2023-04-17 NOTE — PROGRESS NOTES
Neurology Progress Note      Chief Complaint:    Seizure  Lennox-Gastaut syndrome    Subjective   History of Present Illness:  Bautista Grubbs is a 67 y.o. male who presents today for seizure.  He is routinely followed by Bautista James MD for primary care.  He has a caregiver from [facility] this with him today who provides history along with chart review from the facility.    Seizure  The patient seizure reports in the chart reveals a total of 5 seizures in 2022 in addition to 1 seizure on January 14 of this year.  During the times that he has had the seizures he has noted to have illness primarily pneumonia.  He was COVID-positive during the timeframe that he had his seizure in January.  He continues to take Keppra 1750 mg 2 times daily in addition to Lamictal 225 mg 2 times daily.  There are no missed doses of this.  Since last being seen he has had a PEG tube placed and he is receiving all of his oral intake from this.  It is likely that due to his severe dysphagia he was having significant aspiration pneumonia which would then induce seizures.  Since the time of his PEG tube placement he has had less episodes of seizure.  He remains severely mentally handicapped and has a limited examination today.  There are no real questions or complaints at this point.    Allergies:    Haldol [haloperidol] and Levaquin [levofloxacin]    Medications:  Current Outpatient Medications   Medication Sig Dispense Refill   • albuterol (PROVENTIL) (2.5 MG/3ML) 0.083% nebulizer solution      • albuterol sulfate  (90 Base) MCG/ACT inhaler Inhale 2 puffs Every 4 (Four) Hours As Needed for Shortness of Air or Wheezing. 6.7 g 0   • betamethasone dipropionate (DIPROLENE) 0.05 % ointment      • calcium carbonate-vitamin d 600-400 MG-UNIT per tablet Take 1 tablet by mouth 2 (Two) Times a Day. Per G-Tube     • denosumab (Prolia) 60 MG/ML solution prefilled syringe syringe Inject 1 mL under the skin into the appropriate  area as directed. Every 6 Months     • DIAZEPAM RE Insert 10 mg into the rectum As Needed (For Seizure Activity).     • famotidine (PEPCID) 20 MG tablet Take 1 tablet by mouth Daily. Per G-Tube     • ferrous sulfate 325 (65 FE) MG tablet Take 1 tablet by mouth Daily With Breakfast. Per G-Tube     • ibuprofen (ADVIL,MOTRIN) 600 MG tablet Take 1 tablet by mouth Every 8 (Eight) Hours As Needed for Moderate Pain . 30 tablet 0   • ipratropium-albuterol (DUO-NEB) 0.5-2.5 mg/3 ml nebulizer Take 3 mL by nebulization Every 4 (Four) Hours As Needed for Shortness of Air. 360 mL    • lamoTRIgine (LaMICtal) 200 MG tablet Take 1 tablet by mouth 2 (Two) Times a Day. Per G-Tube     • lamoTRIgine (LaMICtal) 25 MG tablet Take 1 tablet by mouth 2 (Two) Times a Day. Per G-Tube To Equal 225 MG     • levETIRAcetam (KEPPRA) 100 MG/ML solution Take 17.5 mL by mouth 2 (Two) Times a Day.     • LORazepam (ATIVAN) 1 MG tablet GIVE ONE TABLET PER G-TUBE TWICE DAILY FOR ANXIETY AND AGITATION 60 tablet 5   • midodrine (PROAMATINE) 5 MG tablet Take 1 tablet by mouth 3 (Three) Times a Day. (Patient taking differently: Take 2 tablets by mouth 3 (Three) Times a Day. Per G-Tube) 90 tablet 3   • Nutritional Supplements (JEVITY 1.5 JEFFERY PO) Take  by mouth. Gets 390mL bolus every 6 hours 1A-7A-1P-7P     • simvastatin (ZOCOR) 20 MG tablet Take 1 tablet by mouth Daily. Per G-Tube     • sucralfate (CARAFATE) 1 g tablet Take 1 tablet by mouth 4 (Four) Times a Day Before Meals & at Bedtime. 120 tablet 0   • thioridazine (MELLARIL) 100 MG tablet Take 2 tablets by mouth 2 (Two) Times a Day. Per G-Tube     • triamcinolone (KENALOG) 0.1 % cream        No current facility-administered medications for this visit.     Current outpatient and discharge medications have been reconciled for the patient.  Reviewed by: GARRET Del Valle    Past Medical History:  Past Medical History:   Diagnosis Date   • Alopecia    • Arthritis    • Autism    • COPD (chronic  obstructive pulmonary disease)    • GERD (gastroesophageal reflux disease)    • Hyperlipidemia    • Hypertension    • Insomnia    • Intellectual disability    • Lennox-Gastaut syndrome    • Major depressive disorder    • Orthostatic hypotension    • Osteoporosis    • Right bundle branch block    • Scoliosis    • Seizures      Past Surgical History:   Procedure Laterality Date   • COLONOSCOPY N/A 1/8/2021    Procedure: COLONOSCOPY;  Surgeon: Wanda Lang MD;  Location: [facility] ENDOSCOPY;  Service: Gastroenterology;  Laterality: N/A;   • COLONOSCOPY N/A 9/2/2021    Procedure: COLONOSCOPY;  Surgeon: Wanda Lang MD;  Location: [facility] ENDOSCOPY;  Service: Gastroenterology;  Laterality: N/A;   • COLONOSCOPY N/A 10/14/2022    Procedure: COLONOSCOPY;  Surgeon: Wanda Lang MD;  Location: [facility] ENDOSCOPY;  Service: Gastroenterology;  Laterality: N/A;   • ENDOSCOPY N/A 1/7/2021    Procedure: ESOPHAGOGASTRODUODENOSCOPY;  Surgeon: Wanda Lang MD;  Location: [facility] ENDOSCOPY;  Service: Gastroenterology;  Laterality: N/A;   • ENDOSCOPY N/A 1/22/2021    Procedure: ESOPHAGOGASTRODUODENOSCOPY;  Surgeon: Wanda Lang MD;  Location: [facility] ENDOSCOPY;  Service: Gastroenterology;  Laterality: N/A;   • ENDOSCOPY W/ PEG TUBE PLACEMENT N/A 6/25/2021    Procedure: ESOPHAGOGASTRODUODENOSCOPY WITH PERCUTANEOUS ENDOSCOPIC GASTROSTOMY TUBE INSERTION WITH ANESTHESIA;  Surgeon: Wanda Lang MD;  Location: [facility] ENDOSCOPY;  Service: Gastroenterology;  Laterality: N/A;   • SIGMOIDOSCOPY N/A 10/11/2021    Procedure: SIGMOIDOSCOPY FLEXIBLE;  Surgeon: Wanda Lang MD;  Location: [facility] ENDOSCOPY;  Service: Gastroenterology;  Laterality: N/A;   • SIGMOIDOSCOPY N/A 11/30/2021    Procedure: SIGMOIDOSCOPY FLEXIBLE;  Surgeon: Wanda Lang MD;  Location: [facility] ENDOSCOPY;  Service: Gastroenterology;  Laterality: N/A;   • SIGMOIDOSCOPY N/A 6/16/2022    Procedure: SIGMOIDOSCOPY FLEXIBLE;  Surgeon: Precious  "MD Wanda;  Location: [facility] ENDOSCOPY;  Service: Gastroenterology;  Laterality: N/A;  argon at rectal polypectomy site   • SIGMOIDOSCOPY N/A 2/24/2023    Procedure: SIGMOIDOSCOPY FLEXIBLE enemas on call at facility;  Surgeon: Wanda Lang MD;  Location: [facility] ENDOSCOPY;  Service: Gastroenterology;  Laterality: N/A;  argon to rectal polyp site   • UPPER GASTROINTESTINAL ENDOSCOPY  01/22/2021   • UPPER GASTROINTESTINAL ENDOSCOPY  06/25/2021     Family History   Problem Relation Age of Onset   • Hypertension Father      Social History     Tobacco Use   • Smoking status: Never   • Smokeless tobacco: Never   Vaping Use   • Vaping Use: Never used   Substance Use Topics   • Alcohol use: Never   • Drug use: Never     Review of Systems      Objective   Vital Signs:  Heart Rate:  [101] 101  BP: (120)/(62) 120/62      04/17/23  1052   Weight: 71.2 kg (157 lb)     180.3 cm (71\")  Body mass index is 21.9 kg/m².    Physical Exam  Neurological Exam    Results Review:    Lab Results   Component Value Date    GLUCOSE 74 03/02/2023    BUN 23 03/02/2023    CREATININE 0.75 (L) 03/02/2023    EGFRIFNONA 90 01/20/2022    BCR 30.7 (H) 03/02/2023    K 4.6 03/02/2023    CO2 30.0 (H) 03/02/2023    CALCIUM 9.5 03/02/2023    ALBUMIN 3.3 (L) 03/02/2023    AST 26 03/02/2023    ALT 33 03/02/2023     Lab Results   Component Value Date    WBC 12.53 (H) 03/02/2023    HGB 11.7 (L) 03/02/2023    HCT 36.3 (L) 03/02/2023    MCV 88.8 03/02/2023     03/02/2023     No results found for: CHOL, CHLPL, TRIG, HDL, LDL, LDLDIRECT  No results found for: TSH  No results found for: HGBA1C  No results found for: FOLATE  No results found for: [id number]    Chart Review:  Reviewed chart from [facility] facility.  Reviewed seizure documentation since February 2022 in addition to numerous progress notes from Dr. Bautista Zee MD.     Plan .  Assessment:  Bautista Grubbs is a 67 y.o. male who presents for seizure follow-up.  He has not been seen since " February 2022.  However, he remains doing decently well with his history of Lennox-Gastaut syndrome.  He continues to take Lamictal 225 mg twice daily in addition to Keppra to 1750 mg twice daily.  His seizures have drastically reduced since he obtained a PEG tube and he is no longer having severe aspiration pneumonia.  He had 1 seizure in January which was associated to COVID-19 infection.  Overall, he remains stable at this point and the facility has no questions or concerns.  I have advised the caregiver that is with him to call me with any questions or concerns or any significant amount of breakthrough seizures.  She states understanding with this.  I recommend no changes to his medications at this time.  I also recommend appropriate prevention of illness as his seizures are very receptive to illness.    Plan:  • Continue Lamictal 225 mg twice daily  • Continue Keppra 1750 mg twice daily  • Seizure precautions  • Safety and fall risk prevention discussed with the aid today.  • Call with any breakthrough seizures.    The patient and I have discussed the plan of care and he is in full agreement at this time.     Follow-Up:  Return in about 6 months (around 10/17/2023) for Seizure.       Sloan Her, GARRET  04/17/23  12:03 CDT

## 2023-07-12 ENCOUNTER — APPOINTMENT (OUTPATIENT)
Dept: GENERAL RADIOLOGY | Facility: HOSPITAL | Age: 68
DRG: 178 | End: 2023-07-12
Payer: MEDICARE

## 2023-07-12 ENCOUNTER — APPOINTMENT (OUTPATIENT)
Dept: CT IMAGING | Facility: HOSPITAL | Age: 68
DRG: 178 | End: 2023-07-12
Payer: MEDICARE

## 2023-07-12 ENCOUNTER — HOSPITAL ENCOUNTER (INPATIENT)
Facility: HOSPITAL | Age: 68
LOS: 7 days | Discharge: NURSING, REHAB OR LONG-TERM CARE | DRG: 178 | End: 2023-07-19
Attending: EMERGENCY MEDICINE
Payer: MEDICARE

## 2023-07-12 DIAGNOSIS — J18.9 PNEUMONIA OF LEFT LUNG DUE TO INFECTIOUS ORGANISM, UNSPECIFIED PART OF LUNG: Primary | ICD-10-CM

## 2023-07-12 DIAGNOSIS — Z74.09 IMPAIRED FUNCTIONAL MOBILITY, BALANCE, GAIT, AND ENDURANCE: ICD-10-CM

## 2023-07-12 LAB
ALBUMIN SERPL-MCNC: 2.8 G/DL (ref 3.5–5.2)
ALBUMIN/GLOB SERPL: 0.7 G/DL
ALP SERPL-CCNC: 155 U/L (ref 39–117)
ALT SERPL W P-5'-P-CCNC: 92 U/L (ref 1–41)
ANION GAP SERPL CALCULATED.3IONS-SCNC: 6 MMOL/L (ref 5–15)
ARTERIAL PATENCY WRIST A: ABNORMAL
AST SERPL-CCNC: 65 U/L (ref 1–40)
ATMOSPHERIC PRESS: 747 MMHG
BASE EXCESS BLDA CALC-SCNC: 7.7 MMOL/L (ref 0–2)
BASOPHILS # BLD AUTO: 0.06 10*3/MM3 (ref 0–0.2)
BASOPHILS NFR BLD AUTO: 0.7 % (ref 0–1.5)
BDY SITE: ABNORMAL
BILIRUB SERPL-MCNC: 0.2 MG/DL (ref 0–1.2)
BUN SERPL-MCNC: 21 MG/DL (ref 8–23)
BUN/CREAT SERPL: 35 (ref 7–25)
CALCIUM SPEC-SCNC: 8.6 MG/DL (ref 8.6–10.5)
CHLORIDE SERPL-SCNC: 105 MMOL/L (ref 98–107)
CO2 SERPL-SCNC: 33 MMOL/L (ref 22–29)
CREAT SERPL-MCNC: 0.6 MG/DL (ref 0.76–1.27)
DEPRECATED RDW RBC AUTO: 47.8 FL (ref 37–54)
EGFRCR SERPLBLD CKD-EPI 2021: 106.5 ML/MIN/1.73
EOSINOPHIL # BLD AUTO: 0.16 10*3/MM3 (ref 0–0.4)
EOSINOPHIL NFR BLD AUTO: 1.7 % (ref 0.3–6.2)
ERYTHROCYTE [DISTWIDTH] IN BLOOD BY AUTOMATED COUNT: 14.8 % (ref 12.3–15.4)
GLOBULIN UR ELPH-MCNC: 4.1 GM/DL
GLUCOSE SERPL-MCNC: 86 MG/DL (ref 65–99)
HCO3 BLDA-SCNC: 32.2 MMOL/L (ref 20–26)
HCT VFR BLD AUTO: 32 % (ref 37.5–51)
HGB BLD-MCNC: 10.6 G/DL (ref 13–17.7)
HOLD SPECIMEN: NORMAL
IMM GRANULOCYTES # BLD AUTO: 0.03 10*3/MM3 (ref 0–0.05)
IMM GRANULOCYTES NFR BLD AUTO: 0.3 % (ref 0–0.5)
LYMPHOCYTES # BLD AUTO: 0.46 10*3/MM3 (ref 0.7–3.1)
LYMPHOCYTES NFR BLD AUTO: 5 % (ref 19.6–45.3)
Lab: ABNORMAL
MCH RBC QN AUTO: 29.3 PG (ref 26.6–33)
MCHC RBC AUTO-ENTMCNC: 33.1 G/DL (ref 31.5–35.7)
MCV RBC AUTO: 88.4 FL (ref 79–97)
MODALITY: ABNORMAL
MONOCYTES # BLD AUTO: 0.98 10*3/MM3 (ref 0.1–0.9)
MONOCYTES NFR BLD AUTO: 10.7 % (ref 5–12)
NEUTROPHILS NFR BLD AUTO: 7.49 10*3/MM3 (ref 1.7–7)
NEUTROPHILS NFR BLD AUTO: 81.6 % (ref 42.7–76)
NRBC BLD AUTO-RTO: 0 /100 WBC (ref 0–0.2)
NT-PROBNP SERPL-MCNC: 225.3 PG/ML (ref 0–900)
PCO2 BLDA: 43.6 MM HG (ref 35–45)
PH BLDA: 7.48 PH UNITS (ref 7.35–7.45)
PLATELET # BLD AUTO: 222 10*3/MM3 (ref 140–450)
PMV BLD AUTO: 12.1 FL (ref 6–12)
PO2 BLDA: 52.2 MM HG (ref 83–108)
POTASSIUM SERPL-SCNC: 3.5 MMOL/L (ref 3.5–5.2)
PROT SERPL-MCNC: 6.9 G/DL (ref 6–8.5)
RBC # BLD AUTO: 3.62 10*6/MM3 (ref 4.14–5.8)
SAO2 % BLDCOA: 88.9 % (ref 94–99)
SODIUM SERPL-SCNC: 144 MMOL/L (ref 136–145)
TROPONIN T SERPL HS-MCNC: 10 NG/L
VENTILATOR MODE: ABNORMAL
WBC NRBC COR # BLD: 9.18 10*3/MM3 (ref 3.4–10.8)
WHOLE BLOOD HOLD COAG: NORMAL
WHOLE BLOOD HOLD SPECIMEN: NORMAL

## 2023-07-12 PROCEDURE — 87449 NOS EACH ORGANISM AG IA: CPT | Performed by: PHYSICIAN ASSISTANT

## 2023-07-12 PROCEDURE — 36600 WITHDRAWAL OF ARTERIAL BLOOD: CPT

## 2023-07-12 PROCEDURE — 84484 ASSAY OF TROPONIN QUANT: CPT

## 2023-07-12 PROCEDURE — 25010000002 ENOXAPARIN PER 10 MG: Performed by: PHYSICIAN ASSISTANT

## 2023-07-12 PROCEDURE — 87040 BLOOD CULTURE FOR BACTERIA: CPT | Performed by: EMERGENCY MEDICINE

## 2023-07-12 PROCEDURE — 87899 AGENT NOS ASSAY W/OPTIC: CPT | Performed by: PHYSICIAN ASSISTANT

## 2023-07-12 PROCEDURE — 83880 ASSAY OF NATRIURETIC PEPTIDE: CPT

## 2023-07-12 PROCEDURE — 25510000001 IOPAMIDOL PER 1 ML: Performed by: EMERGENCY MEDICINE

## 2023-07-12 PROCEDURE — 99285 EMERGENCY DEPT VISIT HI MDM: CPT

## 2023-07-12 PROCEDURE — 93010 ELECTROCARDIOGRAM REPORT: CPT | Performed by: INTERNAL MEDICINE

## 2023-07-12 PROCEDURE — 36415 COLL VENOUS BLD VENIPUNCTURE: CPT

## 2023-07-12 PROCEDURE — 85025 COMPLETE CBC W/AUTO DIFF WBC: CPT

## 2023-07-12 PROCEDURE — 87641 MR-STAPH DNA AMP PROBE: CPT | Performed by: PHYSICIAN ASSISTANT

## 2023-07-12 PROCEDURE — 93005 ELECTROCARDIOGRAM TRACING: CPT

## 2023-07-12 PROCEDURE — 82803 BLOOD GASES ANY COMBINATION: CPT

## 2023-07-12 PROCEDURE — 25010000002 DEXAMETHASONE PER 1 MG: Performed by: EMERGENCY MEDICINE

## 2023-07-12 PROCEDURE — 80053 COMPREHEN METABOLIC PANEL: CPT

## 2023-07-12 PROCEDURE — 94640 AIRWAY INHALATION TREATMENT: CPT

## 2023-07-12 PROCEDURE — 71045 X-RAY EXAM CHEST 1 VIEW: CPT

## 2023-07-12 PROCEDURE — 71275 CT ANGIOGRAPHY CHEST: CPT

## 2023-07-12 RX ORDER — MIDODRINE HYDROCHLORIDE 5 MG/1
10 TABLET ORAL
Status: DISCONTINUED | OUTPATIENT
Start: 2023-07-12 | End: 2023-07-19 | Stop reason: HOSPADM

## 2023-07-12 RX ORDER — ONDANSETRON 2 MG/ML
4 INJECTION INTRAMUSCULAR; INTRAVENOUS EVERY 6 HOURS PRN
Status: DISCONTINUED | OUTPATIENT
Start: 2023-07-12 | End: 2023-07-19 | Stop reason: HOSPADM

## 2023-07-12 RX ORDER — LEVETIRACETAM 100 MG/ML
1750 SOLUTION ORAL 2 TIMES DAILY
Status: DISCONTINUED | OUTPATIENT
Start: 2023-07-12 | End: 2023-07-19 | Stop reason: HOSPADM

## 2023-07-12 RX ORDER — SODIUM CHLORIDE 0.9 % (FLUSH) 0.9 %
10 SYRINGE (ML) INJECTION EVERY 12 HOURS SCHEDULED
Status: DISCONTINUED | OUTPATIENT
Start: 2023-07-12 | End: 2023-07-19 | Stop reason: HOSPADM

## 2023-07-12 RX ORDER — IPRATROPIUM BROMIDE AND ALBUTEROL SULFATE 2.5; .5 MG/3ML; MG/3ML
3 SOLUTION RESPIRATORY (INHALATION) ONCE
Status: COMPLETED | OUTPATIENT
Start: 2023-07-12 | End: 2023-07-12

## 2023-07-12 RX ORDER — IPRATROPIUM BROMIDE AND ALBUTEROL SULFATE 2.5; .5 MG/3ML; MG/3ML
3 SOLUTION RESPIRATORY (INHALATION) EVERY 4 HOURS PRN
Status: DISCONTINUED | OUTPATIENT
Start: 2023-07-12 | End: 2023-07-19 | Stop reason: HOSPADM

## 2023-07-12 RX ORDER — CALCIUM CARBONATE/VITAMIN D3 600 MG-10
1 TABLET ORAL 2 TIMES DAILY
Status: DISCONTINUED | OUTPATIENT
Start: 2023-07-12 | End: 2023-07-19 | Stop reason: HOSPADM

## 2023-07-12 RX ORDER — LAMOTRIGINE 100 MG/1
200 TABLET ORAL 2 TIMES DAILY
Status: DISCONTINUED | OUTPATIENT
Start: 2023-07-12 | End: 2023-07-19 | Stop reason: HOSPADM

## 2023-07-12 RX ORDER — SODIUM CHLORIDE 0.9 % (FLUSH) 0.9 %
10 SYRINGE (ML) INJECTION AS NEEDED
Status: DISCONTINUED | OUTPATIENT
Start: 2023-07-12 | End: 2023-07-17

## 2023-07-12 RX ORDER — ONDANSETRON 4 MG/1
4 TABLET, FILM COATED ORAL EVERY 6 HOURS PRN
Status: DISCONTINUED | OUTPATIENT
Start: 2023-07-12 | End: 2023-07-19 | Stop reason: HOSPADM

## 2023-07-12 RX ORDER — ATORVASTATIN CALCIUM 10 MG/1
10 TABLET, FILM COATED ORAL DAILY
Status: DISCONTINUED | OUTPATIENT
Start: 2023-07-12 | End: 2023-07-19 | Stop reason: HOSPADM

## 2023-07-12 RX ORDER — DEXAMETHASONE SODIUM PHOSPHATE 4 MG/ML
10 INJECTION, SOLUTION INTRA-ARTICULAR; INTRALESIONAL; INTRAMUSCULAR; INTRAVENOUS; SOFT TISSUE ONCE
Status: COMPLETED | OUTPATIENT
Start: 2023-07-12 | End: 2023-07-12

## 2023-07-12 RX ORDER — FAMOTIDINE 20 MG/1
20 TABLET, FILM COATED ORAL DAILY
Status: DISCONTINUED | OUTPATIENT
Start: 2023-07-12 | End: 2023-07-19 | Stop reason: HOSPADM

## 2023-07-12 RX ORDER — LAMOTRIGINE 25 MG/1
25 TABLET ORAL 2 TIMES DAILY
Status: DISCONTINUED | OUTPATIENT
Start: 2023-07-12 | End: 2023-07-19 | Stop reason: HOSPADM

## 2023-07-12 RX ORDER — SUCRALFATE 1 G/1
1 TABLET ORAL
Status: DISCONTINUED | OUTPATIENT
Start: 2023-07-12 | End: 2023-07-19 | Stop reason: HOSPADM

## 2023-07-12 RX ORDER — ENOXAPARIN SODIUM 100 MG/ML
40 INJECTION SUBCUTANEOUS DAILY
Status: DISCONTINUED | OUTPATIENT
Start: 2023-07-12 | End: 2023-07-19 | Stop reason: HOSPADM

## 2023-07-12 RX ORDER — FERROUS SULFATE TAB EC 324 MG (65 MG FE EQUIVALENT) 324 (65 FE) MG
324 TABLET DELAYED RESPONSE ORAL
Status: DISCONTINUED | OUTPATIENT
Start: 2023-07-13 | End: 2023-07-19 | Stop reason: HOSPADM

## 2023-07-12 RX ORDER — SODIUM CHLORIDE 9 MG/ML
40 INJECTION, SOLUTION INTRAVENOUS AS NEEDED
Status: DISCONTINUED | OUTPATIENT
Start: 2023-07-12 | End: 2023-07-17

## 2023-07-12 RX ORDER — CALCIUM CARBONATE/VITAMIN D3 600 MG-10
1 TABLET ORAL 2 TIMES DAILY
Status: DISCONTINUED | OUTPATIENT
Start: 2023-07-12 | End: 2023-07-12

## 2023-07-12 RX ORDER — VANCOMYCIN/0.9 % SOD CHLORIDE 1.5G/250ML
1500 PLASTIC BAG, INJECTION (ML) INTRAVENOUS EVERY 12 HOURS
Status: DISCONTINUED | OUTPATIENT
Start: 2023-07-13 | End: 2023-07-14

## 2023-07-12 RX ORDER — ALBUTEROL SULFATE 90 UG/1
2 AEROSOL, METERED RESPIRATORY (INHALATION) EVERY 4 HOURS PRN
Status: DISCONTINUED | OUTPATIENT
Start: 2023-07-12 | End: 2023-07-19 | Stop reason: HOSPADM

## 2023-07-12 RX ORDER — ACETAMINOPHEN 160 MG/5ML
650 SOLUTION ORAL EVERY 6 HOURS PRN
Status: DISCONTINUED | OUTPATIENT
Start: 2023-07-12 | End: 2023-07-19 | Stop reason: HOSPADM

## 2023-07-12 RX ORDER — SODIUM CHLORIDE, SODIUM LACTATE, POTASSIUM CHLORIDE, CALCIUM CHLORIDE 600; 310; 30; 20 MG/100ML; MG/100ML; MG/100ML; MG/100ML
75 INJECTION, SOLUTION INTRAVENOUS CONTINUOUS
Status: DISCONTINUED | OUTPATIENT
Start: 2023-07-12 | End: 2023-07-14

## 2023-07-12 RX ADMIN — LEVETIRACETAM 1750 MG: 100 SOLUTION ORAL at 20:53

## 2023-07-12 RX ADMIN — LAMOTRIGINE 200 MG: 100 TABLET ORAL at 20:53

## 2023-07-12 RX ADMIN — Medication 1750 MG: at 23:54

## 2023-07-12 RX ADMIN — Medication 10 ML: at 20:54

## 2023-07-12 RX ADMIN — SUCRALFATE 1 G: 1 TABLET ORAL at 17:17

## 2023-07-12 RX ADMIN — LAMOTRIGINE 25 MG: 25 TABLET ORAL at 20:54

## 2023-07-12 RX ADMIN — DEXAMETHASONE SODIUM PHOSPHATE 10 MG: 4 INJECTION, SOLUTION INTRAMUSCULAR; INTRAVENOUS at 12:53

## 2023-07-12 RX ADMIN — Medication 2000 MG: at 15:33

## 2023-07-12 RX ADMIN — SUCRALFATE 1 G: 1 TABLET ORAL at 20:54

## 2023-07-12 RX ADMIN — Medication 1 TABLET: at 22:00

## 2023-07-12 RX ADMIN — IPRATROPIUM BROMIDE AND ALBUTEROL SULFATE 3 ML: .5; 3 SOLUTION RESPIRATORY (INHALATION) at 12:23

## 2023-07-12 RX ADMIN — Medication 10 ML: at 17:16

## 2023-07-12 RX ADMIN — IOPAMIDOL 90 ML: 755 INJECTION, SOLUTION INTRAVENOUS at 14:22

## 2023-07-12 RX ADMIN — MIDODRINE HYDROCHLORIDE 10 MG: 5 TABLET ORAL at 17:17

## 2023-07-12 RX ADMIN — ENOXAPARIN SODIUM 40 MG: 40 INJECTION SUBCUTANEOUS at 17:17

## 2023-07-12 RX ADMIN — THIORIDAZINE HYDROCHLORIDE 200 MG: 50 TABLET, FILM COATED ORAL at 20:53

## 2023-07-12 RX ADMIN — SODIUM CHLORIDE, POTASSIUM CHLORIDE, SODIUM LACTATE AND CALCIUM CHLORIDE 75 ML/HR: 600; 310; 30; 20 INJECTION, SOLUTION INTRAVENOUS at 17:17

## 2023-07-12 NOTE — H&P
[facility] Medicine  HISTORY AND PHYSICAL      Date of Admission: 7/12/2023  Primary Care Physician: Bautista James MD    Subjective     Chief Complaint: Difficulty breathing, cough    History of Present Illness  Patient is a 67 year old non-verbal male (PMHx Autism, HTN, HLD, COPD, GERD, Seizures, Chronic tube feeding) brought to [facility] ED for evaluation of persistent difficulty breathing and cough. Patient lives in a group home and has caregiver at bedside who provides history. Caregiver tells me that patient was recently admitted to the hospital in [place], [place] for management of pneumonia on July 6. He was discharged from that facility on July 10. Patient has continued to demonstrate cough, quick breathing, and sometimes seemed to have difficulty breathing. Low grade fevers have been noticed. Patient hasn't seemed bothered, but sleeps more when he has illness, such as now. He will grunt when he doesn't want to be bothered. Patient has history of seizures. He does wear a helmet when ambulatory due to not only the seizures, but also because he is known to suddenly drop to the floor if he becomes upset/unhappy.    ED findings include CXR and CT chest that demonstrate extensive infiltration of the left lung; cannot exclude underlying mass/neoplasm. Small bilateral effusions also noted. No PE. CBC stable; CMP stable.     Review of Systems   Unable to perform ROS: Patient nonverbal   Respiratory:  Positive for cough (per caregiver).       Otherwise complete ROS reviewed and negative except as mentioned in the HPI.    Past Medical History:   Past Medical History:   Diagnosis Date    Alopecia     Arthritis     Autism     COPD (chronic obstructive pulmonary disease)     GERD (gastroesophageal reflux disease)     Hyperlipidemia     Hypertension     Insomnia     Intellectual disability     Lennox-Gastaut syndrome     Major depressive disorder     Orthostatic  hypotension     Osteoporosis     Right bundle branch block     Scoliosis     Seizures      Past Surgical History:  Past Surgical History:   Procedure Laterality Date    COLONOSCOPY N/A 1/8/2021    Procedure: COLONOSCOPY;  Surgeon: Wanda Lang MD;  Location: [facility] ENDOSCOPY;  Service: Gastroenterology;  Laterality: N/A;    COLONOSCOPY N/A 9/2/2021    Procedure: COLONOSCOPY;  Surgeon: Wanda Lang MD;  Location: [facility] ENDOSCOPY;  Service: Gastroenterology;  Laterality: N/A;    COLONOSCOPY N/A 10/14/2022    Procedure: COLONOSCOPY;  Surgeon: Wanda Lang MD;  Location: [facility] ENDOSCOPY;  Service: Gastroenterology;  Laterality: N/A;    ENDOSCOPY N/A 1/7/2021    Procedure: ESOPHAGOGASTRODUODENOSCOPY;  Surgeon: Wanda Lang MD;  Location: [facility] ENDOSCOPY;  Service: Gastroenterology;  Laterality: N/A;    ENDOSCOPY N/A 1/22/2021    Procedure: ESOPHAGOGASTRODUODENOSCOPY;  Surgeon: Wanda Lang MD;  Location: [facility] ENDOSCOPY;  Service: Gastroenterology;  Laterality: N/A;    ENDOSCOPY W/ PEG TUBE PLACEMENT N/A 6/25/2021    Procedure: ESOPHAGOGASTRODUODENOSCOPY WITH PERCUTANEOUS ENDOSCOPIC GASTROSTOMY TUBE INSERTION WITH ANESTHESIA;  Surgeon: Wanda Lang MD;  Location: [facility] ENDOSCOPY;  Service: Gastroenterology;  Laterality: N/A;    SIGMOIDOSCOPY N/A 10/11/2021    Procedure: SIGMOIDOSCOPY FLEXIBLE;  Surgeon: Wanda Lang MD;  Location: [facility] ENDOSCOPY;  Service: Gastroenterology;  Laterality: N/A;    SIGMOIDOSCOPY N/A 11/30/2021    Procedure: SIGMOIDOSCOPY FLEXIBLE;  Surgeon: Wanda Lang MD;  Location: [facility] ENDOSCOPY;  Service: Gastroenterology;  Laterality: N/A;    SIGMOIDOSCOPY N/A 6/16/2022    Procedure: SIGMOIDOSCOPY FLEXIBLE;  Surgeon: Wanda Lang MD;  Location: [facility] ENDOSCOPY;  Service: Gastroenterology;  Laterality: N/A;  argon at rectal polypectomy site    SIGMOIDOSCOPY N/A 2/24/2023    Procedure: SIGMOIDOSCOPY FLEXIBLE enemas on call at facility;   Surgeon: Wanda Lagn MD;  Location: [facility] ENDOSCOPY;  Service: Gastroenterology;  Laterality: N/A;  argon to rectal polyp site    UPPER GASTROINTESTINAL ENDOSCOPY  01/22/2021    UPPER GASTROINTESTINAL ENDOSCOPY  06/25/2021     Social History:  reports that he has never smoked. He has never used smokeless tobacco. He reports that he does not drink alcohol and does not use drugs.    Family History: family history includes Hypertension in his father.       Allergies:  Allergies   Allergen Reactions    Haldol [Haloperidol] Unknown (See Comments)     Unknown      Levaquin [Levofloxacin] Other (See Comments)     Lowers seizure threshold       Medications:  Prior to Admission medications    Medication Sig Start Date End Date Taking? Authorizing Provider   albuterol (PROVENTIL) (2.5 MG/3ML) 0.083% nebulizer solution  10/12/21  Yes Patricia Driscoll MD   albuterol sulfate  (90 Base) MCG/ACT inhaler Inhale 2 puffs Every 4 (Four) Hours As Needed for Shortness of Air or Wheezing. 1/7/23  Yes Carlos A Ely MD   calcium carbonate-vitamin d 600-400 MG-UNIT per tablet Take 1 tablet by mouth 2 (Two) Times a Day. Per G-Tube   Yes Patricia Driscoll MD   denosumab (Prolia) 60 MG/ML solution prefilled syringe syringe Inject 1 mL under the skin into the appropriate area as directed. Every 6 Months   Yes Patricia Driscoll MD   DIAZEPAM RE Insert 10 mg into the rectum As Needed (For Seizure Activity).   Yes Patricia Driscoll MD   famotidine (PEPCID) 20 MG tablet Take 1 tablet by mouth Daily. Per G-Tube   Yes Patricia Driscoll MD   ferrous sulfate 325 (65 FE) MG tablet Take 1 tablet by mouth Daily With Breakfast. Per G-Tube   Yes Patricia Driscoll MD   ibuprofen (ADVIL,MOTRIN) 600 MG tablet Take 1 tablet by mouth Every 8 (Eight) Hours As Needed for Moderate Pain . 1/19/22  Yes Sandeep Fernandez MD   ipratropium-albuterol (DUO-NEB) 0.5-2.5 mg/3 ml nebulizer Take 3 mL by nebulization Every 4 (Four)  Hours As Needed for Shortness of Air. 4/15/22  Yes Leander Meade MD   lamoTRIgine (LaMICtal) 200 MG tablet Take 1 tablet by mouth 2 (Two) Times a Day. Per G-Tube   Yes Patricia Driscoll MD   lamoTRIgine (LaMICtal) 25 MG tablet Take 1 tablet by mouth 2 (Two) Times a Day. Per G-Tube To Equal 225 MG   Yes Patricia Driscoll MD   levETIRAcetam (KEPPRA) 100 MG/ML solution Take 17.5 mL by mouth 2 (Two) Times a Day. 3/21/23  Yes Patricia Driscoll MD   LORazepam (ATIVAN) 1 MG tablet GIVE ONE TABLET PER G-TUBE TWICE DAILY FOR ANXIETY AND AGITATION 2/2/22  Yes Christelle Smith APRN   magnesium hydroxide (MILK OF MAGNESIA) 400 MG/5ML suspension Take  by mouth Daily As Needed for Constipation.   Yes Patricia Driscoll MD   midodrine (PROAMATINE) 5 MG tablet Take 1 tablet by mouth 3 (Three) Times a Day.  Patient taking differently: Take 2 tablets by mouth 3 (Three) Times a Day. Per G-Tube 2/4/19  Yes Earnestine Reid MD   Nutritional Supplements (JEVITY 1.5 JEFFERY PO) Take  by mouth. Gets 390mL bolus every 6 hours 1A-7A-1P-7P   Yes Patricia Driscoll MD   simvastatin (ZOCOR) 20 MG tablet Take 1 tablet by mouth Daily. Per G-Tube   Yes Patricia Driscoll MD   sucralfate (CARAFATE) 1 g tablet Take 1 tablet by mouth 4 (Four) Times a Day Before Meals & at Bedtime. 5/19/21  Yes Sandeep Fernandez MD   thioridazine (MELLARIL) 100 MG tablet Take 2 tablets by mouth 2 (Two) Times a Day. Per G-Tube   Yes Patricia Driscoll MD   triamcinolone (KENALOG) 0.1 % cream  8/25/22  Yes Patricia Driscoll MD   betamethasone dipropionate (DIPROLENE) 0.05 % ointment  9/21/22   Patricia Driscoll MD I have utilized all available immediate resources to obtain, update, and review the patient's current medications.    Objective     Vital Signs: /91 (BP Location: Right leg, Patient Position: Sitting)   Pulse 82   Temp 99.3 °F (37.4 °C) (Oral)   Resp 18   Wt 88.8 kg (195 lb 11.2 oz)   SpO2 95%   BMI 27.29  kg/m²   Physical Exam  Vitals and nursing note reviewed.   Constitutional:       Appearance: He is underweight.   HENT:      Head: Normocephalic and atraumatic.      Right Ear: External ear normal.      Left Ear: External ear normal.      Nose: Nose normal. No congestion or rhinorrhea.      Mouth/Throat:      Mouth: Mucous membranes are moist.      Pharynx: Oropharynx is clear.   Eyes:      General: No scleral icterus.     Extraocular Movements: Extraocular movements intact.      Conjunctiva/sclera: Conjunctivae normal.   Neck:      Vascular: No carotid bruit.   Cardiovascular:      Rate and Rhythm: Normal rate and regular rhythm.      Pulses: Normal pulses.      Heart sounds: Normal heart sounds. No murmur heard.  Pulmonary:      Effort: Pulmonary effort is normal. Tachypnea present.      Breath sounds: Examination of the right-middle field reveals decreased breath sounds. Examination of the right-lower field reveals decreased breath sounds. Decreased breath sounds present. No wheezing, rhonchi or rales.   Abdominal:      General: Abdomen is flat. Bowel sounds are normal.      Palpations: Abdomen is soft.      Tenderness: There is no abdominal tenderness. There is no guarding.   Musculoskeletal:         General: No swelling or deformity. Normal range of motion.      Cervical back: Normal range of motion.   Skin:     General: Skin is warm and dry.      Capillary Refill: Capillary refill takes less than 2 seconds.      Findings: No rash.   Neurological:      Mental Status: He is alert.            Results Reviewed:  Lab Results (last 24 hours)       Procedure Component Value Units Date/Time    Extra Tubes [[id number]] Collected: 07/12/23 1252    Specimen: Blood, Venous Line Updated: 07/12/23 1400    Narrative:      The following orders were created for panel order Extra Tubes.  Procedure                               Abnormality         Status                     ---------                               -----------          ------                     Lavender Top[[id number]]                                     Final result               Gold Top - SST[[id number]]                                   Final result               Smith Top[[id number]]                                         In process                   Please view results for these tests on the individual orders.    Lavender Top [[id number]] Collected: 07/12/23 1252    Specimen: Blood Updated: 07/12/23 1400     Extra Tube hold for add-on     Comment: Auto resulted       Gold Top - SST [[id number]] Collected: 07/12/23 1252    Specimen: Blood Updated: 07/12/23 1400     Extra Tube Hold for add-ons.     Comment: Auto resulted.       Single High Sensitivity Troponin T [[id number]]  (Normal) Collected: 07/12/23 1252    Specimen: Blood Updated: 07/12/23 1316     HS Troponin T 10 ng/L     Narrative:      High Sensitive Troponin T Reference Range:  <10.0 ng/L- Negative Female for AMI  <15.0 ng/L- Negative Male for AMI  >=10 - Abnormal Female indicating possible myocardial injury.  >=15 - Abnormal Male indicating possible myocardial injury.   Clinicians would have to utilize clinical acumen, EKG, Troponin, and serial changes to determine if it is an Acute Myocardial Infarction or myocardial injury due to an underlying chronic condition.         BNP [[id number]]  (Normal) Collected: 07/12/23 1252    Specimen: Blood Updated: 07/12/23 1316     proBNP 225.3 pg/mL     Narrative:      Among patients with dyspnea, NT-proBNP is highly sensitive for the detection of acute congestive heart failure. In addition NT-proBNP of <300 pg/ml effectively rules out acute congestive heart failure with 99% negative predictive value.    Results may be falsely decreased if patient taking Biotin.      Comprehensive Metabolic Panel [[id number]]  (Abnormal) Collected: 07/12/23 1252    Specimen: Blood Updated: 07/12/23 1313     Glucose 86 mg/dL      BUN 21 mg/dL      Creatinine 0.60 mg/dL      Sodium 144 mmol/L       Potassium 3.5 mmol/L      Chloride 105 mmol/L      CO2 33.0 mmol/L      Calcium 8.6 mg/dL      Total Protein 6.9 g/dL      Albumin 2.8 g/dL      ALT (SGPT) 92 U/L      AST (SGOT) 65 U/L      Alkaline Phosphatase 155 U/L      Total Bilirubin 0.2 mg/dL      Globulin 4.1 gm/dL      A/G Ratio 0.7 g/dL      BUN/Creatinine Ratio 35.0     Anion Gap 6.0 mmol/L      eGFR 106.5 mL/min/1.73     Narrative:      GFR Normal >60  Chronic Kidney Disease <60  Kidney Failure <15      Gray Top [[id number]] Collected: 07/12/23 1252    Specimen: Blood Updated: 07/12/23 1253    [place] Draw [[id number]] Collected: 07/12/23 1118    Specimen: Blood Updated: 07/12/23 1246    Narrative:      The following orders were created for panel order [place] Draw.  Procedure                               Abnormality         Status                     ---------                               -----------         ------                     Green Top (Gel)[[id number]]                                  Final result               Lavender Top[[id number]]                                     Final result               Gold Top - SST[[id number]]                                   Final result               Light Blue Top[[id number]]                                   Final result                 Please view results for these tests on the individual orders.    Light Blue Top [[id number]] Collected: 07/12/23 1118    Specimen: Blood Updated: 07/12/23 1246     Extra Tube Hold for add-ons.     Comment: Auto resulted       Green Top (Gel) [[id number]] Collected: 07/12/23 1118    Specimen: Blood Updated: 07/12/23 1246     Extra Tube Hold for add-ons.     Comment: Auto resulted.       Lavender Top [[id number]] Collected: 07/12/23 1118    Specimen: Blood Updated: 07/12/23 1246     Extra Tube hold for add-on     Comment: Auto resulted       CBC & Differential [[id number]]  (Abnormal) Collected: 07/12/23 1118    Specimen: Blood Updated: 07/12/23 1243    Narrative:      The following  orders were created for panel order CBC & Differential.  Procedure                               Abnormality         Status                     ---------                               -----------         ------                     CBC Auto Differential[[id number]]        Abnormal            Final result                 Please view results for these tests on the individual orders.    CBC Auto Differential [[id number]]  (Abnormal) Collected: 07/12/23 1118    Specimen: Blood Updated: 07/12/23 1243     WBC 9.18 10*3/mm3      RBC 3.62 10*6/mm3      Hemoglobin 10.6 g/dL      Hematocrit 32.0 %      MCV 88.4 fL      MCH 29.3 pg      MCHC 33.1 g/dL      RDW 14.8 %      RDW-SD 47.8 fl      MPV 12.1 fL      Platelets 222 10*3/mm3      Neutrophil % 81.6 %      Lymphocyte % 5.0 %      Monocyte % 10.7 %      Eosinophil % 1.7 %      Basophil % 0.7 %      Immature Grans % 0.3 %      Neutrophils, Absolute 7.49 10*3/mm3      Lymphocytes, Absolute 0.46 10*3/mm3      Monocytes, Absolute 0.98 10*3/mm3      Eosinophils, Absolute 0.16 10*3/mm3      Basophils, Absolute 0.06 10*3/mm3      Immature Grans, Absolute 0.03 10*3/mm3      nRBC 0.0 /100 WBC     Gold Top - [facility] [[id number]] Collected: 07/12/23 1118    Specimen: Blood Updated: 07/12/23 1230     Extra Tube Hold for add-ons.     Comment: Auto resulted.       Extra Tubes [[id number]] Collected: 07/12/23 1118    Specimen: Blood, Venous Line Updated: 07/12/23 1119    Narrative:      The following orders were created for panel order Extra Tubes.  Procedure                               Abnormality         Status                     ---------                               -----------         ------                     Smith Top[[id number]]                                         In process                   Please view results for these tests on the individual orders.    Gray Top [[id number]] Collected: 07/12/23 1118    Specimen: Blood Updated: 07/12/23 1119          Imaging Results (Last 24 Hours)        Procedure Component Value Units Date/Time    CT Angiogram Chest [[id number]] Collected: 07/12/23 1434     Updated: 07/12/23 1443    Narrative:      HISTORY:  The patient has a history of a lung mass with respiratory failure.    EXAMINATION PERFORMED:  CT angiography of the chest.    TECHNIQUE:  IV contrast was administered and axial images from the thoracic inlet through  the diaphragms were performed.  3D multiplanar reformats of the thoracic aorta  were completed on a separate workstation under concurrent supervision.    COMPARISON:  4/11/2022.    FINDINGS:  There is extensive infiltration involving the left upper and left lower lobes.  An underlying mass lesion is difficult to exclude at this time.  There is a  small left-sided pleural effusion.  There is a trace of right-sided pleural  effusion.    No large central pulmonary embolus is identified. Evaluation of the lower lobe  pulmonary arterial branches is limited secondary to motion artifact.      Impression:      1. Extensive infiltration of the left lung.  An underlying mass or neoplasm is  difficult to exclude given the degree of consolidation of the left lung.  Correlation with the clinical findings is needed.    2. Small left-sided pleural effusion and trace right-sided pleural effusion.    3. No large central pulmonary embolus is identified. Evaluation of the lower  lobe pulmonary arterial branches is limited secondary to motion artifact.    XR Chest 1 View [[id number]] Collected: 07/12/23 1132     Updated: 07/12/23 1140    Narrative:      FINDINGS:  An AP view of the chest shows soft tissue nodularity in the left upper lung  field.  This is highly suspect for malignant disease.  There is also some  nodular infiltrate within the left lower chest.  There are no prior films for  comparison.  I would recommend CT examination of the chest with contrast to  further evaluate what may very likely represent underlying malignant disease  within the left upper  "lung field.    No other significant finding.          I have personally reviewed and interpreted the radiology studies and ECG obtained at time of admission.       Assessment / Plan   Treatment Plan:    1. Pneumonia   -IV Cefepime; blood cultures in process   -Nebs and inhalers PRN; maintaining appropriate saturation on room air    2. Hypotension   -noted per group home reports; patient on midodrine TID; continue, monitor    3. Seizures   -Continue Keppra and Lamictal    4. GERD   -continue Pepcid, Carafate    5. Schizophrenia/Behavioral Health   -Continue Thioridazine    6. Chronic malnutrition   -NPO; continue tube feeding after nutrition assessment. Consult placed.    -IVF hydration for now at 75cc/hour    VTE Ppx: Lovenox subQ      Medical Decision Making  Number and Complexity of problems: 1 high, 5 moderate    Conditions and Status:        Condition is unchanged.    [facility] Data  External documents reviewed: The patient's recent past medical charts for this facility as well as outside facilities via the \"Care Everywhere\" application of Epic reviewed.     Discussed with: admitting physician, ED physician     I have utilized all available immediate resources to obtain, update, or review the patient's current medications (including all prescriptions, over-the-counter products, herbals, cannabis/cannabidiol products, and vitamin/mineral/dietary (nutritional) supplements).     Care Planning  Shared decision making: Patient updated on current status and informed of proposed care plan; is in agreement with plan  Code status and discussions: DNR/DNI  I confirmed that the patient's Advance Care Plan is present, code status is documented, or surrogate decision maker is listed in the patient's medical record.       Disposition  Social Determinants of Health that impact treatment or disposition: n/a  I expect the patient to be discharged to group facility in 1-2 days.     The patient was seen and examined by me on " 07/12/23.        Electronically signed by Consuelo Keenan PA-C, 07/12/23, 15:27 CDT.

## 2023-07-12 NOTE — ED PROVIDER NOTES
Subjective   History of Present Illness  This is a 67-year-old male who is nonverbal at baseline.  No history of smoking.  Presents for shortness of breath.  Patient was apparently recently treated at an outside facility and told he had pneumonia.  Upon returning to his group home the patient was noted to continue to have tachypnea and trouble breathing.  No recent reported fevers or chills.  No productive cough or hemoptysis.  No known injury.  The patient is nonverbal and history is provided by a caregiver at the group home who is mildly familiar with the patient.      Review of Systems   Unable to perform ROS: Patient nonverbal     Past Medical History:   Diagnosis Date    Alopecia     Arthritis     Autism     COPD (chronic obstructive pulmonary disease)     GERD (gastroesophageal reflux disease)     Hyperlipidemia     Hypertension     Insomnia     Intellectual disability     Lennox-Gastaut syndrome     Major depressive disorder     Orthostatic hypotension     Osteoporosis     Right bundle branch block     Scoliosis     Seizures        Allergies   Allergen Reactions    Haldol [Haloperidol] Unknown (See Comments)     Unknown      Levaquin [Levofloxacin] Other (See Comments)     Lowers seizure threshold       Past Surgical History:   Procedure Laterality Date    COLONOSCOPY N/A 1/8/2021    Procedure: COLONOSCOPY;  Surgeon: Wanda Lang MD;  Location: [facility] ENDOSCOPY;  Service: Gastroenterology;  Laterality: N/A;    COLONOSCOPY N/A 9/2/2021    Procedure: COLONOSCOPY;  Surgeon: Wanda Lang MD;  Location: [facility] ENDOSCOPY;  Service: Gastroenterology;  Laterality: N/A;    COLONOSCOPY N/A 10/14/2022    Procedure: COLONOSCOPY;  Surgeon: Wanda Lang MD;  Location: [facility] ENDOSCOPY;  Service: Gastroenterology;  Laterality: N/A;    ENDOSCOPY N/A 1/7/2021    Procedure: ESOPHAGOGASTRODUODENOSCOPY;  Surgeon: Wanda Lang MD;  Location: [facility] ENDOSCOPY;  Service: Gastroenterology;  Laterality: N/A;     ENDOSCOPY N/A 1/22/2021    Procedure: ESOPHAGOGASTRODUODENOSCOPY;  Surgeon: Wanda Lang MD;  Location: [facility] ENDOSCOPY;  Service: Gastroenterology;  Laterality: N/A;    ENDOSCOPY W/ PEG TUBE PLACEMENT N/A 6/25/2021    Procedure: ESOPHAGOGASTRODUODENOSCOPY WITH PERCUTANEOUS ENDOSCOPIC GASTROSTOMY TUBE INSERTION WITH ANESTHESIA;  Surgeon: Wanda Lang MD;  Location: [facility] ENDOSCOPY;  Service: Gastroenterology;  Laterality: N/A;    SIGMOIDOSCOPY N/A 10/11/2021    Procedure: SIGMOIDOSCOPY FLEXIBLE;  Surgeon: Wanda Lang MD;  Location: [facility] ENDOSCOPY;  Service: Gastroenterology;  Laterality: N/A;    SIGMOIDOSCOPY N/A 11/30/2021    Procedure: SIGMOIDOSCOPY FLEXIBLE;  Surgeon: Wanda Lang MD;  Location: [facility] ENDOSCOPY;  Service: Gastroenterology;  Laterality: N/A;    SIGMOIDOSCOPY N/A 6/16/2022    Procedure: SIGMOIDOSCOPY FLEXIBLE;  Surgeon: Wanda Lang MD;  Location: [facility] ENDOSCOPY;  Service: Gastroenterology;  Laterality: N/A;  argon at rectal polypectomy site    SIGMOIDOSCOPY N/A 2/24/2023    Procedure: SIGMOIDOSCOPY FLEXIBLE enemas on call at facility;  Surgeon: Wanda Lang MD;  Location: [facility] ENDOSCOPY;  Service: Gastroenterology;  Laterality: N/A;  argon to rectal polyp site    UPPER GASTROINTESTINAL ENDOSCOPY  01/22/2021    UPPER GASTROINTESTINAL ENDOSCOPY  06/25/2021       Family History   Problem Relation Age of Onset    Hypertension Father        Social History     Socioeconomic History    Marital status: Single   Tobacco Use    Smoking status: Never    Smokeless tobacco: Never   Vaping Use    Vaping Use: Never used   Substance and Sexual Activity    Alcohol use: Never    Drug use: Never    Sexual activity: Defer           Objective   Physical Exam  Vitals and nursing note reviewed.   Constitutional:       General: He is not in acute distress.     Comments: Wearing a helmet   HENT:      Head: Normocephalic and atraumatic.   Neck:      Vascular: No JVD.  "  Cardiovascular:      Rate and Rhythm: Normal rate and regular rhythm.   Pulmonary:      Effort: Pulmonary effort is normal.      Breath sounds: Normal breath sounds.   Chest:      Chest wall: No tenderness.   Abdominal:      Palpations: Abdomen is soft.      Tenderness: There is no abdominal tenderness.   Musculoskeletal:      Right lower leg: No tenderness. No edema.      Left lower leg: No tenderness. No edema.   Skin:     General: Skin is warm and dry.   Neurological:      Mental Status: He is alert.   Psychiatric:         Behavior: Behavior normal.       Procedures           ED Course  ED Course as of 07/12/23 1746 Wed Jul 12, 2023   1428 Single High Sensitivity Troponin T [JV]   1428 CBC & Differential(!)  Mild anemia [JV]   1428 Comprehensive Metabolic Panel(!)  Mild transaminitis [JV]      ED Course User Index  [JV] Collins Lowe DO                    CURB-65 Score: 3                        Medical Decision Making  The patient's recent past medical charts for this facility as well as outside facilities via the \"care everywhere\" application of epic reviewed.  The patient was admitted to this facility with hypoxic respiratory failure and fever last year.    The differential diagnosis includes COPD, CHF, anxiety reaction, and acute coronary syndrome, among others.    On final reevaluation the patient is in stable condition.      Problems Addressed:  Pneumonia of left lung due to infectious organism, unspecified part of lung: complicated acute illness or injury    Amount and/or Complexity of Data Reviewed  Independent Historian: EMS     Details: Additional history provided by EMS personnel.  Labs: ordered. Decision-making details documented in ED Course.  Radiology: ordered.  ECG/medicine tests: ordered and independent interpretation performed.     Details: EKG interpretation: Interpreted myself.  Normal sinus rhythm.  Rate 85.  Normal P wave and parable.  Normal QRS and axis.  Normal QTc interval.  ST " segments are normal.  Discussion of management or test interpretation with external provider(s): Case discussed with admitting hospitalist physician.    Risk  OTC drugs.  Prescription drug management.  Decision regarding hospitalization.        Final diagnoses:   Pneumonia of left lung due to infectious organism, unspecified part of lung       ED Disposition  ED Disposition       ED Disposition   Decision to Admit    Condition   --    Comment   Level of Care: Telemetry [5]   Diagnosis: Pneumonia [632352]   Admitting Physician: SAUL ROACH [[id number]]   Attending Physician: SAUL ROACH [[id number]]   Bed Request Comments: Order clarification: clarification of cosign order: Admit to inpatient status 7/12/23 @ 1513   Certification: I Certify That Inpatient Hospital Services Are Medically Necessary For Greater Than 2 Midnights                 No follow-up provider specified.       Medication List      No changes were made to your prescriptions during this visit.            Collins Lowe,   07/12/23 1746

## 2023-07-12 NOTE — ED NOTES
Nursing report ED to floor  Bautista Grubbs  67 y.o.  male    HPI:   Chief Complaint   Patient presents with    Shortness of Breath       Admitting doctor:   Donta Briseno MD    Consulting provider(s):  Consults       No orders found from 6/13/2023 to 7/13/2023.             Admitting diagnosis:   The encounter diagnosis was Pneumonia of left lung due to infectious organism, unspecified part of lung.    Code status:   Current Code Status       Date Active Code Status Order ID Comments User Context       Prior            Allergies:   Haldol [haloperidol] and Levaquin [levofloxacin]    Intake and Output  No intake or output data in the 24 hours ending 07/12/23 1514    Weight:       07/12/23  1041   Weight: 88.8 kg (195 lb 11.2 oz)       Most recent vitals:   Vitals:    07/12/23 1230 07/12/23 1300 07/12/23 1330 07/12/23 1438   BP:  165/77  178/91   BP Location:    Right leg   Patient Position:    Sitting   Pulse: 82 84 82 82   Resp:    18   Temp:       TempSrc:       SpO2: 100% 96% 96% 95%   Weight:         Oxygen Therapy: Room Air    Active LDAs/IV Access:   Lines, Drains & Airways       Active LDAs       Name Placement date Placement time Site Days    Peripheral IV 07/12/23 1110 Right Hand 07/12/23  1110  Hand  less than 1    Peripheral IV 07/12/23 1404 Anterior;Right Forearm 07/12/23  1404  Forearm  less than 1    Gastrostomy/Enterostomy RLQ 04/11/22  Placed prior to arrival  2153  RLQ  456                    Labs (abnormal labs have a star):   Labs Reviewed   COMPREHENSIVE METABOLIC PANEL - Abnormal; Notable for the following components:       Result Value    Creatinine 0.60 (*)     CO2 33.0 (*)     Albumin 2.8 (*)     ALT (SGPT) 92 (*)     AST (SGOT) 65 (*)     Alkaline Phosphatase 155 (*)     BUN/Creatinine Ratio 35.0 (*)     All other components within normal limits    Narrative:     GFR Normal >60  Chronic Kidney Disease <60  Kidney Failure <15     CBC WITH AUTO DIFFERENTIAL - Abnormal; Notable for the  following components:    RBC 3.62 (*)     Hemoglobin 10.6 (*)     Hematocrit 32.0 (*)     MPV 12.1 (*)     Neutrophil % 81.6 (*)     Lymphocyte % 5.0 (*)     Neutrophils, Absolute 7.49 (*)     Lymphocytes, Absolute 0.46 (*)     Monocytes, Absolute 0.98 (*)     All other components within normal limits   BNP (IN-HOUSE) - Normal    Narrative:     Among patients with dyspnea, NT-proBNP is highly sensitive for the detection of acute congestive heart failure. In addition NT-proBNP of <300 pg/ml effectively rules out acute congestive heart failure with 99% negative predictive value.    Results may be falsely decreased if patient taking Biotin.     SINGLE HSTROPONIN T - Normal    Narrative:     High Sensitive Troponin T Reference Range:  <10.0 ng/L- Negative Female for AMI  <15.0 ng/L- Negative Male for AMI  >=10 - Abnormal Female indicating possible myocardial injury.  >=15 - Abnormal Male indicating possible myocardial injury.   Clinicians would have to utilize clinical acumen, EKG, Troponin, and serial changes to determine if it is an Acute Myocardial Infarction or myocardial injury due to an underlying chronic condition.        BLOOD CULTURE   BLOOD CULTURE   RAINBOW DRAW    Narrative:     The following orders were created for panel order [place] Draw.  Procedure                               Abnormality         Status                     ---------                               -----------         ------                     Green Top (Gel)[[id number]]                                  Final result               Lavender Top[[id number]]                                     Final result               Gold Top - SST[[id number]]                                   Final result               Light Blue Top[[id number]]                                   Final result                 Please view results for these tests on the individual orders.   BLOOD GAS, ARTERIAL   CBC AND DIFFERENTIAL    Narrative:     The following orders were  created for panel order CBC & Differential.  Procedure                               Abnormality         Status                     ---------                               -----------         ------                     CBC Auto Differential[[id number]]        Abnormal            Final result                 Please view results for these tests on the individual orders.   GREEN TOP   LAVENDER TOP   GOLD TOP - SST   LIGHT BLUE TOP   LAVENDER TOP   GOLD TOP - SST   EXTRA TUBES    Narrative:     The following orders were created for panel order Extra Tubes.  Procedure                               Abnormality         Status                     ---------                               -----------         ------                     Smith Top[[id number]]                                         In process                   Please view results for these tests on the individual orders.   GRAY TOP   EXTRA TUBES    Narrative:     The following orders were created for panel order Extra Tubes.  Procedure                               Abnormality         Status                     ---------                               -----------         ------                     Lavender Top[[id number]]                                     Final result               Gold Top - SST[[id number]]                                   Final result               Smith Top[[id number]]                                         In process                   Please view results for these tests on the individual orders.   GRAY TOP       Meds given in ED:   Medications   sodium chloride 0.9 % flush 10 mL (has no administration in time range)   cefepime (MAXIPIME) 2000 mg/100 mL 0.9% NS (mbp) (has no administration in time range)   dexamethasone (DECADRON) injection 10 mg (10 mg Intravenous Given 7/12/23 1253)   ipratropium-albuterol (DUO-NEB) nebulizer solution 3 mL (3 mL Nebulization Given 7/12/23 1223)   iopamidol (ISOVUE-370) 76 % injection 100 mL (90 mL Intravenous  Given 7/12/23 1422)           NIH Stroke Scale:       Isolation/Infection(s):  No active isolations   No active infections     COVID Testing  Collected No  Resulted N/A    Nursing report ED to floor:  Mental status: Nonverbal at baseline  Ambulatory status: Assist x2  Precautions: None    ED nurse phone extentsion- [phone] or [phone]

## 2023-07-12 NOTE — Clinical Note
Level of Care: Telemetry [5]   Diagnosis: Pneumonia [[id number]]   Admitting Physician: SAUL ROACH [[id number]]   Attending Physician: SAUL ROACH [[id number]]   Certification: I Certify That Inpatient Hospital Services Are Medically Necessary For Greater Than 2 Midnights

## 2023-07-13 LAB
ANION GAP SERPL CALCULATED.3IONS-SCNC: 9 MMOL/L (ref 5–15)
BUN SERPL-MCNC: 20 MG/DL (ref 8–23)
BUN/CREAT SERPL: 32.3 (ref 7–25)
CALCIUM SPEC-SCNC: 7.9 MG/DL (ref 8.6–10.5)
CHLORIDE SERPL-SCNC: 106 MMOL/L (ref 98–107)
CO2 SERPL-SCNC: 28 MMOL/L (ref 22–29)
CREAT SERPL-MCNC: 0.62 MG/DL (ref 0.76–1.27)
EGFRCR SERPLBLD CKD-EPI 2021: 105.4 ML/MIN/1.73
GLUCOSE SERPL-MCNC: 115 MG/DL (ref 65–99)
L PNEUMO1 AG UR QL IA: NEGATIVE
MRSA DNA SPEC QL NAA+PROBE: NEGATIVE
POTASSIUM SERPL-SCNC: 3.3 MMOL/L (ref 3.5–5.2)
S PNEUM AG SPEC QL LA: NEGATIVE
SODIUM SERPL-SCNC: 143 MMOL/L (ref 136–145)
WHOLE BLOOD HOLD SPECIMEN: NORMAL

## 2023-07-13 PROCEDURE — 80048 BASIC METABOLIC PNL TOTAL CA: CPT | Performed by: PHYSICIAN ASSISTANT

## 2023-07-13 PROCEDURE — 0 CEFEPIME PER 500 MG: Performed by: PHYSICIAN ASSISTANT

## 2023-07-13 PROCEDURE — 25010000002 VANCOMYCIN 10 G RECONSTITUTED SOLUTION: Performed by: PHYSICIAN ASSISTANT

## 2023-07-13 PROCEDURE — 25010000002 ENOXAPARIN PER 10 MG: Performed by: PHYSICIAN ASSISTANT

## 2023-07-13 RX ORDER — POTASSIUM CHLORIDE 1.5 G/1.58G
40 POWDER, FOR SOLUTION ORAL DAILY
Status: DISCONTINUED | OUTPATIENT
Start: 2023-07-13 | End: 2023-07-13

## 2023-07-13 RX ORDER — POTASSIUM CHLORIDE 1.5 G/1.58G
40 POWDER, FOR SOLUTION ORAL DAILY
Status: COMPLETED | OUTPATIENT
Start: 2023-07-13 | End: 2023-07-14

## 2023-07-13 RX ADMIN — SUCRALFATE 1 G: 1 TABLET ORAL at 09:02

## 2023-07-13 RX ADMIN — MIDODRINE HYDROCHLORIDE 10 MG: 5 TABLET ORAL at 11:21

## 2023-07-13 RX ADMIN — CEFEPIME 2000 MG: 2 INJECTION, POWDER, FOR SOLUTION INTRAVENOUS at 09:09

## 2023-07-13 RX ADMIN — CEFEPIME 2000 MG: 2 INJECTION, POWDER, FOR SOLUTION INTRAVENOUS at 17:06

## 2023-07-13 RX ADMIN — ATORVASTATIN CALCIUM 10 MG: 10 TABLET, FILM COATED ORAL at 09:01

## 2023-07-13 RX ADMIN — SUCRALFATE 1 G: 1 TABLET ORAL at 11:21

## 2023-07-13 RX ADMIN — Medication 10 ML: at 21:05

## 2023-07-13 RX ADMIN — CEFEPIME 2000 MG: 2 INJECTION, POWDER, FOR SOLUTION INTRAVENOUS at 01:53

## 2023-07-13 RX ADMIN — Medication 1 TABLET: at 09:01

## 2023-07-13 RX ADMIN — POTASSIUM CHLORIDE 40 MEQ: 1.5 POWDER, FOR SOLUTION ORAL at 16:35

## 2023-07-13 RX ADMIN — Medication 1 TABLET: at 20:50

## 2023-07-13 RX ADMIN — LAMOTRIGINE 25 MG: 25 TABLET ORAL at 09:04

## 2023-07-13 RX ADMIN — MIDODRINE HYDROCHLORIDE 10 MG: 5 TABLET ORAL at 09:01

## 2023-07-13 RX ADMIN — SUCRALFATE 1 G: 1 TABLET ORAL at 16:36

## 2023-07-13 RX ADMIN — ENOXAPARIN SODIUM 40 MG: 40 INJECTION SUBCUTANEOUS at 09:00

## 2023-07-13 RX ADMIN — THIORIDAZINE HYDROCHLORIDE 200 MG: 50 TABLET, FILM COATED ORAL at 09:03

## 2023-07-13 RX ADMIN — FERROUS SULFATE TAB EC 324 MG (65 MG FE EQUIVALENT) 324 MG: 324 (65 FE) TABLET DELAYED RESPONSE at 09:03

## 2023-07-13 RX ADMIN — SUCRALFATE 1 G: 1 TABLET ORAL at 20:51

## 2023-07-13 RX ADMIN — LEVETIRACETAM 1750 MG: 100 SOLUTION ORAL at 20:51

## 2023-07-13 RX ADMIN — SODIUM CHLORIDE, POTASSIUM CHLORIDE, SODIUM LACTATE AND CALCIUM CHLORIDE 75 ML/HR: 600; 310; 30; 20 INJECTION, SOLUTION INTRAVENOUS at 09:04

## 2023-07-13 RX ADMIN — LAMOTRIGINE 200 MG: 100 TABLET ORAL at 20:50

## 2023-07-13 RX ADMIN — VANCOMYCIN HYDROCHLORIDE 1500 MG: 10 INJECTION, POWDER, LYOPHILIZED, FOR SOLUTION INTRAVENOUS at 11:21

## 2023-07-13 RX ADMIN — FAMOTIDINE 20 MG: 20 TABLET, FILM COATED ORAL at 09:03

## 2023-07-13 RX ADMIN — THIORIDAZINE HYDROCHLORIDE 200 MG: 50 TABLET, FILM COATED ORAL at 20:51

## 2023-07-13 RX ADMIN — LAMOTRIGINE 200 MG: 100 TABLET ORAL at 09:03

## 2023-07-13 RX ADMIN — LEVETIRACETAM 1750 MG: 100 SOLUTION ORAL at 09:01

## 2023-07-13 RX ADMIN — LAMOTRIGINE 25 MG: 25 TABLET ORAL at 20:51

## 2023-07-13 RX ADMIN — Medication 10 ML: at 09:04

## 2023-07-13 NOTE — PROGRESS NOTES
[facility] Medicine Services  INPATIENT PROGRESS NOTE    Length of Stay: 1  Date of Admission: 7/12/2023  Primary Care Physician: Bautista James MD    Subjective   Chief Complaint: admitted because of recurrent pneumonia  HPI:  Patient is a 67 year old non-verbal male (PMHx Autism, HTN, HLD, COPD, GERD, Seizures, Chronic tube feeding) brought to [facility] ED for evaluation of persistent difficulty breathing and cough. Patient lives in a group home and has caregiver at bedside who provides history. Caregiver tells that patient was recently admitted to the hospital in [place], [place] for management of pneumonia on July 6. He was discharged from that facility on July 10. Patient has continued to demonstrate cough, quick breathing, and sometimes seemed to have difficulty breathing. Low grade fevers have been noticed. Patient hasn't seemed bothered, but sleeps more when he has illness, such as now. He will grunt when he doesn't want to be bothered. Patient has history of seizures. He does wear a helmet when ambulatory due to not only the seizures, but also because he is known to suddenly drop to the floor if he becomes upset/unhappy.     ED findings include CXR and CT chest that demonstrate extensive infiltration of the left lung; cannot exclude underlying mass/neoplasm. Small bilateral effusions also noted. No PE. CBC stable; CMP stable.            As of today, 07/13/23  Review of Systems   Constitutional:  Negative for activity change, appetite change, chills, diaphoresis and fatigue.   HENT:  Negative for congestion, ear discharge, hearing loss, rhinorrhea, sinus pain, sneezing and sore throat.    Eyes:  Negative for photophobia, discharge and visual disturbance.   Respiratory:  Negative for cough, chest tightness, shortness of breath and wheezing.    Cardiovascular:  Negative for chest pain and palpitations.   Gastrointestinal:  Negative for abdominal distention,  abdominal pain, blood in stool, diarrhea, nausea and vomiting.   Endocrine: Negative for cold intolerance, heat intolerance, polydipsia, polyphagia and polyuria.   Genitourinary:  Negative for dysuria, flank pain, hematuria and urgency.   Musculoskeletal:  Negative for arthralgias, joint swelling and myalgias.   Skin:  Negative for color change.   Allergic/Immunologic: Negative for food allergies.   Neurological:  Negative for dizziness, seizures, syncope, speech difficulty, weakness and headaches.   Hematological:  Negative for adenopathy. Does not bruise/bleed easily.   Psychiatric/Behavioral:  Negative for confusion, hallucinations, self-injury and suicidal ideas. The patient is not nervous/anxious.       All pertinent negatives and positives are as above. All other systems have been reviewed and are negative unless otherwise stated.    Objective    Temp:  [97.5 °F (36.4 °C)-98.6 °F (37 °C)] 97.5 °F (36.4 °C)  Heart Rate:  [78-86] 80  Resp:  [18-20] 20  BP: (134-178)/(73-97) 146/85    AM-PAC 6 Clicks Score (PT): 15 (07/13/23 1105)    As of today, 07/13/23  Physical Exam  Constitutional:       Appearance: Normal appearance.   HENT:      Head: Normocephalic and atraumatic.      Right Ear: Tympanic membrane normal.      Left Ear: Tympanic membrane normal.      Nose: Nose normal.      Mouth/Throat:      Mouth: Mucous membranes are moist.   Eyes:      Pupils: Pupils are equal, round, and reactive to light.   Cardiovascular:      Rate and Rhythm: Normal rate and regular rhythm.      Pulses: Normal pulses.      Heart sounds: Normal heart sounds.   Pulmonary:      Effort: Pulmonary effort is normal.      Breath sounds: Normal breath sounds.   Abdominal:      General: Abdomen is flat. Bowel sounds are normal.      Palpations: Abdomen is soft.   Musculoskeletal:         General: Normal range of motion.      Cervical back: Normal range of motion and neck supple.   Skin:     General: Skin is warm and dry.      Capillary  Refill: Capillary refill takes less than 2 seconds.   Neurological:      General: No focal deficit present.      Mental Status: He is alert and oriented to person, place, and time.   Psychiatric:         Mood and Affect: Mood normal.         Behavior: Behavior normal.         Thought Content: Thought content normal.         Judgment: Judgment normal.         Results Review:  I have reviewed the labs, radiology results, and diagnostic studies.    Laboratory Data:   Results from last 7 days   Lab Units 07/13/23  0620 07/12/23  1252   SODIUM mmol/L 143 144   POTASSIUM mmol/L 3.3* 3.5   CHLORIDE mmol/L 106 105   CO2 mmol/L 28.0 33.0*   BUN mg/dL 20 21   CREATININE mg/dL 0.62* 0.60*   GLUCOSE mg/dL 115* 86   CALCIUM mg/dL 7.9* 8.6   BILIRUBIN mg/dL  --  0.2   ALK PHOS U/L  --  155*   ALT (SGPT) U/L  --  92*   AST (SGOT) U/L  --  65*   ANION GAP mmol/L 9.0 6.0     Estimated Creatinine Clearance: 131.3 mL/min (A) (by C-G formula based on SCr of 0.62 mg/dL (L)).          Results from last 7 days   Lab Units 07/12/23  1118   WBC 10*3/mm3 9.18   HEMOGLOBIN g/dL 10.6*   HEMATOCRIT % 32.0*   PLATELETS 10*3/mm3 222           Culture Data:   No results found for: BLOODCX  No results found for: URINECX  No results found for: RESPCX  No results found for: WOUNDCX  No results found for: STOOLCX  No components found for: BODYFLD    Radiology Data:   Imaging Results (Last 24 Hours)       Procedure Component Value Units Date/Time    CT Angiogram Chest [[id number]] Collected: 07/12/23 1434     Updated: 07/12/23 1651    Narrative:      HISTORY:  The patient has a history of a lung mass with respiratory failure.    EXAMINATION PERFORMED:  CT angiography of the chest.    TECHNIQUE:  IV contrast was administered and axial images from the thoracic inlet through  the diaphragms were performed.  3D multiplanar reformats of the thoracic aorta  were completed on a separate workstation under concurrent  supervision.    COMPARISON:  4/11/2022.    FINDINGS:  There is extensive infiltration involving the left upper and left lower lobes.  An underlying mass lesion is difficult to exclude at this time.  There is a  small left-sided pleural effusion.  There is a trace of right-sided pleural  effusion.    No large central pulmonary embolus is identified. Evaluation of the lower lobe  pulmonary arterial branches is limited secondary to motion artifact.      Impression:      1. Extensive infiltration of the left lung.  An underlying mass or neoplasm is  difficult to exclude given the degree of consolidation of the left lung.  Correlation with the clinical findings is needed.    2. Small left-sided pleural effusion and trace right-sided pleural effusion.    3. No large central pulmonary embolus is identified. Evaluation of the lower  lobe pulmonary arterial branches is limited secondary to motion artifact.            I have utilized all available immediate resources to obtain, update, or review the patient's current medications (including all prescriptions, over-the-counter products, herbals, cannabis/cannabidiol products, and vitamin/mineral/dietary (nutritional) supplements).       Assessment/Plan     Active Hospital Problems    Diagnosis     Pneumonia        Plan:    1. Pneumonia of upper and lower left lung, can not rule out underlying mass              -IV Cefepime and vanco; blood cultures in process                  2. Hypotension resolved              -noted per group home reports; patient on midodrine TID; continue, monitor     3. Seizures              -Continue Keppra and Lamictal     4. GERD              -continue Pepcid, Carafate     5. Schizophrenia/Behavioral Health              -Continue Thioridazine     6. Chronic malnutrition              -continue tube feeding.   7. Anemia of chronic disease, to follow with PCP                 VTE Ppx: Lovenox subQ        Medical Decision Making  Number and Complexity of  problems: 6  Differential Diagnosis: none    Conditions and Status:        Condition is improving.     [facility] Data  External documents reviewed: none  My EKG interpretation: normal sinus rythm  My CT interpretation: chest lef lung  pneumonia vs underlying mass  Tests considered but not ordered: none     Decision rules/scores evaluated (example [id number], [place], etc): none     Discussed with: caregiver     Treatment Plan  Continue IV antibiotics    Care Planning  Shared decision making: care giver  Code status and discussions: DNAR    Disposition  Social Determinants of Health that impact treatment or disposition: live in nurse skilled facility  I expect the patient to be discharged to skilled nurse facility in 5 days.       I confirmed that the patient's Advance Care Plan is present, code status is documented, or surrogate decision maker is listed in the patient's medical record.      This document has been electronically signed by Donta Briseno MD on July 13, 2023 12:41 CDT

## 2023-07-13 NOTE — PLAN OF CARE
Goal Outcome Evaluation: Pt in bed resting at this time. No s/s of pain or discomfort noted to be present. Pt is non-verbal, with only occasional grunting. Caregiver from outwood facility at bedside. Cough noted, infrequent, non-productive. MRSA nasal swab and urine collected and sent to lab, results nml. Continues with IV abx, tolerating well with no s/s of adverse reactions noted. VSS. No distress.

## 2023-07-13 NOTE — CONSULTS
Adult Nutrition  Assessment/PES    Patient Name:  Bautista Grubbs  YOB: 1955  MRN: [id number]  Admit Date:  7/12/2023    Assessment Date:  7/13/2023    Comments:  RD staff visited pt r/t TF consult. Pt Dx with Pneumonia, has shortness of breathe and a cough, Hx of HTN, GERD, COPD, and HLD. RD staff spoke with caregiver, caregiver states pt is strictly NPO d/t aspiration. Pt is nonverbal and has trouble breathing. Caregiver was able to confirm formula and order pt was receiving at the nursing facility ([facility]). Pt was revceiving Jevity 1.5, 390cc Q6hr; totaling out to 2,385kcals and 106g pro. RD staff recommend continuous feed using Isosource 1.5. Starting rate at 30mL/hr and increase by 15mL in 4 hours to goal rate of 55mL/hr with 25mL flush every hour. This will provide 1800kcals, 87g pro, and 791mL of fluid (including free water). #, BMI 27.98kg/m2. Pts wt has a positive trend; wt on 4/17/2023 157# and wt on 2/24/2023 151#. RD staff will continue to monitor and follow hospital course.     Reason for Assessment       Row Name 07/13/23 1029          Reason for Assessment    Reason For Assessment TF/PN;nurse/nurse practitioner consult (P)      Identified At Risk by Screening Criteria tube feeding or parenteral nutrition (P)                       Labs/Tests/Procedures/Meds       Row Name 07/13/23 1033          Labs/Procedures/Meds    Lab Results Reviewed reviewed, pertinent (P)      Lab Results Comments Pot 3.3, Glu 115, ALT 92, Alb 2.8 (P)         Diagnostic Tests/Procedures    Diagnostic Test/Procedure Reviewed reviewed, pertinent (P)         Medications    Pertinent Medications Reviewed reviewed, pertinent (P)      Pertinent Medications Comments Vancomycin, Lovenox, Lipitor, Cefepime (P)                     Physical Findings       Row Name 07/13/23 1206          Physical Findings    Enteral Access Devices PEG (P)                     Estimated/Assessed Needs - Anthropometrics       Row  "Name 07/13/23 1030 07/13/23 0808       Anthropometrics    Height for Calculation 1.778 m (5' 10\") (P)  --    Weight for Calculation 72.6 kg (160 lb) (P)  --    Additional Documentation Ideal Body Weight (IBW) (Group) (P)  --       Ideal Body Weight (IBW)    Ideal Body Weight 160# (P)  --       Estimated/Assessed Needs    Additional Documentation KCAL/KG (Group);Protein Requirements (Group);Fluid Requirements (Group) (P)  --       KCAL/KG    KCAL/KG 25 Kcal/Kg (kcal) (P)  --    25 Kcal/Kg (kcal) 1814.4 (P)  --    30 Kcal/Kg (kcal) -- (P)  --    35 Kcal/Kg (kcal) -- (P)  --       Protein Requirements    Weight Used For Protein Calculations 72.6 kg (160 lb) (P)  75.3 kg (166 lb)    Est Protein Requirement Amount (gms/kg) 1.2 gm protein (P)  --    Estimated Protein Requirements (gms/day) 87.09 (P)  --       Fluid Requirements    Fluid Requirements (mL/day) 1800 (P)  1900    Estimated Fluid Requirement Method RDA Method (P)  --    RDA Method (mL) 1800 (P)  1900      Row Name 07/13/23 0500          Anthropometrics    Weight 88.5 kg (195 lb)                    Nutrition Prescription Ordered       Row Name 07/13/23 1103 07/13/23 1033       Nutrition Prescription PO    Current PO Diet NPO (P)  NPO (P)                    Evaluation of Received Nutrient/Fluid Intake       Row Name 07/13/23 1034          PO Evaluation    Number of Meals 0 (P)      % PO Intake 0 (P)                        Problem/Interventions:   Problem 1       Row Name 07/13/23 1037          Nutrition Diagnoses Problem 1    Problem 1 Increased Nutrient Needs (P)      Macronutrient Kcal;Protein (P)      Etiology (related to) Medical Diagnosis (P)      Pulmonary/Critical Care COPD;Pneumonia (P)      Signs/Symptoms (evidenced by) NPO (P)                           Intervention Goal       Row Name 07/13/23 1040          Intervention Goal    TF/PN Inititiate TF/PN (P)      Weight Maintain weight (P)                     Nutrition Intervention       Row Name 07/13/23 " 1041          Nutrition Intervention    RD/Tech Action Follow Tx progress;Care plan reviewd (P)                     Nutrition Prescription       Row Name 07/13/23 1211          Nutrition Prescription EN    Enteral Prescription Enteral begin/change (P)      Enteral Route PEG (P)      Product Jevity 1.5 galina (P)      TF Delivery Method Continuous (P)      Continuous TF Goal Rate (mL/hr) 45 mL/hr (P)      Continuous TF Starting Rate (mL/hr) 30 mL/hr (P)      Water flush (mL)  25 mL (P)      Water Flush Frequency Per hour (P)      New EN Prescription Ordered? Yes (P)                     Education/Evaluation       Row Name 07/13/23 1214          Education    Education Education not appropriate at this time (P)      Please explain Patient nonverbal (P)         Monitor/Evaluation    Monitor TF delivery/tolerance;Symptoms;Weight (P)      Education Follow-up Reinforce PRN (P)                      Electronically signed by:  Ange Carvajal  07/13/23 12:20 CDT

## 2023-07-13 NOTE — PROGRESS NOTES
Vancomycin added given recent hospitalization; have also added for MRSA, Legionella, and S. Pneumonia screening.

## 2023-07-13 NOTE — SIGNIFICANT NOTE
07/13/23 1320   OTHER   Discipline physical therapist   Rehab Time/Intention   Session Not Performed other (see comments)     Initial PT evaluation attempted.  Patient does not follow commands, is non-verbal, will not rise from bed or allow ROM to LEs.   Caregiver present, reports patient already repositions himself in bed without difficulty, will not rise from bed unless he wishes to. PT not currently appropriate for this patient. Skilled PT discharged.  Please consult PT should situation change.  RN updated.

## 2023-07-13 NOTE — PLAN OF CARE
Goal Outcome Evaluation:  Plan of Care Reviewed With: (P) caregiver        Progress: (P) no change  Outcome Evaluation: (P) RD staff visited pt r/t TF consult.Caregiver was able to confirm formula and order pt was receiving at the nursing facility ([facility]). Pt was revceiving Jevity 1.5, 390cc Q6hr; totaling out to 2,385kcals and 106g pro. RD staff recommend continuous feed using Isosource 1.5. Starting rate at 30mL/hr and increase by 15mL in 4 hours to goal rate of 55mL/hr with 25mL flush every hour. This will provide 1800kcals, 87g pro, and 791mL of fluid (including free water). RD staff will continue to monitor and follow hospital course.

## 2023-07-13 NOTE — PROGRESS NOTES
"Pharmacokinetics by Pharmacy - Vancomycin    Bautista Grubbs is a 67 y.o. male receiving vancomycin 1500 mg IV q12hr (day 1) for pneumonia.  Patient is also receiving cefepime..    Objective:    Temp Readings from Last 1 Encounters:   07/13/23 97.8 °F (36.6 °C) (Temporal)     WBC   Date Value Ref Range Status   07/12/2023 9.18 3.40 - 10.80 10*3/mm3 Final    No results found for: CRP, LACTATE   Creatinine   Date Value Ref Range Status   07/13/2023 0.62 (L) 0.76 - 1.27 mg/dL Final   07/12/2023 0.60 (L) 0.76 - 1.27 mg/dL Final       No results found for: VANCOPEAK, VANCOTROUGH, VANCORANDOM    Culture Results:  Microbiology Results (last 10 days)       Procedure Component Value - Date/Time    Legionella Antigen, Urine - Urine, Urine, Clean Catch [[id number]]  (Normal) Collected: 07/12/23 2357    Lab Status: Final result Specimen: Urine, Clean Catch Updated: 07/13/23 0021     LEGIONELLA ANTIGEN, URINE Negative    S. Pneumo Ag Urine or CSF - Urine, Urine, Clean Catch [[id number]]  (Normal) Collected: 07/12/23 2357    Lab Status: Final result Specimen: Urine, Clean Catch Updated: 07/13/23 0021     Strep Pneumo Ag Negative    MRSA Screen, PCR (Inpatient) - Swab, Nares [[id number]]  (Normal) Collected: 07/12/23 2357    Lab Status: Final result Specimen: Swab from Nares Updated: 07/13/23 0116     MRSA, PCR Negative    Narrative:      Performed by real-time polymerase chain reaction (qPCR).  The negative predictive value of this diagnostic test is high and should only be used to consider de-escalating anti-MRSA therapy. A positive result may indicate colonization with MRSA and must be correlated clinically.    Blood Culture - Blood, Arm, Left [[id number]]  (Normal) Collected: 07/12/23 1526    Lab Status: Preliminary result Specimen: Blood from Arm, Left Updated: 07/13/23 1545     Blood Culture No growth at 24 hours          No results found for: RESPCX    [Ht: 177.8 cm (70\"); Wt: 88.5 kg (195 lb)]   Body mass index is 27.98 " kg/m².  Ideal body weight: 73 kg (160 lb 15 oz)  Adjusted ideal body weight: 79.2 kg (174 lb 9 oz)      Assessment:  WBCs WNL  Creatinine WNL  Cultures  7/12 blood culture pending  MRSA PCR negative   AUC and trough goals are 400-600 and 10-20 mcg/mL, respectively.     Plan:  Vancomycin 1500 mg IV q12hrs  Vancomycin levels when clinically indicated. Consulted until 7/19.  Pharmacy will monitor renal function and adjust dose accordingly.    Thank you for this consult.     Silverio Mcdonnell [facility]   07/13/23 16:51 CDT

## 2023-07-13 NOTE — PLAN OF CARE
Goal Outcome Evaluation:  Plan of Care Reviewed With: patient, caregiver           Outcome Evaluation: [facility] sitter with pt. Dietition saw patient today. See new orders for tube feedings. Held Midodrine d/t bp runnig high this afternoon. No new concerns at this time.

## 2023-07-14 LAB
ANION GAP SERPL CALCULATED.3IONS-SCNC: 8 MMOL/L (ref 5–15)
ARTERIAL PATENCY WRIST A: ABNORMAL
ATMOSPHERIC PRESS: 746 MMHG
BASE EXCESS BLDA CALC-SCNC: 7.3 MMOL/L (ref 0–2)
BDY SITE: ABNORMAL
BUN SERPL-MCNC: 18 MG/DL (ref 8–23)
BUN/CREAT SERPL: 28.1 (ref 7–25)
CA-I BLD-MCNC: 4.71 MG/DL (ref 4.6–5.6)
CALCIUM SPEC-SCNC: 8.3 MG/DL (ref 8.6–10.5)
CHLORIDE SERPL-SCNC: 106 MMOL/L (ref 98–107)
CO2 SERPL-SCNC: 28 MMOL/L (ref 22–29)
CREAT SERPL-MCNC: 0.64 MG/DL (ref 0.76–1.27)
EGFRCR SERPLBLD CKD-EPI 2021: 104.4 ML/MIN/1.73
GAS FLOW AIRWAY: 3 LPM
GLUCOSE SERPL-MCNC: 131 MG/DL (ref 65–99)
HCO3 BLDA-SCNC: 30.8 MMOL/L (ref 20–26)
HOLD SPECIMEN: NORMAL
HOLD SPECIMEN: NORMAL
Lab: ABNORMAL
MAGNESIUM SERPL-MCNC: 2 MG/DL (ref 1.6–2.4)
MODALITY: ABNORMAL
PCO2 BLDA: 38.4 MM HG (ref 35–45)
PH BLDA: 7.51 PH UNITS (ref 7.35–7.45)
PHOSPHATE SERPL-MCNC: 2.3 MG/DL (ref 2.5–4.5)
PO2 BLDA: 62.3 MM HG (ref 83–108)
POTASSIUM SERPL-SCNC: 3.3 MMOL/L (ref 3.5–5.2)
SAO2 % BLDCOA: 93.4 % (ref 94–99)
SODIUM SERPL-SCNC: 142 MMOL/L (ref 136–145)
VENTILATOR MODE: ABNORMAL
WHOLE BLOOD HOLD SPECIMEN: NORMAL

## 2023-07-14 PROCEDURE — 84100 ASSAY OF PHOSPHORUS: CPT

## 2023-07-14 PROCEDURE — 36600 WITHDRAWAL OF ARTERIAL BLOOD: CPT

## 2023-07-14 PROCEDURE — 82803 BLOOD GASES ANY COMBINATION: CPT

## 2023-07-14 PROCEDURE — 25010000002 VANCOMYCIN 10 G RECONSTITUTED SOLUTION: Performed by: PHYSICIAN ASSISTANT

## 2023-07-14 PROCEDURE — 83735 ASSAY OF MAGNESIUM: CPT

## 2023-07-14 PROCEDURE — 25010000002 SODIUM CHLORIDE 0.9 % WITH KCL 20 MEQ 20-0.9 MEQ/L-% SOLUTION

## 2023-07-14 PROCEDURE — 25010000002 ENOXAPARIN PER 10 MG: Performed by: PHYSICIAN ASSISTANT

## 2023-07-14 PROCEDURE — 82330 ASSAY OF CALCIUM: CPT

## 2023-07-14 PROCEDURE — 0 CEFEPIME PER 500 MG: Performed by: PHYSICIAN ASSISTANT

## 2023-07-14 PROCEDURE — 80048 BASIC METABOLIC PNL TOTAL CA: CPT | Performed by: PHYSICIAN ASSISTANT

## 2023-07-14 RX ORDER — GUAIFENESIN 600 MG/1
600 TABLET, EXTENDED RELEASE ORAL EVERY 12 HOURS SCHEDULED
Status: DISCONTINUED | OUTPATIENT
Start: 2023-07-14 | End: 2023-07-19 | Stop reason: HOSPADM

## 2023-07-14 RX ORDER — SODIUM CHLORIDE AND POTASSIUM CHLORIDE 150; 900 MG/100ML; MG/100ML
75 INJECTION, SOLUTION INTRAVENOUS CONTINUOUS
Status: DISCONTINUED | OUTPATIENT
Start: 2023-07-14 | End: 2023-07-17

## 2023-07-14 RX ORDER — ACETYLCYSTEINE 100 MG/ML
4 SOLUTION ORAL; RESPIRATORY (INHALATION)
Status: DISCONTINUED | OUTPATIENT
Start: 2023-07-14 | End: 2023-07-15

## 2023-07-14 RX ADMIN — LEVETIRACETAM 1750 MG: 100 SOLUTION ORAL at 22:02

## 2023-07-14 RX ADMIN — SUCRALFATE 1 G: 1 TABLET ORAL at 17:33

## 2023-07-14 RX ADMIN — Medication 1 TABLET: at 09:13

## 2023-07-14 RX ADMIN — POTASSIUM CHLORIDE AND SODIUM CHLORIDE 75 ML/HR: 900; 150 INJECTION, SOLUTION INTRAVENOUS at 17:33

## 2023-07-14 RX ADMIN — CEFEPIME 2000 MG: 2 INJECTION, POWDER, FOR SOLUTION INTRAVENOUS at 01:59

## 2023-07-14 RX ADMIN — LAMOTRIGINE 200 MG: 100 TABLET ORAL at 22:01

## 2023-07-14 RX ADMIN — MIDODRINE HYDROCHLORIDE 10 MG: 5 TABLET ORAL at 12:30

## 2023-07-14 RX ADMIN — SODIUM CHLORIDE, POTASSIUM CHLORIDE, SODIUM LACTATE AND CALCIUM CHLORIDE 75 ML/HR: 600; 310; 30; 20 INJECTION, SOLUTION INTRAVENOUS at 00:10

## 2023-07-14 RX ADMIN — Medication 1 TABLET: at 22:01

## 2023-07-14 RX ADMIN — THIORIDAZINE HYDROCHLORIDE 200 MG: 50 TABLET, FILM COATED ORAL at 09:58

## 2023-07-14 RX ADMIN — ATORVASTATIN CALCIUM 10 MG: 10 TABLET, FILM COATED ORAL at 09:14

## 2023-07-14 RX ADMIN — LAMOTRIGINE 200 MG: 100 TABLET ORAL at 09:13

## 2023-07-14 RX ADMIN — FAMOTIDINE 20 MG: 20 TABLET, FILM COATED ORAL at 09:13

## 2023-07-14 RX ADMIN — LAMOTRIGINE 25 MG: 25 TABLET ORAL at 09:52

## 2023-07-14 RX ADMIN — LAMOTRIGINE 25 MG: 25 TABLET ORAL at 22:01

## 2023-07-14 RX ADMIN — THIORIDAZINE HYDROCHLORIDE 200 MG: 50 TABLET, FILM COATED ORAL at 22:01

## 2023-07-14 RX ADMIN — ENOXAPARIN SODIUM 40 MG: 40 INJECTION SUBCUTANEOUS at 09:14

## 2023-07-14 RX ADMIN — CEFEPIME 2000 MG: 2 INJECTION, POWDER, FOR SOLUTION INTRAVENOUS at 18:31

## 2023-07-14 RX ADMIN — CEFEPIME 2000 MG: 2 INJECTION, POWDER, FOR SOLUTION INTRAVENOUS at 10:00

## 2023-07-14 RX ADMIN — POTASSIUM CHLORIDE 40 MEQ: 1.5 POWDER, FOR SOLUTION ORAL at 09:13

## 2023-07-14 RX ADMIN — VANCOMYCIN HYDROCHLORIDE 1500 MG: 10 INJECTION, POWDER, LYOPHILIZED, FOR SOLUTION INTRAVENOUS at 00:10

## 2023-07-14 RX ADMIN — LEVETIRACETAM 1750 MG: 100 SOLUTION ORAL at 09:59

## 2023-07-14 RX ADMIN — GUAIFENESIN 600 MG: 600 TABLET, EXTENDED RELEASE ORAL at 22:01

## 2023-07-14 RX ADMIN — SUCRALFATE 1 G: 1 TABLET ORAL at 12:30

## 2023-07-14 RX ADMIN — Medication 10 ML: at 22:02

## 2023-07-14 RX ADMIN — SUCRALFATE 1 G: 1 TABLET ORAL at 09:14

## 2023-07-14 RX ADMIN — MIDODRINE HYDROCHLORIDE 10 MG: 5 TABLET ORAL at 09:14

## 2023-07-14 RX ADMIN — FERROUS SULFATE TAB EC 324 MG (65 MG FE EQUIVALENT) 324 MG: 324 (65 FE) TABLET DELAYED RESPONSE at 09:14

## 2023-07-14 RX ADMIN — SUCRALFATE 1 G: 1 TABLET ORAL at 22:02

## 2023-07-14 NOTE — PROGRESS NOTES
[facility] Medicine Services  INPATIENT PROGRESS NOTE    Length of Stay: 2  Date of Admission: 7/12/2023  Primary Care Physician: Bautista James MD    Subjective   Chief Complaint: pneumonia  HPI:  Patient is a 67 year old non-verbal male (PMHx Autism, HTN, HLD, COPD, GERD, Seizures, Chronic tube feeding) brought to [facility] ED for evaluation of persistent difficulty breathing and cough. Patient lives in a group home and has caregiver at bedside who provides history. Caregiver tells that patient was recently admitted to the hospital in [place], [place] for management of pneumonia on July 6. He was discharged from that facility on July 10. Patient has continued to demonstrate cough, quick breathing, and sometimes seemed to have difficulty breathing. Low grade fevers have been noticed. To communicate he  will grunt when he doesn't want to be bothered. Patient has history of seizures. He does wear a helmet when ambulatory due to not only the seizures, but also because he is known to suddenly drop to the floor if he becomes upset/unhappy. Since 2 episodes of pneumonia this month and suspicious mass spoke to CT surgeon maybe mucus plug, order chest PT and mucinex.     ED findings include CXR and CT chest that demonstrate extensive infiltration of the left lung; cannot exclude underlying mass/neoplasm. Small bilateral effusions also noted. No PE. CBC stable; CMP stable.       As of today, 07/14/23  Review of Systems   Constitutional:  Negative for activity change, appetite change, chills, diaphoresis and fatigue.   HENT:  Negative for congestion, ear discharge, hearing loss, rhinorrhea, sinus pain, sneezing and sore throat.    Eyes:  Negative for photophobia, discharge and visual disturbance.   Respiratory:  Negative for cough, chest tightness, shortness of breath and wheezing.    Cardiovascular:  Negative for chest pain and palpitations.   Gastrointestinal:  Negative for  abdominal distention, abdominal pain, blood in stool, diarrhea, nausea and vomiting.   Endocrine: Negative for cold intolerance, heat intolerance, polydipsia, polyphagia and polyuria.   Genitourinary:  Negative for dysuria, flank pain, hematuria and urgency.   Musculoskeletal:  Negative for arthralgias, joint swelling and myalgias.   Skin:  Negative for color change.   Allergic/Immunologic: Negative for food allergies.   Neurological:  Negative for dizziness, seizures, syncope, speech difficulty, weakness and headaches.   Hematological:  Negative for adenopathy. Does not bruise/bleed easily.   Psychiatric/Behavioral:  Negative for confusion, hallucinations, self-injury and suicidal ideas. The patient is not nervous/anxious.       All pertinent negatives and positives are as above. All other systems have been reviewed and are negative unless otherwise stated.    Objective    Temp:  [97.7 °F (36.5 °C)-99.6 °F (37.6 °C)] 99.6 °F (37.6 °C)  Heart Rate:  [60-84] 74  Resp:  [18-20] 18  BP: (118-172)/(75-87) 172/77    AM-PAC 6 Clicks Score (PT): 14 (07/14/23 0846)    As of today, 07/14/23  Physical Exam  Constitutional:       Appearance: Normal appearance.   HENT:      Head: Normocephalic and atraumatic.      Right Ear: Tympanic membrane normal.      Left Ear: Tympanic membrane normal.      Nose: Nose normal.      Mouth/Throat:      Mouth: Mucous membranes are moist.   Eyes:      Pupils: Pupils are equal, round, and reactive to light.   Cardiovascular:      Rate and Rhythm: Normal rate and regular rhythm.      Pulses: Normal pulses.      Heart sounds: Normal heart sounds.   Pulmonary:      Effort: Pulmonary effort is normal.      Breath sounds: Rhonchi present.   Abdominal:      General: Abdomen is flat. Bowel sounds are normal.      Palpations: Abdomen is soft.   Musculoskeletal:         General: Normal range of motion.      Cervical back: Normal range of motion and neck supple.   Skin:     General: Skin is warm and dry.       Capillary Refill: Capillary refill takes less than 2 seconds.   Neurological:      General: No focal deficit present.      Mental Status: He is alert and oriented to person, place, and time.   Psychiatric:         Mood and Affect: Mood normal.         Behavior: Behavior normal.         Thought Content: Thought content normal.         Judgment: Judgment normal.       Results Review:  I have reviewed the labs, radiology results, and diagnostic studies.    Laboratory Data:   Results from last 7 days   Lab Units 07/14/23  0629 07/13/23  0620 07/12/23  1252   SODIUM mmol/L 142 143 144   POTASSIUM mmol/L 3.3* 3.3* 3.5   CHLORIDE mmol/L 106 106 105   CO2 mmol/L 28.0 28.0 33.0*   BUN mg/dL 18 20 21   CREATININE mg/dL 0.64* 0.62* 0.60*   GLUCOSE mg/dL 131* 115* 86   CALCIUM mg/dL 8.3* 7.9* 8.6   BILIRUBIN mg/dL  --   --  0.2   ALK PHOS U/L  --   --  155*   ALT (SGPT) U/L  --   --  92*   AST (SGOT) U/L  --   --  65*   ANION GAP mmol/L 8.0 9.0 6.0     Estimated Creatinine Clearance: 127.2 mL/min (A) (by C-G formula based on SCr of 0.64 mg/dL (L)).          Results from last 7 days   Lab Units 07/12/23  1118   WBC 10*3/mm3 9.18   HEMOGLOBIN g/dL 10.6*   HEMATOCRIT % 32.0*   PLATELETS 10*3/mm3 222           Culture Data:   Blood Culture   Date Value Ref Range Status   07/12/2023 No growth at 24 hours  Preliminary   07/12/2023 No growth at 2 days  Preliminary     No results found for: URINECX  No results found for: RESPCX  No results found for: WOUNDCX  No results found for: STOOLCX  No components found for: BODYFLD    Radiology Data:   Imaging Results (Last 24 Hours)       ** No results found for the last 24 hours. **            I have utilized all available immediate resources to obtain, update, or review the patient's current medications (including all prescriptions, over-the-counter products, herbals, cannabis/cannabidiol products, and vitamin/mineral/dietary (nutritional) supplements).       Assessment/Plan     Active  Hospital Problems    Diagnosis     Pneumonia        Plan:       1. Pneumonia of upper and lower left lung, can not rule out underlying mass              -IV Cefepime and vanco; will change based on MRSA PCR, DC vanco. CT surgeon consult, advice chest PT and mucinex              2. Hypotension resolved, noted per group home reports; patient on midodrine TID; continue, monitor     3. Seizures: controles. Continue Keppra and Lamictal     4. GERD: continue Pepcid, Carafate     5. Schizophrenia/Behavioral Health: Continue Thioridazine     6. Chronic malnutrition: continue tube feeding.     7. Anemia of chronic disease, to follow with PCP                 VTE Ppx: Lovenox subQ        Medical Decision Making  Number and Complexity of problems: 7  Differential Diagnosis: none    Conditions and Status:        Condition is improving.     [facility] Data  External documents reviewed: none  My EKG interpretation: none  My plain film interpretation: left upper and lower lobe pneumonia  Tests considered but not ordered: none     Decision rules/scores evaluated (example [id number], Wells, etc): not indicated     Discussed with: care giver     Treatment Plan  CT surgeon consult  Chest PT  Mucinex    Care Planning  Shared decision making: home group [facility]  Code status and discussions: DNAR    Disposition  Social Determinants of Health that impact treatment or disposition: none  I expect the patient to be discharged to [facility] group in 7 days.       I confirmed that the patient's Advance Care Plan is present, code status is documented, or surrogate decision maker is listed in the patient's medical record.      This document has been electronically signed by Donta Briseno MD on July 14, 2023 16:49 CDT

## 2023-07-14 NOTE — PROGRESS NOTES
Nutrition Services    Patient Name:  Bautista Grubbs  YOB: 1955  MRN: [id number]  Admit Date:  7/12/2023    Nutrition F/U:  Current tube feeding at goal rate with good tolerance.  Per staff Head of the bed locked in.  Will monitor.     Electronically signed by:  Neyda Alegria RD  07/14/23 16:05 CDT

## 2023-07-14 NOTE — PLAN OF CARE
Goal Outcome Evaluation:  Plan of Care Reviewed With: patient        Progress: improving  Outcome Evaluation: patient at goal rate on feedings and tolerating. [facility] sitter at bedside. VS stable. will continue to monitor patient

## 2023-07-15 LAB
ALBUMIN SERPL-MCNC: 2.5 G/DL (ref 3.5–5.2)
ALBUMIN/GLOB SERPL: 0.6 G/DL
ALP SERPL-CCNC: 137 U/L (ref 39–117)
ALT SERPL W P-5'-P-CCNC: 58 U/L (ref 1–41)
ANION GAP SERPL CALCULATED.3IONS-SCNC: 8 MMOL/L (ref 5–15)
AST SERPL-CCNC: 38 U/L (ref 1–40)
BASOPHILS # BLD AUTO: 0.05 10*3/MM3 (ref 0–0.2)
BASOPHILS NFR BLD AUTO: 0.6 % (ref 0–1.5)
BILIRUB SERPL-MCNC: 0.2 MG/DL (ref 0–1.2)
BUN SERPL-MCNC: 13 MG/DL (ref 8–23)
BUN/CREAT SERPL: 16.7 (ref 7–25)
CA-I BLD-MCNC: 4.43 MG/DL (ref 4.6–5.6)
CALCIUM SPEC-SCNC: 8.3 MG/DL (ref 8.6–10.5)
CHLORIDE SERPL-SCNC: 107 MMOL/L (ref 98–107)
CO2 SERPL-SCNC: 29 MMOL/L (ref 22–29)
CREAT SERPL-MCNC: 0.78 MG/DL (ref 0.76–1.27)
DEPRECATED RDW RBC AUTO: 46.8 FL (ref 37–54)
EGFRCR SERPLBLD CKD-EPI 2021: 98.4 ML/MIN/1.73
EOSINOPHIL # BLD AUTO: 0.25 10*3/MM3 (ref 0–0.4)
EOSINOPHIL NFR BLD AUTO: 3 % (ref 0.3–6.2)
ERYTHROCYTE [DISTWIDTH] IN BLOOD BY AUTOMATED COUNT: 14.6 % (ref 12.3–15.4)
GLOBULIN UR ELPH-MCNC: 3.9 GM/DL
GLUCOSE SERPL-MCNC: 101 MG/DL (ref 65–99)
HCT VFR BLD AUTO: 30.3 % (ref 37.5–51)
HGB BLD-MCNC: 10 G/DL (ref 13–17.7)
IMM GRANULOCYTES # BLD AUTO: 0.04 10*3/MM3 (ref 0–0.05)
IMM GRANULOCYTES NFR BLD AUTO: 0.5 % (ref 0–0.5)
LYMPHOCYTES # BLD AUTO: 0.78 10*3/MM3 (ref 0.7–3.1)
LYMPHOCYTES NFR BLD AUTO: 9.3 % (ref 19.6–45.3)
MAGNESIUM SERPL-MCNC: 2 MG/DL (ref 1.6–2.4)
MCH RBC QN AUTO: 29.1 PG (ref 26.6–33)
MCHC RBC AUTO-ENTMCNC: 33 G/DL (ref 31.5–35.7)
MCV RBC AUTO: 88.1 FL (ref 79–97)
MONOCYTES # BLD AUTO: 0.89 10*3/MM3 (ref 0.1–0.9)
MONOCYTES NFR BLD AUTO: 10.6 % (ref 5–12)
NEUTROPHILS NFR BLD AUTO: 6.4 10*3/MM3 (ref 1.7–7)
NEUTROPHILS NFR BLD AUTO: 76 % (ref 42.7–76)
NRBC BLD AUTO-RTO: 0 /100 WBC (ref 0–0.2)
PHOSPHATE SERPL-MCNC: 3.1 MG/DL (ref 2.5–4.5)
PLATELET # BLD AUTO: 297 10*3/MM3 (ref 140–450)
PMV BLD AUTO: 11 FL (ref 6–12)
POTASSIUM SERPL-SCNC: 3.4 MMOL/L (ref 3.5–5.2)
PROT SERPL-MCNC: 6.4 G/DL (ref 6–8.5)
QT INTERVAL: 392 MS
QTC INTERVAL: 466 MS
RBC # BLD AUTO: 3.44 10*6/MM3 (ref 4.14–5.8)
SODIUM SERPL-SCNC: 144 MMOL/L (ref 136–145)
WBC NRBC COR # BLD: 8.41 10*3/MM3 (ref 3.4–10.8)

## 2023-07-15 PROCEDURE — 97162 PT EVAL MOD COMPLEX 30 MIN: CPT

## 2023-07-15 PROCEDURE — 94760 N-INVAS EAR/PLS OXIMETRY 1: CPT

## 2023-07-15 PROCEDURE — 25010000002 LORAZEPAM PER 2 MG

## 2023-07-15 PROCEDURE — 25010000002 ENOXAPARIN PER 10 MG: Performed by: PHYSICIAN ASSISTANT

## 2023-07-15 PROCEDURE — 25010000002 LORAZEPAM PER 2 MG: Performed by: FAMILY MEDICINE

## 2023-07-15 PROCEDURE — 94799 UNLISTED PULMONARY SVC/PX: CPT

## 2023-07-15 PROCEDURE — 94668 MNPJ CHEST WALL SBSQ: CPT

## 2023-07-15 PROCEDURE — 99222 1ST HOSP IP/OBS MODERATE 55: CPT | Performed by: THORACIC SURGERY (CARDIOTHORACIC VASCULAR SURGERY)

## 2023-07-15 PROCEDURE — 80053 COMPREHEN METABOLIC PANEL: CPT

## 2023-07-15 PROCEDURE — 94664 DEMO&/EVAL PT USE INHALER: CPT

## 2023-07-15 PROCEDURE — 85025 COMPLETE CBC W/AUTO DIFF WBC: CPT

## 2023-07-15 PROCEDURE — 83735 ASSAY OF MAGNESIUM: CPT

## 2023-07-15 PROCEDURE — 25010000002 MEROPENEM PER 100 MG

## 2023-07-15 PROCEDURE — 84100 ASSAY OF PHOSPHORUS: CPT

## 2023-07-15 PROCEDURE — 25010000002 SODIUM CHLORIDE 0.9 % WITH KCL 20 MEQ 20-0.9 MEQ/L-% SOLUTION

## 2023-07-15 PROCEDURE — 82330 ASSAY OF CALCIUM: CPT

## 2023-07-15 RX ORDER — LORAZEPAM 2 MG/ML
2 INJECTION INTRAMUSCULAR ONCE AS NEEDED
Status: COMPLETED | OUTPATIENT
Start: 2023-07-15 | End: 2023-07-15

## 2023-07-15 RX ORDER — ACETYLCYSTEINE 100 MG/ML
4 SOLUTION ORAL; RESPIRATORY (INHALATION)
Status: DISCONTINUED | OUTPATIENT
Start: 2023-07-15 | End: 2023-07-17

## 2023-07-15 RX ORDER — POTASSIUM CHLORIDE 1.5 G/1.58G
40 POWDER, FOR SOLUTION ORAL ONCE
Status: COMPLETED | OUTPATIENT
Start: 2023-07-15 | End: 2023-07-15

## 2023-07-15 RX ORDER — LORAZEPAM 2 MG/ML
2 INJECTION INTRAMUSCULAR ONCE
Status: COMPLETED | OUTPATIENT
Start: 2023-07-15 | End: 2023-07-15

## 2023-07-15 RX ORDER — SODIUM CHLORIDE 9 MG/ML
INJECTION, SOLUTION INTRAVENOUS
Status: COMPLETED
Start: 2023-07-15 | End: 2023-07-15

## 2023-07-15 RX ORDER — POTASSIUM CHLORIDE 750 MG/1
40 CAPSULE, EXTENDED RELEASE ORAL ONCE
Status: DISCONTINUED | OUTPATIENT
Start: 2023-07-15 | End: 2023-07-15

## 2023-07-15 RX ADMIN — ACETYLCYSTEINE 4 ML: 100 SOLUTION ORAL; RESPIRATORY (INHALATION) at 08:15

## 2023-07-15 RX ADMIN — THIORIDAZINE HYDROCHLORIDE 200 MG: 50 TABLET, FILM COATED ORAL at 21:07

## 2023-07-15 RX ADMIN — IPRATROPIUM BROMIDE AND ALBUTEROL SULFATE 3 ML: .5; 3 SOLUTION RESPIRATORY (INHALATION) at 08:15

## 2023-07-15 RX ADMIN — LORAZEPAM 2 MG: 2 INJECTION INTRAMUSCULAR; INTRAVENOUS at 03:20

## 2023-07-15 RX ADMIN — FAMOTIDINE 20 MG: 20 TABLET, FILM COATED ORAL at 09:33

## 2023-07-15 RX ADMIN — Medication 1 TABLET: at 09:33

## 2023-07-15 RX ADMIN — ATORVASTATIN CALCIUM 10 MG: 10 TABLET, FILM COATED ORAL at 09:32

## 2023-07-15 RX ADMIN — Medication 1 TABLET: at 21:08

## 2023-07-15 RX ADMIN — GUAIFENESIN 600 MG: 600 TABLET, EXTENDED RELEASE ORAL at 21:09

## 2023-07-15 RX ADMIN — MEROPENEM 1000 MG: 1 INJECTION, POWDER, FOR SOLUTION INTRAVENOUS at 05:23

## 2023-07-15 RX ADMIN — LEVETIRACETAM 1750 MG: 100 SOLUTION ORAL at 09:36

## 2023-07-15 RX ADMIN — Medication 10 ML: at 09:33

## 2023-07-15 RX ADMIN — LAMOTRIGINE 200 MG: 100 TABLET ORAL at 09:34

## 2023-07-15 RX ADMIN — FERROUS SULFATE TAB EC 324 MG (65 MG FE EQUIVALENT) 324 MG: 324 (65 FE) TABLET DELAYED RESPONSE at 09:33

## 2023-07-15 RX ADMIN — MEROPENEM 1000 MG: 1 INJECTION, POWDER, FOR SOLUTION INTRAVENOUS at 23:28

## 2023-07-15 RX ADMIN — MEROPENEM 1000 MG: 1 INJECTION, POWDER, FOR SOLUTION INTRAVENOUS at 00:49

## 2023-07-15 RX ADMIN — MIDODRINE HYDROCHLORIDE 10 MG: 5 TABLET ORAL at 09:32

## 2023-07-15 RX ADMIN — ACETYLCYSTEINE 4 ML: 100 SOLUTION ORAL; RESPIRATORY (INHALATION) at 22:38

## 2023-07-15 RX ADMIN — POTASSIUM CHLORIDE AND SODIUM CHLORIDE 75 ML/HR: 900; 150 INJECTION, SOLUTION INTRAVENOUS at 07:44

## 2023-07-15 RX ADMIN — IPRATROPIUM BROMIDE AND ALBUTEROL SULFATE 3 ML: .5; 3 SOLUTION RESPIRATORY (INHALATION) at 22:38

## 2023-07-15 RX ADMIN — MEROPENEM 1000 MG: 1 INJECTION, POWDER, FOR SOLUTION INTRAVENOUS at 15:37

## 2023-07-15 RX ADMIN — MIDODRINE HYDROCHLORIDE 10 MG: 5 TABLET ORAL at 11:50

## 2023-07-15 RX ADMIN — GUAIFENESIN 600 MG: 600 TABLET, EXTENDED RELEASE ORAL at 09:33

## 2023-07-15 RX ADMIN — POTASSIUM CHLORIDE AND SODIUM CHLORIDE 75 ML/HR: 900; 150 INJECTION, SOLUTION INTRAVENOUS at 21:58

## 2023-07-15 RX ADMIN — POTASSIUM CHLORIDE 40 MEQ: 1.5 POWDER, FOR SOLUTION ORAL at 11:50

## 2023-07-15 RX ADMIN — Medication 10 ML: at 21:09

## 2023-07-15 RX ADMIN — ENOXAPARIN SODIUM 40 MG: 40 INJECTION SUBCUTANEOUS at 09:34

## 2023-07-15 RX ADMIN — LORAZEPAM 2 MG: 2 INJECTION INTRAMUSCULAR; INTRAVENOUS at 23:46

## 2023-07-15 RX ADMIN — THIORIDAZINE HYDROCHLORIDE 200 MG: 50 TABLET, FILM COATED ORAL at 09:32

## 2023-07-15 RX ADMIN — MIDODRINE HYDROCHLORIDE 10 MG: 5 TABLET ORAL at 18:18

## 2023-07-15 RX ADMIN — SUCRALFATE 1 G: 1 TABLET ORAL at 21:06

## 2023-07-15 RX ADMIN — SODIUM CHLORIDE 250 ML: 9 INJECTION, SOLUTION INTRAVENOUS at 00:50

## 2023-07-15 RX ADMIN — SUCRALFATE 1 G: 1 TABLET ORAL at 18:18

## 2023-07-15 RX ADMIN — LAMOTRIGINE 200 MG: 100 TABLET ORAL at 21:07

## 2023-07-15 RX ADMIN — LAMOTRIGINE 25 MG: 25 TABLET ORAL at 09:35

## 2023-07-15 RX ADMIN — SUCRALFATE 1 G: 1 TABLET ORAL at 11:50

## 2023-07-15 RX ADMIN — LAMOTRIGINE 25 MG: 25 TABLET ORAL at 21:09

## 2023-07-15 RX ADMIN — LEVETIRACETAM 1750 MG: 100 SOLUTION ORAL at 21:08

## 2023-07-15 RX ADMIN — ACETYLCYSTEINE 4 ML: 100 SOLUTION ORAL; RESPIRATORY (INHALATION) at 14:15

## 2023-07-15 RX ADMIN — IPRATROPIUM BROMIDE AND ALBUTEROL SULFATE 3 ML: .5; 3 SOLUTION RESPIRATORY (INHALATION) at 14:15

## 2023-07-15 NOTE — THERAPY EVALUATION
Patient Name: Bautista Grubbs  : 1955    MRN: [id number]                              Today's Date: 7/15/2023     PT Evaluation  Admit Date: 2023    Visit Dx:     ICD-10-CM ICD-9-CM   1. Pneumonia of left lung due to infectious organism, unspecified part of lung  J18.9 486   2. Impaired functional mobility, balance, gait, and endurance [Z74.09 (ICD-10-CM)]  Z74.09 V49.89     Patient Active Problem List   Diagnosis    Syncope    Autism    COPD (chronic obstructive pulmonary disease)    GERD (gastroesophageal reflux disease)    Hyperlipidemia    Seizures    Orthostatic hypotension    Anemia    Gastrointestinal hemorrhage    Iron deficiency anemia due to chronic blood loss    UGIB (upper gastrointestinal bleed)    MRSA bacteremia    Right upper lobe pneumonia    Bacteremia    Oropharyngeal dysphagia    Adenomatous polyp of colon    Aspiration into airway    Pneumonia     Past Medical History:   Diagnosis Date    Alopecia     Arthritis     Autism     COPD (chronic obstructive pulmonary disease)     GERD (gastroesophageal reflux disease)     Hyperlipidemia     Hypertension     Insomnia     Intellectual disability     Lennox-Gastaut syndrome     Major depressive disorder     Orthostatic hypotension     Osteoporosis     Right bundle branch block     Scoliosis     Seizures      Past Surgical History:   Procedure Laterality Date    COLONOSCOPY N/A 2021    Procedure: COLONOSCOPY;  Surgeon: Wanda Lang MD;  Location: [facility] ENDOSCOPY;  Service: Gastroenterology;  Laterality: N/A;    COLONOSCOPY N/A 2021    Procedure: COLONOSCOPY;  Surgeon: Wanda Lang MD;  Location: [facility] ENDOSCOPY;  Service: Gastroenterology;  Laterality: N/A;    COLONOSCOPY N/A 10/14/2022    Procedure: COLONOSCOPY;  Surgeon: Wanda Lang MD;  Location: [facility] ENDOSCOPY;  Service: Gastroenterology;  Laterality: N/A;    ENDOSCOPY N/A 2021    Procedure: ESOPHAGOGASTRODUODENOSCOPY;  Surgeon: Wanda Lang MD;   Location: [facility] ENDOSCOPY;  Service: Gastroenterology;  Laterality: N/A;    ENDOSCOPY N/A 1/22/2021    Procedure: ESOPHAGOGASTRODUODENOSCOPY;  Surgeon: Wanda Lang MD;  Location: [facility] ENDOSCOPY;  Service: Gastroenterology;  Laterality: N/A;    ENDOSCOPY W/ PEG TUBE PLACEMENT N/A 6/25/2021    Procedure: ESOPHAGOGASTRODUODENOSCOPY WITH PERCUTANEOUS ENDOSCOPIC GASTROSTOMY TUBE INSERTION WITH ANESTHESIA;  Surgeon: Wanda Lang MD;  Location: [facility] ENDOSCOPY;  Service: Gastroenterology;  Laterality: N/A;    SIGMOIDOSCOPY N/A 10/11/2021    Procedure: SIGMOIDOSCOPY FLEXIBLE;  Surgeon: Wanda Lang MD;  Location: [facility] ENDOSCOPY;  Service: Gastroenterology;  Laterality: N/A;    SIGMOIDOSCOPY N/A 11/30/2021    Procedure: SIGMOIDOSCOPY FLEXIBLE;  Surgeon: Wanda Lang MD;  Location: [facility] ENDOSCOPY;  Service: Gastroenterology;  Laterality: N/A;    SIGMOIDOSCOPY N/A 6/16/2022    Procedure: SIGMOIDOSCOPY FLEXIBLE;  Surgeon: Wanda Lang MD;  Location: [facility] ENDOSCOPY;  Service: Gastroenterology;  Laterality: N/A;  argon at rectal polypectomy site    SIGMOIDOSCOPY N/A 2/24/2023    Procedure: SIGMOIDOSCOPY FLEXIBLE enemas on call at facility;  Surgeon: Wanda Lang MD;  Location: [facility] ENDOSCOPY;  Service: Gastroenterology;  Laterality: N/A;  argon to rectal polyp site    UPPER GASTROINTESTINAL ENDOSCOPY  01/22/2021    UPPER GASTROINTESTINAL ENDOSCOPY  06/25/2021      General Information       Row Name 07/15/23 1026          Physical Therapy Time and Intention    Document Type evaluation  -GB     Mode of Treatment individual therapy;physical therapy  -GB       Row Name 07/15/23 1026          General Information    Patient Profile Reviewed yes  -GB     Prior Level of Function min assist:;w/c or scooter;all household mobility  likes up in w/chair  -GB     Existing Precautions/Restrictions fall   walkson his toes; throws self to floor for attn w/ injury hx; shows preference to staff-  aware of surroundings and activity around him; likes taste of rootbeer on swab; NPO swallowing; like scartoons blues clues and  Bluey;  -GB     Barriers to Rehab medically complex;previous functional deficit;cognitive status  caregiver says he responds to very direct info/can follow directions if he favors person around him;  -GB       Row Name 07/15/23 1026          Living Environment    People in Home facility resident  [place];  -GB       Row Name 07/15/23 1026          Home Main Entrance    Number of Stairs, Main Entrance none  -GB       Row Name 07/15/23 1026          Stairs Within Home, Primary    Number of Stairs, Within Home, Primary none  -GB       Row Name 07/15/23 1026          Safety Issues, Functional Mobility    Safety Issues Affecting Function (Mobility) awareness of need for assistance;problem-solving;safety precautions follow-through/compliance  -GB     Impairments Affecting Function (Mobility) endurance/activity tolerance;balance;cognition;coordination;motor control;motor planning;postural/trunk control  -GB               User Key  (r) = Recorded By, (t) = Taken By, (c) = Cosigned By      Initials Name Provider Type    Kati Shah, PT Physical Therapist                   Mobility       Row Name 07/15/23 1152          Bed Mobility    Bed Mobility bed mobility (all) activities  -GB     All Activities, [place] (Bed Mobility) dependent (less than 25% patient effort);2 person assist;1 person assist  -GB     Assistive Device (Bed Mobility) draw sheet;head of bed elevated  -GB     Comment, (Bed Mobility) Chair position sparkle well w/out distress but w/ awareness of change and awakening; moved sup to sit w/ mod-max assist of 2 but he did not want to stay up and started leaning back and to L side; we repositioned him to keep head and chest up in sidelying; we tried one more time to sit EOB but he resisted and returned to L sidelean . Transfer sheet was out from under him so we needed  assist of 2 more staff to reposition to get sheet under him and reposition to HOB then upright. He was more alert/awake after this but resisted finger probe for good reading of sats. He does not like 02 NC so PT messaged RT as FYI.  -GB       Row Name 07/15/23 1152          Bed-Chair Transfer    Bed-Chair [place] (Transfers) not tested  -GB       Row Name 07/15/23 1152          Sit-Stand Transfer    Sit-Stand [place] (Transfers) not tested;unable to assess  -GB       Row Name 07/15/23 1152          Gait/Stairs (Locomotion)    [place] Level (Gait) unable to assess;not tested  -GB     Distance in Feet (Gait) pt walks on toes per sitter at home and can walk distances when he likes caregiver. He has hx of throwing self to floor from sittng when un happy; This has  given him some injuries but he has persisted.  -GB     [place] Level (Stairs) not tested;unable to assess  -GB               User Key  (r) = Recorded By, (t) = Taken By, (c) = Cosigned By      Initials Name Provider Type    Kati Shah, PT Physical Therapist                   Obj/Interventions       Row Name 07/15/23 1207          Range of Motion Comprehensive    General Range of Motion bilateral lower extremity ROM WFL  -GB     Comment, General Range of Motion except DF limited kyrie, R>L.  Able to get neutral DF Lfoot w/ stretch and relaxation; R foot allows DF ~20 deg from neutral w/ stretch & inhiibition  -GB       Row Name 07/15/23 1207          Strength Comprehensive (MMT)    Comment, General Manual Muscle Testing (MMT) Assessment at least 3+-4-/5 demonstrated w/out MMT: likely much stronger given resistance we recieved when trying to work w/ him  -GB       Row Name 07/15/23 1207          Balance    Balance Assessment sitting dynamic balance;sitting static balance  -GB     Static Sitting Balance dependent;1-person assist  -GB     Dynamic Sitting Balance dependent;1-person assist  -GB     Position, Sitting Balance  supported;sitting edge of bed  -GB       Row Name 07/15/23 1207          Sensory Assessment (Somatosensory)    Sensory Assessment (Somatosensory) unable/difficult to assess  demos awareness of touch and position change  -GB               User Key  (r) = Recorded By, (t) = Taken By, (c) = Cosigned By      Initials Name Provider Type    GB Kati Arellano, PT Physical Therapist                   Goals/Plan       Row Name 07/15/23 1206          Bed Mobility Goal 1 (PT)    Activity/Assistive Device (Bed Mobility Goal 1, PT) bed mobility activities, all  -GB     [place] Level/Cues Needed (Bed Mobility Goal 1, PT) contact guard required;minimum assist (75% or more patient effort)  -GB     Time Frame (Bed Mobility Goal 1, PT) 10 days  -GB     Progress/Outcomes (Bed Mobility Goal 1, PT) goal not met  -       Row Name 07/15/23 1206          Transfer Goal 1 (PT)    Activity/Assistive Device (Transfer Goal 1, PT) bed-to-chair/chair-to-bed;toilet  -GB     [place] Level/Cues Needed (Transfer Goal 1, PT) minimum assist (75% or more patient effort)  -GB     Time Frame (Transfer Goal 1, PT) 30 days  -GB     Progress/Outcome (Transfer Goal 1, PT) goal not met  -       Row Name 07/15/23 1206          Gait Training Goal 1 (PT)    Activity/Assistive Device (Gait Training Goal 1, PT) gait (walking locomotion);assistive device use  -GB     [place] Level (Gait Training Goal 1, PT) modified independence;standby assist;supervision required  -GB     Distance (Gait Training Goal 1, PT) 75 ft per trip or more pending compliance  -GB     Time Frame (Gait Training Goal 1, PT) 30 days  -GB     Progress/Outcome (Gait Training Goal 1, PT) new goal  -       Row Name 07/15/23 1206          Therapy Assessment/Plan (PT)    Planned Therapy Interventions (PT) balance training;bed mobility training;gait training;motor coordination training;transfer training;wheelchair management/propulsion  training;strengthening;patient/family education;neuromuscular re-education;home exercise program  -               User Key  (r) = Recorded By, (t) = Taken By, (c) = Cosigned By      Initials Name Provider Type    Kati Shah, PT Physical Therapist                   Clinical Impression       Row Name 07/15/23 1033          Pain    Additional Documentation Pain Scale: FACES Pre/Post-Treatment (Group)  -GB       Row Name 07/15/23 1033          Pain Scale: FACES Pre/Post-Treatment    Pain: FACES Scale, Pretreatment 0-->no hurt  -GB     Posttreatment Pain Rating 0-->no hurt  -GB       Row Name 07/15/23 1033          Plan of Care Review    Plan of Care Reviewed With patient;caregiver  -     Outcome Evaluation PT Sabina completed for what he can tolerate. He walks at home ([place]) on toes and at request but spends more time in w/chair as well. He will need to be able to transfer to go home per sitter. He has good AROM kyrie LEs but UEs held in flx/adduction at sides primarily; We tried sup to sit x 2 which needed mod-max assist of 2 but he responds by pushing back and leaning to L so he did not sustain sitting long; he was held semi sidelying between attempts to keep head/chest up and increase acclimation to being up but he did not initiate or try to maintain upright.  PT expects his upright sparkle is decreased w/ recent bedrest so we will work to increase upright sparkle  assist in mobiity and work toward transfers w/  min- CGA assist of staff to enable him to return home.  Goal is d/c to [facility] where he knows staff, setting etc as his highest level of d/c plan.  -       Row Name 07/15/23 1033          Therapy Assessment/Plan (PT)    Patient/Family Therapy Goals Statement (PT) home to [place]  -GB     Rehab Potential (PT) fair, will monitor progress closely  -GB     Criteria for Skilled Interventions Met (PT) yes;skilled treatment is necessary  -GB     Therapy Frequency (PT) 5 times/wk  5-7 d/w  -GB        Row Name 07/15/23 1033          Vital Signs    Pre Systolic BP Rehab 130  -GB     Pre Treatment Diastolic BP 79  -GB     Intra Systolic BP Rehab 133  -GB     Intra Treatment Diastolic BP 67  -GB     Post Systolic BP Rehab 127  -GB     Post Treatment Diastolic BP 75  -GB     Pretreatment Heart Rate (beats/min) 86  -GB     Posttreatment Heart Rate (beats/min) 84  -GB     Pre SpO2 (%) 96  -GB     O2 Delivery Pre Treatment nasal cannula  3L/m  -GB     Post SpO2 (%) 93  -GB     O2 Delivery Post Treatment nasal cannula  pt pulls finger out of probe  -GB     Pre Patient Position Supine  -GB     Intra Patient Position Sitting  -GB     Post Patient Position Supine  -GB       Row Name 07/15/23 1033          Positioning and Restraints    Pre-Treatment Position in bed  -GB     Post Treatment Position bed  -GB     In Bed supine;exit alarm on;patient within staff view;with family/caregiver;fowlers;call light within reach;encouraged to call for assist  -GB               User Key  (r) = Recorded By, (t) = Taken By, (c) = Cosigned By      Initials Name Provider Type    Kati Shah, PT Physical Therapist                   Outcome Measures       Row Name 07/15/23 1210 07/15/23 0942       How much help from another person do you currently need...    Turning from your back to your side while in flat bed without using bedrails? 1  -GB 3  -ML    Moving from lying on back to sitting on the side of a flat bed without bedrails? 1  -GB 3  -ML    Moving to and from a bed to a chair (including a wheelchair)? 1  -GB 3  -ML    Standing up from a chair using your arms (e.g., wheelchair, bedside chair)? 1  -GB 2  -ML    Climbing 3-5 steps with a railing? 1  -GB 1  -ML    To walk in hospital room? 1  -GB 2  -ML    AM-PAC 6 Clicks Score (PT) 6  -GB 14  -ML    Highest level of mobility 2 --> Bed activities/dependent transfer  -GB 4 --> Transferred to chair/commode  -ML              User Key  (r) = Recorded By, (t) = Taken By, (c) =  Cosigned By      Initials Name Provider Type    Kati Shah, PT Physical Therapist    Li Carlos LPN Licensed Nurse                                 Physical Therapy Education       Title: PT OT SLP Therapies (In Progress)       Topic: Physical Therapy (In Progress)       Point: Mobility training (Done)       Learning Progress Summary             Patient Acceptance, E,D, VU,NR by GUILLERMINA at 7/15/2023 1211    Comment: PT Eval; POC: mobility;                         Point: Home exercise program (Not Started)       Learner Progress:  Not documented in this visit.              Point: Body mechanics (Not Started)       Learner Progress:  Not documented in this visit.              Point: Precautions (Not Started)       Learner Progress:  Not documented in this visit.                              User Key       Initials Effective Dates Name Provider Type Discipline    GUILLERMINA 06/16/21 -  Kati Arellano PT Physical Therapist PT                  PT Recommendation and Plan  Planned Therapy Interventions (PT): balance training, bed mobility training, gait training, motor coordination training, transfer training, wheelchair management/propulsion training, strengthening, patient/family education, neuromuscular re-education, home exercise program  Plan of Care Reviewed With: patient, caregiver  Outcome Evaluation: PT Sabina completed for what he can tolerate. He walks at home ([place]) on toes and at request but spends more time in w/chair as well. He will need to be able to transfer to go home per sitter. He has good AROM kyrie LEs but UEs held in flx/adduction at sides primarily; We tried sup to sit x 2 which needed mod-max assist of 2 but he responds by pushing back and leaning to L so he did not sustain sitting long; he was held semi sidelying between attempts to keep head/chest up and increase acclimation to being up but he did not initiate or try to maintain upright.  PT expects his upright sparkle is  decreased w/ recent bedrest so we will work to increase upright sparkle  assist in mobiity and work toward transfers w/  min- CGA assist of staff to enable him to return home.  Goal is d/c to [facility] where he knows staff, setting etc as his highest level of d/c plan.     Time Calculation:    PT Charges       Row Name 07/15/23 1026             Time Calculation    Start Time 1026  -GB      Stop Time 1128  -GB      Time Calculation (min) 62 min  -GB      PT Received On 07/15/23  -GB      PT Goal Re-Cert Due Date 07/28/23  -GB         Untimed Charges    PT Eval/Re-eval Minutes 62  -GB         Total Minutes    Untimed Charges Total Minutes 62  -GB       Total Minutes 62  -GB                User Key  (r) = Recorded By, (t) = Taken By, (c) = Cosigned By      Initials Name Provider Type    Kati Shah, PT Physical Therapist                  Therapy Charges for Today       Code Description Service Date Service Provider Modifiers Qty    [id number] HC PT EVAL MOD COMPLEXITY 4 7/15/2023 Kati Arellano, PT GP 1            PT G-Codes  AM-PAC 6 Clicks Score (PT): 6  PT Discharge Summary  Anticipated Discharge Disposition (PT): other (see comments) (goal return to [facility])    Kati Arellano, PT  7/15/2023

## 2023-07-15 NOTE — PLAN OF CARE
Goal Outcome Evaluation:  Plan of Care Reviewed With: patient        Progress: no change  Outcome Evaluation: Patient resting in bed at this time with sitter at side.  no s/s of distress noted.  Will continue to monitor.

## 2023-07-15 NOTE — CONSULTS
CVTS CONSULTATION    Patient Care Team:  Bautista James MD as PCP - General (Family Medicine)    Chief complaint: Cough difficulty breathing    Subjective     History of Present Illness:  Patient is a 67 year old non-verbal male (PMHx Autism, HTN, HLD, COPD, GERD, Seizures, Chronic tube feeding) brought to [facility] ED for evaluation of persistent difficulty breathing and cough. Patient lives in a group home and has caregiver at bedside who provides history. Caregiver tells me that patient was recently admitted to the hospital in [place], [place] for management of pneumonia on July 6. He was discharged from that facility on July 10. Patient has continued to demonstrate cough, quick breathing, and sometimes seemed to have difficulty breathing. Low grade fevers have been noticed. Patient hasn't seemed bothered, but sleeps more when he has illness, such as now. He will grunt when he doesn't want to be bothered. Patient has history of seizures. He does wear a helmet when ambulatory due to not only the seizures, but also because he is known to suddenly drop to the floor if he becomes upset/unhappy.     ED findings include CXR and CT chest that demonstrate extensive infiltration of the left lung; cannot exclude underlying mass/neoplasm. Small bilateral effusions also noted. No PE. CBC stable; CMP stable.    Review of Systems not able to be performed patient is nonverbal, caregiver states regards to cough    Past Medical History:   Diagnosis Date    Alopecia     Arthritis     Autism     COPD (chronic obstructive pulmonary disease)     GERD (gastroesophageal reflux disease)     Hyperlipidemia     Hypertension     Insomnia     Intellectual disability     Lennox-Gastaut syndrome     Major depressive disorder     Orthostatic hypotension     Osteoporosis     Right bundle branch block     Scoliosis     Seizures      Past Surgical History:   Procedure Laterality Date    COLONOSCOPY N/A 1/8/2021    Procedure: COLONOSCOPY;  Surgeon:  Wanda Lang MD;  Location: [facility] ENDOSCOPY;  Service: Gastroenterology;  Laterality: N/A;    COLONOSCOPY N/A 9/2/2021    Procedure: COLONOSCOPY;  Surgeon: Wanda Lang MD;  Location: [facility] ENDOSCOPY;  Service: Gastroenterology;  Laterality: N/A;    COLONOSCOPY N/A 10/14/2022    Procedure: COLONOSCOPY;  Surgeon: Wanda Lang MD;  Location: [facility] ENDOSCOPY;  Service: Gastroenterology;  Laterality: N/A;    ENDOSCOPY N/A 1/7/2021    Procedure: ESOPHAGOGASTRODUODENOSCOPY;  Surgeon: Wanda Lang MD;  Location: [facility] ENDOSCOPY;  Service: Gastroenterology;  Laterality: N/A;    ENDOSCOPY N/A 1/22/2021    Procedure: ESOPHAGOGASTRODUODENOSCOPY;  Surgeon: Wanda Lang MD;  Location: [facility] ENDOSCOPY;  Service: Gastroenterology;  Laterality: N/A;    ENDOSCOPY W/ PEG TUBE PLACEMENT N/A 6/25/2021    Procedure: ESOPHAGOGASTRODUODENOSCOPY WITH PERCUTANEOUS ENDOSCOPIC GASTROSTOMY TUBE INSERTION WITH ANESTHESIA;  Surgeon: Wanda Lang MD;  Location: [facility] ENDOSCOPY;  Service: Gastroenterology;  Laterality: N/A;    SIGMOIDOSCOPY N/A 10/11/2021    Procedure: SIGMOIDOSCOPY FLEXIBLE;  Surgeon: Wanda Lang MD;  Location: [facility] ENDOSCOPY;  Service: Gastroenterology;  Laterality: N/A;    SIGMOIDOSCOPY N/A 11/30/2021    Procedure: SIGMOIDOSCOPY FLEXIBLE;  Surgeon: Wanda Lang MD;  Location: [facility] ENDOSCOPY;  Service: Gastroenterology;  Laterality: N/A;    SIGMOIDOSCOPY N/A 6/16/2022    Procedure: SIGMOIDOSCOPY FLEXIBLE;  Surgeon: Wanda Lang MD;  Location: [facility] ENDOSCOPY;  Service: Gastroenterology;  Laterality: N/A;  argon at rectal polypectomy site    SIGMOIDOSCOPY N/A 2/24/2023    Procedure: SIGMOIDOSCOPY FLEXIBLE enemas on call at facility;  Surgeon: Wanda Lang MD;  Location: [facility] ENDOSCOPY;  Service: Gastroenterology;  Laterality: N/A;  argon to rectal polyp site    UPPER GASTROINTESTINAL ENDOSCOPY  01/22/2021    UPPER GASTROINTESTINAL ENDOSCOPY  06/25/2021      Family History   Problem Relation Age of Onset    Hypertension Father      Social History     Tobacco Use    Smoking status: Never    Smokeless tobacco: Never   Vaping Use    Vaping Use: Never used   Substance Use Topics    Alcohol use: Never    Drug use: Never     Medications Prior to Admission   Medication Sig Dispense Refill Last Dose    albuterol (PROVENTIL) (2.5 MG/3ML) 0.083% nebulizer solution        albuterol sulfate  (90 Base) MCG/ACT inhaler Inhale 2 puffs Every 4 (Four) Hours As Needed for Shortness of Air or Wheezing. 6.7 g 0     calcium carbonate-vitamin d 600-400 MG-UNIT per tablet Take 1 tablet by mouth 2 (Two) Times a Day. Per G-Tube       denosumab (Prolia) 60 MG/ML solution prefilled syringe syringe Inject 1 mL under the skin into the appropriate area as directed. Every 6 Months       DIAZEPAM RE Insert 10 mg into the rectum As Needed (For Seizure Activity).       famotidine (PEPCID) 20 MG tablet Take 1 tablet by mouth Daily. Per G-Tube       ferrous sulfate 325 (65 FE) MG tablet Take 1 tablet by mouth Daily With Breakfast. Per G-Tube       ibuprofen (ADVIL,MOTRIN) 600 MG tablet Take 1 tablet by mouth Every 8 (Eight) Hours As Needed for Moderate Pain . 30 tablet 0     ipratropium-albuterol (DUO-NEB) 0.5-2.5 mg/3 ml nebulizer Take 3 mL by nebulization Every 4 (Four) Hours As Needed for Shortness of Air. 360 mL      lamoTRIgine (LaMICtal) 200 MG tablet Take 1 tablet by mouth 2 (Two) Times a Day. Per G-Tube       lamoTRIgine (LaMICtal) 25 MG tablet Take 1 tablet by mouth 2 (Two) Times a Day. Per G-Tube To Equal 225 MG       levETIRAcetam (KEPPRA) 100 MG/ML solution Take 17.5 mL by mouth 2 (Two) Times a Day.       LORazepam (ATIVAN) 1 MG tablet GIVE ONE TABLET PER G-TUBE TWICE DAILY FOR ANXIETY AND AGITATION 60 tablet 5     magnesium hydroxide (MILK OF MAGNESIA) 400 MG/5ML suspension Take  by mouth Daily As Needed for Constipation.       midodrine (PROAMATINE) 5 MG tablet Take 1 tablet by  "mouth 3 (Three) Times a Day. (Patient taking differently: Take 2 tablets by mouth 3 (Three) Times a Day. Per G-Tube) 90 tablet 3     Nutritional Supplements (JEVITY 1.5 JEFFERY PO) Take  by mouth. Gets 390mL bolus every 6 hours 1A-7A-1P-7P       simvastatin (ZOCOR) 20 MG tablet Take 1 tablet by mouth Daily. Per G-Tube       sucralfate (CARAFATE) 1 g tablet Take 1 tablet by mouth 4 (Four) Times a Day Before Meals & at Bedtime. 120 tablet 0     thioridazine (MELLARIL) 100 MG tablet Take 2 tablets by mouth 2 (Two) Times a Day. Per G-Tube       triamcinolone (KENALOG) 0.1 % cream         acetylcysteine, 4 mL, Nebulization, Q6H While Awake - RT  atorvastatin, 10 mg, Oral, Daily  calcium carb-cholecalciferol, 1 tablet, Per G Tube, BID  enoxaparin, 40 mg, Subcutaneous, Daily  famotidine, 20 mg, Per G Tube, Daily  ferrous sulfate, 324 mg, Oral, Daily With Breakfast  guaiFENesin, 600 mg, Oral, Q12H  lamoTRIgine, 200 mg, Per G Tube, BID  lamoTRIgine, 25 mg, Per G Tube, BID  levETIRAcetam, 1,750 mg, Per G Tube, BID  meropenem, 1,000 mg, Intravenous, Q8H  midodrine, 10 mg, Per G Tube, TID AC  sodium chloride, 10 mL, Intravenous, Q12H  sucralfate, 1 g, Per G Tube, 4x Daily AC & at Bedtime  thioridazine, 200 mg, Per G Tube, BID      Allergies:  Haldol [haloperidol] and Levaquin [levofloxacin]    Objective      Vital Signs  Temp:  [97.8 °F (36.6 °C)-99.6 °F (37.6 °C)] 97.8 °F (36.6 °C)  Heart Rate:  [69-84] 69  Resp:  [16-20] 16  BP: (132-172)/(76-92) 140/78    Flowsheet Rows      Flowsheet Row First Filed Value   Admission Height 177.8 cm (70\")  [aprox] Documented at 07/12/2023 1640   Admission Weight 88.8 kg (195 lb 11.2 oz) Documented at 07/12/2023 1041          177.8 cm (70\")  Vitals and nursing note reviewed.   Constitutional:       Appearance: He is underweight.  Nonverbal difficult to arouse  HENT:      Head: Normocephalic and atraumatic.   Eyes:      General: No scleral icterus.     Conjunctiva/sclera: Conjunctivae normal. "   Neck:      Vascular: No carotid bruit.   Cardiovascular:      Rate and Rhythm: Normal rate and regular rhythm.   Pulmonary:      Effort: Pulmonary effort is normal. Tachypnea present.      Breath sounds: Examination of the right-middle field reveals decreased breath sounds.      Examination of the right-lower field reveals decreased breath sounds. Decreased breath sounds present. No wheezing, rhonchi or rales.   Abdominal:      General: Abdomen is flat. Bowel sounds are normal.   Musculoskeletal:         General: No swelling or deformity. Normal range of motion.    Skin:     General: Skin is warm and dry.   Neurological:      Mental Status: He is somnolent       Results Review:         Assessment & Plan       Autism    Anemia    Aspiration into airway    Pneumonia      Plan:  Care per hospitalist - antibiotics, pulmonary toilet, respiratory treatments in place  Concern had been for initial chest x-ray showing a left lung mass however on CT scan patient has multiple infiltrates throughout the left lung and at this point it is difficult to determine whether any of these represents a lung mass versus just normal pneumonia with consolidation.  In view of this we continued medical management at this time and reevaluate with serial x-rays possible repeat CT scan in several days.    We will follow as needed while in the hospital, thank you very much for allowing us to participate in the care of this patient.    Clarence Monahan MD FACS  07/15/23  08:23 CDT

## 2023-07-15 NOTE — PROGRESS NOTES
[facility]  INPATIENT PROGRESS NOTE    Length of Stay: 3  Date of Admission: 7/12/2023  Primary Care Physician: Bautista James MD    Subjective   Chief Complaint: does not talk  HPI:  Patient is a 67 year old non-verbal male (PMHx Autism, HTN, HLD, COPD, GERD, Seizures, Chronic tube feeding) brought to [facility] ED for evaluation of persistent difficulty breathing and cough. Patient lives in a group home and has caregiver at bedside who provides history. Was recently admitted to the hospital in [place], [place] for management of pneumonia on July 6. He was discharged from that facility on July 10. Patient has continued to demonstrate cough, quick breathing, and sometimes seemed to have difficulty breathing. Low grade fevers have been noticed. To communicate he  will grunt when he doesn't want to be bothered. Patient has history of seizures. He does wear a helmet when ambulatory due to not only the seizures, but also because he is known to suddenly drop to the floor if he becomes upset/unhappy. Since 2 episodes of pneumonia this month and suspicious mass spoke to CT surgeon, recommendations given, for now treatment for pneumonia and follow up with CT chest in 1 a month     ED findings include CXR and CT chest that demonstrate extensive infiltration of the left lung; cannot exclude underlying mass/neoplasm. Small bilateral effusions also noted. No PE. CBC stable; CMP stable.           As of today, 07/15/23  Review of Systems   Constitutional:  Negative for activity change, appetite change, chills, diaphoresis and fatigue.   HENT:  Negative for congestion, ear discharge, hearing loss, rhinorrhea, sinus pain, sneezing and sore throat.    Eyes:  Negative for photophobia, discharge and visual disturbance.   Respiratory:  Negative for cough, chest tightness, shortness of breath and wheezing.    Cardiovascular:  Negative for chest pain and palpitations.    Gastrointestinal:  Negative for abdominal distention, abdominal pain, blood in stool, diarrhea, nausea and vomiting.   Endocrine: Negative for cold intolerance, heat intolerance, polydipsia, polyphagia and polyuria.   Genitourinary:  Negative for dysuria, flank pain, hematuria and urgency.   Musculoskeletal:  Negative for arthralgias, joint swelling and myalgias.   Skin:  Negative for color change.   Allergic/Immunologic: Negative for food allergies.   Neurological:  Negative for dizziness, seizures, syncope, speech difficulty, weakness and headaches.   Hematological:  Negative for adenopathy. Does not bruise/bleed easily.   Psychiatric/Behavioral:  Negative for confusion, hallucinations, self-injury and suicidal ideas. The patient is not nervous/anxious.       All pertinent negatives and positives are as above. All other systems have been reviewed and are negative unless otherwise stated.    Objective    Temp:  [97.8 °F (36.6 °C)-99.6 °F (37.6 °C)] 97.8 °F (36.6 °C)  Heart Rate:  [66-80] 66  Resp:  [16-20] 20  BP: (140-172)/(77-92) 140/78    AM-PAC 6 Clicks Score (PT): 14 (07/14/23 0846)    As of today, 07/15/23  Physical Exam  Constitutional:       Appearance: Normal appearance.   HENT:      Head: Normocephalic and atraumatic.      Right Ear: Tympanic membrane normal.      Left Ear: Tympanic membrane normal.      Nose: Nose normal.      Mouth/Throat:      Mouth: Mucous membranes are moist.   Eyes:      Pupils: Pupils are equal, round, and reactive to light.   Cardiovascular:      Rate and Rhythm: Normal rate and regular rhythm.      Pulses: Normal pulses.      Heart sounds: Normal heart sounds.   Pulmonary:      Effort: Pulmonary effort is normal.      Breath sounds: Normal breath sounds.   Abdominal:      General: Abdomen is flat. Bowel sounds are normal.      Palpations: Abdomen is soft.   Musculoskeletal:         General: Normal range of motion.      Cervical back: Normal range of motion and neck supple.    Skin:     General: Skin is warm and dry.      Capillary Refill: Capillary refill takes less than 2 seconds.   Neurological:      General: No focal deficit present.      Mental Status: He is alert and oriented to person, place, and time.   Psychiatric:         Mood and Affect: Mood normal.         Behavior: Behavior normal.         Thought Content: Thought content normal.         Judgment: Judgment normal.         Results Review:  I have reviewed the labs, radiology results, and diagnostic studies.    Laboratory Data:   Results from last 7 days   Lab Units 07/15/23  0653 07/14/23  0629 07/13/23  0620 07/12/23  1252   SODIUM mmol/L 144 142 143 144   POTASSIUM mmol/L 3.4* 3.3* 3.3* 3.5   CHLORIDE mmol/L 107 106 106 105   CO2 mmol/L 29.0 28.0 28.0 33.0*   BUN mg/dL 13 18 20 21   CREATININE mg/dL 0.78 0.64* 0.62* 0.60*   GLUCOSE mg/dL 101* 131* 115* 86   CALCIUM mg/dL 8.3* 8.3* 7.9* 8.6   BILIRUBIN mg/dL 0.2  --   --  0.2   ALK PHOS U/L 137*  --   --  155*   ALT (SGPT) U/L 58*  --   --  92*   AST (SGOT) U/L 38  --   --  65*   ANION GAP mmol/L 8.0 8.0 9.0 6.0     Estimated Creatinine Clearance: 108.6 mL/min (by C-G formula based on SCr of 0.78 mg/dL).  Results from last 7 days   Lab Units 07/15/23  0653 07/14/23  0629   MAGNESIUM mg/dL 2.0 2.0   PHOSPHORUS mg/dL 3.1 2.3*         Results from last 7 days   Lab Units 07/15/23  0653 07/12/23  1118   WBC 10*3/mm3 8.41 9.18   HEMOGLOBIN g/dL 10.0* 10.6*   HEMATOCRIT % 30.3* 32.0*   PLATELETS 10*3/mm3 297 222           Culture Data:   Blood Culture   Date Value Ref Range Status   07/12/2023 No growth at 2 days  Preliminary   07/12/2023 No growth at 2 days  Preliminary     No results found for: URINECX  No results found for: RESPCX  No results found for: WOUNDCX  No results found for: STOOLCX  No components found for: BODYFLD    Radiology Data:   Imaging Results (Last 24 Hours)       ** No results found for the last 24 hours. **            I have utilized all available  immediate resources to obtain, update, or review the patient's current medications (including all prescriptions, over-the-counter products, herbals, cannabis/cannabidiol products, and vitamin/mineral/dietary (nutritional) supplements).       Assessment/Plan     Active Hospital Problems    Diagnosis     Pneumonia     Aspiration into airway     Anemia     Autism        Plan:    Assessment and plan:  1. Pneumonia of upper and lower left lung, can not rule out underlying mass              -IV Cefepime and vanco; will change based on MRSA PCR, DC vanco, risk of anaerobic organisms and previous antibiotic exposure, started meropenem 7/14/2023. Appreciate CT surgeon evaluation: pulmonary toilet, respiratory treatments in place. Concern had been for initial chest x-ray showing a left lung mass however on CT scan patient has multiple infiltrates throughout the left lung and at this point it is difficult to determine whether any of these represents a lung mass versus just normal pneumonia with consolidation. In view of this we continued medical management at this time and reevaluate with serial x-rays possible repeat CT scan in several days.                 2. Hypotension resolved, noted per group home reports; patient on midodrine TID; continue, monitor     3. Seizures: controles. Continue Keppra and Lamictal     4. GERD: continue Pepcid, Carafate     5. Schizophrenia/Behavioral Health: Continue Thioridazine. Last night agitated, received ativan, now sedated     6. Chronic malnutrition: continue tube feeding.      7. Anemia of chronic disease, to follow with PCP                 VTE Ppx: Lovenox subQ        Medical Decision Making  Number and Complexity of problems: 7  Differential Diagnosis: none    Conditions and Status:        Condition is unchanged.     [facility] Data  External documents reviewed: none  My EKG interpretation: normal  My CT interpretation: chest, left upper and lower lobe pneumonia  Tests considered but not  ordered: none     Decision rules/scores evaluated (example [id number], Wells, etc): not indicated     Discussed with: care giver Barbara     Treatment Plan  Pulmonary toilet  Delirium protocol  Am labs    Care Planning  Shared decision making: erik Grubbs   Code status and discussions: DNAR    Disposition  Social Determinants of Health that impact treatment or disposition: none  I expect the patient to be discharged to home group in 7 days.       I confirmed that the patient's Advance Care Plan is present, code status is documented, or surrogate decision maker is listed in the patient's medical record.      This document has been electronically signed by Donta Briseno MD on July 15, 2023 08:59 CDT

## 2023-07-15 NOTE — PLAN OF CARE
Goal Outcome Evaluation:  Plan of Care Reviewed With: patient, caregiver           Outcome Evaluation: PT Sabina completed for what he can tolerate. He walks at home ([facility]) on toes and at request but spends more time in w/chair as well. He will need to be able to transfer to go home per sitter. He has good AROM kyrie LEs but UEs held in flx/adduction at sides primarily; We tried sup to sit x 2 which needed mod-max assist of 2 but he responds by pushing back and leaning to L so he did not sustain sitting long; he was held semi sidelying between attempts to keep head/chest up and increase acclimation to being up but he did not initiate or try to maintain upright.  PT expects his upright sparkle is decreased w/ recent bedrest so we will work to increase upright sparkle  assist in mobiity and work toward transfers w/  min- CGA assist of staff to enable him to return home.  Goal is d/c to [facility] where he knows staff, setting etc as his highest level of d/c plan.      Anticipated Discharge Disposition (PT): other (see comments) (goal return to [facility])

## 2023-07-15 NOTE — PROGRESS NOTES
Because of previous exposure to broad spectrum antibiotics and risk of anaerobes will change cefepime to meropenem

## 2023-07-16 ENCOUNTER — APPOINTMENT (OUTPATIENT)
Dept: GENERAL RADIOLOGY | Facility: HOSPITAL | Age: 68
DRG: 178 | End: 2023-07-16
Payer: MEDICARE

## 2023-07-16 LAB
ANION GAP SERPL CALCULATED.3IONS-SCNC: 6 MMOL/L (ref 5–15)
BASOPHILS # BLD AUTO: 0.07 10*3/MM3 (ref 0–0.2)
BASOPHILS NFR BLD AUTO: 0.8 % (ref 0–1.5)
BUN SERPL-MCNC: 13 MG/DL (ref 8–23)
BUN/CREAT SERPL: 24.1 (ref 7–25)
CA-I BLD-MCNC: 4.51 MG/DL (ref 4.6–5.6)
CALCIUM SPEC-SCNC: 8.3 MG/DL (ref 8.6–10.5)
CHLORIDE SERPL-SCNC: 107 MMOL/L (ref 98–107)
CO2 SERPL-SCNC: 30 MMOL/L (ref 22–29)
CREAT SERPL-MCNC: 0.54 MG/DL (ref 0.76–1.27)
DEPRECATED RDW RBC AUTO: 46.6 FL (ref 37–54)
EGFRCR SERPLBLD CKD-EPI 2021: 109.9 ML/MIN/1.73
EOSINOPHIL # BLD AUTO: 0.39 10*3/MM3 (ref 0–0.4)
EOSINOPHIL NFR BLD AUTO: 4.4 % (ref 0.3–6.2)
ERYTHROCYTE [DISTWIDTH] IN BLOOD BY AUTOMATED COUNT: 14.4 % (ref 12.3–15.4)
GLUCOSE SERPL-MCNC: 102 MG/DL (ref 65–99)
HCT VFR BLD AUTO: 32.4 % (ref 37.5–51)
HGB BLD-MCNC: 10.5 G/DL (ref 13–17.7)
IMM GRANULOCYTES # BLD AUTO: 0.05 10*3/MM3 (ref 0–0.05)
IMM GRANULOCYTES NFR BLD AUTO: 0.6 % (ref 0–0.5)
LYMPHOCYTES # BLD AUTO: 0.72 10*3/MM3 (ref 0.7–3.1)
LYMPHOCYTES NFR BLD AUTO: 8.1 % (ref 19.6–45.3)
MAGNESIUM SERPL-MCNC: 2.1 MG/DL (ref 1.6–2.4)
MCH RBC QN AUTO: 28.8 PG (ref 26.6–33)
MCHC RBC AUTO-ENTMCNC: 32.4 G/DL (ref 31.5–35.7)
MCV RBC AUTO: 88.8 FL (ref 79–97)
MONOCYTES # BLD AUTO: 0.74 10*3/MM3 (ref 0.1–0.9)
MONOCYTES NFR BLD AUTO: 8.3 % (ref 5–12)
NEUTROPHILS NFR BLD AUTO: 6.95 10*3/MM3 (ref 1.7–7)
NEUTROPHILS NFR BLD AUTO: 77.8 % (ref 42.7–76)
NRBC BLD AUTO-RTO: 0 /100 WBC (ref 0–0.2)
PHOSPHATE SERPL-MCNC: 2.8 MG/DL (ref 2.5–4.5)
PLATELET # BLD AUTO: 282 10*3/MM3 (ref 140–450)
PMV BLD AUTO: 10.6 FL (ref 6–12)
POTASSIUM SERPL-SCNC: 3.6 MMOL/L (ref 3.5–5.2)
RBC # BLD AUTO: 3.65 10*6/MM3 (ref 4.14–5.8)
SODIUM SERPL-SCNC: 143 MMOL/L (ref 136–145)
WBC NRBC COR # BLD: 8.92 10*3/MM3 (ref 3.4–10.8)

## 2023-07-16 PROCEDURE — 25010000002 ENOXAPARIN PER 10 MG: Performed by: PHYSICIAN ASSISTANT

## 2023-07-16 PROCEDURE — 84100 ASSAY OF PHOSPHORUS: CPT

## 2023-07-16 PROCEDURE — 80048 BASIC METABOLIC PNL TOTAL CA: CPT

## 2023-07-16 PROCEDURE — 94799 UNLISTED PULMONARY SVC/PX: CPT

## 2023-07-16 PROCEDURE — 25010000002 SODIUM CHLORIDE 0.9 % WITH KCL 20 MEQ 20-0.9 MEQ/L-% SOLUTION

## 2023-07-16 PROCEDURE — 25010000002 MEROPENEM PER 100 MG

## 2023-07-16 PROCEDURE — 83735 ASSAY OF MAGNESIUM: CPT

## 2023-07-16 PROCEDURE — 71045 X-RAY EXAM CHEST 1 VIEW: CPT

## 2023-07-16 PROCEDURE — 94668 MNPJ CHEST WALL SBSQ: CPT

## 2023-07-16 PROCEDURE — 82330 ASSAY OF CALCIUM: CPT

## 2023-07-16 PROCEDURE — 99232 SBSQ HOSP IP/OBS MODERATE 35: CPT | Performed by: THORACIC SURGERY (CARDIOTHORACIC VASCULAR SURGERY)

## 2023-07-16 PROCEDURE — 85025 COMPLETE CBC W/AUTO DIFF WBC: CPT

## 2023-07-16 PROCEDURE — 94664 DEMO&/EVAL PT USE INHALER: CPT

## 2023-07-16 PROCEDURE — 94760 N-INVAS EAR/PLS OXIMETRY 1: CPT

## 2023-07-16 RX ORDER — QUETIAPINE FUMARATE 25 MG/1
12.5 TABLET, FILM COATED ORAL EVERY 12 HOURS SCHEDULED
Status: DISCONTINUED | OUTPATIENT
Start: 2023-07-16 | End: 2023-07-19 | Stop reason: HOSPADM

## 2023-07-16 RX ADMIN — SUCRALFATE 1 G: 1 TABLET ORAL at 22:12

## 2023-07-16 RX ADMIN — Medication 12.5 MG: at 14:45

## 2023-07-16 RX ADMIN — ACETYLCYSTEINE 4 ML: 100 SOLUTION ORAL; RESPIRATORY (INHALATION) at 19:38

## 2023-07-16 RX ADMIN — ACETYLCYSTEINE 4 ML: 100 SOLUTION ORAL; RESPIRATORY (INHALATION) at 07:47

## 2023-07-16 RX ADMIN — SUCRALFATE 1 G: 1 TABLET ORAL at 18:20

## 2023-07-16 RX ADMIN — Medication 12.5 MG: at 21:14

## 2023-07-16 RX ADMIN — MEROPENEM 1000 MG: 1 INJECTION, POWDER, FOR SOLUTION INTRAVENOUS at 22:12

## 2023-07-16 RX ADMIN — SUCRALFATE 1 G: 1 TABLET ORAL at 09:27

## 2023-07-16 RX ADMIN — Medication 1 TABLET: at 09:27

## 2023-07-16 RX ADMIN — IPRATROPIUM BROMIDE AND ALBUTEROL SULFATE 3 ML: .5; 3 SOLUTION RESPIRATORY (INHALATION) at 12:52

## 2023-07-16 RX ADMIN — MIDODRINE HYDROCHLORIDE 10 MG: 5 TABLET ORAL at 13:00

## 2023-07-16 RX ADMIN — IPRATROPIUM BROMIDE AND ALBUTEROL SULFATE 3 ML: .5; 3 SOLUTION RESPIRATORY (INHALATION) at 19:38

## 2023-07-16 RX ADMIN — LAMOTRIGINE 25 MG: 25 TABLET ORAL at 21:18

## 2023-07-16 RX ADMIN — FAMOTIDINE 20 MG: 20 TABLET, FILM COATED ORAL at 09:26

## 2023-07-16 RX ADMIN — ACETYLCYSTEINE 4 ML: 100 SOLUTION ORAL; RESPIRATORY (INHALATION) at 12:52

## 2023-07-16 RX ADMIN — ENOXAPARIN SODIUM 40 MG: 40 INJECTION SUBCUTANEOUS at 09:27

## 2023-07-16 RX ADMIN — THIORIDAZINE HYDROCHLORIDE 200 MG: 50 TABLET, FILM COATED ORAL at 21:13

## 2023-07-16 RX ADMIN — LEVETIRACETAM 1750 MG: 100 SOLUTION ORAL at 09:29

## 2023-07-16 RX ADMIN — IPRATROPIUM BROMIDE AND ALBUTEROL SULFATE 3 ML: .5; 3 SOLUTION RESPIRATORY (INHALATION) at 07:47

## 2023-07-16 RX ADMIN — GUAIFENESIN 600 MG: 600 TABLET, EXTENDED RELEASE ORAL at 09:27

## 2023-07-16 RX ADMIN — Medication 10 ML: at 09:28

## 2023-07-16 RX ADMIN — ATORVASTATIN CALCIUM 10 MG: 10 TABLET, FILM COATED ORAL at 09:27

## 2023-07-16 RX ADMIN — GUAIFENESIN 600 MG: 600 TABLET, EXTENDED RELEASE ORAL at 21:15

## 2023-07-16 RX ADMIN — LAMOTRIGINE 200 MG: 100 TABLET ORAL at 21:14

## 2023-07-16 RX ADMIN — MEROPENEM 1000 MG: 1 INJECTION, POWDER, FOR SOLUTION INTRAVENOUS at 09:29

## 2023-07-16 RX ADMIN — LAMOTRIGINE 200 MG: 100 TABLET ORAL at 09:28

## 2023-07-16 RX ADMIN — MIDODRINE HYDROCHLORIDE 10 MG: 5 TABLET ORAL at 09:27

## 2023-07-16 RX ADMIN — POTASSIUM CHLORIDE AND SODIUM CHLORIDE 75 ML/HR: 900; 150 INJECTION, SOLUTION INTRAVENOUS at 10:28

## 2023-07-16 RX ADMIN — Medication 1 TABLET: at 21:15

## 2023-07-16 RX ADMIN — LEVETIRACETAM 1750 MG: 100 SOLUTION ORAL at 21:16

## 2023-07-16 RX ADMIN — LAMOTRIGINE 25 MG: 25 TABLET ORAL at 09:29

## 2023-07-16 RX ADMIN — SUCRALFATE 1 G: 1 TABLET ORAL at 13:00

## 2023-07-16 RX ADMIN — THIORIDAZINE HYDROCHLORIDE 200 MG: 50 TABLET, FILM COATED ORAL at 09:26

## 2023-07-16 RX ADMIN — MEROPENEM 1000 MG: 1 INJECTION, POWDER, FOR SOLUTION INTRAVENOUS at 14:44

## 2023-07-16 RX ADMIN — FERROUS SULFATE TAB EC 324 MG (65 MG FE EQUIVALENT) 324 MG: 324 (65 FE) TABLET DELAYED RESPONSE at 09:27

## 2023-07-16 NOTE — SIGNIFICANT NOTE
07/16/23 1551   OTHER   Discipline occupational therapist   Rehab Time/Intention   Session Not Performed other (see comments)     OT eval attempted. Caregiver from [facility] report pt is dependent at baseline with ADLs. Caregiver reported pt easily becomes agitated when he is required do do things he does not want to do. Caregiver reported that pt is currently at baseline with ADLs and mobility with no requirements in order for him to return to [facility]. No IP OT services needed at this time. Will d/c current orders.

## 2023-07-16 NOTE — PLAN OF CARE
Goal Outcome Evaluation:  Plan of Care Reviewed With: patient, caregiver        Progress: no change  Outcome Evaluation: Patient restless this shift requiring prn ativan, VSS. Caregiver at bedside. Will monitor.

## 2023-07-16 NOTE — PROGRESS NOTES
CTVS DAILY NOTE     LOS: 4 days     Patient Care Team:  Bautista James MD as PCP - General (Family Medicine)    Chief Complaint: Cough difficulty breathing       History of Present Illness:  Patient is a 67 year old non-verbal male (PMHx Autism, HTN, HLD, COPD, GERD, Seizures, Chronic tube feeding) brought to [facility] ED for evaluation of persistent difficulty breathing and cough. Patient lives in a group home and has caregiver at bedside who provides history. Caregiver tells me that patient was recently admitted to the hospital in [place], [place] for management of pneumonia on July 6. He was discharged from that facility on July 10. Patient has continued to demonstrate cough, quick breathing, and sometimes seemed to have difficulty breathing. Low grade fevers have been noticed. Patient hasn't seemed bothered, but sleeps more when he has illness, such as now. He will grunt when he doesn't want to be bothered. Patient has history of seizures. He does wear a helmet when ambulatory due to not only the seizures, but also because he is known to suddenly drop to the floor if he becomes upset/unhappy.     ED findings include CXR and CT chest that demonstrate extensive infiltration of the left lung; cannot exclude underlying mass/neoplasm. Small bilateral effusions also noted. No PE. CBC stable; CMP stable.     Review of Systems not able to be performed patient is nonverbal, caregiver states regards to cough    Vital Signs    Temp:  [97 °F (36.1 °C)-99 °F (37.2 °C)] 97 °F (36.1 °C)  Heart Rate:  [63-98] 85  Resp:  [18-20] 20  BP: (119-167)/(76-84) 143/76  Body mass index is 26.06 kg/m².    Intake/Output Summary (Last 24 hours) at 7/16/2023 0943  Last data filed at 7/16/2023 0500  Gross per 24 hour   Intake 1711 ml   Output 1300 ml   Net 411 ml     No intake/output data recorded.    Wt Readings from Last 3 Encounters:   07/15/23 82.4 kg (181 lb 9.6 oz)   04/17/23 71.2 kg (157 lb)   03/02/23 68.5 kg (151 lb)  "      Objective:  General Appearance:  Comfortable and in no acute distress.    Vital signs: (most recent): Blood pressure 143/76, pulse 85, temperature 97 °F (36.1 °C), temperature source Temporal, resp. rate 20, height 177.8 cm (70\"), weight 82.4 kg (181 lb 9.6 oz), SpO2 96 %.  Vital signs are normal.    Output: Producing urine.    HEENT: Normal HEENT exam.    Lungs:  Normal effort.    Heart: Normal rate.    Abdomen: Abdomen is soft.    Extremities: Normal range of motion.    Pupils:  Pupils are equal, round, and reactive to light.    Skin:  Warm.                Results Review:      WBC No results found for: WBCS   HGB Hemoglobin   Date/Time Value Ref Range Status   07/16/2023 0709 10.5 (L) 13.0 - 17.7 g/dL Final   07/15/2023 0653 10.0 (L) 13.0 - 17.7 g/dL Final      HCT Hematocrit   Date/Time Value Ref Range Status   07/16/2023 0709 32.4 (L) 37.5 - 51.0 % Final   07/15/2023 0653 30.3 (L) 37.5 - 51.0 % Final      Platlets No results found for: LABPLAT     PT/INR:  No results found for: PROTIME/No results found for: INR    Sodium Sodium   Date/Time Value Ref Range Status   07/16/2023 0709 143 136 - 145 mmol/L Final   07/15/2023 0653 144 136 - 145 mmol/L Final   07/14/2023 0629 142 136 - 145 mmol/L Final      Potassium Potassium   Date/Time Value Ref Range Status   07/16/2023 0709 3.6 3.5 - 5.2 mmol/L Final   07/15/2023 0653 3.4 (L) 3.5 - 5.2 mmol/L Final   07/14/2023 0629 3.3 (L) 3.5 - 5.2 mmol/L Final      Chloride Chloride   Date/Time Value Ref Range Status   07/16/2023 0709 107 98 - 107 mmol/L Final   07/15/2023 0653 107 98 - 107 mmol/L Final   07/14/2023 0629 106 98 - 107 mmol/L Final      Bicarbonate No results found for: PLASMABICARB   BUN BUN   Date/Time Value Ref Range Status   07/16/2023 0709 13 8 - 23 mg/dL Final   07/15/2023 0653 13 8 - 23 mg/dL Final   07/14/2023 0629 18 8 - 23 mg/dL Final      Creatinine Creatinine   Date/Time Value Ref Range Status   07/16/2023 0709 0.54 (L) 0.76 - 1.27 mg/dL Final "   07/15/2023 0653 0.78 0.76 - 1.27 mg/dL Final   07/14/2023 0629 0.64 (L) 0.76 - 1.27 mg/dL Final      Calcium Calcium   Date/Time Value Ref Range Status   07/16/2023 0709 8.3 (L) 8.6 - 10.5 mg/dL Final   07/15/2023 0653 8.3 (L) 8.6 - 10.5 mg/dL Final   07/14/2023 0629 8.3 (L) 8.6 - 10.5 mg/dL Final      Magnesium Magnesium   Date/Time Value Ref Range Status   07/16/2023 0709 2.1 1.6 - 2.4 mg/dL Final   07/15/2023 0653 2.0 1.6 - 2.4 mg/dL Final   07/14/2023 0629 2.0 1.6 - 2.4 mg/dL Final        Imaging Results (Last 72 Hours)       ** No results found for the last 72 hours. **              acetylcysteine, 4 mL, Nebulization, Q6H While Awake - RT  atorvastatin, 10 mg, Oral, Daily  calcium carb-cholecalciferol, 1 tablet, Per G Tube, BID  enoxaparin, 40 mg, Subcutaneous, Daily  famotidine, 20 mg, Per G Tube, Daily  ferrous sulfate, 324 mg, Oral, Daily With Breakfast  guaiFENesin, 600 mg, Oral, Q12H  lamoTRIgine, 200 mg, Per G Tube, BID  lamoTRIgine, 25 mg, Per G Tube, BID  levETIRAcetam, 1,750 mg, Per G Tube, BID  meropenem, 1,000 mg, Intravenous, Q8H  midodrine, 10 mg, Per G Tube, TID AC  sodium chloride, 10 mL, Intravenous, Q12H  sucralfate, 1 g, Per G Tube, 4x Daily AC & at Bedtime  thioridazine, 200 mg, Per G Tube, BID      sodium chloride 0.9 % with KCl 20 mEq, 75 mL/hr, Last Rate: 75 mL/hr (07/15/23 2158)          Patient Active Problem List   Diagnosis Code    Syncope R55    Autism F84.0    COPD (chronic obstructive pulmonary disease) J44.9    GERD (gastroesophageal reflux disease) K21.9    Hyperlipidemia E78.5    Seizures R56.9    Orthostatic hypotension I95.1    Anemia D64.9    Gastrointestinal hemorrhage K92.2    Iron deficiency anemia due to chronic blood loss D50.0    UGIB (upper gastrointestinal bleed) K92.2    MRSA bacteremia R78.81, B95.62    Right upper lobe pneumonia J18.9    Bacteremia R78.81    Oropharyngeal dysphagia R13.12    Adenomatous polyp of colon D12.6    Aspiration into airway T17.908A     Pneumonia J18.9         Assessment & Plan    Autism    Anemia    Aspiration into airway    Pneumonia        Plan:  Care per hospitalist - antibiotics, pulmonary toilet, respiratory treatments in place  Concern had been for initial chest x-ray showing a left lung mass however on CT scan patient has multiple infiltrates throughout the left lung and at this point it is difficult to determine whether any of these represents a lung mass versus just normal pneumonia with consolidation.  In view of this we continued medical management at this time and reevaluate with serial x-rays possible repeat CT scan in several days.     We will follow as needed while in the hospital, thank you very much for allowing us to participate in the care of this patient.    Clarence Monahan MD  07/16/23  09:43 CDT

## 2023-07-16 NOTE — PROGRESS NOTES
[facility] Medicine Services  INPATIENT PROGRESS NOTE    Length of Stay: 4  Date of Admission: 7/12/2023  Primary Care Physician: Bautista James MD    Subjective   Chief Complaint: increased shortness of breath  HPI:   Patient is a 67 year old non-verbal male (PMHx Autism, HTN, HLD, COPD, GERD, Seizures, Chronic tube feeding) brought to [facility] ED for evaluation of persistent difficulty breathing and cough. Patient lives in a group home and has caregiver at bedside who provides history. Was recently admitted to the hospital in [place], [place] for management of pneumonia on July 6. He was discharged from that facility on July 10.  To communicate he  will grunt when he doesn't want to be bothered. Patient has history of seizures. He does wear a helmet when ambulatory due to not only the seizures, but also because he is known to suddenly drop to the floor if he becomes upset/unhappy. Since 2 episodes of pneumonia this month and suspicious mass spoke to CT surgeon, recommendations given, for now treatment for pneumonia and follow up with CT chest in 1 a month     ED findings include CXR and CT chest that demonstrate extensive infiltration of the left lung; cannot exclude underlying mass/neoplasm. Small bilateral effusions also noted. No PE. CBC stable; CMP stable.        As of today, 07/16/23  Review of Systems   Constitutional:  Negative for activity change, appetite change, chills, diaphoresis and fatigue.   HENT:  Negative for congestion, ear discharge, hearing loss, rhinorrhea, sinus pain, sneezing and sore throat.    Eyes:  Negative for photophobia, discharge and visual disturbance.   Respiratory:  Negative for cough, chest tightness, shortness of breath and wheezing.    Cardiovascular:  Negative for chest pain and palpitations.   Gastrointestinal:  Negative for abdominal distention, abdominal pain, blood in stool, diarrhea, nausea and vomiting.   Endocrine:  Negative for cold intolerance, heat intolerance, polydipsia, polyphagia and polyuria.   Genitourinary:  Negative for dysuria, flank pain, hematuria and urgency.   Musculoskeletal:  Negative for arthralgias, joint swelling and myalgias.   Skin:  Negative for color change.   Allergic/Immunologic: Negative for food allergies.   Neurological:  Negative for dizziness, seizures, syncope, speech difficulty, weakness and headaches.   Hematological:  Negative for adenopathy. Does not bruise/bleed easily.   Psychiatric/Behavioral:  Negative for confusion, hallucinations, self-injury and suicidal ideas. The patient is not nervous/anxious.       All pertinent negatives and positives are as above. All other systems have been reviewed and are negative unless otherwise stated.    Objective    Temp:  [97 °F (36.1 °C)-99 °F (37.2 °C)] 97 °F (36.1 °C)  Heart Rate:  [63-98] 77  Resp:  [18-20] 20  BP: (119-167)/(72-89) 126/72    AM-PAC 6 Clicks Score (PT): 6 (07/16/23 0927)    As of today, 07/16/23  Physical Exam  Constitutional:       Appearance: Normal appearance.   HENT:      Head: Normocephalic and atraumatic.      Right Ear: Tympanic membrane normal.      Left Ear: Tympanic membrane normal.      Nose: Nose normal.      Mouth/Throat:      Mouth: Mucous membranes are moist.   Eyes:      Pupils: Pupils are equal, round, and reactive to light.   Cardiovascular:      Rate and Rhythm: Normal rate and regular rhythm.      Pulses: Normal pulses.      Heart sounds: Normal heart sounds.   Pulmonary:      Effort: Pulmonary effort is normal.      Breath sounds: Normal breath sounds.   Abdominal:      General: Abdomen is flat. Bowel sounds are normal.      Palpations: Abdomen is soft.   Musculoskeletal:         General: Normal range of motion.      Cervical back: Normal range of motion and neck supple.   Skin:     General: Skin is warm and dry.      Capillary Refill: Capillary refill takes less than 2 seconds.   Neurological:      General: No  focal deficit present.      Mental Status: He is alert and oriented to person, place, and time.   Psychiatric:         Mood and Affect: Mood normal.         Behavior: Behavior normal.         Thought Content: Thought content normal.         Judgment: Judgment normal.         Results Review:  I have reviewed the labs, radiology results, and diagnostic studies.    Laboratory Data:   Results from last 7 days   Lab Units 07/16/23  0709 07/15/23  0653 07/14/23  0629 07/13/23  0620 07/12/23  1252   SODIUM mmol/L 143 144 142   < > 144   POTASSIUM mmol/L 3.6 3.4* 3.3*   < > 3.5   CHLORIDE mmol/L 107 107 106   < > 105   CO2 mmol/L 30.0* 29.0 28.0   < > 33.0*   BUN mg/dL 13 13 18   < > 21   CREATININE mg/dL 0.54* 0.78 0.64*   < > 0.60*   GLUCOSE mg/dL 102* 101* 131*   < > 86   CALCIUM mg/dL 8.3* 8.3* 8.3*   < > 8.6   BILIRUBIN mg/dL  --  0.2  --   --  0.2   ALK PHOS U/L  --  137*  --   --  155*   ALT (SGPT) U/L  --  58*  --   --  92*   AST (SGOT) U/L  --  38  --   --  65*   ANION GAP mmol/L 6.0 8.0 8.0   < > 6.0    < > = values in this interval not displayed.     Estimated Creatinine Clearance: 156.8 mL/min (A) (by C-G formula based on SCr of 0.54 mg/dL (L)).  Results from last 7 days   Lab Units 07/16/23  0709 07/15/23  0653 07/14/23  0629   MAGNESIUM mg/dL 2.1 2.0 2.0   PHOSPHORUS mg/dL 2.8 3.1 2.3*         Results from last 7 days   Lab Units 07/16/23  0709 07/15/23  0653 07/12/23  1118   WBC 10*3/mm3 8.92 8.41 9.18   HEMOGLOBIN g/dL 10.5* 10.0* 10.6*   HEMATOCRIT % 32.4* 30.3* 32.0*   PLATELETS 10*3/mm3 282 297 222           Culture Data:   No results found for: BLOODCX  No results found for: URINECX  No results found for: RESPCX  No results found for: WOUNDCX  No results found for: STOOLCX  No components found for: BODYFLD    Radiology Data:   Imaging Results (Last 24 Hours)       Procedure Component Value Units Date/Time    XR Chest 1 View [[id number]] Collected: 07/16/23 1133     Updated: 07/16/23 1206    Narrative:       INDICATION:  Follow-up pneumonia.    COMPARISON:  7/12/2023    FINDINGS:  Persistent pleural-based density on the left is seen.  Infiltrates appear to be  fairly stable from previous examination.  The right lung zone is clear.    SUMMARY:  No interval change.              I have utilized all available immediate resources to obtain, update, or review the patient's current medications (including all prescriptions, over-the-counter products, herbals, cannabis/cannabidiol products, and vitamin/mineral/dietary (nutritional) supplements).       Assessment/Plan     Active Hospital Problems    Diagnosis     Pneumonia     Aspiration into airway     Anemia     Autism        Plan:      Assessment and plan:  1. Pneumonia of upper and lower left lung, can not rule out underlying mass             -started meropenem 7/14/2023. Appreciate CT surgeon evaluation: pulmonary toilet, respiratory treatments in place. Concern had been for initial chest x-ray showing a left lung mass however on CT scan patient has multiple infiltrates throughout the left lung and at this point it is difficult to determine whether any of these represents a lung mass versus just normal pneumonia with consolidation. In view of this we continued medical management at this time and reevaluate with serial x-rays possible repeat CT scan in several days.                  2. Hypotension resolved, noted per group home reports; patient on midodrine TID; continue, monitor     3. Seizures: controles. Continue Keppra and Lamictal     4. GERD: continue Pepcid, Carafate     5. Schizophrenia/Behavioral Health: Continue Thioridazine. Has been with episodes of agitation, start delirium protocol and will get tomorrow a psych evaluation     6. Chronic malnutrition: continue tube feeding.      7. Anemia of chronic disease, to follow with PCP                 VTE Ppx: Lovenox subQ      Medical Decision Making  Number and Complexity of problems: 7  Differential Diagnosis:  none    Conditions and Status:        Condition is unchanged.     [facility] Data  External documents reviewed: none  My EKG interpretation: normal  My CT interpretation: chest left upper and lower lobe pneumonia  Tests considered but not ordered: none     Decision rules/scores evaluated (example [id number], Wells, etc): not indicated     Discussed with: care giver     Treatment Plan  Continue antibiotics, pulmonary toilet, chest PT  Delirium protocol    Care Planning  Shared decision making: brother  Code status and discussions: dnar    Disposition  Social Determinants of Health that impact treatment or disposition: none  I expect the patient to be discharged to home group in 6 days.       I confirmed that the patient's Advance Care Plan is present, code status is documented, or surrogate decision maker is listed in the patient's medical record.      This document has been electronically signed by Donta Briseno MD on July 16, 2023 13:11 CDT

## 2023-07-17 LAB
ANION GAP SERPL CALCULATED.3IONS-SCNC: 6 MMOL/L (ref 5–15)
BACTERIA SPEC AEROBE CULT: NORMAL
BACTERIA SPEC AEROBE CULT: NORMAL
BUN SERPL-MCNC: 11 MG/DL (ref 8–23)
BUN/CREAT SERPL: 16.2 (ref 7–25)
CALCIUM SPEC-SCNC: 9.1 MG/DL (ref 8.6–10.5)
CHLORIDE SERPL-SCNC: 103 MMOL/L (ref 98–107)
CO2 SERPL-SCNC: 33 MMOL/L (ref 22–29)
CREAT SERPL-MCNC: 0.68 MG/DL (ref 0.76–1.27)
EGFRCR SERPLBLD CKD-EPI 2021: 102.5 ML/MIN/1.73
GLUCOSE SERPL-MCNC: 103 MG/DL (ref 65–99)
MAGNESIUM SERPL-MCNC: 2 MG/DL (ref 1.6–2.4)
POTASSIUM SERPL-SCNC: 3.8 MMOL/L (ref 3.5–5.2)
SODIUM SERPL-SCNC: 142 MMOL/L (ref 136–145)
WHOLE BLOOD HOLD SPECIMEN: NORMAL

## 2023-07-17 PROCEDURE — 25010000002 ENOXAPARIN PER 10 MG: Performed by: PHYSICIAN ASSISTANT

## 2023-07-17 PROCEDURE — 83735 ASSAY OF MAGNESIUM: CPT | Performed by: PHYSICIAN ASSISTANT

## 2023-07-17 PROCEDURE — 94799 UNLISTED PULMONARY SVC/PX: CPT

## 2023-07-17 PROCEDURE — 25010000002 MEROPENEM PER 100 MG

## 2023-07-17 PROCEDURE — 94760 N-INVAS EAR/PLS OXIMETRY 1: CPT

## 2023-07-17 PROCEDURE — 25010000002 SODIUM CHLORIDE 0.9 % WITH KCL 20 MEQ 20-0.9 MEQ/L-% SOLUTION

## 2023-07-17 PROCEDURE — 80048 BASIC METABOLIC PNL TOTAL CA: CPT

## 2023-07-17 RX ADMIN — LEVETIRACETAM 1750 MG: 100 SOLUTION ORAL at 21:19

## 2023-07-17 RX ADMIN — LAMOTRIGINE 25 MG: 25 TABLET ORAL at 21:19

## 2023-07-17 RX ADMIN — SUCRALFATE 1 G: 1 TABLET ORAL at 21:18

## 2023-07-17 RX ADMIN — LEVETIRACETAM 1750 MG: 100 SOLUTION ORAL at 09:39

## 2023-07-17 RX ADMIN — LAMOTRIGINE 25 MG: 25 TABLET ORAL at 09:41

## 2023-07-17 RX ADMIN — POTASSIUM CHLORIDE AND SODIUM CHLORIDE 75 ML/HR: 900; 150 INJECTION, SOLUTION INTRAVENOUS at 00:45

## 2023-07-17 RX ADMIN — ENOXAPARIN SODIUM 40 MG: 40 INJECTION SUBCUTANEOUS at 09:38

## 2023-07-17 RX ADMIN — SUCRALFATE 1 G: 1 TABLET ORAL at 16:42

## 2023-07-17 RX ADMIN — MIDODRINE HYDROCHLORIDE 10 MG: 5 TABLET ORAL at 16:42

## 2023-07-17 RX ADMIN — Medication 1 TABLET: at 21:18

## 2023-07-17 RX ADMIN — THIORIDAZINE HYDROCHLORIDE 200 MG: 50 TABLET, FILM COATED ORAL at 09:42

## 2023-07-17 RX ADMIN — Medication 10 ML: at 21:19

## 2023-07-17 RX ADMIN — ACETYLCYSTEINE 4 ML: 100 SOLUTION ORAL; RESPIRATORY (INHALATION) at 07:59

## 2023-07-17 RX ADMIN — SUCRALFATE 1 G: 1 TABLET ORAL at 13:05

## 2023-07-17 RX ADMIN — Medication 1 TABLET: at 09:40

## 2023-07-17 RX ADMIN — GUAIFENESIN 600 MG: 600 TABLET, EXTENDED RELEASE ORAL at 09:41

## 2023-07-17 RX ADMIN — ATORVASTATIN CALCIUM 10 MG: 10 TABLET, FILM COATED ORAL at 09:40

## 2023-07-17 RX ADMIN — MIDODRINE HYDROCHLORIDE 10 MG: 5 TABLET ORAL at 09:39

## 2023-07-17 RX ADMIN — Medication 10 ML: at 05:40

## 2023-07-17 RX ADMIN — GUAIFENESIN 600 MG: 600 TABLET, EXTENDED RELEASE ORAL at 21:18

## 2023-07-17 RX ADMIN — MEROPENEM 1000 MG: 1 INJECTION, POWDER, FOR SOLUTION INTRAVENOUS at 05:40

## 2023-07-17 RX ADMIN — Medication 12.5 MG: at 21:18

## 2023-07-17 RX ADMIN — MIDODRINE HYDROCHLORIDE 10 MG: 5 TABLET ORAL at 13:06

## 2023-07-17 RX ADMIN — ACETAMINOPHEN 650 MG: 650 SOLUTION ORAL at 15:28

## 2023-07-17 RX ADMIN — LAMOTRIGINE 200 MG: 100 TABLET ORAL at 21:18

## 2023-07-17 RX ADMIN — Medication 12.5 MG: at 09:41

## 2023-07-17 RX ADMIN — FAMOTIDINE 20 MG: 20 TABLET, FILM COATED ORAL at 09:40

## 2023-07-17 RX ADMIN — FERROUS SULFATE TAB EC 324 MG (65 MG FE EQUIVALENT) 324 MG: 324 (65 FE) TABLET DELAYED RESPONSE at 09:39

## 2023-07-17 RX ADMIN — LAMOTRIGINE 200 MG: 100 TABLET ORAL at 09:39

## 2023-07-17 RX ADMIN — Medication 10 ML: at 09:41

## 2023-07-17 RX ADMIN — SUCRALFATE 1 G: 1 TABLET ORAL at 09:39

## 2023-07-17 RX ADMIN — IPRATROPIUM BROMIDE AND ALBUTEROL SULFATE 3 ML: .5; 3 SOLUTION RESPIRATORY (INHALATION) at 07:59

## 2023-07-17 RX ADMIN — THIORIDAZINE HYDROCHLORIDE 200 MG: 50 TABLET, FILM COATED ORAL at 21:18

## 2023-07-17 NOTE — PLAN OF CARE
Goal Outcome Evaluation:  Plan of Care Reviewed With: (P) caregiver        Progress: (P) improving  Outcome Evaluation: (P) Caregiver reported pt is tolerating TF well, no c/o of N/V, TF is at goal rate of 55mL/hr. Pt had a BM this morning. Pt has 1+ edema, caregiver reports that is usual for pt. #, BMI 26.06kg/m2. Admit wt 195#. Pt has a -7% wt loss since admit, not sure the accuracy since all wts are bed scales. RD staff will continue to monitor and follow hospital course.

## 2023-07-17 NOTE — PLAN OF CARE
Goal Outcome Evaluation:      Patients VSS stable. Monitoring on going. Patient more cooperative with care.

## 2023-07-17 NOTE — SIGNIFICANT NOTE
07/17/23 1309   OTHER   Discipline physical therapy assistant   Rehab Time/Intention   Session Not Performed other (see comments)  (RN advised no Tx)

## 2023-07-17 NOTE — PROGRESS NOTES
Adult Nutrition  Assessment/PES    Patient Name:  Bautista Grubbs  YOB: 1955  MRN: [id number]  Admit Date:  7/12/2023    Assessment Date:  7/17/2023    Comments:  F/U with pt, RD staff spoke with caregiver. Caregiver reported pt is tolerating TF well, no c/o of N/V, TF is at goal rate of 55mL/hr. Pt had a BM this morning. Pt has 1+ edema, caregiver reports that is usual for pt. #, BMI 26.06kg/m2. Admit wt 195#. Pt has a -7% wt loss since admit, not sure the accuracy since all wts are bed scales. RD staff will continue to monitor and follow hospital course.       Meds: Lipitor, Lovenox, Pepcid  Labs: Glu 107, Alb 2.5, ALT 58        Electronically signed by:  Ange Carvajal  07/17/23 13:03 CDT

## 2023-07-17 NOTE — PROGRESS NOTES
[facility] Medicine Services  INPATIENT PROGRESS NOTE      Length of Stay: 5  Date of Admission: 7/12/2023  Primary Care Physician: Bautista James MD    Subjective   Chief Complaint: Shortness of breath  HPI: Patient is nonverbal and cannot contribute to history.  Sitter at bedside notes that patient appears to be doing much better and acting more like his normal self.  No new concerns reported.  He was able to be weaned down to room air earlier today.    Review of Systems   All pertinent negatives and positives are as above. All other systems have been reviewed and are negative unless otherwise stated.     Objective    As of today 07/17/23  Temp:  [98.3 °F (36.8 °C)-99.4 °F (37.4 °C)] 99.4 °F (37.4 °C)  Heart Rate:  [] 108  Resp:  [16-18] 16  BP: (139-194)/(81-97) 139/97    Physical Exam  Constitutional:       General: He is not in acute distress.  HENT:      Head: Normocephalic and atraumatic.      Right Ear: External ear normal.      Left Ear: External ear normal.      Nose: Nose normal.      Mouth/Throat:      Pharynx: Oropharynx is clear.   Cardiovascular:      Rate and Rhythm: Normal rate and regular rhythm.      Heart sounds: Normal heart sounds.   Pulmonary:      Comments: Coarse diminished breath sounds bilaterally  Abdominal:      General: Bowel sounds are normal.      Palpations: Abdomen is soft.      Tenderness: There is no abdominal tenderness.   Skin:     General: Skin is warm and dry.      Capillary Refill: Capillary refill takes less than 2 seconds.   Neurological:      Mental Status: Mental status is at baseline.         Results Review:  I have reviewed the labs, radiology results, and diagnostic studies.    Laboratory Data:   Results from last 7 days   Lab Units 07/17/23  0612 07/16/23  0709 07/15/23  0653 07/13/23  0620 07/12/23  1252   SODIUM mmol/L 142 143 144   < > 144   POTASSIUM mmol/L 3.8 3.6 3.4*   < > 3.5   CHLORIDE mmol/L  103 107 107   < > 105   CO2 mmol/L 33.0* 30.0* 29.0   < > 33.0*   BUN mg/dL 11 13 13   < > 21   CREATININE mg/dL 0.68* 0.54* 0.78   < > 0.60*   GLUCOSE mg/dL 103* 102* 101*   < > 86   CALCIUM mg/dL 9.1 8.3* 8.3*   < > 8.6   BILIRUBIN mg/dL  --   --  0.2  --  0.2   ALK PHOS U/L  --   --  137*  --  155*   ALT (SGPT) U/L  --   --  58*  --  92*   AST (SGOT) U/L  --   --  38  --  65*   ANION GAP mmol/L 6.0 6.0 8.0   < > 6.0    < > = values in this interval not displayed.     Estimated Creatinine Clearance: 124.5 mL/min (A) (by C-G formula based on SCr of 0.68 mg/dL (L)).  Results from last 7 days   Lab Units 07/17/23  0612 07/16/23  0709 07/15/23  0653 07/14/23  0629   MAGNESIUM mg/dL 2.0 2.1 2.0 2.0   PHOSPHORUS mg/dL  --  2.8 3.1 2.3*         Results from last 7 days   Lab Units 07/16/23  0709 07/15/23  0653 07/12/23  1118   WBC 10*3/mm3 8.92 8.41 9.18   HEMOGLOBIN g/dL 10.5* 10.0* 10.6*   HEMATOCRIT % 32.4* 30.3* 32.0*   PLATELETS 10*3/mm3 282 297 222           Culture Data:   No results found for: BLOODCX  No results found for: URINECX  No results found for: RESPCX  No results found for: WOUNDCX  No results found for: STOOLCX  No components found for: BODYFLD    Radiology Data:   Imaging Results (Last 24 Hours)       ** No results found for the last 24 hours. **            I have reviewed the patient's current medications.     Assessment/Plan     Active Problems:    Autism    Anemia    Aspiration into airway    Pneumonia  Seizures  GERD  Schizophrenia    Plan:  - Continue to monitor for need for supplemental oxygen  - Continue home medications as appropriate  - For now we will plan on continue with meropenem today and consider transition to Augmentin per PEG tube may be tomorrow  - Continue tube feeds  - Okay to stop telemetry and IV fluids at this time  - Appreciate CT surgery help  - DVT prophylaxis with Lovenox  - CODE STATUS: DNR/DNI    Medical Decision Making  Number and Complexity of problems: 7 high  complexity    Conditions and Status:        Condition is improving.     MDM Data  Tests considered but not ordered: None     Decision rules/scores evaluated (example [id number], Wells, etc): None     Discussed with: Nursing     Treatment Plan  As above    Care Planning  Code status and discussions: DNR/DNI    Disposition  Social Determinants of Health that impact treatment or disposition: none  I expect the patient to be discharged to home in 1-2 days.       I have utilized all available immediate resources to obtain, update, or review the patient's current medications (including all prescriptions, over-the-counter products, herbals, cannabis/cannabidiol products, and vitamin/mineral/dietary (nutritional) supplements).   I confirmed that the patient's Advance Care Plan is present, code status is documented, or surrogate decision maker is listed in the patient's medical record.      Bautista Arredondo MD

## 2023-07-17 NOTE — PROGRESS NOTES
"CTVS DAILY NOTE     LOS: 5 days     Patient Care Team:  Bautista James MD as PCP - General (Family Medicine)    Chief Complaint: Cough difficulty breathing       History of Present Illness:  Patient is a 67 year old non-verbal male (PMHx Autism, HTN, HLD, COPD, GERD, Seizures, Chronic tube feeding) brought to [facility] ED for evaluation of persistent difficulty breathing and cough. Patient lives in a group home and has caregiver at bedside  Patient has history of seizures. He does wear a helmet when ambulatory due to not only the seizures, but also because he is known to suddenly drop to the floor if he becomes upset/unhappy.     ED findings include CXR and CT chest that demonstrate extensive infiltration of the left lung; cannot exclude underlying mass/neoplasm. Small bilateral effusions also noted. No PE. CBC stable; CMP stable.     Review of Systems not able to be performed patient is nonverbal, caregiver states regards to cough    Vital Signs    Temp:  [98.2 °F (36.8 °C)-99.4 °F (37.4 °C)] 99.4 °F (37.4 °C)  Heart Rate:  [] 81  Resp:  [16-20] 16  BP: (126-194)/(72-89) 139/82  Body mass index is 26.06 kg/m².    Intake/Output Summary (Last 24 hours) at 7/17/2023 1135  Last data filed at 7/17/2023 0500  Gross per 24 hour   Intake 1060 ml   Output 4000 ml   Net -2940 ml       No intake/output data recorded.    Wt Readings from Last 3 Encounters:   07/15/23 82.4 kg (181 lb 9.6 oz)   04/17/23 71.2 kg (157 lb)   03/02/23 68.5 kg (151 lb)       Objective:  General Appearance:  Comfortable and in no acute distress.    Vital signs: (most recent): Blood pressure 139/82, pulse 81, temperature 99.4 °F (37.4 °C), temperature source Temporal, resp. rate 16, height 177.8 cm (70\"), weight 82.4 kg (181 lb 9.6 oz), SpO2 94 %.  Vital signs are normal.    Output: Producing urine.    HEENT: Normal HEENT exam.    Lungs:  Normal effort.  There are decreased breath sounds.    Heart: Normal rate.    Abdomen: Abdomen is soft.  "   Extremities: Normal range of motion.    Pupils:  Pupils are equal, round, and reactive to light.    Skin:  Warm.                Results Review:      WBC No results found for: WBCS   HGB Hemoglobin   Date/Time Value Ref Range Status   07/16/2023 0709 10.5 (L) 13.0 - 17.7 g/dL Final   07/15/2023 0653 10.0 (L) 13.0 - 17.7 g/dL Final      HCT Hematocrit   Date/Time Value Ref Range Status   07/16/2023 0709 32.4 (L) 37.5 - 51.0 % Final   07/15/2023 0653 30.3 (L) 37.5 - 51.0 % Final      Platlets No results found for: LABPLAT     PT/INR:  No results found for: PROTIME/No results found for: INR    Sodium Sodium   Date/Time Value Ref Range Status   07/17/2023 0612 142 136 - 145 mmol/L Final   07/16/2023 0709 143 136 - 145 mmol/L Final   07/15/2023 0653 144 136 - 145 mmol/L Final      Potassium Potassium   Date/Time Value Ref Range Status   07/17/2023 0612 3.8 3.5 - 5.2 mmol/L Final   07/16/2023 0709 3.6 3.5 - 5.2 mmol/L Final   07/15/2023 0653 3.4 (L) 3.5 - 5.2 mmol/L Final      Chloride Chloride   Date/Time Value Ref Range Status   07/17/2023 0612 103 98 - 107 mmol/L Final   07/16/2023 0709 107 98 - 107 mmol/L Final   07/15/2023 0653 107 98 - 107 mmol/L Final      Bicarbonate No results found for: PLASMABICARB   BUN BUN   Date/Time Value Ref Range Status   07/17/2023 0612 11 8 - 23 mg/dL Final   07/16/2023 0709 13 8 - 23 mg/dL Final   07/15/2023 0653 13 8 - 23 mg/dL Final      Creatinine Creatinine   Date/Time Value Ref Range Status   07/17/2023 0612 0.68 (L) 0.76 - 1.27 mg/dL Final   07/16/2023 0709 0.54 (L) 0.76 - 1.27 mg/dL Final   07/15/2023 0653 0.78 0.76 - 1.27 mg/dL Final      Calcium Calcium   Date/Time Value Ref Range Status   07/17/2023 0612 9.1 8.6 - 10.5 mg/dL Final   07/16/2023 0709 8.3 (L) 8.6 - 10.5 mg/dL Final   07/15/2023 0653 8.3 (L) 8.6 - 10.5 mg/dL Final      Magnesium Magnesium   Date/Time Value Ref Range Status   07/17/2023 0612 2.0 1.6 - 2.4 mg/dL Final   07/16/2023 0709 2.1 1.6 - 2.4 mg/dL Final    07/15/2023 0653 2.0 1.6 - 2.4 mg/dL Final        Imaging Results (Last 72 Hours)       Procedure Component Value Units Date/Time    XR Chest 1 View [[id number]] Collected: 07/16/23 1133     Updated: 07/16/23 1510    Narrative:      INDICATION:  Follow-up pneumonia.    COMPARISON:  7/12/2023    FINDINGS:  Persistent pleural-based density on the left is seen.  Infiltrates appear to be  fairly stable from previous examination.  The right lung zone is clear.    SUMMARY:  No interval change.                atorvastatin, 10 mg, Oral, Daily  calcium carb-cholecalciferol, 1 tablet, Per G Tube, BID  enoxaparin, 40 mg, Subcutaneous, Daily  famotidine, 20 mg, Per G Tube, Daily  ferrous sulfate, 324 mg, Oral, Daily With Breakfast  guaiFENesin, 600 mg, Oral, Q12H  lamoTRIgine, 200 mg, Per G Tube, BID  lamoTRIgine, 25 mg, Per G Tube, BID  levETIRAcetam, 1,750 mg, Per G Tube, BID  meropenem, 1,000 mg, Intravenous, Q8H  midodrine, 10 mg, Per G Tube, TID AC  QUEtiapine, 12.5 mg, Oral, Q12H  sodium chloride, 10 mL, Intravenous, Q12H  sucralfate, 1 g, Per G Tube, 4x Daily AC & at Bedtime  thioridazine, 200 mg, Per G Tube, BID      sodium chloride 0.9 % with KCl 20 mEq, 75 mL/hr, Last Rate: 75 mL/hr (07/17/23 0045)          Patient Active Problem List   Diagnosis Code    Syncope R55    Autism F84.0    COPD (chronic obstructive pulmonary disease) J44.9    GERD (gastroesophageal reflux disease) K21.9    Hyperlipidemia E78.5    Seizures R56.9    Orthostatic hypotension I95.1    Anemia D64.9    Gastrointestinal hemorrhage K92.2    Iron deficiency anemia due to chronic blood loss D50.0    UGIB (upper gastrointestinal bleed) K92.2    MRSA bacteremia R78.81, B95.62    Right upper lobe pneumonia J18.9    Bacteremia R78.81    Oropharyngeal dysphagia R13.12    Adenomatous polyp of colon D12.6    Aspiration into airway T17.908A    Pneumonia J18.9         Assessment & Plan    Autism    Anemia    Aspiration into airway    Pneumonia         Plan:  Care per hospitalist - antibiotics, pulmonary toilet, respiratory treatments in place  Concern had been for initial chest x-ray showing a left lung mass however on CT scan patient has multiple infiltrates throughout the left lung and at this point it is difficult to determine whether any of these represents a lung mass versus just normal pneumonia with consolidation.  In view of this we continued medical management at this time, If no clinical improvement may consider repeat CT Chest after adequate medical therapy.      CT Surgery to sign off. Please call if needed.           This document has been electronically signed by GARRET Bermudez on July 17, 2023 11:38 CDT

## 2023-07-18 LAB
ANION GAP SERPL CALCULATED.3IONS-SCNC: 6 MMOL/L (ref 5–15)
BUN SERPL-MCNC: 15 MG/DL (ref 8–23)
BUN/CREAT SERPL: 18.5 (ref 7–25)
CALCIUM SPEC-SCNC: 9.5 MG/DL (ref 8.6–10.5)
CHLORIDE SERPL-SCNC: 101 MMOL/L (ref 98–107)
CO2 SERPL-SCNC: 34 MMOL/L (ref 22–29)
CREAT SERPL-MCNC: 0.81 MG/DL (ref 0.76–1.27)
EGFRCR SERPLBLD CKD-EPI 2021: 97.2 ML/MIN/1.73
GLUCOSE SERPL-MCNC: 105 MG/DL (ref 65–99)
MAGNESIUM SERPL-MCNC: 2.2 MG/DL (ref 1.6–2.4)
POTASSIUM SERPL-SCNC: 3.9 MMOL/L (ref 3.5–5.2)
SODIUM SERPL-SCNC: 141 MMOL/L (ref 136–145)

## 2023-07-18 PROCEDURE — 25010000002 ENOXAPARIN PER 10 MG: Performed by: PHYSICIAN ASSISTANT

## 2023-07-18 PROCEDURE — 80048 BASIC METABOLIC PNL TOTAL CA: CPT

## 2023-07-18 PROCEDURE — 83735 ASSAY OF MAGNESIUM: CPT | Performed by: HOSPITALIST

## 2023-07-18 RX ORDER — AMOXICILLIN AND CLAVULANATE POTASSIUM 600; 42.9 MG/5ML; MG/5ML
600 POWDER, FOR SUSPENSION ORAL EVERY 12 HOURS SCHEDULED
Status: DISCONTINUED | OUTPATIENT
Start: 2023-07-18 | End: 2023-07-19 | Stop reason: HOSPADM

## 2023-07-18 RX ADMIN — Medication 12.5 MG: at 09:24

## 2023-07-18 RX ADMIN — ENOXAPARIN SODIUM 40 MG: 40 INJECTION SUBCUTANEOUS at 09:26

## 2023-07-18 RX ADMIN — MIDODRINE HYDROCHLORIDE 10 MG: 5 TABLET ORAL at 09:25

## 2023-07-18 RX ADMIN — Medication 1 TABLET: at 09:25

## 2023-07-18 RX ADMIN — LAMOTRIGINE 25 MG: 25 TABLET ORAL at 20:27

## 2023-07-18 RX ADMIN — LEVETIRACETAM 1750 MG: 100 SOLUTION ORAL at 20:27

## 2023-07-18 RX ADMIN — SUCRALFATE 1 G: 1 TABLET ORAL at 11:21

## 2023-07-18 RX ADMIN — LAMOTRIGINE 25 MG: 25 TABLET ORAL at 09:26

## 2023-07-18 RX ADMIN — FAMOTIDINE 20 MG: 20 TABLET, FILM COATED ORAL at 09:24

## 2023-07-18 RX ADMIN — LEVETIRACETAM 1750 MG: 100 SOLUTION ORAL at 09:27

## 2023-07-18 RX ADMIN — LAMOTRIGINE 200 MG: 100 TABLET ORAL at 09:25

## 2023-07-18 RX ADMIN — ATORVASTATIN CALCIUM 10 MG: 10 TABLET, FILM COATED ORAL at 09:25

## 2023-07-18 RX ADMIN — SUCRALFATE 1 G: 1 TABLET ORAL at 20:26

## 2023-07-18 RX ADMIN — AMOXICILLIN AND CLAVULANATE POTASSIUM 600 MG: 600; 42.9 SUSPENSION ORAL at 11:21

## 2023-07-18 RX ADMIN — THIORIDAZINE HYDROCHLORIDE 200 MG: 50 TABLET, FILM COATED ORAL at 09:25

## 2023-07-18 RX ADMIN — Medication 1 TABLET: at 20:25

## 2023-07-18 RX ADMIN — GUAIFENESIN 600 MG: 600 TABLET, EXTENDED RELEASE ORAL at 20:25

## 2023-07-18 RX ADMIN — SUCRALFATE 1 G: 1 TABLET ORAL at 09:25

## 2023-07-18 RX ADMIN — MIDODRINE HYDROCHLORIDE 10 MG: 5 TABLET ORAL at 17:02

## 2023-07-18 RX ADMIN — FERROUS SULFATE TAB EC 324 MG (65 MG FE EQUIVALENT) 324 MG: 324 (65 FE) TABLET DELAYED RESPONSE at 09:24

## 2023-07-18 RX ADMIN — GUAIFENESIN 600 MG: 600 TABLET, EXTENDED RELEASE ORAL at 09:25

## 2023-07-18 RX ADMIN — AMOXICILLIN AND CLAVULANATE POTASSIUM 600 MG: 600; 42.9 SUSPENSION ORAL at 20:27

## 2023-07-18 RX ADMIN — MIDODRINE HYDROCHLORIDE 10 MG: 5 TABLET ORAL at 11:21

## 2023-07-18 RX ADMIN — THIORIDAZINE HYDROCHLORIDE 200 MG: 50 TABLET, FILM COATED ORAL at 20:26

## 2023-07-18 RX ADMIN — LAMOTRIGINE 200 MG: 100 TABLET ORAL at 20:25

## 2023-07-18 RX ADMIN — Medication 12.5 MG: at 20:25

## 2023-07-18 RX ADMIN — SUCRALFATE 1 G: 1 TABLET ORAL at 17:02

## 2023-07-18 NOTE — PLAN OF CARE
Goal Outcome Evaluation: patient has been awake most of the day, patient has refused vitals signs at times and IV. PO augmetin started. Feedings held at this time due to tube not wanting to stay in place. Patient tends to push out stomach and the feed tubing will pop off of the PEG tube. Patient sitter at bedside. Patient to discharge tomorrow. No acute events this shift.

## 2023-07-18 NOTE — PLAN OF CARE
Goal Outcome Evaluation: Pt in bed resting at this time. No s/s of pain or discomfort noted to be present this shift. No acute changes noted. VSS. No distress.

## 2023-07-18 NOTE — PROGRESS NOTES
[facility] Medicine Services  INPATIENT PROGRESS NOTE    Length of Stay: 6  Date of Admission: 7/12/2023  Primary Care Physician: Bautista James MD    Subjective   Chief Complaint: Cough, shortness of breath  HPI: Patient is nonverbal.  Appears comfortable.  Does not appear to be short of breath.    As of today, 07/18/23  Review of Systems   Unable to perform ROS: Patient nonverbal      All pertinent negatives and positives are as above. All other systems have been reviewed and are negative unless otherwise stated.    Objective    Temp:  [97.5 °F (36.4 °C)-98.1 °F (36.7 °C)] 97.5 °F (36.4 °C)  Heart Rate:  [78-97] 97  Resp:  [18-20] 20  BP: (123-156)/(87-95) 131/95    AM-PAC 6 Clicks Score (PT): 6 (07/16/23 0927)    As of today, 07/18/23  Physical Exam  Vitals reviewed.   Constitutional:       Appearance: He is well-developed.   HENT:      Head: Normocephalic and atraumatic.   Eyes:      Pupils: Pupils are equal, round, and reactive to light.   Cardiovascular:      Rate and Rhythm: Normal rate and regular rhythm.      Heart sounds: Normal heart sounds. No murmur heard.    No friction rub. No gallop.   Pulmonary:      Effort: Pulmonary effort is normal. No respiratory distress.      Breath sounds: Normal breath sounds. No wheezing or rales.   Chest:      Chest wall: No tenderness.   Abdominal:      General: Bowel sounds are normal. There is no distension.      Palpations: Abdomen is soft.      Tenderness: There is no abdominal tenderness.   Musculoskeletal:      Cervical back: Normal range of motion and neck supple.   Neurological:      Comments: Nonverbal   Psychiatric:         Behavior: Behavior normal.         Results Review:  I have reviewed the labs, radiology results, and diagnostic studies.    Laboratory Data:   Results from last 7 days   Lab Units 07/18/23  0550 07/17/23  0612 07/16/23  0709 07/15/23  0653 07/13/23  0620 07/12/23  1252   SODIUM mmol/L  141 142 143 144   < > 144   POTASSIUM mmol/L 3.9 3.8 3.6 3.4*   < > 3.5   CHLORIDE mmol/L 101 103 107 107   < > 105   CO2 mmol/L 34.0* 33.0* 30.0* 29.0   < > 33.0*   BUN mg/dL 15 11 13 13   < > 21   CREATININE mg/dL 0.81 0.68* 0.54* 0.78   < > 0.60*   GLUCOSE mg/dL 105* 103* 102* 101*   < > 86   CALCIUM mg/dL 9.5 9.1 8.3* 8.3*   < > 8.6   BILIRUBIN mg/dL  --   --   --  0.2  --  0.2   ALK PHOS U/L  --   --   --  137*  --  155*   ALT (SGPT) U/L  --   --   --  58*  --  92*   AST (SGOT) U/L  --   --   --  38  --  65*   ANION GAP mmol/L 6.0 6.0 6.0 8.0   < > 6.0    < > = values in this interval not displayed.     Estimated Creatinine Clearance: 104.6 mL/min (by C-G formula based on SCr of 0.81 mg/dL).  Results from last 7 days   Lab Units 07/18/23  0550 07/17/23  0612 07/16/23  0709 07/15/23  0653 07/14/23  0629   MAGNESIUM mg/dL 2.2 2.0 2.1 2.0 2.0   PHOSPHORUS mg/dL  --   --  2.8 3.1 2.3*         Results from last 7 days   Lab Units 07/16/23  0709 07/15/23  0653 07/12/23  1118   WBC 10*3/mm3 8.92 8.41 9.18   HEMOGLOBIN g/dL 10.5* 10.0* 10.6*   HEMATOCRIT % 32.4* 30.3* 32.0*   PLATELETS 10*3/mm3 282 297 222           Culture Data:   No results found for: BLOODCX  No results found for: URINECX  No results found for: RESPCX  No results found for: WOUNDCX  No results found for: STOOLCX  No components found for: BODYFLD    Radiology Data:   Imaging Results (Last 24 Hours)       ** No results found for the last 24 hours. **            I have utilized all available immediate resources to obtain, update, or review the patient's current medications (including all prescriptions, over-the-counter products, herbals, cannabis/cannabidiol products, and vitamin/mineral/dietary (nutritional) supplements).       Assessment/Plan     Active Hospital Problems    Diagnosis     Pneumonia     Aspiration into airway     Anemia     Autism        Plan:  1.  Pneumonia: Probable aspiration.  Per patient's caregiver, he continues to be less alert  than normal.  Continue antibiotics.  2.  Autism: Nonverbal.  3.  Seizure disorder: Continue current medication.  4.  Schizophrenia: Continue current medication.  5.  DVT prophylaxis: Lovenox.      Medical Decision Making  Number and Complexity of problems: 1 highly complex medical problems    Conditions and Status:        Condition is unchanged.     Discussed with: Nursing and patient's caregiver     Treatment Plan  As above    Care Planning  Shared decision making: Patient unable to participate in plan of care  Code status and discussions: DNR/DNI    Disposition  Social Determinants of Health that impact treatment or disposition: None  I expect the patient to be discharged to home in 1-2 days.       The patient was evaluated during the global COVID-19 pandemic, and the diagnosis was suspected/considered upon their initial presentation.  Evaluation, treatment, and testing were consistent with current guidelines for patients who present with complaints or symptoms that may be related to COVID-19.    I confirmed that the patient's Advance Care Plan is present, code status is documented, or surrogate decision maker is listed in the patient's medical record.        This document has been electronically signed by Vicente Spicer MD on July 18, 2023 15:02 CDT

## 2023-07-18 NOTE — PROGRESS NOTES
Enter Query Response Below      Query Response:   Aspiration Pneumonia due to other bacterial organism of unknown type     Electronically signed by Bautista Arredondo MD, 07/18/23, 3:18 PM CDT.             If applicable, please update the problem list.

## 2023-07-19 VITALS
DIASTOLIC BLOOD PRESSURE: 58 MMHG | HEART RATE: 80 BPM | HEIGHT: 70 IN | OXYGEN SATURATION: 93 % | BODY MASS INDEX: 26 KG/M2 | RESPIRATION RATE: 20 BRPM | TEMPERATURE: 97.3 F | WEIGHT: 181.6 LBS | SYSTOLIC BLOOD PRESSURE: 102 MMHG

## 2023-07-19 LAB
ANION GAP SERPL CALCULATED.3IONS-SCNC: 11 MMOL/L (ref 5–15)
BUN SERPL-MCNC: 24 MG/DL (ref 8–23)
BUN/CREAT SERPL: 27.6 (ref 7–25)
CALCIUM SPEC-SCNC: 9.5 MG/DL (ref 8.6–10.5)
CHLORIDE SERPL-SCNC: 99 MMOL/L (ref 98–107)
CO2 SERPL-SCNC: 29 MMOL/L (ref 22–29)
CREAT SERPL-MCNC: 0.87 MG/DL (ref 0.76–1.27)
EGFRCR SERPLBLD CKD-EPI 2021: 95.2 ML/MIN/1.73
GLUCOSE SERPL-MCNC: 115 MG/DL (ref 65–99)
POTASSIUM SERPL-SCNC: 4.3 MMOL/L (ref 3.5–5.2)
SODIUM SERPL-SCNC: 139 MMOL/L (ref 136–145)

## 2023-07-19 PROCEDURE — 80048 BASIC METABOLIC PNL TOTAL CA: CPT

## 2023-07-19 PROCEDURE — 25010000002 ENOXAPARIN PER 10 MG: Performed by: PHYSICIAN ASSISTANT

## 2023-07-19 RX ORDER — AMOXICILLIN AND CLAVULANATE POTASSIUM 600; 42.9 MG/5ML; MG/5ML
600 POWDER, FOR SUSPENSION ORAL EVERY 12 HOURS SCHEDULED
Qty: 55 ML | Refills: 0 | Status: SHIPPED | OUTPATIENT
Start: 2023-07-19 | End: 2023-07-25

## 2023-07-19 RX ADMIN — SUCRALFATE 1 G: 1 TABLET ORAL at 08:00

## 2023-07-19 RX ADMIN — LAMOTRIGINE 25 MG: 25 TABLET ORAL at 08:03

## 2023-07-19 RX ADMIN — LAMOTRIGINE 200 MG: 100 TABLET ORAL at 08:00

## 2023-07-19 RX ADMIN — Medication 1 TABLET: at 08:01

## 2023-07-19 RX ADMIN — LEVETIRACETAM 1750 MG: 100 SOLUTION ORAL at 08:01

## 2023-07-19 RX ADMIN — MIDODRINE HYDROCHLORIDE 10 MG: 5 TABLET ORAL at 11:03

## 2023-07-19 RX ADMIN — Medication 12.5 MG: at 08:00

## 2023-07-19 RX ADMIN — THIORIDAZINE HYDROCHLORIDE 200 MG: 50 TABLET, FILM COATED ORAL at 08:01

## 2023-07-19 RX ADMIN — AMOXICILLIN AND CLAVULANATE POTASSIUM 600 MG: 600; 42.9 SUSPENSION ORAL at 08:03

## 2023-07-19 RX ADMIN — GUAIFENESIN 600 MG: 600 TABLET, EXTENDED RELEASE ORAL at 08:00

## 2023-07-19 RX ADMIN — SUCRALFATE 1 G: 1 TABLET ORAL at 11:03

## 2023-07-19 RX ADMIN — ENOXAPARIN SODIUM 40 MG: 40 INJECTION SUBCUTANEOUS at 08:00

## 2023-07-19 RX ADMIN — MIDODRINE HYDROCHLORIDE 10 MG: 5 TABLET ORAL at 08:01

## 2023-07-19 RX ADMIN — FAMOTIDINE 20 MG: 20 TABLET, FILM COATED ORAL at 08:00

## 2023-07-19 RX ADMIN — ATORVASTATIN CALCIUM 10 MG: 10 TABLET, FILM COATED ORAL at 08:00

## 2023-07-19 RX ADMIN — FERROUS SULFATE TAB EC 324 MG (65 MG FE EQUIVALENT) 324 MG: 324 (65 FE) TABLET DELAYED RESPONSE at 08:00

## 2023-07-19 NOTE — PLAN OF CARE
Goal Outcome Evaluation: Pt in bed resting without difficulty. No s/s of pain or discomfort noted to be present this shift. No acute changes noted. VSS. No distress.

## 2023-07-19 NOTE — PROGRESS NOTES
Adult Nutrition  Assessment/PES    Patient Name:  Bautista Grubbs  YOB: 1955  MRN: [id number]  Admit Date:  7/12/2023    Assessment Date:  7/19/2023    Comments:  F/U with pt. Caregiver at bedside. No c/o of N/V/D, some crackling in pt throat, caregiver reports no coughing. Pt has 1+ edema in lower legs. #, BMI 26.06kg/m2. Admit wt 195#; -7% wt loss; caregiver reports wt fluctuation is normal for pt. Caregiver states no complaints or concerns. Pt is discharging today.    Labs: Glu 115, BUN 24, Alb 2.5, ALT 58    Meds: Lovenox, Lipitor, keppra, ProAmatine              Electronically signed by:  Ange Carvajal  07/19/23 11:46 CDT

## 2023-07-19 NOTE — DISCHARGE SUMMARY
[facility] Medicine Services  DISCHARGE SUMMARY       Date of Admission: 7/12/2023  Date of Discharge:  7/19/2023  Primary Care Physician: Bautista James MD    Presenting Problem/History of Present Illness:  Pneumonia [J18.9]  Pneumonia of left lung due to infectious organism, unspecified part of lung [J18.9]       Final Discharge Diagnoses:  Active Hospital Problems    Diagnosis     Pneumonia     Aspiration into airway     Anemia     Autism        Consults:   Consults       Date and Time Order Name Status Description    7/14/2023  5:14 PM Inpatient Cardiothoracic Surgery Consult Completed           Pertinent Test Results:   Lab Results (most recent)       Procedure Component Value Units Date/Time    Basic Metabolic Panel [[id number]]  (Abnormal) Collected: 07/19/23 0612    Specimen: Blood Updated: 07/19/23 0648     Glucose 115 mg/dL      BUN 24 mg/dL      Creatinine 0.87 mg/dL      Sodium 139 mmol/L      Potassium 4.3 mmol/L      Chloride 99 mmol/L      CO2 29.0 mmol/L      Calcium 9.5 mg/dL      BUN/Creatinine Ratio 27.6     Anion Gap 11.0 mmol/L      eGFR 95.2 mL/min/1.73     Narrative:      GFR Normal >60  Chronic Kidney Disease <60  Kidney Failure <15      Magnesium [[id number]]  (Normal) Collected: 07/18/23 0550    Specimen: Blood Updated: 07/18/23 1126     Magnesium 2.2 mg/dL     Basic Metabolic Panel [[id number]]  (Abnormal) Collected: 07/18/23 0550    Specimen: Blood Updated: 07/18/23 0629     Glucose 105 mg/dL      BUN 15 mg/dL      Creatinine 0.81 mg/dL      Sodium 141 mmol/L      Potassium 3.9 mmol/L      Chloride 101 mmol/L      CO2 34.0 mmol/L      Calcium 9.5 mg/dL      BUN/Creatinine Ratio 18.5     Anion Gap 6.0 mmol/L      eGFR 97.2 mL/min/1.73     Narrative:      GFR Normal >60  Chronic Kidney Disease <60  Kidney Failure <15      Blood Culture - Blood, Arm, Right [[id number]]  (Normal) Collected: 07/12/23 1630    Specimen: Blood from Arm, Right  Updated: 07/17/23 1700     Blood Culture No growth at 5 days    Blood Culture - Blood, Arm, Left [[id number]]  (Normal) Collected: 07/12/23 1526    Specimen: Blood from Arm, Left Updated: 07/17/23 1545     Blood Culture No growth at 5 days    Magnesium [[id number]]  (Normal) Collected: 07/17/23 0612    Specimen: Blood Updated: 07/17/23 0931     Magnesium 2.0 mg/dL     Extra Tubes [[id number]] Collected: 07/17/23 0612    Specimen: Blood, Venous Line Updated: 07/17/23 0715    Narrative:      The following orders were created for panel order Extra Tubes.  Procedure                               Abnormality         Status                     ---------                               -----------         ------                     Lavender Top[[id number]]                                     Final result                 Please view results for these tests on the individual orders.    Lavender Top [[id number]] Collected: 07/17/23 0612    Specimen: Blood Updated: 07/17/23 0715     Extra Tube hold for add-on     Comment: Auto resulted       Phosphorus [[id number]]  (Normal) Collected: 07/16/23 0709    Specimen: Blood Updated: 07/16/23 0734     Phosphorus 2.8 mg/dL     Calcium, Ionized [[id number]]  (Abnormal) Collected: 07/16/23 0709    Specimen: Blood Updated: 07/16/23 0717     Ionized Calcium 4.51 mg/dL     CBC & Differential [[id number]]  (Abnormal) Collected: 07/16/23 0709    Specimen: Blood Updated: 07/16/23 0716    Narrative:      The following orders were created for panel order CBC & Differential.  Procedure                               Abnormality         Status                     ---------                               -----------         ------                     CBC Auto Differential[[id number]]        Abnormal            Final result                 Please view results for these tests on the individual orders.    CBC Auto Differential [[id number]]  (Abnormal) Collected: 07/16/23 0709    Specimen: Blood Updated: 07/16/23  0716     WBC 8.92 10*3/mm3      RBC 3.65 10*6/mm3      Hemoglobin 10.5 g/dL      Hematocrit 32.4 %      MCV 88.8 fL      MCH 28.8 pg      MCHC 32.4 g/dL      RDW 14.4 %      RDW-SD 46.6 fl      MPV 10.6 fL      Platelets 282 10*3/mm3      Neutrophil % 77.8 %      Lymphocyte % 8.1 %      Monocyte % 8.3 %      Eosinophil % 4.4 %      Basophil % 0.8 %      Immature Grans % 0.6 %      Neutrophils, Absolute 6.95 10*3/mm3      Lymphocytes, Absolute 0.72 10*3/mm3      Monocytes, Absolute 0.74 10*3/mm3      Eosinophils, Absolute 0.39 10*3/mm3      Basophils, Absolute 0.07 10*3/mm3      Immature Grans, Absolute 0.05 10*3/mm3      nRBC 0.0 /100 WBC     Phosphorus [[id number]]  (Normal) Collected: 07/15/23 0653    Specimen: Blood Updated: 07/15/23 0754     Phosphorus 3.1 mg/dL     Comprehensive Metabolic Panel [[id number]]  (Abnormal) Collected: 07/15/23 0653    Specimen: Blood Updated: 07/15/23 0745     Glucose 101 mg/dL      BUN 13 mg/dL      Creatinine 0.78 mg/dL      Sodium 144 mmol/L      Potassium 3.4 mmol/L      Chloride 107 mmol/L      CO2 29.0 mmol/L      Calcium 8.3 mg/dL      Total Protein 6.4 g/dL      Albumin 2.5 g/dL      ALT (SGPT) 58 U/L      AST (SGOT) 38 U/L      Alkaline Phosphatase 137 U/L      Total Bilirubin 0.2 mg/dL      Globulin 3.9 gm/dL      A/G Ratio 0.6 g/dL      BUN/Creatinine Ratio 16.7     Anion Gap 8.0 mmol/L      eGFR 98.4 mL/min/1.73     Narrative:      GFR Normal >60  Chronic Kidney Disease <60  Kidney Failure <15      CBC & Differential [[id number]]  (Abnormal) Collected: 07/15/23 0653    Specimen: Blood Updated: 07/15/23 0721    Narrative:      The following orders were created for panel order CBC & Differential.  Procedure                               Abnormality         Status                     ---------                               -----------         ------                     CBC Auto Differential[[id number]]        Abnormal            Final result                 Please view results  for these tests on the individual orders.    CBC Auto Differential [[id number]]  (Abnormal) Collected: 07/15/23 0653    Specimen: Blood Updated: 07/15/23 0721     WBC 8.41 10*3/mm3      RBC 3.44 10*6/mm3      Hemoglobin 10.0 g/dL      Hematocrit 30.3 %      MCV 88.1 fL      MCH 29.1 pg      MCHC 33.0 g/dL      RDW 14.6 %      RDW-SD 46.8 fl      MPV 11.0 fL      Platelets 297 10*3/mm3      Neutrophil % 76.0 %      Lymphocyte % 9.3 %      Monocyte % 10.6 %      Eosinophil % 3.0 %      Basophil % 0.6 %      Immature Grans % 0.5 %      Neutrophils, Absolute 6.40 10*3/mm3      Lymphocytes, Absolute 0.78 10*3/mm3      Monocytes, Absolute 0.89 10*3/mm3      Eosinophils, Absolute 0.25 10*3/mm3      Basophils, Absolute 0.05 10*3/mm3      Immature Grans, Absolute 0.04 10*3/mm3      nRBC 0.0 /100 WBC     Calcium, Ionized [[id number]]  (Abnormal) Collected: 07/15/23 0653    Specimen: Blood Updated: 07/15/23 0720     Ionized Calcium 4.43 mg/dL     Extra Tubes [[id number]] Collected: 07/14/23 1844    Specimen: Blood, Venous Line Updated: 07/14/23 1945    Narrative:      The following orders were created for panel order Extra Tubes.  Procedure                               Abnormality         Status                     ---------                               -----------         ------                     Green Top (Gel)[[id number]]                                  Final result               Green Top (Gel)[[id number]]                                  Final result                 Please view results for these tests on the individual orders.    Green Top (Gel) [[id number]] Collected: 07/14/23 1844    Specimen: Blood Updated: 07/14/23 1945     Extra Tube Hold for add-ons.     Comment: Auto resulted.       Green Top (Gel) [[id number]] Collected: 07/14/23 1844    Specimen: Blood Updated: 07/14/23 1945     Extra Tube Hold for add-ons.     Comment: Auto resulted.       Blood Gas, Arterial - [[id number]]  (Abnormal) Collected: 07/14/23 1820     Specimen: Arterial Blood Updated: 07/14/23 1817     Site Left Radial     Otoniel's Test N/A     pH, Arterial 7.512 pH units      Comment: 83 Value above reference range        pCO2, Arterial 38.4 mm Hg      pO2, Arterial 62.3 mm Hg      Comment: 84 Value below reference range        HCO3, Arterial 30.8 mmol/L      Comment: 83 Value above reference range        Base Excess, Arterial 7.3 mmol/L      Comment: 83 Value above reference range        O2 Saturation, Arterial 93.4 %      Comment: 84 Value below reference range        Barometric Pressure for Blood Gas 746 mmHg      Modality Nasal Cannula     Flow Rate 3.0 lpm      Ventilator Mode NA     Collected by GANGA Ellis     Comment: Meter: [id number]     :  [id number]       Lavender Top [[id number]] Collected: 07/14/23 0629    Specimen: Blood Updated: 07/14/23 0731     Extra Tube hold for add-on     Comment: Auto resulted       MRSA Screen, PCR (Inpatient) - Swab, Nares [[id number]]  (Normal) Collected: 07/12/23 2357    Specimen: Swab from Nares Updated: 07/13/23 0116     MRSA, PCR Negative    Narrative:      Performed by real-time polymerase chain reaction (qPCR).  The negative predictive value of this diagnostic test is high and should only be used to consider de-escalating anti-MRSA therapy. A positive result may indicate colonization with MRSA and must be correlated clinically.    S. Pneumo Ag Urine or CSF - Urine, Urine, Clean Catch [[id number]]  (Normal) Collected: 07/12/23 2357    Specimen: Urine, Clean Catch Updated: 07/13/23 0021     Strep Pneumo Ag Negative    Legionella Antigen, Urine - Urine, Urine, Clean Catch [[id number]]  (Normal) Collected: 07/12/23 2357    Specimen: Urine, Clean Catch Updated: 07/13/23 0021     LEGIONELLA ANTIGEN, URINE Negative    Gray Top [[id number]] Collected: 07/12/23 1252    Specimen: Blood Updated: 07/12/23 1700     Extra Tube Hold for add-ons.     Comment: Auto resulted.       Blood Gas, Arterial - [[id number]]  (Abnormal)  Collected: 07/12/23 1653    Specimen: Arterial Blood Updated: 07/12/23 1652     Site Left Radial     Otoniel's Test N/A     pH, Arterial 7.476 pH units      Comment: 83 Value above reference range        pCO2, Arterial 43.6 mm Hg      pO2, Arterial 52.2 mm Hg      Comment: 85 Value below critical limit        HCO3, Arterial 32.2 mmol/L      Comment: 83 Value above reference range        Base Excess, Arterial 7.7 mmol/L      Comment: 83 Value above reference range        O2 Saturation, Arterial 88.9 %      Comment: 84 Value below reference range        Barometric Pressure for Blood Gas 747 mmHg      Modality Room Air     Ventilator Mode NA     Collected by tosha     Comment: Meter: [id number]     :  [id number]       Smith Top [[id number]] Collected: 07/12/23 1118    Specimen: Blood Updated: 07/12/23 1531     Extra Tube Hold for add-ons.     Comment: Auto resulted.       Gold Top - SST [[id number]] Collected: 07/12/23 1252    Specimen: Blood Updated: 07/12/23 1400     Extra Tube Hold for add-ons.     Comment: Auto resulted.       Single High Sensitivity Troponin T [[id number]]  (Normal) Collected: 07/12/23 1252    Specimen: Blood Updated: 07/12/23 1316     HS Troponin T 10 ng/L     Narrative:      High Sensitive Troponin T Reference Range:  <10.0 ng/L- Negative Female for AMI  <15.0 ng/L- Negative Male for AMI  >=10 - Abnormal Female indicating possible myocardial injury.  >=15 - Abnormal Male indicating possible myocardial injury.   Clinicians would have to utilize clinical acumen, EKG, Troponin, and serial changes to determine if it is an Acute Myocardial Infarction or myocardial injury due to an underlying chronic condition.         BNP [[id number]]  (Normal) Collected: 07/12/23 1252    Specimen: Blood Updated: 07/12/23 1316     proBNP 225.3 pg/mL     Narrative:      Among patients with dyspnea, NT-proBNP is highly sensitive for the detection of acute congestive heart failure. In addition NT-proBNP of <300 pg/ml  effectively rules out acute congestive heart failure with 99% negative predictive value.    Results may be falsely decreased if patient taking Biotin.      Comprehensive Metabolic Panel [[id number]]  (Abnormal) Collected: 07/12/23 1252    Specimen: Blood Updated: 07/12/23 1313     Glucose 86 mg/dL      BUN 21 mg/dL      Creatinine 0.60 mg/dL      Sodium 144 mmol/L      Potassium 3.5 mmol/L      Chloride 105 mmol/L      CO2 33.0 mmol/L      Calcium 8.6 mg/dL      Total Protein 6.9 g/dL      Albumin 2.8 g/dL      ALT (SGPT) 92 U/L      AST (SGOT) 65 U/L      Alkaline Phosphatase 155 U/L      Total Bilirubin 0.2 mg/dL      Globulin 4.1 gm/dL      A/G Ratio 0.7 g/dL      BUN/Creatinine Ratio 35.0     Anion Gap 6.0 mmol/L      eGFR 106.5 mL/min/1.73     Narrative:      GFR Normal >60  Chronic Kidney Disease <60  Kidney Failure <15      Rudyard Draw [[id number]] Collected: 07/12/23 1118    Specimen: Blood Updated: 07/12/23 1246    Narrative:      The following orders were created for panel order Rudyard Draw.  Procedure                               Abnormality         Status                     ---------                               -----------         ------                     Green Top (Gel)[[id number]]                                  Final result               Lavender Top[[id number]]                                     Final result               Gold Top - SST[[id number]]                                   Final result               Light Blue Top[[id number]]                                   Final result                 Please view results for these tests on the individual orders.    Light Blue Top [[id number]] Collected: 07/12/23 1118    Specimen: Blood Updated: 07/12/23 1246     Extra Tube Hold for add-ons.     Comment: Auto resulted       Gold Top - SST [[id number]] Collected: 07/12/23 1118    Specimen: Blood Updated: 07/12/23 1230     Extra Tube Hold for add-ons.     Comment: Auto resulted.             Imaging Results  (Most Recent)       Procedure Component Value Units Date/Time    XR Chest 1 View [[id number]] Collected: 07/16/23 1133     Updated: 07/16/23 1510    Narrative:      INDICATION:  Follow-up pneumonia.    COMPARISON:  7/12/2023    FINDINGS:  Persistent pleural-based density on the left is seen.  Infiltrates appear to be  fairly stable from previous examination.  The right lung zone is clear.    SUMMARY:  No interval change.      CT Angiogram Chest [[id number]] Collected: 07/12/23 1434     Updated: 07/12/23 1651    Narrative:      HISTORY:  The patient has a history of a lung mass with respiratory failure.    EXAMINATION PERFORMED:  CT angiography of the chest.    TECHNIQUE:  IV contrast was administered and axial images from the thoracic inlet through  the diaphragms were performed.  3D multiplanar reformats of the thoracic aorta  were completed on a separate workstation under concurrent supervision.    COMPARISON:  4/11/2022.    FINDINGS:  There is extensive infiltration involving the left upper and left lower lobes.  An underlying mass lesion is difficult to exclude at this time.  There is a  small left-sided pleural effusion.  There is a trace of right-sided pleural  effusion.    No large central pulmonary embolus is identified. Evaluation of the lower lobe  pulmonary arterial branches is limited secondary to motion artifact.      Impression:      1. Extensive infiltration of the left lung.  An underlying mass or neoplasm is  difficult to exclude given the degree of consolidation of the left lung.  Correlation with the clinical findings is needed.    2. Small left-sided pleural effusion and trace right-sided pleural effusion.    3. No large central pulmonary embolus is identified. Evaluation of the lower  lobe pulmonary arterial branches is limited secondary to motion artifact.    XR Chest 1 View [[id number]] Collected: 07/12/23 1132     Updated: 07/12/23 1140    Narrative:      FINDINGS:  An AP view of the chest shows  "soft tissue nodularity in the left upper lung  field.  This is highly suspect for malignant disease.  There is also some  nodular infiltrate within the left lower chest.  There are no prior films for  comparison.  I would recommend CT examination of the chest with contrast to  further evaluate what may very likely represent underlying malignant disease  within the left upper lung field.    No other significant finding.            Chief Complaint on Day of Discharge: Patient nonverbal.  Appears comfortable.    Hospital Course:  The patient is a 67 y.o. male who presented to [facility] with shortness of breath and cough.  Patient was found to have pneumonia most likely secondary to aspiration.  Broad-spectrum IV antibiotics were started.  Patient had acute hypoxic respiratory failure secondary to pneumonia and initially required supplemental oxygen.  This was weaned off and patient did well.  He was continued on home medications and home tube feedings.  He continued to improve and his mentation returned to baseline per caregivers from his group home.  He was discharged in hemodynamically stable condition.    Condition on Discharge: Hemodynamically stable    Physical Exam on Discharge:  /58 (BP Location: Right arm, Patient Position: Lying)   Pulse 80   Temp 97.3 °F (36.3 °C) (Infrared)   Resp 20   Ht 177.8 cm (70\") Comment: aprox  Wt 82.4 kg (181 lb 9.6 oz)   SpO2 93%   BMI 26.06 kg/m²   Physical Exam  Vitals reviewed.   Constitutional:       Appearance: He is well-developed.   HENT:      Head: Normocephalic and atraumatic.   Eyes:      Pupils: Pupils are equal, round, and reactive to light.   Cardiovascular:      Rate and Rhythm: Normal rate and regular rhythm.      Heart sounds: Normal heart sounds. No murmur heard.    No friction rub. No gallop.   Pulmonary:      Effort: Pulmonary effort is normal. No respiratory distress.      Breath sounds: Normal breath sounds. No wheezing or rales. "   Chest:      Chest wall: No tenderness.   Abdominal:      General: Bowel sounds are normal. There is no distension.      Palpations: Abdomen is soft.      Tenderness: There is no abdominal tenderness.   Musculoskeletal:      Cervical back: Normal range of motion and neck supple.   Neurological:      Mental Status: Mental status is at baseline.      Comments: Nonverbal   Psychiatric:         Behavior: Behavior normal.         Discharge Disposition:  Home or Self Care    Discharge Medications:     Discharge Medications        New Medications        Instructions Start Date   amoxicillin-clavulanate 600-42.9 MG/5ML suspension  Commonly known as: AUGMENTIN   600 mg, Oral, Every 12 Hours Scheduled             Continue These Medications        Instructions Start Date   albuterol (2.5 MG/3ML) 0.083% nebulizer solution  Commonly known as: PROVENTIL  Notes to patient: As directed   No dose, route, or frequency recorded.      albuterol sulfate  (90 Base) MCG/ACT inhaler  Commonly known as: PROVENTIL HFA;VENTOLIN HFA;PROAIR HFA   2 puffs, Inhalation, Every 4 Hours PRN      calcium carbonate-vitamin d 600-400 MG-UNIT per tablet   1 tablet, Oral, 2 Times Daily, Per G-Tube      DIAZEPAM RE   10 mg, Rectal, As Needed      famotidine 20 MG tablet  Commonly known as: PEPCID   20 mg, Oral, Daily, Per G-Tube       ferrous sulfate 325 (65 FE) MG tablet   325 mg, Oral, Daily With Breakfast, Per G-Tube      ibuprofen 600 MG tablet  Commonly known as: ADVIL,MOTRIN   600 mg, Oral, Every 8 Hours PRN      ipratropium-albuterol 0.5-2.5 mg/3 ml nebulizer  Commonly known as: DUO-NEB   3 mL, Nebulization, Every 4 Hours PRN      JEVITY 1.5 JEFFERY PO   Oral, Gets 390mL bolus every 6 hours 1A-7A-1P-7P       lamoTRIgine 200 MG tablet  Commonly known as: LaMICtal   200 mg, Oral, 2 Times Daily, Per G-Tube      lamoTRIgine 25 MG tablet  Commonly known as: LaMICtal   25 mg, Oral, 2 Times Daily, Per G-Tube To Equal 225 MG       levETIRAcetam 100  MG/ML solution  Commonly known as: KEPPRA   1,750 mg, Oral, 2 Times Daily      LORazepam 1 MG tablet  Commonly known as: ATIVAN   GIVE ONE TABLET PER G-TUBE TWICE DAILY FOR ANXIETY AND AGITATION      magnesium hydroxide 400 MG/5ML suspension  Commonly known as: MILK OF MAGNESIA   Oral, Daily PRN      Prolia 60 MG/ML solution prefilled syringe syringe  Generic drug: denosumab  Notes to patient: As directed every 6 months   60 mg, Subcutaneous, Every 6 Months      simvastatin 20 MG tablet  Commonly known as: ZOCOR   20 mg, Oral, Daily, Per G-Tube      sucralfate 1 g tablet  Commonly known as: CARAFATE   1 g, Oral, 4 Times Daily Before Meals & Nightly      thioridazine 100 MG tablet  Commonly known as: MELLARIL   200 mg, Oral, 2 Times Daily, Per G-Tube      triamcinolone 0.1 % cream  Commonly known as: KENALOG  Notes to patient: As directed   No dose, route, or frequency recorded.             ASK your doctor about these medications        Instructions Start Date   midodrine 5 MG tablet  Commonly known as: PROAMATINE  Notes to patient: Confirm with Dr. Reid   5 mg, Oral, 3 Times Daily               Discharge Diet:   Diet Instructions       Diet: Tube Feeding; Continuous; Goal of 55 cc/hr.  Free water flush of 25 cc every hour.  Jevity 1.5      Discharge Diet: Tube Feeding    Feeding Type: Continuous    Formula & Rate: Goal of 55 cc/hr.  Free water flush of 25 cc every hour.  Jevity 1.5            Activity at Discharge:   Activity Instructions       Activity as Tolerated              Follow-up Appointments:   Future Appointments   Date Time Provider Department Center   9/13/2023  2:30 PM Wanda Lang MD [facility]   10/16/2023 11:00 AM Sloan Her APRN [facility]     PCP in 1 to 2 weeks          This document has been electronically signed by Vicente Spicer MD on July 19, 2023 12:14 CDT      Time: 35 minutes

## 2023-07-19 NOTE — PLAN OF CARE
Goal Outcome Evaluation:  Plan of Care Reviewed With: (P) caregiver        Progress: (P) improving  Outcome Evaluation: (P) F/U with pt. Caregiver at bedside. No c/o of N/V/D, some crackling in pt throat, caregiver reports no coughing. Pt has 1+ edema in lower legs. #, BMI 26.06kg/m2. Admit wt 195#; -7% wt loss; caregiver reports wt fluctuation is normal for pt. Caregiver states no complaints or concerns. Pt is discharging today.

## 2023-07-19 NOTE — PLAN OF CARE
Goal Outcome Evaluation: patient to discharge today. Called patient report to chris, report given to mike. Sitter till at bedside.

## 2023-09-05 ENCOUNTER — APPOINTMENT (OUTPATIENT)
Dept: GENERAL RADIOLOGY | Facility: HOSPITAL | Age: 68
End: 2023-09-05
Payer: MEDICARE

## 2023-09-05 ENCOUNTER — HOSPITAL ENCOUNTER (EMERGENCY)
Facility: HOSPITAL | Age: 68
Discharge: NURSING, REHAB OR LONG-TERM CARE | End: 2023-09-05
Attending: STUDENT IN AN ORGANIZED HEALTH CARE EDUCATION/TRAINING PROGRAM | Admitting: STUDENT IN AN ORGANIZED HEALTH CARE EDUCATION/TRAINING PROGRAM
Payer: MEDICARE

## 2023-09-05 VITALS
SYSTOLIC BLOOD PRESSURE: 149 MMHG | TEMPERATURE: 97.5 F | DIASTOLIC BLOOD PRESSURE: 93 MMHG | RESPIRATION RATE: 18 BRPM | HEART RATE: 66 BPM | OXYGEN SATURATION: 94 %

## 2023-09-05 DIAGNOSIS — R05.1 ACUTE COUGH: Primary | ICD-10-CM

## 2023-09-05 LAB
ALBUMIN SERPL-MCNC: 3.4 G/DL (ref 3.5–5.2)
ALBUMIN/GLOB SERPL: 0.9 G/DL
ALP SERPL-CCNC: 152 U/L (ref 39–117)
ALT SERPL W P-5'-P-CCNC: 52 U/L (ref 1–41)
ANION GAP SERPL CALCULATED.3IONS-SCNC: 6 MMOL/L (ref 5–15)
AST SERPL-CCNC: 39 U/L (ref 1–40)
BASOPHILS # BLD AUTO: 0.05 10*3/MM3 (ref 0–0.2)
BASOPHILS NFR BLD AUTO: 0.8 % (ref 0–1.5)
BILIRUB SERPL-MCNC: 0.2 MG/DL (ref 0–1.2)
BUN SERPL-MCNC: 20 MG/DL (ref 8–23)
BUN/CREAT SERPL: 23.5 (ref 7–25)
CALCIUM SPEC-SCNC: 9.2 MG/DL (ref 8.6–10.5)
CHLORIDE SERPL-SCNC: 104 MMOL/L (ref 98–107)
CO2 SERPL-SCNC: 34 MMOL/L (ref 22–29)
CREAT SERPL-MCNC: 0.85 MG/DL (ref 0.76–1.27)
DEPRECATED RDW RBC AUTO: 50.1 FL (ref 37–54)
EGFRCR SERPLBLD CKD-EPI 2021: 95.8 ML/MIN/1.73
EOSINOPHIL # BLD AUTO: 0.28 10*3/MM3 (ref 0–0.4)
EOSINOPHIL NFR BLD AUTO: 4.3 % (ref 0.3–6.2)
ERYTHROCYTE [DISTWIDTH] IN BLOOD BY AUTOMATED COUNT: 15.3 % (ref 12.3–15.4)
GLOBULIN UR ELPH-MCNC: 3.8 GM/DL
GLUCOSE SERPL-MCNC: 83 MG/DL (ref 65–99)
HCT VFR BLD AUTO: 37.8 % (ref 37.5–51)
HGB BLD-MCNC: 12.2 G/DL (ref 13–17.7)
HOLD SPECIMEN: NORMAL
HOLD SPECIMEN: NORMAL
IMM GRANULOCYTES # BLD AUTO: 0.02 10*3/MM3 (ref 0–0.05)
IMM GRANULOCYTES NFR BLD AUTO: 0.3 % (ref 0–0.5)
LYMPHOCYTES # BLD AUTO: 0.87 10*3/MM3 (ref 0.7–3.1)
LYMPHOCYTES NFR BLD AUTO: 13.3 % (ref 19.6–45.3)
MAGNESIUM SERPL-MCNC: 2.2 MG/DL (ref 1.6–2.4)
MCH RBC QN AUTO: 28.9 PG (ref 26.6–33)
MCHC RBC AUTO-ENTMCNC: 32.3 G/DL (ref 31.5–35.7)
MCV RBC AUTO: 89.6 FL (ref 79–97)
MONOCYTES # BLD AUTO: 0.69 10*3/MM3 (ref 0.1–0.9)
MONOCYTES NFR BLD AUTO: 10.6 % (ref 5–12)
NEUTROPHILS NFR BLD AUTO: 4.63 10*3/MM3 (ref 1.7–7)
NEUTROPHILS NFR BLD AUTO: 70.7 % (ref 42.7–76)
NRBC BLD AUTO-RTO: 0 /100 WBC (ref 0–0.2)
PLATELET # BLD AUTO: 185 10*3/MM3 (ref 140–450)
PMV BLD AUTO: 12.2 FL (ref 6–12)
POTASSIUM SERPL-SCNC: 3.5 MMOL/L (ref 3.5–5.2)
PROT SERPL-MCNC: 7.2 G/DL (ref 6–8.5)
RBC # BLD AUTO: 4.22 10*6/MM3 (ref 4.14–5.8)
SODIUM SERPL-SCNC: 144 MMOL/L (ref 136–145)
WBC NRBC COR # BLD: 6.54 10*3/MM3 (ref 3.4–10.8)
WHOLE BLOOD HOLD COAG: NORMAL

## 2023-09-05 PROCEDURE — 83735 ASSAY OF MAGNESIUM: CPT | Performed by: STUDENT IN AN ORGANIZED HEALTH CARE EDUCATION/TRAINING PROGRAM

## 2023-09-05 PROCEDURE — 80053 COMPREHEN METABOLIC PANEL: CPT | Performed by: STUDENT IN AN ORGANIZED HEALTH CARE EDUCATION/TRAINING PROGRAM

## 2023-09-05 PROCEDURE — 99283 EMERGENCY DEPT VISIT LOW MDM: CPT

## 2023-09-05 PROCEDURE — 85025 COMPLETE CBC W/AUTO DIFF WBC: CPT | Performed by: STUDENT IN AN ORGANIZED HEALTH CARE EDUCATION/TRAINING PROGRAM

## 2023-09-05 PROCEDURE — 71045 X-RAY EXAM CHEST 1 VIEW: CPT

## 2023-09-05 RX ORDER — AMMONIUM LACTATE 120 MG/G
1 CREAM TOPICAL AS NEEDED
COMMUNITY

## 2023-09-05 NOTE — ED PROVIDER NOTES
Subjective   History of Present Illness  Presents from outside with possible aspiration pneumonia from feeding tube.  Patient was being fed around 11:00 where he threw up some of his tube feed and staff is afraid he aspirated because he was coughing afterwards.  Patient is nonverbal at baseline.  History is provided to me by caregiver/staff from the facility he lives.    Review of Systems   Respiratory:  Positive for cough.    All other systems reviewed and are negative.    Past Medical History:   Diagnosis Date    Alopecia     Arthritis     Autism     COPD (chronic obstructive pulmonary disease)     GERD (gastroesophageal reflux disease)     Hyperlipidemia     Hypertension     Insomnia     Intellectual disability     Lennox-Gastaut syndrome     Major depressive disorder     Orthostatic hypotension     Osteoporosis     Right bundle branch block     Scoliosis     Seizures        Allergies   Allergen Reactions    Haldol [Haloperidol] Unknown (See Comments)     Unknown      Levaquin [Levofloxacin] Other (See Comments)     Lowers seizure threshold       Past Surgical History:   Procedure Laterality Date    COLONOSCOPY N/A 1/8/2021    Procedure: COLONOSCOPY;  Surgeon: Wanda Lang MD;  Location: [facility] ENDOSCOPY;  Service: Gastroenterology;  Laterality: N/A;    COLONOSCOPY N/A 9/2/2021    Procedure: COLONOSCOPY;  Surgeon: Wanda Lang MD;  Location: [facility] ENDOSCOPY;  Service: Gastroenterology;  Laterality: N/A;    COLONOSCOPY N/A 10/14/2022    Procedure: COLONOSCOPY;  Surgeon: Wanda Lang MD;  Location: [facility] ENDOSCOPY;  Service: Gastroenterology;  Laterality: N/A;    ENDOSCOPY N/A 1/7/2021    Procedure: ESOPHAGOGASTRODUODENOSCOPY;  Surgeon: Wanda Lang MD;  Location: [facility] ENDOSCOPY;  Service: Gastroenterology;  Laterality: N/A;    ENDOSCOPY N/A 1/22/2021    Procedure: ESOPHAGOGASTRODUODENOSCOPY;  Surgeon: Wanda Lang MD;  Location: [facility] ENDOSCOPY;  Service: Gastroenterology;   Laterality: N/A;    ENDOSCOPY W/ PEG TUBE PLACEMENT N/A 6/25/2021    Procedure: ESOPHAGOGASTRODUODENOSCOPY WITH PERCUTANEOUS ENDOSCOPIC GASTROSTOMY TUBE INSERTION WITH ANESTHESIA;  Surgeon: Wanda Lang MD;  Location: [facility] ENDOSCOPY;  Service: Gastroenterology;  Laterality: N/A;    SIGMOIDOSCOPY N/A 10/11/2021    Procedure: SIGMOIDOSCOPY FLEXIBLE;  Surgeon: Wanda Lang MD;  Location: [facility] ENDOSCOPY;  Service: Gastroenterology;  Laterality: N/A;    SIGMOIDOSCOPY N/A 11/30/2021    Procedure: SIGMOIDOSCOPY FLEXIBLE;  Surgeon: Wanda Lang MD;  Location: [facility] ENDOSCOPY;  Service: Gastroenterology;  Laterality: N/A;    SIGMOIDOSCOPY N/A 6/16/2022    Procedure: SIGMOIDOSCOPY FLEXIBLE;  Surgeon: Wanda Lang MD;  Location: [facility] ENDOSCOPY;  Service: Gastroenterology;  Laterality: N/A;  argon at rectal polypectomy site    SIGMOIDOSCOPY N/A 2/24/2023    Procedure: SIGMOIDOSCOPY FLEXIBLE enemas on call at facility;  Surgeon: Wanda Lang MD;  Location: [facility] ENDOSCOPY;  Service: Gastroenterology;  Laterality: N/A;  argon to rectal polyp site    UPPER GASTROINTESTINAL ENDOSCOPY  01/22/2021    UPPER GASTROINTESTINAL ENDOSCOPY  06/25/2021       Family History   Problem Relation Age of Onset    Hypertension Father        Social History     Socioeconomic History    Marital status: Single   Tobacco Use    Smoking status: Never    Smokeless tobacco: Never   Vaping Use    Vaping Use: Never used   Substance and Sexual Activity    Alcohol use: Never    Drug use: Never    Sexual activity: Defer           Objective   Physical Exam  Vitals and nursing note reviewed.   Constitutional:       Appearance: He is well-developed.   HENT:      Head: Normocephalic and atraumatic.   Eyes:      Pupils: Pupils are equal, round, and reactive to light.   Neck:      Thyroid: No thyromegaly.      Trachea: No tracheal deviation.   Cardiovascular:      Rate and Rhythm: Normal rate and regular rhythm.      Pulses:            Radial pulses are 2+ on the left side.        Dorsalis pedis pulses are 2+ on the right side and 2+ on the left side.      Heart sounds: Normal heart sounds, S1 normal and S2 normal.   Pulmonary:      Effort: Pulmonary effort is normal.      Breath sounds: Normal breath sounds.   Abdominal:      Palpations: Abdomen is soft.      Comments: G-tube left upper stomach   Musculoskeletal:         General: Normal range of motion.      Cervical back: Neck supple.   Skin:     General: Skin is warm and dry.      Capillary Refill: Capillary refill takes 2 to 3 seconds.   Neurological:      Mental Status: He is alert. Mental status is at baseline.       Procedures           ED Course      Vitals:    09/05/23 1320 09/05/23 1415 09/05/23 1500 09/05/23 1545   BP: 125/74  151/89 149/93   BP Location: Left arm      Patient Position: Lying      Pulse: 68 66 72 66   Resp: 18      Temp:       TempSrc:       SpO2: 92% 92% 97% 94%      Lab Results (last 24 hours)       Procedure Component Value Units Date/Time    Comprehensive Metabolic Panel [[id number]]  (Abnormal) Collected: 09/05/23 1414    Specimen: Blood Updated: 09/05/23 1456     Glucose 83 mg/dL      BUN 20 mg/dL      Creatinine 0.85 mg/dL      Sodium 144 mmol/L      Potassium 3.5 mmol/L      Chloride 104 mmol/L      CO2 34.0 mmol/L      Calcium 9.2 mg/dL      Total Protein 7.2 g/dL      Albumin 3.4 g/dL      ALT (SGPT) 52 U/L      AST (SGOT) 39 U/L      Alkaline Phosphatase 152 U/L      Total Bilirubin 0.2 mg/dL      Globulin 3.8 gm/dL      A/G Ratio 0.9 g/dL      BUN/Creatinine Ratio 23.5     Anion Gap 6.0 mmol/L      eGFR 95.8 mL/min/1.73     Narrative:      GFR Normal >60  Chronic Kidney Disease <60  Kidney Failure <15      Magnesium [[id number]]  (Normal) Collected: 09/05/23 1414    Specimen: Blood Updated: 09/05/23 1456     Magnesium 2.2 mg/dL     CBC & Differential [[id number]]  (Abnormal) Collected: 09/05/23 1414    Specimen: Blood Updated: 09/05/23 1434    Narrative:       The following orders were created for panel order CBC & Differential.  Procedure                               Abnormality         Status                     ---------                               -----------         ------                     CBC Auto Differential[[id number]]        Abnormal            Final result                 Please view results for these tests on the individual orders.    CBC Auto Differential [[id number]]  (Abnormal) Collected: 09/05/23 1414    Specimen: Blood Updated: 09/05/23 1434     WBC 6.54 10*3/mm3      RBC 4.22 10*6/mm3      Hemoglobin 12.2 g/dL      Hematocrit 37.8 %      MCV 89.6 fL      MCH 28.9 pg      MCHC 32.3 g/dL      RDW 15.3 %      RDW-SD 50.1 fl      MPV 12.2 fL      Platelets 185 10*3/mm3      Neutrophil % 70.7 %      Lymphocyte % 13.3 %      Monocyte % 10.6 %      Eosinophil % 4.3 %      Basophil % 0.8 %      Immature Grans % 0.3 %      Neutrophils, Absolute 4.63 10*3/mm3      Lymphocytes, Absolute 0.87 10*3/mm3      Monocytes, Absolute 0.69 10*3/mm3      Eosinophils, Absolute 0.28 10*3/mm3      Basophils, Absolute 0.05 10*3/mm3      Immature Grans, Absolute 0.02 10*3/mm3      nRBC 0.0 /100 WBC     Gold Top - SST [[id number]] Collected: 09/05/23 1311    Specimen: Blood Updated: 09/05/23 1430     Extra Tube Hold for add-ons.     Comment: Auto resulted.       Light Blue Top [[id number]] Collected: 09/05/23 1311    Specimen: Blood Updated: 09/05/23 1430     Extra Tube Hold for add-ons.     Comment: Auto resulted              XR Chest 1 View    Result Date: 9/5/2023  1.  No acute intrathoracic process.                                          Medical Decision Making  Nonverbal patient with possible aspiration pneumonia approximately hour prior to evaluation.  Patient maintaining room air sats greater than 94% without other unstable vital signs.  Chest x-ray negative for acute findings.  Labs unremarkable.  Discussed pneumonitis treatments such as self resolution over the  next couple of days with the caregiver/staff member of the care home.  They are agreeable to not start antibiotics given patient is not hypoxic, toxic or have unstable labs or vital signs.  Encouraged monitoring of patient's vital signs and physical status over the next couple of days and to return to ED with worsening or concerning symptoms.  No identifiable cause noted for hospitalization or transfer noted during today's visit.       Problems Addressed:  Acute cough: complicated acute illness or injury    Amount and/or Complexity of Data Reviewed  Labs: ordered.  Radiology: ordered.        Final diagnoses:   Acute cough       ED Disposition  ED Disposition       ED Disposition   Discharge    Condition   Stable    Comment   --               Bautista James MD  [address] [facility] [id number] [phone]    Call in 1 day  As needed         Medication List        Changed      midodrine 5 MG tablet  Commonly known as: PROAMATINE  Take 1 tablet by mouth 3 (Three) Times a Day.  What changed:   how much to take  additional instructions            This document has been electronically signed by Karla Olivia MD on September 5, 2023 23:05 CDT  .si  [email or web address]  Part of this note may be an electronic transcription/translation of spoken language to printed text using the Dragon Dictation System.          Karla Olivia MD  09/05/23 4073

## 2023-09-05 NOTE — DISCHARGE INSTRUCTIONS
Monitor Bautista for change of condition such as elevated fever, worsening cough, confusion, alteration in vital signs.  Should any of these occur, return to ED.  Call primary care for follow-up in 1 to 2 days.

## 2023-09-13 ENCOUNTER — OFFICE VISIT (OUTPATIENT)
Dept: GASTROENTEROLOGY | Facility: CLINIC | Age: 68
End: 2023-09-13
Payer: MEDICARE

## 2023-09-13 VITALS
DIASTOLIC BLOOD PRESSURE: 77 MMHG | HEART RATE: 77 BPM | HEIGHT: 70 IN | BODY MASS INDEX: 26.06 KG/M2 | SYSTOLIC BLOOD PRESSURE: 115 MMHG

## 2023-09-13 DIAGNOSIS — D12.6 ADENOMATOUS POLYP OF COLON, UNSPECIFIED PART OF COLON: Primary | ICD-10-CM

## 2023-09-13 PROBLEM — R32 URINARY INCONTINENCE: Status: ACTIVE | Noted: 2023-09-13

## 2023-09-13 PROCEDURE — 99213 OFFICE O/P EST LOW 20 MIN: CPT | Performed by: INTERNAL MEDICINE

## 2023-09-13 PROCEDURE — 1160F RVW MEDS BY RX/DR IN RCRD: CPT | Performed by: INTERNAL MEDICINE

## 2023-09-13 PROCEDURE — 1159F MED LIST DOCD IN RCRD: CPT | Performed by: INTERNAL MEDICINE

## 2023-09-13 RX ORDER — DEXTROSE AND SODIUM CHLORIDE 5; .45 G/100ML; G/100ML
30 INJECTION, SOLUTION INTRAVENOUS CONTINUOUS PRN
Status: CANCELLED | OUTPATIENT
Start: 2023-09-29

## 2023-09-14 ENCOUNTER — APPOINTMENT (OUTPATIENT)
Dept: GENERAL RADIOLOGY | Facility: HOSPITAL | Age: 68
DRG: 871 | End: 2023-09-14
Payer: MEDICARE

## 2023-09-14 ENCOUNTER — HOSPITAL ENCOUNTER (INPATIENT)
Facility: HOSPITAL | Age: 68
LOS: 3 days | Discharge: NURSING, REHAB OR LONG-TERM CARE | DRG: 871 | End: 2023-09-17
Attending: FAMILY MEDICINE | Admitting: HOSPITALIST
Payer: MEDICARE

## 2023-09-14 DIAGNOSIS — Z78.9 IMPAIRED MOBILITY AND ADLS: ICD-10-CM

## 2023-09-14 DIAGNOSIS — Z74.09 IMPAIRED MOBILITY AND ADLS: ICD-10-CM

## 2023-09-14 DIAGNOSIS — J96.01 ACUTE RESPIRATORY FAILURE WITH HYPOXIA: ICD-10-CM

## 2023-09-14 DIAGNOSIS — Z74.09 IMPAIRED FUNCTIONAL MOBILITY, BALANCE, GAIT, AND ENDURANCE: ICD-10-CM

## 2023-09-14 DIAGNOSIS — J18.9 PNEUMONIA OF BOTH LOWER LOBES DUE TO INFECTIOUS ORGANISM: Primary | ICD-10-CM

## 2023-09-14 PROBLEM — J96.90 RESPIRATORY FAILURE: Status: ACTIVE | Noted: 2023-09-14

## 2023-09-14 LAB
ALBUMIN SERPL-MCNC: 3.4 G/DL (ref 3.5–5.2)
ALBUMIN/GLOB SERPL: 0.8 G/DL
ALP SERPL-CCNC: 148 U/L (ref 39–117)
ALT SERPL W P-5'-P-CCNC: 44 U/L (ref 1–41)
ANION GAP SERPL CALCULATED.3IONS-SCNC: 9 MMOL/L (ref 5–15)
ANISOCYTOSIS BLD QL: ABNORMAL
ARTERIAL PATENCY WRIST A: ABNORMAL
AST SERPL-CCNC: 36 U/L (ref 1–40)
ATMOSPHERIC PRESS: 751 MMHG
BASE EXCESS BLDA CALC-SCNC: 9.2 MMOL/L (ref 0–2)
BDY SITE: ABNORMAL
BILIRUB SERPL-MCNC: 0.3 MG/DL (ref 0–1.2)
BUN SERPL-MCNC: 28 MG/DL (ref 8–23)
BUN/CREAT SERPL: 30.1 (ref 7–25)
CALCIUM SPEC-SCNC: 8.8 MG/DL (ref 8.6–10.5)
CHLORIDE SERPL-SCNC: 103 MMOL/L (ref 98–107)
CO2 SERPL-SCNC: 34 MMOL/L (ref 22–29)
CREAT SERPL-MCNC: 0.93 MG/DL (ref 0.76–1.27)
D-DIMER, QUANTITATIVE (MAD,POW, STR): 820 NG/ML (FEU) (ref 0–660)
D-LACTATE SERPL-SCNC: 1.1 MMOL/L (ref 0.5–2)
D-LACTATE SERPL-SCNC: 2.4 MMOL/L (ref 0.5–2)
DEPRECATED RDW RBC AUTO: 50.5 FL (ref 37–54)
EGFRCR SERPLBLD CKD-EPI 2021: 90.6 ML/MIN/1.73
EOSINOPHIL # BLD MANUAL: 0.17 10*3/MM3 (ref 0–0.4)
EOSINOPHIL NFR BLD MANUAL: 1 % (ref 0.3–6.2)
ERYTHROCYTE [DISTWIDTH] IN BLOOD BY AUTOMATED COUNT: 15.3 % (ref 12.3–15.4)
FLUAV RNA RESP QL NAA+PROBE: NOT DETECTED
FLUBV RNA RESP QL NAA+PROBE: NOT DETECTED
GAS FLOW AIRWAY: 10 LPM
GEN 5 2HR TROPONIN T REFLEX: 12 NG/L
GLOBULIN UR ELPH-MCNC: 4.2 GM/DL
GLUCOSE BLDC GLUCOMTR-MCNC: 139 MG/DL (ref 70–130)
GLUCOSE SERPL-MCNC: 126 MG/DL (ref 65–99)
HCO3 BLDA-SCNC: 34.8 MMOL/L (ref 20–26)
HCT VFR BLD AUTO: 36.9 % (ref 37.5–51)
HGB BLD-MCNC: 11.9 G/DL (ref 13–17.7)
HOLD SPECIMEN: NORMAL
HOLD SPECIMEN: NORMAL
LYMPHOCYTES # BLD MANUAL: 0.87 10*3/MM3 (ref 0.7–3.1)
LYMPHOCYTES NFR BLD MANUAL: 1 % (ref 5–12)
Lab: ABNORMAL
MAGNESIUM SERPL-MCNC: 2.4 MG/DL (ref 1.6–2.4)
MCH RBC QN AUTO: 28.9 PG (ref 26.6–33)
MCHC RBC AUTO-ENTMCNC: 32.2 G/DL (ref 31.5–35.7)
MCV RBC AUTO: 89.6 FL (ref 79–97)
MODALITY: ABNORMAL
MONOCYTES # BLD: 0.17 10*3/MM3 (ref 0.1–0.9)
NEUTROPHILS # BLD AUTO: 16.14 10*3/MM3 (ref 1.7–7)
NEUTROPHILS NFR BLD MANUAL: 83 % (ref 42.7–76)
NEUTS BAND NFR BLD MANUAL: 10 % (ref 0–5)
NT-PROBNP SERPL-MCNC: 371.4 PG/ML (ref 0–900)
PCO2 BLDA: 50 MM HG (ref 35–45)
PH BLDA: 7.45 PH UNITS (ref 7.35–7.45)
PLATELET # BLD AUTO: 172 10*3/MM3 (ref 140–450)
PMV BLD AUTO: 11.9 FL (ref 6–12)
PO2 BLDA: 111 MM HG (ref 83–108)
POTASSIUM SERPL-SCNC: 2.9 MMOL/L (ref 3.5–5.2)
PROCALCITONIN SERPL-MCNC: 0.88 NG/ML (ref 0–0.25)
PROT SERPL-MCNC: 7.6 G/DL (ref 6–8.5)
QT INTERVAL: 398 MS
QTC INTERVAL: 513 MS
RBC # BLD AUTO: 4.12 10*6/MM3 (ref 4.14–5.8)
SAO2 % BLDCOA: 99.3 % (ref 94–99)
SARS-COV-2 RNA RESP QL NAA+PROBE: NOT DETECTED
SMALL PLATELETS BLD QL SMEAR: ADEQUATE
SODIUM SERPL-SCNC: 146 MMOL/L (ref 136–145)
TROPONIN T DELTA: -1 NG/L
TROPONIN T SERPL HS-MCNC: 13 NG/L
TROPONIN T SERPL HS-MCNC: 15 NG/L
VARIANT LYMPHS NFR BLD MANUAL: 5 % (ref 19.6–45.3)
VENTILATOR MODE: ABNORMAL
WBC MORPH BLD: NORMAL
WBC NRBC COR # BLD: 17.35 10*3/MM3 (ref 3.4–10.8)
WHOLE BLOOD HOLD COAG: NORMAL
WHOLE BLOOD HOLD SPECIMEN: NORMAL

## 2023-09-14 PROCEDURE — 25010000002 CEFTRIAXONE PER 250 MG: Performed by: FAMILY MEDICINE

## 2023-09-14 PROCEDURE — 36415 COLL VENOUS BLD VENIPUNCTURE: CPT | Performed by: FAMILY MEDICINE

## 2023-09-14 PROCEDURE — 87636 SARSCOV2 & INF A&B AMP PRB: CPT | Performed by: FAMILY MEDICINE

## 2023-09-14 PROCEDURE — 71045 X-RAY EXAM CHEST 1 VIEW: CPT

## 2023-09-14 PROCEDURE — 83605 ASSAY OF LACTIC ACID: CPT | Performed by: FAMILY MEDICINE

## 2023-09-14 PROCEDURE — 85025 COMPLETE CBC W/AUTO DIFF WBC: CPT | Performed by: FAMILY MEDICINE

## 2023-09-14 PROCEDURE — 83880 ASSAY OF NATRIURETIC PEPTIDE: CPT | Performed by: FAMILY MEDICINE

## 2023-09-14 PROCEDURE — 36600 WITHDRAWAL OF ARTERIAL BLOOD: CPT

## 2023-09-14 PROCEDURE — 82948 REAGENT STRIP/BLOOD GLUCOSE: CPT

## 2023-09-14 PROCEDURE — 0 POTASSIUM CHLORIDE 10 MEQ/100ML SOLUTION: Performed by: INTERNAL MEDICINE

## 2023-09-14 PROCEDURE — 80053 COMPREHEN METABOLIC PANEL: CPT | Performed by: FAMILY MEDICINE

## 2023-09-14 PROCEDURE — 82803 BLOOD GASES ANY COMBINATION: CPT

## 2023-09-14 PROCEDURE — 84484 ASSAY OF TROPONIN QUANT: CPT | Performed by: FAMILY MEDICINE

## 2023-09-14 PROCEDURE — 84145 PROCALCITONIN (PCT): CPT | Performed by: INTERNAL MEDICINE

## 2023-09-14 PROCEDURE — 25010000002 HEPARIN (PORCINE) PER 1000 UNITS: Performed by: INTERNAL MEDICINE

## 2023-09-14 PROCEDURE — 99285 EMERGENCY DEPT VISIT HI MDM: CPT

## 2023-09-14 PROCEDURE — 93010 ELECTROCARDIOGRAM REPORT: CPT | Performed by: INTERNAL MEDICINE

## 2023-09-14 PROCEDURE — 0 CEFEPIME PER 500 MG: Performed by: INTERNAL MEDICINE

## 2023-09-14 PROCEDURE — 85007 BL SMEAR W/DIFF WBC COUNT: CPT | Performed by: FAMILY MEDICINE

## 2023-09-14 PROCEDURE — 93005 ELECTROCARDIOGRAM TRACING: CPT | Performed by: FAMILY MEDICINE

## 2023-09-14 PROCEDURE — 83735 ASSAY OF MAGNESIUM: CPT | Performed by: FAMILY MEDICINE

## 2023-09-14 PROCEDURE — 25010000002 METRONIDAZOLE 500 MG/100ML SOLUTION: Performed by: INTERNAL MEDICINE

## 2023-09-14 PROCEDURE — 85379 FIBRIN DEGRADATION QUANT: CPT | Performed by: FAMILY MEDICINE

## 2023-09-14 PROCEDURE — 87040 BLOOD CULTURE FOR BACTERIA: CPT | Performed by: FAMILY MEDICINE

## 2023-09-14 RX ORDER — FERROUS SULFATE TAB EC 324 MG (65 MG FE EQUIVALENT) 324 (65 FE) MG
324 TABLET DELAYED RESPONSE ORAL
Status: DISCONTINUED | OUTPATIENT
Start: 2023-09-15 | End: 2023-09-14

## 2023-09-14 RX ORDER — FERROUS SULFATE 220 (44)/5
220 ELIXIR ORAL DAILY
Status: DISCONTINUED | OUTPATIENT
Start: 2023-09-15 | End: 2023-09-17 | Stop reason: HOSPADM

## 2023-09-14 RX ORDER — SUCRALFATE 1 G/1
1 TABLET ORAL
Status: DISCONTINUED | OUTPATIENT
Start: 2023-09-14 | End: 2023-09-17 | Stop reason: HOSPADM

## 2023-09-14 RX ORDER — LAMOTRIGINE 25 MG/1
25 TABLET ORAL 2 TIMES DAILY
Status: DISCONTINUED | OUTPATIENT
Start: 2023-09-14 | End: 2023-09-14 | Stop reason: SDUPTHER

## 2023-09-14 RX ORDER — SODIUM CHLORIDE 0.9 % (FLUSH) 0.9 %
10 SYRINGE (ML) INJECTION EVERY 12 HOURS SCHEDULED
Status: DISCONTINUED | OUTPATIENT
Start: 2023-09-14 | End: 2023-09-17 | Stop reason: HOSPADM

## 2023-09-14 RX ORDER — AMOXICILLIN 250 MG
2 CAPSULE ORAL 2 TIMES DAILY
Status: DISCONTINUED | OUTPATIENT
Start: 2023-09-14 | End: 2023-09-17 | Stop reason: HOSPADM

## 2023-09-14 RX ORDER — METRONIDAZOLE 500 MG/100ML
500 INJECTION, SOLUTION INTRAVENOUS EVERY 8 HOURS
Status: DISCONTINUED | OUTPATIENT
Start: 2023-09-14 | End: 2023-09-17 | Stop reason: HOSPADM

## 2023-09-14 RX ORDER — SODIUM CHLORIDE 0.9 % (FLUSH) 0.9 %
10 SYRINGE (ML) INJECTION AS NEEDED
Status: DISCONTINUED | OUTPATIENT
Start: 2023-09-14 | End: 2023-09-17 | Stop reason: HOSPADM

## 2023-09-14 RX ORDER — HEPARIN SODIUM 5000 [USP'U]/ML
5000 INJECTION, SOLUTION INTRAVENOUS; SUBCUTANEOUS EVERY 8 HOURS SCHEDULED
Status: DISCONTINUED | OUTPATIENT
Start: 2023-09-14 | End: 2023-09-17 | Stop reason: HOSPADM

## 2023-09-14 RX ORDER — ONDANSETRON 4 MG/1
4 TABLET, FILM COATED ORAL EVERY 6 HOURS PRN
Status: DISCONTINUED | OUTPATIENT
Start: 2023-09-14 | End: 2023-09-17 | Stop reason: HOSPADM

## 2023-09-14 RX ORDER — ACETAMINOPHEN 325 MG/1
650 TABLET ORAL EVERY 4 HOURS PRN
Status: DISCONTINUED | OUTPATIENT
Start: 2023-09-14 | End: 2023-09-14 | Stop reason: SDUPTHER

## 2023-09-14 RX ORDER — LEVETIRACETAM 100 MG/ML
1750 SOLUTION ORAL 2 TIMES DAILY
Status: DISCONTINUED | OUTPATIENT
Start: 2023-09-14 | End: 2023-09-17 | Stop reason: HOSPADM

## 2023-09-14 RX ORDER — BISACODYL 5 MG/1
5 TABLET, DELAYED RELEASE ORAL DAILY PRN
Status: DISCONTINUED | OUTPATIENT
Start: 2023-09-14 | End: 2023-09-14

## 2023-09-14 RX ORDER — IPRATROPIUM BROMIDE AND ALBUTEROL SULFATE 2.5; .5 MG/3ML; MG/3ML
3 SOLUTION RESPIRATORY (INHALATION) EVERY 4 HOURS PRN
Status: DISCONTINUED | OUTPATIENT
Start: 2023-09-14 | End: 2023-09-17 | Stop reason: HOSPADM

## 2023-09-14 RX ORDER — BISACODYL 10 MG
10 SUPPOSITORY, RECTAL RECTAL DAILY PRN
Status: DISCONTINUED | OUTPATIENT
Start: 2023-09-14 | End: 2023-09-17 | Stop reason: HOSPADM

## 2023-09-14 RX ORDER — LAMOTRIGINE 100 MG/1
200 TABLET ORAL 2 TIMES DAILY
Status: DISCONTINUED | OUTPATIENT
Start: 2023-09-14 | End: 2023-09-14

## 2023-09-14 RX ORDER — ACETAMINOPHEN 160 MG/5ML
650 SOLUTION ORAL EVERY 4 HOURS PRN
Status: DISCONTINUED | OUTPATIENT
Start: 2023-09-14 | End: 2023-09-17 | Stop reason: HOSPADM

## 2023-09-14 RX ORDER — SODIUM CHLORIDE, SODIUM LACTATE, POTASSIUM CHLORIDE, CALCIUM CHLORIDE 600; 310; 30; 20 MG/100ML; MG/100ML; MG/100ML; MG/100ML
75 INJECTION, SOLUTION INTRAVENOUS CONTINUOUS
Status: DISPENSED | OUTPATIENT
Start: 2023-09-14 | End: 2023-09-15

## 2023-09-14 RX ORDER — POTASSIUM CHLORIDE 7.45 MG/ML
10 INJECTION INTRAVENOUS
Status: DISPENSED | OUTPATIENT
Start: 2023-09-14 | End: 2023-09-15

## 2023-09-14 RX ORDER — ONDANSETRON 2 MG/ML
4 INJECTION INTRAMUSCULAR; INTRAVENOUS EVERY 6 HOURS PRN
Status: DISCONTINUED | OUTPATIENT
Start: 2023-09-14 | End: 2023-09-17 | Stop reason: HOSPADM

## 2023-09-14 RX ORDER — SODIUM CHLORIDE 9 MG/ML
40 INJECTION, SOLUTION INTRAVENOUS AS NEEDED
Status: DISCONTINUED | OUTPATIENT
Start: 2023-09-14 | End: 2023-09-17 | Stop reason: HOSPADM

## 2023-09-14 RX ORDER — FAMOTIDINE 20 MG/1
20 TABLET, FILM COATED ORAL DAILY
Status: DISCONTINUED | OUTPATIENT
Start: 2023-09-15 | End: 2023-09-15

## 2023-09-14 RX ORDER — POLYETHYLENE GLYCOL 3350 17 G/17G
17 POWDER, FOR SOLUTION ORAL DAILY PRN
Status: DISCONTINUED | OUTPATIENT
Start: 2023-09-14 | End: 2023-09-17 | Stop reason: HOSPADM

## 2023-09-14 RX ORDER — ATORVASTATIN CALCIUM 10 MG/1
10 TABLET, FILM COATED ORAL NIGHTLY
Status: DISCONTINUED | OUTPATIENT
Start: 2023-09-14 | End: 2023-09-14

## 2023-09-14 RX ORDER — ACETAMINOPHEN 650 MG/1
650 SUPPOSITORY RECTAL EVERY 4 HOURS PRN
Status: DISCONTINUED | OUTPATIENT
Start: 2023-09-14 | End: 2023-09-17 | Stop reason: HOSPADM

## 2023-09-14 RX ORDER — ATORVASTATIN CALCIUM 10 MG/1
10 TABLET, FILM COATED ORAL NIGHTLY
Status: DISCONTINUED | OUTPATIENT
Start: 2023-09-14 | End: 2023-09-17 | Stop reason: HOSPADM

## 2023-09-14 RX ORDER — NITROGLYCERIN 0.4 MG/1
0.4 TABLET SUBLINGUAL
Status: DISCONTINUED | OUTPATIENT
Start: 2023-09-14 | End: 2023-09-17 | Stop reason: HOSPADM

## 2023-09-14 RX ORDER — SUCRALFATE 1 G/1
1 TABLET ORAL
Status: DISCONTINUED | OUTPATIENT
Start: 2023-09-14 | End: 2023-09-14

## 2023-09-14 RX ADMIN — LEVETIRACETAM 1750 MG: 100 SOLUTION ORAL at 20:18

## 2023-09-14 RX ADMIN — HEPARIN SODIUM 5000 UNITS: 5000 INJECTION INTRAVENOUS; SUBCUTANEOUS at 22:41

## 2023-09-14 RX ADMIN — SUCRALFATE 1 G: 1 TABLET ORAL at 22:42

## 2023-09-14 RX ADMIN — DOXYCYCLINE 100 MG: 100 INJECTION, POWDER, LYOPHILIZED, FOR SOLUTION INTRAVENOUS at 14:28

## 2023-09-14 RX ADMIN — CEFTRIAXONE SODIUM 2000 MG: 2 INJECTION, POWDER, FOR SOLUTION INTRAMUSCULAR; INTRAVENOUS at 13:51

## 2023-09-14 RX ADMIN — SODIUM CHLORIDE, POTASSIUM CHLORIDE, SODIUM LACTATE AND CALCIUM CHLORIDE 75 ML/HR: 600; 310; 30; 20 INJECTION, SOLUTION INTRAVENOUS at 18:28

## 2023-09-14 RX ADMIN — LAMOTRIGINE 225 MG: 100 TABLET ORAL at 22:42

## 2023-09-14 RX ADMIN — DOCUSATE SODIUM 50 MG AND SENNOSIDES 8.6 MG 2 TABLET: 8.6; 5 TABLET, FILM COATED ORAL at 22:41

## 2023-09-14 RX ADMIN — ATORVASTATIN CALCIUM 10 MG: 10 TABLET, FILM COATED ORAL at 22:43

## 2023-09-14 RX ADMIN — METRONIDAZOLE 500 MG: 500 INJECTION, SOLUTION INTRAVENOUS at 18:28

## 2023-09-14 RX ADMIN — CEFEPIME HYDROCHLORIDE 2000 MG: 2 INJECTION, POWDER, FOR SOLUTION INTRAVENOUS at 20:17

## 2023-09-14 RX ADMIN — POTASSIUM CHLORIDE 10 MEQ: 7.46 INJECTION, SOLUTION INTRAVENOUS at 19:44

## 2023-09-14 NOTE — ED NOTES
Nursing report ED to floor  Bautista Grubbs  67 y.o.  male    HPI:   Chief Complaint   Patient presents with    Respiratory Distress       Admitting doctor:   No admitting provider for patient encounter.    Consulting provider(s):  Consults       No orders found from 8/16/2023 to 9/15/2023.             Admitting diagnosis:   The primary encounter diagnosis was Pneumonia of both lower lobes due to infectious organism. A diagnosis of Acute respiratory failure with hypoxia was also pertinent to this visit.    Code status:   Current Code Status       Date Active Code Status Order ID Comments User Context       Prior            Allergies:   Haldol [haloperidol], Levaquin [levofloxacin], and Other    Intake and Output    Intake/Output Summary (Last 24 hours) at 9/14/2023 1500  Last data filed at 9/14/2023 1424  Gross per 24 hour   Intake 100 ml   Output --   Net 100 ml       Weight:       09/14/23  1236   Weight: 82.1 kg (181 lb)       Most recent vitals:   Vitals:    09/14/23 1330 09/14/23 1349 09/14/23 1414 09/14/23 1427   BP:    112/64   Pulse: 94 94 92 88   Resp:    20   Temp:       TempSrc:       SpO2: 99% 97% 95% 94%   Weight:       Height:         Oxygen Therapy: 6L per NC    Active LDAs/IV Access:   Lines, Drains & Airways       Active LDAs       Name Placement date Placement time Site Days    Peripheral IV 09/14/23 1241 Posterior;Right Hand 09/14/23  1241  Hand  less than 1    Peripheral IV 09/14/23 1249 Right Antecubital 09/14/23  1249  Antecubital  less than 1    Gastrostomy/Enterostomy RLQ 04/11/22  Placed prior to arrival  2153  RLQ  520    External Urinary Catheter 07/12/23  1759  --  63                    Labs (abnormal labs have a star):   Labs Reviewed   LACTIC ACID, PLASMA - Abnormal; Notable for the following components:       Result Value    Lactate 2.4 (*)     All other components within normal limits   COMPREHENSIVE METABOLIC PANEL - Abnormal; Notable for the following components:    Glucose 126  (*)     BUN 28 (*)     Sodium 146 (*)     Potassium 2.9 (*)     CO2 34.0 (*)     Albumin 3.4 (*)     ALT (SGPT) 44 (*)     Alkaline Phosphatase 148 (*)     BUN/Creatinine Ratio 30.1 (*)     All other components within normal limits    Narrative:     GFR Normal >60  Chronic Kidney Disease <60  Kidney Failure <15     SINGLE HSTROPONIN T - Abnormal; Notable for the following components:    HS Troponin T 15 (*)     All other components within normal limits    Narrative:     High Sensitive Troponin T Reference Range:  <10.0 ng/L- Negative Female for AMI  <15.0 ng/L- Negative Male for AMI  >=10 - Abnormal Female indicating possible myocardial injury.  >=15 - Abnormal Male indicating possible myocardial injury.   Clinicians would have to utilize clinical acumen, EKG, Troponin, and serial changes to determine if it is an Acute Myocardial Infarction or myocardial injury due to an underlying chronic condition.        CBC WITH AUTO DIFFERENTIAL - Abnormal; Notable for the following components:    WBC 17.35 (*)     RBC 4.12 (*)     Hemoglobin 11.9 (*)     Hematocrit 36.9 (*)     All other components within normal limits   D-DIMER, QUANTITATIVE - Abnormal; Notable for the following components:    D-Dimer, Quantitative 820 (*)     All other components within normal limits    Narrative:     According to the assay 's published package insert, a normal (<500 ng/mL (FEU)) D-dimer result in conjunction with a non-high clinical probability assessment, excludes deep vein thrombosis (DVT) and pulmonary embolism (PE) with high sensitivity.    D-dimer values increase with age and this can make VTE exclusion of an older population difficult. To address this, the [employer], based on best available evidence and recent guidelines, recommends that clinicians use age-adjusted D-dimer thresholds in patients greater than 50 years of age with: a) a low probability of PE who do not meet all Pulmonary Embolism Rule  "Out Criteria, or b) in those with intermediate probability of PE.   The formula for an age-adjusted D-dimer cut-off is \"age*10\".  For example, a 60 year old patient would have an age-adjusted cut-off of 600 ng/mL (FEU) and an 80 year old 800 ng/mL (FEU).     MANUAL DIFFERENTIAL - Abnormal; Notable for the following components:    Neutrophil % 83.0 (*)     Lymphocyte % 5.0 (*)     Monocyte % 1.0 (*)     Bands %  10.0 (*)     Neutrophils Absolute 16.14 (*)     All other components within normal limits   BLOOD GAS, ARTERIAL - Abnormal; Notable for the following components:    pH, Arterial 7.451 (*)     pCO2, Arterial 50.0 (*)     pO2, Arterial 111.0 (*)     HCO3, Arterial 34.8 (*)     Base Excess, Arterial 9.2 (*)     O2 Saturation, Arterial 99.3 (*)     All other components within normal limits   COVID-19 AND FLU A/B, NP SWAB IN TRANSPORT MEDIA 8-12 HR TAT - Normal    Narrative:     Fact sheet for providers: https://www.fda.gov/media/316478/download    Fact sheet for patients: https://www.fda.gov/media/087644/download    Test performed by PCR.   BNP (IN-HOUSE) - Normal    Narrative:     Among patients with dyspnea, NT-proBNP is highly sensitive for the detection of acute congestive heart failure. In addition NT-proBNP of <300 pg/ml effectively rules out acute congestive heart failure with 99% negative predictive value.     TROPONIN - Normal    Narrative:     High Sensitive Troponin T Reference Range:  <10.0 ng/L- Negative Female for AMI  <15.0 ng/L- Negative Male for AMI  >=10 - Abnormal Female indicating possible myocardial injury.  >=15 - Abnormal Male indicating possible myocardial injury.   Clinicians would have to utilize clinical acumen, EKG, Troponin, and serial changes to determine if it is an Acute Myocardial Infarction or myocardial injury due to an underlying chronic condition.        MAGNESIUM - Normal   BLOOD CULTURE   BLOOD CULTURE   RAINBOW DRAW    Narrative:     The following orders were created for " panel order [place] Draw.  Procedure                               Abnormality         Status                     ---------                               -----------         ------                     Green Top (Gel)[[id number]]                                  Final result               Lavender Top[[id number]]                                     Final result               Gold Top - SST[[id number]]                                   Final result               Light Blue Top[[id number]]                                   Final result                 Please view results for these tests on the individual orders.   BLOOD GAS, ARTERIAL   LACTIC ACID, REFLEX   HIGH SENSITIVITIY TROPONIN T 2HR   CBC AND DIFFERENTIAL    Narrative:     The following orders were created for panel order CBC & Differential.  Procedure                               Abnormality         Status                     ---------                               -----------         ------                     CBC Auto Differential[[id number]]        Abnormal            Final result               Scan Slide[[id number]]                                                                    Please view results for these tests on the individual orders.   GREEN TOP   LAVENDER TOP   GOLD TOP - SST   LIGHT BLUE TOP       Meds given in ED:   Medications   sodium chloride 0.9 % flush 10 mL (has no administration in time range)   doxycycline (VIBRAMYCIN) 100 mg/100 mL 0.9% NS MBP (100 mg Intravenous New Bag 9/14/23 1428)   cefTRIAXone (ROCEPHIN) 2000 mg/100 mL 0.9% NS IVPB (MBP) (0 mg Intravenous Stopped 9/14/23 1424)           NIH Stroke Scale:       Isolation/Infection(s):  No active isolations   COVID (rule out)     COVID Testing  Collected yes  Resulted negative    Nursing report ED to floor:  Mental status: disoriented x4, intellectual disability  Ambulatory status: nonambulatory  Precautions: sitter at bedside, pt from [facility]    ED nurse phone extentsiuh- [phone]

## 2023-09-14 NOTE — ED NOTES
Pt unable to tolerate 4L per NC, pt titrated up to 6L per NC. Mouth cleaned with wet mouth swab and wash cloth at this time.

## 2023-09-14 NOTE — H&P
[facility] Medicine  HISTORY AND PHYSICAL      Date of Admission: 9/14/2023  Primary Care Physician: Bautista James MD    Subjective     Chief Complaint: Respiratory distress    History of Present Illness  Patient with past medical history of dysphagia, depression, autism, intellectual delay,Lennox Gastaut, seizures, hyperlipidemia, COPD, GERD.  Patient presents with persistent respiratory distress.  Patient present in emergency room 9/5/2023 with respiratory distress.  Patient ultimately discharged back to [facility] after emergency room diagnosis of aspiration pneumonia versus pneumonitis.  Patient's caregiver states patient has suffered from continued respiratory distress since ED evaluation.  Patient also recently treated in hospital 7/12 to 7/19 for pneumonia possibly aspiration related.  Caregiver states that since returning to [facility] after 9/5 ED evaluation, patient's O2 saturations dropped as low as in the 70s.  Caregiver also states patient's chronic cough has worsened at [place] [facility].  Patient nonverbal and unable to give history.  Caregiver denies fevers, chills, known sick contacts or recent travel.  Patient satting 94% in emergency room on 6 L nasal cannula at time evaluation by Dr. Smith.  Patient also using no accessory muscles of respiration.        Review of Systems   Otherwise complete ROS reviewed and negative except as mentioned in the HPI.    Past Medical History:   Past Medical History:   Diagnosis Date    Alopecia     Arthritis     Autism     COPD (chronic obstructive pulmonary disease)     GERD (gastroesophageal reflux disease)     Hyperlipidemia     Hypertension     Insomnia     Intellectual disability     Lennox-Gastaut syndrome     Major depressive disorder     Orthostatic hypotension     Osteoporosis     Right bundle branch block     Scoliosis     Seizures      Past Surgical History:  Past Surgical History:    Procedure Laterality Date    COLONOSCOPY N/A 1/8/2021    Procedure: COLONOSCOPY;  Surgeon: Wanda Lang MD;  Location: [facility] ENDOSCOPY;  Service: Gastroenterology;  Laterality: N/A;    COLONOSCOPY N/A 9/2/2021    Procedure: COLONOSCOPY;  Surgeon: Wanda Lang MD;  Location: [facility] ENDOSCOPY;  Service: Gastroenterology;  Laterality: N/A;    COLONOSCOPY N/A 10/14/2022    Procedure: COLONOSCOPY;  Surgeon: Wanda Lang MD;  Location: [facility] ENDOSCOPY;  Service: Gastroenterology;  Laterality: N/A;    ENDOSCOPY N/A 1/7/2021    Procedure: ESOPHAGOGASTRODUODENOSCOPY;  Surgeon: Wanda Lang MD;  Location: [facility] ENDOSCOPY;  Service: Gastroenterology;  Laterality: N/A;    ENDOSCOPY N/A 1/22/2021    Procedure: ESOPHAGOGASTRODUODENOSCOPY;  Surgeon: Wanda Lang MD;  Location: [facility] ENDOSCOPY;  Service: Gastroenterology;  Laterality: N/A;    ENDOSCOPY W/ PEG TUBE PLACEMENT N/A 6/25/2021    Procedure: ESOPHAGOGASTRODUODENOSCOPY WITH PERCUTANEOUS ENDOSCOPIC GASTROSTOMY TUBE INSERTION WITH ANESTHESIA;  Surgeon: Wanda Lang MD;  Location: [facility] ENDOSCOPY;  Service: Gastroenterology;  Laterality: N/A;    SIGMOIDOSCOPY N/A 10/11/2021    Procedure: SIGMOIDOSCOPY FLEXIBLE;  Surgeon: Wanda Lang MD;  Location: [facility] ENDOSCOPY;  Service: Gastroenterology;  Laterality: N/A;    SIGMOIDOSCOPY N/A 11/30/2021    Procedure: SIGMOIDOSCOPY FLEXIBLE;  Surgeon: Wanda Lang MD;  Location: [facility] ENDOSCOPY;  Service: Gastroenterology;  Laterality: N/A;    SIGMOIDOSCOPY N/A 6/16/2022    Procedure: SIGMOIDOSCOPY FLEXIBLE;  Surgeon: Wanda Lang MD;  Location: [facility] ENDOSCOPY;  Service: Gastroenterology;  Laterality: N/A;  argon at rectal polypectomy site    SIGMOIDOSCOPY N/A 2/24/2023    Procedure: SIGMOIDOSCOPY FLEXIBLE enemas on call at facility;  Surgeon: Wanda Lang MD;  Location: [facility] ENDOSCOPY;  Service: Gastroenterology;  Laterality: N/A;  argon to rectal polyp site     UPPER GASTROINTESTINAL ENDOSCOPY  01/22/2021    UPPER GASTROINTESTINAL ENDOSCOPY  06/25/2021     Social History:  reports that he has never smoked. He has never used smokeless tobacco. He reports that he does not drink alcohol and does not use drugs.    Family History: family history includes Hypertension in his father.       Allergies:  Allergies   Allergen Reactions    Haldol [Haloperidol] Unknown (See Comments)     Unknown      Levaquin [Levofloxacin] Other (See Comments)     Lowers seizure threshold    Other Unknown - High Severity       Medications:  Prior to Admission medications    Medication Sig Start Date End Date Taking? Authorizing Provider   calcium carbonate-vitamin d 600-400 MG-UNIT per tablet Administer 1 tablet per G tube 2 (Two) Times a Day. Per G-Tube   Yes Patricia Driscoll MD   famotidine (PEPCID) 20 MG tablet Take 1 tablet by mouth Daily. Per G-Tube   Yes Patricia Driscoll MD   ferrous sulfate 325 (65 FE) MG tablet Administer 1 tablet per G tube Daily With Dinner. Per G-Tube   Yes aPtricia Driscoll MD   lamoTRIgine (LaMICtal) 200 MG tablet Administer 1 tablet per G tube 2 (Two) Times a Day. Per G-Tube To Equal 225 MG   Yes Patricia Driscoll MD   lamoTRIgine (LaMICtal) 25 MG tablet Administer 1 tablet per G tube 2 (Two) Times a Day. Per G-Tube To Equal 225 MG   Yes Patricia Driscoll MD   levETIRAcetam (KEPPRA) 100 MG/ML solution Administer 17.5 mL per G tube 2 (Two) Times a Day. 3/21/23  Yes Patricia Driscoll MD   LORazepam (ATIVAN) 1 MG tablet GIVE ONE TABLET PER G-TUBE TWICE DAILY FOR ANXIETY AND AGITATION 2/2/22  Yes Christelle Smith APRN   midodrine (PROAMATINE) 5 MG tablet Take 1 tablet by mouth 3 (Three) Times a Day.  Patient taking differently: Administer 1 tablet per G tube 3 (Three) Times a Day. Per G-Tube 2/4/19  Yes Earnestine Reid MD   simvastatin (ZOCOR) 20 MG tablet Administer 1 tablet per G tube Every Evening. Per G-Tube   Yes Patricia Driscoll MD    sucralfate (CARAFATE) 1 g tablet Take 1 tablet by mouth 4 (Four) Times a Day Before Meals & at Bedtime.  Patient taking differently: Administer 1 tablet per G tube 4 (Four) Times a Day Before Meals & at Bedtime. 5/19/21  Yes Sandeep Fernandez MD   thioridazine (MELLARIL) 100 MG tablet Administer 2 tablets per G tube 2 (Two) Times a Day. Per G-Tube   Yes Patricia Driscoll MD   albuterol (PROVENTIL) (2.5 MG/3ML) 0.083% nebulizer solution  10/12/21   Patricia Driscoll MD   albuterol sulfate  (90 Base) MCG/ACT inhaler Inhale 2 puffs Every 4 (Four) Hours As Needed for Shortness of Air or Wheezing. 1/7/23   Carlos A Ely MD   ammonium lactate (AMLACTIN) 12 % cream Apply 1 g topically to the appropriate area as directed As Needed for Dry Skin.    Patricia Driscoll MD   denosumab (Prolia) 60 MG/ML solution prefilled syringe syringe Inject 1 mL under the skin into the appropriate area as directed. Every 6 Months    Patricia Driscoll MD   DIAZEPAM RE Insert 10 mg into the rectum As Needed (For Seizure Activity).    Patricia Driscoll MD   ibuprofen (ADVIL,MOTRIN) 600 MG tablet Take 1 tablet by mouth Every 8 (Eight) Hours As Needed for Moderate Pain . 1/19/22   Sandeep Fernandez MD   ipratropium-albuterol (DUO-NEB) 0.5-2.5 mg/3 ml nebulizer Take 3 mL by nebulization Every 4 (Four) Hours As Needed for Shortness of Air. 4/15/22   Leander Meade MD   magnesium hydroxide (MILK OF MAGNESIA) 400 MG/5ML suspension Take  by mouth Daily As Needed for Constipation.    Patricia Driscoll MD   Nutritional Supplements (JEVITY 1.5 JEFFERY PO) Take  by mouth. Gets 390mL bolus every 6 hours 1A-7A-1P-7P    Patricia Driscoll MD   triamcinolone (KENALOG) 0.1 % cream  8/25/22 9/14/23  Patricia Driscoll MD I have utilized all available immediate resources to obtain, update, and review the patient's current medications.    Objective     Vital Signs: /69   Pulse 92   Temp 98 °F (36.7 °C)  "(Axillary)   Resp 19   Ht 177.8 cm (70\")   Wt 82.1 kg (181 lb)   SpO2 94%   BMI 25.97 kg/m²   Physical Exam  Constitutional:       Appearance: He is ill-appearing.      Comments: Somnolent, snoring at time of evaluation   HENT:      Head: Normocephalic and atraumatic.      Right Ear: Ear canal normal.      Left Ear: Ear canal normal.      Nose: Nose normal.      Mouth/Throat:      Mouth: Mucous membranes are moist.      Pharynx: Oropharynx is clear.   Eyes:      Extraocular Movements: Extraocular movements intact.      Conjunctiva/sclera: Conjunctivae normal.      Pupils: Pupils are equal, round, and reactive to light.   Cardiovascular:      Rate and Rhythm: Regular rhythm. Tachycardia present.      Pulses: Normal pulses.      Heart sounds: Normal heart sounds.   Pulmonary:      Breath sounds: Rales present.      Comments: Decreased bibasilar breath sounds  Abdominal:      General: Abdomen is flat. Bowel sounds are normal.      Palpations: Abdomen is soft.      Comments: PEG tube in place   Musculoskeletal:         General: Normal range of motion.      Cervical back: Normal range of motion and neck supple.   Skin:     General: Skin is warm.      Capillary Refill: Capillary refill takes less than 2 seconds.   Neurological:      General: No focal deficit present.      Comments: Nonverbal at baseline.  Awakens slightly to verbal/tactile stimulation and then falls back to sleep.            Results Reviewed:  Lab Results (last 24 hours)       Procedure Component Value Units Date/Time    High Sensitivity Troponin T 2Hr [[id number]]  (Normal) Collected: 09/14/23 1530    Specimen: Blood Updated: 09/14/23 1605     HS Troponin T 12 ng/L      Troponin T Delta -1 ng/L     Narrative:      High Sensitive Troponin T Reference Range:  <10.0 ng/L- Negative Female for AMI  <15.0 ng/L- Negative Male for AMI  >=10 - Abnormal Female indicating possible myocardial injury.  >=15 - Abnormal Male indicating possible myocardial injury. " "  Clinicians would have to utilize clinical acumen, EKG, Troponin, and serial changes to determine if it is an Acute Myocardial Infarction or myocardial injury due to an underlying chronic condition.         STAT Lactic Acid, Reflex [[id number]]  (Normal) Collected: 09/14/23 1530    Specimen: Blood Updated: 09/14/23 1601     Lactate 1.1 mmol/L     D-dimer, Quantitative [[id number]]  (Abnormal) Collected: 09/14/23 1250    Specimen: Blood Updated: 09/14/23 1426     D-Dimer, Quantitative 820 ng/mL (FEU)     Narrative:      According to the assay 's published package insert, a normal (<500 ng/mL (FEU)) D-dimer result in conjunction with a non-high clinical probability assessment, excludes deep vein thrombosis (DVT) and pulmonary embolism (PE) with high sensitivity.    D-dimer values increase with age and this can make VTE exclusion of an older population difficult. To address this, the [employer] College [employer], based on best available evidence and recent guidelines, recommends that clinicians use age-adjusted D-dimer thresholds in patients greater than 50 years of age with: a) a low probability of PE who do not meet all Pulmonary Embolism Rule Out Criteria, or b) in those with intermediate probability of PE.   The formula for an age-adjusted D-dimer cut-off is \"age*10\".  For example, a 60 year old patient would have an age-adjusted cut-off of 600 ng/mL (FEU) and an 80 year old 800 ng/mL (FEU).      Jber Draw [[id number]] Collected: 09/14/23 1250    Specimen: Blood Updated: 09/14/23 1415    Narrative:      The following orders were created for panel order Jber Draw.  Procedure                               Abnormality         Status                     ---------                               -----------         ------                     Green Top (Gel)[[id number]]                                  Final result               Lavender Top[[id number]]                                     Final result       "         Gold Top - SST[[id number]]                                   Final result               Light Blue Top[[id number]]                                   Final result                 Please view results for these tests on the individual orders.    Lavender Top [[id number]] Collected: 09/14/23 1250    Specimen: Blood Updated: 09/14/23 1415     Extra Tube hold for add-on     Comment: Auto resulted       Magnesium [[id number]]  (Normal) Collected: 09/14/23 1250    Specimen: Blood Updated: 09/14/23 1408     Magnesium 2.4 mg/dL     High Sensitivity Troponin T [[id number]]  (Normal) Collected: 09/14/23 1324    Specimen: Blood from Arm, Left Updated: 09/14/23 1406     HS Troponin T 13 ng/L     Narrative:      High Sensitive Troponin T Reference Range:  <10.0 ng/L- Negative Female for AMI  <15.0 ng/L- Negative Male for AMI  >=10 - Abnormal Female indicating possible myocardial injury.  >=15 - Abnormal Male indicating possible myocardial injury.   Clinicians would have to utilize clinical acumen, EKG, Troponin, and serial changes to determine if it is an Acute Myocardial Infarction or myocardial injury due to an underlying chronic condition.         Manual Differential [[id number]]  (Abnormal) Collected: 09/14/23 1250    Specimen: Blood Updated: 09/14/23 1403     Neutrophil % 83.0 %      Lymphocyte % 5.0 %      Monocyte % 1.0 %      Eosinophil % 1.0 %      Bands %  10.0 %      Neutrophils Absolute 16.14 10*3/mm3      Lymphocytes Absolute 0.87 10*3/mm3      Monocytes Absolute 0.17 10*3/mm3      Eosinophils Absolute 0.17 10*3/mm3      Anisocytosis Slight/1+     WBC Morphology Normal     Platelet Estimate Adequate    COVID-19 and FLU A/B PCR - Swab, Nasopharynx [[id number]]  (Normal) Collected: 09/14/23 1322    Specimen: Swab from Nasopharynx Updated: 09/14/23 1401     COVID19 Not Detected     Influenza A PCR Not Detected     Influenza B PCR Not Detected    Narrative:      Fact sheet for providers:  https://www.fda.gov/media/860430/download    Fact sheet for patients: https://www.fda.gov/media/965225/download    Test performed by PCR.    Green Top (Gel) [[id number]] Collected: 09/14/23 1250    Specimen: Blood Updated: 09/14/23 1400     Extra Tube Hold for add-ons.     Comment: Auto resulted.       Light Blue Top [[id number]] Collected: 09/14/23 1250    Specimen: Blood Updated: 09/14/23 1400     Extra Tube Hold for add-ons.     Comment: Auto resulted       Gold Top - SST [[id number]] Collected: 09/14/23 1250    Specimen: Blood Updated: 09/14/23 1400     Extra Tube Hold for add-ons.     Comment: Auto resulted.       Lactic Acid, Plasma [[id number]]  (Abnormal) Collected: 09/14/23 1250    Specimen: Blood Updated: 09/14/23 1341     Lactate 2.4 mmol/L     Blood Gas, Arterial - [[id number]]  (Abnormal) Collected: 09/14/23 1340    Specimen: Arterial Blood Updated: 09/14/23 1339     Site Left Brachial     Otoniel's Test N/A     pH, Arterial 7.451 pH units      Comment: 83 Value above reference range        pCO2, Arterial 50.0 mm Hg      Comment: 83 Value above reference range        pO2, Arterial 111.0 mm Hg      Comment: 83 Value above reference range        HCO3, Arterial 34.8 mmol/L      Comment: 83 Value above reference range        Base Excess, Arterial 9.2 mmol/L      Comment: 83 Value above reference range        O2 Saturation, Arterial 99.3 %      Comment: 83 Value above reference range        Barometric Pressure for Blood Gas 751 mmHg      Modality Simple Mask     Flow Rate 10.0 lpm      Ventilator Mode NA     Collected by      Comment: Meter: [id number]     :  [id number]       Blood Culture - Blood, Arm, Left [[id number]] Collected: 09/14/23 1324    Specimen: Blood from Arm, Left Updated: 09/14/23 1337    Comprehensive Metabolic Panel [[id number]]  (Abnormal) Collected: 09/14/23 1250    Specimen: Blood Updated: 09/14/23 1328     Glucose 126 mg/dL      BUN 28 mg/dL      Creatinine 0.93 mg/dL      Sodium  146 mmol/L      Potassium 2.9 mmol/L      Chloride 103 mmol/L      CO2 34.0 mmol/L      Calcium 8.8 mg/dL      Total Protein 7.6 g/dL      Albumin 3.4 g/dL      ALT (SGPT) 44 U/L      AST (SGOT) 36 U/L      Alkaline Phosphatase 148 U/L      Total Bilirubin 0.3 mg/dL      Globulin 4.2 gm/dL      A/G Ratio 0.8 g/dL      BUN/Creatinine Ratio 30.1     Anion Gap 9.0 mmol/L      eGFR 90.6 mL/min/1.73     Narrative:      GFR Normal >60  Chronic Kidney Disease <60  Kidney Failure <15      Single High Sensitivity Troponin T [[id number]]  (Abnormal) Collected: 09/14/23 1250    Specimen: Blood Updated: 09/14/23 1328     HS Troponin T 15 ng/L     Narrative:      High Sensitive Troponin T Reference Range:  <10.0 ng/L- Negative Female for AMI  <15.0 ng/L- Negative Male for AMI  >=10 - Abnormal Female indicating possible myocardial injury.  >=15 - Abnormal Male indicating possible myocardial injury.   Clinicians would have to utilize clinical acumen, EKG, Troponin, and serial changes to determine if it is an Acute Myocardial Infarction or myocardial injury due to an underlying chronic condition.         BNP [[id number]]  (Normal) Collected: 09/14/23 1250    Specimen: Blood Updated: 09/14/23 1326     proBNP 371.4 pg/mL     Narrative:      Among patients with dyspnea, NT-proBNP is highly sensitive for the detection of acute congestive heart failure. In addition NT-proBNP of <300 pg/ml effectively rules out acute congestive heart failure with 99% negative predictive value.      CBC & Differential [[id number]]  (Abnormal) Collected: 09/14/23 1250    Specimen: Blood Updated: 09/14/23 1319    Narrative:      The following orders were created for panel order CBC & Differential.  Procedure                               Abnormality         Status                     ---------                               -----------         ------                     CBC Auto Differential[[id number]]        Abnormal            Final result               Scan  Slide[[id number]]                                                                    Please view results for these tests on the individual orders.    CBC Auto Differential [[id number]]  (Abnormal) Collected: 09/14/23 1250    Specimen: Blood Updated: 09/14/23 1319     WBC 17.35 10*3/mm3      RBC 4.12 10*6/mm3      Hemoglobin 11.9 g/dL      Hematocrit 36.9 %      MCV 89.6 fL      MCH 28.9 pg      MCHC 32.2 g/dL      RDW 15.3 %      RDW-SD 50.5 fl      MPV 11.9 fL      Platelets 172 10*3/mm3     Blood Culture - Blood, Arm, Right [[id number]] Collected: 09/14/23 1250    Specimen: Blood from Arm, Right Updated: 09/14/23 1307          Imaging Results (Last 24 Hours)       Procedure Component Value Units Date/Time    XR Chest 1 View [[id number]] Collected: 09/14/23 1322     Updated: 09/14/23 1602    Narrative:      INDICATION:  [facility] Triage Protocol.    COMPARISON:  9/5/2023.    FINDINGS:  Cardiac size is not enlarged.  Diffuse interstitial coarsening.  No pleural  effusion or pneumothorax.  Hazy airspace opacities throughout both lungs.      Impression:      Hazy airspace opacities throughout both lungs may reflect pneumonia  or vascular congestion.  Follow-up until radiographic resolution.            I have personally reviewed and interpreted the radiology studies and ECG obtained at time of admission.     Scheduled Meds:heparin (porcine), 5,000 Units, Subcutaneous, Q8H  metroNIDAZOLE, 500 mg, Intravenous, Q8H  senna-docusate sodium, 2 tablet, Oral, BID  sodium chloride, 10 mL, Intravenous, Q12H      Continuous Infusions:lactated ringers, 75 mL/hr  Pharmacy to Dose Cefepime,       PRN Meds:.  acetaminophen **OR** acetaminophen **OR** acetaminophen    senna-docusate sodium **AND** polyethylene glycol **AND** bisacodyl **AND** bisacodyl    Calcium Replacement - Follow Nurse / BPA Driven Protocol    Magnesium Standard Dose Replacement - Follow Nurse / BPA Driven Protocol    nitroglycerin    ondansetron **OR** ondansetron     Pharmacy to Dose Cefepime    Phosphorus Replacement - Follow Nurse / BPA Driven Protocol    Potassium Replacement - Follow Nurse / BPA Driven Protocol    sodium chloride    sodium chloride    sodium chloride   Assessment / Plan     Assessment:   Active Hospital Problems    Diagnosis     **Respiratory failure        Assessment  Intellectually delayed autism patient with acute on chronic aspiration pneumonia issues:      Plan  Acute on chronic respiratory distress secondary to possible aspiration pneumonia:  - Nebulization treatments, IV Solu-Medrol, wean O2 as tolerated, IV cefepime/Flagyl, incentive spirometer      Lactic acidosis possibly due to early sepsis from aspiration pneumonia:  - Repeat lactic acid.  Maintenance IV fluids.  IV antibiotics.  Check blood cultures, respiratory swab PCR, respiratory culture looking for signs of infectious pathogen.  Plus as above.    Acute on chronic COPD exacerbation: As above in lactic acidosis and respiratory distress sections    Depression: Continue home management  Dysphagia: G-tube feeds per SNF at with training facility regimen  Seizures: Continue home meds  Hyperlipidemia: Continue home meds  GERD: Continue home meds    FEN n.p.o. strict with G-tube feeds and meds through G-tube  Code DNR/DNI per nursing home paperwork  PPx: Heparin subcutaneous    Medical Decision Making  Number and Complexity of problems: Several complex issues   differential Diagnosis: Aspiration pneumonia, healthcare associated pneumonia, COPD exacerbation    Conditions and Status:        Condition is improving.    I have utilized all available immediate resources to obtain, update, or review the patient's current medications (including all prescriptions, over-the-counter products, herbals, cannabis/cannabidiol products, and vitamin/mineral/dietary (nutritional) supplements).      [facility] Data  External documents reviewed: Up-to-date, care everywhere  My EKG interpretation: No overt signs symptoms of  acute coronary syndrome  My plain film interpretation: Bibasilar infiltrates noted  Tests considered but not ordered:       Decision rules/scores evaluated (example [id number], Wells, etc):       Discussed with: Patient's caregiver     Treatment Plan  See assessment and plan    Care Planning  Shared decision making: Patient's caregiver, Dr. Smith, nursing staff,  Code status and discussions: DNR/DNI    Disposition  Social Determinants of Health that impact treatment or disposition: Lives at [place] training facility (SNF)  I expect the patient to be discharged to [facility] training facility (SNF) in 3-6 days.     The patient was seen and examined by me on 9/14/2023 at 1430.    Electronically signed by Chirag Smith MD, 09/14/23, 17:08 CDT.

## 2023-09-14 NOTE — ED PROVIDER NOTES
Subjective   History of Present Illness  67-year-old man presents to the ER in respiratory distress.  Patient is nonverbal and not able to give history, history was given by care taker.  Said patient has had a prior history of respiratory distress.  Was in the ER last week for an acute cough and respiratory distress.  The care taker said that at his assisted living facility his sats dropped into the 70s.  Said he has a chronic cough at all times normally, but it has gotten worse the last couple days as well as gotten harder for him to breathe.    Shortness of Breath  Severity:  Moderate  Duration:  1 day  Timing:  Constant  Progression:  Worsening  Chronicity:  New  Relieved by:  Oxygen, rest and sitting up  Associated symptoms: cough      Review of Systems   Unable to perform ROS: Patient nonverbal   Constitutional:  Positive for activity change.   Respiratory:  Positive for cough and shortness of breath.      Past Medical History:   Diagnosis Date    Alopecia     Arthritis     Autism     COPD (chronic obstructive pulmonary disease)     GERD (gastroesophageal reflux disease)     Hyperlipidemia     Hypertension     Insomnia     Intellectual disability     Lennox-Gastaut syndrome     Major depressive disorder     Orthostatic hypotension     Osteoporosis     Right bundle branch block     Scoliosis     Seizures        Allergies   Allergen Reactions    Haldol [Haloperidol] Unknown (See Comments)     Unknown      Levaquin [Levofloxacin] Other (See Comments)     Lowers seizure threshold    Other Unknown - High Severity       Past Surgical History:   Procedure Laterality Date    COLONOSCOPY N/A 1/8/2021    Procedure: COLONOSCOPY;  Surgeon: Wanda Lang MD;  Location: [facility] ENDOSCOPY;  Service: Gastroenterology;  Laterality: N/A;    COLONOSCOPY N/A 9/2/2021    Procedure: COLONOSCOPY;  Surgeon: Wanda Lang MD;  Location: [facility] ENDOSCOPY;  Service: Gastroenterology;  Laterality: N/A;    COLONOSCOPY N/A  10/14/2022    Procedure: COLONOSCOPY;  Surgeon: Wanda Lang MD;  Location: [facility] ENDOSCOPY;  Service: Gastroenterology;  Laterality: N/A;    ENDOSCOPY N/A 1/7/2021    Procedure: ESOPHAGOGASTRODUODENOSCOPY;  Surgeon: Wanda Lang MD;  Location: [facility] ENDOSCOPY;  Service: Gastroenterology;  Laterality: N/A;    ENDOSCOPY N/A 1/22/2021    Procedure: ESOPHAGOGASTRODUODENOSCOPY;  Surgeon: Wanda Lang MD;  Location: [facility] ENDOSCOPY;  Service: Gastroenterology;  Laterality: N/A;    ENDOSCOPY W/ PEG TUBE PLACEMENT N/A 6/25/2021    Procedure: ESOPHAGOGASTRODUODENOSCOPY WITH PERCUTANEOUS ENDOSCOPIC GASTROSTOMY TUBE INSERTION WITH ANESTHESIA;  Surgeon: Wanda Lang MD;  Location: [facility] ENDOSCOPY;  Service: Gastroenterology;  Laterality: N/A;    SIGMOIDOSCOPY N/A 10/11/2021    Procedure: SIGMOIDOSCOPY FLEXIBLE;  Surgeon: Wanda Lang MD;  Location: [facility] ENDOSCOPY;  Service: Gastroenterology;  Laterality: N/A;    SIGMOIDOSCOPY N/A 11/30/2021    Procedure: SIGMOIDOSCOPY FLEXIBLE;  Surgeon: Wanda Lang MD;  Location: [facility] ENDOSCOPY;  Service: Gastroenterology;  Laterality: N/A;    SIGMOIDOSCOPY N/A 6/16/2022    Procedure: SIGMOIDOSCOPY FLEXIBLE;  Surgeon: Wanda Lang MD;  Location: [facility] ENDOSCOPY;  Service: Gastroenterology;  Laterality: N/A;  argon at rectal polypectomy site    SIGMOIDOSCOPY N/A 2/24/2023    Procedure: SIGMOIDOSCOPY FLEXIBLE enemas on call at facility;  Surgeon: Wanda Lang MD;  Location: [facility] ENDOSCOPY;  Service: Gastroenterology;  Laterality: N/A;  argon to rectal polyp site    UPPER GASTROINTESTINAL ENDOSCOPY  01/22/2021    UPPER GASTROINTESTINAL ENDOSCOPY  06/25/2021       Family History   Problem Relation Age of Onset    Hypertension Father        Social History     Socioeconomic History    Marital status: Single   Tobacco Use    Smoking status: Never    Smokeless tobacco: Never   Vaping Use    Vaping Use: Never used   Substance and Sexual  Activity    Alcohol use: Never    Drug use: Never    Sexual activity: Defer           Objective   Physical Exam  Vitals and nursing note reviewed.   Constitutional:       Appearance: He is well-developed.   HENT:      Head: Normocephalic and atraumatic.      Nose: Nose normal.   Eyes:      General: No scleral icterus.        Right eye: No discharge.         Left eye: No discharge.      Conjunctiva/sclera: Conjunctivae normal.      Pupils: Pupils are equal, round, and reactive to light.   Neck:      Trachea: No tracheal deviation.   Cardiovascular:      Rate and Rhythm: Normal rate and regular rhythm.      Heart sounds: Normal heart sounds. No murmur heard.  Pulmonary:      Effort: Pulmonary effort is normal. Tachypnea, accessory muscle usage and respiratory distress present.      Breath sounds: No stridor. Wheezing and rhonchi present. No rales.   Abdominal:      General: Bowel sounds are normal. There is no distension.      Palpations: Abdomen is soft. There is no mass.      Tenderness: There is no abdominal tenderness. There is no guarding or rebound.   Musculoskeletal:      Cervical back: Normal range of motion and neck supple.   Skin:     General: Skin is warm and dry.      Findings: No erythema or rash.   Neurological:      Mental Status: He is alert and oriented to person, place, and time.      Coordination: Coordination normal.   Psychiatric:         Behavior: Behavior normal.         Thought Content: Thought content normal.       ECG 12 Lead      Date/Time: 9/14/2023 3:01 PM  Performed by: Wilian Moore MD  Authorized by: Wilian Moore MD   Interpreted by physician  Rhythm: sinus rhythm  BPM: 100  ST Segments: ST segments normal             ED Course           Labs Reviewed   LACTIC ACID, PLASMA - Abnormal; Notable for the following components:       Result Value    Lactate 2.4 (*)     All other components within normal limits   COMPREHENSIVE METABOLIC PANEL - Abnormal; Notable for the  following components:    Glucose 126 (*)     BUN 28 (*)     Sodium 146 (*)     Potassium 2.9 (*)     CO2 34.0 (*)     Albumin 3.4 (*)     ALT (SGPT) 44 (*)     Alkaline Phosphatase 148 (*)     BUN/Creatinine Ratio 30.1 (*)     All other components within normal limits    Narrative:     GFR Normal >60  Chronic Kidney Disease <60  Kidney Failure <15     SINGLE HSTROPONIN T - Abnormal; Notable for the following components:    HS Troponin T 15 (*)     All other components within normal limits    Narrative:     High Sensitive Troponin T Reference Range:  <10.0 ng/L- Negative Female for AMI  <15.0 ng/L- Negative Male for AMI  >=10 - Abnormal Female indicating possible myocardial injury.  >=15 - Abnormal Male indicating possible myocardial injury.   Clinicians would have to utilize clinical acumen, EKG, Troponin, and serial changes to determine if it is an Acute Myocardial Infarction or myocardial injury due to an underlying chronic condition.        CBC WITH AUTO DIFFERENTIAL - Abnormal; Notable for the following components:    WBC 17.35 (*)     RBC 4.12 (*)     Hemoglobin 11.9 (*)     Hematocrit 36.9 (*)     All other components within normal limits   D-DIMER, QUANTITATIVE - Abnormal; Notable for the following components:    D-Dimer, Quantitative 820 (*)     All other components within normal limits    Narrative:     According to the assay 's published package insert, a normal (<500 ng/mL (FEU)) D-dimer result in conjunction with a non-high clinical probability assessment, excludes deep vein thrombosis (DVT) and pulmonary embolism (PE) with high sensitivity.    D-dimer values increase with age and this can make VTE exclusion of an older population difficult. To address this, the [employer], based on best available evidence and recent guidelines, recommends that clinicians use age-adjusted D-dimer thresholds in patients greater than 50 years of age with: a) a low probability of PE who do  "not meet all Pulmonary Embolism Rule Out Criteria, or b) in those with intermediate probability of PE.   The formula for an age-adjusted D-dimer cut-off is \"age*10\".  For example, a 60 year old patient would have an age-adjusted cut-off of 600 ng/mL (FEU) and an 80 year old 800 ng/mL (FEU).     MANUAL DIFFERENTIAL - Abnormal; Notable for the following components:    Neutrophil % 83.0 (*)     Lymphocyte % 5.0 (*)     Monocyte % 1.0 (*)     Bands %  10.0 (*)     Neutrophils Absolute 16.14 (*)     All other components within normal limits   BLOOD GAS, ARTERIAL - Abnormal; Notable for the following components:    pH, Arterial 7.451 (*)     pCO2, Arterial 50.0 (*)     pO2, Arterial 111.0 (*)     HCO3, Arterial 34.8 (*)     Base Excess, Arterial 9.2 (*)     O2 Saturation, Arterial 99.3 (*)     All other components within normal limits   COVID-19 AND FLU A/B, NP SWAB IN TRANSPORT MEDIA 8-12 HR TAT - Normal    Narrative:     Fact sheet for providers: https://www.fda.gov/media/921022/download    Fact sheet for patients: https://www.fda.gov/media/395686/download    Test performed by PCR.   BNP (IN-HOUSE) - Normal    Narrative:     Among patients with dyspnea, NT-proBNP is highly sensitive for the detection of acute congestive heart failure. In addition NT-proBNP of <300 pg/ml effectively rules out acute congestive heart failure with 99% negative predictive value.     TROPONIN - Normal    Narrative:     High Sensitive Troponin T Reference Range:  <10.0 ng/L- Negative Female for AMI  <15.0 ng/L- Negative Male for AMI  >=10 - Abnormal Female indicating possible myocardial injury.  >=15 - Abnormal Male indicating possible myocardial injury.   Clinicians would have to utilize clinical acumen, EKG, Troponin, and serial changes to determine if it is an Acute Myocardial Infarction or myocardial injury due to an underlying chronic condition.        MAGNESIUM - Normal   BLOOD CULTURE   BLOOD CULTURE   RAINBOW DRAW    Narrative:     The " following orders were created for panel order [facility] Draw.  Procedure                               Abnormality         Status                     ---------                               -----------         ------                     Green Top (Gel)[[id number]]                                  Final result               Lavender Top[[id number]]                                     Final result               Gold Top - SST[[id number]]                                   Final result               Light Blue Top[[id number]]                                   Final result                 Please view results for these tests on the individual orders.   BLOOD GAS, ARTERIAL   LACTIC ACID, REFLEX   HIGH SENSITIVITIY TROPONIN T 2HR   CBC AND DIFFERENTIAL    Narrative:     The following orders were created for panel order CBC & Differential.  Procedure                               Abnormality         Status                     ---------                               -----------         ------                     CBC Auto Differential[[id number]]        Abnormal            Final result               Scan Slide[[id number]]                                                                    Please view results for these tests on the individual orders.   GREEN TOP   LAVENDER TOP   GOLD TOP - SST   LIGHT BLUE TOP       XR Chest 1 View   Preliminary Result   Hazy airspace opacities throughout both lungs may reflect pneumonia   or vascular congestion.  Follow-up until radiographic resolution.                                               Medical Decision Making  Problems Addressed:  Acute respiratory failure with hypoxia: complicated acute illness or injury  Pneumonia of both lower lobes due to infectious organism: complicated acute illness or injury    Amount and/or Complexity of Data Reviewed  Labs: ordered.  Radiology: ordered.  ECG/medicine tests: ordered.    Risk  Prescription drug management.  Decision regarding  hospitalization.        Final diagnoses:   Pneumonia of both lower lobes due to infectious organism   Acute respiratory failure with hypoxia       ED Disposition  ED Disposition       ED Disposition   Decision to Admit    Condition   --    Comment   Level of Care: Telemetry [5]   Diagnosis: Respiratory failure [529116]   Admitting Physician: MARIANNE ESCOBAR [[id number]]   Attending Physician: MARIANNE ESCOBAR [[id number]]   Certification: I Certify That Inpatient Hospital Services Are Medically Necessary For Greater Than 2 Midnights                 No follow-up provider specified.       Medication List      No changes were made to your prescriptions during this visit.            Wilian Moore MD  09/14/23 1508

## 2023-09-15 ENCOUNTER — APPOINTMENT (OUTPATIENT)
Dept: INTERVENTIONAL RADIOLOGY/VASCULAR | Facility: HOSPITAL | Age: 68
DRG: 871 | End: 2023-09-15
Payer: MEDICARE

## 2023-09-15 ENCOUNTER — APPOINTMENT (OUTPATIENT)
Dept: ULTRASOUND IMAGING | Facility: HOSPITAL | Age: 68
DRG: 871 | End: 2023-09-15
Payer: MEDICARE

## 2023-09-15 LAB
ANION GAP SERPL CALCULATED.3IONS-SCNC: 10 MMOL/L (ref 5–15)
BASOPHILS # BLD AUTO: 0.05 10*3/MM3 (ref 0–0.2)
BASOPHILS NFR BLD AUTO: 0.3 % (ref 0–1.5)
BUN SERPL-MCNC: 26 MG/DL (ref 8–23)
BUN/CREAT SERPL: 34.7 (ref 7–25)
CALCIUM SPEC-SCNC: 8.1 MG/DL (ref 8.6–10.5)
CHLORIDE SERPL-SCNC: 108 MMOL/L (ref 98–107)
CO2 SERPL-SCNC: 28 MMOL/L (ref 22–29)
CREAT SERPL-MCNC: 0.75 MG/DL (ref 0.76–1.27)
D-LACTATE SERPL-SCNC: 1 MMOL/L (ref 0.5–2)
DEPRECATED RDW RBC AUTO: 49 FL (ref 37–54)
EGFRCR SERPLBLD CKD-EPI 2021: 99.5 ML/MIN/1.73
EOSINOPHIL # BLD AUTO: 0.02 10*3/MM3 (ref 0–0.4)
EOSINOPHIL NFR BLD AUTO: 0.1 % (ref 0.3–6.2)
ERYTHROCYTE [DISTWIDTH] IN BLOOD BY AUTOMATED COUNT: 15.2 % (ref 12.3–15.4)
GLUCOSE BLDC GLUCOMTR-MCNC: 100 MG/DL (ref 70–130)
GLUCOSE BLDC GLUCOMTR-MCNC: 117 MG/DL (ref 70–130)
GLUCOSE BLDC GLUCOMTR-MCNC: 121 MG/DL (ref 70–130)
GLUCOSE BLDC GLUCOMTR-MCNC: 97 MG/DL (ref 70–130)
GLUCOSE SERPL-MCNC: 110 MG/DL (ref 65–99)
HCT VFR BLD AUTO: 33.7 % (ref 37.5–51)
HGB BLD-MCNC: 11 G/DL (ref 13–17.7)
IMM GRANULOCYTES # BLD AUTO: 0.06 10*3/MM3 (ref 0–0.05)
IMM GRANULOCYTES NFR BLD AUTO: 0.3 % (ref 0–0.5)
L PNEUMO1 AG UR QL IA: NEGATIVE
LYMPHOCYTES # BLD AUTO: 0.63 10*3/MM3 (ref 0.7–3.1)
LYMPHOCYTES NFR BLD AUTO: 3.2 % (ref 19.6–45.3)
MAGNESIUM SERPL-MCNC: 2.5 MG/DL (ref 1.6–2.4)
MCH RBC QN AUTO: 28.8 PG (ref 26.6–33)
MCHC RBC AUTO-ENTMCNC: 32.6 G/DL (ref 31.5–35.7)
MCV RBC AUTO: 88.2 FL (ref 79–97)
MONOCYTES # BLD AUTO: 0.48 10*3/MM3 (ref 0.1–0.9)
MONOCYTES NFR BLD AUTO: 2.4 % (ref 5–12)
NEUTROPHILS NFR BLD AUTO: 18.41 10*3/MM3 (ref 1.7–7)
NEUTROPHILS NFR BLD AUTO: 93.7 % (ref 42.7–76)
NRBC BLD AUTO-RTO: 0 /100 WBC (ref 0–0.2)
PLATELET # BLD AUTO: 175 10*3/MM3 (ref 140–450)
PMV BLD AUTO: 12.1 FL (ref 6–12)
POTASSIUM SERPL-SCNC: 3.1 MMOL/L (ref 3.5–5.2)
RBC # BLD AUTO: 3.82 10*6/MM3 (ref 4.14–5.8)
S PNEUM AG SPEC QL LA: NEGATIVE
SODIUM SERPL-SCNC: 146 MMOL/L (ref 136–145)
WBC NRBC COR # BLD: 19.65 10*3/MM3 (ref 3.4–10.8)
WHOLE BLOOD HOLD SPECIMEN: NORMAL

## 2023-09-15 PROCEDURE — C1751 CATH, INF, PER/CENT/MIDLINE: HCPCS

## 2023-09-15 PROCEDURE — 25010000002 HEPARIN (PORCINE) PER 1000 UNITS: Performed by: INTERNAL MEDICINE

## 2023-09-15 PROCEDURE — 0 CEFEPIME PER 500 MG: Performed by: INTERNAL MEDICINE

## 2023-09-15 PROCEDURE — 87899 AGENT NOS ASSAY W/OPTIC: CPT | Performed by: INTERNAL MEDICINE

## 2023-09-15 PROCEDURE — 97162 PT EVAL MOD COMPLEX 30 MIN: CPT

## 2023-09-15 PROCEDURE — 87449 NOS EACH ORGANISM AG IA: CPT | Performed by: INTERNAL MEDICINE

## 2023-09-15 PROCEDURE — 36410 VNPNXR 3YR/> PHY/QHP DX/THER: CPT

## 2023-09-15 PROCEDURE — 80048 BASIC METABOLIC PNL TOTAL CA: CPT | Performed by: INTERNAL MEDICINE

## 2023-09-15 PROCEDURE — 82948 REAGENT STRIP/BLOOD GLUCOSE: CPT

## 2023-09-15 PROCEDURE — 0 POTASSIUM CHLORIDE 10 MEQ/100ML SOLUTION: Performed by: INTERNAL MEDICINE

## 2023-09-15 PROCEDURE — 84132 ASSAY OF SERUM POTASSIUM: CPT | Performed by: INTERNAL MEDICINE

## 2023-09-15 PROCEDURE — 85025 COMPLETE CBC W/AUTO DIFF WBC: CPT | Performed by: INTERNAL MEDICINE

## 2023-09-15 PROCEDURE — 25010000002 METRONIDAZOLE 500 MG/100ML SOLUTION: Performed by: INTERNAL MEDICINE

## 2023-09-15 PROCEDURE — 83735 ASSAY OF MAGNESIUM: CPT | Performed by: INTERNAL MEDICINE

## 2023-09-15 PROCEDURE — 97166 OT EVAL MOD COMPLEX 45 MIN: CPT

## 2023-09-15 PROCEDURE — 83605 ASSAY OF LACTIC ACID: CPT | Performed by: INTERNAL MEDICINE

## 2023-09-15 PROCEDURE — 76937 US GUIDE VASCULAR ACCESS: CPT

## 2023-09-15 PROCEDURE — 05HC33Z INSERTION OF INFUSION DEVICE INTO LEFT BASILIC VEIN, PERCUTANEOUS APPROACH: ICD-10-PCS | Performed by: INTERNAL MEDICINE

## 2023-09-15 RX ORDER — POTASSIUM CHLORIDE 7.45 MG/ML
10 INJECTION INTRAVENOUS
Status: COMPLETED | OUTPATIENT
Start: 2023-09-15 | End: 2023-09-15

## 2023-09-15 RX ORDER — FAMOTIDINE 40 MG/1
40 TABLET, FILM COATED ORAL DAILY
Status: DISCONTINUED | OUTPATIENT
Start: 2023-09-16 | End: 2023-09-17 | Stop reason: HOSPADM

## 2023-09-15 RX ADMIN — HEPARIN SODIUM 5000 UNITS: 5000 INJECTION INTRAVENOUS; SUBCUTANEOUS at 06:31

## 2023-09-15 RX ADMIN — POTASSIUM CHLORIDE 10 MEQ: 7.46 INJECTION, SOLUTION INTRAVENOUS at 00:25

## 2023-09-15 RX ADMIN — METRONIDAZOLE 500 MG: 500 INJECTION, SOLUTION INTRAVENOUS at 17:46

## 2023-09-15 RX ADMIN — CEFEPIME HYDROCHLORIDE 2000 MG: 2 INJECTION, POWDER, FOR SOLUTION INTRAVENOUS at 17:46

## 2023-09-15 RX ADMIN — Medication 220 MG: at 08:34

## 2023-09-15 RX ADMIN — POTASSIUM CHLORIDE 10 MEQ: 7.46 INJECTION, SOLUTION INTRAVENOUS at 18:50

## 2023-09-15 RX ADMIN — POTASSIUM CHLORIDE 10 MEQ: 7.46 INJECTION, SOLUTION INTRAVENOUS at 13:46

## 2023-09-15 RX ADMIN — POTASSIUM CHLORIDE 10 MEQ: 7.46 INJECTION, SOLUTION INTRAVENOUS at 01:31

## 2023-09-15 RX ADMIN — DOCUSATE SODIUM 50 MG AND SENNOSIDES 8.6 MG 2 TABLET: 8.6; 5 TABLET, FILM COATED ORAL at 08:33

## 2023-09-15 RX ADMIN — LEVETIRACETAM 1750 MG: 100 SOLUTION ORAL at 21:48

## 2023-09-15 RX ADMIN — POTASSIUM CHLORIDE 10 MEQ: 7.46 INJECTION, SOLUTION INTRAVENOUS at 16:48

## 2023-09-15 RX ADMIN — POTASSIUM CHLORIDE 10 MEQ: 7.46 INJECTION, SOLUTION INTRAVENOUS at 17:46

## 2023-09-15 RX ADMIN — CEFEPIME HYDROCHLORIDE 2000 MG: 2 INJECTION, POWDER, FOR SOLUTION INTRAVENOUS at 03:23

## 2023-09-15 RX ADMIN — HEPARIN SODIUM 5000 UNITS: 5000 INJECTION INTRAVENOUS; SUBCUTANEOUS at 21:48

## 2023-09-15 RX ADMIN — LAMOTRIGINE 225 MG: 100 TABLET ORAL at 21:48

## 2023-09-15 RX ADMIN — SUCRALFATE 1 G: 1 TABLET ORAL at 08:33

## 2023-09-15 RX ADMIN — POTASSIUM CHLORIDE 10 MEQ: 7.46 INJECTION, SOLUTION INTRAVENOUS at 03:18

## 2023-09-15 RX ADMIN — SUCRALFATE 1 G: 1 TABLET ORAL at 17:54

## 2023-09-15 RX ADMIN — POTASSIUM CHLORIDE 10 MEQ: 7.46 INJECTION, SOLUTION INTRAVENOUS at 19:51

## 2023-09-15 RX ADMIN — POTASSIUM CHLORIDE 10 MEQ: 7.46 INJECTION, SOLUTION INTRAVENOUS at 12:51

## 2023-09-15 RX ADMIN — Medication 10 ML: at 08:35

## 2023-09-15 RX ADMIN — ATORVASTATIN CALCIUM 10 MG: 10 TABLET, FILM COATED ORAL at 21:48

## 2023-09-15 RX ADMIN — LAMOTRIGINE 225 MG: 100 TABLET ORAL at 08:33

## 2023-09-15 RX ADMIN — HEPARIN SODIUM 5000 UNITS: 5000 INJECTION INTRAVENOUS; SUBCUTANEOUS at 16:06

## 2023-09-15 RX ADMIN — METRONIDAZOLE 500 MG: 500 INJECTION, SOLUTION INTRAVENOUS at 02:30

## 2023-09-15 RX ADMIN — POTASSIUM CHLORIDE 10 MEQ: 7.46 INJECTION, SOLUTION INTRAVENOUS at 02:26

## 2023-09-15 RX ADMIN — LEVETIRACETAM 1750 MG: 100 SOLUTION ORAL at 08:34

## 2023-09-15 RX ADMIN — FAMOTIDINE 20 MG: 20 TABLET ORAL at 08:35

## 2023-09-15 NOTE — THERAPY EVALUATION
Patient Name: Bautista Grubbs  : 1955    MRN: [id number]                              Today's Date: 9/15/2023     PT Evaluation  Admit Date: 2023    Visit Dx:     ICD-10-CM ICD-9-CM   1. Pneumonia of both lower lobes due to infectious organism  J18.9 486   2. Acute respiratory failure with hypoxia  J96.01 518.81   3. Impaired mobility and ADLs [Z74.09, Z78.9 (ICD-10-CM)]  Z74.09 V49.89    Z78.9    4. Impaired functional mobility, balance, gait, and endurance [Z74.09 (ICD-10-CM)]  Z74.09 V49.89     Patient Active Problem List   Diagnosis    Syncope    Autism    COPD (chronic obstructive pulmonary disease)    GERD (gastroesophageal reflux disease)    Hyperlipidemia    Seizures    Orthostatic hypotension    Anemia    Gastrointestinal hemorrhage    Iron deficiency anemia due to chronic blood loss    UGIB (upper gastrointestinal bleed)    MRSA bacteremia    Right upper lobe pneumonia    Bacteremia    Oropharyngeal dysphagia    Adenomatous polyp of colon    Aspiration into airway    Pneumonia    Urinary incontinence    Respiratory failure     Past Medical History:   Diagnosis Date    Alopecia     Arthritis     Autism     COPD (chronic obstructive pulmonary disease)     GERD (gastroesophageal reflux disease)     Hyperlipidemia     Hypertension     Insomnia     Intellectual disability     Lennox-Gastaut syndrome     Major depressive disorder     Orthostatic hypotension     Osteoporosis     Right bundle branch block     Scoliosis     Seizures      Past Surgical History:   Procedure Laterality Date    COLONOSCOPY N/A 2021    Procedure: COLONOSCOPY;  Surgeon: Wanda Lang MD;  Location: [facility] ENDOSCOPY;  Service: Gastroenterology;  Laterality: N/A;    COLONOSCOPY N/A 2021    Procedure: COLONOSCOPY;  Surgeon: Wanda Lang MD;  Location: [facility] ENDOSCOPY;  Service: Gastroenterology;  Laterality: N/A;    COLONOSCOPY N/A 10/14/2022    Procedure: COLONOSCOPY;  Surgeon: Wanda Lang MD;   Location: [facility] ENDOSCOPY;  Service: Gastroenterology;  Laterality: N/A;    ENDOSCOPY N/A 1/7/2021    Procedure: ESOPHAGOGASTRODUODENOSCOPY;  Surgeon: Wanda Lang MD;  Location: [facility] ENDOSCOPY;  Service: Gastroenterology;  Laterality: N/A;    ENDOSCOPY N/A 1/22/2021    Procedure: ESOPHAGOGASTRODUODENOSCOPY;  Surgeon: Wanda Lang MD;  Location: [facility] ENDOSCOPY;  Service: Gastroenterology;  Laterality: N/A;    ENDOSCOPY W/ PEG TUBE PLACEMENT N/A 6/25/2021    Procedure: ESOPHAGOGASTRODUODENOSCOPY WITH PERCUTANEOUS ENDOSCOPIC GASTROSTOMY TUBE INSERTION WITH ANESTHESIA;  Surgeon: Wanda Lang MD;  Location: [facility] ENDOSCOPY;  Service: Gastroenterology;  Laterality: N/A;    SIGMOIDOSCOPY N/A 10/11/2021    Procedure: SIGMOIDOSCOPY FLEXIBLE;  Surgeon: Wanda Lang MD;  Location: [facility] ENDOSCOPY;  Service: Gastroenterology;  Laterality: N/A;    SIGMOIDOSCOPY N/A 11/30/2021    Procedure: SIGMOIDOSCOPY FLEXIBLE;  Surgeon: Wanda Lang MD;  Location: [facility] ENDOSCOPY;  Service: Gastroenterology;  Laterality: N/A;    SIGMOIDOSCOPY N/A 6/16/2022    Procedure: SIGMOIDOSCOPY FLEXIBLE;  Surgeon: Wanda Lang MD;  Location: [facility] ENDOSCOPY;  Service: Gastroenterology;  Laterality: N/A;  argon at rectal polypectomy site    SIGMOIDOSCOPY N/A 2/24/2023    Procedure: SIGMOIDOSCOPY FLEXIBLE enemas on call at facility;  Surgeon: Wanda Lang MD;  Location: [facility] ENDOSCOPY;  Service: Gastroenterology;  Laterality: N/A;  argon to rectal polyp site    UPPER GASTROINTESTINAL ENDOSCOPY  01/22/2021    UPPER GASTROINTESTINAL ENDOSCOPY  06/25/2021      General Information       Row Name 09/15/23 1311          Physical Therapy Time and Intention    Document Type evaluation  -GB     Mode of Treatment occupational therapy;physical therapy  -GB       Row Name 09/15/23 1312 09/15/23 1311       General Information    Patient Profile Reviewed -- yes  -GB    Prior Level of Function -- independent:;all  household mobility;gait  -GB    Existing Precautions/Restrictions --   consider not to take him in our small bathroom but try bsc instead if reasonalble expectation he can go to it.  Bathroom small and multi hard surfaces if he tries to fall  -GB fall;other (see comments);seizures   NPO PEG FEEDs only; Helmet on when OOB due to falls/risk for injuries; he must chose to get up, not us make him ; he will force self to fall if upset ; gait in room only for now unless more reliable and able to follow instructions tho usu doesn't  -GB    Barriers to Rehab -- cognitive status;previous functional deficit;medically complex  -GB      Row Name 09/15/23 1311          Living Environment    People in Home facility resident  -GB       Row Name 09/15/23 1311          Cognition    Orientation Status (Cognition) unable/difficult to assess  -GB       Row Name 09/15/23 1311          Safety Issues, Functional Mobility    Safety Issues Affecting Function (Mobility) insight into deficits/self-awareness;problem-solving;judgment  -GB     Impairments Affecting Function (Mobility) cognition;muscle tone abnormal;coordination;endurance/activity tolerance;balance;postural/trunk control  -GB     Cognitive Impairments, Mobility Safety/Performance impulsivity;insight into deficits/self-awareness  -GB               User Key  (r) = Recorded By, (t) = Taken By, (c) = Cosigned By      Initials Name Provider Type    Kati Shah, PT Physical Therapist                   Mobility       Row Name 09/15/23 1311          Bed Mobility    Bed Mobility supine-sit;sit-supine  -GB     Supine-Sit [place] (Bed Mobility) supervision  -GB     Sit-Supine [place] (Bed Mobility) supervision  -GB     Assistive Device (Bed Mobility) head of bed elevated  -GB     Comment, (Bed Mobility) Pt can move sup to sit from HOB elevation and did so several times. Sitter helped him sit up in bed also but he became more resistanc to that.  -GB       Row Name  09/15/23 1311          Transfers    Comment, (Transfers) We tried several sup to sit and attempted EOB but he became resistant and returned to supine forcefully  -       Row Name 09/15/23 1311          Bed-Chair Transfer    Bed-Chair [place] (Transfers) unable to assess  -     Assistive Device (Bed-Chair Transfers) other (see comments)  -       Row Name 09/15/23 1311          Sit-Stand Transfer    Sit-Stand [place] (Transfers) unable to assess  -       Row Name 09/15/23 1311          Gait/Stairs (Locomotion)    [place] Level (Gait) unable to assess  -               User Key  (r) = Recorded By, (t) = Taken By, (c) = Cosigned By      Initials Name Provider Type    GB Kati Arellano, PT Physical Therapist                   Obj/Interventions       Row Name 09/15/23 1311          Range of Motion Comprehensive    General Range of Motion bilateral lower extremity ROM WFL  -     Comment, General Range of Motion except kyrie feet held in PF for most of session, reputed to walk on toes in gait;  -       Row Name 09/15/23 1311          Strength Comprehensive (MMT)    Comment, General Manual Muscle Testing (MMT) Assessment functional observation of kyrie LEs is he has at least 4-4+/5 hip knee and ankle PF  flx/ext but unable to test MMT specifically due to inabilty for him to follow commands  -       Row Name 09/15/23 1311          Motor Skills    Motor Skills functional endurance  -     Functional Endurance he and we tried moving him sup to sit at least 3-4 times but upright tolerance precluded him for long term sitting so he would lay back down in  about a  1 min increment.  -       Row Name 09/15/23 1311          Balance    Balance Assessment sitting static balance;sitting dynamic balance  -GB     Static Sitting Balance minimal assist;moderate assist  -GB     Dynamic Sitting Balance minimal assist;moderate assist  -GB     Position, Sitting Balance supported;long sitting  -GB        Row Name 09/15/23 1311          Sensory Assessment (Somatosensory)    Sensory Assessment (Somatosensory) LE sensation intact  appears sensate in LEs and head and hands as he moved them when touched  -GB               User Key  (r) = Recorded By, (t) = Taken By, (c) = Cosigned By      Initials Name Provider Type    GB Kati Arellano, PT Physical Therapist                   Goals/Plan       Row Name 09/15/23 1752          Bed Mobility Goal 1 (PT)    Activity/Assistive Device (Bed Mobility Goal 1, PT) sit to supine/supine to sit  -GB     [place] Level/Cues Needed (Bed Mobility Goal 1, PT) contact guard required;minimum assist (75% or more patient effort)  -GB     Time Frame (Bed Mobility Goal 1, PT) 10 days  -GB     Strategies/Barriers (Bed Mobility Goal 1, PT) pt will sit EOB x 15 min w/out trying to lay back down  -GB     Progress/Outcomes (Bed Mobility Goal 1, PT) goal not met  -GB       Row Name 09/15/23 1752          Transfer Goal 1 (PT)    Activity/Assistive Device (Transfer Goal 1, PT) sit-to-stand/stand-to-sit  -GB     [place] Level/Cues Needed (Transfer Goal 1, PT) supervision required;standby assist  -GB     Time Frame (Transfer Goal 1, PT) 10 days  -GB     Strategies/Barriers (Transfers Goal 1, PT) pt will stand w/ deann on and not throw self to floor  -GB     Progress/Outcome (Transfer Goal 1, PT) goal not met  -GB       Row Name 09/15/23 1752          Gait Training Goal 1 (PT)    Activity/Assistive Device (Gait Training Goal 1, PT) gait (walking locomotion)  -GB     [place] Level (Gait Training Goal 1, PT) standby assist  -GB     Distance (Gait Training Goal 1, PT) pt will walk to/from bed to bsc or chair in room - not toilet/bathroom- w/out falling  -GB     Time Frame (Gait Training Goal 1, PT) 2 weeks  -GB     Progress/Outcome (Gait Training Goal 1, PT) goal not met  -GB       Row Name 09/15/23 1752          Therapy Assessment/Plan (PT)    Planned Therapy Interventions (PT)  balance training;bed mobility training;gait training;patient/family education;strengthening;transfer training  -               User Key  (r) = Recorded By, (t) = Taken By, (c) = Cosigned By      Initials Name Provider Type    Kati Shah, PT Physical Therapist                   Clinical Impression       Row Name 09/15/23 1311          Pain    Pre/Posttreatment Pain Comment doesn't answer questions but he would move examiner's hand from resting on his shoudler or helping him palpate his own head.  -GUILLERMINA     Pain Intervention(s) Repositioned  -       Row Name 09/15/23 1311          Pain Scale: FACES Pre/Post-Treatment    Pain: FACES Scale, Pretreatment 0-->no hurt  -GB     Posttreatment Pain Rating 0-->no hurt  -       Row Name 09/15/23 1311          Plan of Care Review    Plan of Care Reviewed With caregiver  -     Outcome Evaluation PT Evaluation completed was co eval w/ OT. Pt alert/aware of team entering and performing eval tasks. He is able to move sup to sit in bed and reported amb indep and will throw self into floor if he objects to others helping him get up or walk. We elected we would only get him up in room to avoid more risk in mederos if he were to try to throw himself down. He had low ex sparkle for long sitting and intiated return to supine immediatly.  PT will follow in limited capacity, giving him opportunity to get up w/ us but limiting activity in room unles he becomes more reliable in staying upright. We do not want to put him at risk for more fall and being strangers to him there is limited reliabilty he will follow us well, It is immensely helpful the caregivers from [facility] are present and they will be pivotal for his success w/ mobility while out of his environment at home.  -       Row Name 09/15/23 1311          Therapy Assessment/Plan (PT)    Patient/Family Therapy Goals Statement (PT) return to [facility]  -     Rehab Potential (PT) fair, will monitor progress closely  -GUILLERMINA      Criteria for Skilled Interventions Met (PT) yes  -GB     Therapy Frequency (PT) 3 times/wk  3-5 d/w  -GB       Row Name 09/15/23 1311          Vital Signs    Pre Systolic BP Rehab 123  -GB     Pre Treatment Diastolic BP 77  -GB     Pretreatment Heart Rate (beats/min) 74  -GB     Pre SpO2 (%) --  unable to keep probe on his finger  -GB     O2 Delivery Pre Treatment room air  -GB     O2 Delivery Post Treatment room air  -GB     Pre Patient Position Supine  -GB     Intra Patient Position Sitting  -GB     Post Patient Position Supine  -GB       Row Name 09/15/23 1311          Positioning and Restraints    Pre-Treatment Position in bed  -GB     Post Treatment Position bed  -GB     In Bed call light within reach;encouraged to call for assist;exit alarm on;side rails up x2;with family/caregiver;fowlers  -GB               User Key  (r) = Recorded By, (t) = Taken By, (c) = Cosigned By      Initials Name Provider Type    Kati Shah, PT Physical Therapist                   Outcome Measures       Row Name 09/15/23 1754 09/15/23 0846       How much help from another person do you currently need...    Turning from your back to your side while in flat bed without using bedrails? 3  -GB 1  -LW    Moving from lying on back to sitting on the side of a flat bed without bedrails? 3  -GB 1  -LW    Moving to and from a bed to a chair (including a wheelchair)? 2  -GB 1  -LW    Standing up from a chair using your arms (e.g., wheelchair, bedside chair)? 2  -GB 1  -LW    Climbing 3-5 steps with a railing? 1  -GB 1  -LW    To walk in hospital room? 2  -GB 1  -LW    AM-PAC 6 Clicks Score (PT) 13  -GB 6  -LW      Row Name 09/15/23 1310          Functional Assessment    Outcome Measure Options AM-PAC 6 Clicks Daily Activity (OT)  -SJ               User Key  (r) = Recorded By, (t) = Taken By, (c) = Cosigned By      Initials Name Provider Type    Kati Shah, PT Physical Therapist    Bryant Burns, OT  Occupational Therapist    Tari Agrawal RN Registered Nurse                                 Physical Therapy Education       Title: PT OT SLP Therapies (In Progress)       Topic: Physical Therapy (In Progress)       Point: Mobility training (Not Started)       Learner Progress:  Not documented in this visit.              Point: Home exercise program (Not Started)       Learner Progress:  Not documented in this visit.              Point: Body mechanics (Not Started)       Learner Progress:  Not documented in this visit.              Point: Precautions (In Progress)       Learning Progress Summary             Patient Acceptance, E, NR by GUILLERMINA at 9/15/2023 1755    Comment: discussed historical behavior of pt re; mobilty and discussed seeing him in room only; not taking him to toilet in our bathroom since smalll and hard surfaces   Caregiver Acceptance, E, NR by GUILLERMINA at 9/15/2023 1759    Comment: discussed historical behavior of pt re; mobilty and discussed seeing him in room only; not taking him to toilet in our bathroom since smalll and hard surfaces                                         User Key       Initials Effective Dates Name Provider Type Discipline     06/16/21 -  Kati Arellano, PT Physical Therapist PT                  PT Recommendation and Plan  Planned Therapy Interventions (PT): balance training, bed mobility training, gait training, patient/family education, strengthening, transfer training  Plan of Care Reviewed With: caregiver  Outcome Evaluation: PT Evaluation completed was co eval w/ OT. Pt alert/aware of team entering and performing eval tasks. He is able to move sup to sit in bed and reported amb indep and will throw self into floor if he objects to others helping him get up or walk. We elected we would only get him up in room to avoid more risk in mederos if he were to try to throw himself down. He had low ex sparkle for long sitting and intiated return to supine immediatly.  PT will  follow in limited capacity, giving him opportunity to get up w/ us but limiting activity in room unles he becomes more reliable in staying upright. We do not want to put him at risk for more fall and being strangers to him there is limited reliabilty he will follow us well, It is immensely helpful the caregivers from [facility] are present and they will be pivotal for his success w/ mobility while out of his environment at home.     Time Calculation:   PT Evaluation Complexity  History, PT Evaluation Complexity: 3 or more personal factors and/or comorbidities  Examination of Body Systems (PT Eval Complexity): total of 3 or more elements  Clinical Presentation (PT Evaluation Complexity): stable  Clinical Decision Making (PT Evaluation Complexity): moderate complexity  Overall Complexity (PT Evaluation Complexity): low complexity     PT Charges       Row Name 09/15/23 1310             Time Calculation    Start Time 1310  -GB      Stop Time 1342  -GB      Time Calculation (min) 32 min  -GB      PT Received On 09/15/23  -GB      PT Goal Re-Cert Due Date 09/28/23  -GB         Untimed Charges    PT Eval/Re-eval Minutes 32  -GB         Total Minutes    Untimed Charges Total Minutes 32  -GB       Total Minutes 32  -GB                User Key  (r) = Recorded By, (t) = Taken By, (c) = Cosigned By      Initials Name Provider Type    Kati Shah, PT Physical Therapist                  Therapy Charges for Today       Code Description Service Date Service Provider Modifiers Qty    [id number] HC PT EVAL MOD COMPLEXITY 2 9/15/2023 Kati Arellano, PT GP 1            PT G-Codes  Outcome Measure Options: AM-PAC 6 Clicks Daily Activity (OT)  AM-PAC 6 Clicks Score (PT): 13  AM-PAC 6 Clicks Score (OT): 6       Kati Arellano PT  9/15/2023

## 2023-09-15 NOTE — PLAN OF CARE
Goal Outcome Evaluation:  Plan of Care Reviewed With: caregiver           Outcome Evaluation: Pt receives nutrition via PEG d/t dysphagia. TF order in place but has not been started at time of RD visit. RD discussed w/nursing. Will monitor and make recs as indicated.

## 2023-09-15 NOTE — THERAPY EVALUATION
Patient Name: Bautista Grubbs  : 1955    MRN: [id number]                              Today's Date: 9/15/2023       Admit Date: 2023    Visit Dx:     ICD-10-CM ICD-9-CM   1. Pneumonia of both lower lobes due to infectious organism  J18.9 486   2. Acute respiratory failure with hypoxia  J96.01 518.81   3. Impaired mobility and ADLs [Z74.09, Z78.9 (ICD-10-CM)]  Z74.09 V49.89    Z78.9      Patient Active Problem List   Diagnosis    Syncope    Autism    COPD (chronic obstructive pulmonary disease)    GERD (gastroesophageal reflux disease)    Hyperlipidemia    Seizures    Orthostatic hypotension    Anemia    Gastrointestinal hemorrhage    Iron deficiency anemia due to chronic blood loss    UGIB (upper gastrointestinal bleed)    MRSA bacteremia    Right upper lobe pneumonia    Bacteremia    Oropharyngeal dysphagia    Adenomatous polyp of colon    Aspiration into airway    Pneumonia    Urinary incontinence    Respiratory failure     Past Medical History:   Diagnosis Date    Alopecia     Arthritis     Autism     COPD (chronic obstructive pulmonary disease)     GERD (gastroesophageal reflux disease)     Hyperlipidemia     Hypertension     Insomnia     Intellectual disability     Lennox-Gastaut syndrome     Major depressive disorder     Orthostatic hypotension     Osteoporosis     Right bundle branch block     Scoliosis     Seizures      Past Surgical History:   Procedure Laterality Date    COLONOSCOPY N/A 2021    Procedure: COLONOSCOPY;  Surgeon: Wanda Lang MD;  Location: [facility] ENDOSCOPY;  Service: Gastroenterology;  Laterality: N/A;    COLONOSCOPY N/A 2021    Procedure: COLONOSCOPY;  Surgeon: Wanda Lang MD;  Location: [facility] ENDOSCOPY;  Service: Gastroenterology;  Laterality: N/A;    COLONOSCOPY N/A 10/14/2022    Procedure: COLONOSCOPY;  Surgeon: Wanda Lang MD;  Location: [facility] ENDOSCOPY;  Service: Gastroenterology;  Laterality: N/A;    ENDOSCOPY N/A 2021    Procedure:  ESOPHAGOGASTRODUODENOSCOPY;  Surgeon: Wanda Lang MD;  Location: [facility] ENDOSCOPY;  Service: Gastroenterology;  Laterality: N/A;    ENDOSCOPY N/A 1/22/2021    Procedure: ESOPHAGOGASTRODUODENOSCOPY;  Surgeon: Wanda Lang MD;  Location: [facility] ENDOSCOPY;  Service: Gastroenterology;  Laterality: N/A;    ENDOSCOPY W/ PEG TUBE PLACEMENT N/A 6/25/2021    Procedure: ESOPHAGOGASTRODUODENOSCOPY WITH PERCUTANEOUS ENDOSCOPIC GASTROSTOMY TUBE INSERTION WITH ANESTHESIA;  Surgeon: Wanda Lang MD;  Location: [facility] ENDOSCOPY;  Service: Gastroenterology;  Laterality: N/A;    SIGMOIDOSCOPY N/A 10/11/2021    Procedure: SIGMOIDOSCOPY FLEXIBLE;  Surgeon: Wanda Lang MD;  Location: [facility] ENDOSCOPY;  Service: Gastroenterology;  Laterality: N/A;    SIGMOIDOSCOPY N/A 11/30/2021    Procedure: SIGMOIDOSCOPY FLEXIBLE;  Surgeon: Wanda Lang MD;  Location: [facility] ENDOSCOPY;  Service: Gastroenterology;  Laterality: N/A;    SIGMOIDOSCOPY N/A 6/16/2022    Procedure: SIGMOIDOSCOPY FLEXIBLE;  Surgeon: Wanda Lang MD;  Location: [facility] ENDOSCOPY;  Service: Gastroenterology;  Laterality: N/A;  argon at rectal polypectomy site    SIGMOIDOSCOPY N/A 2/24/2023    Procedure: SIGMOIDOSCOPY FLEXIBLE enemas on call at facility;  Surgeon: Wanda Lang MD;  Location: [facility] ENDOSCOPY;  Service: Gastroenterology;  Laterality: N/A;  argon to rectal polyp site    UPPER GASTROINTESTINAL ENDOSCOPY  01/22/2021    UPPER GASTROINTESTINAL ENDOSCOPY  06/25/2021      General Information       Row Name 09/15/23 1310          OT Time and Intention    Document Type evaluation  -     Mode of Treatment occupational therapy;physical therapy  -       Row Name 09/15/23 1310          General Information    Patient Profile Reviewed yes  -SJ     Prior Level of Function independent:;gait;transfer;bed mobility;max assist:;ADL's;dressing;bathing  -     Existing Precautions/Restrictions fall;other (see comments);seizures   NPO PEG  FEEDs only; Helmet on when OOB due to falls/risk for injuries  -     Barriers to Rehab medically complex;previous functional deficit;cognitive status  -       Row Name 09/15/23 1310          Living Environment    People in Home facility resident  [facility]  -       Row Name 09/15/23 1310          Stairs Within Home, Primary    Stairs, Within Home, Primary Walks on his toes, walks independently without assistance or supervision, with no AE..  He walks with his helmet on. When he initiates walking he is very independent; when others assist or attempt to him to get up, he attempts to fall. He is incontient. Non-verbal. Total assist for dressing / bathing. He showers with assistance with shower chair and staff assistance. Usually unable to make his needs met. All questions provided by Caregiver at [place].  -       Row Name 09/15/23 1310          Cognition    Orientation Status (Cognition) unable/difficult to assess  -       Row Name 09/15/23 1310          Safety Issues, Functional Mobility    Safety Issues Affecting Function (Mobility) ability to follow commands;at risk behavior observed;awareness of need for assistance;friction/shear risk;impulsivity;safety precaution awareness;steps too close to assistive device;problem-solving;positioning of assistive device;judgment;insight into deficits/self-awareness;safety precautions follow-through/compliance;sequencing abilities  -     Impairments Affecting Function (Mobility) cognition;muscle tone abnormal  -     Cognitive Impairments, Mobility Safety/Performance impulsivity;problem-solving/reasoning;judgment;safety precaution awareness;safety precaution follow-through;sequencing abilities;awareness, need for assistance  -               User Key  (r) = Recorded By, (t) = Taken By, (c) = Cosigned By      Initials Name Provider Type     Bryant Salomon OT Occupational Therapist                     Mobility/ADL's       Row Name 09/15/23 1310           Bed Mobility    Bed Mobility supine-sit;sit-supine  -     Supine-Sit [place] (Bed Mobility) supervision  -     Sit-Supine [place] (Bed Mobility) supervision  -SJ       Row Name 09/15/23 1310          Activities of Daily Living    BADL Assessment/Intervention lower body dressing  -[facility] 09/15/23 1310          Lower Body Dressing Assessment/Training    [place] Level (Lower Body Dressing) doff;don;socks;dependent (less than 25% patient effort)  -               User Key  (r) = Recorded By, (t) = Taken By, (c) = Cosigned By      Initials Name Provider Type     Bryant Salomon OT Occupational Therapist                   Obj/Interventions       Row Name 09/15/23 1310          Sensory Assessment (Somatosensory)    Sensory Assessment (Somatosensory) unable/difficult to assess  -[facility] 09/15/23 1310          Range of Motion Comprehensive    General Range of Motion bilateral upper extremity ROM WFL  -SJ       Row Name 09/15/23 1310          Strength Comprehensive (MMT)    General Manual Muscle Testing (MMT) Assessment other (see comments)  -     Comment, General Manual Muscle Testing (MMT) Assessment atleast 4-/5 grossly; patient resistive / with poor command following for MMT  -               User Key  (r) = Recorded By, (t) = Taken By, (c) = Cosigned By      Initials Name Provider Type     Bryant Salomon, JORGE Occupational Therapist                   Goals/Plan    No documentation.                  Clinical Impression       Row Name 09/15/23 1310          Pain Assessment    Additional Documentation Pain Scale: FACES Pre/Post-Treatment (Group)  -[facility] 09/15/23 1310          Pain Scale: FACES Pre/Post-Treatment    Pain: FACES Scale, Pretreatment 0-->no hurt  -     Posttreatment Pain Rating 0-->no hurt  -SJ       Row Name 09/15/23 1310          Plan of Care Review    Plan of Care Reviewed With patient;caregiver  -     Outcome Evaluation OT eval complete,  co-eval with PT. Patient non-verbal. Wears helmet when OOB as patient prone to falls. Per caregiver, patient can walk, without assistance. When people assist patient to walk, he is known to be resistance and frequently throws himself on floor. Patient has a PEG tube. Dependent for all ADLs and baseline. Non-verbal at this evaluation, unable to follow commands. Supine<>sit with supervision. Unable to progress further due to patient resistance. LE dressing dependent (socks). Unable to progress further as patient becoming resistive. Due to cognitive status, risk for falls when attempting to assist patient with transfers (per caregiver), and poor command following, it is unlikley patient will make any functional improvements with skilled therapy. Patient will continue to need 24/7 care. OT to sign off, if a change in functional status occurs please re-consult.  -       Row Name 09/15/23 1310          Therapy Assessment/Plan (OT)    Patient/Family Therapy Goal Statement (OT) caregiver goal is to return to [facility]  -     Rehab Potential (OT) good, to achieve stated therapy goals  -     Criteria for Skilled Therapeutic Interventions Met (OT) no  -SJ     Therapy Frequency (OT) evaluation only  -     Predicted Duration of Therapy Intervention (OT) eval only  -       Row Name 09/15/23 1310          Therapy Plan Review/Discharge Plan (OT)    Anticipated Discharge Disposition (OT) skilled nursing facility  -       Row Name 09/15/23 1310          Vital Signs    Pre Systolic BP Rehab 123  -SJ     Pre Treatment Diastolic BP 77  -SJ     Pretreatment Heart Rate (beats/min) 74  -SJ     Pre SpO2 (%) --  Patient resisitve with pulse ox readings  -     Pre Patient Position Supine  -       Row Name 09/15/23 1310          Positioning and Restraints    Pre-Treatment Position in bed  -SJ     Post Treatment Position bed  -SJ     In Bed notified nsg;supine;call light within reach;encouraged to call for assist;exit alarm on;side  rails up x3;with family/caregiver  -               User Key  (r) = Recorded By, (t) = Taken By, (c) = Cosigned By      Initials Name Provider Type    Bryant Burns OT Occupational Therapist                   Outcome Measures       Row Name 09/15/23 1310          How much help from another is currently needed...    Putting on and taking off regular lower body clothing? 1  -SJ     Bathing (including washing, rinsing, and drying) 1  -SJ     Toileting (which includes using toilet bed pan or urinal) 1  -SJ     Putting on and taking off regular upper body clothing 1  -SJ     Taking care of personal grooming (such as brushing teeth) 1  -SJ     Eating meals 1  -SJ     AM-PAC 6 Clicks Score (OT) 6  -       Row Name 09/15/23 0846          How much help from another person do you currently need...    Turning from your back to your side while in flat bed without using bedrails? 1  -LW     Moving from lying on back to sitting on the side of a flat bed without bedrails? 1  -LW     Moving to and from a bed to a chair (including a wheelchair)? 1  -LW     Standing up from a chair using your arms (e.g., wheelchair, bedside chair)? 1  -LW     Climbing 3-5 steps with a railing? 1  -LW     To walk in hospital room? 1  -LW     AM-PAC 6 Clicks Score (PT) 6  -       Row Name 09/15/23 1310          Functional Assessment    Outcome Measure Options AM-PAC 6 Clicks Daily Activity (OT)  -               User Key  (r) = Recorded By, (t) = Taken By, (c) = Cosigned By      Initials Name Provider Type    Bryant Burns OT Occupational Therapist    Tari Agrawal RN Registered Nurse                    Occupational Therapy Education       Title: PT OT SLP Therapies (In Progress)       Topic: Occupational Therapy (In Progress)       Point: ADL training (In Progress)       Description:   Instruct learner(s) on proper safety adaptation and remediation techniques during self care or transfers.   Instruct in proper use of  assistive devices.                  Learning Progress Summary             Patient Acceptance, E,TB, NL by  at 9/15/2023 7560    Comment: POC, role of OT, transfer training                         Point: Home exercise program (Not Started)       Description:   Instruct learner(s) on appropriate technique for monitoring, assisting and/or progressing therapeutic exercises/activities.                  Learner Progress:  Not documented in this visit.              Point: Precautions (Not Started)       Description:   Instruct learner(s) on prescribed precautions during self-care and functional transfers.                  Learner Progress:  Not documented in this visit.              Point: Body mechanics (Not Started)       Description:   Instruct learner(s) on proper positioning and spine alignment during self-care, functional mobility activities and/or exercises.                  Learner Progress:  Not documented in this visit.                              User Key       Initials Effective Dates Name Provider Type Discipline     06/14/21 -  Bryant Salomon, OT Occupational Therapist OT                  OT Recommendation and Plan  Therapy Frequency (OT): evaluation only  Plan of Care Review  Plan of Care Reviewed With: patient, caregiver  Outcome Evaluation: OT eval complete, co-eval with PT. Patient non-verbal. Wears helmet when OOB as patient prone to falls. Per caregiver, patient can walk, without assistance. When people assist patient to walk, he is known to be resistance and frequently throws himself on floor. Patient has a PEG tube. Dependent for all ADLs and baseline. Non-verbal at this evaluation, unable to follow commands. Supine<>sit with supervision. Unable to progress further due to patient resistance. LE dressing dependent (socks). Unable to progress further as patient becoming resistive. Due to cognitive status, risk for falls when attempting to assist patient with transfers (per caregiver), and poor  command following, it is unlikley patient will make any functional improvements with skilled therapy. Patient will continue to need 24/7 care. OT to sign off, if a change in functional status occurs please re-consult.     Time Calculation:   Evaluation Complexity (OT)  Review Occupational Profile/Medical/Therapy History Complexity: expanded/moderate complexity  Assessment, Occupational Performance/Identification of Deficit Complexity: 3-5 performance deficits  Clinical Decision Making Complexity (OT): detailed assessment/moderate complexity  Overall Complexity of Evaluation (OT): moderate complexity     Time Calculation- OT       Row Name 09/15/23 1343             Time Calculation- OT    OT Start Time 1310  -      OT Stop Time 1343  -      OT Time Calculation (min) 33 min  -      OT Received On 09/15/23  -      OT Goal Re-Cert Due Date 09/28/23  -         Untimed Charges    OT Eval/Re-eval Minutes 33  -         Total Minutes    Untimed Charges Total Minutes 33  -       Total Minutes 33  -                User Key  (r) = Recorded By, (t) = Taken By, (c) = Cosigned By      Initials Name Provider Type     Bryant Salomon OT Occupational Therapist                  Therapy Charges for Today       Code Description Service Date Service Provider Modifiers Qty    [id number] HC OT EVAL MOD COMPLEXITY 2 9/15/2023 Bryant Salomon OT GO 1                 Bryant Salomon OT  9/15/2023

## 2023-09-15 NOTE — PROGRESS NOTES
TWO PATIENT IDENTIFIERS WERE USED. THE PATIENT WAS DRAPED WITH A FULL BODY DRAPE AND THE PATIENT'S LEFT ARM WAS PREPPED WITH CHLORA PREP. ULTRASOUND WAS USED TO LOCALIZE THELEFT BASILIC VEIN. SUBCUTANEOUS TISSUE AT THE CATHETER SITE WAS INFILTRATED WITH 2% LIDOCAINE. UNDER ULTRASOUND GUIDANCE, THE VEIN WAS ACCESSED WITH A 21 GAUGE  NEEDLE. AN 0.018 WIRE WAS THEN THREADED THROUGH THE NEEDLE. THE 21 GAUGE NEEDLE WAS REMOVED AND A 4 Irish SHEATH WAS PLACED OVER THE WIRE INTO THE VEIN.THE MIDLINE CATHETER WAS TRIMMED TO 20CM. THE MIDLINE CATHETER WAS THEN PLACED OVER THE WIRE INTO THE VEIN, THE SHEATH WAS PEELED AWAY, WIRE WAS REMOVED. CATHETER WAS FLUSHED WITH NORMAL SALINE AND CATHETER TIP APPLIED. BIOPATCH PLACED. CATHETER SECURED WITH STAT LOCK AND TEGADERM. PATIENT TOLERATED PROCEDURE WELL. THIS WAS DONE IN THE ANGIO SUITE      IMPRESSION:SUCCESSFUL PLACEMENT OF DUAL LUMEN MIDLINE.           Rosanna Dixon  9/15/2023  15:05 CDT

## 2023-09-15 NOTE — PLAN OF CARE
Goal Outcome Evaluation:  Plan of Care Reviewed With: caregiver           Outcome Evaluation: PT Evaluation completed was co eval w/ OT. Pt alert/aware of team entering and performing eval tasks. He is able to move sup to sit in bed and reported amb indep and will throw self into floor if he objects to others helping him get up or walk. We elected we would only get him up in room to avoid more risk in mederos if he were to try to throw himself down. He had low ex sparkle for long sitting and intiated return to supine immediatly.  PT will follow in limited capacity, giving him opportunity to get up w/ us but limiting activity in room unles he becomes more reliable in staying upright. We do not want to put him at risk for more fall and being strangers to him there is limited reliabilty he will follow us well, It is immensely helpful the caregivers from [facility] are present and they will be pivotal for his success w/ mobility while out of his environment at home.

## 2023-09-15 NOTE — SIGNIFICANT NOTE
09/15/23 1147   OTHER   Discipline speech language pathologist   Rehab Time/Intention   Session Not Performed unable to evaluate, medical status change     Swallow evaluation discontinued.  Pt has hx of dysphagia with aspiration on last MBS on 6/2023.  Pt remains at high risk for aspiration.

## 2023-09-15 NOTE — CONSULTS
Adult Nutrition  Assessment/PES    Patient Name:  Bautista Grubbs  YOB: 1955  MRN: [id number]  Admit Date:  9/14/2023    Assessment Date:  9/15/2023    Comments:  RD visited to assess d/t pt NPO w/order from MD for TF. Admit r/t respiratory distress, possibly aspiration pneumonia. Hx includes COPD. Pt is a res of [place] and receives nutrition via peg tube d/t dysphagia. Pt is nonverbal. Sitter from facility w/pt but she is unfamiliar w/pt's hx (wt,etc). CBW at 177#. MD order for Isosource 1.5 w/goal rate 55ml/hr and 25ml/hr H2O flushes---this will provide 1980 calories and 89 grams protein. Feeding had not been started at time of RD visit---RD reviewed order w/nursing and she will get it started. Labs: Na 146, K 2.5. Will monitor course/TF initiation/tolerance/labs/wt and make recs as indicated.      Reason for Assessment       Row Name 09/15/23 1508          Reason for Assessment    Reason For Assessment TF/PN     Identified At Risk by Screening Criteria tube feeding or parenteral nutrition                    Nutrition/Diet History       Row Name 09/15/23 1508          Nutrition/Diet History    Typical Intake (Food/Fluid/EN/PN) Pt is nonverbal. Sitter present does not have much info regarding pt's hx.                    Labs/Tests/Procedures/Meds       Row Name 09/15/23 1509          Labs/Procedures/Meds    Lab Results Reviewed reviewed, pertinent     Lab Results Comments Na 146, K 2.5        Medications    Pertinent Medications Reviewed reviewed                      Estimated/Assessed Needs - Anthropometrics       Row Name 09/15/23 1510          Anthropometrics    Weight for Calculation 75.3 kg (166 lb)        Estimated/Assessed Needs    Additional Documentation KCAL/KG (Group);Fluid Requirements (Group);Protein Requirements (Group)        KCAL/KG    KCAL/KG 25 Kcal/Kg (kcal)     25 Kcal/Kg (kcal) 1882.425        Protein Requirements    Weight Used For Protein Calculations 75.3 kg (166 lb)      Est Protein Requirement Amount (gms/kg) 1.0 gm protein     Estimated Protein Requirements (gms/day) 75.3        Fluid Requirements    Fluid Requirements (mL/day) 1800     RDA Method (mL) 1800                    Nutrition Prescription Ordered       Row Name 09/15/23 1511          Nutrition Prescription PO    Current PO Diet NPO        Nutrition Prescription EN    Enteral Route PEG     Product Isosource 1.5 (Jevity 1.5)     TF Delivery Method Continuous     Continuous TF Goal Rate (mL/hr) 55 mL/hr     Water flush (mL)  25 mL     Water Flush Frequency Per hour                         Problem/Interventions:   Problem 1       Row Name 09/15/23 1512          Nutrition Diagnoses Problem 1    Problem 1 Needs Alternate Route     Etiology (related to) Medical Diagnosis;Factors Affecting Nutrition     Oral Swallowing Difficulty     Signs/Symptoms (evidenced by) NPO                          Intervention Goal       Row Name 09/15/23 1519          Intervention Goal    General Improved nutrition related lab(s);Meet nutritional needs for age/condition     TF/PN Establish TF tolerance;Tolerate TF at goal     Weight Maintain weight                    Nutrition Intervention       Row Name 09/15/23 1519          Nutrition Intervention    RD/Tech Action Follow Tx progress                      Education/Evaluation       Row Name 09/15/23 1519          Education    Education Education not appropriate at this time     Please explain Patient nonverbal        Monitor/Evaluation    Monitor Per protocol;TF delivery/tolerance;Weight;Skin status;Symptoms;Pertinent labs                     Electronically signed by:  Adrianna Zamora RD  09/15/23 15:20 CDT

## 2023-09-15 NOTE — PLAN OF CARE
Goal Outcome Evaluation:  Plan of Care Reviewed With: patient, caregiver           Outcome Evaluation: OT hugo complete, co-eval with PT. Patient non-verbal. Wears helmet when OOB as patient prone to falls. Per caregiver, patient can walk, without assistance. When people assist patient to walk, he is known to be resistance and frequently throws himself on floor. Patient has a PEG tube. Dependent for all ADLs and baseline. Non-verbal at this evaluation, unable to follow commands. Supine<>sit with supervision. Unable to progress further due to patient resistance. LE dressing dependent (socks). Unable to progress further as patient becoming resistive. Due to cognitive status, risk for falls when attempting to assist patient with transfers (per caregiver), and poor command following, it is unlikley patient will make any functional improvements with skilled therapy. Patient will continue to need 24/7 care. OT to sign off, if a change in functional status occurs please re-consult.      Anticipated Discharge Disposition (OT): skilled nursing facility

## 2023-09-15 NOTE — PROGRESS NOTES
[facility] Medicine   INPATIENT PROGRESS NOTE      Patient Name: Bautista Grubbs  Date of Admission: 9/14/2023  Today's Date: 09/15/23  Length of Stay: 1  Primary Care Physician: Bautista James MD    Subjective   Chief Complaint and HPI:  Patient admitted on 9/14/2023 with chief complaint of respiratory distress.  Patient with history of dysphagia, depression, autism, severe intellectual delay, nonverbal, Lennox-Gastaut, seizures, hyperlipidemia, COPD, GERD.  Lives at [facility].  Recently treated for aspiration pneumonia about 2 months ago.  Has PEG tube, receives multiple boluses daily for feedings.    9/15/2023: No new issues overnight.  Caretaker at bedside.  States the patient has been on and off of room air today, usually maintaining above 90%.  She states that she notices that he seems to have reflux/aspiration more often after his tube feeding boluses when at SNF.      Review of Systems   Review of Systems as of 09/15/23   Unable to obtain, nonverbal  All pertinent negatives and positives are as above. All other systems have been reviewed and are negative unless otherwise stated.     Objective    Temp:  [98 °F (36.7 °C)-98.2 °F (36.8 °C)] 98 °F (36.7 °C)  Heart Rate:  [] 79  Resp:  [16-23] 16  BP: (100-132)/(59-73) 132/65  Physical Exam    Vitals and nursing note reviewed.   Constitutional:       General: No acute distress.     Appearance: Normal appearance.   HENT:      Head: Normocephalic and atraumatic.      Mouth: Mucous membranes are moist.   Eyes:      Conjunctivae/sclerae: Conjunctivae normal.      Pupils: Pupils are equal, round, and reactive to light.   Cardiovascular:      Rate and Rhythm: Normal rate and regular rhythm.   Pulmonary:      Effort: Pulmonary effort is normal.      Breath sounds: Normal breath sounds.   Abdominal:      General: Abdomen is flat.      Palpations: Abdomen is soft.      Tenderness: No  abdominal tenderness.   Genitourinary:     General:   normal  Musculoskeletal:         General: No signs of injury. Normal range of motion.   Skin:     General: Skin is warm and dry.   Neurological:      General: No focal deficit present.      Mental Status: Alert and oriented  Psychiatric:         Mood and Affect: Mood normal.         Behavior: Behavior normal.         Results Review:  I have reviewed the labs, radiology results, and diagnostic studies.    Laboratory Data:   Results from last 7 days   Lab Units 09/15/23  0518 09/14/23  1250   WBC 10*3/mm3 19.65* 17.35*   HEMOGLOBIN g/dL 11.0* 11.9*   HEMATOCRIT % 33.7* 36.9*   PLATELETS 10*3/mm3 175 172        Results from last 7 days   Lab Units 09/15/23  0807 09/15/23  0518 09/14/23  1250   SODIUM mmol/L  --  146* 146*   POTASSIUM mmol/L 3.1* 3.1*  3.1* 2.9*   CHLORIDE mmol/L  --  108* 103   CO2 mmol/L  --  28.0 34.0*   BUN mg/dL  --  26* 28*   CREATININE mg/dL  --  0.75* 0.93   CALCIUM mg/dL  --  8.1* 8.8   BILIRUBIN mg/dL  --   --  0.3   ALK PHOS U/L  --   --  148*   ALT (SGPT) U/L  --   --  44*   AST (SGOT) U/L  --   --  36   GLUCOSE mg/dL  --  110* 126*       Culture Data:   No results found for: BLOODCX  No results found for: URINECX  No results found for: RESPCX  No results found for: WOUNDCX  No results found for: STOOLCX  No components found for: BODYFLD    Radiology Data:   Imaging Results (Last 24 Hours)       Procedure Component Value Units Date/Time    XR Chest 1 View [[id number]] Collected: 09/14/23 1322     Updated: 09/14/23 1602    Narrative:      INDICATION:  [facility] Triage Protocol.    COMPARISON:  9/5/2023.    FINDINGS:  Cardiac size is not enlarged.  Diffuse interstitial coarsening.  No pleural  effusion or pneumothorax.  Hazy airspace opacities throughout both lungs.      Impression:      Hazy airspace opacities throughout both lungs may reflect pneumonia  or vascular congestion.  Follow-up until radiographic resolution.              I have  reviewed the patient's current medications.     Assessment/Plan     I have utilized all available immediate resources to obtain, update, or review the patient's current medications (including all prescriptions, over-the-counter products, herbals, cannabis/cannabidiol products, and vitamin/mineral/dietary (nutritional) supplements).      Active Hospital Problems    Diagnosis     **Respiratory failure        Medical Decision Making  Number and Complexity of problems: Multiple, complex    Conditions and Status:        Condition is improving.     MDM Data  External documents reviewed: prior hospitalizations, ED notes, consultation notes  My EKG interpretation: None today  My  interpretation: None today  Tests considered but not ordered: None today       Discussed with: Patient, attending      Treatment Plan  As below    Care Planning  Shared decision making: With patient caregiver  Code status and discussions: DNR/DNI    Disposition  Social Determinants of Health that impact treatment or disposition: Nonverbal, SNF resident  I expect the patient to be discharged to SNF in 1-4 days.      I confirmed that the patient's Advance Care Plan is present, code status is documented, or surrogate decision maker is listed in the patient's medical record.   ________________________________      Acute on chronic respiratory distress secondary to possible aspiration pneumonia:  - Nebulization treatments, IV Solu-Medrol, wean O2 as tolerated, IV cefepime/Flagyl, incentive spirometer  9/15/2023: WBC up to 19 today but clinically the patient is improved, maintaining on room air for quite a bit of the day.  Continue to monitor.     Lactic acidosis possibly due to early sepsis from aspiration pneumonia:  - Repeat lactic acid.  Maintenance IV fluids.  IV antibiotics.  Check blood cultures, respiratory swab PCR, respiratory culture looking for signs of infectious pathogen.  Plus as above.  9/15/2023: Blood culture shows no growth at 24  hours.  continue current antibiotic cefepime and Flagyl.  Respiratory swab, respiratory cultures still pending, S pneumo/Legionella pending.     Acute on chronic COPD exacerbation: As above in lactic acidosis and respiratory distress sections     Depression: Continue home management  Dysphagia: G-tube feeds per SNF at with training facility regimen  Seizures: Continue home meds  Hyperlipidemia: Continue home meds  GERD: Continue home meds, will increase Pepcid to 40 mg daily.     FEN n.p.o. strict with G-tube feeds and meds through G-tube  Code DNR/DNI per nursing home paperwork  PPx: Heparin subcutaneous    Copied text in this note has been reviewed and is accurate as of 09/15/23       Electronically signed by Danitza Alas PA-C, 09/15/23, 10:10 CDT.

## 2023-09-15 NOTE — PLAN OF CARE
Goal Outcome Evaluation:   Patient pulling IV lines and cords. Notified  Non violent restraints applied. Attempted to call patient brother Josh no response at this moment.. Telemetry on. Bed low and brakes on. Will continue to monitor this shift.

## 2023-09-16 LAB
ANION GAP SERPL CALCULATED.3IONS-SCNC: 7 MMOL/L (ref 5–15)
BASOPHILS # BLD AUTO: 0.02 10*3/MM3 (ref 0–0.2)
BASOPHILS NFR BLD AUTO: 0.1 % (ref 0–1.5)
BUN SERPL-MCNC: 27 MG/DL (ref 8–23)
BUN/CREAT SERPL: 39.1 (ref 7–25)
CALCIUM SPEC-SCNC: 8.3 MG/DL (ref 8.6–10.5)
CHLORIDE SERPL-SCNC: 109 MMOL/L (ref 98–107)
CO2 SERPL-SCNC: 28 MMOL/L (ref 22–29)
CREAT SERPL-MCNC: 0.69 MG/DL (ref 0.76–1.27)
DEPRECATED RDW RBC AUTO: 48.5 FL (ref 37–54)
EGFRCR SERPLBLD CKD-EPI 2021: 102.1 ML/MIN/1.73
EOSINOPHIL # BLD AUTO: 0.04 10*3/MM3 (ref 0–0.4)
EOSINOPHIL NFR BLD AUTO: 0.3 % (ref 0.3–6.2)
ERYTHROCYTE [DISTWIDTH] IN BLOOD BY AUTOMATED COUNT: 15.1 % (ref 12.3–15.4)
GLUCOSE BLDC GLUCOMTR-MCNC: 102 MG/DL (ref 70–130)
GLUCOSE BLDC GLUCOMTR-MCNC: 82 MG/DL (ref 70–130)
GLUCOSE BLDC GLUCOMTR-MCNC: 97 MG/DL (ref 70–130)
GLUCOSE BLDC GLUCOMTR-MCNC: 97 MG/DL (ref 70–130)
GLUCOSE SERPL-MCNC: 94 MG/DL (ref 65–99)
HCT VFR BLD AUTO: 33.4 % (ref 37.5–51)
HGB BLD-MCNC: 11 G/DL (ref 13–17.7)
IMM GRANULOCYTES # BLD AUTO: 0.05 10*3/MM3 (ref 0–0.05)
IMM GRANULOCYTES NFR BLD AUTO: 0.3 % (ref 0–0.5)
LYMPHOCYTES # BLD AUTO: 1.21 10*3/MM3 (ref 0.7–3.1)
LYMPHOCYTES NFR BLD AUTO: 7.6 % (ref 19.6–45.3)
MAGNESIUM SERPL-MCNC: 2.4 MG/DL (ref 1.6–2.4)
MCH RBC QN AUTO: 28.9 PG (ref 26.6–33)
MCHC RBC AUTO-ENTMCNC: 32.9 G/DL (ref 31.5–35.7)
MCV RBC AUTO: 87.9 FL (ref 79–97)
MONOCYTES # BLD AUTO: 0.75 10*3/MM3 (ref 0.1–0.9)
MONOCYTES NFR BLD AUTO: 4.7 % (ref 5–12)
NEUTROPHILS NFR BLD AUTO: 13.8 10*3/MM3 (ref 1.7–7)
NEUTROPHILS NFR BLD AUTO: 87 % (ref 42.7–76)
NRBC BLD AUTO-RTO: 0 /100 WBC (ref 0–0.2)
PLATELET # BLD AUTO: 176 10*3/MM3 (ref 140–450)
PMV BLD AUTO: 12 FL (ref 6–12)
POTASSIUM SERPL-SCNC: 3.1 MMOL/L (ref 3.5–5.2)
POTASSIUM SERPL-SCNC: 3.2 MMOL/L (ref 3.5–5.2)
POTASSIUM SERPL-SCNC: 3.7 MMOL/L (ref 3.5–5.2)
RBC # BLD AUTO: 3.8 10*6/MM3 (ref 4.14–5.8)
SODIUM SERPL-SCNC: 144 MMOL/L (ref 136–145)
WBC NRBC COR # BLD: 15.87 10*3/MM3 (ref 3.4–10.8)

## 2023-09-16 PROCEDURE — 25010000002 HEPARIN (PORCINE) PER 1000 UNITS: Performed by: INTERNAL MEDICINE

## 2023-09-16 PROCEDURE — 80048 BASIC METABOLIC PNL TOTAL CA: CPT | Performed by: INTERNAL MEDICINE

## 2023-09-16 PROCEDURE — 0 CEFEPIME PER 500 MG: Performed by: INTERNAL MEDICINE

## 2023-09-16 PROCEDURE — 84132 ASSAY OF SERUM POTASSIUM: CPT

## 2023-09-16 PROCEDURE — 83735 ASSAY OF MAGNESIUM: CPT | Performed by: INTERNAL MEDICINE

## 2023-09-16 PROCEDURE — 85025 COMPLETE CBC W/AUTO DIFF WBC: CPT | Performed by: INTERNAL MEDICINE

## 2023-09-16 PROCEDURE — 25010000002 METRONIDAZOLE 500 MG/100ML SOLUTION: Performed by: INTERNAL MEDICINE

## 2023-09-16 PROCEDURE — 82948 REAGENT STRIP/BLOOD GLUCOSE: CPT

## 2023-09-16 RX ORDER — POTASSIUM CHLORIDE 1.5 G/1.58G
40 POWDER, FOR SOLUTION ORAL EVERY 4 HOURS
Status: COMPLETED | OUTPATIENT
Start: 2023-09-16 | End: 2023-09-16

## 2023-09-16 RX ADMIN — SUCRALFATE 1 G: 1 TABLET ORAL at 17:26

## 2023-09-16 RX ADMIN — DOCUSATE SODIUM 50 MG AND SENNOSIDES 8.6 MG 2 TABLET: 8.6; 5 TABLET, FILM COATED ORAL at 21:10

## 2023-09-16 RX ADMIN — FAMOTIDINE 40 MG: 40 TABLET, FILM COATED ORAL at 09:26

## 2023-09-16 RX ADMIN — CEFEPIME HYDROCHLORIDE 2000 MG: 2 INJECTION, POWDER, FOR SOLUTION INTRAVENOUS at 01:33

## 2023-09-16 RX ADMIN — METRONIDAZOLE 500 MG: 500 INJECTION, SOLUTION INTRAVENOUS at 01:33

## 2023-09-16 RX ADMIN — Medication 10 ML: at 09:35

## 2023-09-16 RX ADMIN — METRONIDAZOLE 500 MG: 500 INJECTION, SOLUTION INTRAVENOUS at 09:34

## 2023-09-16 RX ADMIN — POTASSIUM CHLORIDE 40 MEQ: 1.5 POWDER, FOR SOLUTION ORAL at 09:26

## 2023-09-16 RX ADMIN — CEFEPIME HYDROCHLORIDE 2000 MG: 2 INJECTION, POWDER, FOR SOLUTION INTRAVENOUS at 09:34

## 2023-09-16 RX ADMIN — Medication 220 MG: at 10:10

## 2023-09-16 RX ADMIN — HEPARIN SODIUM 5000 UNITS: 5000 INJECTION INTRAVENOUS; SUBCUTANEOUS at 05:40

## 2023-09-16 RX ADMIN — CEFEPIME HYDROCHLORIDE 2000 MG: 2 INJECTION, POWDER, FOR SOLUTION INTRAVENOUS at 17:26

## 2023-09-16 RX ADMIN — SUCRALFATE 1 G: 1 TABLET ORAL at 09:26

## 2023-09-16 RX ADMIN — POTASSIUM CHLORIDE 40 MEQ: 1.5 POWDER, FOR SOLUTION ORAL at 03:04

## 2023-09-16 RX ADMIN — HEPARIN SODIUM 5000 UNITS: 5000 INJECTION INTRAVENOUS; SUBCUTANEOUS at 14:18

## 2023-09-16 RX ADMIN — ATORVASTATIN CALCIUM 10 MG: 10 TABLET, FILM COATED ORAL at 21:10

## 2023-09-16 RX ADMIN — METRONIDAZOLE 500 MG: 500 INJECTION, SOLUTION INTRAVENOUS at 17:26

## 2023-09-16 RX ADMIN — LAMOTRIGINE 225 MG: 100 TABLET ORAL at 21:10

## 2023-09-16 RX ADMIN — LAMOTRIGINE 225 MG: 100 TABLET ORAL at 09:26

## 2023-09-16 RX ADMIN — HEPARIN SODIUM 5000 UNITS: 5000 INJECTION INTRAVENOUS; SUBCUTANEOUS at 21:09

## 2023-09-16 RX ADMIN — LEVETIRACETAM 1750 MG: 100 SOLUTION ORAL at 21:10

## 2023-09-16 RX ADMIN — Medication 10 ML: at 21:10

## 2023-09-16 RX ADMIN — POTASSIUM CHLORIDE 40 MEQ: 1.5 POWDER, FOR SOLUTION ORAL at 12:12

## 2023-09-16 RX ADMIN — LEVETIRACETAM 1750 MG: 100 SOLUTION ORAL at 10:10

## 2023-09-16 NOTE — PROGRESS NOTES
Adult Nutrition  Assessment    Patient Name:  Bautista Grubbs  YOB: 1955  MRN: [id number]  Admit Date:  9/14/2023    Assessment Date:  9/16/2023    Comments:  Facility staff requesting bolus TF as they do at the facility. Staff clarified that pt receives Jevity (isosource 1.5 here) 300ml Q6hrs- this would provide 1800cal, 81g pro and 916ml fluids. Manisha dd 60ml flush before and after each bolus feed (+480ml) total 1396ml + flushes w/ meds. RD staff to follow hospital course.        Electronically signed by:  Viri Dinero RD  09/16/23 14:20 CDT

## 2023-09-16 NOTE — PLAN OF CARE
Goal Outcome Evaluation:  Plan of Care Reviewed With: patient        Progress: improving  Outcome Evaluation: Tube feed per order, currently running at 35ml/hr. No BM noted this shift, purwick in place. IV antibiotics infused per order. Pt frequently pushing himself down in the bed, staff pulling pt up in bed frequently to attempt to keep pt elevated in bed. Head of bed locked out at 30 degrees. Bed alarm in use. Restraints remain in place due to pt attemepting to pull at tubes/lines.

## 2023-09-16 NOTE — PROGRESS NOTES
[facility] Medicine   INPATIENT PROGRESS NOTE      Patient Name: Bautista Grubbs  Date of Admission: 9/14/2023  Today's Date: 09/16/23  Length of Stay: 2  Primary Care Physician: Bautista James MD    Subjective   Chief Complaint and HPI:  Patient admitted on 9/14/2023 with chief complaint of respiratory distress.  Patient with history of dysphagia, depression, autism, severe intellectual delay, nonverbal, Lennox-Gastaut, seizures, hyperlipidemia, COPD, GERD.  Lives at [facility].  Recently treated for aspiration pneumonia about 2 months ago.  Has PEG tube, receives multiple boluses daily for feedings.    9/15/2023: No new issues overnight.  Caretaker at bedside.  States the patient has been on and off of room air today, usually maintaining above 90%.  She states that she notices that he seems to have reflux/aspiration more often after his tube feeding boluses when at SNF.    9/16/2023: Midline in place, pulling out IV/ lines so still in restraints.  Maintaining on room air most of the time.       Review of Systems   Review of Systems as of 09/16/23   Unable to obtain, nonverbal  All pertinent negatives and positives are as above. All other systems have been reviewed and are negative unless otherwise stated.     Objective    Temp:  [97.1 °F (36.2 °C)-98.7 °F (37.1 °C)] 98.7 °F (37.1 °C)  Heart Rate:  [60-74] 73  Resp:  [18] 18  BP: (122-137)/(70-82) 137/82  Physical Exam    Vitals and nursing note reviewed.   Constitutional:       General: No acute distress.     Appearance: Normal appearance.   HENT:      Head: Normocephalic and atraumatic.      Mouth: Mucous membranes are moist.   Eyes:      Conjunctivae/sclerae: Conjunctivae normal.      Pupils: Pupils are equal, round, and reactive to light.   Cardiovascular:      Rate and Rhythm: Normal rate and regular rhythm.   Pulmonary:      Effort: Pulmonary effort is normal.      Breath sounds:  Normal breath sounds.   Abdominal:      General: Abdomen is flat.      Palpations: Abdomen is soft.      Tenderness: No abdominal tenderness.   Genitourinary:     General:   normal  Musculoskeletal:         General: No signs of injury. Normal range of motion.   Skin:     General: Skin is warm and dry.   Neurological:      General: No focal deficit present.      Mental Status: Alert and oriented  Psychiatric:         Mood and Affect: Mood normal.         Behavior: Behavior normal.         Results Review:  I have reviewed the labs, radiology results, and diagnostic studies.    Laboratory Data:   Results from last 7 days   Lab Units 09/16/23  0549 09/15/23  0518 09/14/23  1250   WBC 10*3/mm3 15.87* 19.65* 17.35*   HEMOGLOBIN g/dL 11.0* 11.0* 11.9*   HEMATOCRIT % 33.4* 33.7* 36.9*   PLATELETS 10*3/mm3 176 175 172          Results from last 7 days   Lab Units 09/16/23  0549 09/15/23  2358 09/15/23  0807 09/15/23  0518 09/14/23  1250   SODIUM mmol/L 144  --   --  146* 146*   POTASSIUM mmol/L 3.2* 3.1* 3.1* 3.1*  3.1* 2.9*   CHLORIDE mmol/L 109*  --   --  108* 103   CO2 mmol/L 28.0  --   --  28.0 34.0*   BUN mg/dL 27*  --   --  26* 28*   CREATININE mg/dL 0.69*  --   --  0.75* 0.93   CALCIUM mg/dL 8.3*  --   --  8.1* 8.8   BILIRUBIN mg/dL  --   --   --   --  0.3   ALK PHOS U/L  --   --   --   --  148*   ALT (SGPT) U/L  --   --   --   --  44*   AST (SGOT) U/L  --   --   --   --  36   GLUCOSE mg/dL 94  --   --  110* 126*         Culture Data:   Blood Culture   Date Value Ref Range Status   09/14/2023 No growth at 24 hours  Preliminary   09/14/2023 No growth at 24 hours  Preliminary     No results found for: URINECX  No results found for: RESPCX  No results found for: WOUNDCX  No results found for: STOOLCX  No components found for: BODYFLD    Radiology Data:   Imaging Results (Last 24 Hours)       Procedure Component Value Units Date/Time    US Guided Vascular Access [[id number]] Collected: 09/15/23 1938     Updated:  09/15/23 2728    Narrative:      Ultrasound guidance vascular    FINDINGS:  Real-time ultrasound imaging was provided for vascular access.  Please refer to  procedure note for real-time exam findings.  The left cephalic vein was found to  be patent and compressible.  Access under direct sonographic visualization.  Permanent saved images sent to the PACS for documentation.      IR Insert Midline Without Port Pump 5 Plus [[id number]] Resulted: 09/15/23 1507     Updated: 09/15/23 1507    Narrative:      This procedure was auto-finalized with no dictation required.            I have reviewed the patient's current medications.     Assessment/Plan     I have utilized all available immediate resources to obtain, update, or review the patient's current medications (including all prescriptions, over-the-counter products, herbals, cannabis/cannabidiol products, and vitamin/mineral/dietary (nutritional) supplements).      Active Hospital Problems    Diagnosis     **Respiratory failure        Medical Decision Making  Number and Complexity of problems: Multiple, complex    Conditions and Status:        Condition is improving.     [facility] Data  External documents reviewed: prior hospitalizations, ED notes, consultation notes  My EKG interpretation: None today  My  interpretation: None today  Tests considered but not ordered: None today       Discussed with: Patient, attending      Treatment Plan  As below    Care Planning  Shared decision making: With patient caregiver  Code status and discussions: DNR/DNI    Disposition  Social Determinants of Health that impact treatment or disposition: Nonverbal, SNF resident  I expect the patient to be discharged to SNF in 1-4 days.      I confirmed that the patient's Advance Care Plan is present, code status is documented, or surrogate decision maker is listed in the patient's medical record.   ________________________________      Acute on chronic respiratory distress secondary to possible  aspiration pneumonia:  - Nebulization treatments, IV Solu-Medrol, wean O2 as tolerated, IV cefepime/Flagyl, incentive spirometer  9/15/2023: WBC up to 19 today but clinically the patient is improved, maintaining on room air for quite a bit of the day.  Continue to monitor.     Lactic acidosis possibly due to early sepsis from aspiration pneumonia:  - Repeat lactic acid.  Maintenance IV fluids.  IV antibiotics.  Check blood cultures, respiratory swab PCR, respiratory culture looking for signs of infectious pathogen.  Plus as above.  9/15/2023: Blood culture shows no growth at 24 hours.  continue current antibiotic cefepime and Flagyl.  Respiratory swab, respiratory cultures still pending, S pneumo/Legionella pending.  9/16/2023: Atypicals negative.  Improving today, on room air most of the time.  WBC 15, down from 19.  Anticipate probable discharge tomorrow.     Acute on chronic COPD exacerbation: As above in lactic acidosis and respiratory distress sections     Depression: Continue home management  Dysphagia: G-tube feeds per SNF at with training facility regimen  Seizures: Continue home meds  Hyperlipidemia: Continue home meds  GERD: Continue home meds, will increase Pepcid to 40 mg daily.     FEN n.p.o. strict with G-tube feeds and meds through G-tube  Code DNR/DNI per nursing home paperwork  PPx: Heparin subcutaneous    Copied text in this note has been reviewed and is accurate as of 09/16/23       Electronically signed by Danitza Alas PA-C, 09/16/23, 10:18 CDT.

## 2023-09-16 NOTE — PLAN OF CARE
Goal Outcome Evaluation:   Restraints on still pulling lines and cords . Caretaker at the bedside. Tube feeding Bolus started per order. No signs or symptoms of distress. Bed low and locked.

## 2023-09-17 ENCOUNTER — READMISSION MANAGEMENT (OUTPATIENT)
Dept: CALL CENTER | Facility: HOSPITAL | Age: 68
End: 2023-09-17
Payer: MEDICARE

## 2023-09-17 VITALS
RESPIRATION RATE: 18 BRPM | SYSTOLIC BLOOD PRESSURE: 132 MMHG | OXYGEN SATURATION: 95 % | HEIGHT: 70 IN | HEART RATE: 73 BPM | DIASTOLIC BLOOD PRESSURE: 81 MMHG | BODY MASS INDEX: 24.68 KG/M2 | WEIGHT: 172.4 LBS | TEMPERATURE: 98.7 F

## 2023-09-17 LAB
ANION GAP SERPL CALCULATED.3IONS-SCNC: 10 MMOL/L (ref 5–15)
BASOPHILS # BLD AUTO: 0.02 10*3/MM3 (ref 0–0.2)
BASOPHILS NFR BLD AUTO: 0.2 % (ref 0–1.5)
BUN SERPL-MCNC: 17 MG/DL (ref 8–23)
BUN/CREAT SERPL: 27.4 (ref 7–25)
CALCIUM SPEC-SCNC: 7.9 MG/DL (ref 8.6–10.5)
CHLORIDE SERPL-SCNC: 106 MMOL/L (ref 98–107)
CO2 SERPL-SCNC: 25 MMOL/L (ref 22–29)
CREAT SERPL-MCNC: 0.62 MG/DL (ref 0.76–1.27)
DEPRECATED RDW RBC AUTO: 48 FL (ref 37–54)
EGFRCR SERPLBLD CKD-EPI 2021: 105.4 ML/MIN/1.73
EOSINOPHIL # BLD AUTO: 0.14 10*3/MM3 (ref 0–0.4)
EOSINOPHIL NFR BLD AUTO: 1.5 % (ref 0.3–6.2)
ERYTHROCYTE [DISTWIDTH] IN BLOOD BY AUTOMATED COUNT: 14.9 % (ref 12.3–15.4)
GLUCOSE BLDC GLUCOMTR-MCNC: 115 MG/DL (ref 70–130)
GLUCOSE SERPL-MCNC: 109 MG/DL (ref 65–99)
HCT VFR BLD AUTO: 34 % (ref 37.5–51)
HGB BLD-MCNC: 11.3 G/DL (ref 13–17.7)
IMM GRANULOCYTES # BLD AUTO: 0.1 10*3/MM3 (ref 0–0.05)
IMM GRANULOCYTES NFR BLD AUTO: 1.1 % (ref 0–0.5)
LYMPHOCYTES # BLD AUTO: 0.77 10*3/MM3 (ref 0.7–3.1)
LYMPHOCYTES NFR BLD AUTO: 8.3 % (ref 19.6–45.3)
MAGNESIUM SERPL-MCNC: 2.1 MG/DL (ref 1.6–2.4)
MCH RBC QN AUTO: 29.1 PG (ref 26.6–33)
MCHC RBC AUTO-ENTMCNC: 33.2 G/DL (ref 31.5–35.7)
MCV RBC AUTO: 87.6 FL (ref 79–97)
MONOCYTES # BLD AUTO: 0.68 10*3/MM3 (ref 0.1–0.9)
MONOCYTES NFR BLD AUTO: 7.3 % (ref 5–12)
NEUTROPHILS NFR BLD AUTO: 7.57 10*3/MM3 (ref 1.7–7)
NEUTROPHILS NFR BLD AUTO: 81.6 % (ref 42.7–76)
NRBC BLD AUTO-RTO: 0 /100 WBC (ref 0–0.2)
PLATELET # BLD AUTO: 179 10*3/MM3 (ref 140–450)
PMV BLD AUTO: 12.2 FL (ref 6–12)
POTASSIUM SERPL-SCNC: 3 MMOL/L (ref 3.5–5.2)
RBC # BLD AUTO: 3.88 10*6/MM3 (ref 4.14–5.8)
SODIUM SERPL-SCNC: 141 MMOL/L (ref 136–145)
WBC NRBC COR # BLD: 9.28 10*3/MM3 (ref 3.4–10.8)

## 2023-09-17 PROCEDURE — 83735 ASSAY OF MAGNESIUM: CPT | Performed by: INTERNAL MEDICINE

## 2023-09-17 PROCEDURE — 0 CEFEPIME PER 500 MG: Performed by: INTERNAL MEDICINE

## 2023-09-17 PROCEDURE — 85025 COMPLETE CBC W/AUTO DIFF WBC: CPT | Performed by: INTERNAL MEDICINE

## 2023-09-17 PROCEDURE — 25010000002 METRONIDAZOLE 500 MG/100ML SOLUTION: Performed by: INTERNAL MEDICINE

## 2023-09-17 PROCEDURE — 80048 BASIC METABOLIC PNL TOTAL CA: CPT | Performed by: INTERNAL MEDICINE

## 2023-09-17 PROCEDURE — 82948 REAGENT STRIP/BLOOD GLUCOSE: CPT

## 2023-09-17 PROCEDURE — 25010000002 HEPARIN (PORCINE) PER 1000 UNITS: Performed by: INTERNAL MEDICINE

## 2023-09-17 RX ORDER — FAMOTIDINE 40 MG/1
40 TABLET, FILM COATED ORAL DAILY
Qty: 30 TABLET | Refills: 0 | Status: SHIPPED | OUTPATIENT
Start: 2023-09-18 | End: 2023-10-18

## 2023-09-17 RX ORDER — CEFDINIR 250 MG/5ML
300 POWDER, FOR SUSPENSION ORAL 2 TIMES DAILY
Qty: 48 ML | Refills: 0 | Status: SHIPPED | OUTPATIENT
Start: 2023-09-17 | End: 2023-09-21

## 2023-09-17 RX ORDER — SUCRALFATE 1 G/1
1 TABLET ORAL
Qty: 40 TABLET | Refills: 0
Start: 2023-09-17 | End: 2023-09-27

## 2023-09-17 RX ORDER — METRONIDAZOLE 500 MG/1
500 TABLET ORAL 3 TIMES DAILY
Qty: 12 TABLET | Refills: 0 | Status: SHIPPED | OUTPATIENT
Start: 2023-09-17 | End: 2023-09-21

## 2023-09-17 RX ORDER — POTASSIUM CHLORIDE 1.5 G/1.58G
40 POWDER, FOR SOLUTION ORAL EVERY 4 HOURS
Status: DISCONTINUED | OUTPATIENT
Start: 2023-09-17 | End: 2023-09-17 | Stop reason: HOSPADM

## 2023-09-17 RX ADMIN — CEFEPIME HYDROCHLORIDE 2000 MG: 2 INJECTION, POWDER, FOR SOLUTION INTRAVENOUS at 01:24

## 2023-09-17 RX ADMIN — SUCRALFATE 1 G: 1 TABLET ORAL at 09:05

## 2023-09-17 RX ADMIN — POTASSIUM CHLORIDE 40 MEQ: 1.5 POWDER, FOR SOLUTION ORAL at 12:53

## 2023-09-17 RX ADMIN — LAMOTRIGINE 225 MG: 100 TABLET ORAL at 09:05

## 2023-09-17 RX ADMIN — Medication 220 MG: at 09:05

## 2023-09-17 RX ADMIN — LEVETIRACETAM 1750 MG: 100 SOLUTION ORAL at 09:04

## 2023-09-17 RX ADMIN — HEPARIN SODIUM 5000 UNITS: 5000 INJECTION INTRAVENOUS; SUBCUTANEOUS at 05:47

## 2023-09-17 RX ADMIN — METRONIDAZOLE 500 MG: 500 INJECTION, SOLUTION INTRAVENOUS at 09:05

## 2023-09-17 RX ADMIN — CEFEPIME HYDROCHLORIDE 2000 MG: 2 INJECTION, POWDER, FOR SOLUTION INTRAVENOUS at 09:10

## 2023-09-17 RX ADMIN — FAMOTIDINE 40 MG: 40 TABLET, FILM COATED ORAL at 09:05

## 2023-09-17 RX ADMIN — Medication 10 ML: at 09:18

## 2023-09-17 RX ADMIN — POTASSIUM CHLORIDE 40 MEQ: 1.5 POWDER, FOR SOLUTION ORAL at 09:06

## 2023-09-17 RX ADMIN — METRONIDAZOLE 500 MG: 500 INJECTION, SOLUTION INTRAVENOUS at 01:24

## 2023-09-17 NOTE — PLAN OF CARE
Goal Outcome Evaluation:  Plan of Care Reviewed With: patient        Progress: improving  Outcome Evaluation: Purewick in place. No BM noted this shift. Bed alarm in use. IV antibiotics infused per order. Tube feed given per bolus, pt tolerating at this time. When turning/changing pt he will attempt to pull at lines/tubes while restraints are untied, restraints continued at this time.

## 2023-09-17 NOTE — OUTREACH NOTE
Prep Survey      Flowsheet Row Responses   Jew facility patient discharged from? [place]   Is LACE score < 7 ? No   Eligibility Not Eligible   What are the reasons patient is not eligible? [facility]   Does the patient have one of the following disease processes/diagnoses(primary or secondary)? Other   Prep survey completed? Yes            Brittney SANCHEZ - Registered Nurse

## 2023-09-17 NOTE — DISCHARGE SUMMARY
[facility] Medicine Services  DISCHARGE SUMMARY       Date of Admission: 9/14/2023  Date of Discharge:  9/17/2023  Primary Care Physician: Bautista James MD    Presenting Problem/History of Present Illness:  Respiratory failure [J96.90]  Acute respiratory failure with hypoxia [J96.01]  Pneumonia of both lower lobes due to infectious organism [J18.9]       Final Discharge Diagnoses:  Active Hospital Problems    Diagnosis     **Respiratory failure     Autism     COPD (chronic obstructive pulmonary disease)     GERD (gastroesophageal reflux disease)        Consults:   Consults       Date and Time Order Name Status Description    9/14/2023  3:01 PM Hospitalist (on-call MD unless specified)              Procedures Performed:                 Pertinent Test Results:   Lab Results (most recent)       Procedure Component Value Units Date/Time    POC Glucose Once [[id number]]  (Normal) Collected: 09/17/23 0711    Specimen: Blood Updated: 09/17/23 0729     Glucose 115 mg/dL      Comment: RN NotifiedOperator: [id number] ARPIT Correa ID: [id number]       Magnesium [[id number]]  (Normal) Collected: 09/17/23 0500    Specimen: Blood Updated: 09/17/23 0645     Magnesium 2.1 mg/dL     Basic Metabolic Panel [[id number]]  (Abnormal) Collected: 09/17/23 0500    Specimen: Blood Updated: 09/17/23 0645     Glucose 109 mg/dL      BUN 17 mg/dL      Creatinine 0.62 mg/dL      Sodium 141 mmol/L      Potassium 3.0 mmol/L      Comment: Slight hemolysis detected by analyzer. Results may be affected.        Chloride 106 mmol/L      CO2 25.0 mmol/L      Calcium 7.9 mg/dL      BUN/Creatinine Ratio 27.4     Anion Gap 10.0 mmol/L      eGFR 105.4 mL/min/1.73     Narrative:      GFR Normal >60  Chronic Kidney Disease <60  Kidney Failure <15      CBC & Differential [[id number]]  (Abnormal) Collected: 09/17/23 0500    Specimen: Blood Updated: 09/17/23 0620    Narrative:      The following orders  were created for panel order CBC & Differential.  Procedure                               Abnormality         Status                     ---------                               -----------         ------                     CBC Auto Differential[[id number]]        Abnormal            Final result                 Please view results for these tests on the individual orders.    CBC Auto Differential [[id number]]  (Abnormal) Collected: 09/17/23 0500    Specimen: Blood Updated: 09/17/23 0620     WBC 9.28 10*3/mm3      RBC 3.88 10*6/mm3      Hemoglobin 11.3 g/dL      Hematocrit 34.0 %      MCV 87.6 fL      MCH 29.1 pg      MCHC 33.2 g/dL      RDW 14.9 %      RDW-SD 48.0 fl      MPV 12.2 fL      Platelets 179 10*3/mm3      Neutrophil % 81.6 %      Lymphocyte % 8.3 %      Monocyte % 7.3 %      Eosinophil % 1.5 %      Basophil % 0.2 %      Immature Grans % 1.1 %      Neutrophils, Absolute 7.57 10*3/mm3      Lymphocytes, Absolute 0.77 10*3/mm3      Monocytes, Absolute 0.68 10*3/mm3      Eosinophils, Absolute 0.14 10*3/mm3      Basophils, Absolute 0.02 10*3/mm3      Immature Grans, Absolute 0.10 10*3/mm3      nRBC 0.0 /100 WBC     POC Glucose Once [[id number]]  (Normal) Collected: 09/16/23 1933    Specimen: Blood Updated: 09/16/23 1951     Glucose 97 mg/dL      Comment: RN NotifiedOperator: [id number] JOAQUÍN MCKOYsabina ID: [id number]       Potassium [[id number]]  (Normal) Collected: 09/16/23 1506    Specimen: Blood Updated: 09/16/23 1521     Potassium 3.7 mmol/L     Blood Culture - Blood, Arm, Left [[id number]]  (Normal) Collected: 09/14/23 1324    Specimen: Blood from Arm, Left Updated: 09/16/23 1345     Blood Culture No growth at 2 days    Blood Culture - Blood, Arm, Right [[id number]]  (Normal) Collected: 09/14/23 1250    Specimen: Blood from Arm, Right Updated: 09/16/23 1315     Blood Culture No growth at 2 days    Basic Metabolic Panel [[id number]]  (Abnormal) Collected: 09/16/23 0549    Specimen: Blood Updated: 09/16/23  0624     Glucose 94 mg/dL      BUN 27 mg/dL      Creatinine 0.69 mg/dL      Sodium 144 mmol/L      Potassium 3.2 mmol/L      Chloride 109 mmol/L      CO2 28.0 mmol/L      Calcium 8.3 mg/dL      BUN/Creatinine Ratio 39.1     Anion Gap 7.0 mmol/L      eGFR 102.1 mL/min/1.73     Narrative:      GFR Normal >60  Chronic Kidney Disease <60  Kidney Failure <15      Magnesium [[id number]]  (Normal) Collected: 09/16/23 0549    Specimen: Blood Updated: 09/16/23 0624     Magnesium 2.4 mg/dL     CBC & Differential [[id number]]  (Abnormal) Collected: 09/16/23 0549    Specimen: Blood Updated: 09/16/23 0602    Narrative:      The following orders were created for panel order CBC & Differential.  Procedure                               Abnormality         Status                     ---------                               -----------         ------                     CBC Auto Differential[[id number]]        Abnormal            Final result                 Please view results for these tests on the individual orders.    CBC Auto Differential [[id number]]  (Abnormal) Collected: 09/16/23 0549    Specimen: Blood Updated: 09/16/23 0602     WBC 15.87 10*3/mm3      RBC 3.80 10*6/mm3      Hemoglobin 11.0 g/dL      Hematocrit 33.4 %      MCV 87.9 fL      MCH 28.9 pg      MCHC 32.9 g/dL      RDW 15.1 %      RDW-SD 48.5 fl      MPV 12.0 fL      Platelets 176 10*3/mm3      Neutrophil % 87.0 %      Lymphocyte % 7.6 %      Monocyte % 4.7 %      Eosinophil % 0.3 %      Basophil % 0.1 %      Immature Grans % 0.3 %      Neutrophils, Absolute 13.80 10*3/mm3      Lymphocytes, Absolute 1.21 10*3/mm3      Monocytes, Absolute 0.75 10*3/mm3      Eosinophils, Absolute 0.04 10*3/mm3      Basophils, Absolute 0.02 10*3/mm3      Immature Grans, Absolute 0.05 10*3/mm3      nRBC 0.0 /100 WBC     Potassium [[id number]]  (Abnormal) Collected: 09/15/23 2358    Specimen: Blood Updated: 09/16/23 0031     Potassium 3.1 mmol/L     S. Pneumo Ag Urine or CSF - Urine, Urine,  "Clean Catch [[id number]]  (Normal) Collected: 09/15/23 1643    Specimen: Urine, Clean Catch Updated: 09/15/23 1908     Strep Pneumo Ag Negative    Legionella Antigen, Urine - Urine, Urine, Clean Catch [[id number]]  (Normal) Collected: 09/15/23 1643    Specimen: Urine, Clean Catch Updated: 09/15/23 1907     LEGIONELLA ANTIGEN, URINE Negative    Extra Tubes [[id number]] Collected: 09/15/23 0841    Specimen: Blood, Venous Line Updated: 09/15/23 0945    Narrative:      The following orders were created for panel order Extra Tubes.  Procedure                               Abnormality         Status                     ---------                               -----------         ------                     Lavender Top[[id number]]                                     Final result                 Please view results for these tests on the individual orders.    Lavender Top [[id number]] Collected: 09/15/23 0841    Specimen: Blood Updated: 09/15/23 0945     Extra Tube hold for add-on     Comment: Auto resulted       Lactic Acid, Plasma [[id number]]  (Normal) Collected: 09/15/23 0518    Specimen: Blood Updated: 09/15/23 0625     Lactate 1.0 mmol/L     Procalcitonin [[id number]]  (Abnormal) Collected: 09/14/23 1250    Specimen: Blood Updated: 09/14/23 1822     Procalcitonin 0.88 ng/mL     Narrative:      As a Marker for Sepsis (Non-Neonates):    1. <0.5 ng/mL represents a low risk of severe sepsis and/or septic shock.  2. >2 ng/mL represents a high risk of severe sepsis and/or septic shock.    As a Marker for Lower Respiratory Tract Infections that require antibiotic therapy:    PCT on Admission    Antibiotic Therapy       6-12 Hrs later    >0.5                Strongly Recommended  >0.25 - <0.5        Recommended   0.1 - 0.25          Discouraged              Remeasure/reassess PCT  <0.1                Strongly Discouraged     Remeasure/reassess PCT    As 28 day mortality risk marker: \"Change in Procalcitonin Result\" (>80% or <=80%) if " Day 0 (or Day 1) and Day 4 values are available. Refer to http://www.[facility]-pct-calculator.com    Change in PCT <=80%  A decrease of PCT levels below or equal to 80% defines a positive change in PCT test result representing a higher risk for 28-day all-cause mortality of patients diagnosed with severe sepsis for septic shock.    Change in PCT >80%  A decrease of PCT levels of more than 80% defines a negative change in PCT result representing a lower risk for 28-day all-cause mortality of patients diagnosed with severe sepsis or septic shock.       High Sensitivity Troponin T 2Hr [[id number]]  (Normal) Collected: 09/14/23 1530    Specimen: Blood Updated: 09/14/23 1605     HS Troponin T 12 ng/L      Troponin T Delta -1 ng/L     Narrative:      High Sensitive Troponin T Reference Range:  <10.0 ng/L- Negative Female for AMI  <15.0 ng/L- Negative Male for AMI  >=10 - Abnormal Female indicating possible myocardial injury.  >=15 - Abnormal Male indicating possible myocardial injury.   Clinicians would have to utilize clinical acumen, EKG, Troponin, and serial changes to determine if it is an Acute Myocardial Infarction or myocardial injury due to an underlying chronic condition.         STAT Lactic Acid, Reflex [[id number]]  (Normal) Collected: 09/14/23 1530    Specimen: Blood Updated: 09/14/23 1601     Lactate 1.1 mmol/L     D-dimer, Quantitative [[id number]]  (Abnormal) Collected: 09/14/23 1250    Specimen: Blood Updated: 09/14/23 1426     D-Dimer, Quantitative 820 ng/mL (FEU)     Narrative:      According to the assay 's published package insert, a normal (<500 ng/mL (FEU)) D-dimer result in conjunction with a non-high clinical probability assessment, excludes deep vein thrombosis (DVT) and pulmonary embolism (PE) with high sensitivity.    D-dimer values increase with age and this can make VTE exclusion of an older population difficult. To address this, the [employer], based on best  "available evidence and recent guidelines, recommends that clinicians use age-adjusted D-dimer thresholds in patients greater than 50 years of age with: a) a low probability of PE who do not meet all Pulmonary Embolism Rule Out Criteria, or b) in those with intermediate probability of PE.   The formula for an age-adjusted D-dimer cut-off is \"age*10\".  For example, a 60 year old patient would have an age-adjusted cut-off of 600 ng/mL (FEU) and an 80 year old 800 ng/mL (FEU).      Wilson Draw [[id number]] Collected: 09/14/23 1250    Specimen: Blood Updated: 09/14/23 1415    Narrative:      The following orders were created for panel order Wilson Draw.  Procedure                               Abnormality         Status                     ---------                               -----------         ------                     Green Top (Gel)[[id number]]                                  Final result               Lavender Top[[id number]]                                     Final result               Gold Top - SST[[id number]]                                   Final result               Light Blue Top[[id number]]                                   Final result                 Please view results for these tests on the individual orders.    Lavender Top [[id number]] Collected: 09/14/23 1250    Specimen: Blood Updated: 09/14/23 1415     Extra Tube hold for add-on     Comment: Auto resulted       High Sensitivity Troponin T [[id number]]  (Normal) Collected: 09/14/23 1324    Specimen: Blood from Arm, Left Updated: 09/14/23 1406     HS Troponin T 13 ng/L     Narrative:      High Sensitive Troponin T Reference Range:  <10.0 ng/L- Negative Female for AMI  <15.0 ng/L- Negative Male for AMI  >=10 - Abnormal Female indicating possible myocardial injury.  >=15 - Abnormal Male indicating possible myocardial injury.   Clinicians would have to utilize clinical acumen, EKG, Troponin, and serial changes to determine if it is an Acute Myocardial " Infarction or myocardial injury due to an underlying chronic condition.         Manual Differential [[id number]]  (Abnormal) Collected: 09/14/23 1250    Specimen: Blood Updated: 09/14/23 1403     Neutrophil % 83.0 %      Lymphocyte % 5.0 %      Monocyte % 1.0 %      Eosinophil % 1.0 %      Bands %  10.0 %      Neutrophils Absolute 16.14 10*3/mm3      Lymphocytes Absolute 0.87 10*3/mm3      Monocytes Absolute 0.17 10*3/mm3      Eosinophils Absolute 0.17 10*3/mm3      Anisocytosis Slight/1+     WBC Morphology Normal     Platelet Estimate Adequate    COVID-19 and FLU A/B PCR - Swab, Nasopharynx [[id number]]  (Normal) Collected: 09/14/23 1322    Specimen: Swab from Nasopharynx Updated: 09/14/23 1401     COVID19 Not Detected     Influenza A PCR Not Detected     Influenza B PCR Not Detected    Narrative:      Fact sheet for providers: https://www.fda.gov/media/336826/download    Fact sheet for patients: https://www.fda.gov/media/853122/download    Test performed by PCR.    Green Top (Gel) [[id number]] Collected: 09/14/23 1250    Specimen: Blood Updated: 09/14/23 1400     Extra Tube Hold for add-ons.     Comment: Auto resulted.       Light Blue Top [[id number]] Collected: 09/14/23 1250    Specimen: Blood Updated: 09/14/23 1400     Extra Tube Hold for add-ons.     Comment: Auto resulted       Gold Top - SST [[id number]] Collected: 09/14/23 1250    Specimen: Blood Updated: 09/14/23 1400     Extra Tube Hold for add-ons.     Comment: Auto resulted.       Lactic Acid, Plasma [[id number]]  (Abnormal) Collected: 09/14/23 1250    Specimen: Blood Updated: 09/14/23 1341     Lactate 2.4 mmol/L     Blood Gas, Arterial - [[id number]]  (Abnormal) Collected: 09/14/23 1340    Specimen: Arterial Blood Updated: 09/14/23 1339     Site Left Brachial     Otoniel's Test N/A     pH, Arterial 7.451 pH units      Comment: 83 Value above reference range        pCO2, Arterial 50.0 mm Hg      Comment: 83 Value above reference range        pO2, Arterial 111.0  mm Hg      Comment: 83 Value above reference range        HCO3, Arterial 34.8 mmol/L      Comment: 83 Value above reference range        Base Excess, Arterial 9.2 mmol/L      Comment: 83 Value above reference range        O2 Saturation, Arterial 99.3 %      Comment: 83 Value above reference range        Barometric Pressure for Blood Gas 751 mmHg      Modality Simple Mask     Flow Rate 10.0 lpm      Ventilator Mode NA     Collected by      Comment: Meter: [id number]     :  [id number]       Comprehensive Metabolic Panel [[id number]]  (Abnormal) Collected: 09/14/23 1250    Specimen: Blood Updated: 09/14/23 1328     Glucose 126 mg/dL      BUN 28 mg/dL      Creatinine 0.93 mg/dL      Sodium 146 mmol/L      Potassium 2.9 mmol/L      Chloride 103 mmol/L      CO2 34.0 mmol/L      Calcium 8.8 mg/dL      Total Protein 7.6 g/dL      Albumin 3.4 g/dL      ALT (SGPT) 44 U/L      AST (SGOT) 36 U/L      Alkaline Phosphatase 148 U/L      Total Bilirubin 0.3 mg/dL      Globulin 4.2 gm/dL      A/G Ratio 0.8 g/dL      BUN/Creatinine Ratio 30.1     Anion Gap 9.0 mmol/L      eGFR 90.6 mL/min/1.73     Narrative:      GFR Normal >60  Chronic Kidney Disease <60  Kidney Failure <15      Single High Sensitivity Troponin T [[id number]]  (Abnormal) Collected: 09/14/23 1250    Specimen: Blood Updated: 09/14/23 1328     HS Troponin T 15 ng/L     Narrative:      High Sensitive Troponin T Reference Range:  <10.0 ng/L- Negative Female for AMI  <15.0 ng/L- Negative Male for AMI  >=10 - Abnormal Female indicating possible myocardial injury.  >=15 - Abnormal Male indicating possible myocardial injury.   Clinicians would have to utilize clinical acumen, EKG, Troponin, and serial changes to determine if it is an Acute Myocardial Infarction or myocardial injury due to an underlying chronic condition.         BNP [[id number]]  (Normal) Collected: 09/14/23 1250    Specimen: Blood Updated: 09/14/23 1326     proBNP 371.4 pg/mL     Narrative:       Among patients with dyspnea, NT-proBNP is highly sensitive for the detection of acute congestive heart failure. In addition NT-proBNP of <300 pg/ml effectively rules out acute congestive heart failure with 99% negative predictive value.            Imaging Results (Most Recent)       Procedure Component Value Units Date/Time    US Guided Vascular Access [[id number]] Collected: 09/15/23 1753     Updated: 09/15/23 1756    Narrative:      Ultrasound guidance vascular    FINDINGS:  Real-time ultrasound imaging was provided for vascular access.  Please refer to  procedure note for real-time exam findings.  The left cephalic vein was found to  be patent and compressible.  Access under direct sonographic visualization.  Permanent saved images sent to the PACS for documentation.      IR Insert Midline Without Port Pump 5 Plus [[id number]] Resulted: 09/15/23 1507     Updated: 09/15/23 1507    Narrative:      This procedure was auto-finalized with no dictation required.    XR Chest 1 View [[id number]] Collected: 09/14/23 1322     Updated: 09/14/23 1602    Narrative:      INDICATION:  [facility] Triage Protocol.    COMPARISON:  9/5/2023.    FINDINGS:  Cardiac size is not enlarged.  Diffuse interstitial coarsening.  No pleural  effusion or pneumothorax.  Hazy airspace opacities throughout both lungs.      Impression:      Hazy airspace opacities throughout both lungs may reflect pneumonia  or vascular congestion.  Follow-up until radiographic resolution.              Chief Complaint on Day of Discharge: none    Hospital Course:  The patient is a 67 y.o. male who presented to [facility] with chief complaint of respiratory distress. Patient with history of dysphagia, depression, autism, severe intellectual delay, nonverbal, Lennox-Gastaut, seizures, hyperlipidemia, COPD, GERD. Lives at [facility]. Recently treated for aspiration pneumonia about 2 months ago. Has PEG tube, receives multiple boluses daily for  "feedings.     He was admitted for acute on chronic respiratory distress due to possible aspiration pneumonia, lactic acidosis, acute on chronic COPD exacerbation.  He was placed on antibiotics including cefepime, Flagyl.  He tolerated this well.  He returned to baseline and is stable for discharge back to facility.    Condition on Discharge: Stable    Physical Exam on Discharge:  /81 (BP Location: Right arm, Patient Position: Lying)   Pulse 73   Temp 98.7 °F (37.1 °C) (Temporal)   Resp 18   Ht 177.8 cm (70\")   Wt 78.2 kg (172 lb 6.4 oz)   SpO2 95%   BMI 24.74 kg/m²     Vitals and nursing note reviewed.   Constitutional:       General: No acute distress.     Appearance: Normal appearance.   HENT:      Head: Normocephalic and atraumatic.      Right Ear: External ear normal.      Left Ear: External ear normal.      Nose: Nose normal.      Mouth/Throat:      Mouth: Mucous membranes are moist.   Eyes:      Conjunctivae/sclerae: Conjunctivae normal.      Pupils: Pupils are equal, round, and reactive to light.   Cardiovascular:      Rate and Rhythm: Normal rate and regular rhythm.   Pulmonary:      Effort: Pulmonary effort is normal.      Breath sounds: Normal breath sounds.   Abdominal:      General: Abdomen is flat.      Palpations: Abdomen is soft.      Tenderness: There is no abdominal tenderness.   Genitourinary:   Musculoskeletal:         General: No signs of injury. Normal range of motion.      Cervical back: No tenderness.   Skin:     General: Skin is warm and dry.   Neurological:      General: No focal deficit present.      Mental Status: Alert and oriented to person  Psychiatric:         Mood and Affect: Mood normal.         Behavior: Behavior normal.         Discharge Medications:     Discharge Medications        New Medications        Instructions Start Date   cefdinir 250 MG/5ML suspension  Commonly known as: OMNICEF   300 mg, Oral, 2 Times Daily      metroNIDAZOLE 500 MG tablet  Commonly known " as: Flagyl   500 mg, Oral, 3 Times Daily             Changes to Medications        Instructions Start Date   famotidine 40 MG tablet  Commonly known as: PEPCID  What changed:   medication strength  how much to take  additional instructions   40 mg, Per G Tube, Daily   Start Date: September 18, 2023     midodrine 5 MG tablet  Commonly known as: PROAMATINE  What changed:   how to take this  additional instructions   5 mg, Oral, 3 Times Daily             Continue These Medications        Instructions Start Date   albuterol (2.5 MG/3ML) 0.083% nebulizer solution  Commonly known as: PROVENTIL   No dose, route, or frequency recorded.      albuterol sulfate  (90 Base) MCG/ACT inhaler  Commonly known as: PROVENTIL HFA;VENTOLIN HFA;PROAIR HFA   2 puffs, Inhalation, Every 4 Hours PRN      ammonium lactate 12 % cream  Commonly known as: AMLACTIN   1 application , Topical, As Needed      calcium carbonate-vitamin d 600-400 MG-UNIT per tablet   1 tablet, Per G Tube, 2 Times Daily, Per G-Tube      DIAZEPAM RE   10 mg, Rectal, As Needed      ferrous sulfate 325 (65 FE) MG tablet   325 mg, Per G Tube, Daily With Dinner, Per G-Tube      ibuprofen 600 MG tablet  Commonly known as: ADVIL,MOTRIN   600 mg, Oral, Every 8 Hours PRN      ipratropium-albuterol 0.5-2.5 mg/3 ml nebulizer  Commonly known as: DUO-NEB   3 mL, Nebulization, Every 4 Hours PRN      JEVITY 1.5 JEFFERY PO   Oral, Gets 390mL bolus every 6 hours 1A-7A-1P-7P       lamoTRIgine 200 MG tablet  Commonly known as: LaMICtal   200 mg, Per G Tube, 2 Times Daily, Per G-Tube To Equal 225 MG       lamoTRIgine 25 MG tablet  Commonly known as: LaMICtal   25 mg, Per G Tube, 2 Times Daily, Per G-Tube To Equal 225 MG       levETIRAcetam 100 MG/ML solution  Commonly known as: KEPPRA   1,750 mg, Per G Tube, 2 Times Daily      LORazepam 1 MG tablet  Commonly known as: ATIVAN   GIVE ONE TABLET PER G-TUBE TWICE DAILY FOR ANXIETY AND AGITATION      magnesium hydroxide 400 MG/5ML  suspension  Commonly known as: MILK OF MAGNESIA   Oral, Daily PRN      Prolia 60 MG/ML solution prefilled syringe syringe  Generic drug: denosumab   60 mg, Subcutaneous, Every 6 Months      simvastatin 20 MG tablet  Commonly known as: ZOCOR   20 mg, Per G Tube, Every Evening, Per G-Tube      sucralfate 1 g tablet  Commonly known as: CARAFATE   1 g, Per G Tube, 4 Times Daily Before Meals & Nightly      thioridazine 100 MG tablet  Commonly known as: MELLARIL   200 mg, Per G Tube, 2 Times Daily, Per G-Tube               Discharge Diet:   Diet Instructions       Diet: Nothing By Mouth, Tube Feeding; Bolus; per RD      Discharge Diet:  Nothing By Mouth  Tube Feeding       Feeding Type: Bolus    Formula, Amount & Frequency: per RD             Activity at Discharge:   Activity Instructions       Gradually Increase Activity Until at Pre-Hospitalization Level               Discharge Care Plan/Instructions:     Acute on chronic respiratory distress secondary to possible aspiration pneumonia:  - Nebulization treatments, IV Solu-Medrol, wean O2 as tolerated, IV cefepime/Flagyl, incentive spirometer  9/15/2023: WBC up to 19 today but clinically the patient is improved, maintaining on room air for quite a bit of the day.  Continue to monitor.     Lactic acidosis possibly due to early sepsis from aspiration pneumonia:  - Repeat lactic acid.  Maintenance IV fluids.  IV antibiotics.  Check blood cultures, respiratory swab PCR, respiratory culture looking for signs of infectious pathogen.  Plus as above.  9/15/2023: Blood culture shows no growth at 24 hours.  continue current antibiotic cefepime and Flagyl.  Respiratory swab, respiratory cultures still pending, S pneumo/Legionella pending.  9/16/2023: Atypicals negative.  Improving today, on room air most of the time.  WBC 15, down from 19.  Anticipate probable discharge tomorrow.     Acute on chronic COPD exacerbation: As above in lactic acidosis and respiratory distress sections      Depression: Continue home management  Dysphagia: G-tube feeds per SNF at with training facility regimen  Seizures: Continue home meds  Hyperlipidemia: Continue home meds  GERD: Continue home meds, will increase Pepcid to 40 mg daily.     FEN n.p.o. strict with G-tube feeds and meds through G-tube  Code DNR/DNI per nursing home paperwork  PPx: Heparin subcutaneous      Follow-up Appointments:   Future Appointments   Date Time Provider Department Center   10/16/2023 11:00 AM Sloan Her APRN [facility]   10/18/2023  3:15 PM Wanda Lang MD [facility]       Test Results Pending at Discharge:   Pending Labs       Order Current Status    Blood Culture - Blood, Arm, Left Preliminary result    Blood Culture - Blood, Arm, Right Preliminary result              Copied text in this note has been reviewed and is accurate as of 9/17/2023     Time: 33 minutes spent preparing discharge to include chart review, patient evaluation, medication reconciliation, discharge orders, and reviewing DC plan with CM, RN, patient and family to facilitate safe discharge.

## 2023-09-17 NOTE — PLAN OF CARE
Goal Outcome Evaluation:   Patient resting in bed with eyes closed . Restraints on. Care giver at the bedside. Bed low and alarm on. Will continue to monitor this shift.

## 2023-09-19 LAB
BACTERIA SPEC AEROBE CULT: NORMAL
BACTERIA SPEC AEROBE CULT: NORMAL

## 2023-09-29 ENCOUNTER — ANESTHESIA (OUTPATIENT)
Dept: GASTROENTEROLOGY | Facility: HOSPITAL | Age: 68
End: 2023-09-29
Payer: MEDICARE

## 2023-09-29 ENCOUNTER — ANESTHESIA EVENT (OUTPATIENT)
Dept: GASTROENTEROLOGY | Facility: HOSPITAL | Age: 68
End: 2023-09-29
Payer: MEDICARE

## 2023-09-29 ENCOUNTER — HOSPITAL ENCOUNTER (OUTPATIENT)
Facility: HOSPITAL | Age: 68
Setting detail: HOSPITAL OUTPATIENT SURGERY
Discharge: HOME | End: 2023-09-29
Attending: INTERNAL MEDICINE | Admitting: INTERNAL MEDICINE
Payer: MEDICARE

## 2023-09-29 VITALS
HEIGHT: 70 IN | SYSTOLIC BLOOD PRESSURE: 109 MMHG | WEIGHT: 172.4 LBS | OXYGEN SATURATION: 93 % | TEMPERATURE: 97 F | HEART RATE: 76 BPM | BODY MASS INDEX: 24.68 KG/M2 | DIASTOLIC BLOOD PRESSURE: 71 MMHG | RESPIRATION RATE: 18 BRPM

## 2023-09-29 DIAGNOSIS — D12.6 ADENOMATOUS POLYP OF COLON, UNSPECIFIED PART OF COLON: ICD-10-CM

## 2023-09-29 PROCEDURE — 88305 TISSUE EXAM BY PATHOLOGIST: CPT

## 2023-09-29 PROCEDURE — 25010000002 PROPOFOL 10 MG/ML EMULSION: Performed by: NURSE ANESTHETIST, CERTIFIED REGISTERED

## 2023-09-29 PROCEDURE — 45338 SIGMOIDOSCOPY W/TUMR REMOVE: CPT | Performed by: INTERNAL MEDICINE

## 2023-09-29 PROCEDURE — 45346 SIGMOIDOSCOPY W/ABLATION: CPT | Performed by: INTERNAL MEDICINE

## 2023-09-29 RX ORDER — PROPOFOL 10 MG/ML
VIAL (ML) INTRAVENOUS AS NEEDED
Status: DISCONTINUED | OUTPATIENT
Start: 2023-09-29 | End: 2023-09-29 | Stop reason: SURG

## 2023-09-29 RX ORDER — DEXTROSE AND SODIUM CHLORIDE 5; .45 G/100ML; G/100ML
30 INJECTION, SOLUTION INTRAVENOUS CONTINUOUS PRN
Status: DISCONTINUED | OUTPATIENT
Start: 2023-09-29 | End: 2023-09-29 | Stop reason: HOSPADM

## 2023-09-29 RX ORDER — LIDOCAINE HYDROCHLORIDE 20 MG/ML
INJECTION, SOLUTION EPIDURAL; INFILTRATION; INTRACAUDAL; PERINEURAL AS NEEDED
Status: DISCONTINUED | OUTPATIENT
Start: 2023-09-29 | End: 2023-09-29 | Stop reason: SURG

## 2023-09-29 RX ADMIN — DEXTROSE AND SODIUM CHLORIDE: 5; 450 INJECTION, SOLUTION INTRAVENOUS at 14:39

## 2023-09-29 RX ADMIN — PROPOFOL 50 MG: 10 INJECTION, EMULSION INTRAVENOUS at 14:40

## 2023-09-29 RX ADMIN — LIDOCAINE HYDROCHLORIDE 50 MG: 20 INJECTION, SOLUTION EPIDURAL; INFILTRATION; INTRACAUDAL; PERINEURAL at 14:40

## 2023-09-29 RX ADMIN — PROPOFOL 40 MG: 10 INJECTION, EMULSION INTRAVENOUS at 14:44

## 2023-09-29 NOTE — ANESTHESIA POSTPROCEDURE EVALUATION
Patient: Bautista Grubbs    Procedure Summary       Date: 09/29/23 Room / Location: [facility] ENDOSCOPY 1 / [facility] ENDOSCOPY    Anesthesia Start: 1439 Anesthesia Stop: 1450    Procedure: SIGMOIDOSCOPY FLEXIBLE Diagnosis:       Adenomatous polyp of colon, unspecified part of colon      (Adenomatous polyp of colon, unspecified part of colon [D12.6])    Surgeons: Wanda Lang MD Provider: Nancy Rossi CRNA    Anesthesia Type: MAC ASA Status: 3            Anesthesia Type: MAC    Vitals  Vitals Value Taken Time   /71 09/29/23 1501   Temp 97 °F (36.1 °C) 09/29/23 1454   Pulse 76 09/29/23 1501   Resp 18 09/29/23 1501   SpO2 93 % 09/29/23 1501           Anesthesia Post Evaluation

## 2023-09-29 NOTE — ANESTHESIA POSTPROCEDURE EVALUATION
"Patient: Bautista Grubbs    Procedure Summary       Date: 09/29/23 Room / Location: [facility] ENDOSCOPY 1 / [facility] ENDOSCOPY    Anesthesia Start: 1439 Anesthesia Stop: 1450    Procedure: SIGMOIDOSCOPY FLEXIBLE Diagnosis:       Adenomatous polyp of colon, unspecified part of colon      (Adenomatous polyp of colon, unspecified part of colon [D12.6])    Surgeons: Wanda Lang MD Provider: Nancy Rossi CRNA    Anesthesia Type: MAC ASA Status: 3            Anesthesia Type: MAC    Vitals  No vitals data found for the desired time range.          Post Anesthesia Care and Evaluation    Patient location during evaluation: PHASE II  Patient participation: complete - patient participated  Level of consciousness: awake  Pain management: adequate    Airway patency: patent  Anesthetic complications: No anesthetic complications  PONV Status: none  Cardiovascular status: acceptable  Respiratory status: acceptable  Hydration status: acceptable    Comments: BP (!) 121/110   Pulse 102   Temp 96.9 °F (36.1 °C)   Resp 18   Ht 177.8 cm (70\")   Wt 78.2 kg (172 lb 6.4 oz)   SpO2 93%   BMI 24.74 kg/m²       "

## 2023-09-29 NOTE — PROGRESS NOTES
Chief Complaint   Patient presents with    Colon Polyps     6 Month F/U          Subjective    Bautista Grubbs is a 67 y.o. male.    History of Present Illness  Patient presented to GI clinic for follow-up visit today.  No further nausea or emesis.  Bowel movements regular.  No further rectal bleeding.  Tolerating tube feeds.  Weight is stable.  Due for sigmoidoscopy for repeat evaluation of the rectal polyp.       The following portions of the patient's history were reviewed and updated as appropriate:   Past Medical History:   Diagnosis Date    Alopecia     Arthritis     Autism     COPD (chronic obstructive pulmonary disease)     GERD (gastroesophageal reflux disease)     Hyperlipidemia     Hypertension     Insomnia     Intellectual disability     Lennox-Gastaut syndrome     Major depressive disorder     Orthostatic hypotension     Osteoporosis     Right bundle branch block     Scoliosis     Seizures      Past Surgical History:   Procedure Laterality Date    COLONOSCOPY N/A 1/8/2021    Procedure: COLONOSCOPY;  Surgeon: Wanda Lang MD;  Location: [facility] ENDOSCOPY;  Service: Gastroenterology;  Laterality: N/A;    COLONOSCOPY N/A 9/2/2021    Procedure: COLONOSCOPY;  Surgeon: Wanda Lang MD;  Location: [facility] ENDOSCOPY;  Service: Gastroenterology;  Laterality: N/A;    COLONOSCOPY N/A 10/14/2022    Procedure: COLONOSCOPY;  Surgeon: Wanda Lang MD;  Location: [facility] ENDOSCOPY;  Service: Gastroenterology;  Laterality: N/A;    ENDOSCOPY N/A 1/7/2021    Procedure: ESOPHAGOGASTRODUODENOSCOPY;  Surgeon: Wanda Lang MD;  Location: [facility] ENDOSCOPY;  Service: Gastroenterology;  Laterality: N/A;    ENDOSCOPY N/A 1/22/2021    Procedure: ESOPHAGOGASTRODUODENOSCOPY;  Surgeon: Wanda Lang MD;  Location: [facility] ENDOSCOPY;  Service: Gastroenterology;  Laterality: N/A;    ENDOSCOPY W/ PEG TUBE PLACEMENT N/A 6/25/2021    Procedure: ESOPHAGOGASTRODUODENOSCOPY WITH PERCUTANEOUS ENDOSCOPIC GASTROSTOMY  TUBE INSERTION WITH ANESTHESIA;  Surgeon: Wanda Lang MD;  Location: [facility] ENDOSCOPY;  Service: Gastroenterology;  Laterality: N/A;    SIGMOIDOSCOPY N/A 10/11/2021    Procedure: SIGMOIDOSCOPY FLEXIBLE;  Surgeon: Wanda Lang MD;  Location: [facility] ENDOSCOPY;  Service: Gastroenterology;  Laterality: N/A;    SIGMOIDOSCOPY N/A 11/30/2021    Procedure: SIGMOIDOSCOPY FLEXIBLE;  Surgeon: Wanda Lang MD;  Location: [facility] ENDOSCOPY;  Service: Gastroenterology;  Laterality: N/A;    SIGMOIDOSCOPY N/A 6/16/2022    Procedure: SIGMOIDOSCOPY FLEXIBLE;  Surgeon: Wanda Lang MD;  Location: [facility] ENDOSCOPY;  Service: Gastroenterology;  Laterality: N/A;  argon at rectal polypectomy site    SIGMOIDOSCOPY N/A 2/24/2023    Procedure: SIGMOIDOSCOPY FLEXIBLE enemas on call at facility;  Surgeon: Wanda Lang MD;  Location: [facility] ENDOSCOPY;  Service: Gastroenterology;  Laterality: N/A;  argon to rectal polyp site    UPPER GASTROINTESTINAL ENDOSCOPY  01/22/2021    UPPER GASTROINTESTINAL ENDOSCOPY  06/25/2021     Family History   Problem Relation Age of Onset    Hypertension Father        Prior to Admission medications    Medication Sig Start Date End Date Taking? Authorizing Provider   albuterol (PROVENTIL) (2.5 MG/3ML) 0.083% nebulizer solution  10/12/21  Yes Patricia Driscoll MD   calcium carbonate-vitamin d 600-400 MG-UNIT per tablet Administer 1 tablet per G tube 2 (Two) Times a Day. Per G-Tube   Yes Patricia Driscoll MD   ferrous sulfate 325 (65 FE) MG tablet Administer 1 tablet per G tube Daily With Dinner. Per G-Tube   Yes Patricia Driscoll MD   lamoTRIgine (LaMICtal) 200 MG tablet Administer 1 tablet per G tube 2 (Two) Times a Day. Per G-Tube To Equal 225 MG   Yes Patricia Driscoll MD   lamoTRIgine (LaMICtal) 25 MG tablet Administer 1 tablet per G tube 2 (Two) Times a Day. Per G-Tube To Equal 225 MG   Yes Patricia Driscoll MD   levETIRAcetam (KEPPRA) 100 MG/ML solution  Administer 17.5 mL per G tube 2 (Two) Times a Day. 3/21/23  Yes Patricia Driscoll MD   LORazepam (ATIVAN) 1 MG tablet GIVE ONE TABLET PER G-TUBE TWICE DAILY FOR ANXIETY AND AGITATION 2/2/22  Yes Christelle Smith APRN   midodrine (PROAMATINE) 5 MG tablet Take 1 tablet by mouth 3 (Three) Times a Day. 2/4/19  Yes Earnestine Reid MD   albuterol sulfate  (90 Base) MCG/ACT inhaler Inhale 2 puffs Every 4 (Four) Hours As Needed for Shortness of Air or Wheezing. 1/7/23   Carlos A Ely MD   ammonium lactate (AMLACTIN) 12 % cream Apply 1 g topically to the appropriate area as directed As Needed for Dry Skin.    Patricia Driscoll MD   denosumab (Prolia) 60 MG/ML solution prefilled syringe syringe Inject 1 mL under the skin into the appropriate area as directed. Every 6 Months    Patricia Driscoll MD   DIAZEPAM RE Insert 10 mg into the rectum As Needed (For Seizure Activity).    Patricia Driscoll MD   famotidine (PEPCID) 40 MG tablet Administer 1 tablet per G tube Daily for 30 days. 9/18/23 10/18/23  Danitza Alas PA-C   ibuprofen (ADVIL,MOTRIN) 600 MG tablet Take 1 tablet by mouth Every 8 (Eight) Hours As Needed for Moderate Pain . 1/19/22   Sandeep Fernandez MD   ipratropium-albuterol (DUO-NEB) 0.5-2.5 mg/3 ml nebulizer Take 3 mL by nebulization Every 4 (Four) Hours As Needed for Shortness of Air. 4/15/22   Leander Meade MD   magnesium hydroxide (MILK OF MAGNESIA) 400 MG/5ML suspension Take  by mouth Daily As Needed for Constipation.    Patricia Driscoll MD   Nutritional Supplements (JEVITY 1.5 JEFFERY PO) Take  by mouth. Gets 390mL bolus every 6 hours 1A-7A-1P-7P    Patricia Driscoll MD   simvastatin (ZOCOR) 20 MG tablet Administer 1 tablet per G tube Every Evening. Per G-Tube    Patricia Driscoll MD   thioridazine (MELLARIL) 100 MG tablet Administer 2 tablets per G tube 2 (Two) Times a Day. Per G-Tube    Patricia Driscoll MD     Allergies   Allergen Reactions    Haldol  "[Haloperidol] Unknown (See Comments)     Unknown      Levaquin [Levofloxacin] Other (See Comments)     Lowers seizure threshold    Other Unknown - High Severity     Social History     Socioeconomic History    Marital status: Single   Tobacco Use    Smoking status: Never    Smokeless tobacco: Never   Vaping Use    Vaping Use: Never used   Substance and Sexual Activity    Alcohol use: Never    Drug use: Never    Sexual activity: Defer       Review of Systems  Review of Systems   Unable to perform ROS: Patient nonverbal      /77 (BP Location: Right arm)   Pulse 77   Ht 177.8 cm (70\")   BMI 26.06 kg/m²     Objective    Physical Exam  Constitutional:       Appearance: He is well-developed.   HENT:      Head: Normocephalic and atraumatic.   Eyes:      Conjunctiva/sclera: Conjunctivae normal.      Pupils: Pupils are equal, round, and reactive to light.   Neck:      Thyroid: No thyromegaly.   Cardiovascular:      Rate and Rhythm: Normal rate and regular rhythm.      Heart sounds: Normal heart sounds. No murmur heard.  Pulmonary:      Effort: Pulmonary effort is normal.      Breath sounds: Normal breath sounds. No wheezing.   Abdominal:      General: Bowel sounds are normal. There is no distension.      Palpations: Abdomen is soft. There is no mass.      Tenderness: There is no abdominal tenderness.      Hernia: No hernia is present.   Genitourinary:     Comments: No lesions noted  Musculoskeletal:         General: No tenderness. Normal range of motion.      Cervical back: Normal range of motion and neck supple.   Lymphadenopathy:      Cervical: No cervical adenopathy.   Skin:     General: Skin is warm and dry.      Findings: No rash.   Neurological:      Mental Status: He is alert and oriented to person, place, and time.      Cranial Nerves: No cranial nerve deficit.     Admission on 09/05/2023, Discharged on 09/05/2023   Component Date Value Ref Range Status    Glucose 09/05/2023 83  65 - 99 mg/dL Final    BUN " 09/05/2023 20  8 - 23 mg/dL Final    Creatinine 09/05/2023 0.85  0.76 - 1.27 mg/dL Final    Sodium 09/05/2023 144  136 - 145 mmol/L Final    Potassium 09/05/2023 3.5  3.5 - 5.2 mmol/L Final    Chloride 09/05/2023 104  98 - 107 mmol/L Final    CO2 09/05/2023 34.0 (H)  22.0 - 29.0 mmol/L Final    Calcium 09/05/2023 9.2  8.6 - 10.5 mg/dL Final    Total Protein 09/05/2023 7.2  6.0 - 8.5 g/dL Final    Albumin 09/05/2023 3.4 (L)  3.5 - 5.2 g/dL Final    ALT (SGPT) 09/05/2023 52 (H)  1 - 41 U/L Final    AST (SGOT) 09/05/2023 39  1 - 40 U/L Final    Alkaline Phosphatase 09/05/2023 152 (H)  39 - 117 U/L Final    Total Bilirubin 09/05/2023 0.2  0.0 - 1.2 mg/dL Final    Globulin 09/05/2023 3.8  gm/dL Final    A/G Ratio 09/05/2023 0.9  g/dL Final    BUN/Creatinine Ratio 09/05/2023 23.5  7.0 - 25.0 Final    Anion Gap 09/05/2023 6.0  5.0 - 15.0 mmol/L Final    eGFR 09/05/2023 95.8  >60.0 mL/min/1.73 Final    Magnesium 09/05/2023 2.2  1.6 - 2.4 mg/dL Final    WBC 09/05/2023 6.54  3.40 - 10.80 10*3/mm3 Final    RBC 09/05/2023 4.22  4.14 - 5.80 10*6/mm3 Final    Hemoglobin 09/05/2023 12.2 (L)  13.0 - 17.7 g/dL Final    Hematocrit 09/05/2023 37.8  37.5 - 51.0 % Final    MCV 09/05/2023 89.6  79.0 - 97.0 fL Final    MCH 09/05/2023 28.9  26.6 - 33.0 pg Final    MCHC 09/05/2023 32.3  31.5 - 35.7 g/dL Final    RDW 09/05/2023 15.3  12.3 - 15.4 % Final    RDW-SD 09/05/2023 50.1  37.0 - 54.0 fl Final    MPV 09/05/2023 12.2 (H)  6.0 - 12.0 fL Final    Platelets 09/05/2023 185  140 - 450 10*3/mm3 Final    Neutrophil % 09/05/2023 70.7  42.7 - 76.0 % Final    Lymphocyte % 09/05/2023 13.3 (L)  19.6 - 45.3 % Final    Monocyte % 09/05/2023 10.6  5.0 - 12.0 % Final    Eosinophil % 09/05/2023 4.3  0.3 - 6.2 % Final    Basophil % 09/05/2023 0.8  0.0 - 1.5 % Final    Immature Grans % 09/05/2023 0.3  0.0 - 0.5 % Final    Neutrophils, Absolute 09/05/2023 4.63  1.70 - 7.00 10*3/mm3 Final    Lymphocytes, Absolute 09/05/2023 0.87  0.70 - 3.10 10*3/mm3 Final     Monocytes, Absolute 09/05/2023 0.69  0.10 - 0.90 10*3/mm3 Final    Eosinophils, Absolute 09/05/2023 0.28  0.00 - 0.40 10*3/mm3 Final    Basophils, Absolute 09/05/2023 0.05  0.00 - 0.20 10*3/mm3 Final    Immature Grans, Absolute 09/05/2023 0.02  0.00 - 0.05 10*3/mm3 Final    nRBC 09/05/2023 0.0  0.0 - 0.2 /100 WBC Final    Extra Tube 09/05/2023 Hold for add-ons.   Final    Auto resulted.    Extra Tube 09/05/2023 Hold for add-ons.   Final    Auto resulted.    Extra Tube 09/05/2023 Hold for add-ons.   Final    Auto resulted     Assessment & Plan      1. Adenomatous polyp of colon, unspecified part of colon    1.  Adenomatous rectal polyp status post piecemeal polypectomy and APC cauterization, schedule sigmoidoscopy for further evaluation and possible retreatment.  2.  Oropharyngeal dysphagia, continue PEG tube feeds.  3.,  Vomiting, resolved.  4.  Anemia, repeat CBC.  Continue iron supplements.      Orders placed during this encounter include:  Orders Placed This Encounter   Procedures    Obtain Informed Consent     Standing Status:   Future     Order Specific Question:   Informed Consent Given For     Answer:   flexible sigmoidoscopy       SIGMOIDOSCOPY FLEXIBLE (N/A)    Review and/or summary of lab tests, radiology, procedures, medications. Review and summary of old records and obtaining of history. The risks and benefits of my recommendations, as well as other treatment options were discussed with the patient and nursing attendant today. Questions were answered.    No orders of the defined types were placed in this encounter.      Follow-up: Return in about 1 month (around 10/13/2023).               Results for orders placed or performed during the hospital encounter of 09/14/23   STAT Lactic Acid, Reflex    Specimen: Blood   Result Value Ref Range    Lactate 1.1 0.5 - 2.0 mmol/L   Single High Sensitivity Troponin T    Specimen: Blood   Result Value Ref Range    HS Troponin T 15 (H) <15 ng/L   Gold Top - SST    Result Value Ref Range    Extra Tube Hold for add-ons.    Green Top (Gel)   Result Value Ref Range    Extra Tube Hold for add-ons.    High Sensitivity Troponin T 2Hr    Specimen: Blood   Result Value Ref Range    HS Troponin T 12 <15 ng/L    Troponin T Delta -1 >=-4 - <+4 ng/L   COVID-19 and FLU A/B PCR - Swab, Nasopharynx    Specimen: Nasopharynx; Swab   Result Value Ref Range    COVID19 Not Detected Not Detected - Ref. Range    Influenza A PCR Not Detected Not Detected    Influenza B PCR Not Detected Not Detected   S. Pneumo Ag Urine or CSF - Urine, Urine, Clean Catch    Specimen: Urine, Clean Catch   Result Value Ref Range    Strep Pneumo Ag Negative Negative   Procalcitonin    Specimen: Blood   Result Value Ref Range    Procalcitonin 0.88 (H) 0.00 - 0.25 ng/mL   CBC Auto Differential    Specimen: Blood   Result Value Ref Range    WBC 9.28 3.40 - 10.80 10*3/mm3    RBC 3.88 (L) 4.14 - 5.80 10*6/mm3    Hemoglobin 11.3 (L) 13.0 - 17.7 g/dL    Hematocrit 34.0 (L) 37.5 - 51.0 %    MCV 87.6 79.0 - 97.0 fL    MCH 29.1 26.6 - 33.0 pg    MCHC 33.2 31.5 - 35.7 g/dL    RDW 14.9 12.3 - 15.4 %    RDW-SD 48.0 37.0 - 54.0 fl    MPV 12.2 (H) 6.0 - 12.0 fL    Platelets 179 140 - 450 10*3/mm3    Neutrophil % 81.6 (H) 42.7 - 76.0 %    Lymphocyte % 8.3 (L) 19.6 - 45.3 %    Monocyte % 7.3 5.0 - 12.0 %    Eosinophil % 1.5 0.3 - 6.2 %    Basophil % 0.2 0.0 - 1.5 %    Immature Grans % 1.1 (H) 0.0 - 0.5 %    Neutrophils, Absolute 7.57 (H) 1.70 - 7.00 10*3/mm3    Lymphocytes, Absolute 0.77 0.70 - 3.10 10*3/mm3    Monocytes, Absolute 0.68 0.10 - 0.90 10*3/mm3    Eosinophils, Absolute 0.14 0.00 - 0.40 10*3/mm3    Basophils, Absolute 0.02 0.00 - 0.20 10*3/mm3    Immature Grans, Absolute 0.10 (H) 0.00 - 0.05 10*3/mm3    nRBC 0.0 0.0 - 0.2 /100 WBC   CBC Auto Differential    Specimen: Blood   Result Value Ref Range    WBC 15.87 (H) 3.40 - 10.80 10*3/mm3    RBC 3.80 (L) 4.14 - 5.80 10*6/mm3    Hemoglobin 11.0 (L) 13.0 - 17.7 g/dL     Hematocrit 33.4 (L) 37.5 - 51.0 %    MCV 87.9 79.0 - 97.0 fL    MCH 28.9 26.6 - 33.0 pg    MCHC 32.9 31.5 - 35.7 g/dL    RDW 15.1 12.3 - 15.4 %    RDW-SD 48.5 37.0 - 54.0 fl    MPV 12.0 6.0 - 12.0 fL    Platelets 176 140 - 450 10*3/mm3    Neutrophil % 87.0 (H) 42.7 - 76.0 %    Lymphocyte % 7.6 (L) 19.6 - 45.3 %    Monocyte % 4.7 (L) 5.0 - 12.0 %    Eosinophil % 0.3 0.3 - 6.2 %    Basophil % 0.1 0.0 - 1.5 %    Immature Grans % 0.3 0.0 - 0.5 %    Neutrophils, Absolute 13.80 (H) 1.70 - 7.00 10*3/mm3    Lymphocytes, Absolute 1.21 0.70 - 3.10 10*3/mm3    Monocytes, Absolute 0.75 0.10 - 0.90 10*3/mm3    Eosinophils, Absolute 0.04 0.00 - 0.40 10*3/mm3    Basophils, Absolute 0.02 0.00 - 0.20 10*3/mm3    Immature Grans, Absolute 0.05 0.00 - 0.05 10*3/mm3    nRBC 0.0 0.0 - 0.2 /100 WBC   CBC Auto Differential    Specimen: Blood   Result Value Ref Range    WBC 19.65 (H) 3.40 - 10.80 10*3/mm3    RBC 3.82 (L) 4.14 - 5.80 10*6/mm3    Hemoglobin 11.0 (L) 13.0 - 17.7 g/dL    Hematocrit 33.7 (L) 37.5 - 51.0 %    MCV 88.2 79.0 - 97.0 fL    MCH 28.8 26.6 - 33.0 pg    MCHC 32.6 31.5 - 35.7 g/dL    RDW 15.2 12.3 - 15.4 %    RDW-SD 49.0 37.0 - 54.0 fl    MPV 12.1 (H) 6.0 - 12.0 fL    Platelets 175 140 - 450 10*3/mm3    Neutrophil % 93.7 (H) 42.7 - 76.0 %    Lymphocyte % 3.2 (L) 19.6 - 45.3 %    Monocyte % 2.4 (L) 5.0 - 12.0 %    Eosinophil % 0.1 (L) 0.3 - 6.2 %    Basophil % 0.3 0.0 - 1.5 %    Immature Grans % 0.3 0.0 - 0.5 %    Neutrophils, Absolute 18.41 (H) 1.70 - 7.00 10*3/mm3    Lymphocytes, Absolute 0.63 (L) 0.70 - 3.10 10*3/mm3    Monocytes, Absolute 0.48 0.10 - 0.90 10*3/mm3    Eosinophils, Absolute 0.02 0.00 - 0.40 10*3/mm3    Basophils, Absolute 0.05 0.00 - 0.20 10*3/mm3    Immature Grans, Absolute 0.06 (H) 0.00 - 0.05 10*3/mm3    nRBC 0.0 0.0 - 0.2 /100 WBC   CBC Auto Differential    Specimen: Blood   Result Value Ref Range    WBC 17.35 (H) 3.40 - 10.80 10*3/mm3    RBC 4.12 (L) 4.14 - 5.80 10*6/mm3    Hemoglobin 11.9 (L) 13.0  - 17.7 g/dL    Hematocrit 36.9 (L) 37.5 - 51.0 %    MCV 89.6 79.0 - 97.0 fL    MCH 28.9 26.6 - 33.0 pg    MCHC 32.2 31.5 - 35.7 g/dL    RDW 15.3 12.3 - 15.4 %    RDW-SD 50.5 37.0 - 54.0 fl    MPV 11.9 6.0 - 12.0 fL    Platelets 172 140 - 450 10*3/mm3   Lavender Top   Result Value Ref Range    Extra Tube hold for add-on    Lavender Top   Result Value Ref Range    Extra Tube hold for add-on    Light Blue Top   Result Value Ref Range    Extra Tube Hold for add-ons.    High Sensitivity Troponin T    Specimen: Arm, Left; Blood   Result Value Ref Range    HS Troponin T 13 <15 ng/L   Legionella Antigen, Urine - Urine, Urine, Clean Catch    Specimen: Urine, Clean Catch   Result Value Ref Range    LEGIONELLA ANTIGEN, URINE Negative Negative   Manual Differential    Specimen: Blood   Result Value Ref Range    Neutrophil % 83.0 (H) 42.7 - 76.0 %    Lymphocyte % 5.0 (L) 19.6 - 45.3 %    Monocyte % 1.0 (L) 5.0 - 12.0 %    Eosinophil % 1.0 0.3 - 6.2 %    Bands %  10.0 (H) 0.0 - 5.0 %    Neutrophils Absolute 16.14 (H) 1.70 - 7.00 10*3/mm3    Lymphocytes Absolute 0.87 0.70 - 3.10 10*3/mm3    Monocytes Absolute 0.17 0.10 - 0.90 10*3/mm3    Eosinophils Absolute 0.17 0.00 - 0.40 10*3/mm3    Anisocytosis Slight/1+ None Seen    WBC Morphology Normal Normal    Platelet Estimate Adequate Normal     *Note: Due to a large number of results and/or encounters for the requested time period, some results have not been displayed. A complete set of results can be found in Results Review.         This document has been electronically signed by Wanda Lang MD on September 29, 2023 14:24 CDT

## 2023-09-29 NOTE — ANESTHESIA PREPROCEDURE EVALUATION
Anesthesia Evaluation     Patient summary reviewed and Nursing notes reviewed   NPO Solid Status: > 8 hours  NPO Liquid Status: > 2 hours           Airway   Mallampati: II  TM distance: <3 FB  Neck ROM: limited  Possible difficult intubation  Dental    (+) poor dentition    Pulmonary     breath sounds clear to auscultation  (+) pneumonia , COPD,  Cardiovascular - normal exam    ECG reviewed    (+) hypertension, dysrhythmias, hyperlipidemia    ROS comment: 9/14/23Rhythm: sinus rhythm   BPM: 100   ST Segments: ST segments normal     Neuro/Psych  (+) seizures, syncope, psychiatric history  GI/Hepatic/Renal/Endo    (+) GERD, GI bleeding     Musculoskeletal     Abdominal    Substance History      OB/GYN          Other   arthritis,                     Anesthesia Plan    ASA 3     general   total IV anesthesia  intravenous induction     Anesthetic plan, risks, benefits, and alternatives have been provided, discussed and informed consent has been obtained with: patient.    CODE STATUS:

## 2023-10-03 LAB — REF LAB TEST METHOD: NORMAL

## 2024-09-23 NOTE — CONSULTS
Adult Nutrition  Assessment    Patient Name:  Bautista Grubbs  YOB: 1955  MRN: [id number]  Admit Date:  1/5/2021    Assessment Date:  1/6/2021    Comments:  Pt admitted due to GI Bleed.  Protonix prescribed.  To be evaluated to Gastroenterology.  Pt is NPO at present.  Pt discussed with RN who indicates pt is nonverbal.  Hgb, Hct are low.  Ferrous Sulfate prescribed.  Per Nutrition Screen pt needs thickened liquids.  Will leave note for MD regarding considering a swallowing eval when PO intake is started.     Reason for Assessment     Row Name 01/06/21 1432          Reason for Assessment    Reason For Assessment  identified at risk by screening criteria     Identified At Risk by Screening Criteria  other (see comments) needs thickened liquids             Labs/Tests/Procedures/Meds     Row Name 01/06/21 1433          Labs/Procedures/Meds    Lab Results Reviewed  reviewed, pertinent        Medications    Pertinent Medications Reviewed  reviewed, pertinent           Estimated/Assessed Needs     Row Name 01/06/21 1433          Calculation Measurements    Weight Used For Calculations  78.1 kg (172 lb 2.9 oz)        Estimated/Assessed Needs    Additional Documentation  Calorie Requirements (Group);Fluid Requirements (Group);Porter-St. Jeor Equation (Group);Protein Requirements (Group)        Calorie Requirements    Estimated Calorie Requirement (kcal/day)  2071     Estimated Calorie Need Method  Porter-St Jeor        Porter-St. Jeor Equation    RMR (Porter-St. Jeor Equation)  1593.125        Protein Requirements    Weight Used For Protein Calculations  78.1 kg (172 lb 2.9 oz)     Est Protein Requirement Amount (gms/kg)  1.0 gm protein     Estimated Protein Requirements (gms/day)  78.1        Fluid Requirements    Fluid Requirements (mL/day)  2345     RDA Method (mL)  2345         Nutrition Prescription Ordered     Row Name 01/06/21 1435          Nutrition Prescription PO    Current PO Diet  NPO                  Electronically signed by:  Blanca Menjivar RD  01/06/21 14:36 CST   No

## (undated) DEVICE — CANN SMPL SOFTECH BIFLO ETCO2 A/M 7FT

## (undated) DEVICE — BITEBLOCK ENDO W/STRAP 60F A/ LF DISP

## (undated) DEVICE — TRAP SXN POLYP QUICKCATCH LF

## (undated) DEVICE — FIAPC® PROBE W/ FILTER 2200 C OD 2.3MM/6.9FR; L 2.2M/7.2FT: Brand: ERBE

## (undated) DEVICE — Device: Brand: DISPOSABLE ELECTROSURGICAL SNARE

## (undated) DEVICE — PATIENT RETURN ELECTRODE, SINGLE-USE, CONTACT QUALITY MONITORING, ADULT, WITH 9FT CORD, FOR PATIENTS WEIGING OVER 33LBS. (15KG): Brand: MEGADYNE

## (undated) DEVICE — SINGLE-USE BIOPSY FORCEPS: Brand: RADIAL JAW 4

## (undated) DEVICE — PERCUTANEOUS ENDOSCOPIC GASTROSTOMY KIT: Brand: ENDOVIVE SAFETY PEG KIT

## (undated) DEVICE — BNDR ABD 4PANEL12IN 30TO45IN